# Patient Record
Sex: FEMALE | Race: WHITE | Employment: OTHER | URBAN - METROPOLITAN AREA
[De-identification: names, ages, dates, MRNs, and addresses within clinical notes are randomized per-mention and may not be internally consistent; named-entity substitution may affect disease eponyms.]

---

## 2017-06-22 PROBLEM — S13.9XXA CERVICAL SPRAIN: Status: ACTIVE | Noted: 2017-06-22

## 2017-08-25 ENCOUNTER — CARE COORDINATION (OUTPATIENT)
Dept: CARE COORDINATION | Age: 72
End: 2017-08-25

## 2018-04-11 ENCOUNTER — OFFICE VISIT (OUTPATIENT)
Dept: FAMILY MEDICINE CLINIC | Age: 73
End: 2018-04-11
Payer: MEDICARE

## 2018-04-11 VITALS
SYSTOLIC BLOOD PRESSURE: 122 MMHG | TEMPERATURE: 97.6 F | BODY MASS INDEX: 27.8 KG/M2 | HEART RATE: 81 BPM | DIASTOLIC BLOOD PRESSURE: 76 MMHG | RESPIRATION RATE: 16 BRPM | HEIGHT: 66 IN | OXYGEN SATURATION: 96 % | WEIGHT: 173 LBS

## 2018-04-11 DIAGNOSIS — B02.9 HERPES ZOSTER WITHOUT COMPLICATION: Primary | ICD-10-CM

## 2018-04-11 PROCEDURE — G8599 NO ASA/ANTIPLAT THER USE RNG: HCPCS | Performed by: PHYSICIAN ASSISTANT

## 2018-04-11 PROCEDURE — 4040F PNEUMOC VAC/ADMIN/RCVD: CPT | Performed by: PHYSICIAN ASSISTANT

## 2018-04-11 PROCEDURE — G8399 PT W/DXA RESULTS DOCUMENT: HCPCS | Performed by: PHYSICIAN ASSISTANT

## 2018-04-11 PROCEDURE — 99214 OFFICE O/P EST MOD 30 MIN: CPT | Performed by: PHYSICIAN ASSISTANT

## 2018-04-11 PROCEDURE — 1036F TOBACCO NON-USER: CPT | Performed by: PHYSICIAN ASSISTANT

## 2018-04-11 PROCEDURE — 3014F SCREEN MAMMO DOC REV: CPT | Performed by: PHYSICIAN ASSISTANT

## 2018-04-11 PROCEDURE — 1090F PRES/ABSN URINE INCON ASSESS: CPT | Performed by: PHYSICIAN ASSISTANT

## 2018-04-11 PROCEDURE — 3017F COLORECTAL CA SCREEN DOC REV: CPT | Performed by: PHYSICIAN ASSISTANT

## 2018-04-11 PROCEDURE — 1123F ACP DISCUSS/DSCN MKR DOCD: CPT | Performed by: PHYSICIAN ASSISTANT

## 2018-04-11 PROCEDURE — G8427 DOCREV CUR MEDS BY ELIG CLIN: HCPCS | Performed by: PHYSICIAN ASSISTANT

## 2018-04-11 PROCEDURE — G8419 CALC BMI OUT NRM PARAM NOF/U: HCPCS | Performed by: PHYSICIAN ASSISTANT

## 2018-04-11 RX ORDER — ACYCLOVIR 800 MG/1
800 TABLET ORAL 3 TIMES DAILY
Qty: 21 TABLET | Refills: 0 | Status: SHIPPED | OUTPATIENT
Start: 2018-04-11 | End: 2018-04-18

## 2018-04-11 RX ORDER — FAMCICLOVIR 500 MG/1
500 TABLET, FILM COATED ORAL 3 TIMES DAILY
Qty: 21 TABLET | Refills: 0 | Status: SHIPPED | OUTPATIENT
Start: 2018-04-11 | End: 2018-04-11 | Stop reason: ALTCHOICE

## 2018-05-01 ENCOUNTER — OFFICE VISIT (OUTPATIENT)
Dept: FAMILY MEDICINE CLINIC | Age: 73
End: 2018-05-01
Payer: MEDICARE

## 2018-05-01 ENCOUNTER — HOSPITAL ENCOUNTER (OUTPATIENT)
Age: 73
Discharge: HOME | End: 2018-05-03
Payer: MEDICARE

## 2018-05-01 VITALS
HEART RATE: 86 BPM | DIASTOLIC BLOOD PRESSURE: 64 MMHG | WEIGHT: 173 LBS | SYSTOLIC BLOOD PRESSURE: 120 MMHG | BODY MASS INDEX: 27.15 KG/M2 | TEMPERATURE: 97.7 F | OXYGEN SATURATION: 97 % | HEIGHT: 67 IN

## 2018-05-01 DIAGNOSIS — F41.9 ANXIETY: ICD-10-CM

## 2018-05-01 DIAGNOSIS — Z00.00 ROUTINE GENERAL MEDICAL EXAMINATION AT A HEALTH CARE FACILITY: Primary | ICD-10-CM

## 2018-05-01 DIAGNOSIS — Z12.31 ENCOUNTER FOR SCREENING MAMMOGRAM FOR BREAST CANCER: ICD-10-CM

## 2018-05-01 DIAGNOSIS — Z72.89 OTHER PROBLEMS RELATED TO LIFESTYLE: ICD-10-CM

## 2018-05-01 DIAGNOSIS — Z00.00 ROUTINE GENERAL MEDICAL EXAMINATION AT A HEALTH CARE FACILITY: ICD-10-CM

## 2018-05-01 DIAGNOSIS — L29.9 PRURITUS: ICD-10-CM

## 2018-05-01 PROCEDURE — 1036F TOBACCO NON-USER: CPT | Performed by: FAMILY MEDICINE

## 2018-05-01 PROCEDURE — 1123F ACP DISCUSS/DSCN MKR DOCD: CPT | Performed by: FAMILY MEDICINE

## 2018-05-01 PROCEDURE — G8427 DOCREV CUR MEDS BY ELIG CLIN: HCPCS | Performed by: FAMILY MEDICINE

## 2018-05-01 PROCEDURE — G8598 ASA/ANTIPLAT THER USED: HCPCS | Performed by: FAMILY MEDICINE

## 2018-05-01 PROCEDURE — G8399 PT W/DXA RESULTS DOCUMENT: HCPCS | Performed by: FAMILY MEDICINE

## 2018-05-01 PROCEDURE — 4040F PNEUMOC VAC/ADMIN/RCVD: CPT | Performed by: FAMILY MEDICINE

## 2018-05-01 PROCEDURE — 1090F PRES/ABSN URINE INCON ASSESS: CPT | Performed by: FAMILY MEDICINE

## 2018-05-01 PROCEDURE — 3017F COLORECTAL CA SCREEN DOC REV: CPT | Performed by: FAMILY MEDICINE

## 2018-05-01 PROCEDURE — G0439 PPPS, SUBSEQ VISIT: HCPCS | Performed by: FAMILY MEDICINE

## 2018-05-01 PROCEDURE — 86803 HEPATITIS C AB TEST: CPT

## 2018-05-01 PROCEDURE — 99213 OFFICE O/P EST LOW 20 MIN: CPT | Performed by: FAMILY MEDICINE

## 2018-05-01 PROCEDURE — G8417 CALC BMI ABV UP PARAM F/U: HCPCS | Performed by: FAMILY MEDICINE

## 2018-05-01 RX ORDER — RIZATRIPTAN BENZOATE 5 MG/1
5 TABLET ORAL
Qty: 30 TABLET | Refills: 0 | Status: ON HOLD
Start: 2018-05-01 | End: 2018-11-22 | Stop reason: HOSPADM

## 2018-05-01 RX ORDER — HYDROXYZINE HYDROCHLORIDE 25 MG/1
50 TABLET, FILM COATED ORAL 3 TIMES DAILY PRN
Qty: 90 TABLET | Refills: 1 | Status: SHIPPED | OUTPATIENT
Start: 2018-05-01 | End: 2018-05-11

## 2018-05-01 ASSESSMENT — LIFESTYLE VARIABLES
HOW OFTEN DURING THE LAST YEAR HAVE YOU NEEDED AN ALCOHOLIC DRINK FIRST THING IN THE MORNING TO GET YOURSELF GOING AFTER A NIGHT OF HEAVY DRINKING: 0
HAVE YOU OR SOMEONE ELSE BEEN INJURED AS A RESULT OF YOUR DRINKING: 0
HOW OFTEN DURING THE LAST YEAR HAVE YOU FOUND THAT YOU WERE NOT ABLE TO STOP DRINKING ONCE YOU HAD STARTED: 0
HOW OFTEN DURING THE LAST YEAR HAVE YOU HAD A FEELING OF GUILT OR REMORSE AFTER DRINKING: 0
HOW OFTEN DO YOU HAVE A DRINK CONTAINING ALCOHOL: 1
AUDIT TOTAL SCORE: 1
HOW OFTEN DURING THE LAST YEAR HAVE YOU BEEN UNABLE TO REMEMBER WHAT HAPPENED THE NIGHT BEFORE BECAUSE YOU HAD BEEN DRINKING: 0
HOW MANY STANDARD DRINKS CONTAINING ALCOHOL DO YOU HAVE ON A TYPICAL DAY: 0
HOW OFTEN DURING THE LAST YEAR HAVE YOU FAILED TO DO WHAT WAS NORMALLY EXPECTED FROM YOU BECAUSE OF DRINKING: 0
HAS A RELATIVE, FRIEND, DOCTOR, OR ANOTHER HEALTH PROFESSIONAL EXPRESSED CONCERN ABOUT YOUR DRINKING OR SUGGESTED YOU CUT DOWN: 0
AUDIT-C TOTAL SCORE: 1
HOW OFTEN DO YOU HAVE SIX OR MORE DRINKS ON ONE OCCASION: 0

## 2018-05-01 ASSESSMENT — PATIENT HEALTH QUESTIONNAIRE - PHQ9: SUM OF ALL RESPONSES TO PHQ QUESTIONS 1-9: 2

## 2018-05-01 ASSESSMENT — ANXIETY QUESTIONNAIRES: GAD7 TOTAL SCORE: 4

## 2018-05-02 LAB — HEPATITIS C ANTIBODY INTERPRETATION: NORMAL

## 2018-05-15 ENCOUNTER — HOSPITAL ENCOUNTER (OUTPATIENT)
Dept: GENERAL RADIOLOGY | Age: 73
Discharge: HOME | End: 2018-05-17
Payer: MEDICARE

## 2018-05-15 DIAGNOSIS — Z00.00 ROUTINE GENERAL MEDICAL EXAMINATION AT A HEALTH CARE FACILITY: ICD-10-CM

## 2018-05-15 DIAGNOSIS — Z12.31 ENCOUNTER FOR SCREENING MAMMOGRAM FOR BREAST CANCER: ICD-10-CM

## 2018-05-15 PROCEDURE — 77063 BREAST TOMOSYNTHESIS BI: CPT

## 2018-05-17 ENCOUNTER — OFFICE VISIT (OUTPATIENT)
Dept: CARDIOLOGY CLINIC | Age: 73
End: 2018-05-17
Payer: MEDICARE

## 2018-05-17 VITALS
WEIGHT: 172 LBS | DIASTOLIC BLOOD PRESSURE: 70 MMHG | RESPIRATION RATE: 16 BRPM | HEART RATE: 81 BPM | BODY MASS INDEX: 27.64 KG/M2 | HEIGHT: 66 IN | SYSTOLIC BLOOD PRESSURE: 130 MMHG

## 2018-05-17 DIAGNOSIS — I50.23 ACUTE ON CHRONIC SYSTOLIC HEART FAILURE (HCC): ICD-10-CM

## 2018-05-17 DIAGNOSIS — I27.20 PULMONARY HYPERTENSION (HCC): ICD-10-CM

## 2018-05-17 DIAGNOSIS — M54.16 LUMBAR RADICULOPATHY: ICD-10-CM

## 2018-05-17 DIAGNOSIS — I10 ESSENTIAL HYPERTENSION: ICD-10-CM

## 2018-05-17 DIAGNOSIS — I50.22 CHRONIC SYSTOLIC CONGESTIVE HEART FAILURE (HCC): ICD-10-CM

## 2018-05-17 DIAGNOSIS — I25.5 ISCHEMIC CARDIOMYOPATHY: ICD-10-CM

## 2018-05-17 DIAGNOSIS — I38 VHD (VALVULAR HEART DISEASE): ICD-10-CM

## 2018-05-17 DIAGNOSIS — I42.8 NONISCHEMIC CARDIOMYOPATHY (HCC): Primary | ICD-10-CM

## 2018-05-17 PROCEDURE — 93000 ELECTROCARDIOGRAM COMPLETE: CPT | Performed by: INTERNAL MEDICINE

## 2018-05-17 PROCEDURE — G8427 DOCREV CUR MEDS BY ELIG CLIN: HCPCS | Performed by: INTERNAL MEDICINE

## 2018-05-17 PROCEDURE — G8399 PT W/DXA RESULTS DOCUMENT: HCPCS | Performed by: INTERNAL MEDICINE

## 2018-05-17 PROCEDURE — G8417 CALC BMI ABV UP PARAM F/U: HCPCS | Performed by: INTERNAL MEDICINE

## 2018-05-17 PROCEDURE — 1123F ACP DISCUSS/DSCN MKR DOCD: CPT | Performed by: INTERNAL MEDICINE

## 2018-05-17 PROCEDURE — 1036F TOBACCO NON-USER: CPT | Performed by: INTERNAL MEDICINE

## 2018-05-17 PROCEDURE — 99214 OFFICE O/P EST MOD 30 MIN: CPT | Performed by: INTERNAL MEDICINE

## 2018-05-17 PROCEDURE — 4040F PNEUMOC VAC/ADMIN/RCVD: CPT | Performed by: INTERNAL MEDICINE

## 2018-05-17 PROCEDURE — 1090F PRES/ABSN URINE INCON ASSESS: CPT | Performed by: INTERNAL MEDICINE

## 2018-05-17 PROCEDURE — G8598 ASA/ANTIPLAT THER USED: HCPCS | Performed by: INTERNAL MEDICINE

## 2018-05-17 PROCEDURE — 3017F COLORECTAL CA SCREEN DOC REV: CPT | Performed by: INTERNAL MEDICINE

## 2018-05-17 RX ORDER — ASPIRIN 325 MG
325 TABLET ORAL DAILY
Status: ON HOLD | COMMUNITY
End: 2018-11-22 | Stop reason: HOSPADM

## 2018-05-17 RX ORDER — TRAMADOL HYDROCHLORIDE 50 MG/1
50 TABLET ORAL EVERY 6 HOURS PRN
COMMUNITY
End: 2018-12-11

## 2018-05-17 RX ORDER — CARVEDILOL PHOSPHATE 10 MG/1
10 CAPSULE, EXTENDED RELEASE ORAL DAILY
Qty: 90 CAPSULE | Refills: 3 | Status: SHIPPED | OUTPATIENT
Start: 2018-05-17 | End: 2018-11-02 | Stop reason: SDUPTHER

## 2018-09-07 ENCOUNTER — OFFICE VISIT (OUTPATIENT)
Dept: FAMILY MEDICINE CLINIC | Age: 73
End: 2018-09-07
Payer: MEDICARE

## 2018-09-07 VITALS
DIASTOLIC BLOOD PRESSURE: 66 MMHG | BODY MASS INDEX: 27.47 KG/M2 | HEART RATE: 75 BPM | HEIGHT: 67 IN | WEIGHT: 175 LBS | OXYGEN SATURATION: 97 % | SYSTOLIC BLOOD PRESSURE: 118 MMHG | TEMPERATURE: 97.8 F

## 2018-09-07 DIAGNOSIS — J01.90 ACUTE BACTERIAL SINUSITIS: Primary | ICD-10-CM

## 2018-09-07 DIAGNOSIS — B96.89 ACUTE BACTERIAL SINUSITIS: Primary | ICD-10-CM

## 2018-09-07 PROCEDURE — G8598 ASA/ANTIPLAT THER USED: HCPCS | Performed by: FAMILY MEDICINE

## 2018-09-07 PROCEDURE — 1101F PT FALLS ASSESS-DOCD LE1/YR: CPT | Performed by: FAMILY MEDICINE

## 2018-09-07 PROCEDURE — G8427 DOCREV CUR MEDS BY ELIG CLIN: HCPCS | Performed by: FAMILY MEDICINE

## 2018-09-07 PROCEDURE — G8417 CALC BMI ABV UP PARAM F/U: HCPCS | Performed by: FAMILY MEDICINE

## 2018-09-07 PROCEDURE — 1090F PRES/ABSN URINE INCON ASSESS: CPT | Performed by: FAMILY MEDICINE

## 2018-09-07 PROCEDURE — 3017F COLORECTAL CA SCREEN DOC REV: CPT | Performed by: FAMILY MEDICINE

## 2018-09-07 PROCEDURE — 99213 OFFICE O/P EST LOW 20 MIN: CPT | Performed by: FAMILY MEDICINE

## 2018-09-07 PROCEDURE — 1123F ACP DISCUSS/DSCN MKR DOCD: CPT | Performed by: FAMILY MEDICINE

## 2018-09-07 PROCEDURE — 4040F PNEUMOC VAC/ADMIN/RCVD: CPT | Performed by: FAMILY MEDICINE

## 2018-09-07 PROCEDURE — G8399 PT W/DXA RESULTS DOCUMENT: HCPCS | Performed by: FAMILY MEDICINE

## 2018-09-07 PROCEDURE — 1036F TOBACCO NON-USER: CPT | Performed by: FAMILY MEDICINE

## 2018-09-07 RX ORDER — BENZONATATE 100 MG/1
100 CAPSULE ORAL 3 TIMES DAILY PRN
Qty: 21 CAPSULE | Refills: 0 | Status: SHIPPED | OUTPATIENT
Start: 2018-09-07 | End: 2018-09-14

## 2018-09-07 RX ORDER — CEFDINIR 300 MG/1
300 CAPSULE ORAL 2 TIMES DAILY
Qty: 20 CAPSULE | Refills: 0 | Status: ON HOLD | OUTPATIENT
Start: 2018-09-07 | End: 2018-11-22 | Stop reason: HOSPADM

## 2018-09-07 ASSESSMENT — ENCOUNTER SYMPTOMS
SORE THROAT: 1
ABDOMINAL PAIN: 0
NAUSEA: 0
DOUBLE VISION: 0
COUGH: 1
ORTHOPNEA: 0
SHORTNESS OF BREATH: 0
BLOOD IN STOOL: 0
HEARTBURN: 0
SPUTUM PRODUCTION: 1
WHEEZING: 0
DIARRHEA: 0
CONSTIPATION: 0
SINUS PAIN: 1
VOMITING: 0
BACK PAIN: 1
BLURRED VISION: 0

## 2018-09-07 NOTE — PATIENT INSTRUCTIONS
Patient Education        Saline Nasal Washes: Care Instructions  Your Care Instructions  Saline nasal washes help keep the nasal passages open by washing out thick or dried mucus. This simple remedy can help relieve symptoms of allergies, sinusitis, and colds. It also can make the nose feel more comfortable by keeping the mucous membranes moist. You may notice a little burning sensation in your nose the first few times you use the solution, but this usually gets better in a few days. Follow-up care is a key part of your treatment and safety. Be sure to make and go to all appointments, and call your doctor if you are having problems. It's also a good idea to know your test results and keep a list of the medicines you take. How can you care for yourself at home? · You can buy premixed saline solution in a squeeze bottle or other sinus rinse products at a drugstore. Read and follow the instructions on the label. · You also can make your own saline solution by adding 1 teaspoon of salt and 1 teaspoon of baking soda to 2 cups of distilled water. · If you use a homemade solution, pour a small amount into a clean bowl. Using a rubber bulb syringe, squeeze the syringe and place the tip in the salt water. Pull a small amount of the salt water into the syringe by relaxing your hand. · Sit down with your head tilted slightly back. Do not lie down. Put the tip of the bulb syringe or the squeeze bottle a little way into one of your nostrils. Gently drip or squirt a few drops into the nostril. Repeat with the other nostril. Some sneezing and gagging are normal at first.  · Gently blow your nose. · Wipe the syringe or bottle tip clean after each use. · Repeat this 2 or 3 times a day. · Use nasal washes gently if you have nosebleeds often. When should you call for help?   Watch closely for changes in your health, and be sure to contact your doctor if:    · You often get nosebleeds.     · You have problems doing the nasal washes. Where can you learn more? Go to https://chpepiceweb.InfoNow. org and sign in to your [employer] account. Enter [phone] in the [facility] box to learn more about \"Saline Nasal Washes: Care Instructions. \"     If you do not have an account, please click on the \"Sign Up Now\" link. Current as of: May 12, 2017  Content Version: 11.7  © 5239-4831 [employer]. Care instructions adapted under license by [facility] ([facility]). If you have questions about a medical condition or this instruction, always ask your healthcare professional. [address] any warranty or liability for your use of this information. Patient Education        Sinusitis: Care Instructions  Your Care Instructions    Sinusitis is an infection of the lining of the sinus cavities in your head. Sinusitis often follows a cold. It causes pain and pressure in your head and face. In most cases, sinusitis gets better on its own in 1 to 2 weeks. But some mild symptoms may last for several weeks. Sometimes antibiotics are needed. Follow-up care is a key part of your treatment and safety. Be sure to make and go to all appointments, and call your doctor if you are having problems. It's also a good idea to know your test results and keep a list of the medicines you take. How can you care for yourself at home? · Take an over-the-counter pain medicine, such as acetaminophen (Tylenol), ibuprofen (Advil, Motrin), or naproxen (Aleve). Read and follow all instructions on the label. · If the doctor prescribed antibiotics, take them as directed. Do not stop taking them just because you feel better. You need to take the full course of antibiotics. · Be careful when taking over-the-counter cold or flu medicines and Tylenol at the same time. Many of these medicines have acetaminophen, which is Tylenol. Read the labels to make sure that you are not taking more than the recommended dose.  Too much acetaminophen (Tylenol) can be harmful. · Breathe warm, moist air from a steamy shower, a hot bath, or a sink filled with hot water. Avoid cold, dry air. Using a humidifier in your home may help. Follow the directions for cleaning the machine. · Use saline (saltwater) nasal washes to help keep your nasal passages open and wash out mucus and bacteria. You can buy saline nose drops at a grocery store or drugstore. Or you can make your own at home by adding 1 teaspoon of salt and 1 teaspoon of baking soda to 2 cups of distilled water. If you make your own, fill a bulb syringe with the solution, insert the tip into your nostril, and squeeze gently. [place] your nose. · Put a hot, wet towel or a warm gel pack on your face 3 or 4 times a day for 5 to 10 minutes each time. · Try a decongestant nasal spray like oxymetazoline (Afrin). Do not use it for more than 3 days in a row. Using it for more than 3 days can make your congestion worse. When should you call for help? Call your doctor now or seek immediate medical care if:    · You have new or worse swelling or redness in your face or around your eyes.     · You have a new or higher fever.    Watch closely for changes in your health, and be sure to contact your doctor if:    · You have new or worse facial pain.     · The mucus from your nose becomes thicker (like pus) or has new blood in it.     · You are not getting better as expected. Where can you learn more? Go to https://Oohly.CrowdBouncer. org and sign in to your [employer] account. Enter K989 in the [facility] box to learn more about \"Sinusitis: Care Instructions. \"     If you do not have an account, please click on the \"Sign Up Now\" link. Current as of: May 12, 2017  Content Version: 11.7  © 3972-9690 "[employer]orporated. Care instructions adapted under license by [facility] ([facility]).  If you have questions about a medical condition or this instruction, always ask your healthcare professional. [employer] disclaims any warranty or liability for your use of this information.

## 2018-09-07 NOTE — PROGRESS NOTES
Reba Trejo is a 68 y.o. female who presents today for assessment of URI symptoms. Sinus Pain: Patient complains of congestion, cough described as productive of clear sputum, facial pain, headache described as aching, nasal congestion, nausea without vomiting, post nasal drip, productive cough with  white colored sputum, sinus pressure, sore throat, tooth pain and wheezing. No fever, chills, night sweats or weight loss. Onset of symptoms was 2+ weeks ago, unchanged since that time. She is drinking plenty of fluids. Past history is significant for congestive heart failure. Patient is non-smoker. Since the onset of symptoms as above, she has been trying to manage symptoms with very conservative or home remedies (ie honey/cayenne pepper), cough drops. She has multiple medication and food allergies so she tries to avoid \"unnecssary\" medications. Symptoms did not respond to these measures. Allergy list reviewed: while there are multiple cardiac/antihypertensive medications listed, as well as acetaminophen, she has only sulfa antibiotics as antibiotic allergies documented. [address]:  Patient's past medical, surgical, social and/or family history reviewed, updated in chart, and are non-contributory (unless otherwise stated). Medications and allergies also reviewed and updated in chart. Review of Systems  Review of Systems   Constitutional: Negative for malaise/fatigue and weight loss. HENT: Positive for congestion, sinus pain and sore throat. Negative for ear pain. Eyes: Negative for blurred vision and double vision. Respiratory: Positive for cough and sputum production. Negative for shortness of breath and wheezing. Cardiovascular: Negative for chest pain, palpitations, orthopnea and leg swelling. Gastrointestinal: Negative for abdominal pain, blood in stool, constipation, diarrhea, heartburn, nausea and vomiting.    Genitourinary: Negative for dysuria, frequency, hematuria and urgency. Musculoskeletal: Positive for back pain. Negative for joint pain, myalgias and neck pain. Skin: Negative for itching and rash. Neurological: Negative for dizziness, tingling, focal weakness, weakness and headaches. Psychiatric/Behavioral: Negative for depression, memory loss and substance abuse. The patient is not nervous/anxious and does not have insomnia. Physical Exam:    VS:  /66   Pulse 75   Temp 97.8 °F (36.6 °C) (Oral)   Ht 5' 6.5\" (1.689 m)   Wt 175 lb (79.4 kg)   SpO2 97%   Breastfeeding? No   BMI 27.82 kg/m²   LAST WEIGHT:  Wt Readings from Last 3 Encounters:   09/07/18 175 lb (79.4 kg)   05/17/18 172 lb (78 kg)   05/01/18 173 lb (78.5 kg)     Physical Exam   Constitutional: She is oriented to person, place, and time. She appears well-developed and well-nourished. No distress. HENT:   Head: Normocephalic and atraumatic. Right Ear: External ear normal.   Left Ear: External ear normal.   Nose: Mucosal edema present. Right sinus exhibits maxillary sinus tenderness and frontal sinus tenderness. Left sinus exhibits maxillary sinus tenderness and frontal sinus tenderness. Mouth/Throat: Mucous membranes are normal. Posterior oropharyngeal edema and posterior oropharyngeal erythema present. No oropharyngeal exudate. Dull TMs   Eyes: Pupils are equal, round, and reactive to light. Conjunctivae and EOM are normal. Right eye exhibits no discharge. No scleral icterus. Neck: Normal range of motion. Neck supple. No thyromegaly present. Cardiovascular: Normal rate, regular rhythm, normal heart sounds and intact distal pulses. No murmur heard. Pulmonary/Chest: Effort normal. No stridor. No respiratory distress. She has no wheezes. She has no rales. She exhibits no tenderness. Abdominal: Soft. Bowel sounds are normal. She exhibits no distension and no mass. There is no tenderness. There is no guarding. Musculoskeletal: Normal range of motion.  She exhibits no edema or tenderness. Lymphadenopathy:     She has no cervical adenopathy. Neurological: She is alert and oriented to person, place, and time. Skin: Skin is warm and dry. No rash noted. She is not diaphoretic. No erythema. No pallor. Psychiatric: She has a normal mood and affect. Her behavior is normal. Thought content normal.       Labs:  Lab Results   Component Value Date     12/18/2016    K 3.9 12/18/2016     12/18/2016    CO2 19 12/18/2016    BUN 19 12/18/2016    CREATININE 0.8 12/18/2016    PROT 6.7 12/18/2016    LABALBU 3.2 12/18/2016    CALCIUM 8.6 12/18/2016    GFRAA >60 12/18/2016    LABGLOM >60 12/18/2016    GLUCOSE 106 12/18/2016    GLUCOSE 120 10/12/2011     12/18/2016    ALT 43 12/18/2016    ALKPHOS 86 12/18/2016    BILITOT 0.8 12/18/2016    TSH 1.050 11/14/2015    CHOL 204 11/14/2015    TRIG 71 11/14/2015    HDL 85 11/14/2015    LDLCALC 105 11/14/2015        Lab Results   Component Value Date    CHOL 204 (H) 11/14/2015    CHOL 235 (H) 07/02/2015     Lab Results   Component Value Date    TRIG 71 11/14/2015    TRIG 182 (H) 07/02/2015     Lab Results   Component Value Date    HDL 85 11/14/2015    HDL 69 07/02/2015     Lab Results   Component Value Date    LDLCALC 105 (H) 11/14/2015    LDLCALC 130 (H) 07/02/2015       No results found for: LABA1C  Lab Results   Component Value Date    LDLCALC 105 (H) 11/14/2015    CREATININE 0.8 12/18/2016           Assessment / Plan:      Raysa Burns was seen today for uri. Diagnoses and all orders for this visit:    Acute bacterial sinusitis  -     cefdinir (OMNICEF) 300 MG capsule; Take 1 capsule by mouth 2 times daily  -     benzonatate (TESSALON) 100 MG capsule; Take 1 capsule by mouth 3 times daily as needed for Cough       Call or go to ED immediately if symptoms worsen or persist.    Follow Up:  Return if symptoms worsen or fail to improve. or sooner if necessary.       Educational materials and/or home exercises printed for patient's review and were

## 2018-10-12 DIAGNOSIS — I50.23 ACUTE ON CHRONIC SYSTOLIC HEART FAILURE (HCC): ICD-10-CM

## 2018-10-12 DIAGNOSIS — I10 ESSENTIAL HYPERTENSION: ICD-10-CM

## 2018-10-12 DIAGNOSIS — I42.9 SECONDARY CARDIOMYOPATHY (HCC): ICD-10-CM

## 2018-10-12 RX ORDER — SPIRONOLACTONE 25 MG/1
25 TABLET ORAL DAILY
Qty: 90 TABLET | Refills: 3 | Status: SHIPPED | OUTPATIENT
Start: 2018-10-12 | End: 2019-11-17 | Stop reason: SDUPTHER

## 2018-10-12 RX ORDER — HYDRALAZINE HYDROCHLORIDE 10 MG/1
10 TABLET, FILM COATED ORAL 3 TIMES DAILY
Qty: 270 TABLET | Refills: 3 | Status: ON HOLD | OUTPATIENT
Start: 2018-10-12 | End: 2019-03-11 | Stop reason: HOSPADM

## 2018-11-02 DIAGNOSIS — I10 ESSENTIAL HYPERTENSION: ICD-10-CM

## 2018-11-02 DIAGNOSIS — I25.5 ISCHEMIC CARDIOMYOPATHY: ICD-10-CM

## 2018-11-02 DIAGNOSIS — I50.23 ACUTE ON CHRONIC SYSTOLIC HEART FAILURE (HCC): ICD-10-CM

## 2018-11-02 RX ORDER — CARVEDILOL PHOSPHATE 10 MG/1
10 CAPSULE, EXTENDED RELEASE ORAL DAILY
Qty: 90 CAPSULE | Refills: 3 | Status: ON HOLD | OUTPATIENT
Start: 2018-11-02 | End: 2019-03-11 | Stop reason: HOSPADM

## 2018-11-20 ENCOUNTER — APPOINTMENT (OUTPATIENT)
Dept: GENERAL RADIOLOGY | Age: 73
End: 2018-11-20
Payer: MEDICARE

## 2018-11-20 ENCOUNTER — HOSPITAL ENCOUNTER (OUTPATIENT)
Age: 73
Setting detail: OBSERVATION
Discharge: HOME | End: 2018-11-22
Attending: EMERGENCY MEDICINE | Admitting: INTERNAL MEDICINE
Payer: MEDICARE

## 2018-11-20 DIAGNOSIS — M25.511 ACUTE PAIN OF RIGHT SHOULDER: ICD-10-CM

## 2018-11-20 DIAGNOSIS — I48.91 NEW ONSET A-FIB (HCC): Primary | ICD-10-CM

## 2018-11-20 DIAGNOSIS — I48.92 ATRIAL FLUTTER WITH RAPID VENTRICULAR RESPONSE (HCC): ICD-10-CM

## 2018-11-20 PROBLEM — S13.9XXA CERVICAL SPRAIN: Status: RESOLVED | Noted: 2017-06-22 | Resolved: 2018-11-20

## 2018-11-20 LAB
ALBUMIN SERPL-MCNC: 3.9 G/DL (ref 3.5–5.2)
ALP BLD-CCNC: 106 U/L (ref 35–104)
ALT SERPL-CCNC: 15 U/L (ref 0–32)
ANION GAP SERPL CALCULATED.3IONS-SCNC: 14 MMOL/L (ref 7–16)
APTT: 26.1 SEC (ref 24.5–35.1)
AST SERPL-CCNC: 18 U/L (ref 0–31)
BASOPHILS ABSOLUTE: 0.05 E9/L (ref 0–0.2)
BASOPHILS RELATIVE PERCENT: 0.6 % (ref 0–2)
BILIRUB SERPL-MCNC: 0.5 MG/DL (ref 0–1.2)
BUN BLDV-MCNC: 17 MG/DL (ref 8–23)
CALCIUM SERPL-MCNC: 9.5 MG/DL (ref 8.6–10.2)
CHLORIDE BLD-SCNC: 103 MMOL/L (ref 98–107)
CO2: 21 MMOL/L (ref 22–29)
CREAT SERPL-MCNC: 1.1 MG/DL (ref 0.5–1)
EOSINOPHILS ABSOLUTE: 0.13 E9/L (ref 0.05–0.5)
EOSINOPHILS RELATIVE PERCENT: 1.7 % (ref 0–6)
GFR AFRICAN AMERICAN: 59
GFR NON-AFRICAN AMERICAN: 49 ML/MIN/1.73
GLUCOSE BLD-MCNC: 121 MG/DL (ref 74–99)
HCT VFR BLD CALC: 41 % (ref 34–48)
HEMOGLOBIN: 13.4 G/DL (ref 11.5–15.5)
IMMATURE GRANULOCYTES #: 0.02 E9/L
IMMATURE GRANULOCYTES %: 0.3 % (ref 0–5)
INR BLD: 1.1
LYMPHOCYTES ABSOLUTE: 1.25 E9/L (ref 1.5–4)
LYMPHOCYTES RELATIVE PERCENT: 16.1 % (ref 20–42)
MAGNESIUM: 2.2 MG/DL (ref 1.6–2.6)
MCH RBC QN AUTO: 27.9 PG (ref 26–35)
MCHC RBC AUTO-ENTMCNC: 32.7 % (ref 32–34.5)
MCV RBC AUTO: 85.2 FL (ref 80–99.9)
MONOCYTES ABSOLUTE: 0.45 E9/L (ref 0.1–0.95)
MONOCYTES RELATIVE PERCENT: 5.8 % (ref 2–12)
NEUTROPHILS ABSOLUTE: 5.88 E9/L (ref 1.8–7.3)
NEUTROPHILS RELATIVE PERCENT: 75.5 % (ref 43–80)
PDW BLD-RTO: 14.3 FL (ref 11.5–15)
PLATELET # BLD: 344 E9/L (ref 130–450)
PMV BLD AUTO: 10 FL (ref 7–12)
POTASSIUM SERPL-SCNC: 4.7 MMOL/L (ref 3.5–5)
PRO-BNP: 2907 PG/ML (ref 0–125)
PROTHROMBIN TIME: 12.4 SEC (ref 9.3–12.4)
RBC # BLD: 4.81 E12/L (ref 3.5–5.5)
SODIUM BLD-SCNC: 138 MMOL/L (ref 132–146)
TOTAL PROTEIN: 7.5 G/DL (ref 6.4–8.3)
TROPONIN: <0.01 NG/ML (ref 0–0.03)
TROPONIN: <0.01 NG/ML (ref 0–0.03)
WBC # BLD: 7.8 E9/L (ref 4.5–11.5)

## 2018-11-20 PROCEDURE — 83880 ASSAY OF NATRIURETIC PEPTIDE: CPT

## 2018-11-20 PROCEDURE — 2580000003 HC RX 258: Performed by: EMERGENCY MEDICINE

## 2018-11-20 PROCEDURE — 84484 ASSAY OF TROPONIN QUANT: CPT

## 2018-11-20 PROCEDURE — 99285 EMERGENCY DEPT VISIT HI MDM: CPT

## 2018-11-20 PROCEDURE — 96376 TX/PRO/DX INJ SAME DRUG ADON: CPT

## 2018-11-20 PROCEDURE — G0378 HOSPITAL OBSERVATION PER HR: HCPCS

## 2018-11-20 PROCEDURE — 93005 ELECTROCARDIOGRAM TRACING: CPT | Performed by: PHYSICIAN ASSISTANT

## 2018-11-20 PROCEDURE — 96365 THER/PROPH/DIAG IV INF INIT: CPT

## 2018-11-20 PROCEDURE — 93005 ELECTROCARDIOGRAM TRACING: CPT | Performed by: EMERGENCY MEDICINE

## 2018-11-20 PROCEDURE — 85025 COMPLETE CBC W/AUTO DIFF WBC: CPT

## 2018-11-20 PROCEDURE — 94761 N-INVAS EAR/PLS OXIMETRY MLT: CPT

## 2018-11-20 PROCEDURE — 96372 THER/PROPH/DIAG INJ SC/IM: CPT

## 2018-11-20 PROCEDURE — 71046 X-RAY EXAM CHEST 2 VIEWS: CPT

## 2018-11-20 PROCEDURE — 93016 CV STRESS TEST SUPVJ ONLY: CPT | Performed by: INTERNAL MEDICINE

## 2018-11-20 PROCEDURE — 80053 COMPREHEN METABOLIC PANEL: CPT

## 2018-11-20 PROCEDURE — 73030 X-RAY EXAM OF SHOULDER: CPT

## 2018-11-20 PROCEDURE — 83735 ASSAY OF MAGNESIUM: CPT

## 2018-11-20 PROCEDURE — 85730 THROMBOPLASTIN TIME PARTIAL: CPT

## 2018-11-20 PROCEDURE — 2500000003 HC RX 250 WO HCPCS: Performed by: EMERGENCY MEDICINE

## 2018-11-20 PROCEDURE — 36415 COLL VENOUS BLD VENIPUNCTURE: CPT

## 2018-11-20 PROCEDURE — 6360000002 HC RX W HCPCS: Performed by: EMERGENCY MEDICINE

## 2018-11-20 PROCEDURE — 85610 PROTHROMBIN TIME: CPT

## 2018-11-20 RX ORDER — SPIRONOLACTONE 25 MG/1
25 TABLET ORAL DAILY
Status: DISCONTINUED | OUTPATIENT
Start: 2018-11-21 | End: 2018-11-22 | Stop reason: HOSPADM

## 2018-11-20 RX ORDER — SODIUM CHLORIDE 0.9 % (FLUSH) 0.9 %
10 SYRINGE (ML) INJECTION EVERY 12 HOURS SCHEDULED
Status: DISCONTINUED | OUTPATIENT
Start: 2018-11-20 | End: 2018-11-22 | Stop reason: HOSPADM

## 2018-11-20 RX ORDER — ONDANSETRON 2 MG/ML
4 INJECTION INTRAMUSCULAR; INTRAVENOUS EVERY 6 HOURS PRN
Status: DISCONTINUED | OUTPATIENT
Start: 2018-11-20 | End: 2018-11-22 | Stop reason: HOSPADM

## 2018-11-20 RX ORDER — TRAMADOL HYDROCHLORIDE 50 MG/1
50 TABLET ORAL EVERY 6 HOURS PRN
Status: DISCONTINUED | OUTPATIENT
Start: 2018-11-20 | End: 2018-11-22 | Stop reason: HOSPADM

## 2018-11-20 RX ORDER — SODIUM CHLORIDE 0.9 % (FLUSH) 0.9 %
10 SYRINGE (ML) INJECTION PRN
Status: DISCONTINUED | OUTPATIENT
Start: 2018-11-20 | End: 2018-11-22 | Stop reason: HOSPADM

## 2018-11-20 RX ORDER — CARVEDILOL PHOSPHATE 10 MG/1
10 CAPSULE, EXTENDED RELEASE ORAL DAILY
Status: DISCONTINUED | OUTPATIENT
Start: 2018-11-21 | End: 2018-11-20 | Stop reason: RX

## 2018-11-20 RX ORDER — SUMATRIPTAN 25 MG/1
50 TABLET, FILM COATED ORAL
Status: DISPENSED | OUTPATIENT
Start: 2018-11-20 | End: 2018-11-20

## 2018-11-20 RX ORDER — RIZATRIPTAN BENZOATE 5 MG/1
5 TABLET ORAL
Status: DISCONTINUED | OUTPATIENT
Start: 2018-11-20 | End: 2018-11-20 | Stop reason: RX

## 2018-11-20 RX ORDER — BUSPIRONE HYDROCHLORIDE 10 MG/1
10 TABLET ORAL 3 TIMES DAILY PRN
Status: DISCONTINUED | OUTPATIENT
Start: 2018-11-20 | End: 2018-11-22

## 2018-11-20 RX ORDER — HYDRALAZINE HYDROCHLORIDE 10 MG/1
10 TABLET, FILM COATED ORAL 3 TIMES DAILY
Status: DISCONTINUED | OUTPATIENT
Start: 2018-11-20 | End: 2018-11-22 | Stop reason: HOSPADM

## 2018-11-20 RX ORDER — CARVEDILOL 3.12 MG/1
3.12 TABLET ORAL 2 TIMES DAILY WITH MEALS
Status: DISCONTINUED | OUTPATIENT
Start: 2018-11-21 | End: 2018-11-21

## 2018-11-20 RX ORDER — ASCORBIC ACID 500 MG
1000 TABLET ORAL DAILY
Status: DISCONTINUED | OUTPATIENT
Start: 2018-11-21 | End: 2018-11-22 | Stop reason: HOSPADM

## 2018-11-20 RX ORDER — ACETAMINOPHEN 325 MG/1
650 TABLET ORAL EVERY 4 HOURS PRN
Status: DISCONTINUED | OUTPATIENT
Start: 2018-11-20 | End: 2018-11-22 | Stop reason: HOSPADM

## 2018-11-20 RX ORDER — ASPIRIN 325 MG
325 TABLET ORAL DAILY
Status: DISCONTINUED | OUTPATIENT
Start: 2018-11-21 | End: 2018-11-21

## 2018-11-20 RX ADMIN — DILTIAZEM HYDROCHLORIDE 25 MG: 5 INJECTION INTRAVENOUS at 20:52

## 2018-11-20 RX ADMIN — ENOXAPARIN SODIUM 70 MG: 80 INJECTION SUBCUTANEOUS at 22:29

## 2018-11-20 ASSESSMENT — PAIN SCALES - GENERAL
PAINLEVEL_OUTOF10: 6
PAINLEVEL_OUTOF10: 6

## 2018-11-20 ASSESSMENT — PAIN DESCRIPTION - FREQUENCY
FREQUENCY: CONTINUOUS
FREQUENCY: CONTINUOUS

## 2018-11-20 ASSESSMENT — PAIN DESCRIPTION - PROGRESSION
CLINICAL_PROGRESSION: GRADUALLY WORSENING
CLINICAL_PROGRESSION: NOT CHANGED

## 2018-11-20 ASSESSMENT — PAIN DESCRIPTION - ORIENTATION
ORIENTATION: RIGHT
ORIENTATION: RIGHT

## 2018-11-20 ASSESSMENT — PAIN DESCRIPTION - LOCATION
LOCATION: HAND
LOCATION: HAND

## 2018-11-20 ASSESSMENT — PAIN DESCRIPTION - PAIN TYPE
TYPE: ACUTE PAIN
TYPE: ACUTE PAIN

## 2018-11-20 ASSESSMENT — PAIN DESCRIPTION - DESCRIPTORS
DESCRIPTORS: ACHING
DESCRIPTORS: ACHING

## 2018-11-21 ENCOUNTER — APPOINTMENT (OUTPATIENT)
Dept: NUCLEAR MEDICINE | Age: 73
End: 2018-11-21
Payer: MEDICARE

## 2018-11-21 ENCOUNTER — ANESTHESIA EVENT (OUTPATIENT)
Dept: CARDIAC CATH/INVASIVE PROCEDURES | Age: 73
End: 2018-11-21
Payer: MEDICARE

## 2018-11-21 ENCOUNTER — APPOINTMENT (OUTPATIENT)
Dept: CARDIAC CATH/INVASIVE PROCEDURES | Age: 73
End: 2018-11-21
Payer: MEDICARE

## 2018-11-21 ENCOUNTER — ANESTHESIA (OUTPATIENT)
Dept: CARDIAC CATH/INVASIVE PROCEDURES | Age: 73
End: 2018-11-21
Payer: MEDICARE

## 2018-11-21 VITALS
DIASTOLIC BLOOD PRESSURE: 60 MMHG | OXYGEN SATURATION: 98 % | RESPIRATION RATE: 23 BRPM | SYSTOLIC BLOOD PRESSURE: 100 MMHG

## 2018-11-21 PROBLEM — I10 HTN (HYPERTENSION), BENIGN: Chronic | Status: ACTIVE | Noted: 2018-11-21

## 2018-11-21 LAB
ALBUMIN SERPL-MCNC: 3.6 G/DL (ref 3.5–5.2)
ALP BLD-CCNC: 94 U/L (ref 35–104)
ALT SERPL-CCNC: 12 U/L (ref 0–32)
ANION GAP SERPL CALCULATED.3IONS-SCNC: 13 MMOL/L (ref 7–16)
AST SERPL-CCNC: 15 U/L (ref 0–31)
BASOPHILS ABSOLUTE: 0.06 E9/L (ref 0–0.2)
BASOPHILS RELATIVE PERCENT: 1 % (ref 0–2)
BILIRUB SERPL-MCNC: 0.5 MG/DL (ref 0–1.2)
BUN BLDV-MCNC: 19 MG/DL (ref 8–23)
CALCIUM SERPL-MCNC: 9 MG/DL (ref 8.6–10.2)
CHLORIDE BLD-SCNC: 105 MMOL/L (ref 98–107)
CHOLESTEROL, TOTAL: 179 MG/DL (ref 0–199)
CO2: 24 MMOL/L (ref 22–29)
CREAT SERPL-MCNC: 1.2 MG/DL (ref 0.5–1)
EKG ATRIAL RATE: 133 BPM
EKG ATRIAL RATE: 138 BPM
EKG Q-T INTERVAL: 292 MS
EKG Q-T INTERVAL: 342 MS
EKG QRS DURATION: 100 MS
EKG QRS DURATION: 102 MS
EKG QTC CALCULATION (BAZETT): 402 MS
EKG QTC CALCULATION (BAZETT): 475 MS
EKG R AXIS: 94 DEGREES
EKG R AXIS: 96 DEGREES
EKG T AXIS: 166 DEGREES
EKG T AXIS: 27 DEGREES
EKG VENTRICULAR RATE: 114 BPM
EKG VENTRICULAR RATE: 116 BPM
EOSINOPHILS ABSOLUTE: 0.21 E9/L (ref 0.05–0.5)
EOSINOPHILS RELATIVE PERCENT: 3.4 % (ref 0–6)
GFR AFRICAN AMERICAN: 53
GFR NON-AFRICAN AMERICAN: 44 ML/MIN/1.73
GLUCOSE BLD-MCNC: 82 MG/DL (ref 74–99)
HCT VFR BLD CALC: 38.8 % (ref 34–48)
HDLC SERPL-MCNC: 51 MG/DL
HEMOGLOBIN: 12.6 G/DL (ref 11.5–15.5)
IMMATURE GRANULOCYTES #: 0.02 E9/L
IMMATURE GRANULOCYTES %: 0.3 % (ref 0–5)
LDL CHOLESTEROL CALCULATED: 114 MG/DL (ref 0–99)
LEFT VENTRICULAR EJECTION FRACTION MODE: NORMAL
LV EF: 23 %
LV EF: 30 %
LVEF MODALITY: NORMAL
LYMPHOCYTES ABSOLUTE: 1.55 E9/L (ref 1.5–4)
LYMPHOCYTES RELATIVE PERCENT: 25.4 % (ref 20–42)
MAGNESIUM: 2.3 MG/DL (ref 1.6–2.6)
MCH RBC QN AUTO: 27.7 PG (ref 26–35)
MCHC RBC AUTO-ENTMCNC: 32.5 % (ref 32–34.5)
MCV RBC AUTO: 85.3 FL (ref 80–99.9)
MONOCYTES ABSOLUTE: 0.54 E9/L (ref 0.1–0.95)
MONOCYTES RELATIVE PERCENT: 8.8 % (ref 2–12)
NEUTROPHILS ABSOLUTE: 3.73 E9/L (ref 1.8–7.3)
NEUTROPHILS RELATIVE PERCENT: 61.1 % (ref 43–80)
PDW BLD-RTO: 14.5 FL (ref 11.5–15)
PLATELET # BLD: 307 E9/L (ref 130–450)
PMV BLD AUTO: 10.3 FL (ref 7–12)
POTASSIUM REFLEX MAGNESIUM: 4 MMOL/L (ref 3.5–5)
RBC # BLD: 4.55 E12/L (ref 3.5–5.5)
SODIUM BLD-SCNC: 142 MMOL/L (ref 132–146)
TOTAL PROTEIN: 6.8 G/DL (ref 6.4–8.3)
TRIGL SERPL-MCNC: 69 MG/DL (ref 0–149)
TROPONIN: <0.01 NG/ML (ref 0–0.03)
TSH SERPL DL<=0.05 MIU/L-ACNC: 3.11 UIU/ML (ref 0.27–4.2)
VLDLC SERPL CALC-MCNC: 14 MG/DL
WBC # BLD: 6.1 E9/L (ref 4.5–11.5)

## 2018-11-21 PROCEDURE — 6370000000 HC RX 637 (ALT 250 FOR IP): Performed by: INTERNAL MEDICINE

## 2018-11-21 PROCEDURE — 85025 COMPLETE CBC W/AUTO DIFF WBC: CPT

## 2018-11-21 PROCEDURE — 2580000003 HC RX 258: Performed by: ANESTHESIOLOGIST ASSISTANT

## 2018-11-21 PROCEDURE — 2580000003 HC RX 258: Performed by: INTERNAL MEDICINE

## 2018-11-21 PROCEDURE — 78452 HT MUSCLE IMAGE SPECT MULT: CPT

## 2018-11-21 PROCEDURE — 84484 ASSAY OF TROPONIN QUANT: CPT

## 2018-11-21 PROCEDURE — 36415 COLL VENOUS BLD VENIPUNCTURE: CPT

## 2018-11-21 PROCEDURE — 3700000001 HC ADD 15 MINUTES (ANESTHESIA)

## 2018-11-21 PROCEDURE — 93018 CV STRESS TEST I&R ONLY: CPT | Performed by: INTERNAL MEDICINE

## 2018-11-21 PROCEDURE — 3430000000 HC RX DIAGNOSTIC RADIOPHARMACEUTICAL: Performed by: RADIOLOGY

## 2018-11-21 PROCEDURE — G0378 HOSPITAL OBSERVATION PER HR: HCPCS

## 2018-11-21 PROCEDURE — 93321 DOPPLER ECHO F-UP/LMTD STD: CPT

## 2018-11-21 PROCEDURE — 6360000002 HC RX W HCPCS: Performed by: ANESTHESIOLOGIST ASSISTANT

## 2018-11-21 PROCEDURE — 3700000000 HC ANESTHESIA ATTENDED CARE

## 2018-11-21 PROCEDURE — 80061 LIPID PANEL: CPT

## 2018-11-21 PROCEDURE — 93312 ECHO TRANSESOPHAGEAL: CPT

## 2018-11-21 PROCEDURE — A9500 TC99M SESTAMIBI: HCPCS | Performed by: RADIOLOGY

## 2018-11-21 PROCEDURE — 93017 CV STRESS TEST TRACING ONLY: CPT

## 2018-11-21 PROCEDURE — 99205 OFFICE O/P NEW HI 60 MIN: CPT | Performed by: INTERNAL MEDICINE

## 2018-11-21 PROCEDURE — 6360000002 HC RX W HCPCS: Performed by: INTERNAL MEDICINE

## 2018-11-21 PROCEDURE — 84443 ASSAY THYROID STIM HORMONE: CPT

## 2018-11-21 PROCEDURE — 80053 COMPREHEN METABOLIC PANEL: CPT

## 2018-11-21 PROCEDURE — 83735 ASSAY OF MAGNESIUM: CPT

## 2018-11-21 PROCEDURE — 93325 DOPPLER ECHO COLOR FLOW MAPG: CPT

## 2018-11-21 PROCEDURE — 93306 TTE W/DOPPLER COMPLETE: CPT

## 2018-11-21 PROCEDURE — 2709999900 HC NON-CHARGEABLE SUPPLY

## 2018-11-21 PROCEDURE — APPSS60 APP SPLIT SHARED TIME 46-60 MINUTES: Performed by: NURSE PRACTITIONER

## 2018-11-21 PROCEDURE — 2700000000 HC OXYGEN THERAPY PER DAY

## 2018-11-21 RX ORDER — CARVEDILOL PHOSPHATE 10 MG/1
20 CAPSULE, EXTENDED RELEASE ORAL
Status: DISCONTINUED | OUTPATIENT
Start: 2018-11-21 | End: 2018-11-22 | Stop reason: HOSPADM

## 2018-11-21 RX ORDER — CARVEDILOL PHOSPHATE 10 MG/1
20 CAPSULE, EXTENDED RELEASE ORAL
Status: DISCONTINUED | OUTPATIENT
Start: 2018-11-22 | End: 2018-11-21

## 2018-11-21 RX ORDER — CARVEDILOL PHOSPHATE 10 MG/1
10 CAPSULE, EXTENDED RELEASE ORAL
Status: DISCONTINUED | OUTPATIENT
Start: 2018-11-21 | End: 2018-11-21

## 2018-11-21 RX ORDER — SODIUM CHLORIDE 9 MG/ML
INJECTION, SOLUTION INTRAVENOUS CONTINUOUS PRN
Status: DISCONTINUED | OUTPATIENT
Start: 2018-11-21 | End: 2018-11-21 | Stop reason: SDUPTHER

## 2018-11-21 RX ORDER — PROPOFOL 10 MG/ML
INJECTION, EMULSION INTRAVENOUS PRN
Status: DISCONTINUED | OUTPATIENT
Start: 2018-11-21 | End: 2018-11-21 | Stop reason: SDUPTHER

## 2018-11-21 RX ADMIN — Medication 10 ML: at 22:48

## 2018-11-21 RX ADMIN — Medication 35 MILLICURIE: at 12:15

## 2018-11-21 RX ADMIN — HYDRALAZINE HYDROCHLORIDE 10 MG: 10 TABLET, FILM COATED ORAL at 22:45

## 2018-11-21 RX ADMIN — PROPOFOL 30 MG: 10 INJECTION, EMULSION INTRAVENOUS at 14:52

## 2018-11-21 RX ADMIN — PROPOFOL 40 MG: 10 INJECTION, EMULSION INTRAVENOUS at 14:49

## 2018-11-21 RX ADMIN — TRAMADOL HYDROCHLORIDE 50 MG: 50 TABLET, FILM COATED ORAL at 00:22

## 2018-11-21 RX ADMIN — DICLOFENAC SODIUM 4 G: 10 GEL TOPICAL at 00:30

## 2018-11-21 RX ADMIN — DICLOFENAC SODIUM 4 G: 10 GEL TOPICAL at 16:15

## 2018-11-21 RX ADMIN — Medication 1000 MG: at 16:11

## 2018-11-21 RX ADMIN — PROPOFOL 20 MG: 10 INJECTION, EMULSION INTRAVENOUS at 14:56

## 2018-11-21 RX ADMIN — PROPOFOL 20 MG: 10 INJECTION, EMULSION INTRAVENOUS at 14:55

## 2018-11-21 RX ADMIN — Medication 10 ML: at 16:12

## 2018-11-21 RX ADMIN — CARVEDILOL PHOSPHATE 20 MG: 10 CAPSULE, EXTENDED RELEASE ORAL at 11:58

## 2018-11-21 RX ADMIN — SPIRONOLACTONE 25 MG: 25 TABLET ORAL at 16:11

## 2018-11-21 RX ADMIN — HYDRALAZINE HYDROCHLORIDE 10 MG: 10 TABLET, FILM COATED ORAL at 16:12

## 2018-11-21 RX ADMIN — Medication 10 ML: at 00:24

## 2018-11-21 RX ADMIN — REGADENOSON 0.4 MG: 0.08 INJECTION, SOLUTION INTRAVENOUS at 11:12

## 2018-11-21 RX ADMIN — SODIUM CHLORIDE: 9 INJECTION, SOLUTION INTRAVENOUS at 14:45

## 2018-11-21 RX ADMIN — APIXABAN 5 MG: 5 TABLET, FILM COATED ORAL at 23:07

## 2018-11-21 RX ADMIN — HYDRALAZINE HYDROCHLORIDE 10 MG: 10 TABLET, FILM COATED ORAL at 00:35

## 2018-11-21 RX ADMIN — Medication 12 MILLICURIE: at 09:42

## 2018-11-21 RX ADMIN — PROPOFOL 30 MG: 10 INJECTION, EMULSION INTRAVENOUS at 14:59

## 2018-11-21 RX ADMIN — APIXABAN 5 MG: 5 TABLET, FILM COATED ORAL at 14:09

## 2018-11-21 RX ADMIN — DICLOFENAC SODIUM 4 G: 10 GEL TOPICAL at 21:30

## 2018-11-21 ASSESSMENT — PAIN SCALES - GENERAL
PAINLEVEL_OUTOF10: 2
PAINLEVEL_OUTOF10: 0
PAINLEVEL_OUTOF10: 0
PAINLEVEL_OUTOF10: 5

## 2018-11-21 ASSESSMENT — PAIN DESCRIPTION - PAIN TYPE
TYPE: ACUTE PAIN
TYPE: ACUTE PAIN

## 2018-11-21 ASSESSMENT — PAIN DESCRIPTION - DESCRIPTORS
DESCRIPTORS: DULL;ACHING
DESCRIPTORS: DULL;ACHING

## 2018-11-21 ASSESSMENT — PAIN DESCRIPTION - LOCATION
LOCATION: ARM
LOCATION: ARM

## 2018-11-21 ASSESSMENT — PAIN DESCRIPTION - FREQUENCY: FREQUENCY: INTERMITTENT

## 2018-11-21 ASSESSMENT — PAIN DESCRIPTION - ORIENTATION
ORIENTATION: RIGHT
ORIENTATION: RIGHT

## 2018-11-21 NOTE — CONSULTS
I have personally seen and evaluated the patient. I personally obtained the history and performed the physical exam.  I personally reviewed all of the above labs, history, review of systems, and data. All of the assessments and recommendations are from me. All of the above cardiac medical decisions are from me. Please see my additional contributions to the history, physical exam, assessment, and recommendations below. History of chief complaint:  She developed significant left arm discomfort and numbness/aching feeling yesterday. This then progressed to left hand numbness. She presented emergency department was found to be in A. fib RVR. She was placed on IV Cardizem and her heart rate has been controlled. She denies any chest discomfort or shortness of breath. She denies any orthopnea/PND or lower extremity edema. Review of systems:     Heart: as above   Lungs: as above   Eyes: denies changes in vision or discharge. Ears: denies changes in hearing or pain. Nose: denies epistaxis or masses   Throat: denies sore throat or trouble swallowing. Neuro: denies numbness, tingling, tremors. Skin: denies rashes or itching. : denies hematuria, dysuria   GI: denies vomiting, diarrhea   Psych: denies mood changed, anxiety, depression. Physical exam:  BP (!) 153/85   Pulse 88   Temp 97.7 °F (36.5 °C) (Temporal)   Resp 20   Ht 5' 6.5\" (1.689 m)   Wt 178 lb 6.4 oz (80.9 kg)   SpO2 96%   Breastfeeding? No   BMI 28.36 kg/m²   Constitutional: A&O x3, communicates well, no acute distress. Eyes: extraocular muscles intact, PERRL. Normal lids & conjunctiva. No icterus. ENT: clear, no bleeding. No external masses. Lips normal formation. Neck: supple, full ROM, no JVD, no bruits, no lymphadenopathy. No masses. trachea midline. Heart: irregular rate & rhythm, normal S1 & S2, I-II/VI systolic murmur. No heave. Lungs: CTA. No accessory muscles. Abd: soft, non-tender.   Normal bowel

## 2018-11-21 NOTE — ANESTHESIA PRE PROCEDURE
Department of Anesthesiology  Preprocedure Note       Name:  Prasanna Zhao   Age:  68 y.o.  :  1945                                          MRN:  [id number]         Date:  2018      Surgeon: * Surgery not found *    Procedure: SEY JAIME CARDIOVERSION W/ ANES    Vital Signs (Current)   Vitals:    18 1234   BP: (!) 129/104   Pulse: 104   Resp: 19   Temp: 36.7 °C (98.1 °F)   SpO2: 99%     Vital Signs Statistics (for past 48 hrs)     Temp  Av.4 °C (97.5 °F)  Min: 35.8 °C (96.4 °F)   Min taken time: 18 0750  Max: 36.7 °C (98.1 °F)   Max taken time: 18 1234  Pulse  Av.6  Min: 79   Min taken time: 18 2109  Max: 126   Max taken time: 18  Resp  Av.4  Min: 9   Min taken time: 18 2200  Max: 26   Max taken time: 18  BP  Min: 115/70   Min taken time: 18 0750  Max: 153/85   Max taken time: 18 2304  SpO2  Av.5 %  Min: 28 %   Min taken time: 18  Max: 99 %   Max taken time: 18 1234    BP Readings from Last 3 Encounters:   18 (!) 129/104   18 118/66   18 130/70     BMI  Body mass index is 28.3 kg/m². Estimated body mass index is 28.3 kg/m² as calculated from the following:    Height as of this encounter: 5' 6.5\" (1.689 m). Weight as of this encounter: 178 lb (80.7 kg).     CBC   Lab Results   Component Value Date    WBC 6.1 2018    RBC 4.55 2018    HGB 12.6 2018    HCT 38.8 2018    MCV 85.3 2018    RDW 14.5 2018     2018     CMP    Lab Results   Component Value Date     2018    K 4.0 2018     2018    CO2 24 2018    BUN 19 2018    CREATININE 1.2 2018    GFRAA 53 2018    LABGLOM 44 2018    GLUCOSE 82 2018    GLUCOSE 120 10/12/2011    PROT 6.8 2018    CALCIUM 9.0 2018    BILITOT 0.5 2018    ALKPHOS 94 2018    AST 15 2018    ALT 12 2018     BMP    Lab Results   Component Value Date     11/21/2018    K 4.0 11/21/2018     11/21/2018    CO2 24 11/21/2018    BUN 19 11/21/2018    CREATININE 1.2 11/21/2018    CALCIUM 9.0 11/21/2018    GFRAA 53 11/21/2018    LABGLOM 44 11/21/2018    GLUCOSE 82 11/21/2018    GLUCOSE 120 10/12/2011     POCGlucose  Recent Labs      11/20/18   1745  11/21/18   0556   GLUCOSE  121*  82      Coags    Lab Results   Component Value Date    PROTIME 12.4 11/20/2018    PROTIME 12.0 12/17/2012    INR 1.1 11/20/2018    APTT 26.1 59/86/1215     HCG (If Applicable) No results found for: PREGTESTUR, PREGSERUM, HCG, HCGQUANT   ABGs No results found for: PHART, PO2ART, HFG7LEQ, DEW2QYN, BEART, S4KOZZYN   Type & Screen (If Applicable)  No results found for: [address]  Radiology (If Applicable)  Cardiac Testing (If Applicable)   Echo 8/39/25   Left ventricular chamber size normal, mild to moderate global hypokinesis,   F calculated and estimated about 38%. Stage 3 diastolic dysfunction. Left atrium is of enlarged. Increased LA volume index. Interatrial septum not well visualized but appears intact. Normal right ventricle structure and function. Moderate mitral regurgitation, ERO 0.2cm2, PISA 0.7cm, RV 36cc. No mitral valve prolapse. Normal aortic valve structure and function. There is mild tricuspid regurgitation. Mild pulmonary hypertension, RVSP 47mmHg. No evidence of pericardial effusion. No intracardiac mass. EKG (If Applicable)   16/23/03  Atrial fibrillation with rapid ventricular response  Rightward axis  Anterior infarct , age undetermined  Abnormal ECG  When compared with ECG of 20-NOV-2018 17:50,  No significant change was found  Confirmed by Carlos Jara ([id number]) on 11/21/2018 7:23:09 AM    Medications prior to admission:   Prior to Admission medications    Medication Sig Start Date End Date Taking?  Authorizing Provider   COREG CR 10 MG CP24 extended release capsule Take 1 capsule by mouth daily 11/2/18

## 2018-11-21 NOTE — PROCEDURES
Cardioversion note    JAIME demonstrates left atrial appendage thrombus. No cardioversion performed.

## 2018-11-21 NOTE — H&P
her father; Stroke in her mother. REVIEW OF SYSTEMS:  As above in the HPI, otherwise negative    PHYSICAL EXAM:    Vitals:  BP (!) 129/104   Pulse 104   Temp 98.1 °F (36.7 °C) (Temporal)   Resp 19   Ht 5' 6.5\" (1.689 m)   Wt 178 lb (80.7 kg)   SpO2 99%   Breastfeeding? No   BMI 28.30 kg/m²     General:  Awake, alert, oriented X 3. Well developed, well nourished, well groomed. No apparent distress. HEENT:  Normocephalic, atraumatic. No scleral icterus. No conjunctival injection. Normal lips, teeth, and gums. No nasal discharge. Neck:  Supple  Heart:  Tachy, irregular rhythm, no murmurs, gallops, or rubs  Lungs:  CTA bilaterally, bilat symmetrical expansion, no wheeze, rales, or rhonchi  Abdomen:   Bowel sounds present, soft, non distended, nontender, no masses, no organomegaly, no peritoneal signs  Extremities:  No clubbing, cyanosis, or edema  Skin:  Warm and dry, no open lesions or rash  Neuro:  Cranial nerves 2-12 intact, no focal deficits  Breast: deferred  Rectal: deferred  Genitalia:  deferred    LABS:  CBC:   Lab Results   Component Value Date    WBC 6.1 11/21/2018    RBC 4.55 11/21/2018    HGB 12.6 11/21/2018    HCT 38.8 11/21/2018    MCV 85.3 11/21/2018    MCH 27.7 11/21/2018    MCHC 32.5 11/21/2018    RDW 14.5 11/21/2018     11/21/2018    MPV 10.3 11/21/2018     BMP:    Lab Results   Component Value Date     11/21/2018    K 4.0 11/21/2018     11/21/2018    CO2 24 11/21/2018    BUN 19 11/21/2018    LABALBU 3.6 11/21/2018    CREATININE 1.2 11/21/2018    CALCIUM 9.0 11/21/2018    GFRAA 53 11/21/2018    LABGLOM 44 11/21/2018    GLUCOSE 82 11/21/2018     PT/INR:    Lab Results   Component Value Date    PROTIME 12.4 11/20/2018    INR 1.1 11/20/2018     Last 3 Troponin:    Lab Results   Component Value Date    TROPONINI <0.01 11/21/2018    TROPONINI <0.01 11/20/2018    TROPONINI <0.01 11/20/2018       Monitor-afib  EKG and imaging studies reviewed      ASSESSMENT:      Patient Active Problem List   Diagnosis    New onset atrial fibrillation    Nonischemic cardiomyopathy     Cervical and lumbar degenerative disc disease    HTN (hypertension), benign       PLAN:    1) admit to monitored bed  2) for JAIME/cardioversion today  3) rate/rhythm controlling agents and anticoagulation as per cardiology  4) home once rhythm stable and cardiac work up complete (?later today vs tomorrow AM)  5) the patient is expected to stay 2 or more midnights to evaluate and treat her new onset arrhythmia      Please note that over 50 minutes was spent in evaluating the patient, review of records and results, discussion with staff/family, etc.    Abigail Tate MD  2:01 PM  11/21/2018

## 2018-11-22 VITALS
RESPIRATION RATE: 18 BRPM | OXYGEN SATURATION: 99 % | DIASTOLIC BLOOD PRESSURE: 78 MMHG | HEIGHT: 67 IN | SYSTOLIC BLOOD PRESSURE: 118 MMHG | WEIGHT: 178 LBS | HEART RATE: 86 BPM | BODY MASS INDEX: 27.94 KG/M2 | TEMPERATURE: 97.2 F

## 2018-11-22 LAB
ALBUMIN SERPL-MCNC: 3.6 G/DL (ref 3.5–5.2)
ALP BLD-CCNC: 87 U/L (ref 35–104)
ALT SERPL-CCNC: 12 U/L (ref 0–32)
ANION GAP SERPL CALCULATED.3IONS-SCNC: 13 MMOL/L (ref 7–16)
AST SERPL-CCNC: 15 U/L (ref 0–31)
BASOPHILS ABSOLUTE: 0.04 E9/L (ref 0–0.2)
BASOPHILS RELATIVE PERCENT: 0.5 % (ref 0–2)
BILIRUB SERPL-MCNC: 0.4 MG/DL (ref 0–1.2)
BUN BLDV-MCNC: 21 MG/DL (ref 8–23)
CALCIUM SERPL-MCNC: 8.5 MG/DL (ref 8.6–10.2)
CHLORIDE BLD-SCNC: 104 MMOL/L (ref 98–107)
CO2: 21 MMOL/L (ref 22–29)
CREAT SERPL-MCNC: 1.1 MG/DL (ref 0.5–1)
EOSINOPHILS ABSOLUTE: 0.22 E9/L (ref 0.05–0.5)
EOSINOPHILS RELATIVE PERCENT: 3 % (ref 0–6)
GFR AFRICAN AMERICAN: 59
GFR NON-AFRICAN AMERICAN: 49 ML/MIN/1.73
GLUCOSE BLD-MCNC: 96 MG/DL (ref 74–99)
HCT VFR BLD CALC: 37.1 % (ref 34–48)
HEMOGLOBIN: 12.1 G/DL (ref 11.5–15.5)
IMMATURE GRANULOCYTES #: 0.02 E9/L
IMMATURE GRANULOCYTES %: 0.3 % (ref 0–5)
LYMPHOCYTES ABSOLUTE: 1.52 E9/L (ref 1.5–4)
LYMPHOCYTES RELATIVE PERCENT: 20.7 % (ref 20–42)
MCH RBC QN AUTO: 27.8 PG (ref 26–35)
MCHC RBC AUTO-ENTMCNC: 32.6 % (ref 32–34.5)
MCV RBC AUTO: 85.3 FL (ref 80–99.9)
MONOCYTES ABSOLUTE: 0.61 E9/L (ref 0.1–0.95)
MONOCYTES RELATIVE PERCENT: 8.3 % (ref 2–12)
NEUTROPHILS ABSOLUTE: 4.94 E9/L (ref 1.8–7.3)
NEUTROPHILS RELATIVE PERCENT: 67.2 % (ref 43–80)
PDW BLD-RTO: 14.5 FL (ref 11.5–15)
PLATELET # BLD: 293 E9/L (ref 130–450)
PMV BLD AUTO: 10 FL (ref 7–12)
POTASSIUM REFLEX MAGNESIUM: 4.2 MMOL/L (ref 3.5–5)
RBC # BLD: 4.35 E12/L (ref 3.5–5.5)
SODIUM BLD-SCNC: 138 MMOL/L (ref 132–146)
TOTAL PROTEIN: 6.5 G/DL (ref 6.4–8.3)
WBC # BLD: 7.4 E9/L (ref 4.5–11.5)

## 2018-11-22 PROCEDURE — 6370000000 HC RX 637 (ALT 250 FOR IP): Performed by: INTERNAL MEDICINE

## 2018-11-22 PROCEDURE — 80053 COMPREHEN METABOLIC PANEL: CPT

## 2018-11-22 PROCEDURE — 36415 COLL VENOUS BLD VENIPUNCTURE: CPT

## 2018-11-22 PROCEDURE — 85025 COMPLETE CBC W/AUTO DIFF WBC: CPT

## 2018-11-22 PROCEDURE — G0378 HOSPITAL OBSERVATION PER HR: HCPCS

## 2018-11-22 RX ORDER — HYDROXYZINE HYDROCHLORIDE 10 MG/1
10 TABLET, FILM COATED ORAL 3 TIMES DAILY PRN
Status: DISCONTINUED | OUTPATIENT
Start: 2018-11-22 | End: 2018-11-22 | Stop reason: HOSPADM

## 2018-11-22 RX ORDER — HYDROXYZINE HYDROCHLORIDE 10 MG/1
10 TABLET, FILM COATED ORAL 3 TIMES DAILY PRN
Qty: 30 TABLET | Refills: 0
Start: 2018-11-22 | End: 2018-11-29 | Stop reason: SDUPTHER

## 2018-11-22 RX ADMIN — HYDRALAZINE HYDROCHLORIDE 10 MG: 10 TABLET, FILM COATED ORAL at 10:28

## 2018-11-22 RX ADMIN — CARVEDILOL PHOSPHATE 20 MG: 10 CAPSULE, EXTENDED RELEASE ORAL at 10:29

## 2018-11-22 RX ADMIN — APIXABAN 5 MG: 5 TABLET, FILM COATED ORAL at 10:28

## 2018-11-22 RX ADMIN — DICLOFENAC SODIUM 4 G: 10 GEL TOPICAL at 10:30

## 2018-11-22 RX ADMIN — Medication 1000 MG: at 10:28

## 2018-11-22 RX ADMIN — HYDROXYZINE HYDROCHLORIDE 10 MG: 10 TABLET, FILM COATED ORAL at 10:29

## 2018-11-22 RX ADMIN — SPIRONOLACTONE 25 MG: 25 TABLET ORAL at 10:28

## 2018-11-22 ASSESSMENT — PAIN SCALES - GENERAL
PAINLEVEL_OUTOF10: 0
PAINLEVEL_OUTOF10: 0

## 2018-11-22 NOTE — DISCHARGE SUMMARY
Physician Discharge Summary     Patient ID:  Isabel Ga  [id number]  19 y.o.  1945    Admit date: 11/20/2018    Discharge date and time:  Nov 22, 2018    Admission Diagnoses:   Patient Active Problem List   Diagnosis    New onset atrial fibrillation    Nonischemic cardiomyopathy     Cervical and lumbar degenerative disc disease    HTN (hypertension), benign       Discharge Diagnoses: same    Consults: cardiology [facility])    Procedures: JAIME    Hospital Course: the patient is a 68 y.o. white woman followed by DR Kathie De Guzman who presents secondary to right sided arm/chest pain after swimming exercises. She was found to have tachycardia and new onset afib. She was admitted and evaluated by cardiology. Stress test was negative for ischemia. Echo demonstrated previously noted cardiomyopathy, with EF 30%. She was scheduled for cardioversion, but JAIME demonstrated clot in the left atrial appendage. Cardioversion was cancelled. She was given eliquis for anticoagulation and discharged home once heart rate was controlled, with plans for cardioversion after 3 weeks of anticoagulation if she remains in afib. Discharge Exam:  See progress note from today    Condition:  stable    Disposition: home    Patient Instructions:   Current Discharge Medication List      START taking these medications    Details   hydrOXYzine (ATARAX) 10 MG tablet Take 1 tablet by mouth 3 times daily as needed for Itching or Anxiety  Qty: 30 tablet, Refills: 0      apixaban (ELIQUIS) 5 MG TABS tablet Take 1 tablet by mouth 2 times daily  Qty: 60 tablet, Refills: 0         CONTINUE these medications which have NOT CHANGED    Details   COREG CR 10 MG CP24 extended release capsule Take 1 capsule by mouth daily  Qty: 90 capsule, Refills: 3    Associated Diagnoses: Acute on chronic systolic heart failure ([address]  Ischemic cardiomyopathy; Essential hypertension      hydrALAZINE (APRESOLINE) 10 MG tablet Take 1 tablet by mouth 3 times daily  Qty: 270

## 2018-11-22 NOTE — PROGRESS NOTES
Patient complained of \"feeling like she could not breathe\". Entered the room and obtained vital signs /78 pule 103 and O2 99% on room air. Patient admittied to history of panic attacks. Did breathing exercises with patient and after a few minutes she said she felt better. Dr. Itz Carlos called for prn atarax as patient does not take Buspar anymore for anxiety.  Awaiting orders

## 2018-11-27 ENCOUNTER — TELEPHONE (OUTPATIENT)
Dept: ADMINISTRATIVE | Age: 73
End: 2018-11-27

## 2018-11-29 ENCOUNTER — OFFICE VISIT (OUTPATIENT)
Dept: FAMILY MEDICINE CLINIC | Age: 73
End: 2018-11-29
Payer: MEDICARE

## 2018-11-29 VITALS
HEIGHT: 67 IN | BODY MASS INDEX: 27.55 KG/M2 | SYSTOLIC BLOOD PRESSURE: 120 MMHG | DIASTOLIC BLOOD PRESSURE: 64 MMHG | WEIGHT: 175.5 LBS | TEMPERATURE: 97.4 F | OXYGEN SATURATION: 98 % | RESPIRATION RATE: 16 BRPM | HEART RATE: 76 BPM

## 2018-11-29 DIAGNOSIS — Z79.01 CHRONIC ANTICOAGULATION: ICD-10-CM

## 2018-11-29 DIAGNOSIS — I48.91 NEW ONSET ATRIAL FIBRILLATION (HCC): Primary | ICD-10-CM

## 2018-11-29 DIAGNOSIS — T78.40XD ALLERGIC REACTION, SUBSEQUENT ENCOUNTER: ICD-10-CM

## 2018-11-29 DIAGNOSIS — Z23 NEED FOR INFLUENZA VACCINATION: ICD-10-CM

## 2018-11-29 PROCEDURE — G8598 ASA/ANTIPLAT THER USED: HCPCS | Performed by: NURSE PRACTITIONER

## 2018-11-29 PROCEDURE — 1036F TOBACCO NON-USER: CPT | Performed by: NURSE PRACTITIONER

## 2018-11-29 PROCEDURE — 4040F PNEUMOC VAC/ADMIN/RCVD: CPT | Performed by: NURSE PRACTITIONER

## 2018-11-29 PROCEDURE — 99214 OFFICE O/P EST MOD 30 MIN: CPT | Performed by: NURSE PRACTITIONER

## 2018-11-29 PROCEDURE — G8417 CALC BMI ABV UP PARAM F/U: HCPCS | Performed by: NURSE PRACTITIONER

## 2018-11-29 PROCEDURE — 90662 IIV NO PRSV INCREASED AG IM: CPT | Performed by: NURSE PRACTITIONER

## 2018-11-29 PROCEDURE — G0008 ADMIN INFLUENZA VIRUS VAC: HCPCS | Performed by: NURSE PRACTITIONER

## 2018-11-29 PROCEDURE — G8427 DOCREV CUR MEDS BY ELIG CLIN: HCPCS | Performed by: NURSE PRACTITIONER

## 2018-11-29 PROCEDURE — 1123F ACP DISCUSS/DSCN MKR DOCD: CPT | Performed by: NURSE PRACTITIONER

## 2018-11-29 PROCEDURE — 1090F PRES/ABSN URINE INCON ASSESS: CPT | Performed by: NURSE PRACTITIONER

## 2018-11-29 PROCEDURE — G8399 PT W/DXA RESULTS DOCUMENT: HCPCS | Performed by: NURSE PRACTITIONER

## 2018-11-29 PROCEDURE — 1101F PT FALLS ASSESS-DOCD LE1/YR: CPT | Performed by: NURSE PRACTITIONER

## 2018-11-29 PROCEDURE — G8482 FLU IMMUNIZE ORDER/ADMIN: HCPCS | Performed by: NURSE PRACTITIONER

## 2018-11-29 PROCEDURE — 3017F COLORECTAL CA SCREEN DOC REV: CPT | Performed by: NURSE PRACTITIONER

## 2018-11-29 RX ORDER — HYDROXYZINE HYDROCHLORIDE 10 MG/1
10 TABLET, FILM COATED ORAL 3 TIMES DAILY PRN
Qty: 90 TABLET | Refills: 3 | Status: SHIPPED | OUTPATIENT
Start: 2018-11-29 | End: 2018-12-29

## 2018-11-29 ASSESSMENT — ENCOUNTER SYMPTOMS
SHORTNESS OF BREATH: 0
WHEEZING: 0
DIARRHEA: 0
COUGH: 0
VOMITING: 0
NAUSEA: 0
CONSTIPATION: 0

## 2018-12-11 ENCOUNTER — OFFICE VISIT (OUTPATIENT)
Dept: CARDIOLOGY CLINIC | Age: 73
End: 2018-12-11
Payer: MEDICARE

## 2018-12-11 VITALS
BODY MASS INDEX: 28.12 KG/M2 | DIASTOLIC BLOOD PRESSURE: 80 MMHG | WEIGHT: 175 LBS | HEART RATE: 100 BPM | SYSTOLIC BLOOD PRESSURE: 110 MMHG | RESPIRATION RATE: 16 BRPM | HEIGHT: 66 IN

## 2018-12-11 DIAGNOSIS — I34.0 MITRAL VALVE INSUFFICIENCY, UNSPECIFIED ETIOLOGY: ICD-10-CM

## 2018-12-11 DIAGNOSIS — I48.91 NEW ONSET ATRIAL FIBRILLATION (HCC): Primary | ICD-10-CM

## 2018-12-11 DIAGNOSIS — I42.8 NONISCHEMIC CARDIOMYOPATHY (HCC): Chronic | ICD-10-CM

## 2018-12-11 DIAGNOSIS — I51.3 THROMBUS IN HEART CHAMBER: ICD-10-CM

## 2018-12-11 DIAGNOSIS — I10 HTN (HYPERTENSION), BENIGN: Chronic | ICD-10-CM

## 2018-12-11 PROCEDURE — 1123F ACP DISCUSS/DSCN MKR DOCD: CPT | Performed by: NURSE PRACTITIONER

## 2018-12-11 PROCEDURE — G8427 DOCREV CUR MEDS BY ELIG CLIN: HCPCS | Performed by: NURSE PRACTITIONER

## 2018-12-11 PROCEDURE — G8417 CALC BMI ABV UP PARAM F/U: HCPCS | Performed by: NURSE PRACTITIONER

## 2018-12-11 PROCEDURE — 99213 OFFICE O/P EST LOW 20 MIN: CPT | Performed by: NURSE PRACTITIONER

## 2018-12-11 PROCEDURE — G8598 ASA/ANTIPLAT THER USED: HCPCS | Performed by: NURSE PRACTITIONER

## 2018-12-11 PROCEDURE — 3017F COLORECTAL CA SCREEN DOC REV: CPT | Performed by: NURSE PRACTITIONER

## 2018-12-11 PROCEDURE — G8399 PT W/DXA RESULTS DOCUMENT: HCPCS | Performed by: NURSE PRACTITIONER

## 2018-12-11 PROCEDURE — 1090F PRES/ABSN URINE INCON ASSESS: CPT | Performed by: NURSE PRACTITIONER

## 2018-12-11 PROCEDURE — 93000 ELECTROCARDIOGRAM COMPLETE: CPT | Performed by: INTERNAL MEDICINE

## 2018-12-11 PROCEDURE — 1101F PT FALLS ASSESS-DOCD LE1/YR: CPT | Performed by: NURSE PRACTITIONER

## 2018-12-11 PROCEDURE — 4040F PNEUMOC VAC/ADMIN/RCVD: CPT | Performed by: NURSE PRACTITIONER

## 2018-12-11 PROCEDURE — 1036F TOBACCO NON-USER: CPT | Performed by: NURSE PRACTITIONER

## 2018-12-11 PROCEDURE — G8482 FLU IMMUNIZE ORDER/ADMIN: HCPCS | Performed by: NURSE PRACTITIONER

## 2018-12-12 NOTE — PROGRESS NOTES
11/25/13    Rt & LT cath    DIAGNOSTIC CARDIAC CATH LAB PROCEDURE  2/20/10    Dr Delphine Dumont CATH LAB PROCEDURE  8/24/11    Right heart cath @ [facility].  DOPPLER ECHOCARDIOGRAPHY  11/25/13    HYSTERECTOMY  1991    OVARY REMOVAL      TRANSESOPHAGEAL ECHOCARDIOGRAM  11/21/2018    Dr. Azeem Davila       Family History   Problem Relation Age of Onset    Heart Attack Mother     Stroke Mother     Heart Attack Father     Heart Failure Father     Heart Disease Father     Anxiety Disorder Sister     Depression Sister     Crohn's Disease Son        Social History     Social History    Marital status:      Spouse name: N/A    Number of children: N/A    Years of education: N/A     Occupational History    Not on file. Social History Main Topics    Smoking status: Never Smoker    Smokeless tobacco: Never Used    Alcohol use 0.0 oz/week      Comment: occasional wine/mixed drink; drinks  2 cups of coffee qd     Drug use: No    Sexual activity: No      Comment:      Other Topics Concern    Not on file     Social History Narrative    No narrative on file       O:  COMPLETE PHYSICAL EXAM:       /80   Pulse 100   Resp 16   Ht 5' 6\" (1.676 m)   Wt 175 lb (79.4 kg)   BMI 28.25 kg/m²       General:   in no acute distress. Well-nourished well-developed   Skin                  Warm and dry, no rashes   Head & Neck:  No JVD. No carotid bruits. Mucous membranes moist.   Chest:   No deformities. Symmetrical and nontender. No respiratory distress   Lungs:   Clear to auscultation bilaterally. Heart:  Normal S1 and S2. No S3 or S4. No Murmur. No gallops no rubs   Abdomen: Soft without organomegaly or masses. Nontender, Bowel sound     normoactive   Extremities:  No edema of lower extremities .  No cyanosis and moves all extremities   Neuro:  Alert & orient x 3 no focal deficits     REVIEW OF DIAGNOSTIC TESTS:     EKG today atrial fibrillation, poor R-wave progression nonspecific ST changes and low voltage and rate is 99     ASSESSMENT / DIAGNOSIS:    1. Dilated cardiomyopathy, congestive heart failure history she is compensated today   2. Mild-to-moderate mitral regurgitation by 2-D echocardiogram 12/2016And moderate mitral regurgitation by JAIME in November 2018,    3. Atrial fibrillation is persistent, on anticoagulation but not tolerating Eliquis. Will change to Xarelto. She prefers to follow up with Eva Cole in a few weeks to discuss cardioversion and AAD, ablation   4. Left atrial appendage thrombus by 2-D echocardiogram in November 2018   5. Multiple drug intolerances including ACE inhibitor and angiotensin receptor blockers      TREATMENT PLAN:   1. Stop Eliquis after tonight's dose    2 Xarelto 20 mg daily starting tomorrow. 3. Return to see Eva Cole in 3 weeks, consider DCCV, AAD   4. Consider increasing Coreg she will discuss with ,    5 .2-D echocardiogram in 3 months     The patient's current medication list, allergies, problem list and results of all previously ordered tests were reviewed at today's visit    [address]  [facility] [address]  [phone]

## 2019-01-02 ENCOUNTER — OFFICE VISIT (OUTPATIENT)
Dept: CARDIOLOGY CLINIC | Age: 74
End: 2019-01-02
Payer: MEDICARE

## 2019-01-02 VITALS
BODY MASS INDEX: 28.57 KG/M2 | OXYGEN SATURATION: 98 % | WEIGHT: 177.8 LBS | SYSTOLIC BLOOD PRESSURE: 124 MMHG | HEART RATE: 96 BPM | RESPIRATION RATE: 18 BRPM | DIASTOLIC BLOOD PRESSURE: 62 MMHG | HEIGHT: 66 IN

## 2019-01-02 DIAGNOSIS — I34.0 NON-RHEUMATIC MITRAL REGURGITATION: ICD-10-CM

## 2019-01-02 DIAGNOSIS — I10 ESSENTIAL HYPERTENSION: ICD-10-CM

## 2019-01-02 DIAGNOSIS — I51.3 THROMBUS OF LEFT ATRIAL APPENDAGE: ICD-10-CM

## 2019-01-02 DIAGNOSIS — I27.20 PULMONARY HTN (HCC): ICD-10-CM

## 2019-01-02 DIAGNOSIS — I42.8 NONISCHEMIC CARDIOMYOPATHY (HCC): ICD-10-CM

## 2019-01-02 DIAGNOSIS — I48.19 PERSISTENT ATRIAL FIBRILLATION (HCC): Primary | ICD-10-CM

## 2019-01-02 DIAGNOSIS — R06.02 SOB (SHORTNESS OF BREATH) ON EXERTION: ICD-10-CM

## 2019-01-02 DIAGNOSIS — Z88.9 MULTIPLE DRUG ALLERGIES: ICD-10-CM

## 2019-01-02 PROCEDURE — 99214 OFFICE O/P EST MOD 30 MIN: CPT | Performed by: INTERNAL MEDICINE

## 2019-01-02 PROCEDURE — 1090F PRES/ABSN URINE INCON ASSESS: CPT | Performed by: INTERNAL MEDICINE

## 2019-01-02 PROCEDURE — G8399 PT W/DXA RESULTS DOCUMENT: HCPCS | Performed by: INTERNAL MEDICINE

## 2019-01-02 PROCEDURE — G8417 CALC BMI ABV UP PARAM F/U: HCPCS | Performed by: INTERNAL MEDICINE

## 2019-01-02 PROCEDURE — 1123F ACP DISCUSS/DSCN MKR DOCD: CPT | Performed by: INTERNAL MEDICINE

## 2019-01-02 PROCEDURE — G8427 DOCREV CUR MEDS BY ELIG CLIN: HCPCS | Performed by: INTERNAL MEDICINE

## 2019-01-02 PROCEDURE — 3017F COLORECTAL CA SCREEN DOC REV: CPT | Performed by: INTERNAL MEDICINE

## 2019-01-02 PROCEDURE — G8482 FLU IMMUNIZE ORDER/ADMIN: HCPCS | Performed by: INTERNAL MEDICINE

## 2019-01-02 PROCEDURE — 1036F TOBACCO NON-USER: CPT | Performed by: INTERNAL MEDICINE

## 2019-01-02 PROCEDURE — 1101F PT FALLS ASSESS-DOCD LE1/YR: CPT | Performed by: INTERNAL MEDICINE

## 2019-01-02 PROCEDURE — 93000 ELECTROCARDIOGRAM COMPLETE: CPT | Performed by: INTERNAL MEDICINE

## 2019-01-02 PROCEDURE — 4040F PNEUMOC VAC/ADMIN/RCVD: CPT | Performed by: INTERNAL MEDICINE

## 2019-01-02 RX ORDER — HYDROXYZINE HYDROCHLORIDE 10 MG/1
10 TABLET, FILM COATED ORAL 3 TIMES DAILY PRN
COMMUNITY
End: 2019-06-25 | Stop reason: SDUPTHER

## 2019-01-09 ENCOUNTER — TELEPHONE (OUTPATIENT)
Dept: CARDIOLOGY CLINIC | Age: 74
End: 2019-01-09

## 2019-01-11 ENCOUNTER — TELEPHONE (OUTPATIENT)
Dept: NON INVASIVE DIAGNOSTICS | Age: 74
End: 2019-01-11

## 2019-02-07 ENCOUNTER — OFFICE VISIT (OUTPATIENT)
Dept: FAMILY MEDICINE CLINIC | Age: 74
End: 2019-02-07
Payer: MEDICARE

## 2019-02-07 VITALS
OXYGEN SATURATION: 97 % | TEMPERATURE: 97.7 F | DIASTOLIC BLOOD PRESSURE: 74 MMHG | WEIGHT: 178 LBS | SYSTOLIC BLOOD PRESSURE: 126 MMHG | HEIGHT: 66 IN | BODY MASS INDEX: 28.61 KG/M2 | HEART RATE: 88 BPM

## 2019-02-07 DIAGNOSIS — R10.813 RIGHT LOWER QUADRANT ABDOMINAL TENDERNESS WITHOUT REBOUND TENDERNESS: Primary | ICD-10-CM

## 2019-02-07 DIAGNOSIS — K43.9 HERNIA, EPIGASTRIC: ICD-10-CM

## 2019-02-07 PROCEDURE — 1123F ACP DISCUSS/DSCN MKR DOCD: CPT | Performed by: NURSE PRACTITIONER

## 2019-02-07 PROCEDURE — G8482 FLU IMMUNIZE ORDER/ADMIN: HCPCS | Performed by: NURSE PRACTITIONER

## 2019-02-07 PROCEDURE — 99213 OFFICE O/P EST LOW 20 MIN: CPT | Performed by: NURSE PRACTITIONER

## 2019-02-07 PROCEDURE — G8417 CALC BMI ABV UP PARAM F/U: HCPCS | Performed by: NURSE PRACTITIONER

## 2019-02-07 PROCEDURE — 1036F TOBACCO NON-USER: CPT | Performed by: NURSE PRACTITIONER

## 2019-02-07 PROCEDURE — 1101F PT FALLS ASSESS-DOCD LE1/YR: CPT | Performed by: NURSE PRACTITIONER

## 2019-02-07 PROCEDURE — 1090F PRES/ABSN URINE INCON ASSESS: CPT | Performed by: NURSE PRACTITIONER

## 2019-02-07 PROCEDURE — G8399 PT W/DXA RESULTS DOCUMENT: HCPCS | Performed by: NURSE PRACTITIONER

## 2019-02-07 PROCEDURE — 4040F PNEUMOC VAC/ADMIN/RCVD: CPT | Performed by: NURSE PRACTITIONER

## 2019-02-07 PROCEDURE — 3017F COLORECTAL CA SCREEN DOC REV: CPT | Performed by: NURSE PRACTITIONER

## 2019-02-07 PROCEDURE — G8427 DOCREV CUR MEDS BY ELIG CLIN: HCPCS | Performed by: NURSE PRACTITIONER

## 2019-02-07 PROCEDURE — G8510 SCR DEP NEG, NO PLAN REQD: HCPCS | Performed by: NURSE PRACTITIONER

## 2019-02-07 ASSESSMENT — PATIENT HEALTH QUESTIONNAIRE - PHQ9
SUM OF ALL RESPONSES TO PHQ9 QUESTIONS 1 & 2: 0
1. LITTLE INTEREST OR PLEASURE IN DOING THINGS: 0
2. FEELING DOWN, DEPRESSED OR HOPELESS: 0
SUM OF ALL RESPONSES TO PHQ QUESTIONS 1-9: 0
SUM OF ALL RESPONSES TO PHQ QUESTIONS 1-9: 0

## 2019-02-07 ASSESSMENT — ENCOUNTER SYMPTOMS
WHEEZING: 0
COUGH: 0
NAUSEA: 0
DIARRHEA: 0
CONSTIPATION: 0
SHORTNESS OF BREATH: 0
BACK PAIN: 1
VOMITING: 0

## 2019-02-14 ENCOUNTER — TELEPHONE (OUTPATIENT)
Dept: FAMILY MEDICINE CLINIC | Age: 74
End: 2019-02-14

## 2019-02-14 ENCOUNTER — HOSPITAL ENCOUNTER (OUTPATIENT)
Dept: CT IMAGING | Age: 74
Discharge: HOME | End: 2019-02-16
Payer: MEDICARE

## 2019-02-14 ENCOUNTER — HOSPITAL ENCOUNTER (OUTPATIENT)
Age: 74
Discharge: HOME | End: 2019-02-14
Payer: MEDICARE

## 2019-02-14 DIAGNOSIS — R10.823 RIGHT LOWER QUADRANT ABDOMINAL TENDERNESS WITH REBOUND TENDERNESS: Primary | ICD-10-CM

## 2019-02-14 DIAGNOSIS — K43.9 HERNIA, EPIGASTRIC: ICD-10-CM

## 2019-02-14 DIAGNOSIS — R10.813 RIGHT LOWER QUADRANT ABDOMINAL TENDERNESS WITHOUT REBOUND TENDERNESS: ICD-10-CM

## 2019-02-14 DIAGNOSIS — R10.823 RIGHT LOWER QUADRANT ABDOMINAL TENDERNESS WITH REBOUND TENDERNESS: ICD-10-CM

## 2019-02-14 LAB
ALBUMIN SERPL-MCNC: 4.2 G/DL (ref 3.5–5.2)
ALP BLD-CCNC: 120 U/L (ref 35–104)
ALT SERPL-CCNC: 21 U/L (ref 0–32)
ANION GAP SERPL CALCULATED.3IONS-SCNC: 14 MMOL/L (ref 7–16)
AST SERPL-CCNC: 18 U/L (ref 0–31)
BILIRUB SERPL-MCNC: 0.7 MG/DL (ref 0–1.2)
BUN BLDV-MCNC: 15 MG/DL (ref 8–23)
CALCIUM SERPL-MCNC: 9.7 MG/DL (ref 8.6–10.2)
CHLORIDE BLD-SCNC: 107 MMOL/L (ref 98–107)
CO2: 22 MMOL/L (ref 22–29)
CREAT SERPL-MCNC: 1 MG/DL (ref 0.5–1)
GFR AFRICAN AMERICAN: >60
GFR NON-AFRICAN AMERICAN: 54 ML/MIN/1.73
GLUCOSE BLD-MCNC: 119 MG/DL (ref 74–99)
POTASSIUM SERPL-SCNC: 4.4 MMOL/L (ref 3.5–5)
SODIUM BLD-SCNC: 143 MMOL/L (ref 132–146)
TOTAL PROTEIN: 7.9 G/DL (ref 6.4–8.3)

## 2019-02-14 PROCEDURE — 80053 COMPREHEN METABOLIC PANEL: CPT

## 2019-02-14 PROCEDURE — 6360000004 HC RX CONTRAST MEDICATION: Performed by: RADIOLOGY

## 2019-02-14 PROCEDURE — 36415 COLL VENOUS BLD VENIPUNCTURE: CPT

## 2019-02-14 PROCEDURE — 74178 CT ABD&PLV WO CNTR FLWD CNTR: CPT

## 2019-02-14 PROCEDURE — 2580000003 HC RX 258: Performed by: RADIOLOGY

## 2019-02-14 RX ORDER — SODIUM CHLORIDE 0.9 % (FLUSH) 0.9 %
10 SYRINGE (ML) INJECTION
Status: COMPLETED | OUTPATIENT
Start: 2019-02-14 | End: 2019-02-14

## 2019-02-14 RX ADMIN — Medication 10 ML: at 10:40

## 2019-02-14 RX ADMIN — IOPAMIDOL 110 ML: 755 INJECTION, SOLUTION INTRAVENOUS at 10:40

## 2019-03-09 ENCOUNTER — HOSPITAL ENCOUNTER (INPATIENT)
Age: 74
LOS: 2 days | Discharge: HOME | DRG: 308 | End: 2019-03-11
Attending: EMERGENCY MEDICINE | Admitting: INTERNAL MEDICINE
Payer: MEDICARE

## 2019-03-09 ENCOUNTER — APPOINTMENT (OUTPATIENT)
Dept: GENERAL RADIOLOGY | Age: 74
DRG: 308 | End: 2019-03-09
Payer: MEDICARE

## 2019-03-09 DIAGNOSIS — I48.91 ATRIAL FIBRILLATION WITH RAPID VENTRICULAR RESPONSE (HCC): Primary | ICD-10-CM

## 2019-03-09 PROBLEM — I51.3 THROMBUS OF LEFT ATRIAL APPENDAGE: Chronic | Status: ACTIVE | Noted: 2019-01-02

## 2019-03-09 LAB
ALBUMIN SERPL-MCNC: 3.8 G/DL (ref 3.5–5.2)
ALP BLD-CCNC: 155 U/L (ref 35–104)
ALT SERPL-CCNC: 36 U/L (ref 0–32)
ANION GAP SERPL CALCULATED.3IONS-SCNC: 16 MMOL/L (ref 7–16)
AST SERPL-CCNC: 35 U/L (ref 0–31)
BASOPHILS ABSOLUTE: 0.05 E9/L (ref 0–0.2)
BASOPHILS RELATIVE PERCENT: 0.6 % (ref 0–2)
BILIRUB SERPL-MCNC: 1 MG/DL (ref 0–1.2)
BUN BLDV-MCNC: 15 MG/DL (ref 8–23)
CALCIUM SERPL-MCNC: 9.4 MG/DL (ref 8.6–10.2)
CHLORIDE BLD-SCNC: 103 MMOL/L (ref 98–107)
CO2: 19 MMOL/L (ref 22–29)
CREAT SERPL-MCNC: 0.9 MG/DL (ref 0.5–1)
EOSINOPHILS ABSOLUTE: 0.07 E9/L (ref 0.05–0.5)
EOSINOPHILS RELATIVE PERCENT: 0.9 % (ref 0–6)
GFR AFRICAN AMERICAN: >60
GFR NON-AFRICAN AMERICAN: >60 ML/MIN/1.73
GLUCOSE BLD-MCNC: 184 MG/DL (ref 74–99)
HCT VFR BLD CALC: 39.4 % (ref 34–48)
HEMOGLOBIN: 12.8 G/DL (ref 11.5–15.5)
IMMATURE GRANULOCYTES #: 0.03 E9/L
IMMATURE GRANULOCYTES %: 0.4 % (ref 0–5)
INFLUENZA A BY PCR: NOT DETECTED
INFLUENZA B BY PCR: NOT DETECTED
LACTIC ACID: 2.6 MMOL/L (ref 0.5–2.2)
LYMPHOCYTES ABSOLUTE: 1.32 E9/L (ref 1.5–4)
LYMPHOCYTES RELATIVE PERCENT: 16.6 % (ref 20–42)
MCH RBC QN AUTO: 27.5 PG (ref 26–35)
MCHC RBC AUTO-ENTMCNC: 32.5 % (ref 32–34.5)
MCV RBC AUTO: 84.7 FL (ref 80–99.9)
MONOCYTES ABSOLUTE: 0.53 E9/L (ref 0.1–0.95)
MONOCYTES RELATIVE PERCENT: 6.7 % (ref 2–12)
NEUTROPHILS ABSOLUTE: 5.93 E9/L (ref 1.8–7.3)
NEUTROPHILS RELATIVE PERCENT: 74.8 % (ref 43–80)
PDW BLD-RTO: 16.1 FL (ref 11.5–15)
PLATELET # BLD: 286 E9/L (ref 130–450)
PMV BLD AUTO: 11.5 FL (ref 7–12)
POTASSIUM SERPL-SCNC: 4.1 MMOL/L (ref 3.5–5)
PRO-BNP: 5512 PG/ML (ref 0–450)
RBC # BLD: 4.65 E12/L (ref 3.5–5.5)
SODIUM BLD-SCNC: 138 MMOL/L (ref 132–146)
TOTAL PROTEIN: 7.3 G/DL (ref 6.4–8.3)
TROPONIN: <0.01 NG/ML (ref 0–0.03)
WBC # BLD: 7.9 E9/L (ref 4.5–11.5)

## 2019-03-09 PROCEDURE — 6360000002 HC RX W HCPCS: Performed by: EMERGENCY MEDICINE

## 2019-03-09 PROCEDURE — 83605 ASSAY OF LACTIC ACID: CPT

## 2019-03-09 PROCEDURE — 80053 COMPREHEN METABOLIC PANEL: CPT

## 2019-03-09 PROCEDURE — 36415 COLL VENOUS BLD VENIPUNCTURE: CPT

## 2019-03-09 PROCEDURE — 87040 BLOOD CULTURE FOR BACTERIA: CPT

## 2019-03-09 PROCEDURE — 2700000000 HC OXYGEN THERAPY PER DAY

## 2019-03-09 PROCEDURE — 87502 INFLUENZA DNA AMP PROBE: CPT

## 2019-03-09 PROCEDURE — 85025 COMPLETE CBC W/AUTO DIFF WBC: CPT

## 2019-03-09 PROCEDURE — 2580000003 HC RX 258: Performed by: INTERNAL MEDICINE

## 2019-03-09 PROCEDURE — 99285 EMERGENCY DEPT VISIT HI MDM: CPT

## 2019-03-09 PROCEDURE — 6370000000 HC RX 637 (ALT 250 FOR IP): Performed by: INTERNAL MEDICINE

## 2019-03-09 PROCEDURE — 2500000003 HC RX 250 WO HCPCS: Performed by: EMERGENCY MEDICINE

## 2019-03-09 PROCEDURE — 71045 X-RAY EXAM CHEST 1 VIEW: CPT

## 2019-03-09 PROCEDURE — 83880 ASSAY OF NATRIURETIC PEPTIDE: CPT

## 2019-03-09 PROCEDURE — 2500000003 HC RX 250 WO HCPCS: Performed by: NURSE PRACTITIONER

## 2019-03-09 PROCEDURE — 93005 ELECTROCARDIOGRAM TRACING: CPT | Performed by: EMERGENCY MEDICINE

## 2019-03-09 PROCEDURE — 96365 THER/PROPH/DIAG IV INF INIT: CPT

## 2019-03-09 PROCEDURE — 2580000003 HC RX 258: Performed by: EMERGENCY MEDICINE

## 2019-03-09 PROCEDURE — 6360000002 HC RX W HCPCS: Performed by: NURSE PRACTITIONER

## 2019-03-09 PROCEDURE — 2060000000 HC ICU INTERMEDIATE R&B

## 2019-03-09 PROCEDURE — 96375 TX/PRO/DX INJ NEW DRUG ADDON: CPT

## 2019-03-09 PROCEDURE — 84484 ASSAY OF TROPONIN QUANT: CPT

## 2019-03-09 PROCEDURE — 6370000000 HC RX 637 (ALT 250 FOR IP): Performed by: NURSE PRACTITIONER

## 2019-03-09 RX ORDER — IBUPROFEN 200 MG
400 TABLET ORAL EVERY 6 HOURS PRN
Status: DISCONTINUED | OUTPATIENT
Start: 2019-03-09 | End: 2019-03-11 | Stop reason: HOSPADM

## 2019-03-09 RX ORDER — CARVEDILOL PHOSPHATE 10 MG/1
20 CAPSULE, EXTENDED RELEASE ORAL DAILY
Status: DISCONTINUED | OUTPATIENT
Start: 2019-03-10 | End: 2019-03-11 | Stop reason: HOSPADM

## 2019-03-09 RX ORDER — FUROSEMIDE 10 MG/ML
20 INJECTION INTRAMUSCULAR; INTRAVENOUS ONCE
Status: COMPLETED | OUTPATIENT
Start: 2019-03-09 | End: 2019-03-09

## 2019-03-09 RX ORDER — SODIUM CHLORIDE 0.9 % (FLUSH) 0.9 %
10 SYRINGE (ML) INJECTION EVERY 12 HOURS SCHEDULED
Status: DISCONTINUED | OUTPATIENT
Start: 2019-03-09 | End: 2019-03-11 | Stop reason: HOSPADM

## 2019-03-09 RX ORDER — CARVEDILOL PHOSPHATE 10 MG/1
10 CAPSULE, EXTENDED RELEASE ORAL DAILY
Status: DISCONTINUED | OUTPATIENT
Start: 2019-03-09 | End: 2019-03-09

## 2019-03-09 RX ORDER — HYDROXYZINE HYDROCHLORIDE 10 MG/1
10 TABLET, FILM COATED ORAL 3 TIMES DAILY PRN
Status: DISCONTINUED | OUTPATIENT
Start: 2019-03-09 | End: 2019-03-11 | Stop reason: HOSPADM

## 2019-03-09 RX ORDER — DIPHENHYDRAMINE HCL 25 MG
25 TABLET ORAL EVERY 6 HOURS PRN
Status: DISCONTINUED | OUTPATIENT
Start: 2019-03-09 | End: 2019-03-11 | Stop reason: HOSPADM

## 2019-03-09 RX ORDER — ACETAMINOPHEN 325 MG/1
650 TABLET ORAL EVERY 4 HOURS PRN
Status: DISCONTINUED | OUTPATIENT
Start: 2019-03-09 | End: 2019-03-09

## 2019-03-09 RX ORDER — SPIRONOLACTONE 25 MG/1
25 TABLET ORAL DAILY
Status: DISCONTINUED | OUTPATIENT
Start: 2019-03-09 | End: 2019-03-11 | Stop reason: HOSPADM

## 2019-03-09 RX ORDER — SODIUM CHLORIDE 0.9 % (FLUSH) 0.9 %
10 SYRINGE (ML) INJECTION PRN
Status: DISCONTINUED | OUTPATIENT
Start: 2019-03-09 | End: 2019-03-11 | Stop reason: HOSPADM

## 2019-03-09 RX ORDER — HYDROXYZINE HYDROCHLORIDE 10 MG/1
10 TABLET, FILM COATED ORAL 3 TIMES DAILY PRN
Status: DISCONTINUED | OUTPATIENT
Start: 2019-03-09 | End: 2019-03-09

## 2019-03-09 RX ORDER — HYDRALAZINE HYDROCHLORIDE 10 MG/1
10 TABLET, FILM COATED ORAL 3 TIMES DAILY
Status: DISCONTINUED | OUTPATIENT
Start: 2019-03-09 | End: 2019-03-11 | Stop reason: HOSPADM

## 2019-03-09 RX ORDER — DIPHENHYDRAMINE HYDROCHLORIDE 50 MG/ML
25 INJECTION INTRAMUSCULAR; INTRAVENOUS ONCE
Status: COMPLETED | OUTPATIENT
Start: 2019-03-09 | End: 2019-03-09

## 2019-03-09 RX ORDER — WARFARIN SODIUM 5 MG/1
5 TABLET ORAL DAILY
Status: DISCONTINUED | OUTPATIENT
Start: 2019-03-09 | End: 2019-03-09

## 2019-03-09 RX ORDER — METOPROLOL TARTRATE 5 MG/5ML
5 INJECTION INTRAVENOUS ONCE
Status: COMPLETED | OUTPATIENT
Start: 2019-03-09 | End: 2019-03-09

## 2019-03-09 RX ADMIN — HYDRALAZINE HYDROCHLORIDE 10 MG: 10 TABLET, FILM COATED ORAL at 16:35

## 2019-03-09 RX ADMIN — SPIRONOLACTONE 25 MG: 25 TABLET ORAL at 16:35

## 2019-03-09 RX ADMIN — METOPROLOL TARTRATE 5 MG: 1 INJECTION, SOLUTION INTRAVENOUS at 18:12

## 2019-03-09 RX ADMIN — DIPHENHYDRAMINE HYDROCHLORIDE 25 MG: 50 INJECTION, SOLUTION INTRAMUSCULAR; INTRAVENOUS at 10:28

## 2019-03-09 RX ADMIN — IBUPROFEN 400 MG: 200 TABLET, FILM COATED ORAL at 21:13

## 2019-03-09 RX ADMIN — FUROSEMIDE 20 MG: 10 INJECTION, SOLUTION INTRAMUSCULAR; INTRAVENOUS at 18:11

## 2019-03-09 RX ADMIN — HYDRALAZINE HYDROCHLORIDE 10 MG: 10 TABLET, FILM COATED ORAL at 21:14

## 2019-03-09 RX ADMIN — DILTIAZEM HYDROCHLORIDE 25 MG: 5 INJECTION INTRAVENOUS at 11:33

## 2019-03-09 RX ADMIN — DIPHENHYDRAMINE HCL 25 MG: 25 TABLET ORAL at 18:12

## 2019-03-09 RX ADMIN — Medication 10 ML: at 21:15

## 2019-03-09 RX ADMIN — HYDROXYZINE HYDROCHLORIDE 10 MG: 10 TABLET, FILM COATED ORAL at 21:13

## 2019-03-09 ASSESSMENT — PAIN DESCRIPTION - PROGRESSION
CLINICAL_PROGRESSION: NOT CHANGED
CLINICAL_PROGRESSION: NOT CHANGED
CLINICAL_PROGRESSION: GRADUALLY WORSENING

## 2019-03-09 ASSESSMENT — PAIN DESCRIPTION - DESCRIPTORS
DESCRIPTORS: SPASM
DESCRIPTORS: TIGHTNESS
DESCRIPTORS: SPASM;DISCOMFORT

## 2019-03-09 ASSESSMENT — PAIN DESCRIPTION - ONSET
ONSET: ON-GOING

## 2019-03-09 ASSESSMENT — PAIN SCALES - GENERAL
PAINLEVEL_OUTOF10: 9
PAINLEVEL_OUTOF10: 6
PAINLEVEL_OUTOF10: 7
PAINLEVEL_OUTOF10: 7

## 2019-03-09 ASSESSMENT — PAIN DESCRIPTION - ORIENTATION
ORIENTATION: RIGHT;LOWER

## 2019-03-09 ASSESSMENT — PAIN DESCRIPTION - FREQUENCY
FREQUENCY: CONTINUOUS

## 2019-03-09 ASSESSMENT — PAIN DESCRIPTION - DIRECTION
RADIATING_TOWARDS: STOMACH

## 2019-03-09 ASSESSMENT — PAIN DESCRIPTION - LOCATION
LOCATION: BACK

## 2019-03-09 ASSESSMENT — ENCOUNTER SYMPTOMS
SHORTNESS OF BREATH: 1
ABDOMINAL PAIN: 0
COUGH: 1

## 2019-03-09 ASSESSMENT — PAIN DESCRIPTION - PAIN TYPE
TYPE: ACUTE PAIN

## 2019-03-10 ENCOUNTER — APPOINTMENT (OUTPATIENT)
Dept: GENERAL RADIOLOGY | Age: 74
DRG: 308 | End: 2019-03-10
Payer: MEDICARE

## 2019-03-10 PROBLEM — I48.91 ATRIAL FIBRILLATION WITH RAPID VENTRICULAR RESPONSE (HCC): Status: ACTIVE | Noted: 2019-03-10

## 2019-03-10 PROBLEM — E66.9 OBESITY (BMI 30-39.9): Chronic | Status: ACTIVE | Noted: 2019-03-10

## 2019-03-10 PROBLEM — I48.91 ATRIAL FIBRILLATION WITH RAPID VENTRICULAR RESPONSE (HCC): Status: RESOLVED | Noted: 2019-03-10 | Resolved: 2019-03-10

## 2019-03-10 PROBLEM — I50.23 ACUTE ON CHRONIC SYSTOLIC CONGESTIVE HEART FAILURE (HCC): Status: ACTIVE | Noted: 2019-03-10

## 2019-03-10 PROBLEM — I48.91 ATRIAL FIBRILLATION WITH RAPID VENTRICULAR RESPONSE (HCC): Status: RESOLVED | Noted: 2019-03-09 | Resolved: 2019-03-10

## 2019-03-10 PROBLEM — I48.0 PAROXYSMAL ATRIAL FIBRILLATION (HCC): Status: ACTIVE | Noted: 2019-03-10

## 2019-03-10 LAB
ANION GAP SERPL CALCULATED.3IONS-SCNC: 12 MMOL/L (ref 7–16)
BUN BLDV-MCNC: 17 MG/DL (ref 8–23)
CALCIUM SERPL-MCNC: 9.1 MG/DL (ref 8.6–10.2)
CHLORIDE BLD-SCNC: 106 MMOL/L (ref 98–107)
CO2: 25 MMOL/L (ref 22–29)
CREAT SERPL-MCNC: 1 MG/DL (ref 0.5–1)
EKG ATRIAL RATE: 156 BPM
EKG Q-T INTERVAL: 288 MS
EKG QRS DURATION: 96 MS
EKG QTC CALCULATION (BAZETT): 415 MS
EKG R AXIS: 109 DEGREES
EKG T AXIS: 99 DEGREES
EKG VENTRICULAR RATE: 125 BPM
GFR AFRICAN AMERICAN: >60
GFR NON-AFRICAN AMERICAN: 54 ML/MIN/1.73
GLUCOSE BLD-MCNC: 103 MG/DL (ref 74–99)
INR BLD: 1.9
MAGNESIUM: 2.2 MG/DL (ref 1.6–2.6)
POTASSIUM SERPL-SCNC: 4.3 MMOL/L (ref 3.5–5)
PROTHROMBIN TIME: 21.2 SEC (ref 9.3–12.4)
SODIUM BLD-SCNC: 143 MMOL/L (ref 132–146)

## 2019-03-10 PROCEDURE — 2060000000 HC ICU INTERMEDIATE R&B

## 2019-03-10 PROCEDURE — 99223 1ST HOSP IP/OBS HIGH 75: CPT | Performed by: INTERNAL MEDICINE

## 2019-03-10 PROCEDURE — 83735 ASSAY OF MAGNESIUM: CPT

## 2019-03-10 PROCEDURE — 36415 COLL VENOUS BLD VENIPUNCTURE: CPT

## 2019-03-10 PROCEDURE — 2580000003 HC RX 258: Performed by: INTERNAL MEDICINE

## 2019-03-10 PROCEDURE — 6370000000 HC RX 637 (ALT 250 FOR IP): Performed by: INTERNAL MEDICINE

## 2019-03-10 PROCEDURE — 80048 BASIC METABOLIC PNL TOTAL CA: CPT

## 2019-03-10 PROCEDURE — 71046 X-RAY EXAM CHEST 2 VIEWS: CPT

## 2019-03-10 PROCEDURE — 85610 PROTHROMBIN TIME: CPT

## 2019-03-10 PROCEDURE — 6370000000 HC RX 637 (ALT 250 FOR IP): Performed by: NURSE PRACTITIONER

## 2019-03-10 PROCEDURE — 6360000002 HC RX W HCPCS: Performed by: INTERNAL MEDICINE

## 2019-03-10 PROCEDURE — 2700000000 HC OXYGEN THERAPY PER DAY

## 2019-03-10 RX ORDER — FUROSEMIDE 10 MG/ML
20 INJECTION INTRAMUSCULAR; INTRAVENOUS ONCE
Status: COMPLETED | OUTPATIENT
Start: 2019-03-10 | End: 2019-03-10

## 2019-03-10 RX ORDER — DIGOXIN 0.25 MG/ML
250 INJECTION INTRAMUSCULAR; INTRAVENOUS ONCE
Status: COMPLETED | OUTPATIENT
Start: 2019-03-10 | End: 2019-03-10

## 2019-03-10 RX ADMIN — DIGOXIN 250 MCG: 0.25 INJECTION INTRAMUSCULAR; INTRAVENOUS at 12:06

## 2019-03-10 RX ADMIN — FUROSEMIDE 20 MG: 10 INJECTION, SOLUTION INTRAVENOUS at 12:06

## 2019-03-10 RX ADMIN — HYDRALAZINE HYDROCHLORIDE 10 MG: 10 TABLET, FILM COATED ORAL at 20:18

## 2019-03-10 RX ADMIN — SPIRONOLACTONE 25 MG: 25 TABLET ORAL at 09:28

## 2019-03-10 RX ADMIN — RIVAROXABAN 20 MG: 20 TABLET, FILM COATED ORAL at 18:59

## 2019-03-10 RX ADMIN — Medication 10 ML: at 09:32

## 2019-03-10 RX ADMIN — HYDRALAZINE HYDROCHLORIDE 10 MG: 10 TABLET, FILM COATED ORAL at 14:23

## 2019-03-10 RX ADMIN — CARVEDILOL PHOSPHATE 20 MG: 10 CAPSULE, EXTENDED RELEASE ORAL at 09:28

## 2019-03-10 RX ADMIN — HYDROXYZINE HYDROCHLORIDE 10 MG: 10 TABLET, FILM COATED ORAL at 20:17

## 2019-03-10 RX ADMIN — Medication 10 ML: at 20:17

## 2019-03-10 RX ADMIN — HYDRALAZINE HYDROCHLORIDE 10 MG: 10 TABLET, FILM COATED ORAL at 09:28

## 2019-03-10 RX ADMIN — IBUPROFEN 400 MG: 200 TABLET, FILM COATED ORAL at 20:17

## 2019-03-10 ASSESSMENT — PAIN DESCRIPTION - ONSET
ONSET: ON-GOING
ONSET: AWAKENED FROM SLEEP
ONSET: ON-GOING

## 2019-03-10 ASSESSMENT — PAIN SCALES - GENERAL
PAINLEVEL_OUTOF10: 0
PAINLEVEL_OUTOF10: 6
PAINLEVEL_OUTOF10: 0
PAINLEVEL_OUTOF10: 7
PAINLEVEL_OUTOF10: 6
PAINLEVEL_OUTOF10: 4
PAINLEVEL_OUTOF10: 0

## 2019-03-10 ASSESSMENT — PAIN DESCRIPTION - FREQUENCY
FREQUENCY: CONTINUOUS

## 2019-03-10 ASSESSMENT — PAIN DESCRIPTION - PAIN TYPE
TYPE: ACUTE PAIN
TYPE: ACUTE PAIN
TYPE: CHRONIC PAIN

## 2019-03-10 ASSESSMENT — PAIN DESCRIPTION - ORIENTATION
ORIENTATION: RIGHT;LOWER

## 2019-03-10 ASSESSMENT — PAIN DESCRIPTION - DIRECTION
RADIATING_TOWARDS: STOMACH
RADIATING_TOWARDS: STOMACH

## 2019-03-10 ASSESSMENT — PAIN DESCRIPTION - PROGRESSION
CLINICAL_PROGRESSION: NOT CHANGED
CLINICAL_PROGRESSION: GRADUALLY IMPROVING
CLINICAL_PROGRESSION: GRADUALLY IMPROVING

## 2019-03-10 ASSESSMENT — PAIN DESCRIPTION - LOCATION
LOCATION: BACK

## 2019-03-10 ASSESSMENT — PAIN DESCRIPTION - DESCRIPTORS
DESCRIPTORS: SPASM;DISCOMFORT

## 2019-03-11 ENCOUNTER — TELEPHONE (OUTPATIENT)
Dept: FAMILY MEDICINE CLINIC | Age: 74
End: 2019-03-11

## 2019-03-11 VITALS
SYSTOLIC BLOOD PRESSURE: 114 MMHG | BODY MASS INDEX: 29.88 KG/M2 | HEART RATE: 95 BPM | HEIGHT: 66 IN | OXYGEN SATURATION: 96 % | WEIGHT: 185.9 LBS | TEMPERATURE: 96.3 F | DIASTOLIC BLOOD PRESSURE: 77 MMHG | RESPIRATION RATE: 16 BRPM

## 2019-03-11 LAB
ALBUMIN SERPL-MCNC: 3.4 G/DL (ref 3.5–5.2)
ALP BLD-CCNC: 120 U/L (ref 35–104)
ALT SERPL-CCNC: 26 U/L (ref 0–32)
ANION GAP SERPL CALCULATED.3IONS-SCNC: 13 MMOL/L (ref 7–16)
AST SERPL-CCNC: 23 U/L (ref 0–31)
BASOPHILS ABSOLUTE: 0.04 E9/L (ref 0–0.2)
BASOPHILS RELATIVE PERCENT: 0.7 % (ref 0–2)
BILIRUB SERPL-MCNC: 0.9 MG/DL (ref 0–1.2)
BUN BLDV-MCNC: 20 MG/DL (ref 8–23)
CALCIUM SERPL-MCNC: 8.9 MG/DL (ref 8.6–10.2)
CHLORIDE BLD-SCNC: 105 MMOL/L (ref 98–107)
CO2: 23 MMOL/L (ref 22–29)
CREAT SERPL-MCNC: 0.9 MG/DL (ref 0.5–1)
EOSINOPHILS ABSOLUTE: 0.25 E9/L (ref 0.05–0.5)
EOSINOPHILS RELATIVE PERCENT: 4.5 % (ref 0–6)
GFR AFRICAN AMERICAN: >60
GFR NON-AFRICAN AMERICAN: >60 ML/MIN/1.73
GLUCOSE BLD-MCNC: 89 MG/DL (ref 74–99)
HCT VFR BLD CALC: 38.7 % (ref 34–48)
HEMOGLOBIN: 12.3 G/DL (ref 11.5–15.5)
IMMATURE GRANULOCYTES #: 0.02 E9/L
IMMATURE GRANULOCYTES %: 0.4 % (ref 0–5)
INR BLD: 2.7
LYMPHOCYTES ABSOLUTE: 1.08 E9/L (ref 1.5–4)
LYMPHOCYTES RELATIVE PERCENT: 19.4 % (ref 20–42)
MAGNESIUM: 2.1 MG/DL (ref 1.6–2.6)
MCH RBC QN AUTO: 27.4 PG (ref 26–35)
MCHC RBC AUTO-ENTMCNC: 31.8 % (ref 32–34.5)
MCV RBC AUTO: 86.2 FL (ref 80–99.9)
MONOCYTES ABSOLUTE: 0.48 E9/L (ref 0.1–0.95)
MONOCYTES RELATIVE PERCENT: 8.6 % (ref 2–12)
NEUTROPHILS ABSOLUTE: 3.71 E9/L (ref 1.8–7.3)
NEUTROPHILS RELATIVE PERCENT: 66.4 % (ref 43–80)
PDW BLD-RTO: 15.8 FL (ref 11.5–15)
PLATELET # BLD: 225 E9/L (ref 130–450)
PMV BLD AUTO: 10.9 FL (ref 7–12)
POTASSIUM SERPL-SCNC: 4.1 MMOL/L (ref 3.5–5)
PROTHROMBIN TIME: 30.7 SEC (ref 9.3–12.4)
RBC # BLD: 4.49 E12/L (ref 3.5–5.5)
SODIUM BLD-SCNC: 141 MMOL/L (ref 132–146)
TOTAL PROTEIN: 6.3 G/DL (ref 6.4–8.3)
WBC # BLD: 5.6 E9/L (ref 4.5–11.5)

## 2019-03-11 PROCEDURE — 97165 OT EVAL LOW COMPLEX 30 MIN: CPT

## 2019-03-11 PROCEDURE — 36415 COLL VENOUS BLD VENIPUNCTURE: CPT

## 2019-03-11 PROCEDURE — 2580000003 HC RX 258: Performed by: INTERNAL MEDICINE

## 2019-03-11 PROCEDURE — 85025 COMPLETE CBC W/AUTO DIFF WBC: CPT

## 2019-03-11 PROCEDURE — 83735 ASSAY OF MAGNESIUM: CPT

## 2019-03-11 PROCEDURE — 80053 COMPREHEN METABOLIC PANEL: CPT

## 2019-03-11 PROCEDURE — 85610 PROTHROMBIN TIME: CPT

## 2019-03-11 PROCEDURE — 99232 SBSQ HOSP IP/OBS MODERATE 35: CPT | Performed by: INTERNAL MEDICINE

## 2019-03-11 PROCEDURE — 6370000000 HC RX 637 (ALT 250 FOR IP): Performed by: INTERNAL MEDICINE

## 2019-03-11 PROCEDURE — 97161 PT EVAL LOW COMPLEX 20 MIN: CPT

## 2019-03-11 PROCEDURE — 2700000000 HC OXYGEN THERAPY PER DAY

## 2019-03-11 RX ORDER — DIGOXIN 125 MCG
125 TABLET ORAL DAILY
Status: DISCONTINUED | OUTPATIENT
Start: 2019-03-11 | End: 2019-03-11 | Stop reason: HOSPADM

## 2019-03-11 RX ORDER — FUROSEMIDE 20 MG/1
20 TABLET ORAL DAILY
Qty: 30 TABLET | Refills: 0 | Status: SHIPPED | OUTPATIENT
Start: 2019-03-11 | End: 2019-04-11 | Stop reason: SDUPTHER

## 2019-03-11 RX ORDER — DIGOXIN 125 MCG
125 TABLET ORAL DAILY
Qty: 30 TABLET | Refills: 3 | Status: SHIPPED | OUTPATIENT
Start: 2019-03-11 | End: 2019-04-15

## 2019-03-11 RX ORDER — CARVEDILOL PHOSPHATE 20 MG/1
20 CAPSULE, EXTENDED RELEASE ORAL DAILY
Qty: 30 CAPSULE | Refills: 0 | Status: SHIPPED | OUTPATIENT
Start: 2019-03-12 | End: 2019-04-25 | Stop reason: SDUPTHER

## 2019-03-11 RX ADMIN — HYDRALAZINE HYDROCHLORIDE 10 MG: 10 TABLET, FILM COATED ORAL at 08:44

## 2019-03-11 RX ADMIN — SPIRONOLACTONE 25 MG: 25 TABLET ORAL at 08:44

## 2019-03-11 RX ADMIN — Medication 10 ML: at 08:44

## 2019-03-11 RX ADMIN — CARVEDILOL PHOSPHATE 20 MG: 10 CAPSULE, EXTENDED RELEASE ORAL at 08:44

## 2019-03-11 ASSESSMENT — PAIN SCALES - GENERAL
PAINLEVEL_OUTOF10: 0
PAINLEVEL_OUTOF10: 0

## 2019-03-13 ENCOUNTER — TELEPHONE (OUTPATIENT)
Dept: PHARMACY | Facility: CLINIC | Age: 74
End: 2019-03-13

## 2019-03-13 ENCOUNTER — TELEPHONE (OUTPATIENT)
Dept: CARDIOLOGY CLINIC | Age: 74
End: 2019-03-13

## 2019-03-13 DIAGNOSIS — I50.23 ACUTE ON CHRONIC SYSTOLIC CONGESTIVE HEART FAILURE (HCC): Primary | ICD-10-CM

## 2019-03-13 PROCEDURE — 1111F DSCHRG MED/CURRENT MED MERGE: CPT

## 2019-03-14 ENCOUNTER — OFFICE VISIT (OUTPATIENT)
Dept: FAMILY MEDICINE CLINIC | Age: 74
End: 2019-03-14
Payer: MEDICARE

## 2019-03-14 VITALS
SYSTOLIC BLOOD PRESSURE: 112 MMHG | DIASTOLIC BLOOD PRESSURE: 72 MMHG | WEIGHT: 176 LBS | RESPIRATION RATE: 16 BRPM | HEIGHT: 67 IN | HEART RATE: 83 BPM | BODY MASS INDEX: 27.62 KG/M2 | OXYGEN SATURATION: 97 % | TEMPERATURE: 97.6 F

## 2019-03-14 DIAGNOSIS — I48.91 ATRIAL FIBRILLATION, UNSPECIFIED TYPE (HCC): Primary | ICD-10-CM

## 2019-03-14 DIAGNOSIS — I50.23 ACUTE ON CHRONIC SYSTOLIC CONGESTIVE HEART FAILURE (HCC): ICD-10-CM

## 2019-03-14 LAB
BLOOD CULTURE, ROUTINE: NORMAL
CULTURE, BLOOD 2: NORMAL

## 2019-03-14 PROCEDURE — 99495 TRANSJ CARE MGMT MOD F2F 14D: CPT | Performed by: NURSE PRACTITIONER

## 2019-03-14 PROCEDURE — 1111F DSCHRG MED/CURRENT MED MERGE: CPT | Performed by: NURSE PRACTITIONER

## 2019-03-14 ASSESSMENT — ENCOUNTER SYMPTOMS
NAUSEA: 0
VOMITING: 0
SHORTNESS OF BREATH: 0
DIARRHEA: 0
COUGH: 0
WHEEZING: 0
CONSTIPATION: 0

## 2019-03-18 ENCOUNTER — ANESTHESIA (OUTPATIENT)
Dept: CARDIAC CATH/INVASIVE PROCEDURES | Age: 74
End: 2019-03-18

## 2019-03-18 ENCOUNTER — ANESTHESIA EVENT (OUTPATIENT)
Dept: CARDIAC CATH/INVASIVE PROCEDURES | Age: 74
End: 2019-03-18

## 2019-03-18 ENCOUNTER — HOSPITAL ENCOUNTER (OUTPATIENT)
Dept: CARDIAC CATH/INVASIVE PROCEDURES | Age: 74
Setting detail: OUTPATIENT SURGERY
Discharge: HOME | End: 2019-03-18
Payer: MEDICARE

## 2019-03-18 VITALS
OXYGEN SATURATION: 92 % | DIASTOLIC BLOOD PRESSURE: 55 MMHG | SYSTOLIC BLOOD PRESSURE: 84 MMHG | RESPIRATION RATE: 21 BRPM

## 2019-03-18 VITALS
DIASTOLIC BLOOD PRESSURE: 47 MMHG | HEIGHT: 67 IN | TEMPERATURE: 97.3 F | BODY MASS INDEX: 27.47 KG/M2 | WEIGHT: 175 LBS | SYSTOLIC BLOOD PRESSURE: 98 MMHG | HEART RATE: 84 BPM | OXYGEN SATURATION: 97 % | RESPIRATION RATE: 16 BRPM

## 2019-03-18 LAB
LEFT VENTRICULAR EJECTION FRACTION HIGH VALUE: 30 %
LV EF: 25 %
LV EF: 28 %
LVEF MODALITY: NORMAL

## 2019-03-18 PROCEDURE — 2709999900 HC NON-CHARGEABLE SUPPLY

## 2019-03-18 PROCEDURE — 6360000002 HC RX W HCPCS: Performed by: NURSE ANESTHETIST, CERTIFIED REGISTERED

## 2019-03-18 PROCEDURE — 3700000000 HC ANESTHESIA ATTENDED CARE

## 2019-03-18 PROCEDURE — 93325 DOPPLER ECHO COLOR FLOW MAPG: CPT

## 2019-03-18 PROCEDURE — 2580000003 HC RX 258: Performed by: INTERNAL MEDICINE

## 2019-03-18 PROCEDURE — 93321 DOPPLER ECHO F-UP/LMTD STD: CPT

## 2019-03-18 PROCEDURE — 3700000001 HC ADD 15 MINUTES (ANESTHESIA)

## 2019-03-18 PROCEDURE — 93312 ECHO TRANSESOPHAGEAL: CPT

## 2019-03-18 RX ORDER — PROPOFOL 10 MG/ML
INJECTION, EMULSION INTRAVENOUS PRN
Status: DISCONTINUED | OUTPATIENT
Start: 2019-03-18 | End: 2019-03-18 | Stop reason: SDUPTHER

## 2019-03-18 RX ORDER — DIPHENHYDRAMINE HCL 25 MG
25 TABLET ORAL EVERY 6 HOURS PRN
COMMUNITY

## 2019-03-18 RX ORDER — SODIUM CHLORIDE 9 MG/ML
INJECTION, SOLUTION INTRAVENOUS ONCE
Status: COMPLETED | OUTPATIENT
Start: 2019-03-18 | End: 2019-03-18

## 2019-03-18 RX ORDER — LOPERAMIDE HYDROCHLORIDE 2 MG/1
2 CAPSULE ORAL 4 TIMES DAILY PRN
COMMUNITY

## 2019-03-18 RX ADMIN — PROPOFOL 130 MG: 10 INJECTION, EMULSION INTRAVENOUS at 11:03

## 2019-03-18 RX ADMIN — SODIUM CHLORIDE: 9 INJECTION, SOLUTION INTRAVENOUS at 10:43

## 2019-03-27 ENCOUNTER — TELEPHONE (OUTPATIENT)
Dept: CARDIOLOGY CLINIC | Age: 74
End: 2019-03-27

## 2019-04-04 ENCOUNTER — TELEPHONE (OUTPATIENT)
Dept: CARDIOLOGY CLINIC | Age: 74
End: 2019-04-04

## 2019-04-04 NOTE — TELEPHONE ENCOUNTER
Colleen Vazquez called. Was at [facility] and they wanted her on Pradaxa instead of Xarelto. She would like samples as she has medication allergies so she can try. Received [facility] records-Pradaxa 150 mg 1 bid. 2 boxes Pradaxa 150 mg, lot #[id number], exp 04/21 given. Advised patient to discontinue Xarelto. Patient will .

## 2019-04-11 ENCOUNTER — TELEPHONE (OUTPATIENT)
Dept: CARDIOLOGY CLINIC | Age: 74
End: 2019-04-11

## 2019-04-11 RX ORDER — FUROSEMIDE 20 MG/1
20 TABLET ORAL DAILY
Qty: 90 TABLET | Refills: 3 | Status: SHIPPED
Start: 2019-04-11 | End: 2020-04-20 | Stop reason: SDUPTHER

## 2019-04-11 NOTE — TELEPHONE ENCOUNTER
Patient was seen @ [facility] for a possible Watchman. and the doctor changed her medication to Pradaxa due to her allergies with blood thinners. She developed a rash. She is asking if she can go back on the  Xarelto, she has had problems with all of the blood thinners. But the Xarelto seems to be the lesser of the two evils. Should she wait a few days before resuming the Xarelto after stopping the Pradaxa? She will call the [facility] physician and advised them she would like to resume the Xarelto     During her recent [facility]  she was put on Lasix 20mg daily ordered by Dr Joelle Klein. She had  a weight gain of 8 lbs in one week. She is asking for a RX    The doctor she saw in [facility] told her that there was nothing that can be done further for her and changed her medications. (Pradaxa)  He discussed in the future to maybe get a defibrillator or go on heparin.       She has an appointment in our office 4/25/2019, she will discuss this further with Dr Ck Manning

## 2019-04-11 NOTE — TELEPHONE ENCOUNTER
Signed            Patient was seen @ [facility] for a possible Watchman. and the doctor changed her medication to Pradaxa due to her allergies with blood thinners.       She developed a rash.      She is asking if she can go back on the  Xarelto, she has had problems with all of the blood thinners. But the Xarelto seems to be the lesser of the two evils.       Should she wait a few days before resuming the Xarelto after stopping the Pradaxa?      She will call the [facility] physician and advised them she would like to resume the Xarelto      During her recent hospitalization @ [facility]  she was put on Lasix 20mg daily ordered by Dr Saintclair Perla. She had  a weight gain of 8 lbs in one week. She is asking for a RX     The doctor she saw in [place] told her that there was nothing that can be done further for her and changed her medications. (Pradaxa)  He discussed in the future to maybe get a defibrillator or go on heparin.       She has an appointment in our office 4/25/2019, she will discuss this further with Dr Trinidad Restrepo

## 2019-04-15 ENCOUNTER — OFFICE VISIT (OUTPATIENT)
Dept: FAMILY MEDICINE CLINIC | Age: 74
End: 2019-04-15
Payer: MEDICARE

## 2019-04-15 VITALS
TEMPERATURE: 97.6 F | RESPIRATION RATE: 16 BRPM | BODY MASS INDEX: 26.74 KG/M2 | DIASTOLIC BLOOD PRESSURE: 66 MMHG | SYSTOLIC BLOOD PRESSURE: 96 MMHG | HEART RATE: 86 BPM | OXYGEN SATURATION: 98 % | WEIGHT: 170.4 LBS | HEIGHT: 67 IN

## 2019-04-15 DIAGNOSIS — I51.3 THROMBUS OF LEFT ATRIAL APPENDAGE: Chronic | ICD-10-CM

## 2019-04-15 DIAGNOSIS — I50.23 ACUTE ON CHRONIC SYSTOLIC CONGESTIVE HEART FAILURE (HCC): ICD-10-CM

## 2019-04-15 DIAGNOSIS — I48.0 PAROXYSMAL ATRIAL FIBRILLATION (HCC): Primary | ICD-10-CM

## 2019-04-15 PROBLEM — E66.9 OBESITY (BMI 30-39.9): Chronic | Status: RESOLVED | Noted: 2019-03-10 | Resolved: 2019-04-15

## 2019-04-15 PROCEDURE — 4040F PNEUMOC VAC/ADMIN/RCVD: CPT | Performed by: FAMILY MEDICINE

## 2019-04-15 PROCEDURE — 1123F ACP DISCUSS/DSCN MKR DOCD: CPT | Performed by: FAMILY MEDICINE

## 2019-04-15 PROCEDURE — 99213 OFFICE O/P EST LOW 20 MIN: CPT | Performed by: FAMILY MEDICINE

## 2019-04-15 PROCEDURE — 1036F TOBACCO NON-USER: CPT | Performed by: FAMILY MEDICINE

## 2019-04-15 PROCEDURE — G8427 DOCREV CUR MEDS BY ELIG CLIN: HCPCS | Performed by: FAMILY MEDICINE

## 2019-04-15 PROCEDURE — 1090F PRES/ABSN URINE INCON ASSESS: CPT | Performed by: FAMILY MEDICINE

## 2019-04-15 PROCEDURE — 3017F COLORECTAL CA SCREEN DOC REV: CPT | Performed by: FAMILY MEDICINE

## 2019-04-15 PROCEDURE — G8399 PT W/DXA RESULTS DOCUMENT: HCPCS | Performed by: FAMILY MEDICINE

## 2019-04-15 PROCEDURE — G8417 CALC BMI ABV UP PARAM F/U: HCPCS | Performed by: FAMILY MEDICINE

## 2019-04-15 RX ORDER — TRIAMCINOLONE ACETONIDE 1 MG/G
CREAM TOPICAL 3 TIMES DAILY PRN
Refills: 1 | COMMUNITY
Start: 2019-04-05

## 2019-04-15 ASSESSMENT — ENCOUNTER SYMPTOMS
NAUSEA: 0
COUGH: 0
DIARRHEA: 0
VOMITING: 0
CONSTIPATION: 0
WHEEZING: 0
SHORTNESS OF BREATH: 0

## 2019-04-15 NOTE — PROGRESS NOTES
tablet  Take 1,000 mg by mouth daily             baclofen (LIORESAL) 20 MG tablet  Take 20 mg by mouth 2 times daily as needed (muscle spasms, spastic bladder) Indications: Back Spasm, Bladder Spasm              carvedilol (COREG CR) 20 MG CP24 extended release capsule  Take 1 capsule by mouth daily             digoxin (LANOXIN) 125 MCG tablet  Take 1 tablet by mouth daily             diphenhydrAMINE (BENADRYL) 25 MG tablet  Take 25 mg by mouth every 6 hours as needed for Itching             Doxylamine Succinate, Sleep, (UNISOM PO)  Take 1 tablet by mouth daily as needed (sleep)              furosemide (LASIX) 20 MG tablet  Take 1 tablet by mouth daily             hydrOXYzine (ATARAX) 10 MG tablet  Take 10 mg by mouth 3 times daily as needed for Itching             loperamide (IMODIUM) 2 MG capsule  Take 2 mg by mouth 4 times daily as needed for Diarrhea             rivaroxaban (XARELTO) 20 MG TABS tablet  Take 1 tablet by mouth daily (with breakfast)             sodium chloride (OCEAN, BABY AYR) 0.65 % nasal spray  1 spray by Nasal route as needed for Congestion             spironolactone (ALDACTONE) 25 MG tablet  Take 1 tablet by mouth daily             triamcinolone (KENALOG) 0.1 % cream  USE TID             TURMERIC PO  Take 1 capsule by mouth daily             vitamin D (CHOLECALCIFEROL) 1000 UNIT TABS tablet  Take 1,000 Units by mouth daily                     Medications patient taking as of now reconciled against medications ordered at time of hospital discharge: Yes    Chief Complaint   Patient presents with    Atrial Fibrillation     follow up. Patient here for follow up hospital admission from 3/9/19-3/11/19. She went in for shortness of breath and rapid heart rate. She was diagnosed with CHF and a-fib. She reports that she has been feeling ok - however she thinks she may be allergic to one of her new medications - she is very itchy. She is going to call cardiology when she leaves here today.   She

## 2019-04-25 ENCOUNTER — OFFICE VISIT (OUTPATIENT)
Dept: CARDIOLOGY CLINIC | Age: 74
End: 2019-04-25
Payer: MEDICARE

## 2019-04-25 VITALS
WEIGHT: 174.8 LBS | HEIGHT: 67 IN | RESPIRATION RATE: 16 BRPM | DIASTOLIC BLOOD PRESSURE: 62 MMHG | HEART RATE: 90 BPM | BODY MASS INDEX: 27.44 KG/M2 | SYSTOLIC BLOOD PRESSURE: 100 MMHG

## 2019-04-25 DIAGNOSIS — Z88.8 ALLERGY TO ACE INHIBITORS: ICD-10-CM

## 2019-04-25 DIAGNOSIS — I51.3 THROMBUS OF LEFT ATRIAL APPENDAGE: ICD-10-CM

## 2019-04-25 DIAGNOSIS — I10 HTN (HYPERTENSION), BENIGN: ICD-10-CM

## 2019-04-25 DIAGNOSIS — I50.22 CHRONIC SYSTOLIC HEART FAILURE (HCC): ICD-10-CM

## 2019-04-25 DIAGNOSIS — I48.19 PERSISTENT ATRIAL FIBRILLATION (HCC): Primary | ICD-10-CM

## 2019-04-25 DIAGNOSIS — I42.8 NONISCHEMIC CARDIOMYOPATHY (HCC): ICD-10-CM

## 2019-04-25 DIAGNOSIS — I38 VHD (VALVULAR HEART DISEASE): ICD-10-CM

## 2019-04-25 DIAGNOSIS — M54.16 LUMBAR RADICULOPATHY: ICD-10-CM

## 2019-04-25 DIAGNOSIS — I27.20 PULMONARY HTN (HCC): ICD-10-CM

## 2019-04-25 PROCEDURE — 1090F PRES/ABSN URINE INCON ASSESS: CPT | Performed by: INTERNAL MEDICINE

## 2019-04-25 PROCEDURE — 3017F COLORECTAL CA SCREEN DOC REV: CPT | Performed by: INTERNAL MEDICINE

## 2019-04-25 PROCEDURE — G8399 PT W/DXA RESULTS DOCUMENT: HCPCS | Performed by: INTERNAL MEDICINE

## 2019-04-25 PROCEDURE — 1036F TOBACCO NON-USER: CPT | Performed by: INTERNAL MEDICINE

## 2019-04-25 PROCEDURE — 99214 OFFICE O/P EST MOD 30 MIN: CPT | Performed by: INTERNAL MEDICINE

## 2019-04-25 PROCEDURE — 93000 ELECTROCARDIOGRAM COMPLETE: CPT | Performed by: INTERNAL MEDICINE

## 2019-04-25 PROCEDURE — 4040F PNEUMOC VAC/ADMIN/RCVD: CPT | Performed by: INTERNAL MEDICINE

## 2019-04-25 PROCEDURE — 1123F ACP DISCUSS/DSCN MKR DOCD: CPT | Performed by: INTERNAL MEDICINE

## 2019-04-25 PROCEDURE — G8427 DOCREV CUR MEDS BY ELIG CLIN: HCPCS | Performed by: INTERNAL MEDICINE

## 2019-04-25 PROCEDURE — G8417 CALC BMI ABV UP PARAM F/U: HCPCS | Performed by: INTERNAL MEDICINE

## 2019-04-25 RX ORDER — CARVEDILOL PHOSPHATE 20 MG/1
20 CAPSULE, EXTENDED RELEASE ORAL DAILY
Qty: 30 CAPSULE | Refills: 11 | Status: SHIPPED | OUTPATIENT
Start: 2019-04-25 | End: 2019-05-01 | Stop reason: SDUPTHER

## 2019-04-25 NOTE — PROGRESS NOTES
OFFICE VISIT     PRIMARY CARE PHYSICIAN:      Amira Trujillo MD       ALLERGIES / SENSITIVITIES:        Allergies   Allergen Reactions    Atacand [Candesartan] Hives and Itching    Adhesive Tape Itching     develops rash and itching with EKG electrodes    Digoxin Itching     developed itching and rash    Losartan Potassium Itching    Shellfish-Derived Products Itching     3/9/19 Pt states she had a lobster pizza and had difficulty breathing, started to sweat and was itchy  states she had a lobster pizza and had difficulty breathing, started to sweat and was itching    Sulfa Antibiotics Diarrhea and Other (See Comments)     Also causes migraines  caused migraines and diarrhea    Acetaminophen Hives and Itching    Apap-Caff-Dihydrocodeine Hives and Itching    Coreg [Carvedilol] Itching     Can take extended release Coreg    Cozaar [Losartan] Itching    Demerol      3/9/19 Pt states she gets a severe migraine    Diovan [Valsartan] Itching    Imdur [Isosorbide Mononitrate]      Severe migraines    Labetalol Itching    Lisinopril Itching    Ramipril Itching    Xarelto [Rivaroxaban]      3/9/19 Pt states that she had broke out with a rash, got itchy and had severe side effects (severe itch, headache)    Effexor [Venlafaxine Hydrochloride] Nausea Only    Eliquis [Apixaban] Hives, Itching and Rash     3/9/19 Pt states that she gets hives, itchy and a rash          REVIEWED MEDICATIONS:        Current Outpatient Medications:     triamcinolone (KENALOG) 0.1 % cream, USE TID, Disp: , Rfl: 1    furosemide (LASIX) 20 MG tablet, Take 1 tablet by mouth daily, Disp: 90 tablet, Rfl: 3    loperamide (IMODIUM) 2 MG capsule, Take 2 mg by mouth 4 times daily as needed for Diarrhea, Disp: , Rfl:     diphenhydrAMINE (BENADRYL) 25 MG tablet, Take 25 mg by mouth every 6 hours as needed for Itching, Disp: , Rfl:     carvedilol (COREG CR) 20 MG CP24 extended release capsule, Take 1 capsule by mouth daily, without aura, with intractable migraine, so stated, without mention of status migrainosus     RNicole Ruby    Chronic sinusitis     Disc displacement, lumbar     R. Flori    Hypertension     Left ventricular systolic dysfunction     Meige syndrome     partial/R. Brocker    Mitral regurgitation     Mild to moderate    Nonischemic cardiomyopathy (HCC)     Osteopenia     TIA (transient ischemic attack)     carotid TIA's    Vertebrobasilar artery syndrome     JOHANA Ruby            Past Surgical History:   Procedure Laterality Date    BLADDER REPAIR      BUNIONECTOMY      CARDIOVASCULAR STRESS TEST  11/25/13    Rt & LT cath    DIAGNOSTIC CARDIAC CATH LAB PROCEDURE  2/20/10    Dr Karen Jiménez CATH LAB PROCEDURE  8/24/11    Right heart cath @ [facility].  DOPPLER ECHOCARDIOGRAPHY  11/25/13    HYSTERECTOMY  1991    OVARY REMOVAL      TRANSESOPHAGEAL ECHOCARDIOGRAM  11/21/2018    Dr. Aguila Dumont TRANSESOPHAGEAL ECHOCARDIOGRAM  03/18/2019          Family History   Problem Relation Age of Onset    Heart Attack Mother     Stroke Mother     Heart Attack Father     Heart Failure Father     Heart Disease Father     Anxiety Disorder Sister     Depression Sister     Crohn's Disease Son           Social History     Tobacco Use    Smoking status: Never Smoker    Smokeless tobacco: Never Used   Substance Use Topics    Alcohol use:  Yes     Alcohol/week: 0.0 oz     Comment: occasional wine/mixed drink         Review of Systems:  HEENT: negative for acute visual symptoms or auditory problems, no dysphagia  Constitutional: negative for fever and chills, or significant weight loss  Respiratory: negative for cough, wheezing, or hemoptysis  Cardiovascular: negative for chest pain, palpitations, and dyspnea  Gastrointestinal: negative for abdominal pain, diarrhea, nausea and vomiting  Endocrine: Negative for polyuria and polydyspsia  Genitourinary:negative for dysuria and hematuria  Derm: negative for rash and skin lesion(s)  Neurological: negative for tingling, numbness, weakness, seizures and tremors  Endocrine: negative for polydipsia and polyuria  Musculoskeletal: negative for pain or tenderness  Psychiatric: negative for anxiety, depression, or suicidal ideations         O:  COMPLETE PHYSICAL EXAM:       /62   Pulse 90   Resp 16   Ht 5' 6.5\" (1.689 m)   Wt 174 lb 12.8 oz (79.3 kg)   BMI 27.79 kg/m²       General:   Patient alert, comfortable, no distress. Appears stated age. HEENT:    Pupils equal, no icterus, no nasal drainage, tongue moist.   Neck:              No masses, Thyroid not palpable. Chest:   Normal configuration, non tender. Lungs:   Clear to auscultation bilaterally, few scattered rhonchi. Cardiovascular:  Irrgular rhythm, 2/6 systolic murmur, No S3, , no palpable thrills, No elevated JVD, No carotid bruit. Abdomen:  Soft, Non tender, Bowel sounds normal, no pulsatile abdominal aorta, no palpable masses. Extremities:  + edema. Distal pulses palpable. No cyanosis, no clubbing. Skin:   Good turgor, warm and dry, no cyanosis. Musculoskeletal: No joint swelling or deformity. Neuro:   Cranial nerves grossly intact; No focal neurologic deficit. Psych:   Alert, good mood and effect. REVIEW OF DIAGNOSTIC TESTS:        Electrocardiogram: A Fib   JAIME 3/18/2019 - CHERYL thrombus, EF 25-30%. A/P:   ASSESSMENT / PLAN:    [place] Days was seen today for atrial fibrillation. Diagnoses and all orders for this visit:      Persistent atrial fibrillation ([facility] [address] - Dx 11/2018 - CHADS2 VASc score 4;  On Xarelto (did not tolerated Eliquis) Rates controlled on Coreg; add Digoxin for rate control if needed; Seen by EP at [facility]-Dr. Dana De La Garza 4/2019 for Watchman - He recommended Rhythm management; assess her EF, if <35% ICD and then Watchman in the future-Lovenox/Coumadin for 4-6 weeks then JAIME and DCCV if CHERYL clot resolves-She wanted to go for repeat JAIME to assess her CHERYL clot for now. Risk benefits discussed, agreeable for JAIME.  -     EKG 12 Lead  -     Echo transesophageal (JAIME); Future  -     Cardioversion Defibrillation; Future     Thrombus of left atrial appendage Found on JAIME 11/21/18 and 3/2019 - On Xarelto   -     Echo transesophageal (JAIME); Future    Nonischemic cardiomyopathy ([facility] [address] EF 38% by Echo 12/2016; JAIME 3/2019- EF 25-30%; On BB and Aldactone; Allergic to ACE/ARB/ Nitrates; Benefits of Life Vest discussed-She wants to think about it-She is considering out-pt CHF clinic f/u  -     EKG 12 Lead     SOBOE - Chronic, No acute CHF     Pulmonary hypertension ([facility] [address] Secondary: PA and RV 50mmHg by [facility] in 2010 at [facility] and in 2011 at [facility]; recommended Long acting Nitrates; Not on it due to Nitrate intolerance  -     EKG 12 Lead     Essential hypertension; Stable     Valvular heart disease, Stable     Migraine without aura and without status migrainosus, not intractable     Multiple drug allergies     Allergy to ACE inhibitors     Lumbar radiculopathy; chronic     Non morbid obesity;  Diet, exercise and weight loss discussed. Preventive Cardiology: Low cholesterol diet, regular exercise as tolerate, and gradual weight loss discussed. Other orders  -     EKG 12 Lead  -     Ejection Fraction Percentage       Preventive Cardiology: Low cholesterol diet, regular exercise as tolerate, and gradual weight loss discussed. Monitor BP and heart rates. All questions answered about cardiac diagnoses and cardiac medications. Continue current medications. Compliance with medications and f/u with all physicians discussed. Risk factor modification based on risk profile discussed. Call if any exertional chest pain, short of breath, dizzy or palpitations   Follow up in 3-4 weeks or earlier if needed.    Prolonged visit to discusses multiple issues      [facility]  [address] [facility], [address], [address] [facility]  [phone]

## 2019-05-01 RX ORDER — CARVEDILOL PHOSPHATE 20 MG/1
20 CAPSULE, EXTENDED RELEASE ORAL DAILY
Qty: 90 CAPSULE | Refills: 3 | Status: SHIPPED | OUTPATIENT
Start: 2019-05-01 | End: 2020-03-30 | Stop reason: SDUPTHER

## 2019-05-01 NOTE — TELEPHONE ENCOUNTER
Please needs RX for [facility] Patient Assistance Program.  Sent RX to Dr Glenn Watts to print and sign   Coreg CR 20mg #90 x 3 refills

## 2019-05-06 ENCOUNTER — HOSPITAL ENCOUNTER (OUTPATIENT)
Dept: CARDIAC CATH/INVASIVE PROCEDURES | Age: 74
Discharge: HOME | End: 2019-05-06
Payer: MEDICARE

## 2019-05-06 ENCOUNTER — ANESTHESIA EVENT (OUTPATIENT)
Dept: CARDIAC CATH/INVASIVE PROCEDURES | Age: 74
End: 2019-05-06

## 2019-05-06 ENCOUNTER — ANESTHESIA (OUTPATIENT)
Dept: CARDIAC CATH/INVASIVE PROCEDURES | Age: 74
End: 2019-05-06

## 2019-05-06 VITALS
DIASTOLIC BLOOD PRESSURE: 61 MMHG | RESPIRATION RATE: 14 BRPM | OXYGEN SATURATION: 98 % | SYSTOLIC BLOOD PRESSURE: 100 MMHG

## 2019-05-06 VITALS
OXYGEN SATURATION: 96 % | HEART RATE: 83 BPM | HEIGHT: 66 IN | WEIGHT: 170 LBS | BODY MASS INDEX: 27.32 KG/M2 | SYSTOLIC BLOOD PRESSURE: 129 MMHG | DIASTOLIC BLOOD PRESSURE: 81 MMHG | TEMPERATURE: 97.5 F | RESPIRATION RATE: 16 BRPM

## 2019-05-06 LAB
LV EF: 28 %
LVEF MODALITY: NORMAL

## 2019-05-06 PROCEDURE — 2709999900 HC NON-CHARGEABLE SUPPLY

## 2019-05-06 PROCEDURE — 92960 CARDIOVERSION ELECTRIC EXT: CPT | Performed by: INTERNAL MEDICINE

## 2019-05-06 PROCEDURE — 93321 DOPPLER ECHO F-UP/LMTD STD: CPT

## 2019-05-06 PROCEDURE — 93010 ELECTROCARDIOGRAM REPORT: CPT | Performed by: INTERNAL MEDICINE

## 2019-05-06 PROCEDURE — 93325 DOPPLER ECHO COLOR FLOW MAPG: CPT

## 2019-05-06 PROCEDURE — 3700000001 HC ADD 15 MINUTES (ANESTHESIA)

## 2019-05-06 PROCEDURE — 6370000000 HC RX 637 (ALT 250 FOR IP): Performed by: INTERNAL MEDICINE

## 2019-05-06 PROCEDURE — 3700000000 HC ANESTHESIA ATTENDED CARE

## 2019-05-06 PROCEDURE — 93312 ECHO TRANSESOPHAGEAL: CPT

## 2019-05-06 PROCEDURE — 6360000002 HC RX W HCPCS: Performed by: NURSE ANESTHETIST, CERTIFIED REGISTERED

## 2019-05-06 PROCEDURE — 2580000003 HC RX 258: Performed by: INTERNAL MEDICINE

## 2019-05-06 PROCEDURE — 93005 ELECTROCARDIOGRAM TRACING: CPT | Performed by: INTERNAL MEDICINE

## 2019-05-06 RX ORDER — SODIUM CHLORIDE 9 MG/ML
INJECTION, SOLUTION INTRAVENOUS CONTINUOUS
Status: DISCONTINUED | OUTPATIENT
Start: 2019-05-06 | End: 2019-05-07 | Stop reason: HOSPADM

## 2019-05-06 RX ORDER — SODIUM CHLORIDE 0.9 % (FLUSH) 0.9 %
10 SYRINGE (ML) INJECTION EVERY 12 HOURS SCHEDULED
Status: CANCELLED | OUTPATIENT
Start: 2019-05-06

## 2019-05-06 RX ORDER — SODIUM CHLORIDE 0.9 % (FLUSH) 0.9 %
10 SYRINGE (ML) INJECTION PRN
Status: CANCELLED | OUTPATIENT
Start: 2019-05-06

## 2019-05-06 RX ORDER — PROPOFOL 10 MG/ML
INJECTION, EMULSION INTRAVENOUS PRN
Status: DISCONTINUED | OUTPATIENT
Start: 2019-05-06 | End: 2019-05-06 | Stop reason: SDUPTHER

## 2019-05-06 RX ADMIN — PROPOFOL 50 MG: 10 INJECTION, EMULSION INTRAVENOUS at 09:04

## 2019-05-06 RX ADMIN — SODIUM CHLORIDE: 9 INJECTION, SOLUTION INTRAVENOUS at 08:26

## 2019-05-06 RX ADMIN — PROPOFOL 75 MG: 10 INJECTION, EMULSION INTRAVENOUS at 09:03

## 2019-05-06 RX ADMIN — RIVAROXABAN 20 MG: 20 TABLET, FILM COATED ORAL at 09:26

## 2019-05-06 RX ADMIN — PROPOFOL 75 MG: 10 INJECTION, EMULSION INTRAVENOUS at 08:50

## 2019-05-06 RX ADMIN — PROPOFOL 50 MG: 10 INJECTION, EMULSION INTRAVENOUS at 08:54

## 2019-05-06 NOTE — PROCEDURES
Preprocedure diagnosis:  Persistent atrial fibrillation  Procedures, cardioversion elective arrhythmia external    Preprocedure diagnosis: A. Fib/flutter    Prior tests anticoagulated    Primary procedure  Written informed consent was obtained, the JAIME was performed under stable fasting condition and intra cardaic thrombi was ruled out, self-adhesive anterior-posterior defibrillation pads were applied, the defibrillator was programmed to deliver energy in a synchronized fashion with a EKG. The patient was set up for monitoring of surface EKG and pulse oximetry continuously. Blood pressure was monitored and with automatic cuff measurements. Supplemental oxygen was administered. The procedure was performed under anesthesia by anesthesiology. After ensuring adequate anesthesia, DC CV was performed by delivering 200 J of direct current delivered in a synchronized fashion. Result: Successful cardioversion to sinus rhythm, confirmed on 12-lead EKG. Complication: None    Patient was monitored until awake and vitals remained stable. Brenda Brar M.D.   [facility] cardiology

## 2019-05-06 NOTE — ANESTHESIA PRE PROCEDURE
Department of Anesthesiology  Preprocedure Note       Name:  Prosper Denise   Age:  76 y.o.  :  1945                                          MRN:  [id number]         Date:  2019      Surgeon: * No surgeons listed *    Procedure: SEY JAIME CARDIOVERSION W/ ANES    Medications prior to admission:   Prior to Admission medications    Medication Sig Start Date End Date Taking?  Authorizing Provider   carvedilol (COREG CR) 20 MG CP24 extended release capsule Take 1 capsule by mouth daily 19   Bhumika Bradshaw MD   triamcinolone (KENALOG) 0.1 % cream USE TID 19   Historical Provider, MD   furosemide (LASIX) 20 MG tablet Take 1 tablet by mouth daily 19   Bhumika Bradshaw MD   loperamide (IMODIUM) 2 MG capsule Take 2 mg by mouth 4 times daily as needed for Diarrhea    Historical Provider, MD   diphenhydrAMINE (BENADRYL) 25 MG tablet Take 25 mg by mouth every 6 hours as needed for Itching    Historical Provider, MD   rivaroxaban (XARELTO) 20 MG TABS tablet Take 1 tablet by mouth daily (with breakfast) 19   Bhumika Bradshaw MD   hydrOXYzine (ATARAX) 10 MG tablet Take 10 mg by mouth 3 times daily as needed for Itching    Historical Provider, MD   spironolactone (ALDACTONE) 25 MG tablet Take 1 tablet by mouth daily 10/12/18   Vivienne Cortes MD   vitamin D (CHOLECALCIFEROL) 1000 UNIT TABS tablet Take 1,000 Units by mouth daily    Historical Provider, MD   TURMERIC PO Take 1 capsule by mouth daily    Historical Provider, MD   sodium chloride (OCEAN, BABY AYR) 0.65 % nasal spray 1 spray by Nasal route as needed for Congestion 16   Aaron Levo, MD   Ascorbic Acid (VITAMIN C) 250 MG tablet Take 1,000 mg by mouth daily    Historical Provider, MD   Doxylamine Succinate, Sleep, (UNISOM PO) Take 1 tablet by mouth daily as needed (sleep)     Historical Provider, MD   baclofen (LIORESAL) 20 MG tablet Take 20 mg by mouth 2 times daily as needed (muscle spasms, spastic bladder) Indications: Back difficulty breathing, started to sweat and was itching    Sulfa Antibiotics Diarrhea and Other (See Comments)     Also causes migraines  caused migraines and diarrhea    Acetaminophen Hives and Itching    Apap-Caff-Dihydrocodeine Hives and Itching    Coreg [Carvedilol] Itching     Can take extended release Coreg    Cozaar [Losartan] Itching    Demerol      3/9/19 Pt states she gets a severe migraine    Diovan [Valsartan] Itching    Imdur [Isosorbide Mononitrate]      Severe migraines    Labetalol Itching    Lisinopril Itching    Ramipril Itching    Xarelto [Rivaroxaban]      3/9/19 Pt states that she had broke out with a rash, got itchy and had severe side effects (severe itch, headache)    Effexor [Venlafaxine Hydrochloride] Nausea Only    Eliquis [Apixaban] Hives, Itching and Rash     3/9/19 Pt states that she gets hives, itchy and a rash       Problem List:    Patient Active Problem List   Diagnosis Code    Anxiety F41.9    Mitral regurgitation I34.0    Lumbar radiculopathy M54.16    Cervical radiculopathy M54.12    HTN (hypertension), benign I10    Thrombus of left atrial appendage I51.3    Paroxysmal atrial fibrillation (HCC) I48.0    Acute on chronic systolic congestive heart failure (HCC) I50.23       Past Medical History:        Diagnosis Date    Anxiety     Arthritis     Atrial fibrillation ([facility] [address]     new onset    Cataract     Cerebral artery occlusion with cerebral infarction ([facility] [address]     TIA    Cervical radiculopathy     RNicole Ruby    CHF (congestive heart failure) ([facility])     Chronic migraine without aura, with intractable migraine, so stated, without mention of status migrainosus     RNicole Sainzer    Chronic sinusitis     Disc displacement, lumbar     R. Brocker    Hypertension     Left ventricular systolic dysfunction     Meige syndrome     partial/R.  Brocker    Mitral regurgitation     Mild to moderate    Nonischemic cardiomyopathy (HCC)     Osteopenia     TIA (transient ischemic attack)     carotid TIA's    Vertebrobasilar artery syndrome     JOHANA Ruby       Past Surgical History:        Procedure Laterality Date    BLADDER REPAIR      BUNIONECTOMY      CARDIOVASCULAR STRESS TEST  11/25/13    Rt & LT cath    DIAGNOSTIC CARDIAC CATH LAB PROCEDURE  2/20/10    Dr Donivan Hashimoto CATH LAB PROCEDURE  8/24/11    Right heart cath @ [facility].  DOPPLER ECHOCARDIOGRAPHY  11/25/13    HYSTERECTOMY  1991    OVARY REMOVAL      TRANSESOPHAGEAL ECHOCARDIOGRAM  11/21/2018    Dr. Brina Dumont TRANSESOPHAGEAL ECHOCARDIOGRAM  03/18/2019       Social History:    Social History     Tobacco Use    Smoking status: Never Smoker    Smokeless tobacco: Never Used   Substance Use Topics    Alcohol use: Yes     Alcohol/week: 0.0 oz     Comment: occasional wine/mixed drink                                Counseling given: Not Answered      Vital Signs (Current): There were no vitals filed for this visit.                                            BP Readings from Last 3 Encounters:   04/25/19 100/62   04/15/19 96/66   03/18/19 (!) 98/47       NPO Status:                                                                                 BMI:   Wt Readings from Last 3 Encounters:   04/25/19 174 lb 12.8 oz (79.3 kg)   04/15/19 170 lb 6.4 oz (77.3 kg)   03/18/19 175 lb (79.4 kg)     There is no height or weight on file to calculate BMI.    CBC:   Lab Results   Component Value Date    WBC 5.6 03/11/2019    RBC 4.49 03/11/2019    HGB 12.3 03/11/2019    HCT 38.7 03/11/2019    MCV 86.2 03/11/2019    RDW 15.8 03/11/2019     03/11/2019       CMP:   Lab Results   Component Value Date     03/11/2019    K 4.1 03/11/2019    K 4.2 11/22/2018     03/11/2019    CO2 23 03/11/2019    BUN 20 03/11/2019    CREATININE 0.9 03/11/2019    GFRAA >60 03/11/2019    LABGLOM >60 03/11/2019    GLUCOSE 89 03/11/2019    GLUCOSE 120 10/12/2011    PROT 6.3 03/11/2019    CALCIUM 8.9 03/11/2019 BILITOT 0.9 03/11/2019    ALKPHOS 120 03/11/2019    AST 23 03/11/2019    ALT 26 03/11/2019       POC Tests: No results for input(s): POCGLU, POCNA, POCK, POCCL, POCBUN, POCHEMO, POCHCT in the last 72 hours. Coags:   Lab Results   Component Value Date    PROTIME 30.7 03/11/2019    PROTIME 12.0 12/17/2012    INR 2.7 03/11/2019    APTT 26.1 11/20/2018       HCG (If Applicable): No results found for: PREGTESTUR, PREGSERUM, HCG, HCGQUANT     ABGs: No results found for: PHART, PO2ART, ONV1UGS, BHP0HON, BEART, O1KFSQKJ     Type & Screen (If Applicable):  No results found for: LABABO, [address]    Anesthesia Evaluation  Patient summary reviewed and Nursing notes reviewed  Airway: Mallampati: III  TM distance: >3 FB   Neck ROM: limited  Mouth opening: > = 3 FB Dental: normal exam         Pulmonary: breath sounds clear to auscultation                             Cardiovascular:    (+) hypertension:, CHF:,       ECG reviewed  Rhythm: irregular  Rate: abnormal                    Neuro/Psych:      (-) TIA           GI/Hepatic/Renal:             Endo/Other:                     Abdominal:           Vascular:                                    Anesthesia Plan      general and MAC     ASA 3       Induction: intravenous. Anesthetic plan and risks discussed with patient. Plan discussed with CRNA and attending. [address], APRN - CRNA   5/6/2019      DOS STAFF ADDENDUM:    Patient seen and chart reviewed. Physical exam and history updated as indicated. NPO status confirmed. Anesthesia options and plan discussed including risks benefits with patient/legal guardian and family as available. Concerns and questions addressed. Consent verbalized to proceed.   Anesthesia plan, options and intraoperative/postoperative concerns discussed with care team.    Yudi Fiore MD  5/6/2019  8:25 AM

## 2019-05-06 NOTE — ANESTHESIA POSTPROCEDURE EVALUATION
Department of Anesthesiology  Postprocedure Note    Patient: Vivien Enriquez  MRN: [id number]  YOB: 1945  Date of evaluation: 5/6/2019  Time:  3:53 PM     Procedure Summary     Date:  05/06/19 [place] / [place]  [facility] [place] [facility]; [facility] [place]    Anesthesia Start:  3847 Anesthesia Stop:  0349    Procedure:  SEY JAIME CARDIOVERSION W/ ANES Diagnosis:      Scheduled Providers:  UYEN El - CRNA; Solo Parikh MD Responsible Provider:  Solo Parikh MD    Anesthesia Type:  general ASA Status:  3          Anesthesia Type: general    Joseph Phase I:      Joseph Phase II:      Last vitals: Reviewed and per EMR flowsheets.        Anesthesia Post Evaluation    Patient location during evaluation: PACU  Patient participation: complete - patient participated  Level of consciousness: awake and alert  Airway patency: patent  Nausea & Vomiting: no nausea and no vomiting  Complications: no  Cardiovascular status: hemodynamically stable and blood pressure returned to baseline  Respiratory status: acceptable  Hydration status: euvolemic

## 2019-05-07 LAB
EKG ATRIAL RATE: 64 BPM
EKG P AXIS: 58 DEGREES
EKG P-R INTERVAL: 224 MS
EKG Q-T INTERVAL: 440 MS
EKG QRS DURATION: 102 MS
EKG QTC CALCULATION (BAZETT): 453 MS
EKG R AXIS: 109 DEGREES
EKG T AXIS: 172 DEGREES
EKG VENTRICULAR RATE: 64 BPM

## 2019-06-05 ENCOUNTER — OFFICE VISIT (OUTPATIENT)
Dept: CARDIOLOGY CLINIC | Age: 74
End: 2019-06-05
Payer: MEDICARE

## 2019-06-05 VITALS
SYSTOLIC BLOOD PRESSURE: 90 MMHG | WEIGHT: 170.2 LBS | BODY MASS INDEX: 27.35 KG/M2 | HEIGHT: 66 IN | RESPIRATION RATE: 16 BRPM | HEART RATE: 65 BPM | DIASTOLIC BLOOD PRESSURE: 50 MMHG

## 2019-06-05 DIAGNOSIS — I34.0 NON-RHEUMATIC MITRAL REGURGITATION: ICD-10-CM

## 2019-06-05 DIAGNOSIS — I42.8 NONISCHEMIC CARDIOMYOPATHY (HCC): ICD-10-CM

## 2019-06-05 DIAGNOSIS — I48.19 PERSISTENT ATRIAL FIBRILLATION (HCC): Primary | ICD-10-CM

## 2019-06-05 DIAGNOSIS — I38 VHD (VALVULAR HEART DISEASE): ICD-10-CM

## 2019-06-05 DIAGNOSIS — Z88.8 ALLERGY TO ACE INHIBITORS: ICD-10-CM

## 2019-06-05 DIAGNOSIS — I50.22 CHRONIC SYSTOLIC HEART FAILURE (HCC): ICD-10-CM

## 2019-06-05 DIAGNOSIS — M54.16 LUMBAR RADICULOPATHY: ICD-10-CM

## 2019-06-05 DIAGNOSIS — I27.20 PULMONARY HTN (HCC): ICD-10-CM

## 2019-06-05 DIAGNOSIS — I10 HTN (HYPERTENSION), BENIGN: ICD-10-CM

## 2019-06-05 DIAGNOSIS — Z88.9 MULTIPLE DRUG ALLERGIES: ICD-10-CM

## 2019-06-05 PROCEDURE — 3017F COLORECTAL CA SCREEN DOC REV: CPT | Performed by: INTERNAL MEDICINE

## 2019-06-05 PROCEDURE — 4040F PNEUMOC VAC/ADMIN/RCVD: CPT | Performed by: INTERNAL MEDICINE

## 2019-06-05 PROCEDURE — G8399 PT W/DXA RESULTS DOCUMENT: HCPCS | Performed by: INTERNAL MEDICINE

## 2019-06-05 PROCEDURE — G8417 CALC BMI ABV UP PARAM F/U: HCPCS | Performed by: INTERNAL MEDICINE

## 2019-06-05 PROCEDURE — 1036F TOBACCO NON-USER: CPT | Performed by: INTERNAL MEDICINE

## 2019-06-05 PROCEDURE — 1090F PRES/ABSN URINE INCON ASSESS: CPT | Performed by: INTERNAL MEDICINE

## 2019-06-05 PROCEDURE — 93000 ELECTROCARDIOGRAM COMPLETE: CPT | Performed by: INTERNAL MEDICINE

## 2019-06-05 PROCEDURE — 99214 OFFICE O/P EST MOD 30 MIN: CPT | Performed by: INTERNAL MEDICINE

## 2019-06-05 PROCEDURE — G8427 DOCREV CUR MEDS BY ELIG CLIN: HCPCS | Performed by: INTERNAL MEDICINE

## 2019-06-05 PROCEDURE — 1123F ACP DISCUSS/DSCN MKR DOCD: CPT | Performed by: INTERNAL MEDICINE

## 2019-06-05 RX ORDER — HYDRALAZINE HYDROCHLORIDE 10 MG/1
10 TABLET, FILM COATED ORAL 3 TIMES DAILY
Refills: 3 | COMMUNITY
Start: 2019-05-24 | End: 2019-12-12 | Stop reason: SDUPTHER

## 2019-06-05 NOTE — PROGRESS NOTES
OFFICE VISIT     PRIMARY CARE PHYSICIAN:      Sly Freed MD       ALLERGIES / SENSITIVITIES:        Allergies   Allergen Reactions    Atacand [Candesartan] Hives and Itching    Adhesive Tape Itching     develops rash and itching with EKG electrodes    Digoxin Itching     developed itching and rash    Losartan Potassium Itching    Shellfish-Derived Products Itching     3/9/19 Pt states she had a lobster pizza and had difficulty breathing, started to sweat and was itchy  states she had a lobster pizza and had difficulty breathing, started to sweat and was itching    Sulfa Antibiotics Diarrhea and Other (See Comments)     Also causes migraines  caused migraines and diarrhea    Acetaminophen Hives and Itching    Apap-Caff-Dihydrocodeine Hives and Itching    Coreg [Carvedilol] Itching     Can take extended release Coreg    Cozaar [Losartan] Itching    Demerol      3/9/19 Pt states she gets a severe migraine    Diovan [Valsartan] Itching    Imdur [Isosorbide Mononitrate]      Severe migraines    Labetalol Itching    Lisinopril Itching    Ramipril Itching    Xarelto [Rivaroxaban]      3/9/19 Pt states that she had broke out with a rash, got itchy and had severe side effects (severe itch, headache)    Effexor [Venlafaxine Hydrochloride] Nausea Only    Eliquis [Apixaban] Hives, Itching and Rash     3/9/19 Pt states that she gets hives, itchy and a rash          REVIEWED MEDICATIONS:        Current Outpatient Medications:     hydrALAZINE (APRESOLINE) 10 MG tablet, 10 mg 3 times daily, Disp: , Rfl: 3    carvedilol (COREG CR) 20 MG CP24 extended release capsule, Take 1 capsule by mouth daily, Disp: 90 capsule, Rfl: 3    triamcinolone (KENALOG) 0.1 % cream, USE TID, Disp: , Rfl: 1    furosemide (LASIX) 20 MG tablet, Take 1 tablet by mouth daily, Disp: 90 tablet, Rfl: 3    loperamide (IMODIUM) 2 MG capsule, Take 2 mg by mouth 4 times daily as needed for Diarrhea, Disp: , Rfl:    diphenhydrAMINE (BENADRYL) 25 MG tablet, Take 25 mg by mouth every 6 hours as needed for Itching, Disp: , Rfl:     rivaroxaban (XARELTO) 20 MG TABS tablet, Take 1 tablet by mouth daily (with breakfast), Disp: 21 tablet, Rfl: 0    hydrOXYzine (ATARAX) 10 MG tablet, Take 10 mg by mouth 3 times daily as needed for Itching, Disp: , Rfl:     spironolactone (ALDACTONE) 25 MG tablet, Take 1 tablet by mouth daily, Disp: 90 tablet, Rfl: 3    vitamin D (CHOLECALCIFEROL) 1000 UNIT TABS tablet, Take 1,000 Units by mouth daily, Disp: , Rfl:     TURMERIC PO, Take 1 capsule by mouth daily, Disp: , Rfl:     sodium chloride (OCEAN, BABY AYR) 0.65 % nasal spray, 1 spray by Nasal route as needed for Congestion, Disp: 50 mL, Rfl: 0    Ascorbic Acid (VITAMIN C) 250 MG tablet, Take 1,000 mg by mouth daily, Disp: , Rfl:     baclofen (LIORESAL) 20 MG tablet, Take 20 mg by mouth 2 times daily as needed (muscle spasms, spastic bladder) Indications: Back Spasm, Bladder Spasm , Disp: , Rfl:       S: REASON FOR VISIT:       Chief Complaint   Patient presents with    Follow-up     Dominick /Cardioversion. Patient denies any cp or dizziness. sOB on occasions    Atrial Fibrillation          History of Present Illness:       Office Visit for follow up of A fib, Post DOMINICK/DCCV   Had DOMINICK and DCCV 5/6/19-Dr Cinda Hernandez, No CHERYL thrombus   Matthew any exertional chest pain or short of breath   No palpitations, dizzy or syncope. Active at home   No orthopnea   Try to watch diet   Compliant with all medications       Past Medical History:   Diagnosis Date    Anxiety     Arthritis     Atrial fibrillation ([address]     new onset    Cataract     Cervical radiculopathy     RNicole Ruby    CHF (congestive heart failure) (HCC)     Chronic migraine without aura, with intractable migraine, so stated, without mention of status migrainosus     JOHANA Ruby    Chronic sinusitis     Disc displacement, lumbar     R.  Alistairer    Hypertension     Left ventricular systolic dysfunction     Meige syndrome     partial/R. Flori    Mitral regurgitation     Mild to moderate    Nonischemic cardiomyopathy (HCC)     Osteopenia     TIA (transient ischemic attack)     carotid TIA's    Vertebrobasilar artery syndrome     JOHANA Ruby            Past Surgical History:   Procedure Laterality Date    BLADDER REPAIR      BUNIONECTOMY      CARDIOVASCULAR STRESS TEST  11/25/13    Rt & LT cath    DIAGNOSTIC CARDIAC CATH LAB PROCEDURE  2/20/10    Dr Nia Nath CATH LAB PROCEDURE  8/24/11    [facility] heart [facility] @ [facility].  DOPPLER ECHOCARDIOGRAPHY  11/25/13    HYSTERECTOMY  1991    OVARY REMOVAL      TRANSESOPHAGEAL ECHOCARDIOGRAM  11/21/2018    Dr. Josafat Grayson TRANSESOPHAGEAL ECHOCARDIOGRAM  03/18/2019          Family History   Problem Relation Age of Onset    Heart Attack Mother     Stroke Mother     Heart Attack Father     Heart Failure Father     Heart Disease Father     Anxiety Disorder Sister     Depression Sister     Crohn's Disease Son           Social History     Tobacco Use    Smoking status: Never Smoker    Smokeless tobacco: Never Used   Substance Use Topics    Alcohol use: Yes     Alcohol/week: 0.0 oz     Comment: occasional wine/mixed drink.   2 cups of coffee a day          Review of Systems:  HEENT: negative for acute visual symptoms or auditory problems, no dysphagia  Constitutional: negative for fever and chills, or significant weight loss  Respiratory: negative for cough, wheezing, or hemoptysis  Cardiovascular: negative for chest pain, palpitations, and dyspnea  Gastrointestinal: negative for abdominal pain, diarrhea, nausea and vomiting  Endocrine: Negative for polyuria and polydyspsia  Genitourinary:negative for dysuria and hematuria  Derm: negative for rash and skin lesion(s)  Neurological: negative for tingling, numbness, weakness, seizures and tremors  Endocrine: negative for polydipsia and polyuria  Musculoskeletal: negative for pain or tenderness  Psychiatric: negative for anxiety, depression, or suicidal ideations         O:  COMPLETE PHYSICAL EXAM:       BP (!) 90/50   Pulse 65   Resp 16   Ht 5' 6\" (1.676 m)   Wt 170 lb 3.2 oz (77.2 kg)   BMI 27.47 kg/m²       General:   Patient alert, comfortable, no distress. Appears stated age. HEENT:    Pupils equal, no icterus, no nasal drainage, tongue moist.   Neck:              No masses, Thyroid not palpable. Chest:   Normal configuration, non tender. Lungs:   Clear to auscultation bilaterally, few scattered rhonchi. Cardiovascular:  Regular rhythm, 1/6 systolic murmur, No S3,, no palpable thrills, No elevated JVD, No carotid bruit. Abdomen:  Soft, Non tender, Bowel sounds normal,    Extremities:  No edema. Distal pulses palpable. No cyanosis, no clubbing. Skin:   Good turgor, warm and dry, no cyanosis. Musculoskeletal: No joint swelling or deformity. Neuro:   Cranial nerves grossly intact; No focal neurologic deficit. Psych:   Alert, good mood and effect. REVIEW OF DIAGNOSTIC TESTS:        Electrocardiogram: NSR   JAIME 5/6/19 - Ef 25-30%    :           A/P:   ASSESSMENT / PLAN:    Launa Hashimoto was seen today for follow-up and atrial fibrillation. Diagnoses and all orders for this visit:    Persistent atrial fibrillation (HCC) - Dx 11/2018 - CHADS2 VASc score 4; On Xarelto (did not tolerated Eliquis) Rates controlled on Coreg; add Digoxin for rate control if needed; Seen by EP at [facility]-Dr. Cici David 4/2019 for Watchman - He recommended Rhythm management; assess her EF, if <35% ICD and then Watchman in the future  -     EKG 12 Lead        -      Ambulatory referral to Cardiac Electrophysiology     Thrombus of left atrial appendage Found on JAIME 11/21/18 and 3/2019 - Resolved on JAIME 5/6/2019 - On Xarelto     Nonischemic cardiomyopathy ([facility] [address] EF 38% by Echo 12/2016; JAIME 3/16/2019- EF 25-30%; JAIME 5/6/2019- EF 25-30%; On maximal tolerable BB and Aldactone;  Allergic

## 2019-06-18 NOTE — PROGRESS NOTES
Mitral regurgitation      Overview Note:     Mild to moderate      Anxiety 04/14/2011       Past Medical History:   Diagnosis Date    Anxiety     Arthritis     Atrial fibrillation ([address]     new onset    Cataract     Cervical radiculopathy     JOHANA Ruby    CHF (congestive heart failure) (HCC)     Chronic migraine without aura, with intractable migraine, so stated, without mention of status migrainosus     JOHANA Ruby    Chronic sinusitis     Disc displacement, lumbar     RNicole Ruby    Hypertension     Left ventricular systolic dysfunction     Meige syndrome     partial/R. Flori    Mitral regurgitation     Mild to moderate    Nonischemic cardiomyopathy (HCC)     Osteopenia     TIA (transient ischemic attack)     carotid TIA's    Vertebrobasilar artery syndrome     JOHANA Ruby       Family History   Problem Relation Age of Onset    Heart Attack Mother     Stroke Mother     Heart Attack Father     Heart Failure Father     Heart Disease Father     Anxiety Disorder Sister     Depression Sister     Crohn's Disease Son      There is no family history of sudden cardiac arrest    Social History     Tobacco Use    Smoking status: Never Smoker    Smokeless tobacco: Never Used   Substance Use Topics    Alcohol use: Yes     Alcohol/week: 0.0 oz     Comment: occasional wine/mixed drink.   2 cups of coffee a day        Current Outpatient Medications   Medication Sig Dispense Refill    hydrALAZINE (APRESOLINE) 10 MG tablet 10 mg 3 times daily  3    carvedilol (COREG CR) 20 MG CP24 extended release capsule Take 1 capsule by mouth daily 90 capsule 3    triamcinolone (KENALOG) 0.1 % cream USE TID  1    furosemide (LASIX) 20 MG tablet Take 1 tablet by mouth daily 90 tablet 3    loperamide (IMODIUM) 2 MG capsule Take 2 mg by mouth 4 times daily as needed for Diarrhea      diphenhydrAMINE (BENADRYL) 25 MG tablet Take 25 mg by mouth every 6 hours as needed for Itching      rivaroxaban (XARELTO) 20 MG [Venlafaxine Hydrochloride] Nausea Only    Eliquis [Apixaban] Hives, Itching and Rash     3/9/19 Pt states that she gets hives, itchy and a rash       ROS:   Constitutional: Negative for fever, activity change and appetite change. HENT: Negative for epistaxis. Eyes: Negative for diploplia, blurred vision. Respiratory: Negative for cough, chest tightness, shortness of breath and wheezing. Cardiovascular: pertinent positives in HPI  Gastrointestinal: Negative for abdominal pain and blood in stool. Genitourinary: Negative for hematuria and difficulty urinating. Musculoskeletal: Negative for myalgias and gait problem. Skin: Negative for color change and rash. Neurological: Negative for syncope and light-headedness. Psychiatric/Behavioral: Negative for confusion and agitation. The patient is not nervous/anxious. Heme: no bleeding disorders, no melena or hematochezia  All other review of systems are negative     PHYSICAL EXAM:  Vitals:    06/19/19 0651   BP: 124/78   Pulse: 61   Resp: 18   Weight: 172 lb (78 kg)   Height: 5' 6\" (1.676 m)     Constitutional: Oriented to person, place, and time. Well-developed and cooperative. Head: Normocephalic and atraumatic. Eyes: Conjunctivae are normal. Pupils are equal, round, and reactive to light. Neck: No hepatojugular reflux and no JVD present. Carotid bruit is not present. Cardiovascular: S1 normal, S2 normal and intact distal pulses. A regular rhythm present. PMI is not displaced. Pulmonary/Chest: Effort normal and breath sounds normal. No respiratory distress. Abdominal: Soft. Normal appearance and bowel sounds are normal.  No abdominal bruit and no pulsatile midline mass. There is no hepatomegaly. No tenderness. Musculoskeletal: Normal range of motion of all extremities, no muscle weakness. Neurological: Alert and oriented to person, place, and time. Gait normal.   Skin: Skin is warm and dry. No bruising, no ecchymosis and no rash noted. MD      Accession Number   [id number]     Referring Physician      Date of Birth      1945    Sonographer         Esthela Partida [facility]      Age                74 year(s)    Interpreting         Elia Mehta MD                                    Physician                                       Any Other     Procedure    Type of Study      JAIME procedure:Transesophageal Echo (JAIME), Doppler Echocardiography, Color   Flow Velocity Mapping.     Procedure Date  Date: 05/06/2019 Start: 07:24 AM    Study Location: Portable  Technical Quality: Adequate visualization    Indications:Atrial fibrillation. Patient Status: Routine    Height: 66 inches Weight: 170 pounds BSA: 1.87 m^2 BMI: 27.44 kg/m^2    Rhythm: Atrial fibrillation    JAIME Performed By: the attending and the sonographer     Type of Anesthesia: Moderate sedation     Allergies    - Other drug:(Sulfa, Effexor, Ramipril, Lisinopril, Labetalol, Imdur,      Diovan, Demerol, Coreg, Cozaar, Tylenol, Percocat, Cadisartan).     Findings      Left Ventricle   Left ventricle size is normal.   Ejection fraction is visually estimated at 25-30%.   Normal left ventricle wall thickness.   Severe global hypokinesis.      Right Ventricle   Normal right ventricular size and function.      Left Atrium   Moderately dilated left atrium.   Moderate spontaneous echo contrast was present in LA cavity.   No evidence of thrombus within left atrium or appendage.   No evidence of patent foramen ovale.    CHERYL emptying velocity 21 cm/s.      Right Atrium   Moderately enlarged right atrium size.      Mitral Valve   Normal mitral valve structure and function.   No evidence of mitral valve stenosis.   Mild mitral regurgitation is present.      Tricuspid Valve   The tricuspid valve appears structurally normal.   Mild tricuspid regurgitation.   No evidence of tricuspid stenosis.      Aortic Valve   Structurally normal aortic valve.   Individual aortic valve leaflets are not clearly visualized.   No hemodynamically significant aortic stenosis is present.   Physiologic and/or trace aortic regurgitation is noted.      Pulmonic Valve   Pulmonic valve is structurally normal.   No evidence of any pulmonic regurgitation.   No evidence of pulmonic valve stenosis.     Pericardial Effusion   No evidence for hemodynamically significant pericardial effusion.      Pleural Effusion   No evidence of pleural effusion.      Aorta   Normal aortic root and ascending aorta.   Miscellaneous   Inferior Vena Cava not well visualized.      Conclusions      Summary   Left ventricle size is normal.   Ejection fraction is visually estimated at 25-30%.   Normal left ventricle wall thickness.   Severe global hypokinesis.   Moderately dilated left atrium.   No evidence of thrombus within left atrium or appendage.   Normal right ventricular size and function.   Moderately enlarged right atrium size.   Mild mitral regurgitation is present.   No hemodynamically significant aortic stenosis is present.   Mild tricuspid regurgitation.   No evidence for hemodynamically significant pericardial effusion.      Signature      ----------------------------------------------------------------   Electronically signed by Carolyn Cardenas MD(Interpreting   physician) on 05/06/2019 09:09 PM           JAIME: March 2019:  Echo Transesophageal in Cath Lab   Order: [id number]      Андрей Baugh MD     3/18/2019 11:20 AM  PRELIMINARY TRANSESOPHAGEAL ECHOCARDIOGRAPHY REPORT    Indications for study:  Atrial fibrillation, possible   cardioversion    Study performed using (Sedation): Per anesthesia    Complications: None    Preliminary findings: Severe LV dysfunction, moderate RV   dysfunction, no hemodynamically significant valve disease, no   evidence of vegetations, spontaneous contrast and left atrial   appendage thrombus,  no intraatrial shunting on bubble study, no   significant atherosclerosis of the aorta.     For patient status during to bleeding, blood clot, infection, pneumothorax, hemothorax, cardiac perforation and tamponade, and death. The patient understands these risks and agrees to proceed with the procedure. 2.  No changes were made in medications. 3.  From an atrial fibrillation perspective, she is maintaining sinus rhythm post JAIME/CV. She also remains on Xarelto for oral anticoagulation. We did discuss that depending on recurrence rate of atrial fibrillation the possible need for anti-rhythmic medication/catheter ablation. We also discussed that if sleep continues to be intolerant to oral anticoagulation, the WATCHMAN left atrial appendage closure device may also be recommended. I have spent a total of 80 minutes with the patient  reviewing the above stated recommendations. A total of >50% of that time involved face-to-face time providing counseling and or coordination of care with the other providers. Thank you for allowing me to participate in your patient's care. Please call me if there are any questions. Sergio Eric, DO  [facility]  [address] [facility]    NOTE: This report was transcribed using voice recognition software. Every effort was made to ensure accuracy; however, inadvertent computerized transcription errors may be present.

## 2019-06-19 ENCOUNTER — OFFICE VISIT (OUTPATIENT)
Dept: NON INVASIVE DIAGNOSTICS | Age: 74
End: 2019-06-19
Payer: MEDICARE

## 2019-06-19 VITALS
DIASTOLIC BLOOD PRESSURE: 78 MMHG | RESPIRATION RATE: 18 BRPM | HEIGHT: 66 IN | HEART RATE: 61 BPM | BODY MASS INDEX: 27.64 KG/M2 | WEIGHT: 172 LBS | SYSTOLIC BLOOD PRESSURE: 124 MMHG

## 2019-06-19 DIAGNOSIS — I48.0 PAROXYSMAL ATRIAL FIBRILLATION (HCC): ICD-10-CM

## 2019-06-19 DIAGNOSIS — I50.22 CHRONIC SYSTOLIC CONGESTIVE HEART FAILURE (HCC): ICD-10-CM

## 2019-06-19 DIAGNOSIS — I50.9 CONGESTIVE HEART FAILURE, UNSPECIFIED HF CHRONICITY, UNSPECIFIED HEART FAILURE TYPE (HCC): Primary | ICD-10-CM

## 2019-06-19 PROCEDURE — G8417 CALC BMI ABV UP PARAM F/U: HCPCS | Performed by: INTERNAL MEDICINE

## 2019-06-19 PROCEDURE — 93000 ELECTROCARDIOGRAM COMPLETE: CPT | Performed by: INTERNAL MEDICINE

## 2019-06-19 PROCEDURE — 99205 OFFICE O/P NEW HI 60 MIN: CPT | Performed by: INTERNAL MEDICINE

## 2019-06-19 PROCEDURE — G8427 DOCREV CUR MEDS BY ELIG CLIN: HCPCS | Performed by: INTERNAL MEDICINE

## 2019-06-19 PROCEDURE — 1090F PRES/ABSN URINE INCON ASSESS: CPT | Performed by: INTERNAL MEDICINE

## 2019-06-25 ENCOUNTER — OFFICE VISIT (OUTPATIENT)
Dept: FAMILY MEDICINE CLINIC | Age: 74
End: 2019-06-25
Payer: MEDICARE

## 2019-06-25 VITALS
WEIGHT: 173 LBS | HEIGHT: 67 IN | SYSTOLIC BLOOD PRESSURE: 122 MMHG | BODY MASS INDEX: 27.15 KG/M2 | DIASTOLIC BLOOD PRESSURE: 70 MMHG | HEART RATE: 60 BPM | TEMPERATURE: 98.1 F | OXYGEN SATURATION: 97 %

## 2019-06-25 DIAGNOSIS — L29.9 ITCHING: ICD-10-CM

## 2019-06-25 DIAGNOSIS — Z76.0 ENCOUNTER FOR MEDICATION REFILL: ICD-10-CM

## 2019-06-25 DIAGNOSIS — Z00.00 ROUTINE GENERAL MEDICAL EXAMINATION AT A HEALTH CARE FACILITY: Primary | ICD-10-CM

## 2019-06-25 DIAGNOSIS — F41.9 ANXIETY: ICD-10-CM

## 2019-06-25 PROCEDURE — 3017F COLORECTAL CA SCREEN DOC REV: CPT | Performed by: FAMILY MEDICINE

## 2019-06-25 PROCEDURE — G0439 PPPS, SUBSEQ VISIT: HCPCS | Performed by: FAMILY MEDICINE

## 2019-06-25 PROCEDURE — 4040F PNEUMOC VAC/ADMIN/RCVD: CPT | Performed by: FAMILY MEDICINE

## 2019-06-25 PROCEDURE — 1123F ACP DISCUSS/DSCN MKR DOCD: CPT | Performed by: FAMILY MEDICINE

## 2019-06-25 RX ORDER — HYDROXYZINE HYDROCHLORIDE 10 MG/1
10 TABLET, FILM COATED ORAL 3 TIMES DAILY PRN
Qty: 270 TABLET | Refills: 3 | Status: SHIPPED
Start: 2019-06-25 | End: 2020-07-20 | Stop reason: SDUPTHER

## 2019-06-25 ASSESSMENT — LIFESTYLE VARIABLES
HOW MANY STANDARD DRINKS CONTAINING ALCOHOL DO YOU HAVE ON A TYPICAL DAY: 0
AUDIT-C TOTAL SCORE: 1
HOW OFTEN DURING THE LAST YEAR HAVE YOU NEEDED AN ALCOHOLIC DRINK FIRST THING IN THE MORNING TO GET YOURSELF GOING AFTER A NIGHT OF HEAVY DRINKING: 0
HOW OFTEN DURING THE LAST YEAR HAVE YOU BEEN UNABLE TO REMEMBER WHAT HAPPENED THE NIGHT BEFORE BECAUSE YOU HAD BEEN DRINKING: 0
HAS A RELATIVE, FRIEND, DOCTOR, OR ANOTHER HEALTH PROFESSIONAL EXPRESSED CONCERN ABOUT YOUR DRINKING OR SUGGESTED YOU CUT DOWN: 0
HAVE YOU OR SOMEONE ELSE BEEN INJURED AS A RESULT OF YOUR DRINKING: 0
HOW OFTEN DO YOU HAVE A DRINK CONTAINING ALCOHOL: 1
HOW OFTEN DURING THE LAST YEAR HAVE YOU HAD A FEELING OF GUILT OR REMORSE AFTER DRINKING: 0
HOW OFTEN DO YOU HAVE SIX OR MORE DRINKS ON ONE OCCASION: 0
HOW OFTEN DURING THE LAST YEAR HAVE YOU FAILED TO DO WHAT WAS NORMALLY EXPECTED FROM YOU BECAUSE OF DRINKING: 0

## 2019-06-25 ASSESSMENT — PATIENT HEALTH QUESTIONNAIRE - PHQ9
SUM OF ALL RESPONSES TO PHQ QUESTIONS 1-9: 0

## 2019-06-25 ASSESSMENT — ANXIETY QUESTIONNAIRES: GAD7 TOTAL SCORE: 0

## 2019-06-25 NOTE — PROGRESS NOTES
Medicare Annual Wellness Visit  Name: Nicola Kocher Date: 2019   MRN: <[id number]> Sex: Female   Age: 76 y.o. Ethnicity: Non-/Non    : 1945 Race: Kiet Mendoza is here for Medicare AWV    Medication list reviewed. Only medication prescribed by PCP is hydroxyzine 10 mg TID PRN. Screenings for behavioral, psychosocial and functional/safety risks, and cognitive dysfunction are all negative except as indicated below. These results, as well as other patient data from the [address] [facility] [address] form, are documented in Flowsheets linked to this Encounter. Allergies   Allergen Reactions    Atacand [Candesartan] Hives and Itching    Adhesive Tape Itching     develops rash and itching with EKG electrodes    Digoxin Itching     developed itching and rash    Losartan Potassium Itching    Shellfish-Derived Products Itching     3/9/19 Pt states she had a lobster pizza and had difficulty breathing, started to sweat and was itchy  states she had a lobster pizza and had difficulty breathing, started to sweat and was itching    Sulfa Antibiotics Diarrhea and Other (See Comments)     Also causes migraines  caused migraines and diarrhea    Acetaminophen Hives and Itching    Apap-Caff-Dihydrocodeine Hives and Itching    Coreg [Carvedilol] Itching     Can take extended release Coreg    Cozaar [Losartan] Itching    Demerol      3/9/19 Pt states she gets a severe migraine    Diovan [Valsartan] Itching    Imdur [Isosorbide Mononitrate]      Severe migraines    Labetalol Itching    Lisinopril Itching    Ramipril Itching    Xarelto [Rivaroxaban]      3/9/19 Pt states that she had broke out with a rash, got itchy and had severe side effects (severe itch, headache)    Effexor [Venlafaxine Hydrochloride] Nausea Only    Eliquis [Apixaban] Hives, Itching and Rash     3/9/19 Pt states that she gets hives, itchy and a rash     Prior to Visit Medications    Medication Sig Taking? lumbar     R. Brocker    Hypertension     Left ventricular systolic dysfunction     Meige syndrome     partial/R. Brocker    Mitral regurgitation     Mild to moderate    Nonischemic cardiomyopathy (HCC)     Osteopenia     TIA (transient ischemic attack)     carotid TIA's    Vertebrobasilar artery syndrome     R. Alistairer     Past Surgical History:   Procedure Laterality Date    BLADDER REPAIR      BUNIONECTOMY      CARDIOVASCULAR STRESS TEST  11/25/13    Rt & LT cath    DIAGNOSTIC CARDIAC CATH LAB PROCEDURE  2/20/10    Dr Cornelius Fairchild CATH LAB PROCEDURE  8/24/11    Right heart cath @ [facility].  DOPPLER ECHOCARDIOGRAPHY  11/25/13    HYSTERECTOMY  1991    OVARY REMOVAL      TRANSESOPHAGEAL ECHOCARDIOGRAM  11/21/2018    Dr. Franchesca Bateman TRANSESOPHAGEAL ECHOCARDIOGRAM  03/18/2019     Family History   Problem Relation Age of Onset    Heart Attack Mother     Stroke Mother     Heart Attack Father     Heart Failure Father     Heart Disease Father     Anxiety Disorder Sister     Depression Sister     Crohn's Disease Son        CareTeam (Including outside providers/suppliers regularly involved in providing care):   Patient Care Team:  Dominick Leal MD as PCP - General (Family Medicine)  Dominick Leal MD as PCP - [facility] Empaneled Provider  Dasie Dancer, MD as Consulting Physician (Dermatology)  Dasie Dancer, MD as Consulting Physician (Dermatology)  Ashok Springer MD as Consulting Physician (Cardiology)  Ashok Springer MD as Consulting Physician (Cardiology)  Norma Frederick DO as Consulting Physician (Electrophysiology)    Wt Readings from Last 3 Encounters:   06/25/19 173 lb (78.5 kg)   06/19/19 172 lb (78 kg)   06/05/19 170 lb 3.2 oz (77.2 kg)     Vitals:    06/25/19 1422   BP: 122/70   Pulse: 60   Temp: 98.1 °F (36.7 °C)   SpO2: 97%   Weight: 173 lb (78.5 kg)   Height: 5' 6.5\" (1.689 m)     Body mass index is 27.5 kg/m².     Based upon direct observation of the patient, evaluation of cognition reveals recent and remote memory intact. Physical Exam:  General Appearance: alert and oriented to person, place and time, well developed and well- nourished, in no acute distress  Skin: warm and dry, no rash or erythema  Head: normocephalic and atraumatic  Eyes: pupils equal, round, and reactive to light, extraocular eye movements intact, conjunctivae normal  ENT: tympanic membrane, external ear and ear canal normal bilaterally, nose without deformity, nasal mucosa and turbinates normal without polyps  Neck: supple and non-tender without mass, no thyromegaly or thyroid nodules, no cervical lymphadenopathy  Pulmonary/Chest: clear to auscultation bilaterally- no wheezes, rales or rhonchi, normal air movement, no respiratory distress  Cardiovascular: normal rate, regular rhythm, normal S1 and S2, no murmurs, rubs, clicks, or gallops, distal pulses intact, no carotid bruits  Abdomen: soft, non-tender, non-distended, normal bowel sounds, no masses or organomegaly  Extremities: no cyanosis, clubbing or edema  Musculoskeletal: normal range of motion, no joint swelling, deformity or tenderness  Neurologic: reflexes normal and symmetric, no cranial nerve deficit, gait, coordination and speech normal    Patient's complete Health Risk Assessment and screening values have been reviewed and are found in Flowsheets. The following problems were reviewed today and where indicated follow up appointments were made and/or referrals ordered.     Positive Risk Factor Screenings with Interventions:   Cognitive Impairment Interventions:  · Negative Cognitive Function Screening    Health Habits/Nutrition:  Health Habits/Nutrition  Do you exercise for at least 20 minutes 2-3 times per week?: Yes  Have you lost any weight without trying in the past 3 months?: No  Do you eat fewer than 2 meals per day?: No  Have you seen a dentist within the past year?: (!) No  Body mass index is 27.5 kg/m². Health Habits/Nutrition Interventions:  · Dental exam overdue:  patient declines dental evaluation    Hearing/Vision:  Hearing/Vision  Do you or your family notice any trouble with your hearing?: (!) Yes  Do you have difficulty driving, watching TV, or doing any of your daily activities because of your eyesight?: No  Have you had an eye exam within the past year?: (!) No  Hearing/Vision Interventions:  · Hearing concerns:  patient declines any further evaluation/treatment for hearing issues, citing inability to afford hearing aids  · Vision concerns:  patient encouraged to make appointment with his/her eye specialist    Personalized Preventive Plan   Current Health Maintenance Status  Immunization History   Administered Date(s) Administered    Influenza 10/18/2011, 10/17/2012    Influenza Vaccine, unspecified formulation 11/26/2014    Influenza Virus Vaccine 10/15/2013, 09/23/2015    Influenza, High Dose (Fluzone 65 yrs and older) 09/14/2016, 11/29/2018    Pneumococcal Conjugate 13-valent ([id number]) 09/23/2015    Pneumococcal Polysaccharide ([id number]) 01/01/2010, 09/14/2016    Tdap (Boostrix, Adacel) 03/18/2015        Health Maintenance   Topic Date Due    Annual Wellness Visit (AWV)  05/01/2019    Shingles Vaccine (1 of 2) 01/01/2025 (Originally 2/28/1995)    Potassium monitoring  03/11/2020    Creatinine monitoring  03/11/2020    Breast cancer screen  05/15/2020    Colon cancer screen colonoscopy  03/02/2023    Lipid screen  11/21/2023    DTaP/Tdap/Td vaccine (2 - Td) 03/18/2025    DEXA (modify frequency per FRAX score)  Completed    Flu vaccine  Completed    Pneumococcal 65+ years Vaccine  Completed    Hepatitis C screen  Completed     Recommendations for Preventive Services Due: see orders and patient instructions/AVS.  .   Recommended screening schedule for the next 5-10 years is provided to the patient in written form: see Patient Instructions/AVS.            Assessment / Plan: Colleen Vazquez was seen today for medicare awv. Diagnoses and all orders for this visit:    Medicare Annual Wellness Exam, subsequent    Encounter for medication refill  -     hydrOXYzine (ATARAX) 10 MG tablet; Take 1 tablet by mouth 3 times daily as needed for Itching    Anxiety  -     hydrOXYzine (ATARAX) 10 MG tablet; Take 1 tablet by mouth 3 times daily as needed for Itching    Itching  -     hydrOXYzine (ATARAX) 10 MG tablet; Take 1 tablet by mouth 3 times daily as needed for Itching       Follow Up:  Return for Medicare Annual Wellness Visit in 1 year (6/26/2020).

## 2019-07-08 ENCOUNTER — HOSPITAL ENCOUNTER (OUTPATIENT)
Dept: CARDIOLOGY | Age: 74
Discharge: HOME | End: 2019-07-08
Payer: MEDICARE

## 2019-07-08 DIAGNOSIS — I50.22 CHRONIC SYSTOLIC CONGESTIVE HEART FAILURE (HCC): ICD-10-CM

## 2019-07-08 PROCEDURE — 93308 TTE F-UP OR LMTD: CPT

## 2019-07-11 ENCOUNTER — TELEPHONE (OUTPATIENT)
Dept: NON INVASIVE DIAGNOSTICS | Age: 74
End: 2019-07-11

## 2019-07-11 NOTE — TELEPHONE ENCOUNTER
----- Message from Rafaela Reyes DO sent at 7/10/2019  8:29 PM EDT -----  Melinda, please let her know that I reviewed her echo. Her heart function is better at 40% and calculated another way ~ 47%. Based on this, there is no indication for ICD implantation. She should continue follow-up with Dr. Zay Yu and needs a f/u with me in 3 months.     ALK

## 2019-09-05 ENCOUNTER — OFFICE VISIT (OUTPATIENT)
Dept: CARDIOLOGY CLINIC | Age: 74
End: 2019-09-05
Payer: MEDICARE

## 2019-09-05 VITALS
DIASTOLIC BLOOD PRESSURE: 58 MMHG | WEIGHT: 176 LBS | BODY MASS INDEX: 28.28 KG/M2 | RESPIRATION RATE: 20 BRPM | SYSTOLIC BLOOD PRESSURE: 118 MMHG | HEART RATE: 65 BPM | HEIGHT: 66 IN

## 2019-09-05 DIAGNOSIS — I42.8 NON-ISCHEMIC CARDIOMYOPATHY (HCC): ICD-10-CM

## 2019-09-05 DIAGNOSIS — I48.0 PAROXYSMAL ATRIAL FIBRILLATION (HCC): Primary | ICD-10-CM

## 2019-09-05 DIAGNOSIS — I38 VHD (VALVULAR HEART DISEASE): ICD-10-CM

## 2019-09-05 DIAGNOSIS — I27.20 PULMONARY HTN (HCC): ICD-10-CM

## 2019-09-05 DIAGNOSIS — I10 ESSENTIAL HYPERTENSION: ICD-10-CM

## 2019-09-05 DIAGNOSIS — Z88.9 MULTIPLE DRUG ALLERGIES: ICD-10-CM

## 2019-09-05 DIAGNOSIS — I10 HTN (HYPERTENSION), BENIGN: ICD-10-CM

## 2019-09-05 DIAGNOSIS — I51.3 THROMBUS OF LEFT ATRIAL APPENDAGE: ICD-10-CM

## 2019-09-05 PROCEDURE — 1036F TOBACCO NON-USER: CPT | Performed by: INTERNAL MEDICINE

## 2019-09-05 PROCEDURE — 1123F ACP DISCUSS/DSCN MKR DOCD: CPT | Performed by: INTERNAL MEDICINE

## 2019-09-05 PROCEDURE — 3017F COLORECTAL CA SCREEN DOC REV: CPT | Performed by: INTERNAL MEDICINE

## 2019-09-05 PROCEDURE — 93000 ELECTROCARDIOGRAM COMPLETE: CPT | Performed by: INTERNAL MEDICINE

## 2019-09-05 PROCEDURE — 4040F PNEUMOC VAC/ADMIN/RCVD: CPT | Performed by: INTERNAL MEDICINE

## 2019-09-05 PROCEDURE — G8417 CALC BMI ABV UP PARAM F/U: HCPCS | Performed by: INTERNAL MEDICINE

## 2019-09-05 PROCEDURE — 99214 OFFICE O/P EST MOD 30 MIN: CPT | Performed by: INTERNAL MEDICINE

## 2019-09-05 PROCEDURE — G8427 DOCREV CUR MEDS BY ELIG CLIN: HCPCS | Performed by: INTERNAL MEDICINE

## 2019-09-05 PROCEDURE — 1090F PRES/ABSN URINE INCON ASSESS: CPT | Performed by: INTERNAL MEDICINE

## 2019-09-05 PROCEDURE — G8399 PT W/DXA RESULTS DOCUMENT: HCPCS | Performed by: INTERNAL MEDICINE

## 2019-09-05 RX ORDER — IBUPROFEN 200 MG
200 TABLET ORAL EVERY 6 HOURS PRN
Status: ON HOLD | COMMUNITY
End: 2020-10-16 | Stop reason: HOSPADM

## 2019-09-05 NOTE — PROGRESS NOTES
 Chronic sinusitis     Disc displacement, lumbar     RNicole Ruby    Hypertension     Left ventricular systolic dysfunction     Meige syndrome     partial/R. Brocker    Mitral regurgitation     Mild to moderate    Nonischemic cardiomyopathy (HCC)     Osteopenia     TIA (transient ischemic attack)     carotid TIA's    Vertebrobasilar artery syndrome     JOHANA Ruby            Past Surgical History:   Procedure Laterality Date    BLADDER REPAIR      BUNIONECTOMY      CARDIOVASCULAR STRESS TEST  11/25/13    Rt & LT cath    DIAGNOSTIC CARDIAC CATH LAB PROCEDURE  2/20/10    Dr Denise Santamaria CATH LAB PROCEDURE  8/24/11    Right heart cath @ [facility].  DOPPLER ECHOCARDIOGRAPHY  11/25/13    HYSTERECTOMY  1991    OVARY REMOVAL      TRANSESOPHAGEAL ECHOCARDIOGRAM  11/21/2018    Dr. Danii Royal TRANSESOPHAGEAL ECHOCARDIOGRAM  03/18/2019          Family History   Problem Relation Age of Onset    Heart Attack Mother     Stroke Mother     Heart Attack Father     Heart Failure Father     Heart Disease Father     Anxiety Disorder Sister     Depression Sister     Crohn's Disease Son           Social History     Tobacco Use    Smoking status: Never Smoker    Smokeless tobacco: Never Used   Substance Use Topics    Alcohol use: Yes     Alcohol/week: 0.0 standard drinks     Comment: occasional wine/mixed drink.   2 cups of coffee a day          Review of Systems:  HEENT: negative for acute visual symptoms or auditory problems, no dysphagia  Constitutional: negative for fever and chills, or significant weight loss  Respiratory: negative for cough, wheezing, or hemoptysis  Cardiovascular: negative for chest pain, palpitations, and dyspnea  Gastrointestinal: negative for abdominal pain, diarrhea, nausea and vomiting  Endocrine: Negative for polyuria and polydyspsia  Genitourinary:negative for dysuria and hematuria  Derm: negative for rash and skin lesion(s)  Neurological: negative for tingling, numbness, weakness, seizures and tremors  Endocrine: negative for polydipsia and polyuria  Musculoskeletal: negative for pain or tenderness  Psychiatric: negative for anxiety, depression, or suicidal ideations         O:  COMPLETE PHYSICAL EXAM:       BP (!) 118/58   Pulse 65   Resp 20   Ht 5' 6\" (1.676 m)   Wt 176 lb (79.8 kg)   BMI 28.41 kg/m²       General:   Patient alert, comfortable, no distress. Appears stated age. HEENT:    Pupils equal, no icterus, no nasal drainage, tongue moist.   Neck:              No masses, Thyroid not palpable. Chest:   Normal configuration, non tender. Lungs:   Clear to auscultation bilaterally, few scattered rhonchi. Cardiovascular:  Regular rhythm, 1/6 systolic murmur, No S3, PMI at 5th ICS, no palpable thrills, No elevated JVD, No carotid bruit. Abdomen:  Soft, Non tender, Bowel sounds normal, no pulsatile abdominal aorta, no palpable masses. Extremities:  No edema. Distal pulses palpable. No cyanosis, no clubbing. Skin:   Good turgor, warm and dry, no cyanosis. Musculoskeletal: No joint swelling or deformity. Neuro:   Cranial nerves grossly intact; No focal neurologic deficit. Psych:   Alert, good mood and effect. REVIEW OF DIAGNOSTIC TESTS:        Electrocardiogram: NSR             A/P:   ASSESSMENT / PLAN:    Manson Gitelman was seen today for atrial fibrillation.     Diagnoses and all orders for this visit:    Paroxysmal atrial fibrillation ([facility] [address]- Dx 11/2018 - CHADS2 VASc score 4; On Xarelto (did not tolerated Eliquis) Rates controlled on Coreg; add Digoxin for rate control if needed; Seen by EP at [facility]- Dr. Reed Hawk 4/2019 for Watchman - He recommended Rhythm management; assess her EF, if <35% ICD and then Watchman in the future - Seen by Dr Ayden Wellington 6/2019 - AAD if recurrent A Fib with symptoms; she is still considering Watchman     Thrombus of left atrial appendage Found on JAIME 11/21/18 and 3/2019 - Resolved on JAIME 5/6/2019 - On Xarelto

## 2019-09-18 ENCOUNTER — TELEPHONE (OUTPATIENT)
Dept: ADMINISTRATIVE | Age: 74
End: 2019-09-18

## 2019-09-20 ENCOUNTER — OFFICE VISIT (OUTPATIENT)
Dept: FAMILY MEDICINE CLINIC | Age: 74
End: 2019-09-20
Payer: MEDICARE

## 2019-09-20 VITALS
DIASTOLIC BLOOD PRESSURE: 64 MMHG | RESPIRATION RATE: 16 BRPM | BODY MASS INDEX: 28.12 KG/M2 | WEIGHT: 175 LBS | SYSTOLIC BLOOD PRESSURE: 130 MMHG | HEART RATE: 64 BPM | TEMPERATURE: 97 F | OXYGEN SATURATION: 97 % | HEIGHT: 66 IN

## 2019-09-20 DIAGNOSIS — K58.0 IRRITABLE BOWEL SYNDROME WITH DIARRHEA: Primary | ICD-10-CM

## 2019-09-20 PROCEDURE — 3017F COLORECTAL CA SCREEN DOC REV: CPT | Performed by: NURSE PRACTITIONER

## 2019-09-20 PROCEDURE — 1036F TOBACCO NON-USER: CPT | Performed by: NURSE PRACTITIONER

## 2019-09-20 PROCEDURE — G8417 CALC BMI ABV UP PARAM F/U: HCPCS | Performed by: NURSE PRACTITIONER

## 2019-09-20 PROCEDURE — G8399 PT W/DXA RESULTS DOCUMENT: HCPCS | Performed by: NURSE PRACTITIONER

## 2019-09-20 PROCEDURE — 1090F PRES/ABSN URINE INCON ASSESS: CPT | Performed by: NURSE PRACTITIONER

## 2019-09-20 PROCEDURE — G8427 DOCREV CUR MEDS BY ELIG CLIN: HCPCS | Performed by: NURSE PRACTITIONER

## 2019-09-20 PROCEDURE — 99213 OFFICE O/P EST LOW 20 MIN: CPT | Performed by: NURSE PRACTITIONER

## 2019-09-20 PROCEDURE — 1123F ACP DISCUSS/DSCN MKR DOCD: CPT | Performed by: NURSE PRACTITIONER

## 2019-09-20 PROCEDURE — 4040F PNEUMOC VAC/ADMIN/RCVD: CPT | Performed by: NURSE PRACTITIONER

## 2019-09-20 RX ORDER — DICYCLOMINE HYDROCHLORIDE 10 MG/1
CAPSULE ORAL
Qty: 90 CAPSULE | Refills: 1 | Status: ON HOLD | OUTPATIENT
Start: 2019-09-20 | End: 2019-11-04

## 2019-09-20 ASSESSMENT — ENCOUNTER SYMPTOMS
EYES NEGATIVE: 1
ALLERGIC/IMMUNOLOGIC NEGATIVE: 1
DIARRHEA: 1
COUGH: 1
BACK PAIN: 1
SHORTNESS OF BREATH: 1

## 2019-09-20 NOTE — PROGRESS NOTES
Provider, MD   carvedilol (COREG CR) 20 MG CP24 extended release capsule Take 1 capsule by mouth daily Yes Jose Maria Nevarez MD   triamcinolone (KENALOG) 0.1 % cream USE TID Yes Historical Provider, MD   furosemide (LASIX) 20 MG tablet Take 1 tablet by mouth daily Yes Jose Maria Nevarez MD   loperamide (IMODIUM) 2 MG capsule Take 2 mg by mouth 4 times daily as needed for Diarrhea Yes Historical Provider, MD   diphenhydrAMINE (BENADRYL) 25 MG tablet Take 25 mg by mouth every 6 hours as needed for Itching Yes Historical Provider, MD   rivaroxaban (XARELTO) 20 MG TABS tablet Take 1 tablet by mouth daily (with breakfast) Yes Jose Maria Nevarez MD   spironolactone (ALDACTONE) 25 MG tablet Take 1 tablet by mouth daily Yes Steffany Davis MD   vitamin D (CHOLECALCIFEROL) 1000 UNIT TABS tablet Take 1,000 Units by mouth daily Yes Historical Provider, MD   TURMERIC PO Take 1 capsule by mouth daily Yes Historical Provider, MD   sodium chloride (OCEAN, BABY AYR) 0.65 % nasal spray 1 spray by Nasal route as needed for Congestion Yes Nick Naylor MD   Ascorbic Acid (VITAMIN C) 250 MG tablet Take 1,000 mg by mouth daily Yes Historical Provider, MD   baclofen (LIORESAL) 20 MG tablet Take 20 mg by mouth 2 times daily as needed (muscle spasms, spastic bladder) Indications: Back Spasm, Bladder Spasm  Yes Historical Provider, MD        Social History     Tobacco Use    Smoking status: Never Smoker    Smokeless tobacco: Never Used   Substance Use Topics    Alcohol use: Yes     Alcohol/week: 0.0 standard drinks     Comment: occasional wine/mixed drink. 2 cups of coffee a day         Vitals:    09/20/19 0829   BP: 130/64   Pulse: 64   Resp: 16   Temp: 97 °F (36.1 °C)   TempSrc: Temporal   SpO2: 97%   Weight: 175 lb (79.4 kg)   Height: 5' 6\" (1.676 m)     Estimated body mass index is 28.25 kg/m² as calculated from the following:    Height as of this encounter: 5' 6\" (1.676 m).     Weight as of this encounter: 175 lb (79.4

## 2019-10-08 ENCOUNTER — OFFICE VISIT (OUTPATIENT)
Dept: NON INVASIVE DIAGNOSTICS | Age: 74
End: 2019-10-08
Payer: MEDICARE

## 2019-10-08 ENCOUNTER — TELEPHONE (OUTPATIENT)
Dept: NON INVASIVE DIAGNOSTICS | Age: 74
End: 2019-10-08

## 2019-10-08 VITALS
HEART RATE: 130 BPM | RESPIRATION RATE: 20 BRPM | BODY MASS INDEX: 29.09 KG/M2 | WEIGHT: 181 LBS | DIASTOLIC BLOOD PRESSURE: 58 MMHG | HEIGHT: 66 IN | SYSTOLIC BLOOD PRESSURE: 106 MMHG

## 2019-10-08 DIAGNOSIS — I48.0 PAROXYSMAL ATRIAL FIBRILLATION (HCC): Primary | ICD-10-CM

## 2019-10-08 PROCEDURE — 99214 OFFICE O/P EST MOD 30 MIN: CPT | Performed by: INTERNAL MEDICINE

## 2019-10-08 PROCEDURE — 93000 ELECTROCARDIOGRAM COMPLETE: CPT | Performed by: INTERNAL MEDICINE

## 2019-10-08 PROCEDURE — 1036F TOBACCO NON-USER: CPT | Performed by: INTERNAL MEDICINE

## 2019-10-08 PROCEDURE — 3017F COLORECTAL CA SCREEN DOC REV: CPT | Performed by: INTERNAL MEDICINE

## 2019-10-08 PROCEDURE — G8427 DOCREV CUR MEDS BY ELIG CLIN: HCPCS | Performed by: INTERNAL MEDICINE

## 2019-10-08 PROCEDURE — 1123F ACP DISCUSS/DSCN MKR DOCD: CPT | Performed by: INTERNAL MEDICINE

## 2019-10-08 PROCEDURE — 4040F PNEUMOC VAC/ADMIN/RCVD: CPT | Performed by: INTERNAL MEDICINE

## 2019-10-08 PROCEDURE — G8417 CALC BMI ABV UP PARAM F/U: HCPCS | Performed by: INTERNAL MEDICINE

## 2019-10-08 PROCEDURE — G8399 PT W/DXA RESULTS DOCUMENT: HCPCS | Performed by: INTERNAL MEDICINE

## 2019-10-08 PROCEDURE — G8484 FLU IMMUNIZE NO ADMIN: HCPCS | Performed by: INTERNAL MEDICINE

## 2019-10-08 PROCEDURE — 1090F PRES/ABSN URINE INCON ASSESS: CPT | Performed by: INTERNAL MEDICINE

## 2019-10-10 ENCOUNTER — OFFICE VISIT (OUTPATIENT)
Dept: FAMILY MEDICINE CLINIC | Age: 74
End: 2019-10-10
Payer: MEDICARE

## 2019-10-10 VITALS
DIASTOLIC BLOOD PRESSURE: 60 MMHG | HEIGHT: 67 IN | WEIGHT: 179 LBS | SYSTOLIC BLOOD PRESSURE: 102 MMHG | BODY MASS INDEX: 28.09 KG/M2 | HEART RATE: 108 BPM | OXYGEN SATURATION: 98 % | TEMPERATURE: 97.6 F

## 2019-10-10 DIAGNOSIS — J40 BRONCHITIS: ICD-10-CM

## 2019-10-10 DIAGNOSIS — R68.89 FLU-LIKE SYMPTOMS: Primary | ICD-10-CM

## 2019-10-10 DIAGNOSIS — R06.2 WHEEZING: ICD-10-CM

## 2019-10-10 LAB
INFLUENZA A ANTIBODY: NEGATIVE
INFLUENZA B ANTIBODY: NEGATIVE

## 2019-10-10 PROCEDURE — G8427 DOCREV CUR MEDS BY ELIG CLIN: HCPCS | Performed by: NURSE PRACTITIONER

## 2019-10-10 PROCEDURE — G8417 CALC BMI ABV UP PARAM F/U: HCPCS | Performed by: NURSE PRACTITIONER

## 2019-10-10 PROCEDURE — 4040F PNEUMOC VAC/ADMIN/RCVD: CPT | Performed by: NURSE PRACTITIONER

## 2019-10-10 PROCEDURE — 87804 INFLUENZA ASSAY W/OPTIC: CPT | Performed by: NURSE PRACTITIONER

## 2019-10-10 PROCEDURE — 3017F COLORECTAL CA SCREEN DOC REV: CPT | Performed by: NURSE PRACTITIONER

## 2019-10-10 PROCEDURE — G8484 FLU IMMUNIZE NO ADMIN: HCPCS | Performed by: NURSE PRACTITIONER

## 2019-10-10 PROCEDURE — 1036F TOBACCO NON-USER: CPT | Performed by: NURSE PRACTITIONER

## 2019-10-10 PROCEDURE — 1090F PRES/ABSN URINE INCON ASSESS: CPT | Performed by: NURSE PRACTITIONER

## 2019-10-10 PROCEDURE — 99213 OFFICE O/P EST LOW 20 MIN: CPT | Performed by: NURSE PRACTITIONER

## 2019-10-10 PROCEDURE — G8399 PT W/DXA RESULTS DOCUMENT: HCPCS | Performed by: NURSE PRACTITIONER

## 2019-10-10 PROCEDURE — 1123F ACP DISCUSS/DSCN MKR DOCD: CPT | Performed by: NURSE PRACTITIONER

## 2019-10-10 RX ORDER — AMOXICILLIN AND CLAVULANATE POTASSIUM 875; 125 MG/1; MG/1
1 TABLET, FILM COATED ORAL 2 TIMES DAILY
Qty: 20 TABLET | Refills: 0 | Status: SHIPPED | OUTPATIENT
Start: 2019-10-10 | End: 2019-10-20

## 2019-10-10 RX ORDER — PREDNISONE 10 MG/1
10 TABLET ORAL 2 TIMES DAILY
Qty: 10 TABLET | Refills: 0 | Status: SHIPPED | OUTPATIENT
Start: 2019-10-10 | End: 2019-10-15

## 2019-10-10 RX ORDER — IPRATROPIUM BROMIDE AND ALBUTEROL SULFATE 2.5; .5 MG/3ML; MG/3ML
1 SOLUTION RESPIRATORY (INHALATION) ONCE
Status: COMPLETED | OUTPATIENT
Start: 2019-10-10 | End: 2019-10-10

## 2019-10-10 RX ORDER — DOXYCYCLINE 100 MG/1
100 CAPSULE ORAL 2 TIMES DAILY
Qty: 20 CAPSULE | Refills: 0 | Status: CANCELLED | OUTPATIENT
Start: 2019-10-10 | End: 2019-10-20

## 2019-10-10 RX ADMIN — IPRATROPIUM BROMIDE AND ALBUTEROL SULFATE 1 AMPULE: 2.5; .5 SOLUTION RESPIRATORY (INHALATION) at 17:00

## 2019-10-10 ASSESSMENT — ENCOUNTER SYMPTOMS
VOMITING: 0
EYE REDNESS: 0
EYE ITCHING: 0
EYE PAIN: 0
ABDOMINAL PAIN: 0
STRIDOR: 0
EYE DISCHARGE: 0
COLOR CHANGE: 0
PHOTOPHOBIA: 0
SHORTNESS OF BREATH: 0
COUGH: 1
WHEEZING: 1
NAUSEA: 0
DIARRHEA: 0

## 2019-11-04 ENCOUNTER — ANESTHESIA (OUTPATIENT)
Dept: CARDIAC CATH/INVASIVE PROCEDURES | Age: 74
End: 2019-11-04

## 2019-11-04 ENCOUNTER — HOSPITAL ENCOUNTER (INPATIENT)
Dept: CARDIAC CATH/INVASIVE PROCEDURES | Age: 74
LOS: 3 days | Discharge: HOME | DRG: 309 | End: 2019-11-07
Attending: INTERNAL MEDICINE | Admitting: INTERNAL MEDICINE
Payer: MEDICARE

## 2019-11-04 ENCOUNTER — ANESTHESIA EVENT (OUTPATIENT)
Dept: CARDIAC CATH/INVASIVE PROCEDURES | Age: 74
End: 2019-11-04

## 2019-11-04 VITALS
OXYGEN SATURATION: 100 % | DIASTOLIC BLOOD PRESSURE: 68 MMHG | RESPIRATION RATE: 24 BRPM | SYSTOLIC BLOOD PRESSURE: 112 MMHG

## 2019-11-04 DIAGNOSIS — Z51.81 VISIT FOR MONITORING TIKOSYN THERAPY: ICD-10-CM

## 2019-11-04 DIAGNOSIS — Z79.899 VISIT FOR MONITORING TIKOSYN THERAPY: ICD-10-CM

## 2019-11-04 DIAGNOSIS — I48.21 PERMANENT ATRIAL FIBRILLATION (HCC): ICD-10-CM

## 2019-11-04 DIAGNOSIS — I48.19 OTHER PERSISTENT ATRIAL FIBRILLATION (HCC): ICD-10-CM

## 2019-11-04 PROBLEM — I48.91 A-FIB (HCC): Status: ACTIVE | Noted: 2019-11-04

## 2019-11-04 LAB
ANION GAP SERPL CALCULATED.3IONS-SCNC: 12 MMOL/L (ref 7–16)
BASOPHILS ABSOLUTE: 0.03 E9/L (ref 0–0.2)
BASOPHILS RELATIVE PERCENT: 0.5 % (ref 0–2)
BUN BLDV-MCNC: 22 MG/DL (ref 8–23)
CALCIUM SERPL-MCNC: 9.9 MG/DL (ref 8.6–10.2)
CHLORIDE BLD-SCNC: 105 MMOL/L (ref 98–107)
CO2: 24 MMOL/L (ref 22–29)
CREAT SERPL-MCNC: 1.1 MG/DL (ref 0.5–1)
EKG ATRIAL RATE: 375 BPM
EKG ATRIAL RATE: 62 BPM
EKG P AXIS: 59 DEGREES
EKG P-R INTERVAL: 224 MS
EKG Q-T INTERVAL: 362 MS
EKG Q-T INTERVAL: 420 MS
EKG QRS DURATION: 100 MS
EKG QRS DURATION: 98 MS
EKG QTC CALCULATION (BAZETT): 426 MS
EKG QTC CALCULATION (BAZETT): 483 MS
EKG R AXIS: 55 DEGREES
EKG R AXIS: 93 DEGREES
EKG T AXIS: -175 DEGREES
EKG T AXIS: 127 DEGREES
EKG VENTRICULAR RATE: 107 BPM
EKG VENTRICULAR RATE: 62 BPM
EOSINOPHILS ABSOLUTE: 0.11 E9/L (ref 0.05–0.5)
EOSINOPHILS RELATIVE PERCENT: 1.8 % (ref 0–6)
GFR AFRICAN AMERICAN: 59
GFR NON-AFRICAN AMERICAN: 48 ML/MIN/1.73
GLUCOSE BLD-MCNC: 133 MG/DL (ref 74–99)
HCT VFR BLD CALC: 43.7 % (ref 34–48)
HEMOGLOBIN: 13.7 G/DL (ref 11.5–15.5)
IMMATURE GRANULOCYTES #: 0.02 E9/L
IMMATURE GRANULOCYTES %: 0.3 % (ref 0–5)
LYMPHOCYTES ABSOLUTE: 1.2 E9/L (ref 1.5–4)
LYMPHOCYTES RELATIVE PERCENT: 19.5 % (ref 20–42)
MAGNESIUM: 2.3 MG/DL (ref 1.6–2.6)
MCH RBC QN AUTO: 27.6 PG (ref 26–35)
MCHC RBC AUTO-ENTMCNC: 31.4 % (ref 32–34.5)
MCV RBC AUTO: 87.9 FL (ref 80–99.9)
MONOCYTES ABSOLUTE: 0.33 E9/L (ref 0.1–0.95)
MONOCYTES RELATIVE PERCENT: 5.4 % (ref 2–12)
NEUTROPHILS ABSOLUTE: 4.47 E9/L (ref 1.8–7.3)
NEUTROPHILS RELATIVE PERCENT: 72.5 % (ref 43–80)
PDW BLD-RTO: 13.2 FL (ref 11.5–15)
PLATELET # BLD: 288 E9/L (ref 130–450)
PMV BLD AUTO: 11.2 FL (ref 7–12)
POTASSIUM SERPL-SCNC: 4.6 MMOL/L (ref 3.5–5)
RBC # BLD: 4.97 E12/L (ref 3.5–5.5)
SODIUM BLD-SCNC: 141 MMOL/L (ref 132–146)
WBC # BLD: 6.2 E9/L (ref 4.5–11.5)

## 2019-11-04 PROCEDURE — 6370000000 HC RX 637 (ALT 250 FOR IP): Performed by: NURSE PRACTITIONER

## 2019-11-04 PROCEDURE — 2709999900 HC NON-CHARGEABLE SUPPLY

## 2019-11-04 PROCEDURE — 36415 COLL VENOUS BLD VENIPUNCTURE: CPT

## 2019-11-04 PROCEDURE — 6370000000 HC RX 637 (ALT 250 FOR IP): Performed by: INTERNAL MEDICINE

## 2019-11-04 PROCEDURE — 5A2204Z RESTORATION OF CARDIAC RHYTHM, SINGLE: ICD-10-PCS | Performed by: INTERNAL MEDICINE

## 2019-11-04 PROCEDURE — 80048 BASIC METABOLIC PNL TOTAL CA: CPT

## 2019-11-04 PROCEDURE — 2060000000 HC ICU INTERMEDIATE R&B

## 2019-11-04 PROCEDURE — 83735 ASSAY OF MAGNESIUM: CPT

## 2019-11-04 PROCEDURE — 93005 ELECTROCARDIOGRAM TRACING: CPT | Performed by: NURSE PRACTITIONER

## 2019-11-04 PROCEDURE — 6360000002 HC RX W HCPCS: Performed by: NURSE ANESTHETIST, CERTIFIED REGISTERED

## 2019-11-04 PROCEDURE — 92960 CARDIOVERSION ELECTRIC EXT: CPT | Performed by: INTERNAL MEDICINE

## 2019-11-04 PROCEDURE — 93005 ELECTROCARDIOGRAM TRACING: CPT | Performed by: INTERNAL MEDICINE

## 2019-11-04 PROCEDURE — 85025 COMPLETE CBC W/AUTO DIFF WBC: CPT

## 2019-11-04 PROCEDURE — 99223 1ST HOSP IP/OBS HIGH 75: CPT | Performed by: INTERNAL MEDICINE

## 2019-11-04 PROCEDURE — 2580000003 HC RX 258: Performed by: NURSE PRACTITIONER

## 2019-11-04 PROCEDURE — 3700000000 HC ANESTHESIA ATTENDED CARE

## 2019-11-04 PROCEDURE — 2580000003 HC RX 258: Performed by: NURSE ANESTHETIST, CERTIFIED REGISTERED

## 2019-11-04 RX ORDER — HYDRALAZINE HYDROCHLORIDE 10 MG/1
10 TABLET, FILM COATED ORAL 3 TIMES DAILY
Status: DISCONTINUED | OUTPATIENT
Start: 2019-11-04 | End: 2019-11-07 | Stop reason: HOSPADM

## 2019-11-04 RX ORDER — SPIRONOLACTONE 25 MG/1
25 TABLET ORAL DAILY
Status: DISCONTINUED | OUTPATIENT
Start: 2019-11-04 | End: 2019-11-07 | Stop reason: HOSPADM

## 2019-11-04 RX ORDER — DIPHENHYDRAMINE HCL 25 MG
25 TABLET ORAL EVERY 6 HOURS PRN
Status: DISCONTINUED | OUTPATIENT
Start: 2019-11-04 | End: 2019-11-07 | Stop reason: HOSPADM

## 2019-11-04 RX ORDER — SODIUM CHLORIDE 0.9 % (FLUSH) 0.9 %
10 SYRINGE (ML) INJECTION PRN
Status: DISCONTINUED | OUTPATIENT
Start: 2019-11-04 | End: 2019-11-07 | Stop reason: HOSPADM

## 2019-11-04 RX ORDER — HYDROXYZINE HYDROCHLORIDE 10 MG/1
10 TABLET, FILM COATED ORAL 3 TIMES DAILY PRN
Status: DISCONTINUED | OUTPATIENT
Start: 2019-11-04 | End: 2019-11-07 | Stop reason: HOSPADM

## 2019-11-04 RX ORDER — DICYCLOMINE HYDROCHLORIDE 10 MG/1
10 CAPSULE ORAL 3 TIMES DAILY PRN
Status: DISCONTINUED | OUTPATIENT
Start: 2019-11-04 | End: 2019-11-07 | Stop reason: HOSPADM

## 2019-11-04 RX ORDER — FUROSEMIDE 20 MG/1
20 TABLET ORAL DAILY
Status: DISCONTINUED | OUTPATIENT
Start: 2019-11-05 | End: 2019-11-07 | Stop reason: HOSPADM

## 2019-11-04 RX ORDER — IBUPROFEN 200 MG
200 TABLET ORAL EVERY 6 HOURS PRN
Status: DISCONTINUED | OUTPATIENT
Start: 2019-11-04 | End: 2019-11-07 | Stop reason: HOSPADM

## 2019-11-04 RX ORDER — SODIUM CHLORIDE 0.9 % (FLUSH) 0.9 %
10 SYRINGE (ML) INJECTION EVERY 12 HOURS SCHEDULED
Status: DISCONTINUED | OUTPATIENT
Start: 2019-11-04 | End: 2019-11-07 | Stop reason: HOSPADM

## 2019-11-04 RX ORDER — PROPOFOL 10 MG/ML
INJECTION, EMULSION INTRAVENOUS PRN
Status: DISCONTINUED | OUTPATIENT
Start: 2019-11-04 | End: 2019-11-04 | Stop reason: SDUPTHER

## 2019-11-04 RX ORDER — CARVEDILOL 6.25 MG/1
6.25 TABLET ORAL 2 TIMES DAILY
Status: DISCONTINUED | OUTPATIENT
Start: 2019-11-04 | End: 2019-11-07 | Stop reason: HOSPADM

## 2019-11-04 RX ORDER — BACLOFEN 10 MG/1
20 TABLET ORAL 2 TIMES DAILY PRN
Status: DISCONTINUED | OUTPATIENT
Start: 2019-11-04 | End: 2019-11-07 | Stop reason: HOSPADM

## 2019-11-04 RX ORDER — VITAMIN B COMPLEX
1000 TABLET ORAL DAILY
Status: DISCONTINUED | OUTPATIENT
Start: 2019-11-04 | End: 2019-11-07 | Stop reason: HOSPADM

## 2019-11-04 RX ORDER — TRIAMCINOLONE ACETONIDE 1 MG/G
CREAM TOPICAL 3 TIMES DAILY
Status: DISCONTINUED | OUTPATIENT
Start: 2019-11-04 | End: 2019-11-07 | Stop reason: HOSPADM

## 2019-11-04 RX ORDER — SODIUM CHLORIDE 9 MG/ML
INJECTION, SOLUTION INTRAVENOUS CONTINUOUS PRN
Status: DISCONTINUED | OUTPATIENT
Start: 2019-11-04 | End: 2019-11-04 | Stop reason: SDUPTHER

## 2019-11-04 RX ORDER — LOPERAMIDE HYDROCHLORIDE 2 MG/1
2 CAPSULE ORAL 4 TIMES DAILY PRN
Status: DISCONTINUED | OUTPATIENT
Start: 2019-11-04 | End: 2019-11-07 | Stop reason: HOSPADM

## 2019-11-04 RX ORDER — SODIUM CHLORIDE 0.9 % (FLUSH) 0.9 %
10 SYRINGE (ML) INJECTION EVERY 12 HOURS SCHEDULED
Status: DISCONTINUED | OUTPATIENT
Start: 2019-11-04 | End: 2019-11-04 | Stop reason: SDUPTHER

## 2019-11-04 RX ORDER — PERPHENAZINE 8 MG
90 TABLET ORAL
COMMUNITY
End: 2020-02-18 | Stop reason: ALTCHOICE

## 2019-11-04 RX ORDER — DOFETILIDE 0.25 MG/1
250 CAPSULE ORAL EVERY 12 HOURS SCHEDULED
Status: DISCONTINUED | OUTPATIENT
Start: 2019-11-04 | End: 2019-11-07 | Stop reason: HOSPADM

## 2019-11-04 RX ADMIN — TRIAMCINOLONE ACETONIDE: 1 CREAM TOPICAL at 21:53

## 2019-11-04 RX ADMIN — RIVAROXABAN 20 MG: 20 TABLET, FILM COATED ORAL at 22:37

## 2019-11-04 RX ADMIN — HYDRALAZINE HYDROCHLORIDE 10 MG: 10 TABLET, FILM COATED ORAL at 17:03

## 2019-11-04 RX ADMIN — SODIUM CHLORIDE: 9 INJECTION, SOLUTION INTRAVENOUS at 12:28

## 2019-11-04 RX ADMIN — TRIAMCINOLONE ACETONIDE: 1 CREAM TOPICAL at 17:03

## 2019-11-04 RX ADMIN — PROPOFOL 50 MG: 10 INJECTION, EMULSION INTRAVENOUS at 12:30

## 2019-11-04 RX ADMIN — VITAMIN D, TAB 1000IU (100/BT) 1000 UNITS: 25 TAB at 17:03

## 2019-11-04 RX ADMIN — CARVEDILOL 6.25 MG: 6.25 TABLET, FILM COATED ORAL at 17:03

## 2019-11-04 RX ADMIN — HYDRALAZINE HYDROCHLORIDE 10 MG: 10 TABLET, FILM COATED ORAL at 21:53

## 2019-11-04 RX ADMIN — DOFETILIDE 250 MCG: 0.25 CAPSULE ORAL at 18:01

## 2019-11-04 RX ADMIN — Medication 10 ML: at 21:54

## 2019-11-04 RX ADMIN — SPIRONOLACTONE 25 MG: 25 TABLET ORAL at 17:14

## 2019-11-04 ASSESSMENT — PAIN SCALES - GENERAL
PAINLEVEL_OUTOF10: 0
PAINLEVEL_OUTOF10: 0

## 2019-11-05 LAB
ANION GAP SERPL CALCULATED.3IONS-SCNC: 13 MMOL/L (ref 7–16)
BUN BLDV-MCNC: 22 MG/DL (ref 8–23)
CALCIUM SERPL-MCNC: 9.5 MG/DL (ref 8.6–10.2)
CHLORIDE BLD-SCNC: 101 MMOL/L (ref 98–107)
CO2: 23 MMOL/L (ref 22–29)
CREAT SERPL-MCNC: 1.2 MG/DL (ref 0.5–1)
EKG ATRIAL RATE: 117 BPM
EKG ATRIAL RATE: 63 BPM
EKG ATRIAL RATE: 80 BPM
EKG P AXIS: 70 DEGREES
EKG P AXIS: 82 DEGREES
EKG P AXIS: 86 DEGREES
EKG P-R INTERVAL: 236 MS
EKG P-R INTERVAL: 240 MS
EKG P-R INTERVAL: 244 MS
EKG Q-T INTERVAL: 402 MS
EKG Q-T INTERVAL: 414 MS
EKG Q-T INTERVAL: 464 MS
EKG QRS DURATION: 100 MS
EKG QRS DURATION: 100 MS
EKG QRS DURATION: 96 MS
EKG QTC CALCULATION (BAZETT): 458 MS
EKG QTC CALCULATION (BAZETT): 474 MS
EKG QTC CALCULATION (BAZETT): 477 MS
EKG R AXIS: 75 DEGREES
EKG R AXIS: 95 DEGREES
EKG R AXIS: 99 DEGREES
EKG T AXIS: 123 DEGREES
EKG T AXIS: 144 DEGREES
EKG T AXIS: 145 DEGREES
EKG VENTRICULAR RATE: 63 BPM
EKG VENTRICULAR RATE: 78 BPM
EKG VENTRICULAR RATE: 80 BPM
GFR AFRICAN AMERICAN: 53
GFR NON-AFRICAN AMERICAN: 44 ML/MIN/1.73
GLUCOSE BLD-MCNC: 114 MG/DL (ref 74–99)
MAGNESIUM: 2.2 MG/DL (ref 1.6–2.6)
POTASSIUM REFLEX MAGNESIUM: 4.4 MMOL/L (ref 3.5–5)
SODIUM BLD-SCNC: 137 MMOL/L (ref 132–146)

## 2019-11-05 PROCEDURE — 80048 BASIC METABOLIC PNL TOTAL CA: CPT

## 2019-11-05 PROCEDURE — 6370000000 HC RX 637 (ALT 250 FOR IP): Performed by: INTERNAL MEDICINE

## 2019-11-05 PROCEDURE — 99233 SBSQ HOSP IP/OBS HIGH 50: CPT | Performed by: INTERNAL MEDICINE

## 2019-11-05 PROCEDURE — 2580000003 HC RX 258: Performed by: NURSE PRACTITIONER

## 2019-11-05 PROCEDURE — 36415 COLL VENOUS BLD VENIPUNCTURE: CPT

## 2019-11-05 PROCEDURE — 2060000000 HC ICU INTERMEDIATE R&B

## 2019-11-05 PROCEDURE — 93005 ELECTROCARDIOGRAM TRACING: CPT | Performed by: NURSE PRACTITIONER

## 2019-11-05 PROCEDURE — 6370000000 HC RX 637 (ALT 250 FOR IP): Performed by: NURSE PRACTITIONER

## 2019-11-05 PROCEDURE — 83735 ASSAY OF MAGNESIUM: CPT

## 2019-11-05 RX ADMIN — TRIAMCINOLONE ACETONIDE: 1 CREAM TOPICAL at 08:33

## 2019-11-05 RX ADMIN — DIPHENHYDRAMINE HCL 25 MG: 25 TABLET ORAL at 02:18

## 2019-11-05 RX ADMIN — DOFETILIDE 250 MCG: 0.25 CAPSULE ORAL at 05:57

## 2019-11-05 RX ADMIN — HYDRALAZINE HYDROCHLORIDE 10 MG: 10 TABLET, FILM COATED ORAL at 14:21

## 2019-11-05 RX ADMIN — HYDRALAZINE HYDROCHLORIDE 10 MG: 10 TABLET, FILM COATED ORAL at 20:15

## 2019-11-05 RX ADMIN — SPIRONOLACTONE 25 MG: 25 TABLET ORAL at 08:32

## 2019-11-05 RX ADMIN — TRIAMCINOLONE ACETONIDE: 1 CREAM TOPICAL at 20:16

## 2019-11-05 RX ADMIN — HYDROXYZINE HYDROCHLORIDE 10 MG: 10 TABLET, FILM COATED ORAL at 05:58

## 2019-11-05 RX ADMIN — RIVAROXABAN 20 MG: 20 TABLET, FILM COATED ORAL at 17:00

## 2019-11-05 RX ADMIN — CARVEDILOL 6.25 MG: 6.25 TABLET, FILM COATED ORAL at 20:15

## 2019-11-05 RX ADMIN — HYDROXYZINE HYDROCHLORIDE 10 MG: 10 TABLET, FILM COATED ORAL at 22:21

## 2019-11-05 RX ADMIN — Medication 10 ML: at 20:17

## 2019-11-05 RX ADMIN — TRIAMCINOLONE ACETONIDE: 1 CREAM TOPICAL at 14:22

## 2019-11-05 RX ADMIN — CARVEDILOL 6.25 MG: 6.25 TABLET, FILM COATED ORAL at 08:32

## 2019-11-05 RX ADMIN — HYDRALAZINE HYDROCHLORIDE 10 MG: 10 TABLET, FILM COATED ORAL at 08:33

## 2019-11-05 RX ADMIN — FUROSEMIDE 20 MG: 20 TABLET ORAL at 08:32

## 2019-11-05 RX ADMIN — VITAMIN D, TAB 1000IU (100/BT) 1000 UNITS: 25 TAB at 08:32

## 2019-11-05 RX ADMIN — Medication 10 ML: at 08:32

## 2019-11-05 RX ADMIN — HYDROXYZINE HYDROCHLORIDE 10 MG: 10 TABLET, FILM COATED ORAL at 14:21

## 2019-11-05 RX ADMIN — DOFETILIDE 250 MCG: 0.25 CAPSULE ORAL at 17:45

## 2019-11-05 ASSESSMENT — PAIN SCALES - GENERAL
PAINLEVEL_OUTOF10: 0

## 2019-11-06 LAB
ANION GAP SERPL CALCULATED.3IONS-SCNC: 14 MMOL/L (ref 7–16)
BACTERIA: ABNORMAL /HPF
BILIRUBIN URINE: NEGATIVE
BLOOD, URINE: NEGATIVE
BUN BLDV-MCNC: 24 MG/DL (ref 8–23)
CALCIUM SERPL-MCNC: 9.6 MG/DL (ref 8.6–10.2)
CHLORIDE BLD-SCNC: 104 MMOL/L (ref 98–107)
CLARITY: CLEAR
CO2: 22 MMOL/L (ref 22–29)
COLOR: YELLOW
CREAT SERPL-MCNC: 1.1 MG/DL (ref 0.5–1)
EPITHELIAL CELLS, UA: ABNORMAL /HPF
GFR AFRICAN AMERICAN: 59
GFR NON-AFRICAN AMERICAN: 48 ML/MIN/1.73
GLUCOSE BLD-MCNC: 116 MG/DL (ref 74–99)
GLUCOSE URINE: NEGATIVE MG/DL
KETONES, URINE: NEGATIVE MG/DL
LEUKOCYTE ESTERASE, URINE: ABNORMAL
MAGNESIUM: 2.1 MG/DL (ref 1.6–2.6)
NITRITE, URINE: POSITIVE
PH UA: 5 (ref 5–9)
POTASSIUM REFLEX MAGNESIUM: 3.9 MMOL/L (ref 3.5–5)
PROTEIN UA: NEGATIVE MG/DL
RBC UA: ABNORMAL /HPF (ref 0–2)
SODIUM BLD-SCNC: 140 MMOL/L (ref 132–146)
SPECIFIC GRAVITY UA: 1.02 (ref 1–1.03)
UROBILINOGEN, URINE: 0.2 E.U./DL
WBC UA: ABNORMAL /HPF (ref 0–5)

## 2019-11-06 PROCEDURE — 83735 ASSAY OF MAGNESIUM: CPT

## 2019-11-06 PROCEDURE — 93005 ELECTROCARDIOGRAM TRACING: CPT | Performed by: NURSE PRACTITIONER

## 2019-11-06 PROCEDURE — 2580000003 HC RX 258: Performed by: NURSE PRACTITIONER

## 2019-11-06 PROCEDURE — 36415 COLL VENOUS BLD VENIPUNCTURE: CPT

## 2019-11-06 PROCEDURE — 99233 SBSQ HOSP IP/OBS HIGH 50: CPT | Performed by: INTERNAL MEDICINE

## 2019-11-06 PROCEDURE — 6370000000 HC RX 637 (ALT 250 FOR IP): Performed by: NURSE PRACTITIONER

## 2019-11-06 PROCEDURE — 93005 ELECTROCARDIOGRAM TRACING: CPT | Performed by: INTERNAL MEDICINE

## 2019-11-06 PROCEDURE — 80048 BASIC METABOLIC PNL TOTAL CA: CPT

## 2019-11-06 PROCEDURE — 87186 SC STD MICRODIL/AGAR DIL: CPT

## 2019-11-06 PROCEDURE — 81001 URINALYSIS AUTO W/SCOPE: CPT

## 2019-11-06 PROCEDURE — 6370000000 HC RX 637 (ALT 250 FOR IP): Performed by: INTERNAL MEDICINE

## 2019-11-06 PROCEDURE — 2060000000 HC ICU INTERMEDIATE R&B

## 2019-11-06 PROCEDURE — 6360000002 HC RX W HCPCS: Performed by: INTERNAL MEDICINE

## 2019-11-06 PROCEDURE — 87088 URINE BACTERIA CULTURE: CPT

## 2019-11-06 RX ORDER — POTASSIUM CHLORIDE 7.45 MG/ML
10 INJECTION INTRAVENOUS
Status: DISCONTINUED | OUTPATIENT
Start: 2019-11-06 | End: 2019-11-06

## 2019-11-06 RX ORDER — POTASSIUM CHLORIDE 7.45 MG/ML
10 INJECTION INTRAVENOUS
Status: COMPLETED | OUTPATIENT
Start: 2019-11-06 | End: 2019-11-06

## 2019-11-06 RX ADMIN — DOFETILIDE 250 MCG: 0.25 CAPSULE ORAL at 05:57

## 2019-11-06 RX ADMIN — POTASSIUM CHLORIDE 10 MEQ: 7.46 INJECTION, SOLUTION INTRAVENOUS at 11:58

## 2019-11-06 RX ADMIN — Medication 10 ML: at 20:24

## 2019-11-06 RX ADMIN — TRIAMCINOLONE ACETONIDE: 1 CREAM TOPICAL at 09:16

## 2019-11-06 RX ADMIN — Medication 10 ML: at 09:15

## 2019-11-06 RX ADMIN — CARVEDILOL 6.25 MG: 6.25 TABLET, FILM COATED ORAL at 20:22

## 2019-11-06 RX ADMIN — Medication 10 ML: at 11:59

## 2019-11-06 RX ADMIN — VITAMIN D, TAB 1000IU (100/BT) 1000 UNITS: 25 TAB at 09:13

## 2019-11-06 RX ADMIN — HYDRALAZINE HYDROCHLORIDE 10 MG: 10 TABLET, FILM COATED ORAL at 20:22

## 2019-11-06 RX ADMIN — RIVAROXABAN 20 MG: 20 TABLET, FILM COATED ORAL at 17:05

## 2019-11-06 RX ADMIN — CARVEDILOL 6.25 MG: 6.25 TABLET, FILM COATED ORAL at 09:14

## 2019-11-06 RX ADMIN — DOFETILIDE 250 MCG: 0.25 CAPSULE ORAL at 17:55

## 2019-11-06 RX ADMIN — TRIAMCINOLONE ACETONIDE: 1 CREAM TOPICAL at 14:20

## 2019-11-06 RX ADMIN — HYDRALAZINE HYDROCHLORIDE 10 MG: 10 TABLET, FILM COATED ORAL at 14:20

## 2019-11-06 RX ADMIN — TRIAMCINOLONE ACETONIDE: 1 CREAM TOPICAL at 20:25

## 2019-11-06 RX ADMIN — SPIRONOLACTONE 25 MG: 25 TABLET ORAL at 09:13

## 2019-11-06 RX ADMIN — IBUPROFEN 200 MG: 200 TABLET, FILM COATED ORAL at 01:13

## 2019-11-06 RX ADMIN — FUROSEMIDE 20 MG: 20 TABLET ORAL at 11:59

## 2019-11-06 RX ADMIN — HYDRALAZINE HYDROCHLORIDE 10 MG: 10 TABLET, FILM COATED ORAL at 09:14

## 2019-11-06 ASSESSMENT — PAIN SCALES - GENERAL
PAINLEVEL_OUTOF10: 0
PAINLEVEL_OUTOF10: 6
PAINLEVEL_OUTOF10: 0

## 2019-11-07 VITALS
OXYGEN SATURATION: 97 % | BODY MASS INDEX: 27.87 KG/M2 | RESPIRATION RATE: 18 BRPM | WEIGHT: 173.4 LBS | TEMPERATURE: 97.7 F | HEIGHT: 66 IN | DIASTOLIC BLOOD PRESSURE: 59 MMHG | SYSTOLIC BLOOD PRESSURE: 118 MMHG | HEART RATE: 61 BPM

## 2019-11-07 LAB
ANION GAP SERPL CALCULATED.3IONS-SCNC: 16 MMOL/L (ref 7–16)
BUN BLDV-MCNC: 21 MG/DL (ref 8–23)
CALCIUM SERPL-MCNC: 9.1 MG/DL (ref 8.6–10.2)
CHLORIDE BLD-SCNC: 106 MMOL/L (ref 98–107)
CO2: 19 MMOL/L (ref 22–29)
CREAT SERPL-MCNC: 1 MG/DL (ref 0.5–1)
EKG ATRIAL RATE: 64 BPM
EKG ATRIAL RATE: 64 BPM
EKG ATRIAL RATE: 65 BPM
EKG ATRIAL RATE: 65 BPM
EKG ATRIAL RATE: 67 BPM
EKG P AXIS: 63 DEGREES
EKG P AXIS: 64 DEGREES
EKG P AXIS: 65 DEGREES
EKG P AXIS: 67 DEGREES
EKG P AXIS: 67 DEGREES
EKG P-R INTERVAL: 204 MS
EKG P-R INTERVAL: 214 MS
EKG P-R INTERVAL: 222 MS
EKG P-R INTERVAL: 224 MS
EKG P-R INTERVAL: 232 MS
EKG Q-T INTERVAL: 432 MS
EKG Q-T INTERVAL: 456 MS
EKG Q-T INTERVAL: 458 MS
EKG Q-T INTERVAL: 464 MS
EKG Q-T INTERVAL: 476 MS
EKG QRS DURATION: 100 MS
EKG QRS DURATION: 102 MS
EKG QRS DURATION: 90 MS
EKG QRS DURATION: 96 MS
EKG QRS DURATION: 98 MS
EKG QTC CALCULATION (BAZETT): 449 MS
EKG QTC CALCULATION (BAZETT): 470 MS
EKG QTC CALCULATION (BAZETT): 472 MS
EKG QTC CALCULATION (BAZETT): 482 MS
EKG QTC CALCULATION (BAZETT): 502 MS
EKG R AXIS: 67 DEGREES
EKG R AXIS: 69 DEGREES
EKG R AXIS: 74 DEGREES
EKG R AXIS: 74 DEGREES
EKG R AXIS: 80 DEGREES
EKG T AXIS: 101 DEGREES
EKG T AXIS: 105 DEGREES
EKG T AXIS: 119 DEGREES
EKG T AXIS: 126 DEGREES
EKG T AXIS: 150 DEGREES
EKG VENTRICULAR RATE: 64 BPM
EKG VENTRICULAR RATE: 64 BPM
EKG VENTRICULAR RATE: 65 BPM
EKG VENTRICULAR RATE: 65 BPM
EKG VENTRICULAR RATE: 67 BPM
GFR AFRICAN AMERICAN: >60
GFR NON-AFRICAN AMERICAN: 54 ML/MIN/1.73
GLUCOSE BLD-MCNC: 96 MG/DL (ref 74–99)
MAGNESIUM: 2.1 MG/DL (ref 1.6–2.6)
POTASSIUM REFLEX MAGNESIUM: 4.2 MMOL/L (ref 3.5–5)
SODIUM BLD-SCNC: 141 MMOL/L (ref 132–146)

## 2019-11-07 PROCEDURE — 93005 ELECTROCARDIOGRAM TRACING: CPT | Performed by: NURSE PRACTITIONER

## 2019-11-07 PROCEDURE — 2580000003 HC RX 258: Performed by: INTERNAL MEDICINE

## 2019-11-07 PROCEDURE — 36415 COLL VENOUS BLD VENIPUNCTURE: CPT

## 2019-11-07 PROCEDURE — 2580000003 HC RX 258: Performed by: NURSE PRACTITIONER

## 2019-11-07 PROCEDURE — 83735 ASSAY OF MAGNESIUM: CPT

## 2019-11-07 PROCEDURE — 80048 BASIC METABOLIC PNL TOTAL CA: CPT

## 2019-11-07 PROCEDURE — 6370000000 HC RX 637 (ALT 250 FOR IP): Performed by: NURSE PRACTITIONER

## 2019-11-07 PROCEDURE — 6360000002 HC RX W HCPCS: Performed by: INTERNAL MEDICINE

## 2019-11-07 PROCEDURE — 99239 HOSP IP/OBS DSCHRG MGMT >30: CPT | Performed by: INTERNAL MEDICINE

## 2019-11-07 RX ORDER — DOFETILIDE 0.25 MG/1
250 CAPSULE ORAL EVERY 12 HOURS SCHEDULED
Qty: 180 CAPSULE | Refills: 3 | Status: SHIPPED | OUTPATIENT
Start: 2019-11-07 | End: 2020-01-13 | Stop reason: SDUPTHER

## 2019-11-07 RX ADMIN — VITAMIN D, TAB 1000IU (100/BT) 1000 UNITS: 25 TAB at 09:10

## 2019-11-07 RX ADMIN — CARVEDILOL 6.25 MG: 6.25 TABLET, FILM COATED ORAL at 09:10

## 2019-11-07 RX ADMIN — DOFETILIDE 250 MCG: 0.25 CAPSULE ORAL at 05:51

## 2019-11-07 RX ADMIN — HYDRALAZINE HYDROCHLORIDE 10 MG: 10 TABLET, FILM COATED ORAL at 09:10

## 2019-11-07 RX ADMIN — TRIAMCINOLONE ACETONIDE: 1 CREAM TOPICAL at 09:11

## 2019-11-07 RX ADMIN — Medication 10 ML: at 09:10

## 2019-11-07 RX ADMIN — FUROSEMIDE 20 MG: 20 TABLET ORAL at 09:10

## 2019-11-07 RX ADMIN — CEFTRIAXONE SODIUM 1 G: 1 INJECTION, POWDER, FOR SOLUTION INTRAMUSCULAR; INTRAVENOUS at 09:09

## 2019-11-07 RX ADMIN — SPIRONOLACTONE 25 MG: 25 TABLET ORAL at 09:11

## 2019-11-07 ASSESSMENT — PAIN SCALES - GENERAL
PAINLEVEL_OUTOF10: 0

## 2019-11-08 ENCOUNTER — TELEPHONE (OUTPATIENT)
Dept: FAMILY MEDICINE CLINIC | Age: 74
End: 2019-11-08

## 2019-11-08 LAB
ORGANISM: ABNORMAL
URINE CULTURE, ROUTINE: ABNORMAL

## 2019-11-12 ENCOUNTER — OFFICE VISIT (OUTPATIENT)
Dept: FAMILY MEDICINE CLINIC | Age: 74
End: 2019-11-12
Payer: MEDICARE

## 2019-11-12 VITALS
HEART RATE: 65 BPM | HEIGHT: 67 IN | TEMPERATURE: 97.4 F | WEIGHT: 171 LBS | BODY MASS INDEX: 26.84 KG/M2 | DIASTOLIC BLOOD PRESSURE: 72 MMHG | SYSTOLIC BLOOD PRESSURE: 106 MMHG | OXYGEN SATURATION: 98 %

## 2019-11-12 DIAGNOSIS — E53.8 B12 NUTRITIONAL DEFICIENCY: ICD-10-CM

## 2019-11-12 DIAGNOSIS — I48.11 LONGSTANDING PERSISTENT ATRIAL FIBRILLATION (HCC): Primary | ICD-10-CM

## 2019-11-12 DIAGNOSIS — E55.9 VITAMIN D DEFICIENCY: ICD-10-CM

## 2019-11-12 DIAGNOSIS — Z78.0 POST-MENOPAUSAL: ICD-10-CM

## 2019-11-12 PROCEDURE — 99495 TRANSJ CARE MGMT MOD F2F 14D: CPT | Performed by: NURSE PRACTITIONER

## 2019-11-12 PROCEDURE — 96372 THER/PROPH/DIAG INJ SC/IM: CPT | Performed by: NURSE PRACTITIONER

## 2019-11-12 PROCEDURE — 1111F DSCHRG MED/CURRENT MED MERGE: CPT | Performed by: NURSE PRACTITIONER

## 2019-11-12 RX ORDER — CYANOCOBALAMIN 1000 UG/ML
1000 INJECTION INTRAMUSCULAR; SUBCUTANEOUS ONCE
Status: COMPLETED | OUTPATIENT
Start: 2019-11-12 | End: 2019-11-12

## 2019-11-12 RX ORDER — CYANOCOBALAMIN 1000 UG/ML
1000 INJECTION INTRAMUSCULAR; SUBCUTANEOUS ONCE
Qty: 1 ML | Refills: 0 | Status: SHIPPED | OUTPATIENT
Start: 2019-11-12 | End: 2019-11-12 | Stop reason: CLARIF

## 2019-11-12 RX ADMIN — CYANOCOBALAMIN 1000 MCG: 1000 INJECTION INTRAMUSCULAR; SUBCUTANEOUS at 15:24

## 2019-11-12 ASSESSMENT — ENCOUNTER SYMPTOMS
GASTROINTESTINAL NEGATIVE: 1
EYES NEGATIVE: 1
RESPIRATORY NEGATIVE: 1
ALLERGIC/IMMUNOLOGIC NEGATIVE: 1

## 2019-11-14 ENCOUNTER — TELEPHONE (OUTPATIENT)
Dept: NON INVASIVE DIAGNOSTICS | Age: 74
End: 2019-11-14

## 2019-11-14 ENCOUNTER — NURSE ONLY (OUTPATIENT)
Dept: NON INVASIVE DIAGNOSTICS | Age: 74
End: 2019-11-14
Payer: MEDICARE

## 2019-11-14 DIAGNOSIS — Z51.81 VISIT FOR MONITORING TIKOSYN THERAPY: Primary | ICD-10-CM

## 2019-11-14 DIAGNOSIS — Z79.899 VISIT FOR MONITORING TIKOSYN THERAPY: Primary | ICD-10-CM

## 2019-11-14 DIAGNOSIS — I48.0 PAROXYSMAL ATRIAL FIBRILLATION (HCC): ICD-10-CM

## 2019-11-14 PROCEDURE — 93000 ELECTROCARDIOGRAM COMPLETE: CPT | Performed by: INTERNAL MEDICINE

## 2019-11-17 DIAGNOSIS — I10 ESSENTIAL HYPERTENSION: ICD-10-CM

## 2019-11-17 DIAGNOSIS — I42.9 SECONDARY CARDIOMYOPATHY (HCC): ICD-10-CM

## 2019-11-17 DIAGNOSIS — I50.23 ACUTE ON CHRONIC SYSTOLIC HEART FAILURE (HCC): ICD-10-CM

## 2019-11-18 DIAGNOSIS — I42.9 SECONDARY CARDIOMYOPATHY (HCC): ICD-10-CM

## 2019-11-18 DIAGNOSIS — I10 ESSENTIAL HYPERTENSION: ICD-10-CM

## 2019-11-18 DIAGNOSIS — I50.23 ACUTE ON CHRONIC SYSTOLIC HEART FAILURE (HCC): ICD-10-CM

## 2019-11-18 RX ORDER — SPIRONOLACTONE 25 MG/1
25 TABLET ORAL DAILY
Qty: 90 TABLET | Refills: 3 | Status: SHIPPED
Start: 2019-11-18 | End: 2020-12-07 | Stop reason: SDUPTHER

## 2019-11-18 RX ORDER — SPIRONOLACTONE 25 MG/1
TABLET ORAL
Qty: 90 TABLET | Refills: 3 | Status: SHIPPED | OUTPATIENT
Start: 2019-11-18 | End: 2019-11-18 | Stop reason: SDUPTHER

## 2019-12-02 ENCOUNTER — HOSPITAL ENCOUNTER (OUTPATIENT)
Dept: MAMMOGRAPHY | Age: 74
Discharge: HOME | End: 2019-12-04
Payer: MEDICARE

## 2019-12-02 ENCOUNTER — HOSPITAL ENCOUNTER (OUTPATIENT)
Age: 74
Discharge: HOME | End: 2019-12-02
Payer: MEDICARE

## 2019-12-02 DIAGNOSIS — E55.9 VITAMIN D DEFICIENCY: ICD-10-CM

## 2019-12-02 DIAGNOSIS — Z78.0 POST-MENOPAUSAL: ICD-10-CM

## 2019-12-02 LAB
FOLATE: 14.9 NG/ML (ref 4.8–24.2)
VITAMIN B-12: 1199 PG/ML (ref 211–946)
VITAMIN D 25-HYDROXY: 23 NG/ML (ref 30–100)

## 2019-12-02 PROCEDURE — 36415 COLL VENOUS BLD VENIPUNCTURE: CPT

## 2019-12-02 PROCEDURE — 82607 VITAMIN B-12: CPT

## 2019-12-02 PROCEDURE — 82306 VITAMIN D 25 HYDROXY: CPT

## 2019-12-02 PROCEDURE — 82746 ASSAY OF FOLIC ACID SERUM: CPT

## 2019-12-02 PROCEDURE — 77080 DXA BONE DENSITY AXIAL: CPT

## 2019-12-03 DIAGNOSIS — Z78.0 OSTEOPENIA AFTER MENOPAUSE: Primary | ICD-10-CM

## 2019-12-03 DIAGNOSIS — M85.80 OSTEOPENIA AFTER MENOPAUSE: Primary | ICD-10-CM

## 2019-12-03 RX ORDER — ALENDRONATE SODIUM 35 MG/1
35 TABLET ORAL
Qty: 4 TABLET | Refills: 3 | Status: SHIPPED | OUTPATIENT
Start: 2019-12-03 | End: 2019-12-09

## 2019-12-09 ENCOUNTER — OFFICE VISIT (OUTPATIENT)
Dept: NON INVASIVE DIAGNOSTICS | Age: 74
End: 2019-12-09
Payer: MEDICARE

## 2019-12-09 VITALS
WEIGHT: 174 LBS | DIASTOLIC BLOOD PRESSURE: 70 MMHG | RESPIRATION RATE: 16 BRPM | HEIGHT: 67 IN | SYSTOLIC BLOOD PRESSURE: 130 MMHG | BODY MASS INDEX: 27.31 KG/M2 | HEART RATE: 58 BPM

## 2019-12-09 DIAGNOSIS — I48.19 PERSISTENT ATRIAL FIBRILLATION (HCC): ICD-10-CM

## 2019-12-09 DIAGNOSIS — I48.91 ATRIAL FIBRILLATION STATUS POST CARDIOVERSION (HCC): Primary | ICD-10-CM

## 2019-12-09 PROCEDURE — 3017F COLORECTAL CA SCREEN DOC REV: CPT | Performed by: NURSE PRACTITIONER

## 2019-12-09 PROCEDURE — 4040F PNEUMOC VAC/ADMIN/RCVD: CPT | Performed by: NURSE PRACTITIONER

## 2019-12-09 PROCEDURE — G8427 DOCREV CUR MEDS BY ELIG CLIN: HCPCS | Performed by: NURSE PRACTITIONER

## 2019-12-09 PROCEDURE — G8417 CALC BMI ABV UP PARAM F/U: HCPCS | Performed by: NURSE PRACTITIONER

## 2019-12-09 PROCEDURE — 1123F ACP DISCUSS/DSCN MKR DOCD: CPT | Performed by: NURSE PRACTITIONER

## 2019-12-09 PROCEDURE — 1036F TOBACCO NON-USER: CPT | Performed by: NURSE PRACTITIONER

## 2019-12-09 PROCEDURE — G8399 PT W/DXA RESULTS DOCUMENT: HCPCS | Performed by: NURSE PRACTITIONER

## 2019-12-09 PROCEDURE — G8484 FLU IMMUNIZE NO ADMIN: HCPCS | Performed by: NURSE PRACTITIONER

## 2019-12-09 PROCEDURE — 93000 ELECTROCARDIOGRAM COMPLETE: CPT | Performed by: INTERNAL MEDICINE

## 2019-12-09 PROCEDURE — 99214 OFFICE O/P EST MOD 30 MIN: CPT | Performed by: NURSE PRACTITIONER

## 2019-12-09 PROCEDURE — 1090F PRES/ABSN URINE INCON ASSESS: CPT | Performed by: NURSE PRACTITIONER

## 2019-12-09 ASSESSMENT — ENCOUNTER SYMPTOMS: RESPIRATORY NEGATIVE: 1

## 2019-12-12 RX ORDER — RIVAROXABAN 20 MG/1
TABLET, FILM COATED ORAL
Qty: 90 TABLET | Refills: 3 | Status: SHIPPED | OUTPATIENT
Start: 2019-12-12 | End: 2019-12-18 | Stop reason: SDUPTHER

## 2019-12-12 RX ORDER — HYDRALAZINE HYDROCHLORIDE 10 MG/1
10 TABLET, FILM COATED ORAL 3 TIMES DAILY
Qty: 270 TABLET | Refills: 3 | Status: SHIPPED
Start: 2019-12-12 | End: 2020-12-07 | Stop reason: SDUPTHER

## 2020-01-13 RX ORDER — DOFETILIDE 0.25 MG/1
250 CAPSULE ORAL EVERY 12 HOURS SCHEDULED
Qty: 180 CAPSULE | Refills: 3 | Status: SHIPPED | OUTPATIENT
Start: 2020-01-13 | End: 2021-02-04 | Stop reason: SDUPTHER

## 2020-02-17 PROBLEM — I48.91 A-FIB (HCC): Status: RESOLVED | Noted: 2019-11-04 | Resolved: 2020-02-17

## 2020-02-17 PROBLEM — I48.21 PERMANENT ATRIAL FIBRILLATION (HCC): Status: RESOLVED | Noted: 2019-11-04 | Resolved: 2020-02-17

## 2020-02-17 PROBLEM — I50.23 ACUTE ON CHRONIC SYSTOLIC CONGESTIVE HEART FAILURE (HCC): Status: RESOLVED | Noted: 2019-03-10 | Resolved: 2020-02-17

## 2020-02-17 NOTE — PROGRESS NOTES
for abdominal pain, blood in stool, constipation, diarrhea, nausea and vomiting. Genitourinary: Negative for dysuria, frequency, hematuria and urgency. Musculoskeletal: Negative for back pain, myalgias and neck pain. Skin: Negative for rash. Neurological: Negative for dizziness, weakness and headaches. Psychiatric/Behavioral: The patient is not nervous/anxious. Physical Exam:    VS:  /74   Pulse 58   Temp 97.8 °F (36.6 °C)   Resp 15   Ht 5' 6.5\" (1.689 m)   Wt 173 lb (78.5 kg)   SpO2 98%   BMI 27.50 kg/m²     LAST WEIGHT:  Wt Readings from Last 3 Encounters:   02/18/20 173 lb (78.5 kg)   12/09/19 174 lb (78.9 kg)   11/12/19 171 lb (77.6 kg)       BMI Readings from Last 3 Encounters:   02/18/20 27.50 kg/m²   12/09/19 27.66 kg/m²   11/12/19 27.19 kg/m²       Physical Exam  Constitutional:       General: She is not in acute distress. Appearance: She is well-developed. She is not diaphoretic. HENT:      Head: Normocephalic and atraumatic. Right Ear: External ear normal.      Left Ear: External ear normal.      Mouth/Throat:      Pharynx: No oropharyngeal exudate. Eyes:      General: No scleral icterus. Right eye: No discharge. Conjunctiva/sclera: Conjunctivae normal.      Pupils: Pupils are equal, round, and reactive to light. Neck:      Musculoskeletal: Normal range of motion and neck supple. Thyroid: No thyromegaly. Cardiovascular:      Rate and Rhythm: Normal rate and regular rhythm. Heart sounds: Normal heart sounds. No murmur. Pulmonary:      Effort: Pulmonary effort is normal. No respiratory distress. Breath sounds: No stridor. No wheezing or rales. Chest:      Chest wall: No tenderness. Abdominal:      General: Bowel sounds are normal. There is no distension. Palpations: Abdomen is soft. There is no mass. Tenderness: There is no abdominal tenderness. There is no guarding. Musculoskeletal: Normal range of motion. anxiety/stressmamangement provided to patient  -     TSH without Reflex; Future    HTN (hypertension), benign  -     TSH without Reflex; Future    Nonischemic cardiomyopathy [facility]):  Essentially stable. Followed by Dr. Johanne Garcia and Dr. Carlyn Olmedo    Paroxysmal atrial fibrillation [facility]):  Essentially stable. Followed by Dr. Johanne Garcia and Dr. Carlyn Olmedo  -     TSH without Reflex; Future    Acute on chronic systolic congestive heart failure [facility]):  Essentially stable. Followed by Dr. Johanne Garcia and Dr. Carlyn Olmedo    Functional diarrhea:  Differential Diagnosis may include thyroid dysfunction verus IBS  -     TSH without Reflex; Future  -     Written information regarding IBS and dietary modifications and stress management provided and discussed with patient. Written information regarding insomnia/sleep health, IBS and dietary modifications, stress management provided and discussed with patient. Time spent with patient, with > 50% spent counselin minutes     Call or go to ED immediately if symptoms worsen or persist.    Follow Up:  Return in about 7 weeks (around 2020) for check up of stress management measures. or sooner if necessary. Educational materials and/or home exercises printed for patient's review and were included in patient instructions on his/her AfterVisit Summary and given to patient at the end of visit. Counseled regarding above diagnosis,including possible risks and complications,  especially if left uncontrolled. Counseled regarding the possible side effects, risks, benefits and alternatives to treatment; patient and/or guardian verbalizes understanding, agrees, feels comfortable with and wishes to proceed with above treatment plan. Advised patient tocall with any new medication issues, and read all Rx info from pharmacy to assureaware of all possible risks and side effects of medication before taking. Reviewed age and gender appropriate health screening exams and vaccinations.

## 2020-02-18 ENCOUNTER — OFFICE VISIT (OUTPATIENT)
Dept: FAMILY MEDICINE CLINIC | Age: 75
End: 2020-02-18
Payer: MEDICARE

## 2020-02-18 ENCOUNTER — HOSPITAL ENCOUNTER (OUTPATIENT)
Age: 75
Discharge: HOME | End: 2020-02-20
Payer: MEDICARE

## 2020-02-18 VITALS
DIASTOLIC BLOOD PRESSURE: 74 MMHG | RESPIRATION RATE: 15 BRPM | TEMPERATURE: 97.8 F | OXYGEN SATURATION: 98 % | WEIGHT: 173 LBS | BODY MASS INDEX: 27.15 KG/M2 | SYSTOLIC BLOOD PRESSURE: 122 MMHG | HEART RATE: 58 BPM | HEIGHT: 67 IN

## 2020-02-18 LAB — TSH SERPL DL<=0.05 MIU/L-ACNC: 1.3 UIU/ML (ref 0.27–4.2)

## 2020-02-18 PROCEDURE — 1036F TOBACCO NON-USER: CPT | Performed by: FAMILY MEDICINE

## 2020-02-18 PROCEDURE — 1123F ACP DISCUSS/DSCN MKR DOCD: CPT | Performed by: FAMILY MEDICINE

## 2020-02-18 PROCEDURE — 84443 ASSAY THYROID STIM HORMONE: CPT

## 2020-02-18 PROCEDURE — 3017F COLORECTAL CA SCREEN DOC REV: CPT | Performed by: FAMILY MEDICINE

## 2020-02-18 PROCEDURE — G8427 DOCREV CUR MEDS BY ELIG CLIN: HCPCS | Performed by: FAMILY MEDICINE

## 2020-02-18 PROCEDURE — G8399 PT W/DXA RESULTS DOCUMENT: HCPCS | Performed by: FAMILY MEDICINE

## 2020-02-18 PROCEDURE — 1090F PRES/ABSN URINE INCON ASSESS: CPT | Performed by: FAMILY MEDICINE

## 2020-02-18 PROCEDURE — G8484 FLU IMMUNIZE NO ADMIN: HCPCS | Performed by: FAMILY MEDICINE

## 2020-02-18 PROCEDURE — 4040F PNEUMOC VAC/ADMIN/RCVD: CPT | Performed by: FAMILY MEDICINE

## 2020-02-18 PROCEDURE — 99214 OFFICE O/P EST MOD 30 MIN: CPT | Performed by: FAMILY MEDICINE

## 2020-02-18 PROCEDURE — G8417 CALC BMI ABV UP PARAM F/U: HCPCS | Performed by: FAMILY MEDICINE

## 2020-02-18 PROCEDURE — 36415 COLL VENOUS BLD VENIPUNCTURE: CPT | Performed by: FAMILY MEDICINE

## 2020-02-18 ASSESSMENT — ENCOUNTER SYMPTOMS
ABDOMINAL PAIN: 0
BACK PAIN: 0
SORE THROAT: 0
BLOOD IN STOOL: 0
CONSTIPATION: 0
DIARRHEA: 0
WHEEZING: 0
SHORTNESS OF BREATH: 0
NAUSEA: 0
VOMITING: 0
COUGH: 0

## 2020-02-18 ASSESSMENT — PATIENT HEALTH QUESTIONNAIRE - PHQ9
1. LITTLE INTEREST OR PLEASURE IN DOING THINGS: 0
2. FEELING DOWN, DEPRESSED OR HOPELESS: 0
SUM OF ALL RESPONSES TO PHQ QUESTIONS 1-9: 0
SUM OF ALL RESPONSES TO PHQ QUESTIONS 1-9: 0
SUM OF ALL RESPONSES TO PHQ9 QUESTIONS 1 & 2: 0

## 2020-02-18 NOTE — PATIENT INSTRUCTIONS
Patient Education        Preventing Osteoporosis: Care Instructions  Your Care Instructions    Osteoporosis means the bones are weak and thin enough that they can break easily. The older you are, the more likely you are to get osteoporosis. But with plenty of calcium, vitamin D, and exercise, you can help prevent osteoporosis. The preteen and teen years are a key time for bone building. With the help of calcium, vitamin D, and exercise in those early years and beyond, the bones reach their peak density and strength by age 27. After age 27, your bones naturally start to thin and weaken. The stronger your bones are at around age 27, the lower your risk for osteoporosis. But no matter what your age and risk are, your bones still need calcium, vitamin D, and exercise to stay strong. Also avoid smoking, and limit alcohol. Smoking and heavy alcohol use can make your bones thinner. Talk to your doctor about any special risks you might have, such as having a close relative with osteoporosis or taking a medicine that can weaken bones. Your doctor can tell you the best ways to protect your bones from thinning. Follow-up care is a key part of your treatment and safety. Be sure to make and go to all appointments, and call your doctor if you are having problems. It's also a good idea to know your test results and keep a list of the medicines you take. How can you care for yourself at home? · Get enough calcium and vitamin D. The [place] [employer] recommends adults younger than age 46 need 1,000 mg of calcium and 600 IU of vitamin D each day. Women ages 46 to 79 need 1,200 mg of calcium and 600 IU of vitamin D each day. Men ages 46 to 79 need 1,000 mg of calcium and 600 IU of vitamin D each day. Adults 71 and older need 1,200 mg of calcium and 800 IU of vitamin D each day. ? Eat foods rich in calcium, like yogurt, cheese, milk, and dark green vegetables.   ? Eat foods rich in vitamin D, like eggs, fatty fish, cereal, and fortified milk. ? Get some sunshine. Your body uses sunshine to make its own vitamin D. The safest time to be out in the sun is before 10 a.m. or after 3 p.m. Avoid getting sunburned. Sunburn can increase your risk of skin cancer. ? Talk to your doctor about taking a calcium plus vitamin D supplement. Ask about what type of calcium is right for you, and how much to take at a time. Adults ages 23 to 48 should not get more than 2,500 mg of calcium and 4,000 IU of vitamin D each day, whether it is from supplements and/or food. Adults ages 46 and older should not get more than 2,000 mg of calcium and 4,000 IU of vitamin D each day from supplements and/or food. · Get regular bone-building exercise. Weight-bearing and resistance exercises keep bones healthy by working the muscles and bones against gravity. Start out at an exercise level that feels right for you. Add a little at a time until you can do the following:  ? Do 30 minutes of weight-bearing exercise on most days of the week. Walking, jogging, stair climbing, and dancing are good choices. ? Do resistance exercises with weights or elastic bands 2 to 3 days a week. · Limit alcohol. Drink no more than 1 alcohol drink a day if you are a woman. Drink no more than 2 alcohol drinks a day if you are a man. · Do not smoke. Smoking can make bones thin faster. If you need help quitting, talk to your doctor about stop-smoking programs and medicines. These can increase your chances of quitting for good. When should you call for help? Watch closely for changes in your health, and be sure to contact your doctor if you have any problems. Where can you learn more? Go to [email or web address] and sign in to your Pur[employer] account. Enter S296 in the vufind box to learn more about \"Preventing Osteoporosis: Care Instructions. \"     If you do not have an account, please click on the \"Sign Up Now\" link.   Current as of: August 6, 2019  Content Version: 12.3  © 9402-0638 Healthwise, Incorporated. Care instructions adapted under license by [facility] ([facility]). If you have questions about a medical condition or this instruction, always ask your healthcare professional. [address] any warranty or liability for your use of this information. Patient Education        Insomnia: Care Instructions  Your Care Instructions    Insomnia is the inability to sleep well. It is a common problem for most people at some time. Insomnia may make it hard for you to get to sleep, stay asleep, or sleep as long as you need to. This can make you tired and grouchy during the day. It can also make you forgetful, less effective at work, and unhappy. Insomnia can be caused by conditions such as depression or anxiety. Pain can also affect your ability to sleep. When these problems are solved, the insomnia usually clears up. But sometimes bad sleep habits can cause insomnia. If insomnia is affecting your work or your enjoyment of life, you can take steps to improve your sleep. Follow-up care is a key part of your treatment and safety. Be sure to make and go to all appointments, and call your doctor if you are having problems. It's also a good idea to know your test results and keep a list of the medicines you take. How can you care for yourself at home? What to avoid  · Do not have drinks with caffeine, such as coffee or black tea, for 8 hours before bed. · Do not smoke or use other types of tobacco near bedtime. Nicotine is a stimulant and can keep you awake. · Avoid drinking alcohol late in the evening, because it can cause you to wake in the middle of the night. · Do not eat a big meal close to bedtime. If you are hungry, eat a light snack. · Do not drink a lot of water close to bedtime, because the need to urinate may wake you up during the night. · Do not read or watch TV in bed.  Use the bed only for sleeping and sexual activity. What to try  · Go to bed at the same time every night, and wake up at the same time every morning. Do not take naps during the day. · Keep your bedroom quiet, dark, and cool. · Sleep on a comfortable pillow and mattress. · If watching the clock makes you anxious, turn it facing away from you so you cannot see the time. · If you worry when you lie down, start a worry book. Well before bedtime, write down your worries, and then set the book and your concerns aside. · Try meditation or other relaxation techniques before you go to bed. · If you cannot fall asleep, get up and go to another room until you feel sleepy. Do something relaxing. Repeat your bedtime routine before you go to bed again. · Make your house quiet and calm about an hour before bedtime. Turn down the lights, turn off the TV, log off the computer, and turn down the volume on music. This can help you relax after a busy day. When should you call for help? Watch closely for changes in your health, and be sure to contact your doctor if:    · Your efforts to improve your sleep do not work.     · Your insomnia gets worse.     · You have been feeling down, depressed, or hopeless or have lost interest in things that you usually enjoy. Where can you learn more? Go to [email or web address]://[email or web address] org and sign in to your Hashdoc account. Enter P513 in the [facility] box to learn more about \"Insomnia: Care Instructions. \"     If you do not have an account, please click on the \"Sign Up Now\" link. Current as of: April 7, 2019  Content Version: 12.3  © 9359-5458 Healthwise, Incorporated. Care instructions adapted under license by [facility] ([facility]). If you have questions about a medical condition or this instruction, always ask your healthcare professional. [place] [address] any warranty or liability for your use of this information.          Patient Education        Learning About Sleeping Well  What does sleeping well mean? Sleeping well means getting enough sleep. How much sleep is enough varies among people. The number of hours you sleep is not as important as how you feel when you wake up. If you do not feel refreshed, you probably need more sleep. Another sign of not getting enough sleep is feeling tired during the day. The average total nightly sleep time is 7½ to 8 hours. Healthy adults may need a little more or a little less than this. Why is getting enough sleep important? Getting enough quality sleep is a basic part of good health. When your sleep suffers, your mood and your thoughts can suffer too. You may find yourself feeling more grumpy or stressed. Not getting enough sleep also can lead to serious problems, including injury, accidents, anxiety, and depression. What might cause poor sleeping? Many things can cause sleep problems, including:  · Stress. Stress can be caused by fear about a single event, such as giving a speech. Or you may have ongoing stress, such as worry about work or school. · Depression, anxiety, and other mental or emotional conditions. · Changes in your sleep habits or surroundings. This includes changes that happen where you sleep, such as noise, light, or sleeping in a different bed. It also includes changes in your sleep pattern, such as having jet lag or working a late shift. · Health problems, such as pain, breathing problems, and restless legs syndrome. · Lack of regular exercise. How can you help yourself? Here are some tips that may help you sleep more soundly and wake up feeling more refreshed. Your sleeping area  · Use your bedroom only for sleeping and sex. A bit of light reading may help you fall asleep. But if it doesn't, do your reading elsewhere in the house. Don't watch TV in bed. · Be sure your bed is big enough to stretch out comfortably, especially if you have a sleep partner. · Keep your bedroom quiet, dark, and cool.  Use curtains, blinds, or a sleep mask to block out light. To block out noise, use earplugs, soothing music, or a \"white noise\" machine. Your evening and bedtime routine  · Create a relaxing bedtime routine. You might want to take a warm shower or bath, listen to soothing music, or drink a cup of noncaffeinated tea. · Go to bed at the same time every night. And get up at the same time every morning, even if you feel tired. What to avoid  · Limit caffeine (coffee, tea, caffeinated sodas) during the day, and don't have any for at least 4 to 6 hours before bedtime. · Don't drink alcohol before bedtime. Alcohol can cause you to wake up more often during the night. · Don't smoke or use tobacco, especially in the evening. Nicotine can keep you awake. · Don't take naps during the day, especially close to bedtime. · Don't lie in bed awake for too long. If you can't fall asleep, or if you wake up in the middle of the night and can't get back to sleep within 15 minutes or so, get out of bed and go to another room until you feel sleepy. · Don't take medicine right before bed that may keep you awake or make you feel hyper or energized. Your doctor can tell you if your medicine may do this and if you can take it earlier in the day. If you can't sleep  · Imagine yourself in a peaceful, pleasant scene. Focus on the details and feelings of being in a place that is relaxing. · Get up and do a quiet or boring activity until you feel sleepy. · Don't drink any liquids after 6 p.m. if you wake up often because you have to go to the bathroom. Where can you learn more? Go to [email or web address] org and sign in to your [employer] account. Enter E202 in the Farfetch box to learn more about \"Learning About Sleeping Well. \"     If you do not have an account, please click on the \"Sign Up Now\" link. Current as of: May 28, 2019  Content Version: 12.3  © 3342-8890 Healthwise, Incorporated.  Care instructions adapted under license by [facility] ([facility]). If you have questions about a medical condition or this instruction, always ask your healthcare professional. [address] any warranty or liability for your use of this information. Patient Education        Learning About the Low FODMAP Diet for Irritable Bowel Syndrome (IBS)  What is the low-FODMAP diet? A low-FODMAP diet is a way to find out what foods give you digestion problems. You stop eating certain high-FODMAP foods for about 2 months. Then you add them back to see how your body reacts. This is called a \"challenge diet. \" A dietitian or doctor can help you follow this diet. FODMAPs are carbohydrates. They are in many types of foods. FODMAP stands for:  · F ermentable. · O ligosaccharides. · D isaccharides. · M onosaccharides. · A nd p olyols. If you have digestive problems, some of these foods can make your symptoms worse. When you are on this diet, you can still eat certain fruits and vegetables. You can also eat certain grains, meats, fish, and lactose-free milks. What is it used for? If you have irritable bowel syndrome (IBS), you can ease your symptoms by not eating some types of foods. Some people also use this diet for inflammatory bowel disease (IBD) or some food intolerances. High-FODMAP foods can be hard to digest. They pull more fluid into your intestines. They are also easily fermented. This can lead to bloating, belly pain, gas, and diarrhea. The low-FODMAP diet can help you figure out what foods to avoid. And it can help you find foods that are easier to digest.  This diet can help with symptoms of some digestive diseases. But it's not a cure. You will still need to manage your condition. How does it work? You will work with a doctor or dietitian when you start the diet. At first, you won't eat any high-FODMAP foods for a few weeks. Go to [email or web address]. [employer]van[employer] to learn more about this diet.  Moody Dickens also find links to an ba for your phone or other device. You'll find low-FODMAP cookbooks there too. After 6 to 8 weeks, you will start to try high-FODMAP foods again. You will add those foods back to your diet, one group at a time. Your doctor or dietitian will probably have you wait a few days before you add each new group of those foods. Keep a food diary. You can write down the foods you try and note how they make you feel. After a few weeks, you may have a better idea of what foods you should avoid and what foods make you feel your best.  What are the risks? There is some risk of not getting all of the vitamins and nutrients you need on the low-FODMAP diet. These include:  · Folate. · Thiamin. · Vitamin B6.  · Calcium. · Vitamin D. Your dietitian or doctor can help you find other sources of these if needed. This diet may limit your fiber intake. Try to plan your meals to include other sources of fiber. What foods are on the low-FODMAP diet? Here is a guide to foods that you can eat, plus the foods that you should avoid, when you are on the low-FODMAP diet. Grains  Okay to eat: Foods made from grains like arrowroot, buckwheat, corn, millet, and oats. You can also eat potato, quinoa, rice, sorghum, tapioca, and teff. Cereals, pasta, breads, corn tortillas and baked goods made from these grains are also okay. (These grains may be labeled \"gluten-free. \")  Avoid: Grains like wheat, barley, and rye. Avoid ingredients such as bulgur, couscous, durum, and semolina. And avoid cereals, breads, and pastas made from these grains. Avoid chickpea, lentil, and pea flour. Proteins  Okay to eat: Most meat, fish, and eggs without high-FODMAP sauces. You can have small amounts of almonds or hazelnuts (10 nuts). Macadamia nuts, peanuts, pecans, pine nuts, and walnuts are also okay. You can also eat andrew and pumpkin seeds, tofu, and tempeh. Avoid: Beans, chickpeas, lentils, and soybeans. Avoid pistachio and cashew nuts.  And some sausages may have high-FODMAP ingredients. Dairy  Okay to eat: Lactose-free dairy milks. Rice milk and almond milk are okay. So are lactose-free yogurts, kefirs, ice creams, and sorbet from low-FODMAP fruits and sweeteners. (These are often labeled \"lactose-free. \") You can have small amounts (2 Tbsp) of cottage, cream, or ricotta cheese. Hard cheeses like cheddar, [place], ROSS, and Swiss are okay. So are small amounts (1 oz) of aged or ripened cheeses like Brie, blue, and feta. Avoid: Milk, including cow, goat, and sheep. Avoid condensed or evaporated milk, buttermilk, custard, cream, sour cream, yogurt, and ice cream. Avoid soy milk. (Check sauces for dairy ingredients.)  Vegetables  Okay to eat: Bamboo shoots, bell peppers, bok jose angel, up to ½ cup of broccoli or cabbage (red or white), and cucumbers. Eggplant, green beans, lettuce, olives, parsnips, and potatoes are okay to eat. So are pumpkin, rutabaga, seaweed, sprouts, Swiss chard, and spinach. You can eat scallions (green part only) and squash (not butternut). You can eat tomatoes, turnips, watercress, yams, and zucchini. You can also have small amounts of artichoke hearts (from can, 1 oz), carrots, corn (½ cob), and sweet potato (½ cup). Avoid: Artichokes, asparagus, [place] sprouts, michelle cabbage, cauliflower, and celery. And avoid garlic, leeks, mushrooms, okra, onions, scallions (white part), shallots, and peas. Fruits  Okay to eat: Bananas, blueberries, cantaloupe, coconut, grapes, and honeydew. Kiwi, cedric, limes, oranges, passion fruit, papaya, and pineapple are also okay. You can eat plantain, raspberries, rhubarb, star fruit, strawberries, tangelo, and tangerine. You can also have small amounts of dried banana chips (up to 10 chips), dried cranberries (1 Tbsp), and shredded coconut (up to ¼ cup). Avoid: Apples, applesauce, apricots, avocados, blackberries, boysenberries, and cherries. Also avoid dates, figs, grapefruit, guava, lychee, and mangoes.  Don't eat information. Patient Education        Diet for Irritable Bowel Syndrome: Care Instructions  Your Care Instructions    Irritable bowel syndrome, or IBS, is a problem with the intestines. IBS can cause belly pain, bloating, gas, constipation, and diarrhea. Most people can control their symptoms by changing their diet and easing stress. No specific foods cause everyone with IBS to have symptoms. Many people find that they feel better by limiting or eliminating foods that may bring on symptoms. Make sure you don't stop eating all foods from any one food group without talking with a dietitian. You need to make sure you are still getting all the nutrients you need. Follow-up care is a key part of your treatment and safety. Be sure to make and go to all appointments, and call your doctor if you are having problems. It's also a good idea to know your test results and keep a list of the medicines you take. How can you care for yourself at home? To reduce constipation  · Include fruits, vegetables, beans, and whole grains in your diet each day. These foods are high in fiber. Slowly increase the amount of fiber you eat. This helps you avoid a lot of gas. · Drink plenty of fluids. If you have kidney, heart, or liver disease and have to limit fluids, talk with your doctor before you increase the amount of fluids you drink. · Get some exercise every day. Build up slowly to 30 to 60 minutes a day on 5 or more days of the week. · Take a fiber supplement, such as Citrucel or Metamucil, every day if needed. Read and follow all instructions on the label. · Schedule time each day for a bowel movement. Having a daily routine may help. Take your time and do not strain when having a bowel movement. · Check with your doctor before you increase the amount of fiber in your diet. For some people who have IBS, eating more fiber may make some symptoms worse. This includes bloating.   To reduce diarrhea  You may try giving up liability for your use of this information. Patient Education        Learning About Stress  What is stress? Stress is what you feel when you have to handle more than you are used to. Stress is a fact of life for most people, and it affects everyone differently. What causes stress for you may not be stressful for someone else. A lot of things can cause stress. You may feel stress when you go on a job interview, take a test, or run a race. This kind of short-term stress is normal and even useful. It can help you if you need to work hard or react quickly. For example, stress can help you finish an important job on time. Stress also can last a long time. Long-term stress is caused by stressful situations or events. Examples of long-term stress include long-term health problems, ongoing problems at work, or conflicts in your family. Long-term stress can harm your health. How does stress affect your health? When you are stressed, your body responds as though you are in danger. It makes hormones that speed up your heart, make you breathe faster, and give you a burst of energy. This is called the fight-or-flight stress response. If the stress is over quickly, your body goes back to normal and no harm is done. But if stress happens too often or lasts too long, it can have bad effects. Long-term stress can make you more likely to get sick, and it can make symptoms of some diseases worse. If you tense up when you are stressed, you may develop neck, shoulder, or low back pain. Stress is linked to high blood pressure and heart disease. Stress also harms your emotional health. It can make you dillard, tense, or depressed. Your relationships may suffer, and you may not do well at work or school. What can you do to manage stress? How to relax your mind  · Write. It may help to write about things that are bothering you. This helps you find out how much stress you feel and what is causing it.  When you know this, you can find better ways to cope. · Let your feelings out. Talk, laugh, cry, and express anger when you need to. Talking with friends, family, a counselor, or a member of the clergy about your feelings is a healthy way to relieve stress. · Do something you enjoy. For example, listen to music or go to a movie. Practice your hobby or do volunteer work. · Meditate. This can help you relax, because you are not worrying about what happened before or what may happen in the future. · Do guided imagery. Imagine yourself in any setting that helps you feel calm. You can use audiotapes, books, or a teacher to guide you. How to relax your body  · Do something active. Exercise or activity can help reduce stress. Walking is a great way to get started. Even everyday activities such as housecleaning or yard work can help. · Do breathing exercises. For example:  ? From a standing position, bend forward from the waist with your knees slightly bent. Let your arms dangle close to the floor. ? Breathe in slowly and deeply as you return to a standing position. Roll up slowly and lift your head last.  ? Hold your breath for just a few seconds in the standing position. ? Breathe out slowly and bend forward from the waist.  · Try yoga or yenny chi. These techniques combine exercise and meditation. You may need some training at first to learn them. What can you do to prevent stress? · Manage your time. This helps you find time to do the things you want and need to do. · Get enough sleep. Your body recovers from the stresses of the day while you are sleeping. · Get support. Your family, friends, and community can make a difference in how you experience stress. Where can you learn more? Go to https://lambert.[employer]. org and sign in to your [facility] account. Enter G276 in the My Sourcebox box to learn more about \"Learning About Stress. \"     If you do not have an account, please click on the \"Sign Up Now\" link.  Current as of: April 7, 2019  Content Version: 12.3  © 8277-3415 Healthwise, [employer]. Care instructions adapted under license by [facility] ([facility]). If you have questions about a medical condition or this instruction, always ask your healthcare professional. [address] any warranty or liability for your use of this information. Patient Education        Learning About Guided Imagery for Stress  What are guided imagery and stress? Stress is what you feel when you have to handle more than you are used to. A lot of things can cause stress. You may feel stress when you go on a job interview, take a test, or run a race. This kind of short-term stress is normal and even useful. It can help you if you need to work hard or react quickly. Stress also can last a long time. Long-term stress is caused by stressful situations or events. Examples of long-term stress include long-term health problems, ongoing problems at work, and conflicts in your family. Long-term stress can harm your health. Guided imagery is a technique of directed thoughts and suggestions that guide your mind toward a relaxed, focused state. This technique helps you use your mind to take you to a calm, peaceful place. You can use it to relax, which can lower blood pressure and reduce other problems related to stress. How does guided imagery help to relieve stress? Because of the way the mind and body are connected, guided imagery can make you feel like you are experiencing something just by imagining it. You can achieve a relaxed state when you imagine all the details of a safe, comfortable place, such as a beach or a garden. This relaxed state may help you feel more in control of your emotions and thought processes. Feeling in control may improve your attitude, health, and sense of well-being. How do you do guided imagery? You can use a smartphone ba or a video to lead you through the process.  You use all of to be present, and to be accepting. · When you're mindful, you do just one thing and you pay close attention to that one thing. For example, you may sit quietly and notice your emotions or how your food tastes and smells. · When you're present, you focus on the things that are happening right now. You let go of your thoughts about the past and the future. When you dwell on the past or the future, you miss moments that can heal and strengthen you. You may miss moments like hearing a child laugh or seeing a friendly face when you think you're all alone. · When you're accepting, you don't  the present moment. Instead you accept your thoughts and feelings as they come. You can practice anytime, anywhere, and in any way you choose. You can practice in many ways. Here are a few ideas:  · While doing your chores, like washing the dishes, let your mind focus on what's in your hand. What does the dish feel like? Is the water warm or cold? · Go outside and take a few deep breaths. What is the air like? Is it warm or cold? · When you can, take some time at the start of your day to sit alone and think. · Take a slow walk by yourself. Count your steps while you breathe in and out. · Try yoga breathing exercises, stretches, and poses to strengthen and relax your muscles. · At work, if you can, try to stop for a few moments each hour. Note how your body feels. Let yourself regroup and let your mind settle before you return to what you were doing. · If you struggle with anxiety or \"worry thoughts,\" imagine your mind as a blue marcos and your worry thoughts as clouds. Now imagine those worry thoughts floating across your mind's marcos. Just let them pass by as you watch. Follow-up care is a key part of your treatment and safety. Be sure to make and go to all appointments, and call your doctor if you are having problems. It's also a good idea to know your test results and keep a list of the medicines you take.   Where can do progressive muscle relaxation? To do progressive muscle relaxation, first you tense a group of muscles as you breathe in. Then you relax them as you breathe out. Notice how your muscles feel when you relax them. You work on your muscle groups in a certain order. Through practice, you can learn to feel the difference between a tensed muscle and a relaxed muscle. Then you can learn how to turn on this relaxed state at the first sign of a muscle tensing up due to stress. Practicing this method for a few weeks will help you get better at this skill. In time you'll be able to use this method to relieve stress. When you first start, it may help to use an audio recording until you learn all the muscle groups in order. Check online for these recordings. Choose a place where you won't be interrupted. It should have space for you to lie down on your back and stretch out comfortably, such as on a carpeted floor. Follow-up care is a key part of your treatment and safety. Be sure to make and go to all appointments, and call your doctor if you are having problems. It's also a good idea to know your test results and keep a list of the medicines you take. Where can you learn more? Go to https://KnowthenapepicewSingleHop.RecentPoker.com. org and sign in to your Cute Attack account. Enter T426 in the [facility] box to learn more about \"Learning About Progressive Muscle Relaxation for Stress. \"     If you do not have an account, please click on the \"Sign Up Now\" link. Current as of: April 7, 2019  Content Version: 12.3  © 5753-7832 Healthwise, Incorporated. Care instructions adapted under license by [facility] ([facility]). If you have questions about a medical condition or this instruction, always ask your healthcare professional. [address] any warranty or liability for your use of this information.

## 2020-03-30 RX ORDER — CARVEDILOL PHOSPHATE 20 MG/1
20 CAPSULE, EXTENDED RELEASE ORAL DAILY
Qty: 90 CAPSULE | Refills: 3 | Status: SHIPPED | OUTPATIENT
Start: 2020-03-30 | End: 2020-03-31 | Stop reason: SDUPTHER

## 2020-03-31 RX ORDER — CARVEDILOL PHOSPHATE 20 MG/1
20 CAPSULE, EXTENDED RELEASE ORAL DAILY
Qty: 90 CAPSULE | Refills: 3 | Status: SHIPPED | OUTPATIENT
Start: 2020-03-31 | End: 2020-03-31 | Stop reason: SDUPTHER

## 2020-03-31 RX ORDER — CARVEDILOL PHOSPHATE 20 MG/1
20 CAPSULE, EXTENDED RELEASE ORAL DAILY
Qty: 90 CAPSULE | Refills: 3 | Status: SHIPPED | OUTPATIENT
Start: 2020-03-31 | End: 2021-03-12 | Stop reason: SDUPTHER

## 2020-04-20 RX ORDER — FUROSEMIDE 20 MG/1
20 TABLET ORAL DAILY
Qty: 90 TABLET | Refills: 3 | Status: SHIPPED
Start: 2020-04-20 | End: 2021-04-30 | Stop reason: SDUPTHER

## 2020-06-10 ENCOUNTER — APPOINTMENT (OUTPATIENT)
Dept: GENERAL RADIOLOGY | Age: 75
End: 2020-06-10
Payer: MEDICARE

## 2020-06-10 ENCOUNTER — HOSPITAL ENCOUNTER (EMERGENCY)
Age: 75
Discharge: HOME | End: 2020-06-10
Payer: MEDICARE

## 2020-06-10 ENCOUNTER — APPOINTMENT (OUTPATIENT)
Dept: CT IMAGING | Age: 75
End: 2020-06-10
Payer: MEDICARE

## 2020-06-10 VITALS
SYSTOLIC BLOOD PRESSURE: 149 MMHG | BODY MASS INDEX: 27.03 KG/M2 | RESPIRATION RATE: 16 BRPM | OXYGEN SATURATION: 98 % | TEMPERATURE: 97 F | HEART RATE: 85 BPM | WEIGHT: 170 LBS | DIASTOLIC BLOOD PRESSURE: 65 MMHG

## 2020-06-10 PROCEDURE — 99283 EMERGENCY DEPT VISIT LOW MDM: CPT

## 2020-06-10 PROCEDURE — 70450 CT HEAD/BRAIN W/O DYE: CPT

## 2020-06-10 PROCEDURE — 73130 X-RAY EXAM OF HAND: CPT

## 2020-06-10 RX ORDER — LIDOCAINE HYDROCHLORIDE 10 MG/ML
5 INJECTION, SOLUTION INFILTRATION; PERINEURAL ONCE
Status: DISCONTINUED | OUTPATIENT
Start: 2020-06-10 | End: 2020-06-10 | Stop reason: HOSPADM

## 2020-06-10 ASSESSMENT — PAIN DESCRIPTION - ORIENTATION: ORIENTATION: LEFT

## 2020-06-10 ASSESSMENT — PAIN DESCRIPTION - PAIN TYPE: TYPE: ACUTE PAIN

## 2020-06-10 ASSESSMENT — PAIN DESCRIPTION - LOCATION: LOCATION: HAND

## 2020-06-10 ASSESSMENT — PAIN SCALES - GENERAL: PAINLEVEL_OUTOF10: 5

## 2020-06-10 ASSESSMENT — PAIN DESCRIPTION - FREQUENCY: FREQUENCY: CONTINUOUS

## 2020-06-10 ASSESSMENT — PAIN DESCRIPTION - DESCRIPTORS: DESCRIPTORS: ACHING

## 2020-06-11 ENCOUNTER — CARE COORDINATION (OUTPATIENT)
Dept: CARE COORDINATION | Age: 75
End: 2020-06-11

## 2020-06-11 NOTE — CARE COORDINATION
Patient contacted regarding recent discharge and COVID-19 risk. Discussed COVID-19 related testing which was not done at this time. Test results were not done. Patient informed of results, if available? N/A     Care Transition Nurse/ Ambulatory Care Manager contacted the patient by telephone to perform post discharge assessment. Verified name and  with patient as identifiers. Patient has following risk factors of: heart failure and CVD. CTN/ACM reviewed discharge instructions, medical action plan and red flags related to discharge diagnosis. Reviewed and educated them on any new and changed medications related to discharge diagnosis. Advised obtaining a 90-day supply of all daily and as-needed medications. Denies fever,cough, or SOB at this time. ACM offered to connect pt to pre service to schedule ER f/u appt, pt declined and states she will call on her own. Education provided regarding infection prevention, and signs and symptoms of COVID-19 and when to seek medical attention with patient who verbalized understanding. Discussed exposure protocols and quarantine from [address] you at higher risk for severe illness  and given an opportunity for questions and concerns. The patient agrees to contact the COVID-19 hotline [phone] or PCP office for questions related to their healthcare. CTN/ACM provided contact information for future reference. From CDC: Are you at higher risk for severe illness?  Wash your hands often.  Avoid close contact (6 feet, which is about two arm lengths) with people who are sick.  Put distance between yourself and other people if COVID-19 is spreading in your community.  Clean and disinfect frequently touched surfaces.  Avoid all cruise travel and non-essential air travel.  Call your healthcare professional if you have concerns about COVID-19 and your underlying condition or if you are sick.     For more information on steps you can take to

## 2020-06-23 ENCOUNTER — OFFICE VISIT (OUTPATIENT)
Dept: FAMILY MEDICINE CLINIC | Age: 75
End: 2020-06-23
Payer: MEDICARE

## 2020-06-23 VITALS
RESPIRATION RATE: 16 BRPM | BODY MASS INDEX: 27.47 KG/M2 | DIASTOLIC BLOOD PRESSURE: 70 MMHG | HEART RATE: 84 BPM | OXYGEN SATURATION: 95 % | WEIGHT: 175 LBS | TEMPERATURE: 97.6 F | HEIGHT: 67 IN | SYSTOLIC BLOOD PRESSURE: 130 MMHG

## 2020-06-23 PROCEDURE — 1123F ACP DISCUSS/DSCN MKR DOCD: CPT | Performed by: NURSE PRACTITIONER

## 2020-06-23 PROCEDURE — 1036F TOBACCO NON-USER: CPT | Performed by: NURSE PRACTITIONER

## 2020-06-23 PROCEDURE — G8417 CALC BMI ABV UP PARAM F/U: HCPCS | Performed by: NURSE PRACTITIONER

## 2020-06-23 PROCEDURE — 1090F PRES/ABSN URINE INCON ASSESS: CPT | Performed by: NURSE PRACTITIONER

## 2020-06-23 PROCEDURE — 99213 OFFICE O/P EST LOW 20 MIN: CPT | Performed by: NURSE PRACTITIONER

## 2020-06-23 PROCEDURE — G8427 DOCREV CUR MEDS BY ELIG CLIN: HCPCS | Performed by: NURSE PRACTITIONER

## 2020-06-23 PROCEDURE — 4040F PNEUMOC VAC/ADMIN/RCVD: CPT | Performed by: NURSE PRACTITIONER

## 2020-06-23 PROCEDURE — G8399 PT W/DXA RESULTS DOCUMENT: HCPCS | Performed by: NURSE PRACTITIONER

## 2020-06-23 PROCEDURE — 3017F COLORECTAL CA SCREEN DOC REV: CPT | Performed by: NURSE PRACTITIONER

## 2020-07-20 ENCOUNTER — OFFICE VISIT (OUTPATIENT)
Dept: FAMILY MEDICINE CLINIC | Age: 75
End: 2020-07-20
Payer: MEDICARE

## 2020-07-20 VITALS
BODY MASS INDEX: 27.97 KG/M2 | SYSTOLIC BLOOD PRESSURE: 110 MMHG | RESPIRATION RATE: 14 BRPM | OXYGEN SATURATION: 96 % | HEART RATE: 57 BPM | HEIGHT: 66 IN | WEIGHT: 174 LBS | DIASTOLIC BLOOD PRESSURE: 70 MMHG | TEMPERATURE: 97.7 F

## 2020-07-20 PROBLEM — Z76.0 ENCOUNTER FOR MEDICATION REFILL: Status: ACTIVE | Noted: 2020-07-20

## 2020-07-20 PROBLEM — L29.9 ITCHING: Status: ACTIVE | Noted: 2020-07-20

## 2020-07-20 PROCEDURE — 3017F COLORECTAL CA SCREEN DOC REV: CPT | Performed by: FAMILY MEDICINE

## 2020-07-20 PROCEDURE — G8417 CALC BMI ABV UP PARAM F/U: HCPCS | Performed by: FAMILY MEDICINE

## 2020-07-20 PROCEDURE — G8510 SCR DEP NEG, NO PLAN REQD: HCPCS | Performed by: FAMILY MEDICINE

## 2020-07-20 PROCEDURE — 4040F PNEUMOC VAC/ADMIN/RCVD: CPT | Performed by: FAMILY MEDICINE

## 2020-07-20 PROCEDURE — 1090F PRES/ABSN URINE INCON ASSESS: CPT | Performed by: FAMILY MEDICINE

## 2020-07-20 PROCEDURE — G8427 DOCREV CUR MEDS BY ELIG CLIN: HCPCS | Performed by: FAMILY MEDICINE

## 2020-07-20 PROCEDURE — G8399 PT W/DXA RESULTS DOCUMENT: HCPCS | Performed by: FAMILY MEDICINE

## 2020-07-20 PROCEDURE — 1036F TOBACCO NON-USER: CPT | Performed by: FAMILY MEDICINE

## 2020-07-20 PROCEDURE — 99213 OFFICE O/P EST LOW 20 MIN: CPT | Performed by: FAMILY MEDICINE

## 2020-07-20 PROCEDURE — 1123F ACP DISCUSS/DSCN MKR DOCD: CPT | Performed by: FAMILY MEDICINE

## 2020-07-20 RX ORDER — HYDROXYZINE HYDROCHLORIDE 10 MG/1
10 TABLET, FILM COATED ORAL 3 TIMES DAILY PRN
Qty: 270 TABLET | Refills: 3 | Status: SHIPPED
Start: 2020-07-20 | End: 2021-06-03

## 2020-07-20 RX ORDER — OMEPRAZOLE 40 MG/1
40 CAPSULE, DELAYED RELEASE ORAL DAILY
COMMUNITY
Start: 2020-07-02

## 2020-07-20 ASSESSMENT — PATIENT HEALTH QUESTIONNAIRE - PHQ9
1. LITTLE INTEREST OR PLEASURE IN DOING THINGS: 0
SUM OF ALL RESPONSES TO PHQ QUESTIONS 1-9: 0
2. FEELING DOWN, DEPRESSED OR HOPELESS: 0
SUM OF ALL RESPONSES TO PHQ9 QUESTIONS 1 & 2: 0
SUM OF ALL RESPONSES TO PHQ QUESTIONS 1-9: 0

## 2020-07-20 NOTE — PATIENT INSTRUCTIONS
Patient Education        Moore: Care Instructions  Your Care Instructions     Moore--even minor ones--can be very painful. A minor burn may heal within several days, while a more serious burn may take weeks or even months to heal completely. You may notice that the burned area feels tight and hard while it is healing. It is important to continue to move the area as the burn heals to prevent loss of motion or loss of function in the area. When your skin is damaged by a burn, you have a greater risk of infection. Keep the wound clean and change the bandages regularly to prevent infection and help the burn heal.  Burns can leave permanent scars. Taking good care of the burn as it heals may help prevent bad scars. The doctor has checked you carefully, but problems can develop later. If you notice any problems or new symptoms, get medical treatment right away. Follow-up care is a key part of your treatment and safety. Be sure to make and go to all appointments, and call your doctor if you are having problems. It's also a good idea to know your test results and keep a list of the medicines you take. How can you care for yourself at home? · If your doctor told you how to care for your burn, follow your doctor's instructions. If you did not get instructions, follow this general advice:  ? Wash the burn with clean water 2 times a day. Don't use hydrogen peroxide or alcohol, which can slow healing. ? Gently pat the burn dry after you wash it.  ? You may cover the burn with a thin layer of petroleum jelly, such as Vaseline, and a nonstick bandage. ? Apply more petroleum jelly and replace the bandage as needed. · Protect your burn while it is healing. Cover your burn if you are going out in the cold or the sun. ? Wear long sleeves if the burn is on your hands or arms. ? Wear a hat if the burn is on your face. ? Wear socks and shoes if the burn is on your feet. · Do not break blisters open.  This increases the chance of infection. If a blister breaks open by itself, blot up the liquid, and leave the skin that covered the blister. This helps protect the new skin. · If your doctor prescribed antibiotics, take them as directed. Do not stop taking them just because you feel better. You need to take the full course of antibiotics. For pain and itching  · Take pain medicines exactly as directed. ? If the doctor gave you a prescription medicine for pain, take it as prescribed. ? If you are not taking a prescription pain medicine, ask your doctor if you can take an over-the-counter medicine. · If the burn itches, try not to scratch it. Try an over-the-counter antihistamine such as diphenhydramine (Benadryl) or loratadine (Claritin). Read and follow all instructions on the label. When should you call for help? Call your doctor now or seek immediate medical care if:  · Your pain gets worse. · You have symptoms of infection, such as:  ? Increased pain, swelling, warmth, or redness near the burn. ? Red streaks leading from the burn. ? Pus draining from the burn. ? A fever. Watch closely for changes in your health, and be sure to contact your doctor if:  · You do not get better as expected. Where can you learn more? Go to https://REVENUE.com.[employer] Energy Resources. org and sign in to your bluebird bio account. Enter H789 in the [facility] box to learn more about \"Burns: Care Instructions. \"     If you do not have an account, please click on the \"Sign Up Now\" link. Current as of: June 26, 2019               Content Version: 12.5  © 2619-5718 Healthwise, Incorporated. Care instructions adapted under license by [facility] ([facility]). If you have questions about a medical condition or this instruction, always ask your healthcare professional. [place] [address] any warranty or liability for your use of this information.          Patient Education        Chemical Burns: Care Instructions  Your Care Instructions Burns can occur when a harmful chemical, such as a cleaning product or an acid, splashes onto the skin. The amount of damage to the skin depends on how strong the chemical was, how much of it was on the skin, and how long it was there. Chemical burns, even minor ones, can be very painful. A minor burn may heal within a few days. But a more serious burn may take weeks or even months to heal completely. When the skin is damaged by a burn, it may become infected. You can help prevent infection and help your burn heal. Keep the burn clean, and change the bandages often. Taking good care of the burn as it heals may help prevent bad scars. The treatment for most chemical burns is to remove the chemical from the skin by flushing the area with plenty of water. But some chemicals can't be removed with water. They may need to be removed from the skin in other ways by the doctor. The doctor has checked your skin carefully, but problems can develop later. If you notice any problems or new symptoms, get medical treatment right away. Follow-up care is a key part of your treatment and safety. Be sure to make and go to all appointments, and call your doctor if you are having problems. It's also a good idea to know your test results and keep a list of the medicines you take. How can you care for yourself at home? · If your doctor told you how to care for your burn, follow your doctor's instructions. If you did not get instructions, follow this general advice:  ? Wash the burn with clean water 2 times a day. Don't use hydrogen peroxide or alcohol, which can slow healing. ? Gently pat the burn dry after you wash it.  ? You may cover the burn with a thin layer of antibiotic ointment, such as bacitracin, and a nonstick bandage. ? Apply more ointment and replace the bandage as needed. · Be safe with medicines. Read and follow all instructions on the label.   ? If the doctor gave you a prescription medicine for pain, take it as prescribed. ? If you are not taking a prescription pain medicine, ask your doctor if you can take an over-the-counter medicine. · Don't break blisters open. Broken blisters could get infected. If a blister breaks open by itself, blot up the liquid, and leave the skin that covered the blister. This helps protect the new skin. · Try not to scratch the burn. Talk to your doctor about what to use on the burn for itching. When should you call for help? Call your doctor now or seek immediate medical care if:  · Your pain gets worse. · You have symptoms of infection, such as:  ? Increased pain, swelling, warmth, or redness. ? Red streaks leading from the burn. ? Pus draining from the burn. ? A fever. Watch closely for changes in your health, and be sure to contact your doctor if:  · The burn is not getting better each day. Where can you learn more? Go to https://xoompark.Loop88. org and sign in to your Auxogyn account. Enter M105 in the [facility] box to learn more about \"Chemical Burns: Care Instructions. \"     If you do not have an account, please click on the \"Sign Up Now\" link. Current as of: June 26, 2019               Content Version: 12.5  © 1978-1577 [employer]. Care instructions adapted under license by [facility] ([facility]). If you have questions about a medical condition or this instruction, always ask your healthcare professional. [place] [address] any warranty or liability for your use of this information. Patient Education         Care for Minor Moore (01:31)  Your health professional recommends that you watch this short online health video. Learn how to treat minor burns and prevent infection, and when to see a doctor. How to watch the video    Scan the QR code   OR Visit the website    https://hwi. se/[email or web address]   Current as of: June 26, 2019               Content Version: 12.5  © 9104-4711 [employer].    Care instructions adapted under license by [facility] ([facility]). If you have questions about a medical condition or this instruction, always ask your healthcare professional. [address] any warranty or liability for your use of this information.

## 2020-07-20 NOTE — PROGRESS NOTES
Latasha Farrell is a 76 y.o. female who presents today for     Chief Complaint   Patient presents with    Wound Infection     right arm wont heal pt stated she thinks it may be infected        Patient reports onset of lesion of left arm approximately one week prior to assessment. She had a procedure done where an IV had to be started in her right arm and was taped heavily. When the IV was removed ad the tape pulled off, some of her skin was pulled off as well. In addition to an area that resembles a second degree burn where the blister is unroofed, she has surrounding redness. Entire lesion measures about 6 cm, with central part surrounded by erythema. She denies swelling, warms of tenderness. She denies fever/chills/other systemic symptoms. Chart reviewed: she has multiple allergies, including to multiple topicals and antibiotics    PMFSH:  Patient's past medical, surgical, social and/or family history reviewed, updated in chart, and are non-contributory (unless otherwise stated). Medications and allergies also reviewed and updated in chart. Review of Systems  Review of Systems   All other systems reviewed and are negative. Physical Exam:    VS:  /70   Pulse 57   Temp 97.7 °F (36.5 °C) (Oral)   Resp 14   Ht 5' 6\" (1.676 m)   Wt 174 lb (78.9 kg)   SpO2 96%   BMI 28.08 kg/m²     LAST WEIGHT:  Wt Readings from Last 3 Encounters:   07/20/20 174 lb (78.9 kg)   06/23/20 175 lb (79.4 kg)   06/10/20 170 lb (77.1 kg)       BMI Readings from Last 3 Encounters:   07/20/20 28.08 kg/m²   06/23/20 27.82 kg/m²   06/10/20 27.03 kg/m²       Physical Exam  Constitutional:       General: She is not in acute distress. Appearance: Normal appearance. She is well-developed and normal weight. HENT:      Head: Normocephalic and atraumatic.       Right Ear: External ear normal.      Left Ear: External ear normal.      Nose: Nose normal.      Mouth/Throat:      Mouth: Mucous membranes are moist. Eyes:      Extraocular Movements: Extraocular movements intact. Conjunctiva/sclera: Conjunctivae normal.   Neck:      Musculoskeletal: Normal range of motion. Pulmonary:      Effort: Pulmonary effort is normal. No respiratory distress. Skin:         Neurological:      Mental Status: She is alert and oriented to person, place, and time. Psychiatric:         Attention and Perception: Attention and perception normal.         Mood and Affect: Mood and affect normal.         Speech: Speech normal.         Behavior: Behavior normal. Behavior is cooperative. Thought Content: Thought content normal.         Cognition and Memory: Cognition normal.         Labs:  Lab Results   Component Value Date     11/07/2019    K 4.2 11/07/2019     11/07/2019    CO2 19 11/07/2019    BUN 21 11/07/2019    CREATININE 1.0 11/07/2019    PROT 6.3 03/11/2019    LABALBU 3.4 03/11/2019    CALCIUM 9.1 11/07/2019    GFRAA >60 11/07/2019    LABGLOM 54 11/07/2019    GLUCOSE 96 11/07/2019    GLUCOSE 120 10/12/2011    AST 23 03/11/2019    ALT 26 03/11/2019    ALKPHOS 120 03/11/2019    BILITOT 0.9 03/11/2019    TSH 1.300 02/18/2020    CHOL 179 11/21/2018    TRIG 69 11/21/2018    HDL 51 11/21/2018    LDLCALC 114 11/21/2018        Lab Results   Component Value Date    CHOL 179 11/21/2018    CHOL 204 (H) 11/14/2015    CHOL 235 (H) 07/02/2015     Lab Results   Component Value Date    TRIG 69 11/21/2018    TRIG 71 11/14/2015    TRIG 182 (H) 07/02/2015     Lab Results   Component Value Date    HDL 51 11/21/2018    HDL 85 11/14/2015    HDL 69 07/02/2015     Lab Results   Component Value Date    LDLCALC 114 (H) 11/21/2018    LDLCALC 105 (H) 11/14/2015    LDLCALC 130 (H) 07/02/2015       No results found for: LABA1C  Lab Results   Component Value Date    LDLCALC 114 (H) 11/21/2018    CREATININE 1.0 11/07/2019           Assessment / Plan:      Nikia Sanders was seen today for wound infection.     Diagnoses and all orders for this visit:    Abrasion of right upper extremity, initial encounter: Piedra not appear infected and seems to be healing on its own        -     Conservative treatment recommendations made. No recommendations for topical treatment, as patient has multiple allergies    Encounter for medication refill  -     hydrOXYzine (ATARAX) 10 MG tablet; Take 1 tablet by mouth 3 times daily as needed for Itching    Anxiety  -     hydrOXYzine (ATARAX) 10 MG tablet; Take 1 tablet by mouth 3 times daily as needed for Itching    Itching  -     hydrOXYzine (ATARAX) 10 MG tablet; Take 1 tablet by mouth 3 times daily as needed for Itching           Follow Up:  Return if symptoms worsen or fail to improve, for Schedule Medicare [facility] at earliest convenience (OV). or sooner if necessary. Call or go to ED immediately if symptoms worsen or persist.    Educational materials and/or home exercises printed for patient's review and were included in patient instructions on his/her AfterVisit Summary and given to patient at the end of visit. Counseled regarding above diagnosis,including possible risks and complications,  especially if left uncontrolled. Counseled regarding the possible side effects, risks, benefits and alternatives to treatment; patient and/or guardian verbalizes understanding, agrees, feels comfortable with and wishes to proceed with above treatment plan. Advised patient tocall with any new medication issues, and read all Rx info from pharmacy to assureaware of all possible risks and side effects of medication before taking. Reviewed age and gender appropriate health screening exams and vaccinations. Advisedpatient regarding importance of keeping up with recommended health maintenance andto schedule as soon as possible if overdue, as this is important in assessing forundiagnosed pathology, especially cancer, as well as protecting against potentially harmful/life threatening disease.       Patient and/or guardian verbalizes understandingand agrees with above counseling, assessment and plan. All questions answered.     Sanju Solomon MD

## 2020-07-28 ENCOUNTER — OFFICE VISIT (OUTPATIENT)
Dept: CARDIOLOGY CLINIC | Age: 75
End: 2020-07-28
Payer: MEDICARE

## 2020-07-28 VITALS
BODY MASS INDEX: 28.54 KG/M2 | SYSTOLIC BLOOD PRESSURE: 126 MMHG | DIASTOLIC BLOOD PRESSURE: 62 MMHG | HEIGHT: 66 IN | OXYGEN SATURATION: 97 % | WEIGHT: 177.6 LBS | RESPIRATION RATE: 20 BRPM | HEART RATE: 59 BPM

## 2020-07-28 PROCEDURE — G8417 CALC BMI ABV UP PARAM F/U: HCPCS | Performed by: INTERNAL MEDICINE

## 2020-07-28 PROCEDURE — G8399 PT W/DXA RESULTS DOCUMENT: HCPCS | Performed by: INTERNAL MEDICINE

## 2020-07-28 PROCEDURE — 1036F TOBACCO NON-USER: CPT | Performed by: INTERNAL MEDICINE

## 2020-07-28 PROCEDURE — 4040F PNEUMOC VAC/ADMIN/RCVD: CPT | Performed by: INTERNAL MEDICINE

## 2020-07-28 PROCEDURE — 99214 OFFICE O/P EST MOD 30 MIN: CPT | Performed by: INTERNAL MEDICINE

## 2020-07-28 PROCEDURE — G8427 DOCREV CUR MEDS BY ELIG CLIN: HCPCS | Performed by: INTERNAL MEDICINE

## 2020-07-28 PROCEDURE — 93000 ELECTROCARDIOGRAM COMPLETE: CPT | Performed by: INTERNAL MEDICINE

## 2020-07-28 PROCEDURE — 3017F COLORECTAL CA SCREEN DOC REV: CPT | Performed by: INTERNAL MEDICINE

## 2020-07-28 PROCEDURE — 1090F PRES/ABSN URINE INCON ASSESS: CPT | Performed by: INTERNAL MEDICINE

## 2020-07-28 PROCEDURE — 1123F ACP DISCUSS/DSCN MKR DOCD: CPT | Performed by: INTERNAL MEDICINE

## 2020-07-28 SDOH — HEALTH STABILITY: MENTAL HEALTH: HOW OFTEN DO YOU HAVE A DRINK CONTAINING ALCOHOL?: MONTHLY OR LESS

## 2020-07-28 SDOH — HEALTH STABILITY: MENTAL HEALTH: HOW MANY STANDARD DRINKS CONTAINING ALCOHOL DO YOU HAVE ON A TYPICAL DAY?: 1 OR 2

## 2020-07-28 NOTE — PROGRESS NOTES
syncope. Active at home   No orthopnea   Try to watch diet          Past Medical History:   Diagnosis Date    Anxiety     Arthritis     Atrial fibrillation ([address]     new onset    Cataract     Cervical radiculopathy     JOHANA Ruby    CHF (congestive heart failure) (HCC)     Chronic migraine without aura, with intractable migraine, so stated, without mention of status migrainosus     JOHANA Ruby    Chronic sinusitis     Disc displacement, lumbar     RNicole Ruby    Hypertension     IBS (irritable bowel syndrome)     Left ventricular systolic dysfunction     Meige syndrome     partial/R. Flori    Mitral regurgitation     Mild to moderate    Nonischemic cardiomyopathy (HCC)     Osteopenia     TIA (transient ischemic attack)     carotid TIA's    Vertebrobasilar artery syndrome     JOHANA Ruby            Past Surgical History:   Procedure Laterality Date    BLADDER REPAIR      BUNIONECTOMY      CARDIOVASCULAR STRESS TEST  11/25/13    Rt & LT cath    CARDIOVERSION  11/04/2019    Successful CV from AF to NSR   (Dr. Willow Guevara)    COLONOSCOPY  07/2020    DIAGNOSTIC CARDIAC CATH LAB PROCEDURE  2/20/10    Dr Oliver Jonhson CATH LAB PROCEDURE  8/24/11    [facility] heart [facility] @ [facility].  DOPPLER ECHOCARDIOGRAPHY  11/25/13    HYSTERECTOMY  1991    OVARY REMOVAL      TRANSESOPHAGEAL ECHOCARDIOGRAM  11/21/2018    Dr. Germán Maldonado TRANSESOPHAGEAL ECHOCARDIOGRAM  03/18/2019          Family History   Problem Relation Age of Onset    Heart Attack Mother     Stroke Mother     Heart Attack Father     Heart Failure Father     Heart Disease Father     Anxiety Disorder Sister     Depression Sister     Crohn's Disease Son           Social History     Tobacco Use    Smoking status: Never Smoker    Smokeless tobacco: Never Used   Substance Use Topics    Alcohol use:  Yes     Alcohol/week: 0.0 standard drinks     Frequency: Monthly or less     Drinks per session: 1 or 2     Binge frequency: Never     Comment: occasional wine/mixed drink. Review of Systems:  Constitutional: negative for fever and chills, or significant weight loss  HEENT: negative for acute visual symptoms or auditory problems, no dysphagia  Respiratory: negative for cough, wheezing, or hemoptysis  Cardiovascular: negative for chest pain, palpitations, and dyspnea  Gastrointestinal: negative for abdominal pain, diarrhea, nausea and vomiting  Endocrine: Negative for polyuria and polydyspsia  Genitourinary:negative for dysuria and hematuria  Derm: negative for rash and skin lesion(s)  Neurological: negative for tingling, numbness, weakness, seizures and tremors  Endocrine: negative for polydipsia and polyuria  Musculoskeletal: negative for pain or tenderness  Psychiatric: negative for anxiety, depression, or suicidal ideations         O:  COMPLETE PHYSICAL EXAM:       /62 (Site: Left Upper Arm, Position: Sitting, Cuff Size: Medium Adult)   Pulse 59   Resp 20   Ht 5' 6\" (1.676 m)   Wt 177 lb 9.6 oz (80.6 kg)   SpO2 97%   BMI 28.67 kg/m²       General:   Patient alert, comfortable, no distress. Appears stated age. HEENT:    Pupils equal, no icterus, no nasal drainage, tongue moist.   Neck:              No masses, Thyroid not palpable. No elevated JVD, No carotid bruit. Chest:   Normal configuration, non tender. Lungs:   Clear to auscultation bilaterally, few scattered rhonchi. Cardiovascular:  Regular rhythm, 1/6 systolic murmur, No S3, PMI at 5th ICS, no palpable thrills,    Abdomen:  Soft, Non tender, Bowel sounds normal, no pulsatile abdominal aorta, no palpable masses. Extremities:  No edema. Distal pulses palpable. No cyanosis, no clubbing. Skin:   Good turgor, warm and dry, no cyanosis. Musculoskeletal: No joint swelling or deformity. Neuro:   Cranial nerves grossly intact; No focal neurologic deficit. Psych:   Alert, good mood and effect.      REVIEW OF DIAGNOSTIC TESTS: Electrocardiogram: NSR, fouzia, QTc 481ms (EKG -12/2019 -QTc 461ms)          A/P:   ASSESSMENT / PLAN:    Marshall Hinkle was seen today for atrial fibrillation, cardiomyopathy, cardiac valve problem and hypertension. Diagnoses and all orders for this visit:      Paroxysmal atrial fibrillation ([address]- Dx 11/2018 - CHADS2 VASc score 4; On Xarelto (did not tolerated Eliquis) Rates controlled on Coreg; add Digoxin for rate control if needed; Seen by EP at [facility]- Dr. Addie Chavarria in 4/2019 for Watchman - He recommended Rhythm management, reassess her EF, if <35% ICD implant and then Watchman in the future - Followed by [id number] EP - s/p DCCV 11/4/2019, On Tikosyn; She is still considering Watchman to get off her [facility] AUTHORITY. Agreeable to see Dr. Dayron Gutiérrez.    - Referral to Dr Davis Postal, [address] Structural [facility]     Thrombus of left atrial appendage Found on JAIME 11/21/18 and 3/2019 - Resolved on JAIME 5/6/2019 - On Xarelto      Nonischemic cardiomyopathy ([address] EF 38% by Echo 12/2016; JAIME 3/16/2019- EF 25-30%; JAIME 5/6/2019- EF 25-30%; Benefits of Life Vest discussed-She wants to think about it. Lexiscan stress 11/21/2018 -Fixed anterior defect EF 23%; EF 35-40% by Echo 7/8/2019 - On maximal tolerable BB and Aldactone; Allergic/Intolerance to ACE/ARB/ Nitrates. -     EKG 12 Lead     Pulmonary hypertension ([address] Secondary: PA and RV 50mmHg by RHC in 2010 at [facility] and in 2011 at [facility]; Recommended Long acting Nitrates; Not on Nitrates due to Nitrate intolerance  -     EKG 12 Lead     Essential hypertension; Stable     Valvular heart disease, Moderate Mitral regurgitation - Echo 7/2019;      Migraine without aura and without status migrainosus, not intractable     Multiple drug allergies     Allergy to ACE inhibitors     Lumbar radiculopathy; chronic     Non morbid obesity;  Diet, exercise and weight loss discussed.     Preventive Cardiology: Low cholesterol diet, regular exercise as tolerate, and gradual weight loss discussed.      Monitor BP and heart rates. Above recommendations discussed with her. All questions answered about cardiac diagnoses and cardiac medications. Continue current medications. Compliance with medications and f/u with all physicians discussed. Risk factor modification based on risk profile discussed. Call if any exertional chest pain, short of breath, dizzy or palpitations   Follow up in 6 months or earlier if needed.          [facility]  [address], [place], [address] [facility]  [phone]

## 2020-08-01 ENCOUNTER — HOSPITAL ENCOUNTER (OUTPATIENT)
Age: 75
Discharge: HOME | End: 2020-08-01
Payer: MEDICARE

## 2020-08-01 PROCEDURE — 87045 FECES CULTURE AEROBIC BACT: CPT

## 2020-08-01 PROCEDURE — 83993 ASSAY FOR CALPROTECTIN FECAL: CPT

## 2020-08-01 PROCEDURE — 87329 GIARDIA AG IA: CPT

## 2020-08-01 PROCEDURE — 87449 NOS EACH ORGANISM AG IA: CPT

## 2020-08-01 PROCEDURE — 87324 CLOSTRIDIUM AG IA: CPT

## 2020-08-01 PROCEDURE — 89055 LEUKOCYTE ASSESSMENT FECAL: CPT

## 2020-08-02 LAB
C DIFF TOXIN/ANTIGEN: NORMAL
WHITE BLOOD CELLS (WBC), STOOL: NORMAL

## 2020-08-03 LAB
CULTURE, STOOL: NORMAL
GIARDIA ANTIGEN STOOL: NORMAL

## 2020-08-26 ENCOUNTER — OFFICE VISIT (OUTPATIENT)
Dept: FAMILY MEDICINE CLINIC | Age: 75
End: 2020-08-26
Payer: MEDICARE

## 2020-08-26 VITALS
TEMPERATURE: 97.5 F | SYSTOLIC BLOOD PRESSURE: 126 MMHG | HEART RATE: 61 BPM | WEIGHT: 176 LBS | RESPIRATION RATE: 16 BRPM | HEIGHT: 67 IN | DIASTOLIC BLOOD PRESSURE: 68 MMHG | OXYGEN SATURATION: 98 % | BODY MASS INDEX: 27.62 KG/M2

## 2020-08-26 PROCEDURE — G0439 PPPS, SUBSEQ VISIT: HCPCS | Performed by: FAMILY MEDICINE

## 2020-08-26 PROCEDURE — 4040F PNEUMOC VAC/ADMIN/RCVD: CPT | Performed by: FAMILY MEDICINE

## 2020-08-26 PROCEDURE — 1123F ACP DISCUSS/DSCN MKR DOCD: CPT | Performed by: FAMILY MEDICINE

## 2020-08-26 PROCEDURE — 3017F COLORECTAL CA SCREEN DOC REV: CPT | Performed by: FAMILY MEDICINE

## 2020-08-26 RX ORDER — FAMOTIDINE 40 MG/1
40 TABLET, FILM COATED ORAL 2 TIMES DAILY PRN
COMMUNITY

## 2020-08-26 RX ORDER — MONTELUKAST SODIUM 4 MG/1
1 TABLET, CHEWABLE ORAL 4 TIMES DAILY
COMMUNITY
Start: 2020-08-12 | End: 2020-10-14

## 2020-08-26 ASSESSMENT — PATIENT HEALTH QUESTIONNAIRE - PHQ9
SUM OF ALL RESPONSES TO PHQ QUESTIONS 1-9: 2
SUM OF ALL RESPONSES TO PHQ QUESTIONS 1-9: 2

## 2020-08-26 ASSESSMENT — ANXIETY QUESTIONNAIRES
7. FEELING AFRAID AS IF SOMETHING AWFUL MIGHT HAPPEN: 0-NOT AT ALL
3. WORRYING TOO MUCH ABOUT DIFFERENT THINGS: 1-SEVERAL DAYS
GAD7 TOTAL SCORE: 3
4. TROUBLE RELAXING: 0-NOT AT ALL
6. BECOMING EASILY ANNOYED OR IRRITABLE: 0-NOT AT ALL
1. FEELING NERVOUS, ANXIOUS, OR ON EDGE: 1-SEVERAL DAYS
5. BEING SO RESTLESS THAT IT IS HARD TO SIT STILL: 0-NOT AT ALL
2. NOT BEING ABLE TO STOP OR CONTROL WORRYING: 1-SEVERAL DAYS

## 2020-08-26 NOTE — PATIENT INSTRUCTIONS
Personalized Preventive Plan for Jun Prakash - 8/26/2020  Medicare offers a range of preventive health benefits. Some of the tests and screenings are paid in full while other may be subject to a deductible, co-insurance, and/or copay. Some of these benefits include a comprehensive review of your medical history including lifestyle, illnesses that may run in your family, and various assessments and screenings as appropriate. After reviewing your medical record and screening and assessments performed today your provider may have ordered immunizations, labs, imaging, and/or referrals for you. A list of these orders (if applicable) as well as your Preventive Care list are included within your After Visit Summary for your review. Other Preventive Recommendations:    · A preventive eye exam performed by an eye specialist is recommended every 1-2 years to screen for glaucoma; cataracts, macular degeneration, and other eye disorders. · A preventive dental visit is recommended every 6 months. · Try to get at least 150 minutes of exercise per week or 10,000 steps per day on a pedometer . · Order or download the FREE \"Exercise & Physical Activity: Your Everyday Guide\" from [employer]. Call 5-982.425.2522 or search [employer] online. · You need 8715-1162 mg of calcium and 4375-6412 IU of vitamin D per day. It is possible to meet your calcium requirement with diet alone, but a vitamin D supplement is usually necessary to meet this goal.  · When exposed to the sun, use a sunscreen that protects against both UVA and UVB radiation with an SPF of 30 or greater. Reapply every 2 to 3 hours or after sweating, drying off with a towel, or swimming. · Always wear a seat belt when traveling in a car. Always wear a helmet when riding a bicycle or motorcycle.   · Make sure your home is safe!!  · Don't forget your flu shot in September!!

## 2020-08-26 NOTE — PROGRESS NOTES
Medicare Annual Wellness Visit  Name: Ariel Uriarte Date: 2020   MRN: <[id number]> Sex: Female   Age: 76 y.o. Ethnicity: Non-/Non    : 1945 Race: Nic Campos is here for Medicare AWV    Screenings for behavioral, psychosocial and functional/safety risks, and cognitive dysfunction are all negative except as indicated below. These results, as well as other patient data from the [address] form, are documented in Flowsheets linked to this Encounter. Allergies   Allergen Reactions    Atacand [Candesartan] Hives and Itching    Adhesive Tape Itching     develops rash and itching with EKG electrodes    Digoxin Itching     developed itching and rash    Losartan Potassium Itching    Shellfish-Derived Products Itching     3/9/19 Pt states she had a lobster pizza and had difficulty breathing, started to sweat and was itchy  states she had a lobster pizza and had difficulty breathing, started to sweat and was itching    Sulfa Antibiotics Diarrhea and Other (See Comments)     Also causes migraines  caused migraines and diarrhea    Acetaminophen Hives and Itching    Apap-Caff-Dihydrocodeine Hives and Itching    Coreg [Carvedilol] Itching     Can take extended release Coreg    Cozaar [Losartan] Itching    Demerol      3/9/19 Pt states she gets a severe migraine    Diovan [Valsartan] Itching    Imdur [Isosorbide Mononitrate]      Severe migraines    Labetalol Itching    Lisinopril Itching    Ramipril Itching    Xarelto [Rivaroxaban]      3/9/19 Pt states that she had broke out with a rash, got itchy and had severe side effects (severe itch, headache)    Effexor [Venlafaxine Hydrochloride] Nausea Only    Eliquis [Apixaban] Hives, Itching and Rash     3/9/19 Pt states that she gets hives, itchy and a rash       Prior to Visit Medications    Medication Sig Taking?  Authorizing Provider   colestipol (COLESTID) 1 g tablet Take 1 tablet by mouth 4 times daily Yes Historical Provider, MD   famotidine (PEPCID) 40 MG tablet Take 40 mg by mouth 2 times daily as needed Yes Historical Provider, MD   omeprazole (PRILOSEC) 40 MG delayed release capsule TAKE 1 CAPSULE BY MOUTH IN THE MORNING Yes Historical Provider, MD   hydrOXYzine (ATARAX) 10 MG tablet Take 1 tablet by mouth 3 times daily as needed for Itching Yes Jose Velázquez MD   furosemide (LASIX) 20 MG tablet Take 1 tablet by mouth daily Yes Gurvinder Kimbrough MD   carvedilol (COREG CR) 20 MG CP24 extended release capsule Take 1 capsule by mouth daily Yes Gurvinder Kimbrough MD   dofetilide (TIKOSYN) 250 MCG capsule Take 1 capsule by mouth every 12 hours Yes Santa Tapia DO   rivaroxaban (XARELTO) 20 MG TABS tablet Take 1 tablet by mouth daily (with breakfast) Yes Gurvinder Kimbrough MD   hydrALAZINE (APRESOLINE) 10 MG tablet Take 1 tablet by mouth 3 times daily Yes Gurvinder Kimbrough MD   spironolactone (ALDACTONE) 25 MG tablet Take 1 tablet by mouth daily Yes Gurvinder Kimbrough MD   ibuprofen (ADVIL;MOTRIN) 200 MG tablet Take 200 mg by mouth every 6 hours as needed for Pain Yes Historical Provider, MD   triamcinolone (KENALOG) 0.1 % cream USE TID Yes Historical Provider, MD   loperamide (IMODIUM) 2 MG capsule Take 2 mg by mouth 4 times daily as needed for Diarrhea Yes Historical Provider, MD   diphenhydrAMINE (BENADRYL) 25 MG tablet Take 25 mg by mouth every 6 hours as needed for Itching Yes Historical Provider, MD   vitamin D (CHOLECALCIFEROL) 1000 UNIT TABS tablet Take 1,000 Units by mouth daily Yes Historical Provider, MD   sodium chloride (OCEAN, BABY AYR) 0.65 % nasal spray 1 spray by Nasal route as needed for Congestion Yes Radha Velazquez MD   baclofen (LIORESAL) 20 MG tablet Take 20 mg by mouth 2 times daily as needed (muscle spasms, spastic bladder) Indications: Back Spasm, Bladder Spasm  Yes Historical Provider, MD       Past Medical History:   Diagnosis Date    Anxiety     Arthritis  Atrial [address] [address] [facility]     new onset    Cataract     Cervical radiculopathy     JOHANA Ruby    CHF (congestive heart failure) (HCC)     Chronic migraine without aura, with intractable migraine, so stated, without mention of status migrainosus     JOHANA Ruby    Chronic sinusitis     Disc displacement, lumbar     RNicole Ruby    Hypertension     IBS (irritable bowel syndrome)     Left ventricular systolic dysfunction     Meige syndrome     partial/R. Brokenyaer    Mitral regurgitation     Mild to moderate    Nonischemic cardiomyopathy (HCC)     Osteopenia     TIA (transient ischemic attack)     carotid TIA's    Vertebrobasilar artery syndrome     JOHANA Ruby       Past Surgical History:   Procedure Laterality Date    BLADDER REPAIR      BUNIONECTOMY      CARDIOVASCULAR STRESS TEST  11/25/13    Rt & LT cath    CARDIOVERSION  11/04/2019    Successful CV from AF to NSR   (Dr. Briana Sharpe)    COLONOSCOPY  07/2020    DIAGNOSTIC CARDIAC CATH LAB PROCEDURE  2/20/10    Dr Matthew Triana CATH LAB PROCEDURE  8/24/11    [facility] heart [facility] @ [facility].     DOPPLER ECHOCARDIOGRAPHY  11/25/13    HYSTERECTOMY  1991    OVARY REMOVAL      TRANSESOPHAGEAL ECHOCARDIOGRAM  11/21/2018    Dr. Stephenson Counts include [facility] beds [facility] TRANSESOPHAGEAL ECHOCARDIOGRAM  03/18/2019       Family History   Problem Relation Age of Onset    Heart Attack Mother     Stroke Mother     Heart Attack Father     Heart Failure Father     Heart Disease Father     Anxiety Disorder Sister     Depression Sister     Crohn's Disease Son        CareTeam (Including outside providers/suppliers regularly involved in providing care):   Patient Care Team:  Serge Rubio MD as PCP - General (Family Medicine)  Serge Rubio MD as PCP - [address] [place] [address] [facility]  Gregoria Harvey MD as Consulting Physician (Dermatology)  Gregoria Harvey MD as Consulting Physician (Dermatology)  Dago Camejo MD as Consulting Physician (Cardiology)  Fang Shay MD as Consulting Physician (Cardiology)  Minine Martin DO as Consulting Physician (Electrophysiology)    Wt Readings from Last 3 Encounters:   08/26/20 176 lb (79.8 kg)   07/28/20 177 lb 9.6 oz (80.6 kg)   07/20/20 174 lb (78.9 kg)     Vitals:    08/26/20 1036   BP: 126/68   Pulse: 61   Resp: 16   Temp: 97.5 °F (36.4 °C)   TempSrc: Oral   SpO2: 98%   Weight: 176 lb (79.8 kg)   Height: 5' 6.5\" (1.689 m)     Body mass index is 27.98 kg/m². Cognitive Function:  Based upon direct observation of the patient, evaluation of cognition reveals recent and remote memory intact. Physical Exam:  General Appearance: alert and oriented to person, place and time, well developed and well- nourished, in no acute distress  Skin: warm and dry, no rash or erythema  Head: normocephalic and atraumatic  Eyes: pupils equal, round, and reactive to light, extraocular eye movements intact, conjunctivae normal  ENT: tympanic membrane, external ear and ear canal normal bilaterally, nose without deformity, nasal mucosa and turbinates normal without polyps  Neck: supple and non-tender without mass, no thyromegaly or thyroid nodules, no cervical lymphadenopathy  Pulmonary/Chest: clear to auscultation bilaterally- no wheezes, rales or rhonchi, normal air movement, no respiratory distress  Cardiovascular: normal rate, regular rhythm, normal S1 and S2, no murmurs, rubs, clicks, or gallops, distal pulses intact, no carotid bruits  Abdomen: soft, non-tender, non-distended, normal bowel sounds, no masses or organomegaly  Extremities: no cyanosis, clubbing or edema  Musculoskeletal: normal range of motion, no joint swelling, deformity or tenderness  Neurologic: reflexes normal and symmetric, no cranial nerve deficit, gait, coordination and speech normal    Patient's complete Health Risk Assessment and screening values have been reviewed and are found in Flowsheets.  The following problems were reviewed today and where indicated follow up appointments were made and/or referrals ordered. Positive Risk Factor Screenings with Interventions:     Fall Risk:  2 or more falls in past year?: no  Fall with injury in past year?: (!) yes  Fall Risk Interventions:    · Home safety tips provided  · Patient declines any further evaluation/treatment for this issue    General Health:  General  In general, how would you say your health is?: Fair  In the past 7 days, have you experienced any of the following? New or Increased Pain, New or Increased Fatigue, Loneliness, Social Isolation, Stress or Anger?: (!) New or Increased Fatigue, Social Isolation  Do you get the social and emotional support that you need?: Yes  Do you have a Living Will?: Yes  General Health Risk Interventions:  · Fatigue: regular exercise recommended- 3-5 times per week, 30-45 minutes per session, patient feels that it is mostly anxiety and pandemic issues. She does follow with Cardiology on a regular basis  · Social isolation: patient's comments regarding inadequate social support: pandemic    Health Habits/Nutrition:  Health Habits/Nutrition  Do you exercise for at least 20 minutes 2-3 times per week?: Yes  Have you lost any weight without trying in the past 3 months?: No  Do you eat fewer than 2 meals per day?: No  Have you seen a dentist within the past year?: (!) No  Body mass index is 27.98 kg/m².   Health Habits/Nutrition Interventions:  · Dental exam overdue:  patient encouraged to make appointment with his/her dentist    Hearing/Vision:  No exam data present  Hearing/Vision  Do you or your family notice any trouble with your hearing?: (!) Yes  Do you have difficulty driving, watching TV, or doing any of your daily activities because of your eyesight?: (!) Yes  Have you had an eye exam within the past year?: (!) No  Hearing/Vision Interventions:  · Hearing concerns:  patient declines any further evaluation/treatment for hearing issues  · Vision concerns:  patient

## 2020-08-28 ENCOUNTER — OFFICE VISIT (OUTPATIENT)
Dept: CARDIOLOGY CLINIC | Age: 75
End: 2020-08-28
Payer: MEDICARE

## 2020-08-28 VITALS
BODY MASS INDEX: 28.12 KG/M2 | WEIGHT: 175 LBS | RESPIRATION RATE: 16 BRPM | SYSTOLIC BLOOD PRESSURE: 126 MMHG | DIASTOLIC BLOOD PRESSURE: 70 MMHG | HEIGHT: 66 IN | HEART RATE: 59 BPM | OXYGEN SATURATION: 95 %

## 2020-08-28 PROBLEM — I50.22 CHRONIC SYSTOLIC CONGESTIVE HEART FAILURE (HCC): Status: ACTIVE | Noted: 2019-03-10

## 2020-08-28 PROBLEM — Z79.01 CHRONIC ANTICOAGULATION: Status: ACTIVE | Noted: 2020-08-28

## 2020-08-28 PROCEDURE — 99215 OFFICE O/P EST HI 40 MIN: CPT | Performed by: INTERNAL MEDICINE

## 2020-08-28 PROCEDURE — 93000 ELECTROCARDIOGRAM COMPLETE: CPT | Performed by: INTERNAL MEDICINE

## 2020-08-28 NOTE — PATIENT INSTRUCTIONS
1. JAIME with Dr. Don Grimm  2. We will then schedule the Watchman in a month or so afterwards  3.  Return to see me in 3 months

## 2020-08-28 NOTE — PROGRESS NOTES
[address] [facility]   Heart and Vascular [place]   Clinic Note     Date:8/28/20   Patient Name:Aidee Charles  YOB: 1945  Age: 76 y.o. Primary Care Provider: Julisa Harrison MD    Subjective     This is a 51-year-old  female referred by Dr. Devi Bocanegra for consideration of left atrial appendage closure. She has been in her usual state of health. When she is in atrial fibrillation she can sense the palpitations and she has not felt those since November 2019 cardioversion and being started on dofetilide. She continues to have a significant skin intolerance to rivaroxaban which she manages with antihistamines. She also has an allergy to adhesive tape and will frequently bruise and blister on her arms. She has fallen maybe once or twice in the past 6 months. Most recently she fell about 1 to 2 months ago in her basement and hurt her hand. She does not note any hematuria, hematochezia, melena, hematemesis, hemoptysis. She has no angina and she has no dyspnea on exertion, lower extremity edema, orthopnea, PND, syncope or presyncope. A focused history review includes:  1. Paroxysmal atrial fibrillation status post DC cardioversion 11/2019  1. Maintained on dofetilide and rivaroxaban  2. History of left atrial appendage thrombus in November 2018 and March 2019. Resolved on rivaroxaban. 3. Allergic to rivaroxaban with significant skin reaction that she manages with hydroxyzine  1. Also allergic to dabigatran and apixaban. All the above documented additionally in [facility] clinic notes  2. Nonischemic cardiomyopathy with chronic systolic heart failure (coronary angiogram without significant CAD in 2010)  1. Most recent TTE from July 2019 with a EF 35 to 40% with moderate MR moderate PH  3. Hypertension  4. Microscopic colitis  5.  Numerous medication allergies including ACE inhibitors and ARB's  6. hypercholesterolemia      Past History    Past Medical History:         Diagnosis Date    Anxiety     Arthritis     Atrial fibrillation ([facility] [address]     new onset    Cataract     Cervical radiculopathy     JOHANA Ruby    CHF (congestive heart failure) (HCC)     Chronic migraine without aura, with intractable migraine, so stated, without mention of status migrainosus     JOHANA Ruby    Chronic sinusitis     Disc displacement, lumbar     JOHANA Ruby    Hypertension     IBS (irritable bowel syndrome)     Left ventricular systolic dysfunction     Meige syndrome     partial/RNicole Ruby    Mitral regurgitation     Mild to moderate    Nonischemic cardiomyopathy (HCC)     Osteopenia     TIA (transient ischemic attack)     carotid TIA's    Vertebrobasilar artery syndrome     JOHANA Ruby          Social History:    Social History     Tobacco History     Smoking Status  Never Smoker    Smokeless Tobacco Use  Never Used          Alcohol History     Alcohol Use Status  Yes Drinks/Week  0 Standard drinks or equivalent per week Amount  0.0 standard drinks of alcohol/wk Comment  occasional wine/mixed drink.            Drug Use     Drug Use Status  Never          Sexual Activity     Sexually Active  Not Asked Comment                      Family History:       Problem Relation Age of Onset    Heart Attack Mother     Stroke Mother     Heart Attack Father     Heart Failure Father     Heart Disease Father     Anxiety Disorder Sister     Depression Sister     Crohn's Disease Son          Review of Systems   General ROS: No weight loss fevers or chills  Psychological ROS: No new depression or anxiety or altered mood  Ophthalmic ROS: No new vision changes or diplopia  Respiratory ROS: No cough, wheezing, shortness of breath, or hemoptysis  Cardiovascular ROS: See HPI  Gastrointestinal ROS: No nausea, vomiting, constipation, diarrhea, hematemesis, melena, or hematochezia  Genito-Urinary ROS: No dysuria, hematuria, or new incontinence  Musculoskeletal ROS: No new muscle pain, joint pain, joint swelling, or back pain  Neurological ROS: No new numbness or paresthesias, no focal weakness, no altered speech, no new memory loss  Dermatological ROS: No new rashes, no pruritus, no skin masses        Medications     Current Outpatient Medications   Medication Sig Dispense Refill    colestipol (COLESTID) 1 g tablet Take 1 tablet by mouth 4 times daily      famotidine (PEPCID) 40 MG tablet Take 40 mg by mouth 2 times daily as needed      omeprazole (PRILOSEC) 40 MG delayed release capsule TAKE 1 CAPSULE BY MOUTH IN THE MORNING      hydrOXYzine (ATARAX) 10 MG tablet Take 1 tablet by mouth 3 times daily as needed for Itching 270 tablet 3    furosemide (LASIX) 20 MG tablet Take 1 tablet by mouth daily 90 tablet 3    carvedilol (COREG CR) 20 MG CP24 extended release capsule Take 1 capsule by mouth daily 90 capsule 3    dofetilide (TIKOSYN) 250 MCG capsule Take 1 capsule by mouth every 12 hours 180 capsule 3    rivaroxaban (XARELTO) 20 MG TABS tablet Take 1 tablet by mouth daily (with breakfast) 90 tablet 3    hydrALAZINE (APRESOLINE) 10 MG tablet Take 1 tablet by mouth 3 times daily 270 tablet 3    spironolactone (ALDACTONE) 25 MG tablet Take 1 tablet by mouth daily 90 tablet 3    ibuprofen (ADVIL;MOTRIN) 200 MG tablet Take 200 mg by mouth every 6 hours as needed for Pain      triamcinolone (KENALOG) 0.1 % cream Apply topically 3 times daily as needed   1    loperamide (IMODIUM) 2 MG capsule Take 2 mg by mouth 4 times daily as needed for Diarrhea      diphenhydrAMINE (BENADRYL) 25 MG tablet Take 25 mg by mouth every 6 hours as needed for Itching      vitamin D (CHOLECALCIFEROL) 1000 UNIT TABS tablet Take 1,000 Units by mouth daily      sodium chloride (OCEAN, BABY AYR) 0.65 % nasal spray 1 spray by Nasal route as needed for Congestion 50 mL 0    baclofen (LIORESAL) 20 MG tablet Take 20 mg by mouth 2 times daily as needed (muscle spasms, spastic bladder) Indications: Back Spasm, Bladder Spasm        No current facility-administered medications for this visit. Physical Examination      /70 (Site: Right Upper Arm, Position: Sitting, Cuff Size: Medium Adult)   Pulse 59   Resp 16   Ht 5' 6\" (1.676 m)   Wt 175 lb (79.4 kg)   SpO2 95%   BMI 28.25 kg/m²     General: No acute distress, appears as stated age, nonicteric  Head: Atraumatic, no gross abnormalities or bruises  Neck: Supple and nontender, no carotid bruits, no JVP  Lungs: Clear to auscultation bilaterally, no wheezes, rales, or rhonchi  Heart: Regular rate and rhythm, no murmurs, rubs, or gallops  Abdomen: Soft, nontender, nondistended, normal bowel sounds  Extremities: No obvious deformities, no cyanosis, no edema  Neurological: Alert and oriented x3, EOMI, moving all extremities x4  Psychological: Normal mood and affect, cooperative  Skin: Color, texture, and turgor normal for age          Labs/Imaging/Diagnostics     Lab Results   Component Value Date     11/07/2019    K 4.2 11/07/2019     11/07/2019    CO2 19 11/07/2019    BUN 21 11/07/2019    CREATININE 1.0 11/07/2019    GLUCOSE 96 11/07/2019    GLUCOSE 120 10/12/2011    CALCIUM 9.1 11/07/2019        CrCl cannot be calculated (Patient's most recent lab result is older than the maximum 120 days allowed. ).     Lab Results   Component Value Date    WBC 6.2 11/04/2019    HGB 13.7 11/04/2019    HCT 43.7 11/04/2019    MCV 87.9 11/04/2019     11/04/2019       Lab Results   Component Value Date    ALT 26 03/11/2019    AST 23 03/11/2019    ALKPHOS 120 (H) 03/11/2019    BILITOT 0.9 03/11/2019       Lab Results   Component Value Date    LABALBU 3.4 (L) 03/11/2019       Lab Results   Component Value Date    CHOL 179 11/21/2018    CHOL 204 (H) 11/14/2015    CHOL 235 (H) 07/02/2015     Lab Results   Component Value Date    TRIG 69 11/21/2018    TRIG 71 11/14/2015    TRIG 182 (H) 07/02/2015     Lab Results   Component Value Date    HDL 51 11/21/2018    HDL 85 11/14/2015    HDL 69 07/02/2015     Lab Results   Component Value Date    LDLCALC 114 (H) 11/21/2018    LDLCALC 105 (H) 11/14/2015    LDLCALC 130 (H) 07/02/2015     Lab Results   Component Value Date    LABVLDL 14 11/21/2018    LABVLDL 14 11/14/2015    LABVLDL 36 07/02/2015     No results found for: [facility]    Lab Results   Component Value Date    CKTOTAL 83 11/25/2013    CKMB 6.6 (H) 11/25/2013    TROPONINI <0.01 03/09/2019       Lab Results   Component Value Date    BNP 1,046 (H) 11/24/2013         No results found for: LABA1C  No results found for: EAG     Pertinent Cardiovascular Studies:  EKG: Sinus bradycardia with first-degree AV block. QTC is 489 ms. There is an incomplete left bundle branch block. TTE: I personally reviewed her TTE from July 2019 which reveals a mildly dilated LV with an EF of 40 to 45% and 3+ functional MR. There is additionally at least moderate pulmonary hypertension. Assessment and Plan: This is a pleasant 51-year-old  female with a history summarized above who has paroxysmal atrial fibrillation, currently maintaining sinus rhythm on dofetilide. She has had intolerances to rivaroxaban, apixaban, dabigatran and is not willing to try warfarin. She has not had any major bleeding although she has had some falls and her skin bruises easily due to her skin fragility and allergy to tape. Additionally she has chronic systolic heart failure with severe functional MR. She has a history of left atrial appendage clot. Her [id number] score is 5 and while she has not had any major bleeding she has had significant well-documented allergies to most oral anticoagulants. She is also at increased risk of bleeding on oral anticoagulation due to her falls and skin fragility. She is interested in pursuing left atrial appendage closure with Watchman device to reduce her risk of stroke whilst eliminating the need for oral anticoagulation.   While this is not the classical indication I do believe that if she is a good candidate anatomically that she has a reasonable indication to pursue LAAC. I discussed with her at length the indications for left atrial appendage closure using Watchman. I explained the possible risks including death, MI, stroke, device related thrombus, device embolization, cardiac tamponade. I also discussed the alternatives including continuing oral anticoagulation. I discussed the benefits which are reducing the risk of ischemic stroke essentially equally to oral anticoagulation whilst eliminating the risk of hemorrhagic stroke without the need for chronic oral anticoagulation. She agrees to proceed. Diagnosis Orders   1. PAF (paroxysmal atrial fibrillation) ([facility])  EKG 12 Lead   2. Thrombus of left atrial appendage  Echo transesophageal (JAIME)   3. Chronic systolic congestive heart failure ([address]     4. HTN (hypertension), benign     5. Severe mitral regurgitation     6. Nonischemic cardiomyopathy ([address]     7. Chronic anticoagulation        · JAIME for Watchman sizing and to rule out left atrial appendage clot. To be performed by Dr. Ernst Heaton  · If no anatomic contraindications including no left atrial appendage thrombus then we will proceed with scheduling her procedure as soon as we are able logistically. She will continue rivaroxaban in the meantime  ·   · Otherwise continue her excellent cardiac care by Dr. Wally Cristina. If she continues to have severe mitral regurgitation despite maximum tolerated medical therapy for chronic systolic heart failure then she may be considered for transcatheter [id number] mitral valve repair with MitraClip. ·      Follow up with me in 3 months    We appreciate the opportunity to participate in Her care!       Sanjana Sweeney MD  Interventional Cardiology/Structural Heart Disease  Cell: [phone]

## 2020-09-01 ENCOUNTER — TELEPHONE (OUTPATIENT)
Dept: CARDIOLOGY CLINIC | Age: 75
End: 2020-09-01

## 2020-09-01 NOTE — TELEPHONE ENCOUNTER
Called and set JAIME with Mary at [address]  9 9 12pm with DR William   (DR Reji Almanza ordered)   Faxed forms to [phone] [facility]. Medicare primary NO PA needed. Called Claudie Krabbe provided instructions verbally and also via email. No questions  Sheltering in place, Covid testing scheduled. To wear mask to hospital. Son will bring her.      Bridgett Salazar RN

## 2020-09-03 ENCOUNTER — HOSPITAL ENCOUNTER (OUTPATIENT)
Age: 75
Discharge: HOME | End: 2020-09-03
Payer: MEDICARE

## 2020-09-03 ENCOUNTER — HOSPITAL ENCOUNTER (OUTPATIENT)
Age: 75
Discharge: HOME | End: 2020-09-05
Payer: MEDICARE

## 2020-09-03 LAB
ALBUMIN SERPL-MCNC: 3.9 G/DL (ref 3.5–5.2)
ALP BLD-CCNC: 138 U/L (ref 35–104)
ALT SERPL-CCNC: 20 U/L (ref 0–32)
ANION GAP SERPL CALCULATED.3IONS-SCNC: 12 MMOL/L (ref 7–16)
APTT: 37.6 SEC (ref 24.5–35.1)
AST SERPL-CCNC: 24 U/L (ref 0–31)
BASOPHILS ABSOLUTE: 0.04 E9/L (ref 0–0.2)
BASOPHILS RELATIVE PERCENT: 0.5 % (ref 0–2)
BILIRUB SERPL-MCNC: 0.6 MG/DL (ref 0–1.2)
BUN BLDV-MCNC: 15 MG/DL (ref 8–23)
CALCIUM SERPL-MCNC: 9.5 MG/DL (ref 8.6–10.2)
CHLORIDE BLD-SCNC: 105 MMOL/L (ref 98–107)
CO2: 22 MMOL/L (ref 22–29)
CREAT SERPL-MCNC: 1 MG/DL (ref 0.5–1)
EOSINOPHILS ABSOLUTE: 0.16 E9/L (ref 0.05–0.5)
EOSINOPHILS RELATIVE PERCENT: 2.1 % (ref 0–6)
GFR AFRICAN AMERICAN: >60
GFR NON-AFRICAN AMERICAN: 54 ML/MIN/1.73
GLUCOSE BLD-MCNC: 112 MG/DL (ref 74–99)
HCT VFR BLD CALC: 42.3 % (ref 34–48)
HEMOGLOBIN: 13.6 G/DL (ref 11.5–15.5)
IMMATURE GRANULOCYTES #: 0.02 E9/L
IMMATURE GRANULOCYTES %: 0.3 % (ref 0–5)
INR BLD: 1.4
LYMPHOCYTES ABSOLUTE: 1.05 E9/L (ref 1.5–4)
LYMPHOCYTES RELATIVE PERCENT: 13.8 % (ref 20–42)
MCH RBC QN AUTO: 28.1 PG (ref 26–35)
MCHC RBC AUTO-ENTMCNC: 32.2 % (ref 32–34.5)
MCV RBC AUTO: 87.4 FL (ref 80–99.9)
MONOCYTES ABSOLUTE: 0.47 E9/L (ref 0.1–0.95)
MONOCYTES RELATIVE PERCENT: 6.2 % (ref 2–12)
NEUTROPHILS ABSOLUTE: 5.86 E9/L (ref 1.8–7.3)
NEUTROPHILS RELATIVE PERCENT: 77.1 % (ref 43–80)
PDW BLD-RTO: 12.9 FL (ref 11.5–15)
PLATELET # BLD: 264 E9/L (ref 130–450)
PMV BLD AUTO: 10.7 FL (ref 7–12)
POTASSIUM SERPL-SCNC: 4.3 MMOL/L (ref 3.5–5)
PROTHROMBIN TIME: 16.3 SEC (ref 9.3–12.4)
RBC # BLD: 4.84 E12/L (ref 3.5–5.5)
SODIUM BLD-SCNC: 139 MMOL/L (ref 132–146)
TOTAL PROTEIN: 7.6 G/DL (ref 6.4–8.3)
WBC # BLD: 7.6 E9/L (ref 4.5–11.5)

## 2020-09-03 PROCEDURE — 85610 PROTHROMBIN TIME: CPT

## 2020-09-03 PROCEDURE — 80053 COMPREHEN METABOLIC PANEL: CPT

## 2020-09-03 PROCEDURE — 85730 THROMBOPLASTIN TIME PARTIAL: CPT

## 2020-09-03 PROCEDURE — 85025 COMPLETE CBC W/AUTO DIFF WBC: CPT

## 2020-09-03 PROCEDURE — U0003 INFECTIOUS AGENT DETECTION BY NUCLEIC ACID (DNA OR RNA); SEVERE ACUTE RESPIRATORY SYNDROME CORONAVIRUS 2 (SARS-COV-2) (CORONAVIRUS DISEASE [COVID-19]), AMPLIFIED PROBE TECHNIQUE, MAKING USE OF HIGH THROUGHPUT TECHNOLOGIES AS DESCRIBED BY CMS-2020-01-R: HCPCS

## 2020-09-03 PROCEDURE — 36415 COLL VENOUS BLD VENIPUNCTURE: CPT

## 2020-09-08 ENCOUNTER — TELEPHONE (OUTPATIENT)
Dept: CARDIOLOGY CLINIC | Age: 75
End: 2020-09-08

## 2020-09-08 ENCOUNTER — ANESTHESIA EVENT (OUTPATIENT)
Dept: NON INVASIVE DIAGNOSTICS | Age: 75
End: 2020-09-08

## 2020-09-09 ENCOUNTER — ANESTHESIA (OUTPATIENT)
Dept: NON INVASIVE DIAGNOSTICS | Age: 75
End: 2020-09-09

## 2020-09-09 ENCOUNTER — HOSPITAL ENCOUNTER (OUTPATIENT)
Dept: NON INVASIVE DIAGNOSTICS | Age: 75
Discharge: HOME | End: 2020-09-09
Payer: MEDICARE

## 2020-09-09 VITALS
TEMPERATURE: 96.8 F | SYSTOLIC BLOOD PRESSURE: 132 MMHG | DIASTOLIC BLOOD PRESSURE: 62 MMHG | WEIGHT: 170 LBS | HEART RATE: 67 BPM | OXYGEN SATURATION: 98 % | RESPIRATION RATE: 16 BRPM | HEIGHT: 67 IN | BODY MASS INDEX: 26.68 KG/M2

## 2020-09-09 VITALS
DIASTOLIC BLOOD PRESSURE: 39 MMHG | OXYGEN SATURATION: 99 % | SYSTOLIC BLOOD PRESSURE: 82 MMHG | RESPIRATION RATE: 26 BRPM

## 2020-09-09 LAB
LV EF: 45 %
LVEF MODALITY: NORMAL
SARS-COV-2, NAAT: NOT DETECTED
SARS-COV-2: NOT DETECTED
SOURCE: NORMAL

## 2020-09-09 PROCEDURE — 93320 DOPPLER ECHO COMPLETE: CPT

## 2020-09-09 PROCEDURE — 7100000010 HC PHASE II RECOVERY - FIRST 15 MIN

## 2020-09-09 PROCEDURE — 7100000011 HC PHASE II RECOVERY - ADDTL 15 MIN

## 2020-09-09 PROCEDURE — 6360000002 HC RX W HCPCS: Performed by: NURSE ANESTHETIST, CERTIFIED REGISTERED

## 2020-09-09 PROCEDURE — 2580000003 HC RX 258: Performed by: NURSE ANESTHETIST, CERTIFIED REGISTERED

## 2020-09-09 PROCEDURE — 3700000001 HC ADD 15 MINUTES (ANESTHESIA)

## 2020-09-09 PROCEDURE — 93325 DOPPLER ECHO COLOR FLOW MAPG: CPT

## 2020-09-09 PROCEDURE — U0002 COVID-19 LAB TEST NON-CDC: HCPCS

## 2020-09-09 PROCEDURE — 93312 ECHO TRANSESOPHAGEAL: CPT

## 2020-09-09 PROCEDURE — 93312 ECHO TRANSESOPHAGEAL: CPT | Performed by: INTERNAL MEDICINE

## 2020-09-09 PROCEDURE — 3700000000 HC ANESTHESIA ATTENDED CARE

## 2020-09-09 PROCEDURE — 93325 DOPPLER ECHO COLOR FLOW MAPG: CPT | Performed by: INTERNAL MEDICINE

## 2020-09-09 RX ORDER — PROPOFOL 10 MG/ML
INJECTION, EMULSION INTRAVENOUS PRN
Status: DISCONTINUED | OUTPATIENT
Start: 2020-09-09 | End: 2020-09-09 | Stop reason: SDUPTHER

## 2020-09-09 RX ORDER — SODIUM CHLORIDE 0.9 % (FLUSH) 0.9 %
10 SYRINGE (ML) INJECTION PRN
Status: DISCONTINUED | OUTPATIENT
Start: 2020-09-09 | End: 2020-09-10 | Stop reason: HOSPADM

## 2020-09-09 RX ORDER — SODIUM CHLORIDE 9 MG/ML
INJECTION, SOLUTION INTRAVENOUS CONTINUOUS PRN
Status: DISCONTINUED | OUTPATIENT
Start: 2020-09-09 | End: 2020-09-09 | Stop reason: SDUPTHER

## 2020-09-09 RX ADMIN — SODIUM CHLORIDE: 9 INJECTION, SOLUTION INTRAVENOUS at 12:11

## 2020-09-09 RX ADMIN — PROPOFOL 200 MG: 10 INJECTION, EMULSION INTRAVENOUS at 12:32

## 2020-09-09 NOTE — ANESTHESIA PRE PROCEDURE
Department of Anesthesiology  Preprocedure Note       Name:  John Hess   Age:  76 y.o.  :  1945                                          MRN:  [id number]         Date:  2020      Surgeon: Dr. Zoey Evangelista    Procedure: TRANSESOPHAGEAL ECHO    Vital Signs (Current)   Vitals:    20 1030   BP: (!) 152/69   Pulse: 59   Resp: 18   Temp: 97.8 °F (36.6 °C)   SpO2: 96%     Vital Signs Statistics (for past 48 hrs)     Temp  Av.8 °F (36.6 °C)  Min: 97.8 °F (36.6 °C)   Min taken time: 20 1030  Max: 97.8 °F (36.6 °C)   Max taken time: 20 1030  Pulse  Av  Min: 61   Min taken time: 20 1030  Max: 61   Max taken time: 20 1030  Resp  Av  Min: 18   Min taken time: 20 1030  Max: 18   Max taken time: 20 1030  BP  Min: 152/69   Min taken time: 20 1030  Max: 152/69   Max taken time: 20 1030  SpO2  Av %  Min: 96 %   Min taken time: 20 1030  Max: 96 %   Max taken time: 20 1030    BP Readings from Last 3 Encounters:   20 (!) 152/69   20 126/70   20 126/68     BMI  Body mass index is 27.03 kg/m². Estimated body mass index is 27.03 kg/m² as calculated from the following:    Height as of this encounter: 5' 6.5\" (1.689 m). Weight as of this encounter: 170 lb (77.1 kg).     CBC   Lab Results   Component Value Date    WBC 7.6 2020    RBC 4.84 2020    HGB 13.6 2020    HCT 42.3 2020    MCV 87.4 2020    RDW 12.9 2020     2020     CMP    Lab Results   Component Value Date     2020    K 4.3 2020    K 4.2 2019     2020    CO2 22 2020    BUN 15 2020    CREATININE 1.0 2020    GFRAA >60 2020    LABGLOM 54 2020    GLUCOSE 112 2020    GLUCOSE 120 10/12/2011    PROT 7.6 2020    CALCIUM 9.5 2020    BILITOT 0.6 2020    ALKPHOS 138 2020    AST 24 2020    ALT 20 2020     BMP    Lab Results Component Value Date     09/03/2020    K 4.3 09/03/2020    K 4.2 11/07/2019     09/03/2020    CO2 22 09/03/2020    BUN 15 09/03/2020    CREATININE 1.0 09/03/2020    CALCIUM 9.5 09/03/2020    GFRAA >60 09/03/2020    LABGLOM 54 09/03/2020    GLUCOSE 112 09/03/2020    GLUCOSE 120 10/12/2011     POCGlucose  No results for input(s): GLUCOSE in the last 72 hours. Coags    Lab Results   Component Value Date    PROTIME 16.3 09/03/2020    PROTIME 12.0 12/17/2012    INR 1.4 09/03/2020    APTT 37.6 09/21/7614     HCG (If Applicable) No results found for: PREGTESTUR, PREGSERUM, HCG, HCGQUANT   ABGs No results found for: PHART, PO2ART, JOR8ZIH, MGR6QJZ, BEART, M3QMUKDQ   Type & Screen (If Applicable)  No results found for: [address]  Radiology (If Applicable)  Cardiac Testing (If Applicable)   Summary   Limited Echo for LV function.   Left ventricular chamber size is normal.   Mild to moderate global hypokinesis, LV EF estimated about 35-40%.   Moderate mitral regurgitation is present.   Moderate Pulmonary HTN.   Compared to prior studies LV EF is improved. (EF 25-30% by JAIME on 5/6/2019   and 25-30% by Echo on 3/18/19)      Signature      ----------------------------------------------------------------   Electronically signed by Manan Kamara MD(Interpreting   physician) on 07/10/2019 08:01 AM    EKG (If Applicable)   Atrial fibrillation with rapid ventricular response  Rightward axis  Low voltage QRS  Cannot rule out Anterior infarct , age undetermined  ST & T wave abnormality, consider inferior ischemia  Abnormal ECG  When compared with ECG of 06-MAY-2019 09:09,  Significant changes have occurred    Medications prior to admission:   Prior to Admission medications    Medication Sig Start Date End Date Taking?  Authorizing Provider   colestipol (COLESTID) 1 g tablet Take 1 tablet by mouth 4 times daily 8/12/20  Yes Historical Provider, MD   famotidine (PEPCID) 40 MG tablet Take 40 mg by mouth 2 times daily    Yes Historical Provider, MD   omeprazole (PRILOSEC) 40 MG delayed release capsule TAKE 1 CAPSULE BY MOUTH IN THE MORNING 7/2/20  Yes Historical Provider, MD   hydrOXYzine (ATARAX) 10 MG tablet Take 1 tablet by mouth 3 times daily as needed for Itching 7/20/20  Yes Robyn Hernandez MD   furosemide (LASIX) 20 MG tablet Take 1 tablet by mouth daily 4/20/20  Yes Manan Kamara MD   carvedilol (COREG CR) 20 MG CP24 extended release capsule Take 1 capsule by mouth daily 3/31/20  Yes Manan Kamara MD   dofetilide [facility]) 250 MCG capsule Take 1 capsule by mouth every 12 hours 1/13/20  Yes Donya Bruce DO   rivaroxaban (XARELTO) 20 MG TABS tablet Take 1 tablet by mouth daily (with breakfast) 12/18/19  Yes Manan Kamara MD   hydrALAZINE (APRESOLINE) 10 MG tablet Take 1 tablet by mouth 3 times daily 12/12/19  Yes Manan Kamara MD   spironolactone (ALDACTONE) 25 MG tablet Take 1 tablet by mouth daily 11/18/19  Yes Manan Kamara MD   ibuprofen (ADVIL;MOTRIN) 200 MG tablet Take 200 mg by mouth every 6 hours as needed for Pain   Yes Historical Provider, MD   triamcinolone (KENALOG) 0.1 % cream Apply topically 3 times daily as needed  4/5/19  Yes Historical Provider, MD   loperamide (IMODIUM) 2 MG capsule Take 2 mg by mouth 4 times daily as needed for Diarrhea   Yes Historical Provider, MD   diphenhydrAMINE (BENADRYL) 25 MG tablet Take 25 mg by mouth every 6 hours as needed for Itching   Yes Historical Provider, MD   sodium chloride (OCEAN, BABY AYR) 0.65 % nasal spray 1 spray by Nasal route as needed for Congestion 12/18/16  Yes Kyle Soria MD   baclofen (LIORESAL) 20 MG tablet Take 20 mg by mouth 2 times daily as needed (muscle spasms, spastic bladder) Indications: Back Spasm, Bladder Spasm    Yes Historical Provider, MD   vitamin D (CHOLECALCIFEROL) 1000 UNIT TABS tablet Take 1,000 Units by mouth daily    Historical Provider, MD       Current medications:    Current Outpatient Medications Medication Sig Dispense Refill    colestipol (COLESTID) 1 g tablet Take 1 tablet by mouth 4 times daily      famotidine (PEPCID) 40 MG tablet Take 40 mg by mouth 2 times daily       omeprazole (PRILOSEC) 40 MG delayed release capsule TAKE 1 CAPSULE BY MOUTH IN THE MORNING      hydrOXYzine (ATARAX) 10 MG tablet Take 1 tablet by mouth 3 times daily as needed for Itching 270 tablet 3    furosemide (LASIX) 20 MG tablet Take 1 tablet by mouth daily 90 tablet 3    carvedilol (COREG CR) 20 MG CP24 extended release capsule Take 1 capsule by mouth daily 90 capsule 3    dofetilide (TIKOSYN) 250 MCG capsule Take 1 capsule by mouth every 12 hours 180 capsule 3    rivaroxaban (XARELTO) 20 MG TABS tablet Take 1 tablet by mouth daily (with breakfast) 90 tablet 3    hydrALAZINE (APRESOLINE) 10 MG tablet Take 1 tablet by mouth 3 times daily 270 tablet 3    spironolactone (ALDACTONE) 25 MG tablet Take 1 tablet by mouth daily 90 tablet 3    ibuprofen (ADVIL;MOTRIN) 200 MG tablet Take 200 mg by mouth every 6 hours as needed for Pain      triamcinolone (KENALOG) 0.1 % cream Apply topically 3 times daily as needed   1    loperamide (IMODIUM) 2 MG capsule Take 2 mg by mouth 4 times daily as needed for Diarrhea      diphenhydrAMINE (BENADRYL) 25 MG tablet Take 25 mg by mouth every 6 hours as needed for Itching      sodium chloride (OCEAN, BABY AYR) 0.65 % nasal spray 1 spray by Nasal route as needed for Congestion 50 mL 0    baclofen (LIORESAL) 20 MG tablet Take 20 mg by mouth 2 times daily as needed (muscle spasms, spastic bladder) Indications: Back Spasm, Bladder Spasm       vitamin D (CHOLECALCIFEROL) 1000 UNIT TABS tablet Take 1,000 Units by mouth daily       Current Facility-Administered Medications   Medication Dose Route Frequency Provider Last Rate Last Dose    sodium chloride flush 0.9 % injection 10 mL  10 mL Intravenous PRN Gavino Bowling MD           Allergies:     Allergies   Allergen Reactions    Atacand [Candesartan] Hives and Itching    Adhesive Tape Itching     develops rash and itching with EKG electrodes    Digoxin Itching     developed itching and rash    Losartan Potassium Itching    Shellfish-Derived Products Itching     3/9/19 Pt states she had a lobster pizza and had difficulty breathing, started to sweat and was itchy    Sulfa Antibiotics Diarrhea and Other (See Comments)     Also causes migraines  caused migraines and diarrhea    Acetaminophen Hives and Itching    Apap-Caff-Dihydrocodeine Hives and Itching    Coreg [Carvedilol] Itching     Can take extended release Coreg    Cozaar [Losartan] Itching    Demerol      3/9/19 Pt states she gets a severe migraine    Diovan [Valsartan] Itching    Imdur [Isosorbide Mononitrate]      Severe migraines    Labetalol Itching    Lisinopril Itching    Ramipril Itching    Xarelto [Rivaroxaban]      3/9/19 Pt states that she had broke out with a rash, got itchy and had severe side effects (severe itch, headache)    Effexor [Venlafaxine Hydrochloride] Nausea Only    Eliquis [Apixaban] Hives, Itching and Rash     3/9/19 Pt states that she gets hives, itchy and a rash       Problem List:    Patient Active Problem List   Diagnosis Code    Anxiety F41.9    Nonischemic cardiomyopathy ([facility] [address] I42.8    Severe mitral regurgitation I34.0    Lumbar radiculopathy M54.16    Cervical radiculopathy M54.12    HTN (hypertension), benign I10    Thrombus of left atrial appendage I51.3    PAF (paroxysmal atrial fibrillation) ([facility]) I48.0    Chronic systolic congestive heart failure ([facility] [address] I50.22    Visit for monitoring Tikosyn therapy Z51.81, Z79.899    Encounter for medication refill Z76.0    Itching L29.9    Chronic anticoagulation Z79.01       Past Medical History:        Diagnosis Date    Anxiety     Arthritis     Atrial fibrillation ([facility] [address]     new onset    Cervical radiculopathy     JOHANA Ruby    CHF (congestive heart failure) ([facility])     Chronic migraine without aura, with intractable migraine, so stated, without mention of status migrainosus     RNicole Ruby    Chronic sinusitis     Disc displacement, lumbar     R. Flori    Hypertension     IBS (irritable bowel syndrome)     Left ventricular systolic dysfunction     Meige syndrome     partial/R. Brocker    Mitral regurgitation     Mild to moderate    Nonischemic cardiomyopathy (HCC)     Osteopenia     TIA (transient ischemic attack)     carotid TIA's    Vertebrobasilar artery syndrome     RNicole Ruby       Past Surgical History:        Procedure Laterality Date    BLADDER REPAIR      BUNIONECTOMY      CARDIOVASCULAR STRESS TEST  11/25/13    Rt & LT cath    CARDIOVERSION  11/04/2019    Successful CV from AF to NSR   (Dr. Griselda Spivey)    COLONOSCOPY  07/2020    DIAGNOSTIC CARDIAC CATH LAB PROCEDURE  2/20/10    Dr Loree Richardson CATH LAB PROCEDURE  8/24/11    [facility].  DOPPLER ECHOCARDIOGRAPHY  11/25/13    HYSTERECTOMY  1991    OVARY REMOVAL      TRANSESOPHAGEAL ECHOCARDIOGRAM  11/21/2018    Dr. Nathan Delgado    TRANSESOPHAGEAL ECHOCARDIOGRAM  03/18/2019    WISDOM TOOTH EXTRACTION         Social History:    Social History     Tobacco Use    Smoking status: Never Smoker    Smokeless tobacco: Never Used   Substance Use Topics    Alcohol use: Yes     Alcohol/week: 0.0 standard drinks     Frequency: Monthly or less     Drinks per session: 1 or 2     Binge frequency: Never     Comment: occasional wine/mixed drink.                                  Counseling given: Not Answered      Vital Signs (Current):   Vitals:    09/09/20 1030   BP: (!) 152/69   Pulse: 59   Resp: 18   Temp: 97.8 °F (36.6 °C)   TempSrc: Temporal   SpO2: 96%   Weight: 170 lb (77.1 kg)   Height: 5' 6.5\" (1.689 m)                                              BP Readings from Last 3 Encounters:   09/09/20 (!) 152/69   08/28/20 126/70   08/26/20 126/68       NPO Status: Time of last liquid consumption: 1900                        Time of last solid consumption: 1900                        Date of last liquid consumption: 09/08/20                        Date of last solid food consumption: 09/08/20    BMI:   Wt Readings from Last 3 Encounters:   09/09/20 170 lb (77.1 kg)   09/03/20 174 lb (78.9 kg)   08/28/20 175 lb (79.4 kg)     Body mass index is 27.03 kg/m². CBC:   Lab Results   Component Value Date    WBC 7.6 09/03/2020    RBC 4.84 09/03/2020    HGB 13.6 09/03/2020    HCT 42.3 09/03/2020    MCV 87.4 09/03/2020    RDW 12.9 09/03/2020     09/03/2020       CMP:   Lab Results   Component Value Date     09/03/2020    K 4.3 09/03/2020    K 4.2 11/07/2019     09/03/2020    CO2 22 09/03/2020    BUN 15 09/03/2020    CREATININE 1.0 09/03/2020    GFRAA >60 09/03/2020    LABGLOM 54 09/03/2020    GLUCOSE 112 09/03/2020    GLUCOSE 120 10/12/2011    PROT 7.6 09/03/2020    CALCIUM 9.5 09/03/2020    BILITOT 0.6 09/03/2020    ALKPHOS 138 09/03/2020    AST 24 09/03/2020    ALT 20 09/03/2020       POC Tests: No results for input(s): POCGLU, POCNA, POCK, POCCL, POCBUN, POCHEMO, POCHCT in the last 72 hours. Coags:   Lab Results   Component Value Date    PROTIME 16.3 09/03/2020    PROTIME 12.0 12/17/2012    INR 1.4 09/03/2020    APTT 37.6 09/03/2020       HCG (If Applicable): No results found for: PREGTESTUR, PREGSERUM, HCG, HCGQUANT     ABGs: No results found for: PHART, PO2ART, BXC4WTZ, IGM1KZX, BEART, I0BCZRNB     Type & Screen (If Applicable):  No results found for: LABABO, [address]    Anesthesia Evaluation  Patient summary reviewed and Nursing notes reviewed no history of anesthetic complications:   Airway: Mallampati: III  TM distance: <3 FB   Neck ROM: limited  Mouth opening: < 3 FB Dental:    (+) caps  Comment: Pt confirms 2 upper R chipped teeth and no loose teeth, no dentures.     Pulmonary:   (+) decreased breath sounds,                             Cardiovascular:    (+) hypertension:, valvular problems/murmurs: MR, dysrhythmias: atrial fibrillation, CHF:,       ECG reviewed  Rhythm: irregular  Rate: normal  Echocardiogram reviewed  Stress test reviewed       Beta Blocker:  Not on Beta Blocker      ROS comment: Nonischemic cardiomyopathy     Neuro/Psych:   (+) neuromuscular disease:, TIA, headaches: migraine headaches, depression/anxiety              ROS comment: Lumbar disc displacement  Cervical radiculopathy  Meige syndrome  Vertebrobasilar artery syndrome GI/Hepatic/Renal:   (+) GERD:,           Endo/Other:    (+) blood dyscrasia: anticoagulation therapy:., .                  ROS comment: osteopenia Abdominal:           Vascular:           ROS comment: JAIME to R/O blood clot. .                               Anesthesia Plan      MAC     ASA 4     (Pt agrees to [facility] ATHENS and IV sedation.)  Induction: intravenous. Anesthetic plan and risks discussed with patient. Plan discussed with CRNA.     Attending anesthesiologist reviewed and agrees with Pre Eval content            rBigitte Nunn MD   9/9/2020

## 2020-09-09 NOTE — PROCEDURES
PRELIMINARY TRANSESOPHAGEAL ECHOCARDIOGRAPHY REPORT    Cardiologist: Dr. Shola Wesley    Date of Procedure: 9/9/2020    Indications for study:  Atrial fibrillation, Pre-WATCHMAN    Study performed using (Sedation): Per anesthesia    Complications: None    Preliminary findings:  +SEC / no CHERYL thrombus  CHERYL ostium measurements / CHERYL ostium to CHERYL apex measurements will be in final report    Bouchra Cantu MD  [facility]) Cardiology

## 2020-09-10 NOTE — ANESTHESIA POSTPROCEDURE EVALUATION
Department of Anesthesiology  Postprocedure Note    Patient: Aspen Hickman  MRN: [id number]  YOB: 1945  Date of evaluation: 9/10/2020  Time:  6:58 AM     Procedure Summary     Date:  09/09/20 Room / Location:  [facility] Echocardiography    Anesthesia Start:  1211 Anesthesia Stop:  1244    Procedure:  ECHOCARDIOGRAM TRANSESOPHAGEAL Diagnosis: Thrombus of left atrial appendage      Intracardiac thrombosis, not elsewhere classified      Nonrheumatic mitral (valve) insufficiency      Paroxysmal atrial fibrillation      (h/o CHERYL thrombus; work-up for Watchman)    Scheduled Providers:   Responsible Provider:  Yeison Medina MD    Anesthesia Type:  MAC ASA Status:  4          Anesthesia Type: MAC    Joseph Phase I: Joseph Score: 10    Joseph Phase II: Joseph Score: 10    Last vitals: Reviewed and per EMR flowsheets.        Anesthesia Post Evaluation    Patient location during evaluation: PACU  Level of consciousness: awake  Airway patency: patent  Nausea & Vomiting: no nausea and no vomiting  Complications: no  Cardiovascular status: hemodynamically stable  Respiratory status: acceptable

## 2020-10-02 ENCOUNTER — OFFICE VISIT (OUTPATIENT)
Dept: CARDIOLOGY CLINIC | Age: 75
End: 2020-10-02
Payer: MEDICARE

## 2020-10-02 VITALS
DIASTOLIC BLOOD PRESSURE: 70 MMHG | WEIGHT: 176.4 LBS | RESPIRATION RATE: 16 BRPM | HEART RATE: 61 BPM | BODY MASS INDEX: 28.35 KG/M2 | HEIGHT: 66 IN | OXYGEN SATURATION: 98 % | SYSTOLIC BLOOD PRESSURE: 118 MMHG

## 2020-10-02 PROCEDURE — 93000 ELECTROCARDIOGRAM COMPLETE: CPT | Performed by: INTERNAL MEDICINE

## 2020-10-02 PROCEDURE — 99214 OFFICE O/P EST MOD 30 MIN: CPT | Performed by: INTERNAL MEDICINE

## 2020-10-02 NOTE — PROGRESS NOTES
[address] [facility]   Heart and V[facility] [place]   Clinic Note     Date:10/2/20   Patient Name:Aidee Charles  YOB: 1945  Age: 76 y.o. Primary Care Provider: Dorisann Hodgkin, MD    Subjective     This is a 51-year-old  female referred by Dr. Maverick Pantoja for consideration of left atrial appendage closure. She has been in her usual state of health. When she is in atrial fibrillation she can sense the palpitations and she has not felt those since November 2019 cardioversion and being started on dofetilide. She continues to have a significant skin intolerance to rivaroxaban which she manages with antihistamines. She also has an allergy to adhesive tape and will frequently bruise and blister on her arms. She has fallen maybe once or twice in the past 6 months. Most recently she fell about  2 months ago in her basement and hurt her hand. She does not note any hematuria, hematochezia, melena, hematemesis, hemoptysis. She has no angina and she has no dyspnea on exertion, lower extremity edema, orthopnea, PND, syncope or presyncope. A focused history review includes:  1. Paroxysmal atrial fibrillation status post DC cardioversion 11/2019  1. Maintained on dofetilide and rivaroxaban  2. History of left atrial appendage thrombus in November 2018 and March 2019. Resolved on rivaroxaban. 3. Allergic to rivaroxaban with significant skin reaction that she manages with hydroxyzine  1. Also allergic to dabigatran and apixaban. All the above documented additionally in [place] clinic notes  2. Nonischemic cardiomyopathy with chronic systolic heart failure (coronary angiogram without significant CAD in 2010)  1. Most recent TTE from July 2019 with a EF 35 to 40% with moderate MR moderate PH  2. JAIME dated 9/9/2020 personally reviewed: Mildly reduced LV systolic function with an EF 45%. Normal RV size and systolic function. Moderate MR. Mild AR. Mild TR. Moderate pulmonary hypertension. No CHERYL thrombus. CHERYL anatomy amenable to watchman. 3. Hypertension  4. Microscopic colitis  5. Numerous medication allergies including ACE inhibitors and ARB's  6. hypercholesterolemia      Past History    Past Medical History:         Diagnosis Date    Anxiety     Arthritis     Atrial fibrillation ([facility] [address]     new onset    Cervical radiculopathy     JOHANA Ruby    CHF (congestive heart failure) (HCC)     Chronic migraine without aura, with intractable migraine, so stated, without mention of status migrainosus     JOHANA Ruby    Chronic sinusitis     Disc displacement, lumbar     JOHANA Ruby    Hypertension     IBS (irritable bowel syndrome)     Left ventricular systolic dysfunction     Meige syndrome     partial/JOHANA Ruby    Microscopic colitis     Mitral regurgitation     Mild to moderate    Nonischemic cardiomyopathy (HCC)     Osteopenia     TIA (transient ischemic attack)     carotid TIA's    Vertebrobasilar artery syndrome     JOHANA Ruby          Social History:    Social History     Tobacco History     Smoking Status  Never Smoker    Smokeless Tobacco Use  Never Used          Alcohol History     Alcohol Use Status  Yes Drinks/Week  0 Standard drinks or equivalent per week Amount  0.0 standard drinks of alcohol/wk Comment  occasional wine/mixed drink.            Drug Use     Drug Use Status  Never          Sexual Activity     Sexually Active  Not Asked Comment                      Family History:       Problem Relation Age of Onset    Heart Attack Mother     Stroke Mother     Heart Attack Father     Heart Failure Father     Heart Disease Father     Anxiety Disorder Sister     Depression Sister     Crohn's Disease Son          Review of Systems   General ROS: No weight loss fevers or chills  Psychological ROS: No new depression or anxiety or altered mood  Ophthalmic ROS: No new vision changes or diplopia  Respiratory ROS: No cough, wheezing, shortness of breath, or hemoptysis  Cardiovascular ROS: See HPI  Gastrointestinal ROS: No nausea, vomiting, constipation, diarrhea, hematemesis, melena, or hematochezia  Genito-Urinary ROS: No dysuria, hematuria, or new incontinence  Musculoskeletal ROS: No new muscle pain, joint pain, joint swelling, or back pain  Neurological ROS: No new numbness or paresthesias, no focal weakness, no altered speech, no new memory loss  Dermatological ROS: No new rashes, no pruritus, no skin masses        Medications     Current Outpatient Medications   Medication Sig Dispense Refill    colestipol (COLESTID) 1 g tablet Take 1 tablet by mouth 4 times daily      famotidine (PEPCID) 40 MG tablet Take 40 mg by mouth 2 times daily       omeprazole (PRILOSEC) 40 MG delayed release capsule TAKE 1 CAPSULE BY MOUTH IN THE MORNING      hydrOXYzine (ATARAX) 10 MG tablet Take 1 tablet by mouth 3 times daily as needed for Itching 270 tablet 3    furosemide (LASIX) 20 MG tablet Take 1 tablet by mouth daily 90 tablet 3    carvedilol (COREG CR) 20 MG CP24 extended release capsule Take 1 capsule by mouth daily 90 capsule 3    dofetilide (TIKOSYN) 250 MCG capsule Take 1 capsule by mouth every 12 hours 180 capsule 3    rivaroxaban (XARELTO) 20 MG TABS tablet Take 1 tablet by mouth daily (with breakfast) 90 tablet 3    hydrALAZINE (APRESOLINE) 10 MG tablet Take 1 tablet by mouth 3 times daily 270 tablet 3    spironolactone (ALDACTONE) 25 MG tablet Take 1 tablet by mouth daily 90 tablet 3    ibuprofen (ADVIL;MOTRIN) 200 MG tablet Take 200 mg by mouth every 6 hours as needed for Pain      triamcinolone (KENALOG) 0.1 % cream Apply topically 3 times daily as needed   1    loperamide (IMODIUM) 2 MG capsule Take 2 mg by mouth 4 times daily as needed for Diarrhea      diphenhydrAMINE (BENADRYL) 25 MG tablet Take 25 mg by mouth every 6 hours as needed for Itching      vitamin D (CHOLECALCIFEROL) 1000 UNIT TABS tablet Take 1,000 Units by mouth daily      sodium chloride (OCEAN, BABY AYR) 0.65 % nasal spray 1 spray by Nasal route as needed for Congestion 50 mL 0    baclofen (LIORESAL) 20 MG tablet Take 20 mg by mouth 2 times daily as needed (muscle spasms, spastic bladder) Indications: Back Spasm, Bladder Spasm        No current facility-administered medications for this visit. Physical Examination      /70   Pulse 61   Resp 16   Ht 5' 6\" (1.676 m)   Wt 176 lb 6.4 oz (80 kg)   SpO2 98%   BMI 28.47 kg/m²     General: No acute distress, appears as stated age, nonicteric  Head: Atraumatic, no gross abnormalities or bruises  Neck: Supple and nontender, no carotid bruits, no JVP  Lungs: Clear to auscultation bilaterally, no wheezes, rales, or rhonchi  Heart: Regular rate and rhythm, no murmurs, rubs, or gallops  Abdomen: Soft, nontender, nondistended, normal bowel sounds  Extremities: No obvious deformities, no cyanosis, no edema  Neurological: Alert and oriented x3, EOMI, moving all extremities x4  Psychological: Normal mood and affect, cooperative  Skin: Color, texture, and turgor normal for age          Labs/Imaging/Diagnostics     Lab Results   Component Value Date     09/03/2020    K 4.3 09/03/2020    K 4.2 11/07/2019     09/03/2020    CO2 22 09/03/2020    BUN 15 09/03/2020    CREATININE 1.0 09/03/2020    GLUCOSE 112 09/03/2020    GLUCOSE 120 10/12/2011    CALCIUM 9.5 09/03/2020        Estimated Creatinine Clearance: 52 mL/min (based on SCr of 1 mg/dL).     Lab Results   Component Value Date    WBC 7.6 09/03/2020    HGB 13.6 09/03/2020    HCT 42.3 09/03/2020    MCV 87.4 09/03/2020     09/03/2020       Lab Results   Component Value Date    ALT 20 09/03/2020    AST 24 09/03/2020    ALKPHOS 138 (H) 09/03/2020    BILITOT 0.6 09/03/2020       Lab Results   Component Value Date    LABALBU 3.9 09/03/2020       Lab Results   Component Value Date    CHOL 179 11/21/2018    CHOL 204 (H) 11/14/2015    CHOL 235 (H) 07/02/2015 Lab Results   Component Value Date    TRIG 69 11/21/2018    TRIG 71 11/14/2015    TRIG 182 (H) 07/02/2015     Lab Results   Component Value Date    HDL 51 11/21/2018    HDL 85 11/14/2015    HDL 69 07/02/2015     Lab Results   Component Value Date    LDLCALC 114 (H) 11/21/2018    LDLCALC 105 (H) 11/14/2015    LDLCALC 130 (H) 07/02/2015     Lab Results   Component Value Date    LABVLDL 14 11/21/2018    LABVLDL 14 11/14/2015    LABVLDL 36 07/02/2015     No results found for: [facility]    Lab Results   Component Value Date    CKTOTAL 83 11/25/2013    CKMB 6.6 (H) 11/25/2013    TROPONINI <0.01 03/09/2019       Lab Results   Component Value Date    BNP 1,046 (H) 11/24/2013         No results found for: LABA1C  No results found for: EAG     Pertinent Cardiovascular Studies:  EKG: Normal sinus rhythm.     JAIME personally reviewed as above     Assessment and Plan: This is a pleasant 42-year-old  female with a history summarized above who has paroxysmal atrial fibrillation, currently maintaining sinus rhythm on dofetilide. She has had intolerances to rivaroxaban, apixaban, dabigatran and is not willing to try warfarin. She has not had any major bleeding although she has had some falls and her skin bruises easily due to her skin fragility and allergy to tape. Additionally she has chronic systolic heart failure with moderate functional MR. She has a history of left atrial appendage clot which was not visualized on her JAIME last month. Her [id number] score is 5 and while she has not had any major bleeding she has had significant well-documented allergies to most oral anticoagulants. She is also at increased risk of bleeding on oral anticoagulation due to her falls and skin fragility. She is interested in pursuing left atrial appendage closure with Watchman device to reduce her risk of stroke whilst eliminating the need for chronic oral anticoagulation.   While this is not the classical

## 2020-10-05 NOTE — PROGRESS NOTES
Watchman  Education handouts provided to patient and [employer]  [id number]  2020   Watchman left atrial appendage closure implant  and [id number] 2019 Think outside the pillbox   Along with review and handout of cardiosmart :a decision aid for  AFIB Stroke prevention for patients with atrial fibrillation very high risk  [email or web address]           After reviewing education and speaking with Dr Chucho Reyes. Logan Sanders  wants to procedue with Watchman. I have answered all questions to his satisfaction.  10 2 2020

## 2020-10-08 RX ORDER — AMOXICILLIN 500 MG/1
2000 CAPSULE ORAL PRN
Qty: 60 CAPSULE | Refills: 0 | Status: SHIPPED
Start: 2020-10-08 | End: 2021-06-03

## 2020-10-08 NOTE — PROGRESS NOTES
Antibiotic prophylactic written letter and escribed for post LAAC. Needed for 6 months 10 14 2020 to 4 14 2021.      Soraya Vazquez RN

## 2020-10-08 NOTE — PROGRESS NOTES
LAAD / Watchman set 10 14 2020    post JAIME for Watchman set at [address] Nov 30,2020  1230pm with DR William performing.

## 2020-10-09 ENCOUNTER — HOSPITAL ENCOUNTER (OUTPATIENT)
Age: 75
Discharge: HOME | End: 2020-10-11
Payer: MEDICARE

## 2020-10-09 PROCEDURE — U0003 INFECTIOUS AGENT DETECTION BY NUCLEIC ACID (DNA OR RNA); SEVERE ACUTE RESPIRATORY SYNDROME CORONAVIRUS 2 (SARS-COV-2) (CORONAVIRUS DISEASE [COVID-19]), AMPLIFIED PROBE TECHNIQUE, MAKING USE OF HIGH THROUGHPUT TECHNOLOGIES AS DESCRIBED BY CMS-2020-01-R: HCPCS

## 2020-10-10 ENCOUNTER — HOSPITAL ENCOUNTER (OUTPATIENT)
Age: 75
Discharge: HOME | End: 2020-10-10
Payer: MEDICARE

## 2020-10-10 LAB
ANION GAP SERPL CALCULATED.3IONS-SCNC: 11 MMOL/L (ref 7–16)
BACTERIA: ABNORMAL /HPF
BASOPHILS ABSOLUTE: 0.04 E9/L (ref 0–0.2)
BASOPHILS RELATIVE PERCENT: 0.7 % (ref 0–2)
BILIRUBIN URINE: NEGATIVE
BLOOD, URINE: NEGATIVE
BUN BLDV-MCNC: 20 MG/DL (ref 8–23)
CALCIUM SERPL-MCNC: 9.7 MG/DL (ref 8.6–10.2)
CHLORIDE BLD-SCNC: 104 MMOL/L (ref 98–107)
CLARITY: CLEAR
CO2: 25 MMOL/L (ref 22–29)
COLOR: YELLOW
CREAT SERPL-MCNC: 1.2 MG/DL (ref 0.5–1)
EOSINOPHILS ABSOLUTE: 0.15 E9/L (ref 0.05–0.5)
EOSINOPHILS RELATIVE PERCENT: 2.7 % (ref 0–6)
GFR AFRICAN AMERICAN: 53
GFR NON-AFRICAN AMERICAN: 44 ML/MIN/1.73
GLUCOSE BLD-MCNC: 111 MG/DL (ref 74–99)
GLUCOSE URINE: NEGATIVE MG/DL
HCT VFR BLD CALC: 40.8 % (ref 34–48)
HEMOGLOBIN: 13.6 G/DL (ref 11.5–15.5)
IMMATURE GRANULOCYTES #: 0.03 E9/L
IMMATURE GRANULOCYTES %: 0.5 % (ref 0–5)
INR BLD: 2.3
KETONES, URINE: NEGATIVE MG/DL
LEUKOCYTE ESTERASE, URINE: ABNORMAL
LYMPHOCYTES ABSOLUTE: 1.4 E9/L (ref 1.5–4)
LYMPHOCYTES RELATIVE PERCENT: 25.4 % (ref 20–42)
MAGNESIUM: 2.1 MG/DL (ref 1.6–2.6)
MCH RBC QN AUTO: 29.2 PG (ref 26–35)
MCHC RBC AUTO-ENTMCNC: 33.3 % (ref 32–34.5)
MCV RBC AUTO: 87.6 FL (ref 80–99.9)
MONOCYTES ABSOLUTE: 0.49 E9/L (ref 0.1–0.95)
MONOCYTES RELATIVE PERCENT: 8.9 % (ref 2–12)
NEUTROPHILS ABSOLUTE: 3.41 E9/L (ref 1.8–7.3)
NEUTROPHILS RELATIVE PERCENT: 61.8 % (ref 43–80)
NITRITE, URINE: NEGATIVE
PDW BLD-RTO: 13.7 FL (ref 11.5–15)
PH UA: 5.5 (ref 5–9)
PLATELET # BLD: 278 E9/L (ref 130–450)
PMV BLD AUTO: 10.3 FL (ref 7–12)
POTASSIUM SERPL-SCNC: 4.3 MMOL/L (ref 3.5–5)
PROTEIN UA: NEGATIVE MG/DL
PROTHROMBIN TIME: 26.4 SEC (ref 9.3–12.4)
RBC # BLD: 4.66 E12/L (ref 3.5–5.5)
RBC UA: ABNORMAL /HPF (ref 0–2)
SARS-COV-2: NOT DETECTED
SODIUM BLD-SCNC: 140 MMOL/L (ref 132–146)
SOURCE: NORMAL
SPECIFIC GRAVITY UA: 1.01 (ref 1–1.03)
UROBILINOGEN, URINE: 0.2 E.U./DL
WBC # BLD: 5.5 E9/L (ref 4.5–11.5)
WBC UA: ABNORMAL /HPF (ref 0–5)

## 2020-10-10 PROCEDURE — 81001 URINALYSIS AUTO W/SCOPE: CPT

## 2020-10-10 PROCEDURE — 85025 COMPLETE CBC W/AUTO DIFF WBC: CPT

## 2020-10-10 PROCEDURE — 83735 ASSAY OF MAGNESIUM: CPT

## 2020-10-10 PROCEDURE — 80048 BASIC METABOLIC PNL TOTAL CA: CPT

## 2020-10-10 PROCEDURE — 85610 PROTHROMBIN TIME: CPT

## 2020-10-10 PROCEDURE — 36415 COLL VENOUS BLD VENIPUNCTURE: CPT

## 2020-10-13 ENCOUNTER — TELEPHONE (OUTPATIENT)
Dept: CARDIOLOGY | Age: 75
End: 2020-10-13

## 2020-10-13 NOTE — TELEPHONE ENCOUNTER
For purpose of the [address] [place] [address] Registry, The Modified [place] Scale was performed over the phone. The results are below.     MODIFIED DAVID SCALE  No symptoms at all

## 2020-10-14 ENCOUNTER — ANESTHESIA EVENT (OUTPATIENT)
Dept: CARDIAC CATH/INVASIVE PROCEDURES | Age: 75
End: 2020-10-14

## 2020-10-14 ENCOUNTER — HOSPITAL ENCOUNTER (INPATIENT)
Dept: CARDIAC CATH/INVASIVE PROCEDURES | Age: 75
LOS: 1 days | Discharge: HOME | DRG: 274 | End: 2020-10-16
Attending: INTERNAL MEDICINE | Admitting: INTERNAL MEDICINE
Payer: MEDICARE

## 2020-10-14 ENCOUNTER — HOSPITAL ENCOUNTER (OUTPATIENT)
Dept: CARDIAC CATH/INVASIVE PROCEDURES | Age: 75
Discharge: HOME | DRG: 274 | End: 2020-10-14
Payer: MEDICARE

## 2020-10-14 ENCOUNTER — ANESTHESIA (OUTPATIENT)
Dept: CARDIAC CATH/INVASIVE PROCEDURES | Age: 75
End: 2020-10-14

## 2020-10-14 PROBLEM — Z95.818 PRESENCE OF WATCHMAN LEFT ATRIAL APPENDAGE CLOSURE DEVICE: Status: ACTIVE | Noted: 2020-10-14

## 2020-10-14 LAB
ABO/RH: NORMAL
ANTIBODY SCREEN: NORMAL
APTT: 31.1 SEC (ref 24.5–35.1)
INR BLD: 1
POC ACT LR: 142 SECONDS
POC ACT LR: 300 SECONDS
POC ACT LR: >400 SECONDS
PROTHROMBIN TIME: 11.3 SEC (ref 9.3–12.4)

## 2020-10-14 PROCEDURE — 36415 COLL VENOUS BLD VENIPUNCTURE: CPT

## 2020-10-14 PROCEDURE — 93308 TTE F-UP OR LMTD: CPT

## 2020-10-14 PROCEDURE — 33340 PERQ CLSR TCAT L ATR APNDGE: CPT

## 2020-10-14 PROCEDURE — C1729 CATH, DRAINAGE: HCPCS

## 2020-10-14 PROCEDURE — 33340 PERQ CLSR TCAT L ATR APNDGE: CPT | Performed by: INTERNAL MEDICINE

## 2020-10-14 PROCEDURE — B246ZZ4 ULTRASONOGRAPHY OF RIGHT AND LEFT HEART, TRANSESOPHAGEAL: ICD-10-PCS | Performed by: INTERNAL MEDICINE

## 2020-10-14 PROCEDURE — C1725 CATH, TRANSLUMIN NON-LASER: HCPCS

## 2020-10-14 PROCEDURE — 2580000003 HC RX 258: Performed by: INTERNAL MEDICINE

## 2020-10-14 PROCEDURE — 99221 1ST HOSP IP/OBS SF/LOW 40: CPT | Performed by: SURGERY

## 2020-10-14 PROCEDURE — C1769 GUIDE WIRE: HCPCS

## 2020-10-14 PROCEDURE — G0378 HOSPITAL OBSERVATION PER HR: HCPCS

## 2020-10-14 PROCEDURE — C1760 CLOSURE DEV, VASC: HCPCS

## 2020-10-14 PROCEDURE — 6360000002 HC RX W HCPCS

## 2020-10-14 PROCEDURE — 2720000010 HC SURG SUPPLY STERILE

## 2020-10-14 PROCEDURE — 93321 DOPPLER ECHO F-UP/LMTD STD: CPT

## 2020-10-14 PROCEDURE — 6370000000 HC RX 637 (ALT 250 FOR IP): Performed by: INTERNAL MEDICINE

## 2020-10-14 PROCEDURE — 85610 PROTHROMBIN TIME: CPT

## 2020-10-14 PROCEDURE — 93355 ECHO TRANSESOPHAGEAL (TEE): CPT | Performed by: INTERNAL MEDICINE

## 2020-10-14 PROCEDURE — 85730 THROMBOPLASTIN TIME PARTIAL: CPT

## 2020-10-14 PROCEDURE — 6360000002 HC RX W HCPCS: Performed by: INTERNAL MEDICINE

## 2020-10-14 PROCEDURE — 2709999900 HC NON-CHARGEABLE SUPPLY

## 2020-10-14 PROCEDURE — 86900 BLOOD TYPING SEROLOGIC ABO: CPT

## 2020-10-14 PROCEDURE — 2780000010 HC IMPLANT OTHER

## 2020-10-14 PROCEDURE — 85347 COAGULATION TIME ACTIVATED: CPT

## 2020-10-14 PROCEDURE — G0379 DIRECT REFER HOSPITAL OBSERV: HCPCS

## 2020-10-14 PROCEDURE — C1894 INTRO/SHEATH, NON-LASER: HCPCS

## 2020-10-14 PROCEDURE — 86850 RBC ANTIBODY SCREEN: CPT

## 2020-10-14 PROCEDURE — 86901 BLOOD TYPING SEROLOGIC RH(D): CPT

## 2020-10-14 PROCEDURE — 93325 DOPPLER ECHO COLOR FLOW MAPG: CPT

## 2020-10-14 PROCEDURE — 94002 VENT MGMT INPAT INIT DAY: CPT

## 2020-10-14 PROCEDURE — 93312 ECHO TRANSESOPHAGEAL: CPT

## 2020-10-14 PROCEDURE — 5A2204Z RESTORATION OF CARDIAC RHYTHM, SINGLE: ICD-10-PCS | Performed by: INTERNAL MEDICINE

## 2020-10-14 PROCEDURE — 02L73DK OCCLUSION OF LEFT ATRIAL APPENDAGE WITH INTRALUMINAL DEVICE, PERCUTANEOUS APPROACH: ICD-10-PCS | Performed by: INTERNAL MEDICINE

## 2020-10-14 RX ORDER — HYDROXYZINE HYDROCHLORIDE 10 MG/1
10 TABLET, FILM COATED ORAL 3 TIMES DAILY PRN
Status: DISCONTINUED | OUTPATIENT
Start: 2020-10-14 | End: 2020-10-16 | Stop reason: HOSPADM

## 2020-10-14 RX ORDER — HYDRALAZINE HYDROCHLORIDE 10 MG/1
10 TABLET, FILM COATED ORAL EVERY 8 HOURS SCHEDULED
Status: DISCONTINUED | OUTPATIENT
Start: 2020-10-15 | End: 2020-10-16 | Stop reason: HOSPADM

## 2020-10-14 RX ORDER — BACLOFEN 10 MG/1
20 TABLET ORAL 2 TIMES DAILY PRN
Status: DISCONTINUED | OUTPATIENT
Start: 2020-10-15 | End: 2020-10-16 | Stop reason: HOSPADM

## 2020-10-14 RX ORDER — SODIUM CHLORIDE 0.9 % (FLUSH) 0.9 %
10 SYRINGE (ML) INJECTION PRN
Status: DISCONTINUED | OUTPATIENT
Start: 2020-10-14 | End: 2020-10-16 | Stop reason: HOSPADM

## 2020-10-14 RX ORDER — SODIUM CHLORIDE 9 MG/ML
INJECTION, SOLUTION INTRAVENOUS CONTINUOUS
Status: DISCONTINUED | OUTPATIENT
Start: 2020-10-14 | End: 2020-10-14

## 2020-10-14 RX ORDER — SODIUM CHLORIDE 0.9 % (FLUSH) 0.9 %
10 SYRINGE (ML) INJECTION EVERY 12 HOURS SCHEDULED
Status: DISCONTINUED | OUTPATIENT
Start: 2020-10-14 | End: 2020-10-16 | Stop reason: HOSPADM

## 2020-10-14 RX ORDER — PROPOFOL 10 MG/ML
10 INJECTION, EMULSION INTRAVENOUS
Status: DISCONTINUED | OUTPATIENT
Start: 2020-10-14 | End: 2020-10-14

## 2020-10-14 RX ORDER — FUROSEMIDE 40 MG/1
40 TABLET ORAL DAILY
Status: DISCONTINUED | OUTPATIENT
Start: 2020-10-15 | End: 2020-10-16 | Stop reason: HOSPADM

## 2020-10-14 RX ORDER — PROPOFOL 10 MG/ML
INJECTION, EMULSION INTRAVENOUS
Status: COMPLETED
Start: 2020-10-14 | End: 2020-10-14

## 2020-10-14 RX ORDER — CARVEDILOL 6.25 MG/1
6.25 TABLET ORAL 2 TIMES DAILY
Status: DISCONTINUED | OUTPATIENT
Start: 2020-10-15 | End: 2020-10-15

## 2020-10-14 RX ORDER — DOFETILIDE 0.25 MG/1
250 CAPSULE ORAL EVERY 12 HOURS SCHEDULED
Status: DISCONTINUED | OUTPATIENT
Start: 2020-10-14 | End: 2020-10-16 | Stop reason: HOSPADM

## 2020-10-14 RX ORDER — SPIRONOLACTONE 25 MG/1
25 TABLET ORAL DAILY
Status: DISCONTINUED | OUTPATIENT
Start: 2020-10-14 | End: 2020-10-16 | Stop reason: HOSPADM

## 2020-10-14 RX ORDER — SODIUM CHLORIDE 9 MG/ML
INJECTION, SOLUTION INTRAVENOUS ONCE
Status: COMPLETED | OUTPATIENT
Start: 2020-10-14 | End: 2020-10-14

## 2020-10-14 RX ORDER — PANTOPRAZOLE SODIUM 40 MG/1
40 TABLET, DELAYED RELEASE ORAL
Status: DISCONTINUED | OUTPATIENT
Start: 2020-10-15 | End: 2020-10-16 | Stop reason: HOSPADM

## 2020-10-14 RX ORDER — DIPHENHYDRAMINE HCL 25 MG
25 TABLET ORAL EVERY 6 HOURS PRN
Status: DISCONTINUED | OUTPATIENT
Start: 2020-10-14 | End: 2020-10-16 | Stop reason: HOSPADM

## 2020-10-14 RX ORDER — FAMOTIDINE 20 MG/1
40 TABLET, FILM COATED ORAL 2 TIMES DAILY
Status: DISCONTINUED | OUTPATIENT
Start: 2020-10-14 | End: 2020-10-15

## 2020-10-14 RX ADMIN — SODIUM CHLORIDE: 9 INJECTION, SOLUTION INTRAVENOUS at 09:30

## 2020-10-14 RX ADMIN — DOFETILIDE 250 MCG: 0.25 CAPSULE ORAL at 22:43

## 2020-10-14 RX ADMIN — CEFAZOLIN 1 G: 1 INJECTION, POWDER, FOR SOLUTION INTRAMUSCULAR; INTRAVENOUS at 22:42

## 2020-10-14 RX ADMIN — SODIUM CHLORIDE: 9 INJECTION, SOLUTION INTRAVENOUS at 09:32

## 2020-10-14 RX ADMIN — PROPOFOL 10 MCG/KG/MIN: 10 INJECTION, EMULSION INTRAVENOUS at 13:51

## 2020-10-14 RX ADMIN — Medication 10 ML: at 22:43

## 2020-10-14 RX ADMIN — PROPOFOL INJECTABLE EMULSION 10 MCG/KG/MIN: 10 INJECTION, EMULSION INTRAVENOUS at 13:51

## 2020-10-14 RX ADMIN — FAMOTIDINE 40 MG: 20 TABLET, FILM COATED ORAL at 22:42

## 2020-10-14 RX ADMIN — SODIUM CHLORIDE: 9 INJECTION, SOLUTION INTRAVENOUS at 09:28

## 2020-10-14 ASSESSMENT — PAIN SCALES - GENERAL
PAINLEVEL_OUTOF10: 0
PAINLEVEL_OUTOF10: 3
PAINLEVEL_OUTOF10: 0
PAINLEVEL_OUTOF10: 8
PAINLEVEL_OUTOF10: 0
PAINLEVEL_OUTOF10: 3

## 2020-10-14 ASSESSMENT — PAIN DESCRIPTION - PAIN TYPE
TYPE: CHRONIC PAIN

## 2020-10-14 ASSESSMENT — PULMONARY FUNCTION TESTS
PIF_VALUE: 13
PIF_VALUE: 23
PIF_VALUE: 22
PIF_VALUE: 25
PIF_VALUE: 26
PIF_VALUE: 25
PIF_VALUE: 21
PIF_VALUE: 24
PIF_VALUE: 14

## 2020-10-14 ASSESSMENT — ENCOUNTER SYMPTOMS: SHORTNESS OF BREATH: 1

## 2020-10-14 NOTE — PROCEDURES
LEFT ATRIAL APPENDAGE CLOSURE cardioversion  Procedure Note      Date of procedure:  10/14/20      : Azucena Cabrera MD  Imaging cardiologist: Esme Montano MD    Procedure:   Left atrial appendage closure using Watchman  Right femoral arterial line using 4 French sheath    Anesthesia: General anesthesia with endotracheal intubation  Appropriate venous lines were placed by anesthesia as needed. Indication:   Atrial fibrillation with [id number] score 5 and HAS-BLED score 2; NYHA functional class 1. Reason LAAC deemed appropriate: Multiple documented allergies to oral anticoagulants -deemed appropriate by her primary cardiologist Dr. Asiya Clay as well as Manda Acosta at [facility] [place] [facility]. Description of the Procedure: Following informed consent, the patient was bought to the Cath Lab and placed under anesthesia as above. JAIME was used to aid in guidance of the procedure; this demonstrated no thrombus in the left atrial appendage and the following baseline ostial measurements: At 0 degrees: Width 18; depth 28  At 45 degrees: Width 18; depth 25  At 90 degrees: Width 18; depth 20  At 135 degrees: Width 18; depth 25  CHERYL morphology: broccoli    Right femoral vein access was obtained with a Cook needle and ultrasound guidance and a short 8 French sheath was placed. 50 units/kg IV heparin was administered. A single Proglide Perclose was used for preclosure. After appropriate serial dilation, a 16 French Cook sheath was placed. A 0.032\" was placed in the superior vena cava and a SL1 transseptal sheath was advanced into the SVC; the 0.032\" wire was then removed. A Euclid NRG needle was advanced inside the transseptal sheath/dilator assembly. Transseptal puncture was performed under JAIME and fluoroscopy guidance as per protocol. The transseptal sheath was advanced to the mid left atrium and the transseptal needle and dilator were removed.  50 units/kg IV heparin was additionally administered with additional heparin as needed to achieve ACT greater than 300 seconds. Left atrial pressure was transduced and showed a mean pressure of 20. Next, an exchange length 0.035\" J-tip Amplatz Super Stiff wire was placed in the left upper pulmonary vein and the transseptal sheath removed over the wire. The Watchman Access Sheath (WAS) was advanced into the left atrium over the Amplatz wire. The Amplatz wire and the WAS dilator were then removed. A 5 French Pigtail catheter was advanced into the left atrial appendage under fluoroscopy and JAIME guidance and placed in the anterior lobe of the CHERYL. Angiography through the pigtail catheter was used to confirm position and measurements obtained on JAIME. A 24 mm Watchman 2.5 device was chosen. The WAS was advanced over the pigtail catheter into deployment position. The Watchman Delivery System [facility]) was prepped in the usual fashion. The pigtail catheter was removed from the WAS and the [address] was advanced under fluoroscopy and JAIME guidance. The device was deployed under fluoroscopy and JAIME in the usual manner. A full recapture was performed due to significant proximal placement and the WDS was removed; the device was examined for structural integrity and was appropriate for reuse. The WAS was repositioned into the posterior lobe over the pigtail catheter. The WDS was re-prepped and the device deployed this time into the posterior lobe of the CHERYL. Device release criteria were verified as follows:  Position was assessed on JAIME and using angiography and deemed to be satisfactory  [place] was assessed on JAIME and fluoroscopy and deemed to be satisfactory  Size was assessed on JAIME and a compression ratio of 12.5 to 25% was achieved  Seal was assessed on JAIME and there was no inge-device leak. The device was released in a standard fashion and reevaluation with JAIME and fluoroscopy ruled out embolization/peridevice leak.     Shortly after the recapture a new, small pericardial effusion was noted.  The procedure was completed as noted above. The patient remained hemodynamically stable with IV hydration and the effusion remained stable and small for about half an hour after protamine was given. Limited TTE was done on the table and revealed only trivial posterior effusion with no RA or RV collapse. We decided to keep the patient intubated with the 4 Cymraes sheath in the right femoral artery for invasive blood pressure monitoring and the 16 Cymraes venous sheath for volume supplementation if needed. She was transferred to CVICU on no pressors and in stable condition. Complications: small pericardial effusion without hemodynamic instability  Blood loss: 10 mL  Contrast: 35 mL  Air Kerma: 223 mGy    Conclusions:  Successful percutaneous left atrial appendage closure using 24 mm Watchman 2.5. Plan:   1. Observe in CVICU  2. Limited TTE in 3 hours. If effusion is stable then we will de-line and extubate  3. Aspirin 81 mg p.o. daily starting tomorrow  4. Repeat limited TTE before discharge tomorrow. Start rivaroxaban in 2 days if effusion is stable. a. Pre-implant regimen  5.  JAIME and office visit in 6 weeks per protocol      Haroon Mir MD  Interventional Cardiology/Structural Heart Disease  Cell: [phone]

## 2020-10-14 NOTE — PLAN OF CARE
Problem: Skin Integrity:  Goal: Will show no infection signs and symptoms  Description: Will show no infection signs and symptoms  Outcome: Met This Shift  Goal: Absence of new skin breakdown  Description: Absence of new skin breakdown  Outcome: Met This Shift     Problem: Restraint Use - Nonviolent/Non-Self-Destructive Behavior:  Goal: Absence of restraint indications  Description: Absence of restraint indications  Outcome: Not Met This Shift  Goal: Absence of restraint-related injury  Description: Absence of restraint-related injury  Outcome: Met This Shift     Problem: Falls - Risk of:  Goal: Will remain free from falls  Description: Will remain free from falls  Outcome: Met This Shift  Goal: Absence of physical injury  Description: Absence of physical injury  Outcome: Met This Shift

## 2020-10-14 NOTE — H&P
Update History & Physical    The patient's History and Physical of October 2, 2020 was reviewed with the patient and I examined the patient. There was no change. The surgical site was confirmed by the patient and me. Plan: The risks, benefits, expected outcome, and alternative to the recommended procedure have been discussed with the patient. Patient understands and wants to proceed with the procedure.      Electronically signed by Jaye Chau MD on 10/14/2020 at 1:37 PM

## 2020-10-14 NOTE — PROGRESS NOTES
Dr. David Soria perfect serve'd, \"Patient following all commands, with spontaneous eye opening, and Propofol turned off completely approx. 20 minutes ago. Respiratory put patient on tidal volume of 650 and performed leak test, no audible leak heard. Patient is still receiving 600-700 tidal volumes. Pt currently on PS of 8 and peep of 5 and is getting good volumes. \"

## 2020-10-14 NOTE — PROGRESS NOTES
Spontaneous Parameters performed on PS mode 0 / 40% / +5    VT = 513 ml  f = 24  B/M  Ve = 13.0 L/M  NIF =  -50  cmH2O  VC = 1328 L  RSBI = 46    Put on ACVC tidal volume of 650, and NO audible leak heard, volumes still received of 600-700.     Performed by Esperanza Rajput RRT RCP

## 2020-10-14 NOTE — PROCEDURES
WATCHMAN TRANSESOPHAGEAL ECHOCARDIOGRAPHY REPORT    Cardiologist: Dr. Leroy Lopez    Date of Procedure: 10/14/2020    Indications for study:  WATCHMAN JAIME, Atrial Fibrillation    Study performed using (Sedation): Per anesthesia    No evidence of a left atrial appendage thrombus / +SEC    Small pericardial effusion post-procedure    Device size implanted: 24 mm  No significant color flow jet around the WATCHMAN device. Deployed diameter 1.8-2.1 cm (12.5%-25% compression).     Yareli Tejada MD  [facility]) Cardiology

## 2020-10-14 NOTE — CONSULTS
144/64   Pulse 58   Temp 93.9 °F (34.4 °C) (Temporal)   Resp 15   Ht 5' 6.5\" (1.689 m)   Wt 175 lb (79.4 kg)   SpO2 100%   BMI 27.82 kg/m²     Average, Min, and Max for last 24 hours Vitals:  Temp:  Temp  Av.2 °F (35.7 °C)  Min: 93.9 °F (34.4 °C)  Max: 97.5 °F (36.4 °C)  RR: Resp  Av.5  Min: 10  Max: 25  HR: Pulse  Av.9  Min: 54  Max: 59  BP:  Systolic (27POF), WMF:455 , Min:144 , WBL:377   ; Diastolic (17TNP), XEO:70, Min:64, Max:78    SpO2: SpO2  Av %  Min: 100 %  Max: 100 %        GCS:    4 - Opens eyes on own   6 - Follows simple motor commands  1 - Makes no noise    Pupil size:  Left 2 mm    Right 2 mm    Pupil reaction: Yes    Wiggles fingers: Left Yes Right Yes    Hand grasp:   Left normal       Right normal    Wiggles toes: Left Yes    Right Yes    Plantar flexion: Left normal     Right normal      CONSTITUTIONAL: no acute distress, lying in hospital bed, intubated, sedated  NEUROLOGIC: PERRL  CARDIOVASCULAR: S1 S2, regular rate  PULMONARY: intubated on mechanical ventilation. Lungs fairly clear  RENAL: lantigua to gravity, clear yellow urine  ABDOMEN: soft, nontender, nondistended  MUSCULOSKELETAL: moves all extremities purposefully   SKIN/EXTREMITIES: no rashes/ecchymosis, no edema, no groin site hematoma, good capillary refill       ASSESSMENT / PLAN:   · Neuro:  GCS 11T   ·   · CV: POD0 from watchman procedure  · Post operative paricardial effusion   · Repeat echo    · Pulm:  Intubated and sedated  · Attempt pressor support  · Can possibly extubate if repeat echo shows stable or resolving pericardial effusion     · GI: No acute issues  · On GI prophylaxis    · Renal: No acute issues  · Making adequate urine    · ID: No acute issues    · Endocrine: No acute issues  · Maintain blood glucose <180  ·   · MSK: moves all extremities      · Heme: No acute issues   · Monitor CBC         Pain/Analgesia: none  Bowel regimen: none  Diet: Diet NPO Effective Now  DVT proph: none  GI proph: pepcid  Seizure proph: none  Glucose protocol: none  Mouth/eye care: none  Still: continue  CVC sites: arterial sheath - right femoral  Ancillary consults: none  Family Update: as able  CODE Status: Full Code    Dispo: Continue SICU care      Electronically signed by Preeti Escudero MD on 10/14/20 at 4:14 PM EDT

## 2020-10-14 NOTE — PROGRESS NOTES
Called by nursing to place patient on pressure support. Patient leak test performed on tidal volume of 650, NO audible leak heard, and still receiving 600-700 tidal volumes back. Patient following all commands, placed on a pressure support mode of 8, +5, receiving good tidal volumes at this time. Bedside nurse notified.

## 2020-10-14 NOTE — PROGRESS NOTES
Dr. Mauricio Tejada at bedside for echo, okay to extubate patient per Dr. Mauricio Tejada. Dr. Farida fernandez serve'd and asked if it was okay to stop sedation and start weaning protocol, Dr. Ghazal Brandt.

## 2020-10-14 NOTE — ANESTHESIA PRE PROCEDURE
Department of Anesthesiology  Preprocedure Note       Name:  Pascale Melendez   Age:  76 y.o.  :  1945                                          MRN:  [id number]         Date:  10/14/2020      Surgeon: Robyn Zazueta  Procedure:  Watchman procedure    Medications prior to admission:   Prior to Admission medications    Medication Sig Start Date End Date Taking?  Authorizing Provider   hydrOXYzine (ATARAX) 10 MG tablet Take 1 tablet by mouth 3 times daily as needed for Itching 20  Yes Lucas Nair MD   furosemide (LASIX) 20 MG tablet Take 1 tablet by mouth daily 20  Yes Michelle Palma MD   carvedilol (COREG CR) 20 MG CP24 extended release capsule Take 1 capsule by mouth daily 3/31/20  Yes Michelle Palma MD   dofetilide [facility]) 250 MCG capsule Take 1 capsule by mouth every 12 hours 20  Yes Marcia Abarca DO   hydrALAZINE (APRESOLINE) 10 MG tablet Take 1 tablet by mouth 3 times daily 19  Yes Michelle Palma MD   spironolactone (ALDACTONE) 25 MG tablet Take 1 tablet by mouth daily 19  Yes Michelle Palma MD   triamcinolone (KENALOG) 0.1 % cream Apply topically 3 times daily as needed  19  Yes Historical Provider, MD   loperamide (IMODIUM) 2 MG capsule Take 2 mg by mouth 4 times daily as needed for Diarrhea   Yes Historical Provider, MD   diphenhydrAMINE (BENADRYL) 25 MG tablet Take 25 mg by mouth every 6 hours as needed for Itching   Yes Historical Provider, MD   vitamin D (CHOLECALCIFEROL) 1000 UNIT TABS tablet Take 1,000 Units by mouth daily   Yes Historical Provider, MD   baclofen (LIORESAL) 20 MG tablet Take 20 mg by mouth 2 times daily as needed (muscle spasms, spastic bladder) Indications: Back Spasm, Bladder Spasm    Yes Historical Provider, MD   amoxicillin (AMOXIL) 500 MG capsule Take 4 capsules by mouth as needed (2000mg once prior to dental, GI/, skin  or cardiac invasive procedures) 10/8/20   Alicja Jones MD   famotidine (PEPCID) 40 MG tablet Take 40 mg by mouth 2 times daily       omeprazole (PRILOSEC) 40 MG delayed release capsule TAKE 1 CAPSULE BY MOUTH IN THE MORNING      rivaroxaban (XARELTO) 20 MG TABS tablet Take 1 tablet by mouth daily (with breakfast) 90 tablet 3    ibuprofen (ADVIL;MOTRIN) 200 MG tablet Take 200 mg by mouth every 6 hours as needed for Pain      sodium chloride (OCEAN, BABY AYR) 0.65 % nasal spray 1 spray by Nasal route as needed for Congestion 50 mL 0     Current Facility-Administered Medications   Medication Dose Route Frequency Provider Last Rate Last Dose    0.9 % sodium chloride infusion   Intravenous Continuous Henry Gautam  mL/hr at 10/14/20 0930         Allergies:     Allergies   Allergen Reactions    Atacand [Candesartan] Hives and Itching    Adhesive Tape Itching     develops rash and itching with EKG electrodes    Digoxin Itching     developed itching and rash    Losartan Potassium Itching    Shellfish-Derived Products Itching     3/9/19 Pt states she had a lobster pizza and had difficulty breathing, started to sweat and was itchy    Sulfa Antibiotics Diarrhea and Other (See Comments)     Also causes migraines  caused migraines and diarrhea    Acetaminophen Hives and Itching    Apap-Caff-Dihydrocodeine Hives and Itching    Coreg [Carvedilol] Itching     Can take extended release Coreg    Cozaar [Losartan] Itching    Demerol      3/9/19 Pt states she gets a severe migraine    Diovan [Valsartan] Itching    Imdur [Isosorbide Mononitrate]      Severe migraines    Labetalol Itching    Lisinopril Itching    Ramipril Itching    Xarelto [Rivaroxaban]      3/9/19 Pt states that she had broke out with a rash, got itchy and had severe side effects (severe itch, headache)    Effexor [Venlafaxine Hydrochloride] Nausea Only    Eliquis [Apixaban] Hives, Itching and Rash     3/9/19 Pt states that she gets hives, itchy and a rash       Problem List:    Patient Active Problem List   Diagnosis Code    Anxiety F41.9    status: Never Smoker    Smokeless tobacco: Never Used   Substance Use Topics    Alcohol use: Yes     Alcohol/week: 0.0 standard drinks     Frequency: Monthly or less     Drinks per session: 1 or 2     Binge frequency: Never     Comment: occasional wine/mixed drink. Counseling given: Not Answered      Vital Signs (Current):   Vitals:    10/14/20 0831   BP: (!) 147/78   Pulse: 57   Resp: 18   Temp: 97.1 °F (36.2 °C)   TempSrc: Temporal   Weight: 175 lb (79.4 kg)   Height: 5' 6.5\" (1.689 m)                                              BP Readings from Last 3 Encounters:   10/14/20 (!) 147/78   10/02/20 118/70   09/09/20 (!) 82/39       NPO Status:  before midnight                                                                               BMI:   Wt Readings from Last 3 Encounters:   10/14/20 175 lb (79.4 kg)   10/02/20 176 lb 6.4 oz (80 kg)   09/09/20 170 lb (77.1 kg)     Body mass index is 27.82 kg/m². CBC:   Lab Results   Component Value Date    WBC 5.5 10/10/2020    RBC 4.66 10/10/2020    HGB 13.6 10/10/2020    HCT 40.8 10/10/2020    MCV 87.6 10/10/2020    RDW 13.7 10/10/2020     10/10/2020       CMP:   Lab Results   Component Value Date     10/10/2020    K 4.3 10/10/2020    K 4.2 11/07/2019     10/10/2020    CO2 25 10/10/2020    BUN 20 10/10/2020    CREATININE 1.2 10/10/2020    GFRAA 53 10/10/2020    LABGLOM 44 10/10/2020    GLUCOSE 111 10/10/2020    GLUCOSE 120 10/12/2011    PROT 7.6 09/03/2020    CALCIUM 9.7 10/10/2020    BILITOT 0.6 09/03/2020    ALKPHOS 138 09/03/2020    AST 24 09/03/2020    ALT 20 09/03/2020       POC Tests: No results for input(s): POCGLU, POCNA, POCK, POCCL, POCBUN, POCHEMO, POCHCT in the last 72 hours.     Coags:   Lab Results   Component Value Date    PROTIME 11.3 10/14/2020    PROTIME 12.0 12/17/2012    INR 1.0 10/14/2020    APTT 31.1 10/14/2020       HCG (If Applicable): No results found for: PREGTESTUR, PREGSERUM, HCG, HCGQUANT ABGs: No results found for: PHART, PO2ART, PNP3ZVF, YFZ7VDH, BEART, L8QFMWQH     Type & Screen (If Applicable):  No results found for: LABABO, LABRH    Drug/Infectious Status (If Applicable):  No results found for: HIV, HEPCAB    COVID-19 Screening (If Applicable):   Lab Results   Component Value Date    COVID19 Not Detected 10/09/2020         Anesthesia Evaluation  Patient summary reviewed and Nursing notes reviewed no history of anesthetic complications:   Airway: Mallampati: II  TM distance: >3 FB   Neck ROM: full  Mouth opening: > = 3 FB Dental:    (+) caps      Pulmonary: breath sounds clear to auscultation  (+) shortness of breath:                             Cardiovascular:  Exercise tolerance: poor (<4 METS),   (+) hypertension:, valvular problems/murmurs: MR, dysrhythmias: atrial fibrillation, CHF:,         Rhythm: irregular  Rate: normal                    Neuro/Psych:   (+) neuromuscular disease:, headaches:,             GI/Hepatic/Renal:   (+) hiatal hernia,           Endo/Other:    (+) blood dyscrasia: anticoagulation therapy:., .                 Abdominal:           Vascular:                                   Left Ventricle   Left ventricle size is normal.   Mildly reduced left ventricular systolic function. Ejection fraction is visually estimated at 45%. Right Ventricle   Normal right ventricular size and function. Left Atrium   Dilated left atrium. Spontaneous echo contrast present. No evidence of a left atrial appendage thrombus. No evidence of interatrial shunting on bubble study. CHERYL ostium measurements:   0 degrees - 1.8 cm (image 40)   45 degrees - 1.8 cm (image 33)   90 degrees - 1.8 cm (image 35)   135 degrees - 1.8 cm (image 37)      CHERYL ostium to CHERYL apex measurements:   0 degrees - 2.3 cm   45 degrees - 2.2 cm   90 degrees - 2.0 cm   135 degrees - 2.0 cm      Right Atrium   Dilated right atrium. Mitral Valve   Moderate mitral regurgitation.    No evidence of hemodynamically significant mitral stenosis. Systolic blunting on PV doppler. Tricuspid Valve   Mild tricuspid regurgitation. RV-RA gradient is estimated at > 55 mmHg. Aortic Valve   Aortic valve opens well. The aortic valve is trileaflet. No evidence of hemodynamically significant aortic stenosis. Mild aortic regurgitation. Pulmonic Valve   No evidence of hemodynamically significant pulmonic regurgitation. Pericardial Effusion   No evidence of a hemodynamically significant pericardial effusion. Aorta   Aortic root dimension within normal limits. Conclusions      Summary   Mildly reduced left ventricular systolic function. Ejection fraction is visually estimated at 45%. Normal right ventricular size and function. Moderate mitral regurgitation. Systolic blunting on PV doppler. Mild aortic regurgitation. Mild tricuspid regurgitation. RV-RA gradient is estimated at > 55 mmHg. Spontaneous echo contrast present in the LA and CHERYL. No evidence of a left atrial appendage thrombus. No evidence of interatrial shunting on bubble study. Anesthesia Plan      general     ASA 3       Induction: intravenous. arterial line  MIPS: Postoperative opioids intended, Prophylactic antiemetics administered and Postoperative trial extubation. Anesthetic plan and risks discussed with patient and child/children. Plan discussed with CRNA. Josephine Parnell MD   10/14/2020      DOS STAFF ADDENDUM:    Patient seen and chart reviewed. Physical exam and history updated as indicated. NPO status confirmed. Anesthesia options and plan discussed including risks benefits with patient/legal guardian and family as available. Concerns and questions addressed. Consent verbalized to proceed.   Anesthesia plan, options and intraoperative/postoperative concerns discussed with care team.    Josephine Parnell MD  10/14/2020  10:13 AM

## 2020-10-15 LAB
ANION GAP SERPL CALCULATED.3IONS-SCNC: 10 MMOL/L (ref 7–16)
BASOPHILS ABSOLUTE: 0.02 E9/L (ref 0–0.2)
BASOPHILS RELATIVE PERCENT: 0.2 % (ref 0–2)
BUN BLDV-MCNC: 22 MG/DL (ref 8–23)
CALCIUM SERPL-MCNC: 9.2 MG/DL (ref 8.6–10.2)
CHLORIDE BLD-SCNC: 105 MMOL/L (ref 98–107)
CO2: 24 MMOL/L (ref 22–29)
CREAT SERPL-MCNC: 1.2 MG/DL (ref 0.5–1)
EOSINOPHILS ABSOLUTE: 0.02 E9/L (ref 0.05–0.5)
EOSINOPHILS RELATIVE PERCENT: 0.2 % (ref 0–6)
GFR AFRICAN AMERICAN: 53
GFR NON-AFRICAN AMERICAN: 44 ML/MIN/1.73
GLUCOSE BLD-MCNC: 103 MG/DL (ref 74–99)
HCT VFR BLD CALC: 37.9 % (ref 34–48)
HEMOGLOBIN: 12.2 G/DL (ref 11.5–15.5)
IMMATURE GRANULOCYTES #: 0.05 E9/L
IMMATURE GRANULOCYTES %: 0.5 % (ref 0–5)
LYMPHOCYTES ABSOLUTE: 1.14 E9/L (ref 1.5–4)
LYMPHOCYTES RELATIVE PERCENT: 10.3 % (ref 20–42)
MCH RBC QN AUTO: 28.8 PG (ref 26–35)
MCHC RBC AUTO-ENTMCNC: 32.2 % (ref 32–34.5)
MCV RBC AUTO: 89.6 FL (ref 80–99.9)
MONOCYTES ABSOLUTE: 0.72 E9/L (ref 0.1–0.95)
MONOCYTES RELATIVE PERCENT: 6.5 % (ref 2–12)
NEUTROPHILS ABSOLUTE: 9.08 E9/L (ref 1.8–7.3)
NEUTROPHILS RELATIVE PERCENT: 82.3 % (ref 43–80)
PDW BLD-RTO: 14 FL (ref 11.5–15)
PLATELET # BLD: 221 E9/L (ref 130–450)
PMV BLD AUTO: 10.8 FL (ref 7–12)
POTASSIUM REFLEX MAGNESIUM: 4.1 MMOL/L (ref 3.5–5)
RBC # BLD: 4.23 E12/L (ref 3.5–5.5)
REASON FOR REJECTION: NORMAL
REJECTED TEST: NORMAL
SODIUM BLD-SCNC: 139 MMOL/L (ref 132–146)
WBC # BLD: 11 E9/L (ref 4.5–11.5)

## 2020-10-15 PROCEDURE — 2580000003 HC RX 258: Performed by: INTERNAL MEDICINE

## 2020-10-15 PROCEDURE — 85025 COMPLETE CBC W/AUTO DIFF WBC: CPT

## 2020-10-15 PROCEDURE — 6370000000 HC RX 637 (ALT 250 FOR IP): Performed by: INTERNAL MEDICINE

## 2020-10-15 PROCEDURE — 80048 BASIC METABOLIC PNL TOTAL CA: CPT

## 2020-10-15 PROCEDURE — 99233 SBSQ HOSP IP/OBS HIGH 50: CPT | Performed by: INTERNAL MEDICINE

## 2020-10-15 PROCEDURE — 6360000002 HC RX W HCPCS: Performed by: INTERNAL MEDICINE

## 2020-10-15 PROCEDURE — 36415 COLL VENOUS BLD VENIPUNCTURE: CPT

## 2020-10-15 PROCEDURE — 96374 THER/PROPH/DIAG INJ IV PUSH: CPT

## 2020-10-15 PROCEDURE — 2060000000 HC ICU INTERMEDIATE R&B

## 2020-10-15 RX ORDER — FAMOTIDINE 20 MG/1
20 TABLET, FILM COATED ORAL DAILY
Status: DISCONTINUED | OUTPATIENT
Start: 2020-10-16 | End: 2020-10-16 | Stop reason: HOSPADM

## 2020-10-15 RX ORDER — CARVEDILOL PHOSPHATE 20 MG/1
20 CAPSULE, EXTENDED RELEASE ORAL
Status: DISCONTINUED | OUTPATIENT
Start: 2020-10-15 | End: 2020-10-16 | Stop reason: HOSPADM

## 2020-10-15 RX ORDER — ASPIRIN 81 MG/1
81 TABLET ORAL DAILY
Status: DISCONTINUED | OUTPATIENT
Start: 2020-10-15 | End: 2020-10-16 | Stop reason: HOSPADM

## 2020-10-15 RX ADMIN — FUROSEMIDE 40 MG: 40 TABLET ORAL at 09:35

## 2020-10-15 RX ADMIN — FAMOTIDINE 40 MG: 20 TABLET, FILM COATED ORAL at 09:34

## 2020-10-15 RX ADMIN — SPIRONOLACTONE 25 MG: 25 TABLET ORAL at 09:34

## 2020-10-15 RX ADMIN — DOFETILIDE 250 MCG: 0.25 CAPSULE ORAL at 20:57

## 2020-10-15 RX ADMIN — ASPIRIN 81 MG: 81 TABLET, COATED ORAL at 13:03

## 2020-10-15 RX ADMIN — RIVAROXABAN 15 MG: 15 TABLET, FILM COATED ORAL at 17:40

## 2020-10-15 RX ADMIN — CEFAZOLIN 1 G: 1 INJECTION, POWDER, FOR SOLUTION INTRAMUSCULAR; INTRAVENOUS at 16:00

## 2020-10-15 RX ADMIN — CARVEDILOL PHOSPHATE 20 MG: 20 CAPSULE, EXTENDED RELEASE ORAL at 17:08

## 2020-10-15 RX ADMIN — HYDRALAZINE HYDROCHLORIDE 10 MG: 10 TABLET, FILM COATED ORAL at 14:29

## 2020-10-15 RX ADMIN — DOFETILIDE 250 MCG: 0.25 CAPSULE ORAL at 09:35

## 2020-10-15 RX ADMIN — Medication 10 ML: at 20:57

## 2020-10-15 RX ADMIN — CEFAZOLIN 1 G: 1 INJECTION, POWDER, FOR SOLUTION INTRAMUSCULAR; INTRAVENOUS at 05:51

## 2020-10-15 RX ADMIN — HYDRALAZINE HYDROCHLORIDE 10 MG: 10 TABLET, FILM COATED ORAL at 20:57

## 2020-10-15 RX ADMIN — SODIUM CHLORIDE, PRESERVATIVE FREE 10 ML: 5 INJECTION INTRAVENOUS at 16:00

## 2020-10-15 RX ADMIN — Medication 10 ML: at 09:08

## 2020-10-15 RX ADMIN — PANTOPRAZOLE SODIUM 40 MG: 40 TABLET, DELAYED RELEASE ORAL at 05:51

## 2020-10-15 RX ADMIN — CEFAZOLIN 1 G: 1 INJECTION, POWDER, FOR SOLUTION INTRAMUSCULAR; INTRAVENOUS at 23:47

## 2020-10-15 RX ADMIN — HYDRALAZINE HYDROCHLORIDE 10 MG: 10 TABLET, FILM COATED ORAL at 05:51

## 2020-10-15 ASSESSMENT — PAIN SCALES - GENERAL
PAINLEVEL_OUTOF10: 3
PAINLEVEL_OUTOF10: 0
PAINLEVEL_OUTOF10: 3
PAINLEVEL_OUTOF10: 3
PAINLEVEL_OUTOF10: 0
PAINLEVEL_OUTOF10: 3

## 2020-10-15 ASSESSMENT — PAIN DESCRIPTION - PAIN TYPE
TYPE: CHRONIC PAIN

## 2020-10-15 NOTE — PROGRESS NOTES
Nurse to nurse report given to [facility] on [place]. Patient stated she would message her son via cell phone of her new room #.

## 2020-10-15 NOTE — PLAN OF CARE
Problem: Skin Integrity:  Goal: Will show no infection signs and symptoms  Description: Will show no infection signs and symptoms  10/15/2020 1858 by Vazquez Aleman RN  Outcome: Met This Shift  10/15/2020 0904 by Ronnie Donis RN  Outcome: Met This Shift  Goal: Absence of new skin breakdown  Description: Absence of new skin breakdown  10/15/2020 1858 by Vazquez Aleman RN  Outcome: Met This Shift  10/15/2020 0904 by Ronnie Donis RN  Outcome: Met This Shift     Problem: Falls - Risk of:  Goal: Will remain free from falls  Description: Will remain free from falls  10/15/2020 1858 by Vazquez Aleman RN  Outcome: Met This Shift  10/15/2020 0904 by Ronnie Donis RN  Outcome: Met This Shift  Goal: Absence of physical injury  Description: Absence of physical injury  10/15/2020 0904 by Ronnie Donis RN  Outcome: Met This Shift

## 2020-10-15 NOTE — PROGRESS NOTES
Your patient is on a medication that requires a renal dose adjustment. Renal Function Assessment:    Date Body Weight SCr CrCl   10/15/2020 79.4 kg 1.2 44 ml/min       Pharmacy has renally dose-adjusted the following medication(s):    Date Medication Original Dosing Regimen New Dosing Regimen   10/15/2020 famotidine 40 mg twice daily 20 mg once daily           These changes were made per protocol according to the [facility] Renal Function-Based Dose Adjustments Policy    *Please note this dose may need readjusted if your patient's renal function significantly improves. Please contact pharmacy with any questions regarding these changes. Alverto Astorga.  Kasey Bradford, Rolf 10/15/2020 12:24 PM

## 2020-10-15 NOTE — PLAN OF CARE
Problem: Skin Integrity:  Goal: Will show no infection signs and symptoms  Description: Will show no infection signs and symptoms  10/15/2020 0904 by Charles Abbott RN  Outcome: Met This Shift  10/15/2020 0034 by Yared Elmore RN  Outcome: Met This Shift  Goal: Absence of new skin breakdown  Description: Absence of new skin breakdown  Outcome: Met This Shift     Problem: Falls - Risk of:  Goal: Will remain free from falls  Description: Will remain free from falls  10/15/2020 0904 by Charles Abbott RN  Outcome: Met This Shift  10/15/2020 0034 by Yared Elmore RN  Outcome: Met This Shift  Goal: Absence of physical injury  Description: Absence of physical injury  Outcome: Met This Shift     Problem: Pain:  Goal: Pain level will decrease  Description: Pain level will decrease  10/15/2020 0904 by Charles Abbott RN  Outcome: Met This Shift  10/15/2020 0034 by Yared Elmore RN  Outcome: Met This Shift  Goal: Control of acute pain  Description: Control of acute pain  Outcome: Met This Shift

## 2020-10-15 NOTE — PROGRESS NOTES
Inpatient Cardiology progress note      Interval HPI:    Seen this morning in CVICU where she spent the night   Normotensive to hypertensive overnight. Normal heart rate   Limited TTE done this morning but not recorded. Effusion nearly resolved   16 Syriac venous sheath and 4 Syriac arterial sheath were removed from the right femoral site last afternoon. There was a small hematoma that resolved with manual pressure.  Labs this morning are stable   She has no complaints. She feels great      PHYSICAL EXAM:  BP (!) 110/55   Pulse 68   Temp 98.4 °F (36.9 °C) (Bladder)   Resp 25   Ht 5' 6.5\" (1.689 m)   Wt 175 lb (79.4 kg)   SpO2 99%   BMI 27.82 kg/m²     General Appearance:    Alert, cooperative, no distress, appears stated age    Head:    Normocephalic, without obvious abnormality, atraumatic    Eyes:    PERRL, conjunctiva/corneas clear, EOM's intact    Neck:   Supple, symmetrical, trachea midline; no carotid    bruit or JVP    Lungs:     Clear to auscultation bilaterally, respirations unlabored, no wheezing, rales or rhonchi    Heart:    Regular rate and rhythm, no murmur, rub   or gallop    Abdomen:     Soft, non-tender, non-distended    Extremities:   Extremities normal, atraumatic, no cyanosis or edema    Skin:   Skin color, texture, turgor normal for age    Neurologic:   Oriented x3, CNII-XII intact. Normal gross strength and sensation       DATA:      Intake/Output Summary (Last 24 hours) at 10/15/2020 1016  Last data filed at 10/15/2020 0858  Gross per 24 hour   Intake 220 ml   Output 1960 ml   Net -1740 ml       Labs:   CBC: No results for input(s): WBC, HGB, HCT, PLT in the last 72 hours. BMP: No results for input(s): NA, K, CO2, BUN, CREATININE, LABGLOM, CALCIUM in the last 72 hours. Invalid input(s): GLU  Mag: No results for input(s): MG in the last 72 hours. Phos: No results for input(s): PHOS in the last 72 hours.   TSH: No results for input(s): TSH in the last 72 hours. HgA1c: No results found for: LABA1C  No results found for: EAG  proBNP: No results for input(s): PROBNP in the last 72 hours. PT/INR:   Recent Labs     10/14/20  0910   PROTIME 11.3   INR 1.0     APTT:  Recent Labs     10/14/20  0910   APTT 31.1     CARDIAC ENZYMES:No results for input(s): CKTOTAL, CKMB, CKMBINDEX, TROPONINI in the last 72 hours. FASTING LIPID PANEL:  Lab Results   Component Value Date    CHOL 179 11/21/2018    HDL 51 11/21/2018    LDLCALC 114 11/21/2018    TRIG 69 11/21/2018     LIVER PROFILE:No results for input(s): AST, ALT, LABALBU in the last 72 hours. ASSESSMENT:  77-year-old  female with paroxysmal atrial fibrillation status post DC cardioversion in November 2019 (maintained on dofetilide), history of left atrial appendage thrombus, history of multiple allergies to oral anticoagulants including rivaroxaban which is managed with antihistamines, nonischemic cardiomyopathy with chronic systolic heart failure, hypertension, hypercholesterolemia. She is postop day 1 status post successful left atrial appendage closure using Watchman with 24 mm device. The procedure was complicated by a small pericardial effusion, not hemodynamically significant and did not require intervention. 1. Status post left atrial appendage closure using Watchman 10/14/2020 with 24 mm device -successful  2. Trivial to small pericardial effusion due to #1. Stable and not hemodynamically significant  3. Paroxysmal atrial fibrillation-in sinus rhythm on dofetilide  4. Nonischemic cardiomyopathy with chronic systolic heart failure-well compensated    PLAN:   Start aspirin 81 mg p.o. daily today   Start rivaroxaban 15 mg p.o. daily today-adjusted for renal creatinine clearance of 44   Continue dofetilide   Continue hydralazine and spironolactone   Continue carvedilol CR   Limited TTE in the morning  a.  If TTE without increase in pericardial effusion then will be okay for discharge    Follow-up plan:  · Aspirin 81 mg daily and rivaroxaban 50 mg p.o. daily until JAIME then will adjust  · JAIME in 6 weeks with  then office visit with me.   · JAIME in 6 months with  then office visit with me  · Antibiotic prophylaxis for endocarditis for the next 6 months before high-risk procedures  · Follow-up with Dr. Yary Low in 3 months      Gunnar Perkins MD  Interventional Cardiology/Structural Heart Disease  Cell: [phone]       Electronically signed by Gunnar Perkins MD on 10/15/2020 at 10:16 AM

## 2020-10-15 NOTE — CARE COORDINATION
SW spoke with Pt and Son - Jeremy Garcia about Transition plan of Care. Pt lives alone and was independent prior to admission. Pt lives in single story ranch with 13 steps to basement where washer ansd dryer are. Pt uses no DME. Pt still driving. PCP: Dr. Hugo Jovel. [facility] [address] Pt declined [facility]. Discharge Plan is to return home with no needs. PATRICK/BECKY to follow for discharge needs.    Javier Gaitan, L.S.W.  [phone]

## 2020-10-15 NOTE — FLOWSHEET NOTE
Joanna Ross notified about hematoma development at puncture site. Pressure applied and vital remained stable.

## 2020-10-15 NOTE — PLAN OF CARE
Problem: Skin Integrity:  Goal: Will show no infection signs and symptoms  Description: Will show no infection signs and symptoms  10/15/2020 0034 by Delilah Lynn RN  Outcome: Met This Shift     Problem: Falls - Risk of:  Goal: Will remain free from falls  Description: Will remain free from falls  10/15/2020 0034 by Delilah Lynn RN  Outcome: Met This Shift     Problem: Pain:  Goal: Pain level will decrease  Description: Pain level will decrease  Outcome: Met This Shift     Problem: Restraint Use - Nonviolent/Non-Self-Destructive Behavior:  Goal: Absence of restraint indications  Description: Absence of restraint indications  10/15/2020 0034 by Delilah Lynn RN  Outcome: Completed  10/14/2020 1459 by Leon Booth RN  Outcome: Not Met This Shift     Problem: Restraint Use - Nonviolent/Non-Self-Destructive Behavior:  Goal: Absence of restraint-related injury  Description: Absence of restraint-related injury  10/15/2020 0034 by Delilah Lynn RN  Outcome: Completed

## 2020-10-15 NOTE — PROGRESS NOTES
Post [facility]   Mailed antibiotic prophylactic letter:  [phone] to 4 14 2020 (PRN amoxicillin 2mg once 30 to 60 min  PRN.      Order has already been placed for Amoxicillin ( 10-8-2020)

## 2020-10-16 ENCOUNTER — TELEPHONE (OUTPATIENT)
Dept: ADMINISTRATIVE | Age: 75
End: 2020-10-16

## 2020-10-16 VITALS
DIASTOLIC BLOOD PRESSURE: 50 MMHG | HEIGHT: 67 IN | HEART RATE: 69 BPM | TEMPERATURE: 97.9 F | OXYGEN SATURATION: 96 % | BODY MASS INDEX: 27.47 KG/M2 | WEIGHT: 175 LBS | SYSTOLIC BLOOD PRESSURE: 103 MMHG | RESPIRATION RATE: 18 BRPM

## 2020-10-16 LAB
ANION GAP SERPL CALCULATED.3IONS-SCNC: 11 MMOL/L (ref 7–16)
BASOPHILS ABSOLUTE: 0.01 E9/L (ref 0–0.2)
BASOPHILS RELATIVE PERCENT: 0.1 % (ref 0–2)
BUN BLDV-MCNC: 23 MG/DL (ref 8–23)
CALCIUM SERPL-MCNC: 8.5 MG/DL (ref 8.6–10.2)
CHLORIDE BLD-SCNC: 105 MMOL/L (ref 98–107)
CO2: 20 MMOL/L (ref 22–29)
CREAT SERPL-MCNC: 1.2 MG/DL (ref 0.5–1)
EOSINOPHILS ABSOLUTE: 0.03 E9/L (ref 0.05–0.5)
EOSINOPHILS RELATIVE PERCENT: 0.3 % (ref 0–6)
GFR AFRICAN AMERICAN: 53
GFR NON-AFRICAN AMERICAN: 44 ML/MIN/1.73
GLUCOSE BLD-MCNC: 128 MG/DL (ref 74–99)
HCT VFR BLD CALC: 31.6 % (ref 34–48)
HEMOGLOBIN: 10.2 G/DL (ref 11.5–15.5)
IMMATURE GRANULOCYTES #: 0.06 E9/L
IMMATURE GRANULOCYTES %: 0.6 % (ref 0–5)
LYMPHOCYTES ABSOLUTE: 1.46 E9/L (ref 1.5–4)
LYMPHOCYTES RELATIVE PERCENT: 14.3 % (ref 20–42)
MCH RBC QN AUTO: 27.9 PG (ref 26–35)
MCHC RBC AUTO-ENTMCNC: 32.3 % (ref 32–34.5)
MCV RBC AUTO: 86.6 FL (ref 80–99.9)
MONOCYTES ABSOLUTE: 0.89 E9/L (ref 0.1–0.95)
MONOCYTES RELATIVE PERCENT: 8.7 % (ref 2–12)
NEUTROPHILS ABSOLUTE: 7.73 E9/L (ref 1.8–7.3)
NEUTROPHILS RELATIVE PERCENT: 76 % (ref 43–80)
PDW BLD-RTO: 13.8 FL (ref 11.5–15)
PLATELET # BLD: 201 E9/L (ref 130–450)
PMV BLD AUTO: 10.6 FL (ref 7–12)
POTASSIUM REFLEX MAGNESIUM: 4.2 MMOL/L (ref 3.5–5)
RBC # BLD: 3.65 E12/L (ref 3.5–5.5)
SODIUM BLD-SCNC: 136 MMOL/L (ref 132–146)
WBC # BLD: 10.2 E9/L (ref 4.5–11.5)

## 2020-10-16 PROCEDURE — 93308 TTE F-UP OR LMTD: CPT

## 2020-10-16 PROCEDURE — 99232 SBSQ HOSP IP/OBS MODERATE 35: CPT | Performed by: INTERNAL MEDICINE

## 2020-10-16 PROCEDURE — 85025 COMPLETE CBC W/AUTO DIFF WBC: CPT

## 2020-10-16 PROCEDURE — 80048 BASIC METABOLIC PNL TOTAL CA: CPT

## 2020-10-16 PROCEDURE — 6370000000 HC RX 637 (ALT 250 FOR IP): Performed by: INTERNAL MEDICINE

## 2020-10-16 PROCEDURE — 36415 COLL VENOUS BLD VENIPUNCTURE: CPT

## 2020-10-16 PROCEDURE — 6360000002 HC RX W HCPCS: Performed by: INTERNAL MEDICINE

## 2020-10-16 PROCEDURE — 2580000003 HC RX 258: Performed by: INTERNAL MEDICINE

## 2020-10-16 RX ORDER — ASPIRIN 81 MG/1
81 TABLET ORAL DAILY
Qty: 90 TABLET | Refills: 3 | Status: SHIPPED
Start: 2020-10-16 | End: 2020-11-30 | Stop reason: HOSPADM

## 2020-10-16 RX ADMIN — PANTOPRAZOLE SODIUM 40 MG: 40 TABLET, DELAYED RELEASE ORAL at 06:20

## 2020-10-16 RX ADMIN — SPIRONOLACTONE 25 MG: 25 TABLET ORAL at 09:12

## 2020-10-16 RX ADMIN — HYDRALAZINE HYDROCHLORIDE 10 MG: 10 TABLET, FILM COATED ORAL at 06:20

## 2020-10-16 RX ADMIN — DOFETILIDE 250 MCG: 0.25 CAPSULE ORAL at 09:12

## 2020-10-16 RX ADMIN — FAMOTIDINE 20 MG: 20 TABLET, FILM COATED ORAL at 09:12

## 2020-10-16 RX ADMIN — Medication 10 ML: at 09:13

## 2020-10-16 RX ADMIN — CEFAZOLIN 1 G: 1 INJECTION, POWDER, FOR SOLUTION INTRAMUSCULAR; INTRAVENOUS at 06:21

## 2020-10-16 RX ADMIN — ASPIRIN 81 MG: 81 TABLET, COATED ORAL at 09:12

## 2020-10-16 RX ADMIN — FUROSEMIDE 40 MG: 40 TABLET ORAL at 09:12

## 2020-10-16 ASSESSMENT — PAIN SCALES - GENERAL
PAINLEVEL_OUTOF10: 0
PAINLEVEL_OUTOF10: 0

## 2020-10-16 NOTE — CARE COORDINATION
Discharge order is in. Pt awaiting ride and ready to leave. Discharge Plan is home with no needs.    Shamar Hunt, L.S.W.  [phone]

## 2020-10-16 NOTE — PROGRESS NOTES
Inpatient Cardiology progress note      Interval HPI:    Doing well this morning. She ambulated without symptoms  Adonica Paget she received aspirin 81 mg and rivaroxaban 15 mg   TTE this morning reviewed as below   No complaints related to the access site      PHYSICAL EXAM:  BP (!) 103/50   Pulse 69   Temp 97.9 °F (36.6 °C) (Oral)   Resp 18   Ht 5' 6.5\" (1.689 m)   Wt 175 lb (79.4 kg)   SpO2 96%   BMI 27.82 kg/m²     General Appearance:    Alert, cooperative, no distress, appears stated age    Head:    Normocephalic, without obvious abnormality, atraumatic    Eyes:    PERRL, conjunctiva/corneas clear, EOM's intact    Neck:   Supple, symmetrical, trachea midline; no carotid    bruit or JVP    Lungs:     Clear to auscultation bilaterally, respirations unlabored, no wheezing, rales or rhonchi    Heart:    Regular rate and rhythm, no murmur, rub   or gallop    Abdomen:     Soft, non-tender, non-distended    Extremities:   Extremities normal, atraumatic, no cyanosis or edema    Skin:   Skin color, texture, turgor normal for age    Neurologic:   Oriented x3, CNII-XII intact. Normal gross strength and sensation       DATA:      Intake/Output Summary (Last 24 hours) at 10/16/2020 1750  Last data filed at 10/16/2020 1327  Gross per 24 hour   Intake 360 ml   Output 800 ml   Net -440 ml       Labs:   CBC:   Recent Labs     10/15/20  1447 10/16/20  0500   WBC 11.0 10.2   HGB 12.2 10.2*   HCT 37.9 31.6*    201     BMP:   Recent Labs     10/15/20  1642 10/16/20  0500    136   K 4.1 4.2   CO2 24 20*   BUN 22 23   CREATININE 1.2* 1.2*   LABGLOM 44 44   CALCIUM 9.2 8.5*     Mag: No results for input(s): MG in the last 72 hours. Phos: No results for input(s): PHOS in the last 72 hours. TSH: No results for input(s): TSH in the last 72 hours. HgA1c: No results found for: LABA1C  No results found for: EAG  proBNP: No results for input(s): PROBNP in the last 72 hours.   PT/INR:   Recent Labs 10/14/20  0910   PROTIME 11.3   INR 1.0     APTT:  Recent Labs     10/14/20  0910   APTT 31.1     CARDIAC ENZYMES:No results for input(s): CKTOTAL, CKMB, CKMBINDEX, TROPONINI in the last 72 hours. FASTING LIPID PANEL:  Lab Results   Component Value Date    CHOL 179 11/21/2018    HDL 51 11/21/2018    LDLCALC 114 11/21/2018    TRIG 69 11/21/2018     LIVER PROFILE:No results for input(s): AST, ALT, LABALBU in the last 72 hours. Personally reviewed her limited TTE this morning:  The pericardial effusion noted previously is essentially resolved. ASSESSMENT:  24-year-old  female with paroxysmal atrial fibrillation status post DC cardioversion in November 2019 (maintained on dofetilide), history of left atrial appendage thrombus, history of multiple allergies to oral anticoagulants including rivaroxaban which is managed with antihistamines, nonischemic cardiomyopathy with chronic systolic heart failure, hypertension, hypercholesterolemia. She is postop day 2 status post successful left atrial appendage closure using Watchman with 24 mm device. The procedure was complicated by a small pericardial effusion, not hemodynamically significant and did not require intervention. 1. Status post left atrial appendage closure using Watchman 10/14/2020 with 24 mm device -successful  2. Trivial to small pericardial effusion due to #1. Resolving  3. Paroxysmal atrial fibrillation-in sinus rhythm on dofetilide  4. Nonischemic cardiomyopathy with chronic systolic heart failure-well compensated    PLAN:   Continue aspirin 81 mg p.o. daily today   Continue rivaroxaban 15 mg p.o. daily -adjusted for renal creatinine clearance of 44   Continue dofetilide   Continue hydralazine and spironolactone   Continue carvedilol CR    Follow-up plan:  · Aspirin 81 mg daily and rivaroxaban 15 mg p.o. daily until JAIME then will adjust  · JAIME in 6 weeks with  then office visit with me.   · JAIME in 6 months with  then office visit with me  · Antibiotic prophylaxis for endocarditis for the next 6 months before high-risk procedures  · Follow-up with Dr. Abel Armendariz in 3 months      Sarah Nugent MD  Interventional Cardiology/Structural Heart Disease  Cell: [phone]       Electronically signed by Sarah Nugent MD on 10/16/2020 at 5:50 PM

## 2020-10-16 NOTE — PROGRESS NOTES
from [facility] site. Follow Up Appointments:   You have a follow up appointment with Dr. Meagan Ricks on 11/16/20 at 1:00 pm        39 Day Follow Up JAIME:   Your follow up JAIME schedule on 11/23/20 at 12:30 pm at [address] will be given detailed instructions closer to the above date.  You will also be notified at that time on any medications changes based on the JAIME findings.

## 2020-10-16 NOTE — PROGRESS NOTES
CLINICAL PHARMACY NOTE: MEDS TO [address] [facility] Patient?: No  Total # of Prescriptions Filled: 1   The following medications were delivered to the patient:  · xarelto 15 mg  Total # of Interventions Completed: 3  Time Spent (min): 15    Additional Documentation:

## 2020-10-16 NOTE — TELEPHONE ENCOUNTER
Jeannie Gaxiola from [facility] SPECIAL[facility] E's called. , Pt needs a hospital f/u since she is leaving today. dx prescence of watchman device,   needs appt within one week. Please call pt back.

## 2020-10-16 NOTE — DISCHARGE SUMMARY
Physician Discharge Summary     Patient ID:  Diane Dooley  [id number]  85 y.o.  1945    Admit date: 10/14/2020    Discharge date and time: 10/16/2020  2:40 PM     Admitting Physician: King Ignacio MD     Discharge Physician: King Ignacio MD     Admission Diagnoses: Paroxysmal atrial fibrillation [I48.0]    Discharge Diagnoses: Presence of Watchman left atrial appendage closure device [Z95.818]    Admission Condition: good    Discharged Condition: good    Indication for Admission: Observation post left atrial appendage closure using [facility] Course: The patient presented electively to the cardiac Cath Lab on October 14. She underwent successful percutaneous left atrial appendage closure with a 24 mm Watchman device under JAIME and general anesthesia. There was 1 full recapture. There was a small pericardial effusion that developed after the recapture. This was monitored and was resolving by the time of discharge and did not require any intervention. Upon discharge she had received her aspirin and a dose of rivaroxaban and underwent a follow-up TTE to ensure no recurrence/worsening of the effusion. Consults: none    Significant Diagnostic Studies: TTE    Discharge Exam:  General Appearance:    Alert, cooperative, no distress, appears stated age    Head:    Normocephalic, without obvious abnormality, atraumatic    Eyes:    PERRL, conjunctiva/corneas clear, EOM's intact    Neck:   Supple, symmetrical, trachea midline; no carotid    bruit or JVP    Lungs:     Clear to auscultation bilaterally, respirations unlabored, no wheezing, rales or rhonchi    Heart:    Regular rate and rhythm, no murmur, rub   or gallop    Abdomen:     Soft, non-tender, non-distended    Extremities:   Extremities normal, atraumatic, no cyanosis or edema    Skin:   Skin color, texture, turgor normal for age    Neurologic:   Oriented x3, CNII-XII intact.  Normal gross strength and sensation     Disposition: home    In process/preliminary results:  Outstanding Order Results     No orders found from 9/15/2020 to 10/15/2020. Patient Instructions:   Current Discharge Medication List      START taking these medications    Details   aspirin 81 MG EC tablet Take 1 tablet by mouth daily  Qty: 90 tablet, Refills: 3         CONTINUE these medications which have CHANGED    Details   rivaroxaban (XARELTO) 15 MG TABS tablet Take 1 tablet by mouth daily (with breakfast)  Qty: 45 tablet, Refills: 1    Comments: Please consider 90 day supplies to promote better adherence         CONTINUE these medications which have NOT CHANGED    Details   hydrOXYzine (ATARAX) 10 MG tablet Take 1 tablet by mouth 3 times daily as needed for Itching  Qty: 270 tablet, Refills: 3    Comments: 7/20/2020: Disregard previous prescriptions and refills; honor this prescription and refills  Associated Diagnoses: Anxiety; Itching; Encounter for medication refill      furosemide (LASIX) 20 MG tablet Take 1 tablet by mouth daily  Qty: 90 tablet, Refills: 3      carvedilol (COREG CR) 20 MG CP24 extended release capsule Take 1 capsule by mouth daily  Qty: 90 capsule, Refills: 3      dofetilide (TIKOSYN) 250 MCG capsule Take 1 capsule by mouth every 12 hours  Qty: 180 capsule, Refills: 3      hydrALAZINE (APRESOLINE) 10 MG tablet Take 1 tablet by mouth 3 times daily  Qty: 270 tablet, Refills: 3      spironolactone (ALDACTONE) 25 MG tablet Take 1 tablet by mouth daily  Qty: 90 tablet, Refills: 3    Associated Diagnoses: Acute on chronic systolic heart failure ([address] Secondary cardiomyopathy ([address]  Essential hypertension      triamcinolone (KENALOG) 0.1 % cream Apply topically 3 times daily as needed   Refills: 1      loperamide (IMODIUM) 2 MG capsule Take 2 mg by mouth 4 times daily as needed for Diarrhea      diphenhydrAMINE (BENADRYL) 25 MG tablet Take 25 mg by mouth every 6 hours as needed for Itching      vitamin D (CHOLECALCIFEROL) 1000 UNIT TABS tablet Take 1,000 Units by mouth daily      baclofen (LIORESAL) 20 MG tablet Take 20 mg by mouth 2 times daily as needed (muscle spasms, spastic bladder) Indications: Back Spasm, Bladder Spasm       amoxicillin (AMOXIL) 500 MG capsule Take 4 capsules by mouth as needed (2000mg once prior to dental, GI/, skin  or cardiac invasive procedures)  Qty: 60 capsule, Refills: 0    Comments: Post LAAC/ Watchman 6 month prophylactics 10 14 2020 to 4 14 2021  Associated Diagnoses: Thrombus of left atrial appendage      famotidine (PEPCID) 40 MG tablet Take 40 mg by mouth 2 times daily       omeprazole (PRILOSEC) 40 MG delayed release capsule TAKE 1 CAPSULE BY MOUTH IN THE MORNING      sodium chloride (OCEAN, BABY AYR) 0.65 % nasal spray 1 spray by Nasal route as needed for Congestion  Qty: 50 mL, Refills: 0         STOP taking these medications       colestipol (COLESTID) 1 g tablet Comments:   Reason for Stopping:         ibuprofen (ADVIL;MOTRIN) 200 MG tablet Comments:   Reason for Stopping:             Activity: activity as tolerated  Diet: regular diet  Wound Care: as directed    Follow-up plan:  · Aspirin 81 mg daily and rivaroxaban 15 mg p.o. daily until JAIME then will adjust  · JAIME in 6 weeks with  then office visit with me.   · JAIME in 6 months with  then office visit with me  · Antibiotic prophylaxis for endocarditis for the next 6 months before high-risk procedures  · Follow-up with Dr. Sera Bertrand in 3 months    Signed:  Danie Irwin MD  Interventional Cardiology/Structural Heart Disease  Cell: [phone]   10/16/2020  6:00 PM

## 2020-10-17 ENCOUNTER — CARE COORDINATION (OUTPATIENT)
Dept: CASE MANAGEMENT | Age: 75
End: 2020-10-17

## 2020-10-17 NOTE — CARE COORDINATION
[facility] Transitions Initial Follow Up Call    Call within 2 business days of discharge: Yes    Patient: Anitha Smith Patient : 1945   MRN: [id number]  Reason for Admission: Ethelda Ransom left atrial appendage closure device  Discharge Date: 10/16/20 RARS: Readmission Risk Score: 18      Last Discharge [facility]       Complaint Diagnosis Description Type Department Provider    10/14/20  Presence of Watchman left atrial appendage closure device Admission (Discharged) from [address] [place] [address] [address], MD            -First attempt to reach the patient for initial Care Transition call post hospital discharge. Message left with CTN's contact information requesting return phone call.     Follow Up  Future Appointments   Date Time Provider Duke Santana   2020  1:00 PM Karis Holm MD [facility]   2020 12:30 PM [facility] RM-[facility] Ivonne Gandhi RN

## 2020-10-19 ENCOUNTER — CARE COORDINATION (OUTPATIENT)
Dept: CASE MANAGEMENT | Age: 75
End: 2020-10-19

## 2020-10-19 ENCOUNTER — TELEPHONE (OUTPATIENT)
Dept: ADMINISTRATIVE | Age: 75
End: 2020-10-19

## 2020-10-19 NOTE — CARE COORDINATION
[facility] Transitions Initial Follow Up Call    Call within 2 business days of discharge: Yes    Patient: Lizzy Street Patient : 1945   MRN: [id number]  Reason for Admission: Laverne Frizzle left atrial appendage closure device  Discharge Date: 10/16/20 RARS: Readmission Risk Score: 18      Last Discharge [facility]       Complaint Diagnosis Description Type Department Provider    10/14/20  Presence of Watchman left atrial appendage closure device Admission (Discharged) from [address] [place] [address] [address] [place] MD          -Second attempt to reach the patient for initial Care Transition call post hospital discharge. Message left with CTN's contact information requesting return phone call.       Follow Up  Future Appointments   Date Time Provider Duke Santana   2020  1:00 PM Azucena Cabrera MD [facility]   2020 12:30 PM [facility] RM-[facility] Ivonne Juares RN

## 2020-10-20 ENCOUNTER — CARE COORDINATION (OUTPATIENT)
Dept: CASE MANAGEMENT | Age: 75
End: 2020-10-20

## 2020-10-29 ENCOUNTER — TELEPHONE (OUTPATIENT)
Dept: CARDIOLOGY CLINIC | Age: 75
End: 2020-10-29

## 2020-11-09 ENCOUNTER — TELEPHONE (OUTPATIENT)
Dept: CARDIOLOGY CLINIC | Age: 75
End: 2020-11-09

## 2020-11-09 NOTE — TELEPHONE ENCOUNTER
King Ignacio MD  You 2 hours ago (1:58 PM)       I prefer that her Xarelto is not stopped    Message text          I called and left voice message per DR Ortiz Erm advice / instruction. Call back # left if Diane Ferozsahara has questions.    Marilee Bean RN

## 2020-11-16 ENCOUNTER — OFFICE VISIT (OUTPATIENT)
Dept: CARDIOLOGY CLINIC | Age: 75
End: 2020-11-16
Payer: MEDICARE

## 2020-11-16 VITALS
HEART RATE: 67 BPM | DIASTOLIC BLOOD PRESSURE: 70 MMHG | HEIGHT: 67 IN | BODY MASS INDEX: 27.62 KG/M2 | WEIGHT: 176 LBS | SYSTOLIC BLOOD PRESSURE: 138 MMHG | RESPIRATION RATE: 16 BRPM

## 2020-11-16 PROCEDURE — 93000 ELECTROCARDIOGRAM COMPLETE: CPT | Performed by: INTERNAL MEDICINE

## 2020-11-16 PROCEDURE — 99214 OFFICE O/P EST MOD 30 MIN: CPT | Performed by: INTERNAL MEDICINE

## 2020-11-16 RX ORDER — CHOLESTYRAMINE 4 G/9G
POWDER, FOR SUSPENSION ORAL DAILY
COMMUNITY
End: 2021-06-03

## 2020-11-16 NOTE — PROGRESS NOTES
[facility]   Heart and V[facility] [place]   Clinic Note     Date:11/16/20   Patient Name:Aidee Charles  YOB: 1945  Age: 76 y.o. Primary Care Provider: Rachna Lima MD    Subjective     This is a 42-year-old  female, patient of Dr. Eddi Sepulveda, who is referred to us originally for left atrial appendage closure using watchman which she underwent successfully on 10/14/2020. This is her 6-week follow-up. Since her discharge she has done very well. She has had no pain or swelling or drainage from her access site. She has had no chest pain or palpitations or syncope or presyncope. She has not had any lower extremity edema orthopnea or PND. She continues to have dyspnea on exertion which is unchanged compared to preprocedure. She is still taking aspirin and Xarelto. She still has her endocarditis prophylaxis antibiotic prescription. A focused history review includes:  1. Paroxysmal atrial fibrillation status post DC cardioversion 11/2019  1. Maintained on dofetilide and rivaroxaban  2. History of left atrial appendage thrombus in November 2018 and March 2019. Resolved on rivaroxaban. 3. Allergic to rivaroxaban with significant skin reaction that she manages with hydroxyzine  1. Also allergic to dabigatran and apixaban. All the above documented additionally in [facility] clinic notes  2. S/p left atrial appendage closure using 24 mm watchman 2.5 (10/14/20 - (Dr. Meagan Ricks)) complicated by small pericardial effusion that did not require intervention and resolved before discharge  3. Nonischemic cardiomyopathy with chronic systolic heart failure (coronary angiogram without significant CAD in 2010)  1. Most recent TTE from July 2019 with a EF 35 to 40% with moderate MR moderate PH  2. JAIME dated 9/9/2020 personally reviewed: Mildly reduced LV systolic function with an EF 45%. Normal RV size and systolic function. Moderate MR. Mild AR. Mild TR. Moderate pulmonary hypertension. No CHERYL thrombus. CHERYL anatomy amenable to watchman. 4. Hypertension  5. Microscopic colitis  6. Numerous medication allergies including ACE inhibitors and ARB's  7. hypercholesterolemia      Past History    Past Medical History:         Diagnosis Date    Anxiety     Arthritis     Atrial fibrillation [address] [address]     new onset    Cervical radiculopathy     JOHANA Ruby    CHF (congestive heart failure) (HCC)     Chronic migraine without aura, with intractable migraine, so stated, without mention of status migrainosus     JOHANA Ruby    Chronic sinusitis     Disc displacement, lumbar     JOHANA Ruby    Hx of blood clots     in heart    Hypertension     IBS (irritable bowel syndrome)     Left ventricular systolic dysfunction     Meige syndrome     partial/JOHANA Ruby    Microscopic colitis     Mitral regurgitation     Mild to moderate    Nonischemic cardiomyopathy (HCC)     Osteopenia     Vertebrobasilar artery syndrome     JOHANA Ruby          Social History:    Social History     Tobacco History     Smoking Status  Never Smoker    Smokeless Tobacco Use  Never Used          Alcohol History     Alcohol Use Status  Yes Drinks/Week  0 Standard drinks or equivalent per week Amount  0.0 standard drinks of alcohol/wk Comment  occasional wine/mixed drink.            Drug Use     Drug Use Status  Never          Sexual Activity     Sexually Active  Not Asked Comment                      Family History:       Problem Relation Age of Onset    Heart Attack Mother     Stroke Mother     Heart Attack Father     Heart Failure Father     Heart Disease Father     Anxiety Disorder Sister     Depression Sister     Crohn's Disease Son          Review of Systems   General ROS: No weight loss fevers or chills  Psychological ROS: No new depression or anxiety or altered mood  Ophthalmic ROS: No new vision changes or diplopia  Respiratory ROS: No cough, wheezing, shortness of breath, or hemoptysis  Cardiovascular ROS: See HPI  Gastrointestinal ROS: No nausea, vomiting, constipation, diarrhea, hematemesis, melena, or hematochezia  Genito-Urinary ROS: No dysuria, hematuria, or new incontinence  Musculoskeletal ROS: No new muscle pain, joint pain, joint swelling, or back pain  Neurological ROS: No new numbness or paresthesias, no focal weakness, no altered speech, no new memory loss  Dermatological ROS: No new rashes, no pruritus, no skin masses        Medications     Current Outpatient Medications   Medication Sig Dispense Refill    cholestyramine (QUESTRAN) 4 GM/DOSE powder Take by mouth daily      aspirin 81 MG EC tablet Take 1 tablet by mouth daily 90 tablet 3    rivaroxaban (XARELTO) 15 MG TABS tablet Take 1 tablet by mouth daily (with breakfast) 45 tablet 1    amoxicillin (AMOXIL) 500 MG capsule Take 4 capsules by mouth as needed (2000mg once prior to dental, GI/, skin  or cardiac invasive procedures) 60 capsule 0    famotidine (PEPCID) 40 MG tablet Take 40 mg by mouth 2 times daily       omeprazole (PRILOSEC) 40 MG delayed release capsule TAKE 1 CAPSULE BY MOUTH IN THE MORNING      hydrOXYzine (ATARAX) 10 MG tablet Take 1 tablet by mouth 3 times daily as needed for Itching 270 tablet 3    furosemide (LASIX) 20 MG tablet Take 1 tablet by mouth daily 90 tablet 3    carvedilol (COREG CR) 20 MG CP24 extended release capsule Take 1 capsule by mouth daily 90 capsule 3    dofetilide (TIKOSYN) 250 MCG capsule Take 1 capsule by mouth every 12 hours 180 capsule 3    hydrALAZINE (APRESOLINE) 10 MG tablet Take 1 tablet by mouth 3 times daily 270 tablet 3    spironolactone (ALDACTONE) 25 MG tablet Take 1 tablet by mouth daily 90 tablet 3    triamcinolone (KENALOG) 0.1 % cream Apply topically 3 times daily as needed   1    loperamide (IMODIUM) 2 MG capsule Take 2 mg by mouth 4 times daily as needed for Diarrhea      diphenhydrAMINE (BENADRYL) 25 MG tablet Take 25 mg by mouth every 6 hours as Value Date    LABALBU 3.9 09/03/2020       Lab Results   Component Value Date    CHOL 179 11/21/2018    CHOL 204 (H) 11/14/2015    CHOL 235 (H) 07/02/2015     Lab Results   Component Value Date    TRIG 69 11/21/2018    TRIG 71 11/14/2015    TRIG 182 (H) 07/02/2015     Lab Results   Component Value Date    HDL 51 11/21/2018    HDL 85 11/14/2015    HDL 69 07/02/2015     Lab Results   Component Value Date    LDLCALC 114 (H) 11/21/2018    LDLCALC 105 (H) 11/14/2015    LDLCALC 130 (H) 07/02/2015     Lab Results   Component Value Date    LABVLDL 14 11/21/2018    LABVLDL 14 11/14/2015    LABVLDL 36 07/02/2015     No results found for: [facility]    Lab Results   Component Value Date    CKTOTAL 83 11/25/2013    CKMB 6.6 (H) 11/25/2013    TROPONINI <0.01 03/09/2019       Lab Results   Component Value Date    BNP 1,046 (H) 11/24/2013         EKG today: Normal sinus rhythm with first-degree AV block and left bundle branch block. Assessment and Plan:      80-year-old  female who is 4 weeks post left atrial appendage closure using Watchman with 24 mm device. She is recovering wonderfully. Diagnosis Orders   1. Presence of Watchman left atrial appendage closure device     2. PAF (paroxysmal atrial fibrillation) ([facility])  EKG 12 Lead      · Continue aspirin and Xarelto pending the 6-week JAIME scheduled for November 30  · If adequate Watchman seal then will discontinue Xarelto and replace it with Plavix 75 mg daily in addition to aspirin 325 mg daily  · Continue endocarditis prophylaxis at least through 6 months post implant  · Return in April for the 6-month visit. JAIME will be scheduled at that time as well.  Follow up with me in April 2021    We appreciate the opportunity to participate in Her care!       Ebony Sheets MD  Interventional Cardiology/Structural Heart Disease  Office: [phone]  Fax: [phone]  CHRISTOPHER Coordinator: Nicko Cordova

## 2020-11-16 NOTE — PATIENT INSTRUCTIONS
1. Xarelto samples for 1 month  2. Follow up in 2 months with Dr. Rui Kay   3. We will call you after the JAIME on November 30 regarding continuing the Xarelto or not  4. Continue the aspirin  5.  Return in April for your 6 month appointment - there will be a JAIME around that time as well

## 2020-11-24 ENCOUNTER — HOSPITAL ENCOUNTER (OUTPATIENT)
Age: 75
Discharge: HOME | End: 2020-11-26
Payer: MEDICARE

## 2020-11-24 PROCEDURE — U0003 INFECTIOUS AGENT DETECTION BY NUCLEIC ACID (DNA OR RNA); SEVERE ACUTE RESPIRATORY SYNDROME CORONAVIRUS 2 (SARS-COV-2) (CORONAVIRUS DISEASE [COVID-19]), AMPLIFIED PROBE TECHNIQUE, MAKING USE OF HIGH THROUGHPUT TECHNOLOGIES AS DESCRIBED BY CMS-2020-01-R: HCPCS

## 2020-11-26 LAB
SARS-COV-2: NOT DETECTED
SOURCE: NORMAL

## 2020-11-27 ENCOUNTER — TELEPHONE (OUTPATIENT)
Dept: NON INVASIVE DIAGNOSTICS | Age: 75
End: 2020-11-27

## 2020-11-30 ENCOUNTER — ANESTHESIA (OUTPATIENT)
Dept: CARDIAC CATH/INVASIVE PROCEDURES | Age: 75
End: 2020-11-30

## 2020-11-30 ENCOUNTER — ANESTHESIA EVENT (OUTPATIENT)
Dept: CARDIAC CATH/INVASIVE PROCEDURES | Age: 75
End: 2020-11-30

## 2020-11-30 ENCOUNTER — HOSPITAL ENCOUNTER (OUTPATIENT)
Dept: CARDIAC CATH/INVASIVE PROCEDURES | Age: 75
Discharge: HOME | End: 2020-11-30
Payer: MEDICARE

## 2020-11-30 VITALS
HEIGHT: 67 IN | HEART RATE: 68 BPM | OXYGEN SATURATION: 97 % | BODY MASS INDEX: 27.15 KG/M2 | RESPIRATION RATE: 16 BRPM | SYSTOLIC BLOOD PRESSURE: 126 MMHG | WEIGHT: 173 LBS | DIASTOLIC BLOOD PRESSURE: 58 MMHG

## 2020-11-30 VITALS
DIASTOLIC BLOOD PRESSURE: 77 MMHG | SYSTOLIC BLOOD PRESSURE: 138 MMHG | OXYGEN SATURATION: 95 % | RESPIRATION RATE: 20 BRPM

## 2020-11-30 LAB
LV EF: 48 %
LVEF MODALITY: NORMAL

## 2020-11-30 PROCEDURE — 3700000000 HC ANESTHESIA ATTENDED CARE

## 2020-11-30 PROCEDURE — 2580000003 HC RX 258: Performed by: INTERNAL MEDICINE

## 2020-11-30 PROCEDURE — 2709999900 HC NON-CHARGEABLE SUPPLY

## 2020-11-30 PROCEDURE — 6360000002 HC RX W HCPCS

## 2020-11-30 PROCEDURE — 93320 DOPPLER ECHO COMPLETE: CPT | Performed by: INTERNAL MEDICINE

## 2020-11-30 PROCEDURE — 3700000001 HC ADD 15 MINUTES (ANESTHESIA)

## 2020-11-30 PROCEDURE — 93325 DOPPLER ECHO COLOR FLOW MAPG: CPT

## 2020-11-30 PROCEDURE — 93312 ECHO TRANSESOPHAGEAL: CPT

## 2020-11-30 PROCEDURE — 93325 DOPPLER ECHO COLOR FLOW MAPG: CPT | Performed by: INTERNAL MEDICINE

## 2020-11-30 PROCEDURE — 93312 ECHO TRANSESOPHAGEAL: CPT | Performed by: INTERNAL MEDICINE

## 2020-11-30 PROCEDURE — 93321 DOPPLER ECHO F-UP/LMTD STD: CPT

## 2020-11-30 RX ORDER — CLOPIDOGREL BISULFATE 75 MG/1
75 TABLET ORAL DAILY
Qty: 30 TABLET | Refills: 5 | Status: SHIPPED | OUTPATIENT
Start: 2020-11-30 | End: 2021-04-15 | Stop reason: HOSPADM

## 2020-11-30 RX ORDER — SODIUM CHLORIDE 9 MG/ML
INJECTION, SOLUTION INTRAVENOUS CONTINUOUS
Status: DISCONTINUED | OUTPATIENT
Start: 2020-11-30 | End: 2020-12-01 | Stop reason: HOSPADM

## 2020-11-30 RX ORDER — PROPOFOL 10 MG/ML
INJECTION, EMULSION INTRAVENOUS PRN
Status: DISCONTINUED | OUTPATIENT
Start: 2020-11-30 | End: 2020-11-30 | Stop reason: SDUPTHER

## 2020-11-30 RX ORDER — ASPIRIN 325 MG
325 TABLET, DELAYED RELEASE (ENTERIC COATED) ORAL DAILY
Qty: 30 TABLET | Refills: 11 | Status: SHIPPED | OUTPATIENT
Start: 2020-11-30 | End: 2021-03-17

## 2020-11-30 RX ADMIN — SODIUM CHLORIDE: 9 INJECTION, SOLUTION INTRAVENOUS at 11:47

## 2020-11-30 RX ADMIN — PROPOFOL 50 MG: 10 INJECTION, EMULSION INTRAVENOUS at 12:02

## 2020-11-30 RX ADMIN — PROPOFOL 10 MG: 10 INJECTION, EMULSION INTRAVENOUS at 12:06

## 2020-11-30 RX ADMIN — PROPOFOL 10 MG: 10 INJECTION, EMULSION INTRAVENOUS at 12:12

## 2020-11-30 RX ADMIN — PROPOFOL 20 MG: 10 INJECTION, EMULSION INTRAVENOUS at 12:10

## 2020-11-30 RX ADMIN — PROPOFOL 10 MG: 10 INJECTION, EMULSION INTRAVENOUS at 12:04

## 2020-11-30 RX ADMIN — PROPOFOL 10 MG: 10 INJECTION, EMULSION INTRAVENOUS at 12:08

## 2020-11-30 NOTE — ANESTHESIA PRE PROCEDURE
Department of Anesthesiology  Preprocedure Note       Name:  Gavin Zheng   Age:  76 y.o.  :  1945                                          MRN:  [id number]         Date:  2020      Surgeon: * Surgery not found *    Procedure:     Medications prior to admission:   Prior to Admission medications    Medication Sig Start Date End Date Taking?  Authorizing Provider   aspirin 81 MG EC tablet Take 1 tablet by mouth daily 10/16/20  Yes Maverick Navarrete MD   rivaroxaban (XARELTO) 15 MG TABS tablet Take 1 tablet by mouth daily (with breakfast) 10/16/20  Yes Maverick Navarrete MD   amoxicillin (AMOXIL) 500 MG capsule Take 4 capsules by mouth as needed (2000mg once prior to dental, GI/, skin  or cardiac invasive procedures) 10/8/20  Yes Maverick Navarrete MD   famotidine (PEPCID) 40 MG tablet Take 40 mg by mouth 2 times daily    Yes Historical Provider, MD   omeprazole (PRILOSEC) 40 MG delayed release capsule TAKE 1 CAPSULE BY MOUTH IN THE MORNING 20  Yes Historical Provider, MD   hydrOXYzine (ATARAX) 10 MG tablet Take 1 tablet by mouth 3 times daily as needed for Itching 20  Yes Bonita Yoon MD   furosemide (LASIX) 20 MG tablet Take 1 tablet by mouth daily 20  Yes Grabiel Marquez MD   carvedilol (COREG CR) 20 MG CP24 extended release capsule Take 1 capsule by mouth daily 3/31/20  Yes Grabiel Marquez MD   dofetilide [facility]) 250 MCG capsule Take 1 capsule by mouth every 12 hours 20  Yes Nanci Godoy DO   hydrALAZINE (APRESOLINE) 10 MG tablet Take 1 tablet by mouth 3 times daily 19  Yes Grabiel Marquez MD   spironolactone (ALDACTONE) 25 MG tablet Take 1 tablet by mouth daily 19  Yes Grabiel Marquez MD   triamcinolone (KENALOG) 0.1 % cream Apply topically 3 times daily as needed  19  Yes Historical Provider, MD   loperamide (IMODIUM) 2 MG capsule Take 2 mg by mouth 4 times daily as needed for Diarrhea   Yes Historical Provider, MD   diphenhydrAMINE (BENADRYL) 25 MG tablet Take 25 mg by mouth every 6 hours as needed for Itching   Yes Historical Provider, MD   vitamin D (CHOLECALCIFEROL) 1000 UNIT TABS tablet Take 1,000 Units by mouth daily   Yes Historical Provider, MD   sodium chloride (OCEAN, BABY AYR) 0.65 % nasal spray 1 spray by Nasal route as needed for Congestion 12/18/16  Yes Cornelius Mccartney MD   baclofen (LIORESAL) 20 MG tablet Take 20 mg by mouth 2 times daily as needed (muscle spasms, spastic bladder) Indications: Back Spasm, Bladder Spasm    Yes Historical Provider, MD   cholestyramine Brown Flatter) 4 GM/DOSE powder Take by mouth daily    Historical Provider, MD       Current medications:    Current Outpatient Medications   Medication Sig Dispense Refill    aspirin 81 MG EC tablet Take 1 tablet by mouth daily 90 tablet 3    rivaroxaban (XARELTO) 15 MG TABS tablet Take 1 tablet by mouth daily (with breakfast) 45 tablet 1    amoxicillin (AMOXIL) 500 MG capsule Take 4 capsules by mouth as needed (2000mg once prior to dental, GI/, skin  or cardiac invasive procedures) 60 capsule 0    famotidine (PEPCID) 40 MG tablet Take 40 mg by mouth 2 times daily       omeprazole (PRILOSEC) 40 MG delayed release capsule TAKE 1 CAPSULE BY MOUTH IN THE MORNING      hydrOXYzine (ATARAX) 10 MG tablet Take 1 tablet by mouth 3 times daily as needed for Itching 270 tablet 3    furosemide (LASIX) 20 MG tablet Take 1 tablet by mouth daily 90 tablet 3    carvedilol (COREG CR) 20 MG CP24 extended release capsule Take 1 capsule by mouth daily 90 capsule 3    dofetilide (TIKOSYN) 250 MCG capsule Take 1 capsule by mouth every 12 hours 180 capsule 3    hydrALAZINE (APRESOLINE) 10 MG tablet Take 1 tablet by mouth 3 times daily 270 tablet 3    spironolactone (ALDACTONE) 25 MG tablet Take 1 tablet by mouth daily 90 tablet 3    triamcinolone (KENALOG) 0.1 % cream Apply topically 3 times daily as needed   1    loperamide (IMODIUM) 2 MG capsule Take 2 mg by mouth 4 times daily as needed for Diarrhea      diphenhydrAMINE (BENADRYL) 25 MG tablet Take 25 mg by mouth every 6 hours as needed for Itching      vitamin D (CHOLECALCIFEROL) 1000 UNIT TABS tablet Take 1,000 Units by mouth daily      sodium chloride (OCEAN, BABY AYR) 0.65 % nasal spray 1 spray by Nasal route as needed for Congestion 50 mL 0    baclofen (LIORESAL) 20 MG tablet Take 20 mg by mouth 2 times daily as needed (muscle spasms, spastic bladder) Indications: Back Spasm, Bladder Spasm       cholestyramine (QUESTRAN) 4 GM/DOSE powder Take by mouth daily       Current Facility-Administered Medications   Medication Dose Route Frequency Provider Last Rate Last Dose    0.9 % sodium chloride infusion   Intravenous Continuous Janey Mejía MD           Allergies:     Allergies   Allergen Reactions    Atacand [Candesartan] Hives and Itching    Adhesive Tape Itching     develops rash and itching with EKG electrodes    Digoxin Itching     developed itching and rash    Losartan Potassium Itching    Shellfish-Derived Products Itching     3/9/19 Pt states she had a lobster pizza and had difficulty breathing, started to sweat and was itchy    Sulfa Antibiotics Diarrhea and Other (See Comments)     Also causes migraines  caused migraines and diarrhea    Acetaminophen Hives and Itching    Apap-Caff-Dihydrocodeine Hives and Itching    Coreg [Carvedilol] Itching     Can take extended release Coreg    Cozaar [Losartan] Itching    Demerol      3/9/19 Pt states she gets a severe migraine    Diovan [Valsartan] Itching    Imdur [Isosorbide Mononitrate]      Severe migraines    Labetalol Itching    Lisinopril Itching    Ramipril Itching    Xarelto [Rivaroxaban]      3/9/19 Pt states that she had broke out with a rash, got itchy and had severe side effects (severe itch, headache)    Effexor [Venlafaxine Hydrochloride] Nausea Only    Eliquis [Apixaban] Hives, Itching and Rash     3/9/19 Pt states that she gets hives, itchy and a rash Problem List:    Patient Active Problem List   Diagnosis Code    Anxiety F41.9    Nonischemic cardiomyopathy ([facility] [address] [address] I42.8    Severe mitral regurgitation I34.0    Lumbar radiculopathy M54.16    Cervical radiculopathy M54.12    HTN (hypertension), benign I10    Thrombus of left atrial appendage I51.3    PAF (paroxysmal atrial fibrillation) ([facility] I48.0    Chronic systolic congestive heart failure ([facility] [address] [address] I50.22    Visit for monitoring Tikosyn therapy Z51.81, Z79.899    Encounter for medication refill Z76.0    Itching L29.9    Chronic anticoagulation Z79.01    Presence of Watchman left atrial appendage closure device Z95.818       Past Medical History:        Diagnosis Date    Anxiety     Arthritis     Atrial fibrillation ([facility] [address] [address]     new onset    Cervical radiculopathy     JOHANA Ruby    CHF (congestive heart failure) ([facility])     Chronic migraine without aura, with intractable migraine, so stated, without mention of status migrainosus     JOHANA Ruby    Chronic sinusitis     Disc displacement, lumbar     JOHANA Ruby    Hx of blood clots     in heart    Hypertension     IBS (irritable bowel syndrome)     Left ventricular systolic dysfunction     Meige syndrome     partial/RNicole Ruby    Microscopic colitis     Mitral regurgitation     Mild to moderate    Nonischemic cardiomyopathy ([facility])     Osteopenia     Vertebrobasilar artery syndrome     JOHANA Ruby       Past Surgical History:        Procedure Laterality Date    BLADDER REPAIR      BUNIONECTOMY      CARDIOVASCULAR STRESS TEST  11/25/13    Rt & LT cath    CARDIOVERSION  11/04/2019    Successful CV from AF to NSR   (Dr. Des Holland)    COLONOSCOPY  07/2020    DIAGNOSTIC CARDIAC CATH LAB PROCEDURE  2/20/10    Dr Asaf Schmid CATH LAB PROCEDURE  8/24/11    [facility].     DOPPLER ECHOCARDIOGRAPHY  11/25/13    HYSTERECTOMY  1991    OTHER SURGICAL HISTORY  10/14/2020    Dr. Terry Robertson- 24mm Watchman device  OVARY REMOVAL      TRANSESOPHAGEAL ECHOCARDIOGRAM  11/21/2018    Dr. Evon Avendaño TRANSESOPHAGEAL ECHOCARDIOGRAM  03/18/2019    WISDOM TOOTH EXTRACTION         Social History:    Social History     Tobacco Use    Smoking status: Never Smoker    Smokeless tobacco: Never Used   Substance Use Topics    Alcohol use: Yes     Alcohol/week: 0.0 standard drinks     Frequency: Monthly or less     Drinks per session: 1 or 2     Binge frequency: Never     Comment: occasional wine/mixed drink. Counseling given: Not Answered      Vital Signs (Current):   Vitals:    11/30/20 0958   Weight: 173 lb (78.5 kg)   Height: 5' 6.5\" (1.689 m)                                              BP Readings from Last 3 Encounters:   11/16/20 138/70   10/16/20 (!) 103/50   10/02/20 118/70       NPO Status:                                                                                 BMI:   Wt Readings from Last 3 Encounters:   11/30/20 173 lb (78.5 kg)   11/16/20 176 lb (79.8 kg)   10/14/20 175 lb (79.4 kg)     Body mass index is 27.5 kg/m². CBC:   Lab Results   Component Value Date    WBC 10.2 10/16/2020    RBC 3.65 10/16/2020    HGB 10.2 10/16/2020    HCT 31.6 10/16/2020    MCV 86.6 10/16/2020    RDW 13.8 10/16/2020     10/16/2020       CMP:   Lab Results   Component Value Date     10/16/2020    K 4.2 10/16/2020     10/16/2020    CO2 20 10/16/2020    BUN 23 10/16/2020    CREATININE 1.2 10/16/2020    GFRAA 53 10/16/2020    LABGLOM 44 10/16/2020    GLUCOSE 128 10/16/2020    GLUCOSE 120 10/12/2011    PROT 7.6 09/03/2020    CALCIUM 8.5 10/16/2020    BILITOT 0.6 09/03/2020    ALKPHOS 138 09/03/2020    AST 24 09/03/2020    ALT 20 09/03/2020       POC Tests: No results for input(s): POCGLU, POCNA, POCK, POCCL, POCBUN, POCHEMO, POCHCT in the last 72 hours.     Coags:   Lab Results   Component Value Date    PROTIME 11.3 10/14/2020    PROTIME 12.0 12/17/2012    INR 1.0 10/14/2020    APTT 31.1

## 2020-11-30 NOTE — ANESTHESIA POSTPROCEDURE EVALUATION
Department of Anesthesiology  Postprocedure Note    Patient: Rpuesh Aguilar  MRN: [id number]  YOB: 1945  Date of evaluation: 11/30/2020  Time:  12:33 PM     Procedure Summary     Date:  11/30/20 Room / Location:  [facility] CATH LAB; YZ ECHO    Anesthesia Start:  5868 Anesthesia Stop:  1218    Procedure:  ECHOCARDIOGRAM TRANSESOPHAGEAL Diagnosis:       Presence of Watchman left atrial appendage closure device      PAF (paroxysmal atrial fibrillation) ([facility])      (6 week post LAAC/ Watchman  DR William to do JAIME, Dr Trey Vigil ordering)      (ok for [facility] [address] [facility] 11 30 2020.)    Scheduled Providers:   Responsible Provider:  Liz Majano MD    Anesthesia Type:  MAC ASA Status:  3          Anesthesia Type: MAC    Joseph Phase I:      Joseph Phase II:      Last vitals: Reviewed and per EMR flowsheets.        Anesthesia Post Evaluation    Patient location during evaluation: PACU  Patient participation: complete - patient participated  Level of consciousness: awake  Pain score: 0  Airway patency: patent  Nausea & Vomiting: no nausea  Complications: no  Cardiovascular status: hemodynamically stable  Respiratory status: acceptable  Hydration status: stable

## 2020-11-30 NOTE — PROCEDURES
TRANSESOPHAGEAL ECHOCARDIOGRAPHY REPORT    Cardiologist: Dr. Manda Rodrigez    Date of Procedure: 11/30/2020    Indications for study:  Follow-up JAIME for Watchman device, Atrial Fibrillation    Study performed using (Sedation): Per anesthesia    Complications: None  Spontaneous echo contrast present in the LA.  WATCHMAN device in place (24 mm) --> no significant color flow jet around the device   Negative bubble study.      -Will discontinue ASA 81 mg daily and xarelto 15 mg daily    - Will start  mg daily and plavix 75 mg daily      Madina Alfaro MD  [facility]) Cardiology

## 2020-12-07 RX ORDER — HYDRALAZINE HYDROCHLORIDE 10 MG/1
10 TABLET, FILM COATED ORAL 3 TIMES DAILY
Qty: 270 TABLET | Refills: 3 | Status: SHIPPED
Start: 2020-12-07 | End: 2021-12-28 | Stop reason: SDUPTHER

## 2020-12-07 RX ORDER — SPIRONOLACTONE 25 MG/1
25 TABLET ORAL DAILY
Qty: 90 TABLET | Refills: 3 | Status: SHIPPED
Start: 2020-12-07 | End: 2021-12-28 | Stop reason: SDUPTHER

## 2020-12-09 ENCOUNTER — TELEPHONE (OUTPATIENT)
Dept: CARDIOLOGY | Age: 75
End: 2020-12-09

## 2020-12-09 NOTE — TELEPHONE ENCOUNTER
12[address] [facility] EVALUATION     For purpose of the [address], The Modified Callaway Scale & the Barthol Index Evaluation was performed over the phone. The results are below.     Modified Clemencia Scale:  No symptoms at all    Barthol Index Evaluation:   Feeding: independant  Bathing: independent  Grooming: independent  Dressing: independent  Bowels: continent   Bladder: continent  Toilet Use: independent  Transfers: independent  Mobility: independent  Stairs: independent

## 2021-01-22 ENCOUNTER — OFFICE VISIT (OUTPATIENT)
Dept: FAMILY MEDICINE CLINIC | Age: 76
End: 2021-01-22
Payer: MEDICARE

## 2021-01-22 VITALS
TEMPERATURE: 97.5 F | SYSTOLIC BLOOD PRESSURE: 130 MMHG | HEART RATE: 60 BPM | OXYGEN SATURATION: 97 % | BODY MASS INDEX: 28.03 KG/M2 | RESPIRATION RATE: 16 BRPM | WEIGHT: 178.6 LBS | DIASTOLIC BLOOD PRESSURE: 66 MMHG | HEIGHT: 67 IN

## 2021-01-22 DIAGNOSIS — H11.31 SUBCONJUNCTIVAL HEMATOMA, RIGHT: Primary | ICD-10-CM

## 2021-01-22 DIAGNOSIS — H11.31 SUBCONJUNCTIVAL HEMATOMA, RIGHT: ICD-10-CM

## 2021-01-22 LAB
APTT: 28.5 SEC (ref 24.5–35.1)
BASOPHILS ABSOLUTE: 0.04 E9/L (ref 0–0.2)
BASOPHILS RELATIVE PERCENT: 0.6 % (ref 0–2)
EOSINOPHILS ABSOLUTE: 0.15 E9/L (ref 0.05–0.5)
EOSINOPHILS RELATIVE PERCENT: 2.3 % (ref 0–6)
HCT VFR BLD CALC: 41.5 % (ref 34–48)
HEMOGLOBIN: 13.5 G/DL (ref 11.5–15.5)
IMMATURE GRANULOCYTES #: 0.02 E9/L
IMMATURE GRANULOCYTES %: 0.3 % (ref 0–5)
INR BLD: 1
LYMPHOCYTES ABSOLUTE: 1.3 E9/L (ref 1.5–4)
LYMPHOCYTES RELATIVE PERCENT: 19.9 % (ref 20–42)
MCH RBC QN AUTO: 28 PG (ref 26–35)
MCHC RBC AUTO-ENTMCNC: 32.5 % (ref 32–34.5)
MCV RBC AUTO: 86.1 FL (ref 80–99.9)
MONOCYTES ABSOLUTE: 0.44 E9/L (ref 0.1–0.95)
MONOCYTES RELATIVE PERCENT: 6.7 % (ref 2–12)
NEUTROPHILS ABSOLUTE: 4.58 E9/L (ref 1.8–7.3)
NEUTROPHILS RELATIVE PERCENT: 70.2 % (ref 43–80)
PDW BLD-RTO: 13.7 FL (ref 11.5–15)
PLATELET # BLD: 301 E9/L (ref 130–450)
PMV BLD AUTO: 11.2 FL (ref 7–12)
PROTHROMBIN TIME: 11.5 SEC (ref 9.3–12.4)
RBC # BLD: 4.82 E12/L (ref 3.5–5.5)
WBC # BLD: 6.5 E9/L (ref 4.5–11.5)

## 2021-01-22 PROCEDURE — 3017F COLORECTAL CA SCREEN DOC REV: CPT | Performed by: FAMILY MEDICINE

## 2021-01-22 PROCEDURE — 1123F ACP DISCUSS/DSCN MKR DOCD: CPT | Performed by: FAMILY MEDICINE

## 2021-01-22 PROCEDURE — 99213 OFFICE O/P EST LOW 20 MIN: CPT | Performed by: FAMILY MEDICINE

## 2021-01-22 PROCEDURE — 4040F PNEUMOC VAC/ADMIN/RCVD: CPT | Performed by: FAMILY MEDICINE

## 2021-01-22 PROCEDURE — G8417 CALC BMI ABV UP PARAM F/U: HCPCS | Performed by: FAMILY MEDICINE

## 2021-01-22 PROCEDURE — 1036F TOBACCO NON-USER: CPT | Performed by: FAMILY MEDICINE

## 2021-01-22 PROCEDURE — G8427 DOCREV CUR MEDS BY ELIG CLIN: HCPCS | Performed by: FAMILY MEDICINE

## 2021-01-22 PROCEDURE — G8399 PT W/DXA RESULTS DOCUMENT: HCPCS | Performed by: FAMILY MEDICINE

## 2021-01-22 PROCEDURE — 1090F PRES/ABSN URINE INCON ASSESS: CPT | Performed by: FAMILY MEDICINE

## 2021-01-22 PROCEDURE — G8484 FLU IMMUNIZE NO ADMIN: HCPCS | Performed by: FAMILY MEDICINE

## 2021-01-22 RX ORDER — DABIGATRAN ETEXILATE 75 MG/1
75 CAPSULE, COATED PELLETS ORAL DAILY
COMMUNITY
End: 2021-03-17

## 2021-01-22 ASSESSMENT — VISUAL ACUITY: OU: 1

## 2021-01-22 NOTE — PROGRESS NOTES
2021    Jasson Chavarria (:  1945) is a 76 y.o. female, here for evaluation of the following medical concerns:  Chief Complaint   Patient presents with    Eye Problem     red, no drainage, no itching, not swollen, right eye       Medical history was reviewed. HPI     Right eye: red; no pain, no itching, no swollen, no discharge, no photophobia. PreVA: normal on right. Fluorescein and wood's lamp. Hair removed. Lab: cbc, appt, pt/inr. Call cardiologist, today. Allergies   Allergen Reactions    Atacand [Candesartan] Hives and Itching    Adhesive Tape Itching     develops rash and itching with EKG electrodes    Digoxin Itching     developed itching and rash    Losartan Potassium Itching    Shellfish-Derived Products Itching     3/9/19 Pt states she had a lobster pizza and had difficulty breathing, started to sweat and was itchy    Sulfa Antibiotics Diarrhea and Other (See Comments)     Also causes migraines  caused migraines and diarrhea    Acetaminophen Hives and Itching    Apap-Caff-Dihydrocodeine Hives and Itching    Coreg [Carvedilol] Itching     Can take extended release Coreg    Cozaar [Losartan] Itching    Demerol      3/9/19 Pt states she gets a severe migraine    Diovan [Valsartan] Itching    Imdur [Isosorbide Mononitrate]      Severe migraines    Labetalol Itching    Lisinopril Itching    Ramipril Itching    Xarelto [Rivaroxaban]      3/9/19 Pt states that she had broke out with a rash, got itchy and had severe side effects (severe itch, headache)    Effexor [Venlafaxine Hydrochloride] Nausea Only    Eliquis [Apixaban] Hives, Itching and Rash     3/9/19 Pt states that she gets hives, itchy and a rash       Prior to Visit Medications    Medication Sig Taking?  Authorizing Provider   dabigatran (PRADAXA) 75 MG capsule Take 75 mg by mouth daily Yes Historical Provider, MD hydrALAZINE (APRESOLINE) 10 MG tablet Take 1 tablet by mouth 3 times daily Yes Regina Duncan MD   spironolactone (ALDACTONE) 25 MG tablet Take 1 tablet by mouth daily Yes Regina Duncan MD   clopidogrel (PLAVIX) 75 MG tablet Take 1 tablet by mouth daily Yes Naeem Rucker MD   aspirin 325 MG EC tablet Take 1 tablet by mouth daily Yes Naeem Rucker MD   cholestyramine Johnson Demetris) 4 GM/DOSE powder Take by mouth daily Yes Historical Provider, MD   amoxicillin (AMOXIL) 500 MG capsule Take 4 capsules by mouth as needed (2000mg once prior to dental, GI/, skin  or cardiac invasive procedures) Yes Angeli Nelson MD   famotidine (PEPCID) 40 MG tablet Take 40 mg by mouth 2 times daily  Yes Historical Provider, MD   omeprazole (PRILOSEC) 40 MG delayed release capsule TAKE 1 CAPSULE BY MOUTH IN THE MORNING Yes Historical Provider, MD   hydrOXYzine (ATARAX) 10 MG tablet Take 1 tablet by mouth 3 times daily as needed for Itching Yes Adi Pretty MD   furosemide (LASIX) 20 MG tablet Take 1 tablet by mouth daily Yes Regina Duncan MD   carvedilol (COREG CR) 20 MG CP24 extended release capsule Take 1 capsule by mouth daily Yes Regina Duncan MD   dofetilide (TIKOSYN) 250 MCG capsule Take 1 capsule by mouth every 12 hours Yes Nichelle Galarza DO   triamcinolone (KENALOG) 0.1 % cream Apply topically 3 times daily as needed  Yes Historical Provider, MD   loperamide (IMODIUM) 2 MG capsule Take 2 mg by mouth 4 times daily as needed for Diarrhea Yes Historical Provider, MD   diphenhydrAMINE (BENADRYL) 25 MG tablet Take 25 mg by mouth every 6 hours as needed for Itching Yes Historical Provider, MD   vitamin D (CHOLECALCIFEROL) 1000 UNIT TABS tablet Take 1,000 Units by mouth daily Yes Historical Provider, MD   sodium chloride (OCEAN, BABY AYR) 0.65 % nasal spray 1 spray by Nasal route as needed for Congestion Yes Geovany Torres MD baclofen (LIORESAL) 20 MG tablet Take 20 mg by mouth 2 times daily as needed (muscle spasms, spastic bladder) Indications: Back Spasm, Bladder Spasm  Yes Historical Provider, MD        Review of Systems    Vitals:    01/22/21 1142   BP: 130/66   Pulse: 60   Resp: 16   Temp: 97.5 °F (36.4 °C)   TempSrc: Oral   SpO2: 97%   Weight: 178 lb 9.6 oz (81 kg)   Height: 5' 6.5\" (1.689 m)       Estimated body mass index is 28.39 kg/m² as calculated from the following:    Height as of this encounter: 5' 6.5\" (1.689 m). Weight as of this encounter: 178 lb 9.6 oz (81 kg). Physical Exam  Eyes:      General: Lids are normal. Vision grossly intact. Gaze aligned appropriately. No allergic shiner, visual field deficit or scleral icterus. Right eye: Foreign body present. Extraocular Movements: Extraocular movements intact. Right eye: Normal extraocular motion and no nystagmus. Left eye: Normal extraocular motion and no nystagmus. Conjunctiva/sclera:      Right eye: Hemorrhage present. Left eye: No hemorrhage. Pupils: Pupils are equal, round, and reactive to light. Right eye: No corneal abrasion or fluorescein uptake. ASSESSMENT/PLAN:  Ashley Robles was seen today for eye problem. Diagnoses and all orders for this visit:    Subconjunctival hematoma, right  -     CBC WITH AUTO DIFFERENTIAL; Future  -     PROTIME-INR; Future  -     APTT; Future         Return in about 5 days (around 1/27/2021). An  electronic signature was used to authenticate this note.     --Marisol Nunez DO on 1/22/2021 at 12:32 PM

## 2021-01-29 DIAGNOSIS — Z79.899 VISIT FOR MONITORING TIKOSYN THERAPY: Primary | ICD-10-CM

## 2021-01-29 DIAGNOSIS — I48.0 PAF (PAROXYSMAL ATRIAL FIBRILLATION) (HCC): ICD-10-CM

## 2021-01-29 DIAGNOSIS — Z51.81 VISIT FOR MONITORING TIKOSYN THERAPY: Primary | ICD-10-CM

## 2021-01-29 NOTE — PROGRESS NOTES
Patient needs to go for lab draw (bmp and mag) before we can process a refill on Tikosyn. Patient wants medication to go through [facility]. Patient is aware that we cannot process a refill until we have labs to assure we do not need to adjust medication and patient verbalized understanding. Patient has about 2 weeks left of medication and will go tomorrow or Monday for labs.

## 2021-01-30 ENCOUNTER — HOSPITAL ENCOUNTER (OUTPATIENT)
Age: 76
Discharge: HOME | End: 2021-01-30
Payer: MEDICARE

## 2021-01-30 DIAGNOSIS — Z51.81 VISIT FOR MONITORING TIKOSYN THERAPY: ICD-10-CM

## 2021-01-30 DIAGNOSIS — Z79.899 VISIT FOR MONITORING TIKOSYN THERAPY: ICD-10-CM

## 2021-01-30 DIAGNOSIS — I48.0 PAF (PAROXYSMAL ATRIAL FIBRILLATION) (HCC): ICD-10-CM

## 2021-01-30 LAB
ANION GAP SERPL CALCULATED.3IONS-SCNC: 12 MMOL/L (ref 7–16)
BUN BLDV-MCNC: 17 MG/DL (ref 8–23)
CALCIUM SERPL-MCNC: 9.2 MG/DL (ref 8.6–10.2)
CHLORIDE BLD-SCNC: 102 MMOL/L (ref 98–107)
CO2: 24 MMOL/L (ref 22–29)
CREAT SERPL-MCNC: 1.2 MG/DL (ref 0.5–1)
GFR AFRICAN AMERICAN: 53
GFR NON-AFRICAN AMERICAN: 44 ML/MIN/1.73
GLUCOSE BLD-MCNC: 107 MG/DL (ref 74–99)
MAGNESIUM: 2.1 MG/DL (ref 1.6–2.6)
POTASSIUM SERPL-SCNC: 4.1 MMOL/L (ref 3.5–5)
SODIUM BLD-SCNC: 138 MMOL/L (ref 132–146)

## 2021-01-30 PROCEDURE — 80048 BASIC METABOLIC PNL TOTAL CA: CPT

## 2021-01-30 PROCEDURE — 36415 COLL VENOUS BLD VENIPUNCTURE: CPT

## 2021-01-30 PROCEDURE — 83735 ASSAY OF MAGNESIUM: CPT

## 2021-02-04 ENCOUNTER — TELEPHONE (OUTPATIENT)
Dept: CARDIOLOGY CLINIC | Age: 76
End: 2021-02-04

## 2021-02-04 ENCOUNTER — NURSE ONLY (OUTPATIENT)
Dept: NON INVASIVE DIAGNOSTICS | Age: 76
End: 2021-02-04
Payer: MEDICARE

## 2021-02-04 DIAGNOSIS — I48.0 PAF (PAROXYSMAL ATRIAL FIBRILLATION) (HCC): ICD-10-CM

## 2021-02-04 DIAGNOSIS — Z51.81 VISIT FOR MONITORING TIKOSYN THERAPY: Primary | ICD-10-CM

## 2021-02-04 DIAGNOSIS — Z79.899 VISIT FOR MONITORING TIKOSYN THERAPY: Primary | ICD-10-CM

## 2021-02-04 PROCEDURE — 93000 ELECTROCARDIOGRAM COMPLETE: CPT | Performed by: INTERNAL MEDICINE

## 2021-02-04 RX ORDER — DOFETILIDE 0.25 MG/1
250 CAPSULE ORAL EVERY 12 HOURS SCHEDULED
Qty: 180 CAPSULE | Refills: 1 | Status: SHIPPED
Start: 2021-02-04 | End: 2022-03-10 | Stop reason: DRUGHIGH

## 2021-02-04 RX ORDER — DOFETILIDE 0.25 MG/1
250 CAPSULE ORAL EVERY 12 HOURS SCHEDULED
Qty: 180 CAPSULE | Refills: 3 | Status: SHIPPED
Start: 2021-02-04 | End: 2021-02-04 | Stop reason: SDUPTHER

## 2021-02-04 NOTE — TELEPHONE ENCOUNTER
She keeps getting bleeding in R eye   was told to f/u with cardiology regarding her ASA and Plavix  Current use    S/p LAAC - watchman 10-. Will have f/u JAIME in April 2021 (to be set when schedule opens)  6 month f/u . She Requesting f/u visit be set with DR Carcamo Backers     Will send message to DR first to review the above.

## 2021-02-04 NOTE — TELEPHONE ENCOUNTER
Requesting Tikosyn refill - CrCl calculated off the following information:    Tikosyn dosage: 250 mcg     Age: 75   Ht: 1.689 m  Wt: 81 kg  Cr: 1.2 mg/dl (based off labs on )  CrCl: 38.65 mL/min    Last QTc: 450 msec (based on last EKG on 11/2020)

## 2021-02-05 NOTE — TELEPHONE ENCOUNTER
Remi Morales MD  You 3 hours ago (7:32 AM)     I would ask for her to be seen by an ophthalmologist or her PCP. Alison Vivas the meantime stop the aspirin and continue the Plavix until such evaluation. Message text        Provided DR Mayank Ambriz instructions.  She will set ophthalmologist apt (this is 2nd time eye bled) and will stop ASA till evaluated by

## 2021-02-08 ENCOUNTER — IMMUNIZATION (OUTPATIENT)
Dept: PRIMARY CARE CLINIC | Age: 76
End: 2021-02-08
Payer: MEDICARE

## 2021-02-08 DIAGNOSIS — Z23 NEED FOR VACCINATION: Primary | ICD-10-CM

## 2021-02-08 PROCEDURE — 91300 COVID-19, PFIZER VACCINE 30MCG/0.3ML DOSE: CPT | Performed by: NURSE PRACTITIONER

## 2021-02-08 PROCEDURE — 0001A COVID-19, PFIZER VACCINE 30MCG/0.3ML DOSE: CPT | Performed by: NURSE PRACTITIONER

## 2021-03-02 ENCOUNTER — IMMUNIZATION (OUTPATIENT)
Dept: PRIMARY CARE CLINIC | Age: 76
End: 2021-03-02
Payer: MEDICARE

## 2021-03-02 PROCEDURE — 0002A COVID-19, PFIZER VACCINE 30MCG/0.3ML DOSE: CPT | Performed by: NURSE PRACTITIONER

## 2021-03-02 PROCEDURE — 91300 COVID-19, PFIZER VACCINE 30MCG/0.3ML DOSE: CPT | Performed by: NURSE PRACTITIONER

## 2021-03-12 RX ORDER — CARVEDILOL PHOSPHATE 20 MG/1
20 CAPSULE, EXTENDED RELEASE ORAL DAILY
Qty: 90 CAPSULE | Refills: 3 | OUTPATIENT
Start: 2021-03-12 | End: 2021-03-24 | Stop reason: SDUPTHER

## 2021-03-15 NOTE — PROGRESS NOTES
extremities   Neuro:  Alert & orient x 3 no focal deficits     REVIEW OF DIAGNOSTIC TESTS:     EKG today sinus rhythm with QTC of 426  Lab Results   Component Value Date     01/30/2021     10/16/2020     10/15/2020    K 4.1 01/30/2021    K 4.2 10/16/2020    K 4.1 10/15/2020    K 4.3 10/10/2020    K 4.3 09/03/2020    K 4.2 11/07/2019     01/30/2021     10/16/2020     10/15/2020    CO2 24 01/30/2021    CO2 20 10/16/2020    CO2 24 10/15/2020    BUN 17 01/30/2021    BUN 23 10/16/2020    BUN 22 10/15/2020    CREATININE 1.2 01/30/2021    CREATININE 1.2 10/16/2020    CREATININE 1.2 10/15/2020         Lab Results   Component Value Date    PROBNP 5,512 (H) 03/09/2019    PROBNP 2,907 (H) 11/20/2018    PROBNP 5,797 (H) 12/16/2016    Labs were reviewed in epic from January 30 and she has labs every 3 months while on Tikosyn     ASSESSMENT / DIAGNOSIS:        Diagnosis Orders   1. Presence of Watchman left atrial appendage closure device     2. PAF (paroxysmal atrial fibrillation) (HCC) on Tikosyn EKG 12 Lead     She is maintaining sinus rhythm and is on Tikosyn     3. nonischemic cardiomyopathy. She is compensated today  4. Hypertension, well controlled  5. Obesity history   6. Multiple allergies and reason for no ACE inhibitor  7. Pulmonary hypertension, she denies after an evaluation in [facility] [employer]  8. History of left atrial appendage thrombus, resolved on last JAIME  9. history of pericardial effusion. Which was decreased by echocardiogram in October 2020, resolved on JAIME  10. Anxiety  11. chronic kidney disease. Creatinine clearance is 51 mL/min  12. Endocarditis prophylactic 6 months post implant of watchman     TREATMENT PLAN;  · Continue Plavix  · Continue endocarditis prophylaxis at least through 6 months post implant  · Return in April for the 6-month visit with Sushant Ambriz,  JAIME will be scheduled at that time as well.   · Follow-up in 7 months with         The patient's current medication list, allergies, problem list and results of all previously ordered tests were reviewed at today's visit      UYEN Baez - CNP        [facility] CARDIOLOGY  [address]  [facility] [address] [phone]

## 2021-03-17 ENCOUNTER — OFFICE VISIT (OUTPATIENT)
Dept: CARDIOLOGY CLINIC | Age: 76
End: 2021-03-17
Payer: MEDICARE

## 2021-03-17 VITALS
RESPIRATION RATE: 12 BRPM | WEIGHT: 177.6 LBS | BODY MASS INDEX: 27.88 KG/M2 | HEART RATE: 66 BPM | DIASTOLIC BLOOD PRESSURE: 64 MMHG | HEIGHT: 67 IN | SYSTOLIC BLOOD PRESSURE: 110 MMHG

## 2021-03-17 DIAGNOSIS — I42.8 NONISCHEMIC CARDIOMYOPATHY (HCC): Primary | Chronic | ICD-10-CM

## 2021-03-17 PROCEDURE — G8417 CALC BMI ABV UP PARAM F/U: HCPCS | Performed by: NURSE PRACTITIONER

## 2021-03-17 PROCEDURE — 99213 OFFICE O/P EST LOW 20 MIN: CPT | Performed by: NURSE PRACTITIONER

## 2021-03-17 PROCEDURE — G8427 DOCREV CUR MEDS BY ELIG CLIN: HCPCS | Performed by: NURSE PRACTITIONER

## 2021-03-17 PROCEDURE — 4040F PNEUMOC VAC/ADMIN/RCVD: CPT | Performed by: NURSE PRACTITIONER

## 2021-03-17 PROCEDURE — 1036F TOBACCO NON-USER: CPT | Performed by: NURSE PRACTITIONER

## 2021-03-17 PROCEDURE — G8484 FLU IMMUNIZE NO ADMIN: HCPCS | Performed by: NURSE PRACTITIONER

## 2021-03-17 PROCEDURE — 1090F PRES/ABSN URINE INCON ASSESS: CPT | Performed by: NURSE PRACTITIONER

## 2021-03-17 PROCEDURE — G8399 PT W/DXA RESULTS DOCUMENT: HCPCS | Performed by: NURSE PRACTITIONER

## 2021-03-17 PROCEDURE — 93000 ELECTROCARDIOGRAM COMPLETE: CPT | Performed by: INTERNAL MEDICINE

## 2021-03-17 PROCEDURE — 1123F ACP DISCUSS/DSCN MKR DOCD: CPT | Performed by: NURSE PRACTITIONER

## 2021-03-24 ENCOUNTER — CARE COORDINATION (OUTPATIENT)
Dept: CARE COORDINATION | Age: 76
End: 2021-03-24

## 2021-03-24 RX ORDER — CARVEDILOL PHOSPHATE 20 MG/1
20 CAPSULE, EXTENDED RELEASE ORAL DAILY
Qty: 90 CAPSULE | Refills: 3 | Status: SHIPPED | OUTPATIENT
Start: 2021-03-24 | End: 2022-05-06 | Stop reason: ALTCHOICE

## 2021-03-24 NOTE — LETTER
3/24/2021    [address]      Dear Ede Malone,    My name is Patricia Medina and I am a registered nurse who partners with Eliana Young MD to improve patients' health. Eliana Young MD believes you would benefit from working with me. As a member of your health care team, I would work with other providers involved in your care, offer education for your specific health conditions, and connect you with additional resources as needed. I will collaborate with Eliana Young MD to support you in following your treatment plan. The additional support I provide is no additional cost to you. My primary focus is to help you achieve specific goals and improve your health. We are committed to walk with you on this journey and look forward to working with you. Please call me to further discuss your healthcare needs.   I am available by phone    In good health,     Patricia [place], [place], [address] [address]  Cell: [phone]

## 2021-03-24 NOTE — CARE COORDINATION
-Attempted to reach pt to offer enrollment into care coordination program, however no answer.  -Detailed VM left introducing self, reason for call, and brief explanation of program.  -Left ACM's contact information, requesting call back.   -Mailed Intro Letter via [employer]

## 2021-03-26 DIAGNOSIS — I48.0 PAF (PAROXYSMAL ATRIAL FIBRILLATION) (HCC): ICD-10-CM

## 2021-03-26 DIAGNOSIS — Z01.818 PRE-OP EXAM: Primary | ICD-10-CM

## 2021-03-26 DIAGNOSIS — Z95.818 PRESENCE OF WATCHMAN LEFT ATRIAL APPENDAGE CLOSURE DEVICE: ICD-10-CM

## 2021-03-26 NOTE — PROGRESS NOTES
Set JAIME with Mary at [facility] unable to get ride for next Friday. Reset JAIME 4/15/2021 [address], 1945, [phone] (home)    · DR Luciana Ferreira ordering/ Dr Linden Raphael performing 6mo post LAAC/  JAIME    Patient scheduled on  4/15/21 at  1am with arrival 615am  at [facility]   for  JAIME  ,  and was given procedure instructions, mask requirement and temperature check  instructions. Patient answered all checklist questions and appointment confirmed. ·  covid test due  4/9     · Faxed clinicals and orders PAT HECTOR  · No auth required for JAIME    · Marvie Leyden will  written instructions for testing at [facility] upcoming visit.  (see scanned media tab 3/26/2021 for JAIME instructions)

## 2021-03-29 NOTE — CARE COORDINATION
LVM #2  Noted Intro letter in [address] read 3/25/21 nonsmoker  no memory loss as per niece  she lives with her niece  no ETOH  never smoker

## 2021-04-06 ENCOUNTER — OFFICE VISIT (OUTPATIENT)
Dept: CARDIOLOGY CLINIC | Age: 76
End: 2021-04-06
Payer: MEDICARE

## 2021-04-06 VITALS
OXYGEN SATURATION: 98 % | HEIGHT: 66 IN | SYSTOLIC BLOOD PRESSURE: 122 MMHG | DIASTOLIC BLOOD PRESSURE: 62 MMHG | WEIGHT: 179.2 LBS | RESPIRATION RATE: 19 BRPM | BODY MASS INDEX: 28.8 KG/M2 | HEART RATE: 64 BPM

## 2021-04-06 DIAGNOSIS — I48.0 PAF (PAROXYSMAL ATRIAL FIBRILLATION) (HCC): ICD-10-CM

## 2021-04-06 DIAGNOSIS — Z95.818 PRESENCE OF WATCHMAN LEFT ATRIAL APPENDAGE CLOSURE DEVICE: Primary | ICD-10-CM

## 2021-04-06 PROCEDURE — 1090F PRES/ABSN URINE INCON ASSESS: CPT | Performed by: INTERNAL MEDICINE

## 2021-04-06 PROCEDURE — 93000 ELECTROCARDIOGRAM COMPLETE: CPT | Performed by: INTERNAL MEDICINE

## 2021-04-06 PROCEDURE — G8399 PT W/DXA RESULTS DOCUMENT: HCPCS | Performed by: INTERNAL MEDICINE

## 2021-04-06 PROCEDURE — G8417 CALC BMI ABV UP PARAM F/U: HCPCS | Performed by: INTERNAL MEDICINE

## 2021-04-06 PROCEDURE — 1123F ACP DISCUSS/DSCN MKR DOCD: CPT | Performed by: INTERNAL MEDICINE

## 2021-04-06 PROCEDURE — 99213 OFFICE O/P EST LOW 20 MIN: CPT | Performed by: INTERNAL MEDICINE

## 2021-04-06 PROCEDURE — G8427 DOCREV CUR MEDS BY ELIG CLIN: HCPCS | Performed by: INTERNAL MEDICINE

## 2021-04-06 PROCEDURE — 1036F TOBACCO NON-USER: CPT | Performed by: INTERNAL MEDICINE

## 2021-04-06 PROCEDURE — 4040F PNEUMOC VAC/ADMIN/RCVD: CPT | Performed by: INTERNAL MEDICINE

## 2021-04-06 NOTE — PROGRESS NOTES
[facility]   [facility] [place]   Clinic Note     Date:4/6/21   Patient Name:Aidee Charles  YOB: 1945  Age: 68 y.o. Primary Care Provider: Kaya Ritter MD    Subjective     This is a 28-year-old  female, patient of Dr. Mely Brand, who is referred to us originally for left atrial appendage closure using watchman which she underwent successfully on 10/14/2020. This is her 6-month follow-up. She continues to do very well. She had conjunctival hemorrhage when she was on full dose aspirin in addition to Plavix. Since then her aspirin was discontinued and she has been maintained on Plavix monotherapy which she has tolerated without adverse bleeding. She has mild stable dyspnea on exertion which has not changed. She denies lower extremity edema, orthopnea, PND, palpitations, syncope, presyncope, chest pain. A focused history review includes:  1. Paroxysmal atrial fibrillation status post DC cardioversion 11/2019  1. Maintained on dofetilide   2. History of left atrial appendage thrombus in November 2018 and March 2019. Resolved on rivaroxaban. 3. Allergic to rivaroxaban with significant skin reaction that she manages with hydroxyzine  1. Also allergic to dabigatran and apixaban. All the above documented additionally in [facility] clinic notes  2. S/p left atrial appendage closure using 24 mm watchman 2.5 (10/14/20 - (Dr. Leonidas Brooke)) complicated by small pericardial effusion that did not require intervention and resolved before discharge  1. 6-week JAIME without issues-switched to aspirin plus Plavix then Plavix alone due to conjunctival hemorrhage  3. Nonischemic cardiomyopathy with chronic systolic heart failure (coronary angiogram without significant CAD in 2010)  1. Most recent TTE from July 2019 with a EF 35 to 40% with moderate MR moderate PH  2. JAIME dated 9/9/2020 personally reviewed: Mildly reduced LV systolic function with an EF 45%. Normal RV size and systolic function. Moderate MR. Mild AR. Mild TR. Moderate pulmonary hypertension. No CHERYL thrombus. CHERYL anatomy amenable to watchman. 4. Hypertension  5. Microscopic colitis  6. Numerous medication allergies including ACE inhibitors and ARB's  7. hypercholesterolemia      Past History    Past Medical History:         Diagnosis Date    Anxiety     Arthritis     Atrial fibrillation ([facility] [address]     new onset    Cervical radiculopathy     JOHANA Ruby    CHF (congestive heart failure) (HCC)     Chronic migraine without aura, with intractable migraine, so stated, without mention of status migrainosus     JOHANA Ruby    Chronic sinusitis     Disc displacement, lumbar     JOHANA Ruby    Hx of blood clots     in heart    Hypertension     IBS (irritable bowel syndrome)     Left ventricular systolic dysfunction     Meige syndrome     partial/JOHANA Ruby    Microscopic colitis     Mitral regurgitation     Mild to moderate    Nonischemic cardiomyopathy (HCC)     Osteopenia     Vertebrobasilar artery syndrome     JOHANA Ruby          Social History:    Social History     Tobacco History     Smoking Status  Never Smoker    Smokeless Tobacco Use  Never Used          Alcohol History     Alcohol Use Status  Yes Drinks/Week  0 Standard drinks or equivalent per week Amount  0.0 standard drinks of alcohol/wk Comment  occasional wine/mixed drink.            Drug Use     Drug Use Status  Never          Sexual Activity     Sexually Active  Not Asked Comment                      Family History:       Problem Relation Age of Onset    Heart Attack Mother     Stroke Mother     Heart Attack Father     Heart Failure Father     Heart Disease Father     Anxiety Disorder Sister     Depression Sister     Crohn's Disease Son          Review of Systems   See above        Medications     Current Outpatient Medications   Medication Sig Dispense Refill    carvedilol (COREG CR) 20 MG CP24 extended release capsule Take 1 capsule by mouth daily 90 capsule 3    dofetilide (TIKOSYN) 250 MCG capsule Take 1 capsule by mouth every 12 hours 180 capsule 1    hydrALAZINE (APRESOLINE) 10 MG tablet Take 1 tablet by mouth 3 times daily 270 tablet 3    spironolactone (ALDACTONE) 25 MG tablet Take 1 tablet by mouth daily 90 tablet 3    clopidogrel (PLAVIX) 75 MG tablet Take 1 tablet by mouth daily 30 tablet 5    cholestyramine (QUESTRAN) 4 GM/DOSE powder Take by mouth daily      amoxicillin (AMOXIL) 500 MG capsule Take 4 capsules by mouth as needed (2000mg once prior to dental, GI/, skin  or cardiac invasive procedures) 60 capsule 0    famotidine (PEPCID) 40 MG tablet Take 40 mg by mouth 2 times daily       omeprazole (PRILOSEC) 40 MG delayed release capsule TAKE 1 CAPSULE BY MOUTH IN THE MORNING      hydrOXYzine (ATARAX) 10 MG tablet Take 1 tablet by mouth 3 times daily as needed for Itching 270 tablet 3    furosemide (LASIX) 20 MG tablet Take 1 tablet by mouth daily 90 tablet 3    triamcinolone (KENALOG) 0.1 % cream Apply topically 3 times daily as needed   1    loperamide (IMODIUM) 2 MG capsule Take 2 mg by mouth 4 times daily as needed for Diarrhea      diphenhydrAMINE (BENADRYL) 25 MG tablet Take 25 mg by mouth every 6 hours as needed for Itching      vitamin D (CHOLECALCIFEROL) 1000 UNIT TABS tablet Take 1,000 Units by mouth daily      sodium chloride (OCEAN, BABY AYR) 0.65 % nasal spray 1 spray by Nasal route as needed for Congestion 50 mL 0    baclofen (LIORESAL) 20 MG tablet Take 20 mg by mouth 2 times daily as needed (muscle spasms, spastic bladder) Indications: Back Spasm, Bladder Spasm        No current facility-administered medications for this visit.             Physical Examination      /62   Pulse 64   Resp 19   Ht 5' 6\" (1.676 m)   Wt 179 lb 3.2 oz (81.3 kg)   SpO2 98%   BMI 28.92 kg/m²     General: No acute distress, appears as stated age, nonicteric  Head: Atraumatic, no gross abnormalities or bruises  Neck: Supple and nontender, no carotid bruits, normal JVP  Lungs: Clear to auscultation bilaterally, no wheezes, rales, or rhonchi  Heart: Regular rate and rhythm, no murmurs, rubs, or gallops  Abdomen: Soft, nontender, nondistended, normal bowel sounds  Extremities: No obvious deformities, no cyanosis, no edema  Neurological: Alert and oriented x3, EOMI, moving all extremities x4  Psychological: Normal mood and affect, cooperative  Skin: Color, texture, and turgor normal for age          Labs/Imaging/Diagnostics     Lab Results   Component Value Date     01/30/2021    K 4.1 01/30/2021    K 4.2 10/16/2020     01/30/2021    CO2 24 01/30/2021    BUN 17 01/30/2021    CREATININE 1.2 01/30/2021    GLUCOSE 107 01/30/2021    GLUCOSE 120 10/12/2011    CALCIUM 9.2 01/30/2021        Estimated Creatinine Clearance: 43 mL/min (A) (based on SCr of 1.2 mg/dL (H)).     Lab Results   Component Value Date    WBC 6.5 01/22/2021    HGB 13.5 01/22/2021    HCT 41.5 01/22/2021    MCV 86.1 01/22/2021     01/22/2021       Lab Results   Component Value Date    ALT 20 09/03/2020    AST 24 09/03/2020    ALKPHOS 138 (H) 09/03/2020    BILITOT 0.6 09/03/2020       Lab Results   Component Value Date    LABALBU 3.9 09/03/2020       Lab Results   Component Value Date    CHOL 179 11/21/2018    CHOL 204 (H) 11/14/2015    CHOL 235 (H) 07/02/2015     Lab Results   Component Value Date    TRIG 69 11/21/2018    TRIG 71 11/14/2015    TRIG 182 (H) 07/02/2015     Lab Results   Component Value Date    HDL 51 11/21/2018    HDL 85 11/14/2015    HDL 69 07/02/2015     Lab Results   Component Value Date    LDLCALC 114 (H) 11/21/2018    LDLCALC 105 (H) 11/14/2015    LDLCALC 130 (H) 07/02/2015     Lab Results   Component Value Date    LABVLDL 14 11/21/2018    LABVLDL 14 11/14/2015    LABVLDL 36 07/02/2015     No results found for: [facility]    Lab Results   Component Value Date    CKTOTAL 83 11/25/2013    CKMB 6.6 (H) 11/25/2013    TROPONINI <0.01 03/09/2019       Lab Results   Component Value Date    BNP 1,046 (H) 11/24/2013         EKG today: Normal sinus rhythm with incomplete left bundle branch block        Assessment and Plan:      20-year-old  female who is ~6 months post left atrial appendage closure using Watchman with 24 mm device. She is doing wonderfully and has no angina, heart failure, arrhythmia symptoms or adverse effects related to antiplatelet therapy or watchman. .       Diagnosis Orders   1. Presence of Watchman left atrial appendage closure device     2. PAF (paroxysmal atrial fibrillation) ([facility])  EKG 12 Lead      · 6 months JAIME scheduled. If no concerns will be switched to aspirin 81 mg daily and stop Plavix. · Continue endocarditis prophylaxis through 12 months post implant  · Return in October for the 12-month visit. · Rest of cardiac care per Dr. Pee Espinoza Follow up with me in October 2021    We appreciate the opportunity to participate in Her care!       Lio Mayer MD  Interventional Cardiology/Structural Heart Disease  Office: [phone]  Fax: [phone]  RN Coordinator: Sohail Zavala

## 2021-04-09 ENCOUNTER — HOSPITAL ENCOUNTER (OUTPATIENT)
Age: 76
Discharge: HOME | End: 2021-04-11
Payer: MEDICARE

## 2021-04-09 DIAGNOSIS — Z01.818 PREOP TESTING: ICD-10-CM

## 2021-04-09 PROCEDURE — U0005 INFEC AGEN DETEC AMPLI PROBE: HCPCS

## 2021-04-09 PROCEDURE — U0003 INFECTIOUS AGENT DETECTION BY NUCLEIC ACID (DNA OR RNA); SEVERE ACUTE RESPIRATORY SYNDROME CORONAVIRUS 2 (SARS-COV-2) (CORONAVIRUS DISEASE [COVID-19]), AMPLIFIED PROBE TECHNIQUE, MAKING USE OF HIGH THROUGHPUT TECHNOLOGIES AS DESCRIBED BY CMS-2020-01-R: HCPCS

## 2021-04-11 LAB — SARS-COV-2, PCR: NOT DETECTED

## 2021-04-13 ENCOUNTER — TELEPHONE (OUTPATIENT)
Dept: CARDIOLOGY CLINIC | Age: 76
End: 2021-04-13

## 2021-04-13 NOTE — TELEPHONE ENCOUNTER
Guilherme Frye called and said orders are needed in [facility] for pt's JAIME w anesth . It can't be written.

## 2021-04-13 NOTE — PROGRESS NOTES
[facility] PRE-ADMISSION TESTING INSTRUCTIONS    The Preadmission Testing patient is instructed accordingly using the following criteria (check applicable):    ARRIVAL INSTRUCTIONS:  [x] Parking the day of Surgery is located in the Main Entrance lot. Upon entering the door, make an immediate right to the surgery reception desk    [x] Bring photo ID and insurance card    [] Bring in a copy of Living will or Durable Power of  papers. [x] Please be sure to arrange transportation to and from the hospital    [x] Please arrange for someone to be with you the remainder of the day due to having anesthesia      GENERAL INSTRUCTIONS:    [x] Nothing by mouth after midnight, including gum, candy, mints or water    [x] You may brush your teeth, but do not swallow any water    [x] Take medications as instructed with 1-2 oz of water    [x] Stop herbal supplements and vitamins 5 days prior to procedure    [] Follow preop dosing of blood thinners per physician instructions    [] Do not take insulin or oral diabetic medications    [] If diabetic and have low blood sugar or feel symptomatic, take 1-2oz apple juice or glucose tablets    [] Bring inhalers day of surgery    [] Bring C-PAP/ Bi-Pap day of surgery    [] Bring urine specimen day of surgery    [x]  Soap shower or bath AM of Surgery, no lotion, powders or creams to surgical site    [] Follow bowel prep as instructed per surgeon    [] No tobacco products within 24 hours of surgery     [] No alcohol or illegal drug use within 24 hours of surgery.     [x] Jewelry, body piercing's, eyeglasses, contact lenses and dentures are not permitted into surgery (bring cases)      [x] Please do not wear any nail polish or make up on the day of surgery    [x] If not already done, you can expect a call from registration    [x] If surgeon requests a time change you will be notified the day prior to surgery    [x] If you receive a survey after surgery we would greatly appreciate your comments    [] Parent/guardian of a minor must accompany their child and remain on the premises  the entire time they are under our care     [] Pediatric patients may bring favorite toy, blanket or comfort item with them    [] A caregiver or family member must remain with the patient during their stay if they are mentally handicapped, have dementia, disoriented or unable to use a call light or would be a safety concern if left unattended    [x] Please notify surgeon if you develop any illness between now and time of surgery (cold, cough, sore throat, fever, nausea, vomiting) or any signs of infections  including skin, wounds, and dental.    [] Other instructions    EDUCATIONAL MATERIALS PROVIDED:    [] PAT Preoperative Education Packet/Booklet     [] Medication List    [] Fluoroscopy Information Pamphlet    [] Transfusion bracelet applied with instructions    [] Joint replacement video reviewed    [] Shower with antibacterial soap and use CHG wipes provided the evening before surgery as instructed        Have you been tested for COVID  Yes           Have you been told you were positive for COVID No  Have you had any known exposure to someone that is positive for COVID No  Do you have a cough                   No              Do you have shortness of breath No                 Do you have a sore throat            No                Are you having chills                    No                Are you having muscle aches. No                    Please come to the hospital wearing a mask and have your significant other wear a mask as well. Both of you should check your temperature before leaving to come here,  if it is 100 or higher please call [phone] for instruction.

## 2021-04-15 ENCOUNTER — ANESTHESIA EVENT (OUTPATIENT)
Dept: NON INVASIVE DIAGNOSTICS | Age: 76
End: 2021-04-15

## 2021-04-15 ENCOUNTER — TELEPHONE (OUTPATIENT)
Dept: CARDIOLOGY CLINIC | Age: 76
End: 2021-04-15

## 2021-04-15 ENCOUNTER — ANESTHESIA (OUTPATIENT)
Dept: NON INVASIVE DIAGNOSTICS | Age: 76
End: 2021-04-15

## 2021-04-15 ENCOUNTER — HOSPITAL ENCOUNTER (OUTPATIENT)
Dept: NON INVASIVE DIAGNOSTICS | Age: 76
Discharge: HOME | End: 2021-04-15
Payer: MEDICARE

## 2021-04-15 VITALS
OXYGEN SATURATION: 98 % | RESPIRATION RATE: 25 BRPM | SYSTOLIC BLOOD PRESSURE: 96 MMHG | DIASTOLIC BLOOD PRESSURE: 55 MMHG

## 2021-04-15 VITALS
TEMPERATURE: 97.4 F | HEIGHT: 66 IN | BODY MASS INDEX: 28.77 KG/M2 | SYSTOLIC BLOOD PRESSURE: 122 MMHG | DIASTOLIC BLOOD PRESSURE: 75 MMHG | OXYGEN SATURATION: 96 % | HEART RATE: 63 BPM | RESPIRATION RATE: 24 BRPM | WEIGHT: 179 LBS

## 2021-04-15 DIAGNOSIS — Z95.818 PRESENCE OF WATCHMAN LEFT ATRIAL APPENDAGE CLOSURE DEVICE: ICD-10-CM

## 2021-04-15 DIAGNOSIS — Z01.818 PREOP TESTING: Primary | ICD-10-CM

## 2021-04-15 DIAGNOSIS — Z01.818 PRE-OP EXAM: ICD-10-CM

## 2021-04-15 DIAGNOSIS — I48.0 PAF (PAROXYSMAL ATRIAL FIBRILLATION) (HCC): ICD-10-CM

## 2021-04-15 LAB
LV EF: 48 %
LVEF MODALITY: NORMAL

## 2021-04-15 PROCEDURE — 2500000003 HC RX 250 WO HCPCS: Performed by: ANESTHESIOLOGY

## 2021-04-15 PROCEDURE — 3700000000 HC ANESTHESIA ATTENDED CARE

## 2021-04-15 PROCEDURE — 6360000002 HC RX W HCPCS

## 2021-04-15 PROCEDURE — 7100000010 HC PHASE II RECOVERY - FIRST 15 MIN

## 2021-04-15 PROCEDURE — 6360000002 HC RX W HCPCS: Performed by: NURSE ANESTHETIST, CERTIFIED REGISTERED

## 2021-04-15 PROCEDURE — 93325 DOPPLER ECHO COLOR FLOW MAPG: CPT | Performed by: INTERNAL MEDICINE

## 2021-04-15 PROCEDURE — 7100000011 HC PHASE II RECOVERY - ADDTL 15 MIN

## 2021-04-15 PROCEDURE — 2580000003 HC RX 258: Performed by: NURSE ANESTHETIST, CERTIFIED REGISTERED

## 2021-04-15 PROCEDURE — 93320 DOPPLER ECHO COMPLETE: CPT | Performed by: INTERNAL MEDICINE

## 2021-04-15 PROCEDURE — 93325 DOPPLER ECHO COLOR FLOW MAPG: CPT

## 2021-04-15 PROCEDURE — 93312 ECHO TRANSESOPHAGEAL: CPT

## 2021-04-15 PROCEDURE — 3700000001 HC ADD 15 MINUTES (ANESTHESIA)

## 2021-04-15 PROCEDURE — 93312 ECHO TRANSESOPHAGEAL: CPT | Performed by: INTERNAL MEDICINE

## 2021-04-15 PROCEDURE — 93320 DOPPLER ECHO COMPLETE: CPT

## 2021-04-15 RX ORDER — FENTANYL CITRATE 50 UG/ML
25 INJECTION, SOLUTION INTRAMUSCULAR; INTRAVENOUS EVERY 5 MIN PRN
Status: DISCONTINUED | OUTPATIENT
Start: 2021-04-15 | End: 2021-04-16 | Stop reason: HOSPADM

## 2021-04-15 RX ORDER — PROPOFOL 10 MG/ML
INJECTION, EMULSION INTRAVENOUS PRN
Status: DISCONTINUED | OUTPATIENT
Start: 2021-04-15 | End: 2021-04-15 | Stop reason: SDUPTHER

## 2021-04-15 RX ORDER — SODIUM CHLORIDE 0.9 % (FLUSH) 0.9 %
5-40 SYRINGE (ML) INJECTION PRN
Status: DISCONTINUED | OUTPATIENT
Start: 2021-04-15 | End: 2021-04-16 | Stop reason: HOSPADM

## 2021-04-15 RX ORDER — SODIUM CHLORIDE 9 MG/ML
INJECTION, SOLUTION INTRAVENOUS CONTINUOUS PRN
Status: DISCONTINUED | OUTPATIENT
Start: 2021-04-15 | End: 2021-04-15 | Stop reason: SDUPTHER

## 2021-04-15 RX ADMIN — SODIUM CHLORIDE: 9 INJECTION, SOLUTION INTRAVENOUS at 07:46

## 2021-04-15 RX ADMIN — PROPOFOL 50 MG: 10 INJECTION, EMULSION INTRAVENOUS at 07:57

## 2021-04-15 RX ADMIN — PROPOFOL 50 MG: 10 INJECTION, EMULSION INTRAVENOUS at 08:03

## 2021-04-15 RX ADMIN — PROPOFOL 80 MG: 10 INJECTION, EMULSION INTRAVENOUS at 07:48

## 2021-04-15 RX ADMIN — FAMOTIDINE 20 MG: 10 INJECTION, SOLUTION INTRAVENOUS at 06:59

## 2021-04-15 RX ADMIN — PROPOFOL 50 MG: 10 INJECTION, EMULSION INTRAVENOUS at 07:53

## 2021-04-15 ASSESSMENT — LIFESTYLE VARIABLES: SMOKING_STATUS: 0

## 2021-04-15 ASSESSMENT — PAIN - FUNCTIONAL ASSESSMENT: PAIN_FUNCTIONAL_ASSESSMENT: 0-10

## 2021-04-15 ASSESSMENT — ENCOUNTER SYMPTOMS: DYSPNEA ACTIVITY LEVEL: AFTER AMBULATING 2 FLIGHTS OF STAIRS

## 2021-04-15 ASSESSMENT — PAIN SCALES - GENERAL: PAINLEVEL_OUTOF10: 0

## 2021-04-15 NOTE — TELEPHONE ENCOUNTER
MD Adolph Woodward Torres   Cc: Regina Hernández, RN             Device-related thrombus noted on the atrial surface of the Watchman CHERYL occluder 6-month JAIME. There is no inge-device leak; the device is well-seated. Stop antiplatelets and start Xarelto 20 mg daily. I called her and went over the findings and plan. Repeat JAIME in 6-8 weeks. Follow up with me in 4 weeks. · Set 1mo f/u with DR Meenakshi Luu. Mailed apt and called and left on VM apt     · She called me back. Confirmed upcoming apt with DR Meenakshi Luu. · She will use Rx assistance for xalrelto transferred to [facility] rx assistance to start paperwork. · Will need JAIME set in 6-8 weeks. LM for [facility] at [facility] for week of May 31 with DR William. Pending call back to set. · 1:34 PM no am openings the week of May 31st for JAIME at [facility]. Will send ntoe to Wm Reynoso to set JAIME once June provider schedules open. Set with DR William. 1st or 2nd week of June please.

## 2021-04-15 NOTE — ANESTHESIA PRE PROCEDURE
Department of Anesthesiology  Preprocedure Note       Name:  Michelle Mckeon   Age:  68 y.o.  :  1945                                          MRN:  [id number]         Date:  4/15/2021      Surgeon: * No surgeons listed *    Procedure: * No procedures listed *    Medications prior to admission:   Prior to Admission medications    Medication Sig Start Date End Date Taking?  Authorizing Provider   carvedilol (COREG CR) 20 MG CP24 extended release capsule Take 1 capsule by mouth daily 3/24/21  Yes Patricia Vera MD   dofetilide (TIKOSYN) 250 MCG capsule Take 1 capsule by mouth every 12 hours 21  Yes Amarilis Jackson MD   hydrALAZINE (APRESOLINE) 10 MG tablet Take 1 tablet by mouth 3 times daily 20  Yes Patricia Vera MD   spironolactone (ALDACTONE) 25 MG tablet Take 1 tablet by mouth daily 20  Yes Patricia Vera MD   clopidogrel (PLAVIX) 75 MG tablet Take 1 tablet by mouth daily 20  Yes Winifred Cisneros MD   cholestyramine Nelli Doyle) 4 GM/DOSE powder Take by mouth daily   Yes Historical Provider, MD   amoxicillin (AMOXIL) 500 MG capsule Take 4 capsules by mouth as needed (2000mg once prior to dental, GI/, skin  or cardiac invasive procedures) 10/8/20  Yes Rosie Mays MD   famotidine (PEPCID) 40 MG tablet Take 40 mg by mouth 2 times daily    Yes Historical Provider, MD   omeprazole (PRILOSEC) 40 MG delayed release capsule TAKE 1 CAPSULE BY MOUTH IN THE MORNING 20  Yes Historical Provider, MD   hydrOXYzine (ATARAX) 10 MG tablet Take 1 tablet by mouth 3 times daily as needed for Itching 20  Yes Chelsea Walker MD   furosemide (LASIX) 20 MG tablet Take 1 tablet by mouth daily 20  Yes Patricia Vera MD   triamcinolone (KENALOG) 0.1 % cream Apply topically 3 times daily as needed  19  Yes Historical Provider, MD   loperamide (IMODIUM) 2 MG capsule Take 2 mg by mouth 4 times daily as needed for Diarrhea   Yes Historical Provider, MD   diphenhydrAMINE (BENADRYL) 25 MG tablet Take 25 mg by mouth every 6 hours as needed for Itching   Yes Historical Provider, MD   vitamin D (CHOLECALCIFEROL) 1000 UNIT TABS tablet Take 1,000 Units by mouth daily   Yes Historical Provider, MD   sodium chloride (OCEAN, BABY AYR) 0.65 % nasal spray 1 spray by Nasal route as needed for Congestion 12/18/16  Yes Leroy Zepeda MD   baclofen (LIORESAL) 20 MG tablet Take 20 mg by mouth 2 times daily as needed (muscle spasms, spastic bladder) Indications: Back Spasm, Bladder Spasm    Yes Historical Provider, MD       Current medications:    Current Outpatient Medications   Medication Sig Dispense Refill    carvedilol (COREG CR) 20 MG CP24 extended release capsule Take 1 capsule by mouth daily 90 capsule 3    dofetilide (TIKOSYN) 250 MCG capsule Take 1 capsule by mouth every 12 hours 180 capsule 1    hydrALAZINE (APRESOLINE) 10 MG tablet Take 1 tablet by mouth 3 times daily 270 tablet 3    spironolactone (ALDACTONE) 25 MG tablet Take 1 tablet by mouth daily 90 tablet 3    clopidogrel (PLAVIX) 75 MG tablet Take 1 tablet by mouth daily 30 tablet 5    cholestyramine (QUESTRAN) 4 GM/DOSE powder Take by mouth daily      amoxicillin (AMOXIL) 500 MG capsule Take 4 capsules by mouth as needed (2000mg once prior to dental, GI/, skin  or cardiac invasive procedures) 60 capsule 0    famotidine (PEPCID) 40 MG tablet Take 40 mg by mouth 2 times daily       omeprazole (PRILOSEC) 40 MG delayed release capsule TAKE 1 CAPSULE BY MOUTH IN THE MORNING      hydrOXYzine (ATARAX) 10 MG tablet Take 1 tablet by mouth 3 times daily as needed for Itching 270 tablet 3    furosemide (LASIX) 20 MG tablet Take 1 tablet by mouth daily 90 tablet 3    triamcinolone (KENALOG) 0.1 % cream Apply topically 3 times daily as needed   1    loperamide (IMODIUM) 2 MG capsule Take 2 mg by mouth 4 times daily as needed for Diarrhea      diphenhydrAMINE (BENADRYL) 25 MG tablet Take 25 mg by mouth every 6 hours as  Chronic systolic congestive heart failure (HCC) I50.22    Visit for monitoring Tikosyn therapy Z51.81, Z79.899    Encounter for medication refill Z76.0    Itching L29.9    Chronic anticoagulation Z79.01    Presence of Watchman left atrial appendage closure device Z95.818       Past Medical History:        Diagnosis Date    Afib ([facility] [address]     history    Anxiety     Arthritis     CHF (congestive heart failure) (HCC)     Chronic sinusitis     Hypertension     Left ventricular systolic dysfunction     Meige syndrome     partial/ PCP    Microscopic colitis     Mitral regurgitation     Mild to moderate    Nonischemic cardiomyopathy (HCC)     f/u with Dr. Ifeoma Ji Osteopenia     Vertebrobasilar artery syndrome     f/u PCP       Past Surgical History:        Procedure Laterality Date    BLADDER REPAIR      BUNIONECTOMY      CARDIOVASCULAR STRESS TEST  11/25/13    Rt & LT cath    CARDIOVERSION  11/04/2019    Successful CV from AF to NSR   (Dr. Jennifer River)    COLONOSCOPY  07/2020    DIAGNOSTIC CARDIAC CATH LAB PROCEDURE  2/20/10    Dr Moreira [place] CATH LAB PROCEDURE  8/24/11    Right heart cath @ [facility].  DOPPLER ECHOCARDIOGRAPHY  11/25/13    HYSTERECTOMY  1991    total    OTHER SURGICAL HISTORY  10/14/2020    Dr. Néstor Ruiz- 24mm Watchman device    TRANSESOPHAGEAL ECHOCARDIOGRAM  11/21/2018    Dr. Rubi Dumont TRANSESOPHAGEAL ECHOCARDIOGRAM  03/18/2019    WISDOM TOOTH EXTRACTION         Social History:    Social History     Tobacco Use    Smoking status: Never Smoker    Smokeless tobacco: Never Used   Substance Use Topics    Alcohol use: Yes     Alcohol/week: 0.0 standard drinks     Frequency: Monthly or less     Drinks per session: 1 or 2     Binge frequency: Never     Comment: occasional wine/mixed drink.                                  Counseling given: Not Answered      Vital Signs (Current):   Vitals:    04/13/21 1015   Weight: 179 lb (81.2 kg)   Height: 5' 6\" (1.676 m) BP Readings from Last 3 Encounters:   04/06/21 122/62   03/17/21 110/64   01/22/21 130/66       NPO Status:                                                                                 BMI:   Wt Readings from Last 3 Encounters:   04/13/21 179 lb (81.2 kg)   04/06/21 179 lb 3.2 oz (81.3 kg)   03/17/21 177 lb 9.6 oz (80.6 kg)     Body mass index is 28.89 kg/m². CBC:   Lab Results   Component Value Date    WBC 6.5 01/22/2021    RBC 4.82 01/22/2021    HGB 13.5 01/22/2021    HCT 41.5 01/22/2021    MCV 86.1 01/22/2021    RDW 13.7 01/22/2021     01/22/2021       CMP:   Lab Results   Component Value Date     01/30/2021    K 4.1 01/30/2021    K 4.2 10/16/2020     01/30/2021    CO2 24 01/30/2021    BUN 17 01/30/2021    CREATININE 1.2 01/30/2021    GFRAA 53 01/30/2021    LABGLOM 44 01/30/2021    GLUCOSE 107 01/30/2021    GLUCOSE 120 10/12/2011    PROT 7.6 09/03/2020    CALCIUM 9.2 01/30/2021    BILITOT 0.6 09/03/2020    ALKPHOS 138 09/03/2020    AST 24 09/03/2020    ALT 20 09/03/2020       POC Tests: No results for input(s): POCGLU, POCNA, POCK, POCCL, POCBUN, POCHEMO, POCHCT in the last 72 hours.     Coags:   Lab Results   Component Value Date    PROTIME 11.5 01/22/2021    PROTIME 12.0 12/17/2012    INR 1.0 01/22/2021    APTT 28.5 01/22/2021       HCG (If Applicable): No results found for: PREGTESTUR, PREGSERUM, HCG, HCGQUANT     ABGs: No results found for: PHART, PO2ART, ENJ4CFK, RRJ8XUU, BEART, A8EVPNHK     Type & Screen (If Applicable):  No results found for: LABABO, LABRH    Drug/Infectious Status (If Applicable):  No results found for: HIV, HEPCAB    COVID-19 Screening (If Applicable):   Lab Results   Component Value Date    COVID19 Not Detected 04/09/2021           Anesthesia Evaluation  Patient summary reviewed and Nursing notes reviewed no history of anesthetic complications:   Airway: Mallampati: III  TM distance: <3 FB   Neck ROM: limited  Mouth opening: > = 3 FB Dental:          Pulmonary:   (+) decreased breath sounds,      (-) not a current smoker                           Cardiovascular:  Exercise tolerance: good (>4 METS),   (+) hypertension:, valvular problems/murmurs: MR, CAD:, dysrhythmias: atrial fibrillation, CHF: systolic, CAIN: after ambulating 2 flights of stairs and no interval change, murmur (Grade II murmur),       ECG reviewed  Rhythm: regular  Rate: normal  Echocardiogram reviewed         Beta Blocker:  Dose within 24 Hrs      ROS comment: Severe MR    Presence of Watchman left atrial appendage closure device    Summary  Ejection fraction is visually estimated at 45-50%. Normal right ventricular size and function. Moderate mitral regurgitation. Mild aortic regurgitation. Mild tricuspid regurgitation. RV-RA gradient is estimated at 49 mmHg. No evidence of interatrial shunting on bubble study. History of WATCHMAN device (24 mm). No significant color flow jet around     Neuro/Psych:   (+) neuromuscular disease:, psychiatric history: stable with treatmentdepression/anxiety             GI/Hepatic/Renal:   (+) hiatal hernia, GERD: poorly controlled,           Endo/Other:    (+) blood dyscrasia (Plavix LD yesterday): anticoagulation therapy, arthritis: OA., . Pt had no PAT visit        ROS comment: Meige syndrome Abdominal:           Vascular:           ROS comment: Vertebrobasilar artery syndrome. Anesthesia Plan      MAC     ASA 3       Induction: intravenous. MIPS: Prophylactic antiemetics administered. Anesthetic plan and risks discussed with patient. Plan discussed with CRNA.                   Mikki Collado,    4/15/2021

## 2021-04-15 NOTE — PROCEDURES
TRANSESOPHAGEAL ECHOCARDIOGRAPHY REPORT    Cardiologist: Dr. Gunnar Bee    Date of Procedure: 4/15/2021    Indications for study:  Follow-up JAIME for Watchman device, Atrial Fibrillation    Study performed using (Sedation): Per anesthesia    Complications: None    * WATCHMAN device in place (24 mm) --> no significant color flow jet around the device  * Thrombus noted on the WATCHMAN device      - Will discontinue plavix  - Will restart xarelto  - Case discussed with Dr. Che Buckley MD  [facility]) Cardiology

## 2021-04-16 ENCOUNTER — TELEPHONE (OUTPATIENT)
Dept: CARDIOLOGY | Age: 76
End: 2021-04-16

## 2021-04-16 NOTE — TELEPHONE ENCOUNTER
For purpose of the [address], The Modified [place] Scale & the Barthol Index Evaluation was administered  over the phone. The results are below.     MODIFIED DAVID SCALE  No symptoms    Barthol Index Evaluation   Feeding: independent  Bathing: independent  Grooming: independent  Dressing: independent  Bowels: inconsistent  Bladder: inconstistent  Toilet Use: independent  Transfers: independent  Mobility: independent  Stairs:independent      Anticoagulant Medication:  Xarelto 20 mg po daily - started yesterday d/t a clot on the Watchman device  Plavix discontinued

## 2021-04-21 ENCOUNTER — TELEPHONE (OUTPATIENT)
Dept: CARDIOLOGY CLINIC | Age: 76
End: 2021-04-21

## 2021-04-21 NOTE — TELEPHONE ENCOUNTER
----- Message from Hortensia Thorne MD sent at 4/15/2021 12:02 PM EDT -----  Device-related thrombus noted on the atrial surface of the Watchman CHERYL occluder 6-month JAIME. There is no inge-device leak; the device is well-seated. Stop antiplatelets and start Xarelto 20 mg daily. I called her and went over the findings and plan. Repeat JAIME in 6-8 weeks. Follow up with me in 4 weeks.

## 2021-04-30 RX ORDER — FUROSEMIDE 20 MG/1
20 TABLET ORAL DAILY
Qty: 90 TABLET | Refills: 3 | Status: SHIPPED
Start: 2021-04-30 | End: 2022-05-06 | Stop reason: SDUPTHER

## 2021-05-10 ENCOUNTER — OFFICE VISIT (OUTPATIENT)
Dept: CARDIOLOGY CLINIC | Age: 76
End: 2021-05-10
Payer: MEDICARE

## 2021-05-10 VITALS
SYSTOLIC BLOOD PRESSURE: 112 MMHG | HEIGHT: 66 IN | RESPIRATION RATE: 16 BRPM | DIASTOLIC BLOOD PRESSURE: 62 MMHG | HEART RATE: 57 BPM | WEIGHT: 178.6 LBS | BODY MASS INDEX: 28.7 KG/M2 | OXYGEN SATURATION: 96 %

## 2021-05-10 DIAGNOSIS — I48.0 PAF (PAROXYSMAL ATRIAL FIBRILLATION) (HCC): ICD-10-CM

## 2021-05-10 DIAGNOSIS — Z95.818 PRESENCE OF WATCHMAN LEFT ATRIAL APPENDAGE CLOSURE DEVICE: Primary | ICD-10-CM

## 2021-05-10 DIAGNOSIS — I51.3 LEFT ATRIAL THROMBUS: ICD-10-CM

## 2021-05-10 PROCEDURE — 99213 OFFICE O/P EST LOW 20 MIN: CPT | Performed by: INTERNAL MEDICINE

## 2021-05-10 PROCEDURE — G8427 DOCREV CUR MEDS BY ELIG CLIN: HCPCS | Performed by: INTERNAL MEDICINE

## 2021-05-10 PROCEDURE — G8417 CALC BMI ABV UP PARAM F/U: HCPCS | Performed by: INTERNAL MEDICINE

## 2021-05-10 PROCEDURE — 4040F PNEUMOC VAC/ADMIN/RCVD: CPT | Performed by: INTERNAL MEDICINE

## 2021-05-10 PROCEDURE — 1090F PRES/ABSN URINE INCON ASSESS: CPT | Performed by: INTERNAL MEDICINE

## 2021-05-10 PROCEDURE — 93000 ELECTROCARDIOGRAM COMPLETE: CPT | Performed by: INTERNAL MEDICINE

## 2021-05-10 PROCEDURE — 1036F TOBACCO NON-USER: CPT | Performed by: INTERNAL MEDICINE

## 2021-05-10 PROCEDURE — G8399 PT W/DXA RESULTS DOCUMENT: HCPCS | Performed by: INTERNAL MEDICINE

## 2021-05-10 PROCEDURE — 1123F ACP DISCUSS/DSCN MKR DOCD: CPT | Performed by: INTERNAL MEDICINE

## 2021-05-10 NOTE — PROGRESS NOTES
[address] [facility]   Heart and Vascular [place]   Clinic Note     Date:5/10/21   Patient Name:Aidee Charles  YOB: 1945  Age: 68 y.o. Primary Care Provider: Kailey Hoover MD    Subjective     This is a 54-year-old  female, patient of Dr. Phillip Mesa, who is referred to us originally for left atrial appendage closure using watchman which she underwent successfully on 10/14/2020. She was restarted on rivaroxaban due to detection of device related thrombus on her 6-month JAIME. Fortunately she is tolerating this well. She reports no other cardiac symptoms such as chest pain, dyspnea, lower extremity edema, orthopnea, PND. A focused history review includes:  1. Paroxysmal atrial fibrillation status post DC cardioversion 11/2019  1. Maintained on dofetilide   2. History of left atrial appendage thrombus in November 2018 and March 2019. Resolved on rivaroxaban. 3. Allergic to rivaroxaban with significant skin reaction that she manages with hydroxyzine  1. Also allergic to dabigatran and apixaban. All the above documented additionally in [facility] clinic notes  2. S/p left atrial appendage closure using 24 mm watchman 2.5 (10/14/20 - (Dr. Jasson Sood)) complicated by small pericardial effusion that did not require intervention and resolved before discharge  1. 6-week JAIME without issues-switched to aspirin 325 mg plus Plavix then Plavix alone due to conjunctival hemorrhage  2. 6-month JAIEM with DRT; no inge-device leak. Stopped APT and restarted Xarelto  3. Nonischemic cardiomyopathy with chronic systolic heart failure (coronary angiogram without significant CAD in 2010)  1. Most recent TTE from July 2019 with a EF 35 to 40% with moderate MR moderate PH  2. JAIME dated 9/9/2020 personally reviewed: Mildly reduced LV systolic function with an EF 45%. Normal RV size and systolic function. Moderate MR. Mild AR. Mild TR. Moderate pulmonary hypertension.   No CHERYL tablet 3    spironolactone (ALDACTONE) 25 MG tablet Take 1 tablet by mouth daily 90 tablet 3    cholestyramine (QUESTRAN) 4 GM/DOSE powder Take by mouth daily      amoxicillin (AMOXIL) 500 MG capsule Take 4 capsules by mouth as needed (2000mg once prior to dental, GI/, skin  or cardiac invasive procedures) 60 capsule 0    famotidine (PEPCID) 40 MG tablet Take 40 mg by mouth 2 times daily       omeprazole (PRILOSEC) 40 MG delayed release capsule TAKE 1 CAPSULE BY MOUTH IN THE MORNING      hydrOXYzine (ATARAX) 10 MG tablet Take 1 tablet by mouth 3 times daily as needed for Itching 270 tablet 3    triamcinolone (KENALOG) 0.1 % cream Apply topically 3 times daily as needed   1    loperamide (IMODIUM) 2 MG capsule Take 2 mg by mouth 4 times daily as needed for Diarrhea      diphenhydrAMINE (BENADRYL) 25 MG tablet Take 25 mg by mouth every 6 hours as needed for Itching      vitamin D (CHOLECALCIFEROL) 1000 UNIT TABS tablet Take 1,000 Units by mouth daily      sodium chloride (OCEAN, BABY AYR) 0.65 % nasal spray 1 spray by Nasal route as needed for Congestion (Patient taking differently: 445 sprays by Nasal route as needed for Congestion ) 50 mL 0    baclofen (LIORESAL) 20 MG tablet Take 20 mg by mouth 2 times daily as needed (muscle spasms, spastic bladder) Indications: Back Spasm, Bladder Spasm        No current facility-administered medications for this visit.             Physical Examination      /62 (Site: Right Upper Arm, Position: Sitting, Cuff Size: Medium Adult)   Pulse 57   Resp 16   Ht 5' 6\" (1.676 m)   Wt 178 lb 9.6 oz (81 kg)   SpO2 96%   BMI 28.83 kg/m²     General: No acute distress, appears as stated age, nonicteric  Head: Atraumatic, no gross abnormalities or bruises  Neck: Supple and nontender, no carotid bruits, normal JVP  Lungs: Clear to auscultation bilaterally, no wheezes, rales, or rhonchi  Heart: Regular rate and rhythm, no murmurs, rubs, or gallops  Abdomen: Soft, nontender, nondistended, normal bowel sounds  Extremities: No obvious deformities, no cyanosis, no edema  Neurological: Alert and oriented x3, EOMI, moving all extremities x4  Psychological: Normal mood and affect, cooperative  Skin: Color, texture, and turgor normal for age          Labs/Imaging/Diagnostics     Lab Results   Component Value Date     01/30/2021    K 4.1 01/30/2021    K 4.2 10/16/2020     01/30/2021    CO2 24 01/30/2021    BUN 17 01/30/2021    CREATININE 1.2 01/30/2021    GLUCOSE 107 01/30/2021    GLUCOSE 120 10/12/2011    CALCIUM 9.2 01/30/2021        Estimated Creatinine Clearance: 43 mL/min (A) (based on SCr of 1.2 mg/dL (H)).     Lab Results   Component Value Date    WBC 6.5 01/22/2021    HGB 13.5 01/22/2021    HCT 41.5 01/22/2021    MCV 86.1 01/22/2021     01/22/2021       Lab Results   Component Value Date    ALT 20 09/03/2020    AST 24 09/03/2020    ALKPHOS 138 (H) 09/03/2020    BILITOT 0.6 09/03/2020       Lab Results   Component Value Date    LABALBU 3.9 09/03/2020       Lab Results   Component Value Date    CHOL 179 11/21/2018    CHOL 204 (H) 11/14/2015    CHOL 235 (H) 07/02/2015     Lab Results   Component Value Date    TRIG 69 11/21/2018    TRIG 71 11/14/2015    TRIG 182 (H) 07/02/2015     Lab Results   Component Value Date    HDL 51 11/21/2018    HDL 85 11/14/2015    HDL 69 07/02/2015     Lab Results   Component Value Date    LDLCALC 114 (H) 11/21/2018    LDLCALC 105 (H) 11/14/2015    LDLCALC 130 (H) 07/02/2015     Lab Results   Component Value Date    LABVLDL 14 11/21/2018    LABVLDL 14 11/14/2015    LABVLDL 36 07/02/2015     No results found for: [facility]    Lab Results   Component Value Date    CKTOTAL 83 11/25/2013    CKMB 6.6 (H) 11/25/2013    TROPONINI <0.01 03/09/2019       Lab Results   Component Value Date    BNP 1,046 (H) 11/24/2013         EKG today: Normal sinus rhythm with incomplete left bundle branch block        Assessment and Plan:      Very pleasant 60-year-old  female with a history noted above. Although her Watchman is well-seated without any peridevice leak, unfortunately there was development of device related thrombus (DRT) for which she had to be restarted on rivaroxaban. Of note she has a history of left atrial appendage thrombus prior to [facility]. Diagnosis Orders   1. Presence of Watchman left atrial appendage closure device     2. PAF (paroxysmal atrial fibrillation) ([facility])  EKG 12 Lead   3. Left atrial thrombus        · Recheck JAIME for DRT in late June. If thrombus persists then will switch to warfarin plus low-dose aspirin. If thrombus resolves then will switch to aspirin 81 mg daily plus ticagrelor 90 mg p.o. twice daily for 6 months; we will recheck a JAIME 3 months after stopping rivaroxaban. · Otherwise continue medications as is including prophylactic antibiotics as needed.  Follow up with me in October 2021    We appreciate the opportunity to participate in Her care!       Jaison Robledo MD  Interventional Cardiology/Structural Heart Disease  Office: [phone]  Fax: [phone]  RN Coordinator: Jeremy Beckwith

## 2021-05-10 NOTE — PATIENT INSTRUCTIONS
1. JAIME in mid to late June - we will call you  2. Keep taking Xarelto until you hear otherwise from us  3.  Keep return appointment as is in October for now

## 2021-06-03 ENCOUNTER — APPOINTMENT (OUTPATIENT)
Dept: CT IMAGING | Age: 76
DRG: 690 | End: 2021-06-03
Payer: MEDICARE

## 2021-06-03 ENCOUNTER — HOSPITAL ENCOUNTER (INPATIENT)
Age: 76
LOS: 1 days | Discharge: HOME | DRG: 690 | End: 2021-06-04
Attending: EMERGENCY MEDICINE | Admitting: INTERNAL MEDICINE
Payer: MEDICARE

## 2021-06-03 ENCOUNTER — APPOINTMENT (OUTPATIENT)
Dept: GENERAL RADIOLOGY | Age: 76
DRG: 690 | End: 2021-06-03
Payer: MEDICARE

## 2021-06-03 DIAGNOSIS — N39.0 URINARY TRACT INFECTION WITHOUT HEMATURIA, SITE UNSPECIFIED: ICD-10-CM
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
## 2021-06-03 DIAGNOSIS — R53.83 OTHER FATIGUE: ICD-10-CM

## 2021-06-03 LAB
ALBUMIN SERPL-MCNC: 4.2 G/DL (ref 3.5–5.2)
ALP BLD-CCNC: 126 U/L (ref 35–104)
ALT SERPL-CCNC: 17 U/L (ref 0–32)
ANION GAP SERPL CALCULATED.3IONS-SCNC: 9 MMOL/L (ref 7–16)
AST SERPL-CCNC: 22 U/L (ref 0–31)
BACTERIA: ABNORMAL /HPF
BASOPHILS ABSOLUTE: 0.03 E9/L (ref 0–0.2)
BASOPHILS RELATIVE PERCENT: 0.2 % (ref 0–2)
BILIRUB SERPL-MCNC: 0.5 MG/DL (ref 0–1.2)
BILIRUBIN URINE: NEGATIVE
BLOOD, URINE: ABNORMAL
BUN BLDV-MCNC: 18 MG/DL (ref 6–23)
CALCIUM SERPL-MCNC: 9.7 MG/DL (ref 8.6–10.2)
CHLORIDE BLD-SCNC: 107 MMOL/L (ref 98–107)
CHP ED QC CHECK: NORMAL
CLARITY: ABNORMAL
CO2: 25 MMOL/L (ref 22–29)
COLOR: YELLOW
CREAT SERPL-MCNC: 1 MG/DL (ref 0.5–1)
EKG ATRIAL RATE: 78 BPM
EKG P AXIS: 80 DEGREES
EKG P-R INTERVAL: 232 MS
EKG Q-T INTERVAL: 418 MS
EKG QRS DURATION: 104 MS
EKG QTC CALCULATION (BAZETT): 476 MS
EKG R AXIS: 100 DEGREES
EKG T AXIS: 88 DEGREES
EKG VENTRICULAR RATE: 78 BPM
EOSINOPHILS ABSOLUTE: 0 E9/L (ref 0.05–0.5)
EOSINOPHILS RELATIVE PERCENT: 0 % (ref 0–6)
EPITHELIAL CELLS, UA: ABNORMAL /HPF
GFR AFRICAN AMERICAN: >60
GFR NON-AFRICAN AMERICAN: 54 ML/MIN/1.73
GLUCOSE BLD-MCNC: 123 MG/DL
GLUCOSE BLD-MCNC: 138 MG/DL (ref 74–99)
GLUCOSE URINE: NEGATIVE MG/DL
HCT VFR BLD CALC: 43.9 % (ref 34–48)
HEMOGLOBIN: 14.3 G/DL (ref 11.5–15.5)
IMMATURE GRANULOCYTES #: 0.06 E9/L
IMMATURE GRANULOCYTES %: 0.5 % (ref 0–5)
KETONES, URINE: 15 MG/DL
LACTIC ACID: 1.5 MMOL/L (ref 0.5–2.2)
LEUKOCYTE ESTERASE, URINE: ABNORMAL
LYMPHOCYTES ABSOLUTE: 0.93 E9/L (ref 1.5–4)
LYMPHOCYTES RELATIVE PERCENT: 7.5 % (ref 20–42)
MCH RBC QN AUTO: 28 PG (ref 26–35)
MCHC RBC AUTO-ENTMCNC: 32.6 % (ref 32–34.5)
MCV RBC AUTO: 85.9 FL (ref 80–99.9)
METER GLUCOSE: 123 MG/DL (ref 74–99)
MONOCYTES ABSOLUTE: 0.5 E9/L (ref 0.1–0.95)
MONOCYTES RELATIVE PERCENT: 4 % (ref 2–12)
NEUTROPHILS ABSOLUTE: 10.92 E9/L (ref 1.8–7.3)
NEUTROPHILS RELATIVE PERCENT: 87.8 % (ref 43–80)
NITRITE, URINE: POSITIVE
PDW BLD-RTO: 13.8 FL (ref 11.5–15)
PH UA: 6 (ref 5–9)
PLATELET # BLD: 279 E9/L (ref 130–450)
PMV BLD AUTO: 10.5 FL (ref 7–12)
POTASSIUM SERPL-SCNC: 4.4 MMOL/L (ref 3.5–5)
PROTEIN UA: NEGATIVE MG/DL
RBC # BLD: 5.11 E12/L (ref 3.5–5.5)
RBC UA: >20 /HPF (ref 0–2)
SODIUM BLD-SCNC: 141 MMOL/L (ref 132–146)
SPECIFIC GRAVITY UA: >=1.03 (ref 1–1.03)
TOTAL CK: 109 U/L (ref 20–180)
TOTAL PROTEIN: 8 G/DL (ref 6.4–8.3)
TROPONIN, HIGH SENSITIVITY: 14 NG/L (ref 0–9)
TROPONIN, HIGH SENSITIVITY: 15 NG/L (ref 0–9)
UROBILINOGEN, URINE: 0.2 E.U./DL
WBC # BLD: 12.4 E9/L (ref 4.5–11.5)
WBC UA: >20 /HPF (ref 0–5)

## 2021-06-03 PROCEDURE — 2580000003 HC RX 258: Performed by: FAMILY MEDICINE

## 2021-06-03 PROCEDURE — 84484 ASSAY OF TROPONIN QUANT: CPT

## 2021-06-03 PROCEDURE — 6370000000 HC RX 637 (ALT 250 FOR IP): Performed by: FAMILY MEDICINE

## 2021-06-03 PROCEDURE — 72125 CT NECK SPINE W/O DYE: CPT

## 2021-06-03 PROCEDURE — 85025 COMPLETE CBC W/AUTO DIFF WBC: CPT

## 2021-06-03 PROCEDURE — 82550 ASSAY OF CK (CPK): CPT

## 2021-06-03 PROCEDURE — 83605 ASSAY OF LACTIC ACID: CPT

## 2021-06-03 PROCEDURE — 87186 SC STD MICRODIL/AGAR DIL: CPT

## 2021-06-03 PROCEDURE — G0378 HOSPITAL OBSERVATION PER HR: HCPCS

## 2021-06-03 PROCEDURE — 93005 ELECTROCARDIOGRAM TRACING: CPT | Performed by: EMERGENCY MEDICINE

## 2021-06-03 PROCEDURE — 81001 URINALYSIS AUTO W/SCOPE: CPT

## 2021-06-03 PROCEDURE — 2580000003 HC RX 258: Performed by: EMERGENCY MEDICINE

## 2021-06-03 PROCEDURE — 96374 THER/PROPH/DIAG INJ IV PUSH: CPT

## 2021-06-03 PROCEDURE — 99283 EMERGENCY DEPT VISIT LOW MDM: CPT

## 2021-06-03 PROCEDURE — 72131 CT LUMBAR SPINE W/O DYE: CPT

## 2021-06-03 PROCEDURE — 6360000002 HC RX W HCPCS: Performed by: EMERGENCY MEDICINE

## 2021-06-03 PROCEDURE — 87088 URINE BACTERIA CULTURE: CPT

## 2021-06-03 PROCEDURE — 36415 COLL VENOUS BLD VENIPUNCTURE: CPT

## 2021-06-03 PROCEDURE — 80053 COMPREHEN METABOLIC PANEL: CPT

## 2021-06-03 PROCEDURE — 82962 GLUCOSE BLOOD TEST: CPT

## 2021-06-03 PROCEDURE — 71045 X-RAY EXAM CHEST 1 VIEW: CPT

## 2021-06-03 PROCEDURE — 70450 CT HEAD/BRAIN W/O DYE: CPT

## 2021-06-03 PROCEDURE — 6370000000 HC RX 637 (ALT 250 FOR IP): Performed by: NURSE PRACTITIONER

## 2021-06-03 RX ORDER — TRAMADOL HYDROCHLORIDE 50 MG/1
25 TABLET ORAL ONCE
Status: COMPLETED | OUTPATIENT
Start: 2021-06-03 | End: 2021-06-03

## 2021-06-03 RX ORDER — FUROSEMIDE 20 MG/1
20 TABLET ORAL DAILY
Status: DISCONTINUED | OUTPATIENT
Start: 2021-06-03 | End: 2021-06-04 | Stop reason: HOSPADM

## 2021-06-03 RX ORDER — HYDROXYZINE HYDROCHLORIDE 10 MG/1
10 TABLET, FILM COATED ORAL EVERY 8 HOURS PRN
COMMUNITY
End: 2021-07-12 | Stop reason: SDUPTHER

## 2021-06-03 RX ORDER — DIPHENHYDRAMINE HCL 25 MG
25 TABLET ORAL EVERY 6 HOURS PRN
Status: DISCONTINUED | OUTPATIENT
Start: 2021-06-03 | End: 2021-06-04 | Stop reason: HOSPADM

## 2021-06-03 RX ORDER — DOFETILIDE 0.25 MG/1
250 CAPSULE ORAL EVERY 12 HOURS SCHEDULED
Status: DISCONTINUED | OUTPATIENT
Start: 2021-06-03 | End: 2021-06-04 | Stop reason: HOSPADM

## 2021-06-03 RX ORDER — HYDROXYZINE HYDROCHLORIDE 10 MG/1
10 TABLET, FILM COATED ORAL 3 TIMES DAILY PRN
Status: DISCONTINUED | OUTPATIENT
Start: 2021-06-03 | End: 2021-06-04 | Stop reason: HOSPADM

## 2021-06-03 RX ORDER — SODIUM CHLORIDE 0.9 % (FLUSH) 0.9 %
5-40 SYRINGE (ML) INJECTION PRN
Status: DISCONTINUED | OUTPATIENT
Start: 2021-06-03 | End: 2021-06-04 | Stop reason: HOSPADM

## 2021-06-03 RX ORDER — CHOLESTYRAMINE 4 G/9G
4 POWDER, FOR SUSPENSION ORAL DAILY
Status: DISCONTINUED | OUTPATIENT
Start: 2021-06-03 | End: 2021-06-03

## 2021-06-03 RX ORDER — BACLOFEN 10 MG/1
20 TABLET ORAL 2 TIMES DAILY PRN
Status: DISCONTINUED | OUTPATIENT
Start: 2021-06-03 | End: 2021-06-04 | Stop reason: HOSPADM

## 2021-06-03 RX ORDER — VITAMIN B COMPLEX
1000 TABLET ORAL DAILY
Status: DISCONTINUED | OUTPATIENT
Start: 2021-06-03 | End: 2021-06-04 | Stop reason: HOSPADM

## 2021-06-03 RX ORDER — PANTOPRAZOLE SODIUM 40 MG/1
40 TABLET, DELAYED RELEASE ORAL
Status: DISCONTINUED | OUTPATIENT
Start: 2021-06-04 | End: 2021-06-04 | Stop reason: HOSPADM

## 2021-06-03 RX ORDER — SODIUM CHLORIDE 0.9 % (FLUSH) 0.9 %
5-40 SYRINGE (ML) INJECTION EVERY 12 HOURS SCHEDULED
Status: DISCONTINUED | OUTPATIENT
Start: 2021-06-03 | End: 2021-06-04 | Stop reason: HOSPADM

## 2021-06-03 RX ORDER — CARVEDILOL 6.25 MG/1
6.25 TABLET ORAL 2 TIMES DAILY
Status: DISCONTINUED | OUTPATIENT
Start: 2021-06-03 | End: 2021-06-04 | Stop reason: HOSPADM

## 2021-06-03 RX ORDER — ACETAMINOPHEN 650 MG/1
650 SUPPOSITORY RECTAL EVERY 6 HOURS PRN
Status: DISCONTINUED | OUTPATIENT
Start: 2021-06-03 | End: 2021-06-04 | Stop reason: HOSPADM

## 2021-06-03 RX ORDER — HYDRALAZINE HYDROCHLORIDE 10 MG/1
10 TABLET, FILM COATED ORAL 3 TIMES DAILY
Status: DISCONTINUED | OUTPATIENT
Start: 2021-06-03 | End: 2021-06-04 | Stop reason: HOSPADM

## 2021-06-03 RX ORDER — ONDANSETRON 2 MG/ML
4 INJECTION INTRAMUSCULAR; INTRAVENOUS EVERY 6 HOURS PRN
Status: DISCONTINUED | OUTPATIENT
Start: 2021-06-03 | End: 2021-06-04 | Stop reason: HOSPADM

## 2021-06-03 RX ORDER — FAMOTIDINE 20 MG/1
40 TABLET, FILM COATED ORAL 2 TIMES DAILY
Status: DISCONTINUED | OUTPATIENT
Start: 2021-06-03 | End: 2021-06-04 | Stop reason: HOSPADM

## 2021-06-03 RX ORDER — ACETAMINOPHEN 325 MG/1
650 TABLET ORAL EVERY 6 HOURS PRN
Status: DISCONTINUED | OUTPATIENT
Start: 2021-06-03 | End: 2021-06-04 | Stop reason: HOSPADM

## 2021-06-03 RX ORDER — SODIUM CHLORIDE 9 MG/ML
25 INJECTION, SOLUTION INTRAVENOUS PRN
Status: DISCONTINUED | OUTPATIENT
Start: 2021-06-03 | End: 2021-06-04 | Stop reason: HOSPADM

## 2021-06-03 RX ORDER — SPIRONOLACTONE 25 MG/1
25 TABLET ORAL DAILY
Status: DISCONTINUED | OUTPATIENT
Start: 2021-06-03 | End: 2021-06-04 | Stop reason: HOSPADM

## 2021-06-03 RX ORDER — TRIAMCINOLONE ACETONIDE 1 MG/G
CREAM TOPICAL 3 TIMES DAILY PRN
Status: DISCONTINUED | OUTPATIENT
Start: 2021-06-03 | End: 2021-06-04 | Stop reason: HOSPADM

## 2021-06-03 RX ORDER — ONDANSETRON 4 MG/1
4 TABLET, ORALLY DISINTEGRATING ORAL EVERY 8 HOURS PRN
Status: DISCONTINUED | OUTPATIENT
Start: 2021-06-03 | End: 2021-06-04 | Stop reason: HOSPADM

## 2021-06-03 RX ADMIN — CEFTRIAXONE 1000 MG: 1 INJECTION, POWDER, FOR SOLUTION INTRAMUSCULAR; INTRAVENOUS at 15:23

## 2021-06-03 RX ADMIN — DOFETILIDE 250 MCG: 0.25 CAPSULE ORAL at 21:25

## 2021-06-03 RX ADMIN — Medication 1000 UNITS: at 21:25

## 2021-06-03 RX ADMIN — HYDRALAZINE HYDROCHLORIDE 10 MG: 10 TABLET, FILM COATED ORAL at 21:26

## 2021-06-03 RX ADMIN — FAMOTIDINE 40 MG: 20 TABLET ORAL at 21:25

## 2021-06-03 RX ADMIN — SPIRONOLACTONE 25 MG: 25 TABLET ORAL at 21:25

## 2021-06-03 RX ADMIN — CARVEDILOL 6.25 MG: 6.25 TABLET, FILM COATED ORAL at 21:25

## 2021-06-03 RX ADMIN — SODIUM CHLORIDE, PRESERVATIVE FREE 10 ML: 5 INJECTION INTRAVENOUS at 21:26

## 2021-06-03 RX ADMIN — RIVAROXABAN 20 MG: 20 TABLET, FILM COATED ORAL at 21:25

## 2021-06-03 RX ADMIN — FUROSEMIDE 20 MG: 20 TABLET ORAL at 21:25

## 2021-06-03 RX ADMIN — TRAMADOL HYDROCHLORIDE 25 MG: 50 TABLET, FILM COATED ORAL at 22:10

## 2021-06-03 ASSESSMENT — PAIN DESCRIPTION - LOCATION: LOCATION: BACK

## 2021-06-03 ASSESSMENT — PAIN DESCRIPTION - ORIENTATION: ORIENTATION: LOWER

## 2021-06-03 ASSESSMENT — PAIN SCALES - GENERAL
PAINLEVEL_OUTOF10: 6
PAINLEVEL_OUTOF10: 6
PAINLEVEL_OUTOF10: 10
PAINLEVEL_OUTOF10: 0
PAINLEVEL_OUTOF10: 0

## 2021-06-03 ASSESSMENT — PAIN DESCRIPTION - PAIN TYPE: TYPE: ACUTE PAIN

## 2021-06-03 NOTE — ED PROVIDER NOTES
Department of Emergency Medicine   ED  Provider Note  Admit Date/RoomTime: 6/3/2021 10:18 AM  ED Room: [facility]          History of Present Illness:  6/3/21, Time: 2:12 PM EDT  Chief Complaint   Patient presents with    Fall     frequent falls, reported that she was laying on ground for 1 hour                Gini Harvey is a 68 y.o. female presenting to the ED for falls. Patient been having frequent falls over the past couple of days. Came on suddenly, nothing makes it better or worse, no associated pain. She does complain of generalized weakness. She has not had this in the past.  She said she was on the ground for about an hour today, she is not able to get up. Her son came and helped her. She does live alone. She does ambulate without a cane or walker. She denies any fever, chills, nausea, vomiting, change in bowel or bladder, neck pain or stiffness, lethargy, cough, sputum, paresthesias, or any other symptoms or complaints. Review of Systems:   Pertinent positives and negatives are stated within HPI, all other systems reviewed and are negative.        --------------------------------------------- PAST HISTORY ---------------------------------------------  Past Medical History:  has a past medical history of Afib ([address] Anxiety, Arthritis, CHF (congestive heart failure) ([address] Chronic sinusitis, Hypertension, Left ventricular systolic dysfunction, Meige syndrome, Microscopic colitis, Mitral regurgitation, Nonischemic cardiomyopathy ([address] Osteopenia, and Vertebrobasilar artery syndrome. Past Surgical History:  has a past surgical history that includes bladder repair; Bunionectomy; Diagnostic Cardiac Cath Lab Procedure (2/20/10); Diagnostic Cardiac Cath Lab Procedure (8/24/11); cardiovascular stress test (11/25/13); doppler echocardiography (11/25/13); transesophageal echocardiogram (11/21/2018); transesophageal echocardiogram (03/18/2019); Cardioversion (11/04/2019);  Colonoscopy (07/2020); [place] tooth extraction; other surgical history (10/14/2020); and Hysterectomy (1991). Social History:  reports that she has never smoked. She has never used smokeless tobacco. She reports current alcohol use. She reports that she does not use drugs. Family History: family history includes Anxiety Disorder in her sister; Crohn's Disease in her son; Depression in her sister; Heart Attack in her father and mother; Heart Disease in her father; Heart Failure in her father; Stroke in her mother. . Unless otherwise noted, family history is non contributory    The patients home medications have been reviewed. Allergies: Atacand [candesartan], Xarelto [rivaroxaban], Adhesive tape, Demerol, Digoxin, Imdur [isosorbide mononitrate], Losartan potassium, Shellfish-derived products, Sulfa antibiotics, Acetaminophen, Apap-caff-dihydrocodeine, Coreg [carvedilol], Cozaar [losartan], Diovan [valsartan], Effexor [venlafaxine hydrochloride], Eliquis [apixaban], Labetalol, Lisinopril, and Ramipril        ---------------------------------------------------PHYSICAL EXAM--------------------------------------    Constitutional/General: Alert and oriented x3  Head: Normocephalic and atraumatic  Eyes: PERRL, EOMI, sclera non icteric  Mouth: Oropharynx clear, handling secretions, no trismus, no asymmetry of the posterior oropharynx or uvular edema  Neck: Supple, full ROM, no stridor, no meningeal signs  Respiratory: Lungs clear to auscultation bilaterally, no wheezes, rales, or rhonchi. Not in respiratory distress  Cardiovascular:  Regular rate. Regular rhythm. 2+ distal pulses. Equal extremity pulses. Chest: No chest wall tenderness  GI:  Abdomen Soft, Non tender, Non distended. No rebound, guarding, or rigidity. No pulsatile masses. Musculoskeletal: Moves all extremities x 4. Warm and well perfused, no clubbing, cyanosis, or edema. Capillary refill <3 seconds  Integument: skin warm and dry. No rashes.    Neurologic: GCS 15, no focal deficits, symmetric strength 5/5 in the upper and lower extremities bilaterally. NIH is 0  Psychiatric: Normal Affect          -------------------------------------------------- RESULTS -------------------------------------------------  I have personally reviewed all laboratory and imaging results for this patient. Results are listed below.      LABS: (Lab results interpreted by me)  Results for orders placed or performed during the hospital encounter of 06/03/21   CBC Auto Differential   Result Value Ref Range    WBC 12.4 (H) 4.5 - 11.5 E9/L    RBC 5.11 3.50 - 5.50 E12/L    Hemoglobin 14.3 11.5 - 15.5 g/dL    Hematocrit 43.9 34.0 - 48.0 %    MCV 85.9 80.0 - 99.9 fL    MCH 28.0 26.0 - 35.0 pg    MCHC 32.6 32.0 - 34.5 %    RDW 13.8 11.5 - 15.0 fL    Platelets 797 454 - 747 E9/L    MPV 10.5 7.0 - 12.0 fL    Neutrophils % 87.8 (H) 43.0 - 80.0 %    Immature Granulocytes % 0.5 0.0 - 5.0 %    Lymphocytes % 7.5 (L) 20.0 - 42.0 %    Monocytes % 4.0 2.0 - 12.0 %    Eosinophils % 0.0 0.0 - 6.0 %    Basophils % 0.2 0.0 - 2.0 %    Neutrophils Absolute 10.92 (H) 1.80 - 7.30 E9/L    Immature Granulocytes # 0.06 E9/L    Lymphocytes Absolute 0.93 (L) 1.50 - 4.00 E9/L    Monocytes Absolute 0.50 0.10 - 0.95 E9/L    Eosinophils Absolute 0.00 (L) 0.05 - 0.50 E9/L    Basophils Absolute 0.03 0.00 - 0.20 E9/L   Comprehensive Metabolic Panel   Result Value Ref Range    Sodium 141 132 - 146 mmol/L    Potassium 4.4 3.5 - 5.0 mmol/L    Chloride 107 98 - 107 mmol/L    CO2 25 22 - 29 mmol/L    Anion Gap 9 7 - 16 mmol/L    Glucose 138 (H) 74 - 99 mg/dL    BUN 18 6 - 23 mg/dL    CREATININE 1.0 0.5 - 1.0 mg/dL    GFR Non-African American 54 >=60 mL/min/1.73    GFR African American >60     Calcium 9.7 8.6 - 10.2 mg/dL    Total Protein 8.0 6.4 - 8.3 g/dL    Albumin 4.2 3.5 - 5.2 g/dL    Total Bilirubin 0.5 0.0 - 1.2 mg/dL    Alkaline Phosphatase 126 (H) 35 - 104 U/L    ALT 17 0 - 32 U/L    AST 22 0 - 31 U/L   Troponin   Result Value Ref Range compression fracture or malalignment of the lumbar spine   2. Multilevel facet arthropathy. 3. Degenerative disc disease at the L4-5 and L5-S1 levels most prominent at   the L5-S1 level. XR CHEST PORTABLE   Final Result   1. 2.1 cm round focus seen within the right hilum likely representing   superimposed vascular structures. Follow-up nonemergent CT scan of the   thorax is recommended when the patient is clinically able. 2. Minimal discoid atelectasis within the left lung base. EKG Interpretation  Interpreted by emergency department physician, Dr. Mellissa William, rate 74, No STEMI        ------------------------- NURSING NOTES AND VITALS REVIEWED ---------------------------   The nursing notes within the ED encounter and vital signs as below have been reviewed by myself  BP (!) 142/61   Pulse 71   Temp 97.4 °F (36.3 °C)   Resp 16   SpO2 97%     Oxygen Saturation Interpretation: Normal    The patients available past medical records and past encounters were reviewed. ------------------------------ ED COURSE/MEDICAL DECISION MAKING----------------------  Medications   cefTRIAXone (ROCEPHIN) 1,000 mg in sterile water 10 mL IV syringe (has no administration in time range)           The cardiac monitor revealed sinus with a heart rate in the 80s as interpreted by me. The cardiac monitor was ordered secondary to the patient's fatigue and to monitor the patient for dysrhythmia. CPT [id number]         Medical Decision Making:    Patient had no focal deficits on arrival.  Labs and imaging reviewed. Patient did receive Rocephin. On reevaluation, patient's resting comfortably, attempted to ambulate, she was not able to stand on her own power. Given this, along with a UTI, patient will be admitted. Counseling:    The emergency provider has spoken with the patient and discussed todays results, in addition to providing specific details for the plan of care and counseling regarding the

## 2021-06-04 VITALS
SYSTOLIC BLOOD PRESSURE: 132 MMHG | OXYGEN SATURATION: 100 % | DIASTOLIC BLOOD PRESSURE: 94 MMHG | HEART RATE: 69 BPM | BODY MASS INDEX: 29.41 KG/M2 | HEIGHT: 66 IN | RESPIRATION RATE: 16 BRPM | TEMPERATURE: 96.5 F | WEIGHT: 182.98 LBS

## 2021-06-04 LAB
HCT VFR BLD CALC: 39.4 % (ref 34–48)
HEMOGLOBIN: 12.7 G/DL (ref 11.5–15.5)
MCH RBC QN AUTO: 28 PG (ref 26–35)
MCHC RBC AUTO-ENTMCNC: 32.2 % (ref 32–34.5)
MCV RBC AUTO: 86.8 FL (ref 80–99.9)
PDW BLD-RTO: 14.1 FL (ref 11.5–15)
PLATELET # BLD: 270 E9/L (ref 130–450)
PMV BLD AUTO: 10.7 FL (ref 7–12)
RBC # BLD: 4.54 E12/L (ref 3.5–5.5)
WBC # BLD: 7.4 E9/L (ref 4.5–11.5)

## 2021-06-04 PROCEDURE — 6370000000 HC RX 637 (ALT 250 FOR IP): Performed by: FAMILY MEDICINE

## 2021-06-04 PROCEDURE — 1200000000 HC SEMI PRIVATE

## 2021-06-04 PROCEDURE — 97535 SELF CARE MNGMENT TRAINING: CPT

## 2021-06-04 PROCEDURE — 96376 TX/PRO/DX INJ SAME DRUG ADON: CPT

## 2021-06-04 PROCEDURE — 2580000003 HC RX 258: Performed by: FAMILY MEDICINE

## 2021-06-04 PROCEDURE — 97165 OT EVAL LOW COMPLEX 30 MIN: CPT

## 2021-06-04 PROCEDURE — 85027 COMPLETE CBC AUTOMATED: CPT

## 2021-06-04 PROCEDURE — 6360000002 HC RX W HCPCS: Performed by: FAMILY MEDICINE

## 2021-06-04 PROCEDURE — 97161 PT EVAL LOW COMPLEX 20 MIN: CPT

## 2021-06-04 PROCEDURE — 36415 COLL VENOUS BLD VENIPUNCTURE: CPT

## 2021-06-04 RX ORDER — CEFDINIR 300 MG/1
300 CAPSULE ORAL 2 TIMES DAILY
Qty: 14 CAPSULE | Refills: 0 | Status: SHIPPED | OUTPATIENT
Start: 2021-06-04 | End: 2021-06-11

## 2021-06-04 RX ADMIN — SPIRONOLACTONE 25 MG: 25 TABLET ORAL at 08:42

## 2021-06-04 RX ADMIN — SODIUM CHLORIDE, PRESERVATIVE FREE 10 ML: 5 INJECTION INTRAVENOUS at 08:45

## 2021-06-04 RX ADMIN — FAMOTIDINE 40 MG: 20 TABLET ORAL at 08:43

## 2021-06-04 RX ADMIN — DOFETILIDE 250 MCG: 0.25 CAPSULE ORAL at 08:43

## 2021-06-04 RX ADMIN — Medication 1000 UNITS: at 08:43

## 2021-06-04 RX ADMIN — PANTOPRAZOLE SODIUM 40 MG: 40 TABLET, DELAYED RELEASE ORAL at 06:26

## 2021-06-04 RX ADMIN — HYDRALAZINE HYDROCHLORIDE 10 MG: 10 TABLET, FILM COATED ORAL at 14:57

## 2021-06-04 RX ADMIN — CEFTRIAXONE 1000 MG: 1 INJECTION, POWDER, FOR SOLUTION INTRAMUSCULAR; INTRAVENOUS at 14:39

## 2021-06-04 ASSESSMENT — PAIN SCALES - GENERAL: PAINLEVEL_OUTOF10: 7

## 2021-06-04 NOTE — CARE COORDINATION
Care Coordination-Observation  The patient was brought in after being found down on the floor due to having a uti and she is now on iv rocephin 1 gm iv qd and she is on Xarelto as well. The patient  lives alone in a 2 story home with 6 steps to get into the home and her bed and bath is on the second floor. She has a cane at home and her [facility] is [facility] and her pcp is Dr Jadyn Jensen. Pt [facility] 18/24 and she walked 210 feet with a wheeled walker sba. I asked the patient if she would like a wheeled walkwer for home and offered choices and she chose [facility] dme ref made to Palak Galarza and orders are in. Heena Martin will bring the wheeled walker to her room.  I will follow
abdominal pain

## 2021-06-04 NOTE — H&P
[address] [facility]  History and Physical      CHIEF COMPLAINT: Frequent falls       Patient of Luis Angel Malone MD presents with:  UTI (urinary tract infection)    History of Present Illness:   Patient is a 59-year-old female with a past medical history of CHF, A. fib, and HTN. Patient presented to the ED for frequent falls at home over the last 2 days. Patient denies any injuries and hitting her head. Patient is a Xarelto patient. Patient states there are no aggravating factors there are no relieving factors. Patient did complain frequent urination. Patient admits to living alone, and uses a cane for ambulation, due to previous back injuries. Patient denies any chest pain, shortness of breath, fever, chills, nausea, diarrhea, abdominal pain, and headache. ER work-up consisted of UTI patient started on Rocephin. Patient admitted observation for further testing and treatment. REVIEW OF SYSTEMS:  Pertinent negatives are above in HPI. ROS otherwise negative. Past Medical History:   Diagnosis Date    Afib [facility])     history    Anxiety     Arthritis     CHF (congestive heart failure) (HCC)     Chronic sinusitis     Hypertension     Left ventricular systolic dysfunction     Meige syndrome     partial/ PCP    Microscopic colitis     Mitral regurgitation     Mild to moderate    Nonischemic cardiomyopathy (HCC)     f/u with Dr. Franco Escudero Osteopenia     Vertebrobasilar artery syndrome     f/u PCP         Past Surgical History:   Procedure Laterality Date    BLADDER REPAIR      BUNIONECTOMY      CARDIOVASCULAR STRESS TEST  11/25/13    Rt & LT cath    CARDIOVERSION  11/04/2019    Successful CV from AF to NSR   (Dr. Hebert Shock)    COLONOSCOPY  07/2020    DIAGNOSTIC CARDIAC CATH LAB PROCEDURE  2/20/10    Dr Katie Cordova CATH LAB PROCEDURE  8/24/11    Right heart cath @ [facility].     DOPPLER ECHOCARDIOGRAPHY  11/25/13    HYSTERECTOMY  1991    total    OTHER SURGICAL HISTORY  10/14/2020    Dr. Perales Ast- 24mm Watchman device    TRANSESOPHAGEAL ECHOCARDIOGRAM  11/21/2018    Dr. Les Ha TRANSESOPHAGEAL ECHOCARDIOGRAM  03/18/2019    WISDOM TOOTH EXTRACTION         Medications Prior to Admission:    Medications Prior to Admission: hydrOXYzine (ATARAX) 10 MG tablet, Take 10 mg by mouth every 8 hours as needed for Itching  furosemide (LASIX) 20 MG tablet, Take 1 tablet by mouth daily  carvedilol (COREG CR) 20 MG CP24 extended release capsule, Take 1 capsule by mouth daily  dofetilide (TIKOSYN) 250 MCG capsule, Take 1 capsule by mouth every 12 hours  hydrALAZINE (APRESOLINE) 10 MG tablet, Take 1 tablet by mouth 3 times daily  spironolactone (ALDACTONE) 25 MG tablet, Take 1 tablet by mouth daily  famotidine (PEPCID) 40 MG tablet, Take 40 mg by mouth 2 times daily as needed   omeprazole (PRILOSEC) 40 MG delayed release capsule, Take 40 mg by mouth daily   triamcinolone (KENALOG) 0.1 % cream, Apply topically 3 times daily as needed (rash)   loperamide (IMODIUM) 2 MG capsule, Take 2 mg by mouth 4 times daily as needed for Diarrhea  diphenhydrAMINE (BENADRYL) 25 MG tablet, Take 25 mg by mouth every 6 hours as needed for Itching  vitamin D (CHOLECALCIFEROL) 1000 UNIT TABS tablet, Take 1,000 Units by mouth daily  baclofen (LIORESAL) 20 MG tablet, Take 20 mg by mouth 2 times daily as needed (spasms) Indications: Back Spasm, Bladder Spasm   rivaroxaban (XARELTO) 20 MG TABS tablet, Take 1 tablet by mouth Daily with supper    Note that the patient's home medications were reviewed and the above list is accurate to the best of my knowledge at the time of the exam.    Allergies:    Atacand [candesartan], Xarelto [rivaroxaban], Adhesive tape, Demerol, Digoxin, Imdur [isosorbide mononitrate], Losartan potassium, Shellfish-derived products, Sulfa antibiotics, Acetaminophen, Apap-caff-dihydrocodeine, Coreg [carvedilol], Cozaar [losartan], Diovan [valsartan], Effexor [venlafaxine 107 06/03/2021    CO2 25 06/03/2021    BUN 18 06/03/2021    CREATININE 1.0 06/03/2021    GFRAA >60 06/03/2021    LABGLOM 54 06/03/2021    GLUCOSE 123 06/03/2021    GLUCOSE 120 10/12/2011    PROT 8.0 06/03/2021    LABALBU 4.2 06/03/2021    CALCIUM 9.7 06/03/2021    BILITOT 0.5 06/03/2021    ALKPHOS 126 06/03/2021    AST 22 06/03/2021    ALT 17 06/03/2021       Imaging:  CXR: 2.1 cm round focus seen within the right hilum likely representing superimposed vascular structures. Follow-up nonemergent CT scan of the thorax is recommended when the patient is clinically able. Minimal discoid atelectasis within the left lung base. CT head: No acute intracranial abnormality    CT cervical spine: There is no acute fracture or subluxation of the cervical spine. Mild multilevel degenerative disc disease and degenerative joint disease most prominent at the C5-6 level. Heterogenous appearance of the thyroid gland. Dedicated nonemergent ultrasound of the thyroid gland is recommended. CT lumbar spine: There is no acute compression fracture or malalignment of the lumbar spine. Multilevel facet arthropathy. Degenerative disc disease at the L4-5 and L5-S1 levels most prominent at the L5-S1 level    EKG:  Sinus rhythm first-degree AV block with PVCs      ASSESSMENT/PLAN:  Principal Problem:    UTI (urinary tract infection)  Active Problems:    HTN (hypertension), benign  Resolved Problems:    * No resolved hospital problems. *    59-year-old female with past medical history of HTN CHF A. fib admitted with    UTI   - Rocephin IV 1 gram every 24 hours  - Strict I & O's   - Urine culture pending    HTN  -Monitor Bps and supplement as needed's if necessary.  -Resume home antihypertensives. Medication for other comorbidities continue as appropriate dose adjustment as necessary. DVT prophylaxis  PT OT  Discharge planning  Case discussed with attending and agreed upon plan of care.        Code status: Full  Requires inpatient level of care  Asad Randall, APRN - CNP    7:05 AM  6/4/2021     admitted for uti   transition to po abx   Advised to keep log of bp's at home for further adjustment of bp's as needed     I personally saw, examined and provided care for the patient. Radiographs, labs and medication list were reviewed by me independently. The case was discussed in detail and plans for care were established. Review of [address] [place] [address], documentation was conducted and revisions were made as appropriate directly by me. I agree with the above documented exam, problem list, and plan of care.      Jodi Alonso MD  5:36 PM  6/6/2021

## 2021-06-04 NOTE — PROGRESS NOTES
CLINICAL PHARMACY NOTE: MEDS TO BEDS    Total # of Prescriptions Filled: 1   The following medications were delivered to the patient:  · Cefdinir 300mg caps       Additional Documentation:  Delivered to patient 6/4 @2:35pm

## 2021-06-04 NOTE — PROGRESS NOTES
Physical Therapy Initial Assessment     Name: Claudeen Quails  : 1945  MRN: [id number]      Date of Service: 2021    Evaluating PT:  Khushboo Durham, PT, DPT [id number]      Room #:  5808/4256-L  Diagnosis:  UTI (urinary tract infection) [N39.0]  PMHx/PSHx:  Nonischemic cardiomyopathy, Mitral regurgitation, Osteopenia, Anxiety, Meige syndrom, Vertebrobasilar artery syndrome, HTN, CHF, Arthritis, A-fib, Cardioversion 2019  Procedure/Surgery:  NA  Precautions:  Falls  Equipment Needs:  Wheeled walker    SUBJECTIVE:    Pt lives alone in a 1 story home with 1 stairs to enter and no rail. Basement laundry with flight and 1 rail. Pt ambulated with no AD PTA. Pt actively driving. Pt reported multiple falls. OBJECTIVE:   Initial Evaluation  Date: 2021 Treatment Date:  NA Short Term/ Long Term   Goals   AM-PAC 6 Clicks 84/73     Was pt agreeable to Eval/treatment? Yes      Does pt have pain? Reported LBP due to herniated discs as 6/10. RN in room and aware. Bed Mobility  Rolling: Supervision  Supine to sit: Supervision  Sit to supine: Supervision  Scooting: Supervision  Rolling: Independent  Supine to sit: Independent  Sit to supine: Independent  Scooting: Independent   Transfers Sit to stand: CGA  Stand to sit: SBA  Stand pivot: SBA  Sit to stand: Mod I  Stand to sit: Mod I  Stand pivot: Mod I   Ambulation    10 feet with no AD with CGA, 200 feet with WW with SBA  >200 feet with [address] Mod I   Stair negotiation: ascended and descended  NT  10 steps with 1 rail with Mod I   ROM BUE:  WFL  BLE:  WFL     Strength BUE:  4/5  BLE:  4/5  Increase strength 1/3 grade   Balance Sitting EOB:  Supervision  Dynamic Standing:  SBA with [address]  Sitting EOB:  Independent  Dynamic Standing:   Mod I with [address]     Pt is A & O x 3  Sensation:  Pt denies numbness and tingling to extremities  Edema:  None noted    Vitals:  Blood Pressure at rest - Blood Pressure post session -   Heart Rate at rest - Heart Rate post session -   SPO2 at rest - SPO2 post session -     Therapeutic Exercises:  STS x 4    Patient education  Pt educated on purpose of PT assessment, importance of mobility, safety with mobility, transfers, gait, use of AD for safety    Patient response to education:   Pt verbalized understanding Pt demonstrated skill Pt requires further education in this area   Yes  Yes with verbal cues and assist Reinforcement as needed     ASSESSMENT:    Conditions Requiring Skilled Therapeutic Intervention:     [x]Decreased strength     []Decreased ROM  [x]Decreased functional mobility  [x]Decreased balance   [x]Decreased endurance   []Decreased posture  []Decreased sensation  []Decreased coordination   []Decreased vision  []Decreased safety awareness   [x]Increased pain     Comments:  Patient cleared by RN and agreeable to treatment. Patient found in semi Lepe's with RN in room administering medication. Patient reported multiple falls at home, but not able to state cause of the falls. Patient assisted to seated EOB and denied dizziness with positional change. Patient assisted to standing and ambulated without AD and mildly unsteady on her feet and reached for the environment to steady self. Hands on maintained for safety. Introduced [employer] and patient demonstrated improved balance and gait quality. Patient with slow gait speed, short stride length but steady with AD. Patient assisted back to semi Lepe's as found with call light and tray table in reach. Patient would benefit from use of [employer] and further PT services to improve safety with functional mobility/gait. Treatment:  Patient practiced and was instructed in the following treatment:    · Bed mobility: Verbal/tactile cues for sequencing BUEs/BLEs for safe technique with rolling/supine<>sit task. · Transfer training: Verbal/tactile cues to facilitate proper hand placement, technique and safety during sit to stand task.    · Gait training: Verbal and tactile cues to facilitate upright posture and standardized testing/informal observation of tasks, assessment of data and education on plan of care and goals.     CPT codes:  [x] Low Complexity PT evaluation 92514  [] Moderate Complexity PT evaluation 41252  [] High Complexity PT evaluation 15172  [] PT Re-evaluation 16711  [] Gait training 52969 - minutes  [] Manual therapy 12038 - minutes  [] Therapeutic activities 48388 - minutes  [] Therapeutic exercises 84783 - minutes  [] Neuromuscular reeducation 24562 - minutes     Ghassane Check, PT, DPT  License [id number]

## 2021-06-04 NOTE — PROGRESS NOTES
PT is alert and oriented, S1S2 normal, lungs clear, no edema, /50, held morning BP meds per MAR, no dizziness at this time, pt in pain, but states she is allergic to Tylenol, refusing Baclofen. She feels her fall is from taking Baclofen too often.  Call bell within reach, will continue to monitor

## 2021-06-05 LAB
ORGANISM: ABNORMAL
URINE CULTURE, ROUTINE: ABNORMAL

## 2021-06-07 ENCOUNTER — CARE COORDINATION (OUTPATIENT)
Dept: CASE MANAGEMENT | Age: 76
End: 2021-06-07

## 2021-06-07 NOTE — CARE COORDINATION
[facility] Transitions Initial Follow Up Call    Call within 2 business days of discharge: Yes    Patient: Zafar Coronel Patient : 1945   MRN: [id number]  Reason for Admission: UTI  Discharge Date: 21 RARS: Readmission Risk Score: 13      Last Discharge [facility]       Complaint Diagnosis Description Type Department Provider    6/3/21 Fall Fall, initial encounter . .. ED to Hosp-Admission (Discharged) (ADMITTED) Maame Yousif MD; NAVNEET Langley. Spoke with: No one    [place] [facility]    First attempt to reach the patient for initial Care Transition call post hospital discharge. Message left with CTN's contact information requesting return phone call.       Follow Up  Future Appointments   Date Time Provider Duke Santana   2021  9:30 AM [facility] CVL SPECIAL PROCEDURES LAB SE[facility] CATH [facility]   10/11/2021 10:30 AM Iggy Aburto MD [address] [place] [address] CHRISTOPHER Maguire

## 2021-06-07 NOTE — DISCHARGE SUMMARY
Physician Discharge Summary     Patient ID:  Zafar Coronel  [id number]  67 y.o.  1945    Admit date: 6/3/2021    Discharge date and time: 6/7/2021    Admission Diagnoses:   Patient Active Problem List   Diagnosis    Anxiety    [facility]ischemic [facility] [address]    Severe mitral regurgitation    Lumbar radiculopathy    Cervical radiculopathy    HTN (hypertension), benign    Left atrial thrombus    PAF (paroxysmal atrial fibrillation) (HCC)    Chronic systolic congestive heart failure ([facility]    Visit for monitoring Tikosyn therapy    Encounter for medication refill    Itching    Chronic anticoagulation    Presence of Watchman left atrial appendage closure device    UTI (urinary tract infection)       Discharge Diagnoses: UTI, hypertension    Consults: none    Procedures: None    Hospital Course: The patient is a 68 y.o. female of Kailey Hoover MD with significant past medical history of CHF, A. fib, and hypertension who presents with frequent falls at home over the past 2 days. Patient tested positive for UTI and was started on IV Rocephin. Patient was given IV fluids, vital signs were monitored with close attention to patient's blood pressures. Patient was discharged on Omnicef x7 days, and patient was advised to keep logs of BPs at home for further adjustments of medications, BP logs to be given to PCP. No results for input(s): WBC, HGB, HCT, PLT in the last 72 hours. No results for input(s): NA, K, CL, CO2, BUN, CREATININE, CALCIUM in the last 72 hours. Invalid input(s): GLU    CT HEAD WO CONTRAST    Result Date: 6/3/2021  EXAMINATION: CT OF THE HEAD WITHOUT CONTRAST  6/3/2021 11:24 am TECHNIQUE: CT of the head was performed without the administration of intravenous contrast. Dose modulation, iterative reconstruction, and/or weight based adjustment of the mA/kV was utilized to reduce the radiation dose to as low as reasonably achievable.  COMPARISON: 06/10/2020 HISTORY: widely patent. Images through the lung apices are negative for a pneumothorax. The thyroid gland is heterogeneous and there are 1 cm cystic nodule within the right and left thyroid lobes and possible 2 mildly calcified dense nodules within the right thyroid lobe measuring less than 6 mm. 1. There is no acute fracture or subluxation of the cervical spine 2. Mild multilevel degenerative disc disease and degenerative joint disease most prominent at the C5-6 level. 3. Heterogeneous appearance of the thyroid gland. Dedicated nonemergent ultrasound of the thyroid gland is recommended. CT LUMBAR SPINE WO CONTRAST    Result Date: 6/3/2021  EXAMINATION: CT OF THE LUMBAR SPINE WITHOUT CONTRAST  6/3/2021 TECHNIQUE: CT of the lumbar spine was performed without the administration of intravenous contrast. Multiplanar reformatted images are provided for review. Dose modulation, iterative reconstruction, and/or weight based adjustment of the mA/kV was utilized to reduce the radiation dose to as low as reasonably achievable. COMPARISON: None HISTORY: ORDERING SYSTEM PROVIDED HISTORY: falls TECHNOLOGIST PROVIDED HISTORY: Reason for exam:->falls Decision Support Exception - unselect if not a suspected or confirmed emergency medical condition->Emergency Medical Condition (MA) What reading provider will be dictating this exam?->CRC FINDINGS: BONES/ALIGNMENT: There is normal alignment of the spine. The vertebral body heights are maintained. No osseous destructive lesion is seen. There is no compression fracture of the lumbar spine. DEGENERATIVE CHANGES: Multilevel facet arthropathy is noted. Mild degenerative disc disease noted at the L4-5 level. There is moderate degenerative disc disease is noted at the L5-S1 level. Abner Michael SOFT TISSUES/RETROPERITONEUM: No paraspinal mass is seen. The sacroiliac joints are patent and symmetrical bilaterally. 1. There is no acute compression fracture or malalignment of the lumbar spine 2. Multilevel facet arthropathy. 3. Degenerative disc disease at the L4-5 and L5-S1 levels most prominent at the L5-S1 level. XR CHEST PORTABLE    Result Date: 6/3/2021  EXAMINATION: ONE XRAY VIEW OF THE CHEST 6/3/2021 11:20 am COMPARISON: 03/10/2019 HISTORY: ORDERING SYSTEM PROVIDED HISTORY: weakness, Possible Stroke TECHNOLOGIST PROVIDED HISTORY: Reason for exam:->weakness, Possible Stroke What reading provider will be dictating this exam?->CRC FINDINGS: Suboptimal inspiration was obtained for the chest x-ray. There is a rounded opacity seen within the right hilum measuring approximately 2.1 cm. This finding can represent a vascular structure. The left hilum is unremarkable. The heart is not enlarged. There is no gross right or left lung infiltrate. There is minimal discoid atelectasis seen within the left lung base. 1. 2.1 cm round focus seen within the right hilum likely representing superimposed vascular structures. Follow-up nonemergent CT scan of the thorax is recommended when the patient is clinically able. 2. Minimal discoid atelectasis within the left lung base. Discharge Exam:    HEENT: NCAT,  PERRLA, No JVD  Heart:  RRR, no murmurs, gallops, or rubs.   Lungs:  CTA bilaterally, no wheeze, rales or rhonchi  Abd: bowel sounds present, nontender, nondistended, no masses  Extrem:  No clubbing, cyanosis, or edema    Disposition: home     Patient Condition at Discharge: Stable    Patient Instructions:      Medication List      START taking these medications    cefdinir 300 MG capsule  Commonly known as: OMNICEF  Take 1 capsule by mouth 2 times daily for 7 days        CONTINUE taking these medications    baclofen 20 MG tablet  Commonly known as: LIORESAL     carvedilol 20 MG Cp24 extended release capsule  Commonly known as: COREG CR  Take 1 capsule by mouth daily     diphenhydrAMINE 25 MG tablet  Commonly known as: BENADRYL     dofetilide 250 MCG capsule  Commonly known as: TIKOSYN  Take 1 capsule by mouth every 12 hours     famotidine 40 MG tablet  Commonly known as: PEPCID     furosemide 20 MG tablet  Commonly known as: Lasix  Take 1 tablet by mouth daily     hydrALAZINE 10 MG tablet  Commonly known as: APRESOLINE  Take 1 tablet by mouth 3 times daily     hydrOXYzine 10 MG tablet  Commonly known as: ATARAX     loperamide 2 MG capsule  Commonly known as: IMODIUM     omeprazole 40 MG delayed release capsule  Commonly known as: PRILOSEC     rivaroxaban 20 MG Tabs tablet  Commonly known as: Xarelto  Take 1 tablet by mouth Daily with supper     spironolactone 25 MG tablet  Commonly known as: ALDACTONE  Take 1 tablet by mouth daily     triamcinolone 0.1 % cream  Commonly known as: KENALOG     vitamin D 1000 UNIT Tabs tablet  Commonly known as: CHOLECALCIFEROL           Where to Get Your Medications      These medications were sent to [facility] [address] [address]    Phone: [phone]   · cefdinir 300 MG capsule       Activity: activity as tolerated  Diet: cardiac diet    Pt has been advised to: Follow-up with Elia Pretty MD in 1 week.   Follow-up with consultants as recommended by them    Note that over 30 minutes was spent in preparing discharge papers, discussing discharge with patient, medication review, etc.    Signed:  Stephanie Yuen MD  6/7/2021  6:38 AM

## 2021-06-08 ENCOUNTER — CARE COORDINATION (OUTPATIENT)
Dept: CASE MANAGEMENT | Age: 76
End: 2021-06-08

## 2021-06-08 DIAGNOSIS — N39.0 URINARY TRACT INFECTION WITHOUT HEMATURIA, SITE UNSPECIFIED: Primary | ICD-10-CM

## 2021-06-08 PROCEDURE — 1111F DSCHRG MED/CURRENT MED MERGE: CPT | Performed by: FAMILY MEDICINE

## 2021-06-08 NOTE — CARE COORDINATION
[facility] Transitions Initial Follow Up Call    Call within 2 business days of discharge: Yes    Patient: Mehdi Golden Patient : 1945   MRN: [id number]  Reason for Admission: UTI  Discharge Date: 21 RARS: Readmission Risk Score: 13      Last Discharge [facility]       Complaint Diagnosis Description Type Department Provider    6/3/21 Fall Fall, initial encounter . .. ED to Hosp-Admission (Discharged) (ADMITTED) Barb Osgood, MD; NAVNEET Jesus. Spoke with: Mehdi Golden, patient    Facility[place] [facility] [place]    Transitions of Care Initial Call      Challenges to be reviewed by the provider   Additional needs identified to be addressed with provider: Yes  Patient reports itching due to CONNOR RUPERTO. Denies sob. Patient states she is taking Benadryl. Patient advised to avoid taking Atarax and Benadryl together. Patient verbalized understanding. Patient states itching is common effect from meds for her. Method of communication with provider : chart routing    Was this a readmission? No  Patient stated reason for admission: frequent falls  Patients top risk factors for readmission: functional physical ability and medical condition-cardiomyopathy, hf, afib    Care Transition Nurse (CTN) contacted the patient by telephone to perform post hospital discharge assessment. Verified name and  with patient as identifiers. Provided introduction to self, and explanation of the CTN role. CTN reviewed discharge instructions, medical action plan and red flags with patient who verbalized understanding. Patient given an opportunity to ask questions and does not have any further questions or concerns at this time. Were discharge instructions available to patient? Yes. Reviewed appropriate site of care based on symptoms and resources available to patient including: PCP and When to call 911. The patient agrees to contact the PCP office for questions related to their healthcare.

## 2021-06-09 ENCOUNTER — CARE COORDINATION (OUTPATIENT)
Dept: CASE MANAGEMENT | Age: 76
End: 2021-06-09

## 2021-06-15 ENCOUNTER — CARE COORDINATION (OUTPATIENT)
Dept: CASE MANAGEMENT | Age: 76
End: 2021-06-15

## 2021-06-15 NOTE — CARE COORDINATION
[facility] Transitions Follow Up Call    6/15/2021    Patient: Trae Ring  Patient : 1945   MRN: [id number]  Reason for Admission: UTI  Discharge Date: 21 RARS: Readmission Risk Score: 13         Spoke with: No one    First attempt to reach the patient for sub Care Transition call post hospital discharge. Message left with CTN's contact information requesting return phone call.       Follow Up  Future Appointments   Date Time Provider Duke Santana   2021  9:30 AM [facility] CVL SPECIAL PROCEDURES LAB [facility] CATH [facility]   10/11/2021 10:30 AM Judy Barragan MD [address] [place] [address] CHRISTOPHER Maguire

## 2021-06-16 ENCOUNTER — CARE COORDINATION (OUTPATIENT)
Dept: CASE MANAGEMENT | Age: 76
End: 2021-06-16

## 2021-06-16 NOTE — CARE COORDINATION
assist you with?: No  Identified Barriers: Other  Care Transitions Interventions  No Identified Needs  Other Interventions:         Spoke with patient for follow up care transition call. Patient states back pain continues from herniated discs and DDD. Patient states each day is better. Patient denies falls, using ww prn. Patient denies urinary burning; reports urinary issues at baseline. Patient reports her scale is under her bed and she will ask her son to get it when he comes to the home on Saturday. Patient states eating is improved and she is hydrating. Patient denies any further needs, questions, or concerns at this time. Patient is agreeable to future follow up calls.          Follow Up  Future Appointments   Date Time Provider Duke Santana   6/23/2021  9:30 AM [facility] SPECIAL PROCEDURES LAB [facility] [place]  Salvadorcarolyn Muñoz   10/11/2021 10:30 AM Earline Sifuentes MD [address] [place] [address] CHRISTOPHER Maguire

## 2021-06-22 ENCOUNTER — TELEPHONE (OUTPATIENT)
Dept: CARDIOLOGY CLINIC | Age: 76
End: 2021-06-22

## 2021-06-22 ENCOUNTER — OFFICE VISIT (OUTPATIENT)
Dept: FAMILY MEDICINE CLINIC | Age: 76
End: 2021-06-22
Payer: MEDICARE

## 2021-06-22 VITALS
DIASTOLIC BLOOD PRESSURE: 72 MMHG | OXYGEN SATURATION: 98 % | RESPIRATION RATE: 16 BRPM | TEMPERATURE: 97.3 F | WEIGHT: 174 LBS | BODY MASS INDEX: 27.97 KG/M2 | SYSTOLIC BLOOD PRESSURE: 128 MMHG | HEART RATE: 65 BPM | HEIGHT: 66 IN

## 2021-06-22 DIAGNOSIS — Z87.440 RECENT URINARY TRACT INFECTION: ICD-10-CM

## 2021-06-22 DIAGNOSIS — R30.0 DYSURIA: Primary | ICD-10-CM

## 2021-06-22 LAB
BILIRUBIN, POC: NORMAL
BLOOD URINE, POC: NORMAL
CLARITY, POC: NORMAL
COLOR, POC: YELLOW
GLUCOSE URINE, POC: NORMAL
KETONES, POC: NORMAL
LEUKOCYTE EST, POC: NORMAL
NITRITE, POC: NORMAL
PH, POC: 5.5
PROTEIN, POC: NORMAL
SPECIFIC GRAVITY, POC: 1.02
UROBILINOGEN, POC: 0.2

## 2021-06-22 PROCEDURE — 4040F PNEUMOC VAC/ADMIN/RCVD: CPT | Performed by: PHYSICIAN ASSISTANT

## 2021-06-22 PROCEDURE — G8399 PT W/DXA RESULTS DOCUMENT: HCPCS | Performed by: PHYSICIAN ASSISTANT

## 2021-06-22 PROCEDURE — G8427 DOCREV CUR MEDS BY ELIG CLIN: HCPCS | Performed by: PHYSICIAN ASSISTANT

## 2021-06-22 PROCEDURE — G8417 CALC BMI ABV UP PARAM F/U: HCPCS | Performed by: PHYSICIAN ASSISTANT

## 2021-06-22 PROCEDURE — 1111F DSCHRG MED/CURRENT MED MERGE: CPT | Performed by: PHYSICIAN ASSISTANT

## 2021-06-22 PROCEDURE — 81002 URINALYSIS NONAUTO W/O SCOPE: CPT | Performed by: PHYSICIAN ASSISTANT

## 2021-06-22 PROCEDURE — 1090F PRES/ABSN URINE INCON ASSESS: CPT | Performed by: PHYSICIAN ASSISTANT

## 2021-06-22 PROCEDURE — 99213 OFFICE O/P EST LOW 20 MIN: CPT | Performed by: PHYSICIAN ASSISTANT

## 2021-06-22 PROCEDURE — 1123F ACP DISCUSS/DSCN MKR DOCD: CPT | Performed by: PHYSICIAN ASSISTANT

## 2021-06-22 PROCEDURE — 1036F TOBACCO NON-USER: CPT | Performed by: PHYSICIAN ASSISTANT

## 2021-06-22 RX ORDER — PHENAZOPYRIDINE HYDROCHLORIDE 200 MG/1
200 TABLET, FILM COATED ORAL 3 TIMES DAILY PRN
Qty: 6 TABLET | Refills: 0 | Status: SHIPPED | OUTPATIENT
Start: 2021-06-22 | End: 2021-06-25

## 2021-06-22 NOTE — TELEPHONE ENCOUNTER
PT is scheduled for 6w  post watchman JAIME 6-3-21. She is having pain and pressure in the lower abdomen    Home urine test stated she has a UTI. Plans on  going to urgent care for follow up.      Wants to know if she should  proceed with JAIME

## 2021-06-22 NOTE — PROGRESS NOTES
constitutional, psych, eyes, ENT, cardiovascular, respiratory, gastrointestinal, neurological, genitourinary, musculoskeletal, integument systems and systems related to the presenting problem are either stated in the preceding or were not pertinent or were negative for the symptoms and/or complaints related to the medical problem. Past Surgical history:   Past Surgical History:   Procedure Laterality Date    BLADDER REPAIR      BUNIONECTOMY      CARDIOVASCULAR STRESS TEST  11/25/13    Rt & LT cath    CARDIOVERSION  11/04/2019    Successful CV from AF to NSR   (Dr. Deb Harmon)    COLONOSCOPY  07/2020    DIAGNOSTIC CARDIAC CATH LAB PROCEDURE  2/20/10    Dr Shaheen Ibarra CATH LAB PROCEDURE  8/24/11    Right heart cath @ [facility].  DOPPLER ECHOCARDIOGRAPHY  11/25/13    HYSTERECTOMY  1991    total    OTHER SURGICAL HISTORY  10/14/2020    Dr. Marcelina Davidson- 24mm Watchman device    TRANSESOPHAGEAL ECHOCARDIOGRAM  11/21/2018    Dr. Brenda Wilcox TRANSESOPHAGEAL ECHOCARDIOGRAM  03/18/2019    WISDOM TOOTH EXTRACTION       Social History:  reports that she has never smoked. She has never used smokeless tobacco. She reports current alcohol use. She reports that she does not use drugs. Family History: family history includes Anxiety Disorder in her sister; Crohn's Disease in her son; Depression in her sister; Heart Attack in her father and mother; Heart Disease in her father; Heart Failure in her father; Stroke in her mother.    Allergies: Atacand [candesartan], Xarelto [rivaroxaban], Adhesive tape, Demerol, Digoxin, Imdur [isosorbide mononitrate], Losartan potassium, Shellfish-derived products, Sulfa antibiotics, Cefdinir, Acetaminophen, Apap-caff-dihydrocodeine, Coreg [carvedilol], Cozaar [losartan], Diovan [valsartan], Effexor [venlafaxine hydrochloride], Eliquis [apixaban], Labetalol, Lisinopril, and Ramipril    Physical Exam         VS:  /72   Pulse 65   Temp 97.3 °F (36.3 °C) (Oral) from recent infection. Script written for Pyridium for symptomatic relief, side effects discussed. Increase fluids and rest.  Also advised to avoid bladder irritants like caffeine to prevent exacerbation. Patient advised to contact urologist to schedule annual exam and symptom recheck. ED sooner if symptoms worsen or change. ED immediately with the development of fever, shaking chills, body aches, flank pain, vomiting, CP, or SOB. Pt is in agreement with this care plan. All questions answered. Jair Fish PA-C

## 2021-06-23 ENCOUNTER — CARE COORDINATION (OUTPATIENT)
Dept: CASE MANAGEMENT | Age: 76
End: 2021-06-23

## 2021-06-25 ENCOUNTER — CARE COORDINATION (OUTPATIENT)
Dept: CASE MANAGEMENT | Age: 76
End: 2021-06-25

## 2021-06-25 ENCOUNTER — TELEPHONE (OUTPATIENT)
Dept: PRIMARY CARE CLINIC | Age: 76
End: 2021-06-25

## 2021-06-25 LAB
ORGANISM: ABNORMAL
URINE CULTURE, ROUTINE: ABNORMAL

## 2021-06-25 RX ORDER — NITROFURANTOIN 25; 75 MG/1; MG/1
100 CAPSULE ORAL 2 TIMES DAILY
Qty: 10 CAPSULE | Refills: 0 | Status: SHIPPED | OUTPATIENT
Start: 2021-06-25 | End: 2021-06-30

## 2021-07-02 ENCOUNTER — CARE COORDINATION (OUTPATIENT)
Dept: CARE COORDINATION | Age: 76
End: 2021-07-02

## 2021-07-02 DIAGNOSIS — Z01.818 PRE-OP EXAM: ICD-10-CM

## 2021-07-02 DIAGNOSIS — I51.3 THROMBUS OF LEFT ATRIAL APPENDAGE: ICD-10-CM

## 2021-07-02 DIAGNOSIS — I48.0 PAF (PAROXYSMAL ATRIAL FIBRILLATION) (HCC): Primary | ICD-10-CM

## 2021-07-02 DIAGNOSIS — Z95.818 PRESENCE OF WATCHMAN LEFT ATRIAL APPENDAGE CLOSURE DEVICE: ICD-10-CM

## 2021-07-02 DIAGNOSIS — I34.0 SEVERE MITRAL REGURGITATION: ICD-10-CM

## 2021-07-02 NOTE — CARE COORDINATION
Second and final attempt made to outreach patient for care transition follow up call.  Left HIPAA compliant message and provided call back number

## 2021-07-04 PROBLEM — N39.0 UTI (URINARY TRACT INFECTION): Status: RESOLVED | Noted: 2021-06-03 | Resolved: 2021-07-04

## 2021-07-12 ENCOUNTER — OFFICE VISIT (OUTPATIENT)
Dept: FAMILY MEDICINE CLINIC | Age: 76
End: 2021-07-12
Payer: MEDICARE

## 2021-07-12 VITALS
SYSTOLIC BLOOD PRESSURE: 136 MMHG | OXYGEN SATURATION: 97 % | DIASTOLIC BLOOD PRESSURE: 74 MMHG | RESPIRATION RATE: 18 BRPM | HEART RATE: 71 BPM | BODY MASS INDEX: 28.45 KG/M2 | HEIGHT: 66 IN | WEIGHT: 177 LBS | TEMPERATURE: 97 F

## 2021-07-12 DIAGNOSIS — M79.642 LEFT HAND PAIN: ICD-10-CM

## 2021-07-12 DIAGNOSIS — L29.9 ITCHING: ICD-10-CM

## 2021-07-12 DIAGNOSIS — Z91.81 AT HIGH RISK FOR FALLS: Primary | ICD-10-CM

## 2021-07-12 DIAGNOSIS — Z01.818 PREOP EXAMINATION: ICD-10-CM

## 2021-07-12 DIAGNOSIS — Z00.00 ROUTINE HEALTH MAINTENANCE: ICD-10-CM

## 2021-07-12 DIAGNOSIS — R93.89 THYROID WITH HETEROGENEOUS ECHOTEXTURE DETERMINED BY ULTRASOUND: ICD-10-CM

## 2021-07-12 DIAGNOSIS — I48.0 PAROXYSMAL ATRIAL FIBRILLATION (HCC): ICD-10-CM

## 2021-07-12 DIAGNOSIS — R91.8 HILAR MASS: ICD-10-CM

## 2021-07-12 LAB
BILIRUBIN, POC: NEGATIVE
BLOOD URINE, POC: NEGATIVE
CLARITY, POC: CLEAR
COLOR, POC: YELLOW
GLUCOSE URINE, POC: NEGATIVE
KETONES, POC: NEGATIVE
LEUKOCYTE EST, POC: NEGATIVE
NITRITE, POC: NEGATIVE
PH, POC: 5
PROTEIN, POC: NEGATIVE
SPECIFIC GRAVITY, POC: 1.02
UROBILINOGEN, POC: 0.2

## 2021-07-12 PROCEDURE — 4040F PNEUMOC VAC/ADMIN/RCVD: CPT | Performed by: STUDENT IN AN ORGANIZED HEALTH CARE EDUCATION/TRAINING PROGRAM

## 2021-07-12 PROCEDURE — 1123F ACP DISCUSS/DSCN MKR DOCD: CPT | Performed by: STUDENT IN AN ORGANIZED HEALTH CARE EDUCATION/TRAINING PROGRAM

## 2021-07-12 PROCEDURE — 99213 OFFICE O/P EST LOW 20 MIN: CPT | Performed by: STUDENT IN AN ORGANIZED HEALTH CARE EDUCATION/TRAINING PROGRAM

## 2021-07-12 PROCEDURE — G8427 DOCREV CUR MEDS BY ELIG CLIN: HCPCS | Performed by: STUDENT IN AN ORGANIZED HEALTH CARE EDUCATION/TRAINING PROGRAM

## 2021-07-12 PROCEDURE — 1090F PRES/ABSN URINE INCON ASSESS: CPT | Performed by: STUDENT IN AN ORGANIZED HEALTH CARE EDUCATION/TRAINING PROGRAM

## 2021-07-12 PROCEDURE — 81002 URINALYSIS NONAUTO W/O SCOPE: CPT | Performed by: STUDENT IN AN ORGANIZED HEALTH CARE EDUCATION/TRAINING PROGRAM

## 2021-07-12 PROCEDURE — 1036F TOBACCO NON-USER: CPT | Performed by: STUDENT IN AN ORGANIZED HEALTH CARE EDUCATION/TRAINING PROGRAM

## 2021-07-12 PROCEDURE — G8417 CALC BMI ABV UP PARAM F/U: HCPCS | Performed by: STUDENT IN AN ORGANIZED HEALTH CARE EDUCATION/TRAINING PROGRAM

## 2021-07-12 PROCEDURE — G8399 PT W/DXA RESULTS DOCUMENT: HCPCS | Performed by: STUDENT IN AN ORGANIZED HEALTH CARE EDUCATION/TRAINING PROGRAM

## 2021-07-12 RX ORDER — HYDROXYZINE HYDROCHLORIDE 10 MG/1
10 TABLET, FILM COATED ORAL EVERY 8 HOURS PRN
Qty: 30 TABLET | Refills: 3 | Status: SHIPPED
Start: 2021-07-12 | End: 2022-02-01 | Stop reason: SDUPTHER

## 2021-07-12 RX ORDER — DICYCLOMINE HYDROCHLORIDE 10 MG/1
10 CAPSULE ORAL 3 TIMES DAILY
COMMUNITY
Start: 2021-07-07 | End: 2022-05-06 | Stop reason: ALTCHOICE

## 2021-07-12 SDOH — ECONOMIC STABILITY: FOOD INSECURITY: WITHIN THE PAST 12 MONTHS, YOU WORRIED THAT YOUR FOOD WOULD RUN OUT BEFORE YOU GOT MONEY TO BUY MORE.: NEVER TRUE

## 2021-07-12 SDOH — ECONOMIC STABILITY: FOOD INSECURITY: WITHIN THE PAST 12 MONTHS, THE FOOD YOU BOUGHT JUST DIDN'T LAST AND YOU DIDN'T HAVE MONEY TO GET MORE.: NEVER TRUE

## 2021-07-12 ASSESSMENT — ENCOUNTER SYMPTOMS
DIARRHEA: 1
SHORTNESS OF BREATH: 0
NAUSEA: 0
VOMITING: 0
SINUS PAIN: 0
CONSTIPATION: 0
BACK PAIN: 0
COUGH: 0
EYE REDNESS: 0
RHINORRHEA: 0
EYE PAIN: 0
SORE THROAT: 0
SINUS PRESSURE: 0
ABDOMINAL PAIN: 0

## 2021-07-12 ASSESSMENT — SOCIAL DETERMINANTS OF HEALTH (SDOH): HOW HARD IS IT FOR YOU TO PAY FOR THE VERY BASICS LIKE FOOD, HOUSING, MEDICAL CARE, AND HEATING?: NOT HARD AT ALL

## 2021-07-12 ASSESSMENT — PATIENT HEALTH QUESTIONNAIRE - PHQ9
1. LITTLE INTEREST OR PLEASURE IN DOING THINGS: 0
SUM OF ALL RESPONSES TO PHQ9 QUESTIONS 1 & 2: 0
SUM OF ALL RESPONSES TO PHQ QUESTIONS 1-9: 0
2. FEELING DOWN, DEPRESSED OR HOPELESS: 0

## 2021-07-12 NOTE — PATIENT INSTRUCTIONS
Patient Education        Preventing Falls: Care Instructions  Your Care Instructions     Getting around your home safely can be a challenge if you have injuries or health problems that make it easy for you to fall. Loose rugs and furniture in walkways are among the dangers for many older people who have problems walking or who have poor eyesight. People who have conditions such as arthritis, osteoporosis, or dementia also have to be careful not to fall. You can make your home safer with a few simple measures. Follow-up care is a key part of your treatment and safety. Be sure to make and go to all appointments, and call your doctor if you are having problems. It's also a good idea to know your test results and keep a list of the medicines you take. How can you care for yourself at home? Taking care of yourself  · You may get dizzy if you do not drink enough water. To prevent dehydration, drink plenty of fluids. Choose water and other caffeine-free clear liquids. If you have kidney, heart, or liver disease and have to limit fluids, talk with your doctor before you increase the amount of fluids you drink. · Exercise regularly to improve your strength, muscle tone, and balance. Walk if you can. Swimming may be a good choice if you cannot walk easily. · Have your vision and hearing checked each year or any time you notice a change. If you have trouble seeing and hearing, you might not be able to avoid objects and could lose your balance. · Know the side effects of the medicines you take. Ask your doctor or pharmacist whether the medicines you take can affect your balance. Sleeping pills or sedatives can affect your balance. · Limit the amount of alcohol you drink. Alcohol can impair your balance and other senses. · Ask your doctor whether calluses or corns on your feet need to be removed. If you wear loose-fitting shoes because of calluses or corns, you can lose your balance and fall.   · Talk to your doctor if you have numbness in your feet. Preventing falls at home  · Remove raised doorway thresholds, throw rugs, and clutter. Repair loose carpet or raised areas in the floor. · Move furniture and electrical cords to keep them out of walking paths. · Use nonskid floor wax, and wipe up spills right away, especially on ceramic tile floors. · If you use a walker or cane, put rubber tips on it. If you use crutches, clean the bottoms of them regularly with an abrasive pad, such as steel wool. · Keep your house well lit, especially [place], and outside walkways. Use night-lights in areas such as hallways and bathrooms. Add extra light switches or use remote switches (such as switches that go on or off when you clap your hands) to make it easier to turn lights on if you have to get up during the night. · Install sturdy handrails on stairways. · Move items in your cabinets so that the things you use a lot are on the lower shelves (about waist level). · Keep a cordless phone and a flashlight with new batteries by your bed. If possible, put a phone in each of the main rooms of your house, or carry a cell phone in case you fall and cannot reach a phone. Or, you can wear a device around your neck or wrist. You push a button that sends a signal for help. · Wear low-heeled shoes that fit well and give your feet good support. Use footwear with nonskid soles. Check the heels and soles of your shoes for wear. Repair or replace worn heels or soles. · Do not wear socks without shoes on wood floors. · Walk on the grass when the sidewalks are slippery. If you live in an area that gets snow and ice in the winter, sprinkle salt on slippery steps and sidewalks. Preventing falls in the bath  · Install grab bars and nonskid mats inside and outside your shower or tub and near the toilet and sinks. · Use shower chairs and bath benches. · Use a hand-held shower head that will allow you to sit while showering.   · Get into a tub or shower by putting the weaker leg in first. Get out of a tub or shower with your strong side first.  · Repair loose toilet seats and consider installing a raised toilet seat to make getting on and off the toilet easier. · Keep your bathroom door unlocked while you are in the shower. Where can you learn more? Go to https://chpepiceweb.Innobits. org and sign in to your [employer] account. Enter [id number] in the [facility] box to learn more about \"Preventing Falls: Care Instructions. \"     If you do not have an account, please click on the \"Sign Up Now\" link. Current as of: December 7, 2020               Content Version: 12.9  © 2006-2021 Healthwise[employer]. Care instructions adapted under license by [facility] ([facility]). If you have questions about a medical condition or this instruction, always ask your healthcare professional. [place] [address] any warranty or liability for your use of this information. Patient Education        How to Get Up Safely After a Fall: Care Instructions  Your Care Instructions     If you have injuries, health problems, or other reasons that may make it easy for you to fall at home, it is a good idea to learn how to get up safely after a fall. Learning how to get up correctly can help you avoid making an injury worse. Also, knowing what to do if you cannot get up can help you stay safe until help arrives. Follow-up care is a key part of your treatment and safety. Be sure to make and go to all appointments, and call your doctor if you are having problems. It's also a good idea to know your test results and keep a list of the medicines you take. How can you care for yourself after a fall? If you think you can get up  First lie still for a few minutes and think about how you feel. If your body feels okay and you think you can get up safely, follow the rest of the steps below:  1.  Look for a chair or other piece of furniture that is close to you.  2. Roll onto your side and rest. Roll by turning your head in the direction you want to roll, move your shoulder and arm, then hip and leg in the same direction. 3. Lie still for a moment to let your blood pressure adjust.  4. Slowly push your upper body up, lift your head, and take a moment to rest.  5. Slowly get up on your hands and knees, and crawl to the chair or other stable piece of furniture. 6. Put your hands on the chair. 7. Move one foot forward, and place it flat on the floor. Your other leg should be bent with the knee on the floor. 8. Rise slowly, turn your body, and sit in the chair. Stay seated for a bit and think about how you feel. Call for help. Even if you feel okay, let someone know what happened to you. You might not know that you have a serious injury. If you cannot get up  1. If you think you are injured after a fall or you cannot get up, try not to panic. 2. Call out for help. 3. If you have a phone within reach or you have an emergency call device, use it to call for help. 4. If you do not have a phone within reach, try to slide yourself toward it. If you cannot get to the phone, try to slide toward a door or window or a place where you think you can be heard. 5. [place] or use an object to make noise so someone might hear you. 6. If you can reach something that you can use for a pillow, place it under your head. Try to stay warm by covering yourself with a blanket or clothing while you wait for help. When should you call for help? Call 911 anytime you think you may need emergency care. For example, call if:    · You passed out (lost consciousness).     · You cannot get up after a fall.     · You have severe pain. Call your doctor now or seek immediate medical care if:    · You have new or worse pain.     · You are dizzy or lightheaded.     · You hit your head.    Watch closely for changes in your health, and be sure to contact your doctor if:    · You do not get better as expected. Where can you learn more? Go to https://chpepiceweb.Mi-Pay. org and sign in to your [employer] account. Enter L132 in the [place] box to learn more about \"How to Get Up Safely After a Fall: Care Instructions. \"     If you do not have an account, please click on the \"Sign Up Now\" link. Current as of: December 7, 2020               Content Version: 12.9  © 2006-2021 [employer]. Care instructions adapted under license by [facility] ([facility]). If you have questions about a medical condition or this instruction, always ask your healthcare professional. [address] any warranty or liability for your use of this information. Patient Education        Hand Pain: Care Instructions  Your Care Instructions     Common causes of hand pain are overuse and injuries, such as might happen during sports or home repair projects. Everyday wear and tear, especially as you get older, also can cause hand pain. Most minor hand injuries will heal on their own, and home treatment is usually all you need to do. If you have sudden and severe pain, you may need tests and treatment. Follow-up care is a key part of your treatment and safety. Be sure to make and go to all appointments, and call your doctor if you are having problems. It's also a good idea to know your test results and keep a list of the medicines you take. How can you care for yourself at home? · Take pain medicines exactly as directed. ? If the doctor gave you a prescription medicine for pain, take it as prescribed. ? If you are not taking a prescription pain medicine, ask your doctor if you can take an over-the-counter medicine. · Rest and protect your hand. Take a break from any activity that may cause pain. · Put ice or a cold pack on your hand for 10 to 20 minutes at a time. Put a thin cloth between the ice and your skin.   · Prop up the sore hand on a pillow when you ice it or anytime you sit or lie down during the next 3 days. Try to keep it above the level of your heart. This will help reduce swelling. · If your doctor recommends a sling, splint, or elastic bandage to support your hand, wear it as directed. When should you call for help? Call 911 anytime you think you may need emergency care. For example, call if:    · Your hand turns cool or pale or changes color. Call your doctor now or seek immediate medical care if:    · You cannot move your hand.     · Your hand pops, moves out of its normal position, and then returns to its normal position.     · You have signs of infection, such as:  ? Increased pain, swelling, warmth, or redness. ? Red streaks leading from the sore area. ? Pus draining from a place on your hand. ? A fever.     · Your hand feels numb or tingly. Watch closely for changes in your health, and be sure to contact your doctor if:    · Your hand feels unstable when you try to use it.     · You do not get better as expected.     · You have any new symptoms, such as swelling.     · Bruises from an injury to your hand last longer than 2 weeks. Where can you learn more? Go to https://The Runthrough.CollabRx. org and sign in to your [employer]P account. Enter R273 in the [facility] box to learn more about \"Hand Pain: Care Instructions. \"     If you do not have an account, please click on the \"Sign Up Now\" link. Current as of: October 19, 2020               Content Version: 12.9  © 2006-2021 Healthwise, Incorporated. Care instructions adapted under license by [facility] ([facility]). If you have questions about a medical condition or this instruction, always ask your healthcare professional. [place] [address] any warranty or liability for your use of this information.

## 2021-07-12 NOTE — PROGRESS NOTES
2021    Mali Ballesteros (:  1945) is a 68 y.o. female, here for evaluation of the following medical concerns:    HPI  Chief Complaint   Patient presents with    New Patient     est pcp    Hand Problem     LT hand pain off and on x 8 mos    Leg Pain     b/l leg pain lt > Rt off and since  admission for back pain      Patient is a 51-year-old female here today to establish care with myself. She used to be a patient of Dr. Marika Vargas. She has past medical history of atrial fibrillation anxiety arthritis congestive heart failure hypertension left ventricular diastolic dysfunction colitis mitral regurg nonischemic cardiomyopathy osteopenia. She is allergic to multiple things including candesartan and Xarelto adhesive tape Demerol digoxin Imdur all the ARBs shellfish sulfa cefdinir Tylenol Coreg Effexor Eliquis and the ACE inhibitor's as well. She has had multiple surgeries including a bunionectomy bladder repair cardioversion hysterectomy watchman device and wisdom tooth extraction. Her mother has a history of heart attack and a stroke her father had a heart attack and heart failure and heart disease her sister has anxiety and depression her son has Crohn's disease. Today she has complaints of left hand pain that has been off and on for about 8 months. She also has leg pain which is worse than the left but it is bilateral and has been present since . Sometimes her hand will lock up and she does not usually drop things. She is still able to  with the hand. It is every once in a while it gets numb and that she has to rub the hand out. She was in the hospital for multiple falls at home and was found to have a UTI and was treated with IV Rocephin and given IV fluids. She is discharged home on Omnicef for 7 days.   Her CT head was normal CT neck showed cervical cervical degenerative disc disease and heterogenous thyroid gland and they recommended a ultrasound of the thyroid in the future. CT lumbar spine showed degenerative disc disease at L4-L5 and L5-S1 chest x-ray showed a 2.1 cm rounded focus in the right hilum and they also recommended a nonemergent CT scan of the thorax. Patient had watchman that developed a blood clot and she is getting a JAIME on Friday. She needs a UA as per cardiology so she can get it done. If she is clear for her blood clots then she can stop her xarelto. Patient states that she always has bladder pressure and decreased urine. This causes her to not really know if she has a UTI or not. She sees her urologist tomorrow. Patient denies CP, SOB, nausea, vomiting, fevers chills sweats, abdominal pain, ear pain, eye pain, nasal congestion, headaches, blurry vision. Review of Systems   Constitutional: Negative for chills, fatigue and fever. HENT: Negative for congestion, ear pain, postnasal drip, rhinorrhea, sinus pressure, sinus pain and sore throat. Eyes: Negative for pain and redness. Respiratory: Negative for cough and shortness of breath. Cardiovascular: Negative for chest pain. Gastrointestinal: Positive for diarrhea. Negative for abdominal pain, constipation, nausea and vomiting. Genitourinary: Positive for decreased urine volume. Negative for difficulty urinating and dysuria. Pressure   Musculoskeletal: Positive for arthralgias (bilateral leg pain and left hand pain ), joint swelling and neck pain. Negative for back pain and myalgias. Skin: Negative for rash. Neurological: Positive for weakness, light-headedness and numbness. Negative for dizziness. Psychiatric/Behavioral: The patient is not nervous/anxious. Prior to Visit Medications    Medication Sig Taking?  Authorizing Provider   dicyclomine (BENTYL) 10 MG capsule  Yes Historical Provider, MD   hydrOXYzine (ATARAX) 10 MG tablet Take 1 tablet by mouth every 8 hours as needed for Itching Yes Trisha Cardona, DO   [place]ic[place] [facility] [place] more than 200 [place]  Expiration: 7/12/2026 Yes Trisha Cardona DO   furosemide (LASIX) 20 MG tablet Take 1 tablet by mouth daily Yes Jose Maria Block MD   rivaroxaban (XARELTO) 20 MG TABS tablet Take 1 tablet by mouth Daily with supper Yes Mona Loredo MD   carvedilol (COREG CR) 20 MG CP24 extended release capsule Take 1 capsule by mouth daily Yes Jose Maria Block MD   dofetilide (TIKOSYN) 250 MCG capsule Take 1 capsule by mouth every 12 hours Yes David Kohli MD   hydrALAZINE (APRESOLINE) 10 MG tablet Take 1 tablet by mouth 3 times daily Yes Jose Maria Block MD   spironolactone (ALDACTONE) 25 MG tablet Take 1 tablet by mouth daily Yes Jose Maria Block MD   famotidine (PEPCID) 40 MG tablet Take 40 mg by mouth 2 times daily as needed  Yes Historical Provider, MD   omeprazole (PRILOSEC) 40 MG delayed release capsule Take 40 mg by mouth daily  Yes Historical Provider, MD   triamcinolone (KENALOG) 0.1 % cream Apply topically 3 times daily as needed (rash)  Yes Historical Provider, MD   loperamide (IMODIUM) 2 MG capsule Take 2 mg by mouth 4 times daily as needed for Diarrhea Yes Historical Provider, MD   diphenhydrAMINE (BENADRYL) 25 MG tablet Take 25 mg by mouth every 6 hours as needed for Itching Yes Historical Provider, MD   vitamin D (CHOLECALCIFEROL) 1000 UNIT TABS tablet Take 1,000 Units by mouth daily Yes Historical Provider, MD   baclofen (LIORESAL) 20 MG tablet Take 20 mg by mouth 2 times daily as needed (spasms) Indications: Back Spasm, Bladder Spasm  Yes Historical Provider, MD        Allergies   Allergen Reactions    Atacand [Candesartan] Hives and Itching    Xarelto [Rivaroxaban] Itching and Rash      severe itch, headache, rash    Adhesive Tape Itching     develops rash and itching with EKG electrodes    Demerol      3/9/19 Pt states she gets a severe migraine    Digoxin Itching     developed itching and rash    Imdur [Isosorbide Mononitrate]       migraines    Losartan Potassium Itching    Shellfish-Derived Products Itching     3/9/19 Pt states she had a lobster pizza and had difficulty breathing, started to sweat and was itchy    Sulfa Antibiotics Diarrhea and Other (See Comments)     Also causes migraines  caused migraines and diarrhea    Cefdinir Hives, Itching and Nausea Only    Acetaminophen Hives and Itching    Apap-Caff-Dihydrocodeine Hives and Itching    Coreg [Carvedilol] Itching     Can take extended release Coreg    Cozaar [Losartan] Itching    Diovan [Valsartan] Itching    Effexor [Venlafaxine Hydrochloride] Nausea Only    Eliquis [Apixaban] Hives, Itching and Rash     3/9/19 Pt states that she gets hives, itchy and a rash    Labetalol Itching    Lisinopril Itching    Ramipril Itching       Past Medical History:   Diagnosis Date    Afib ([facility] [address]     history    Anxiety     Arthritis     CHF (congestive heart failure) (HCC)     Chronic sinusitis     Hypertension     Left ventricular systolic dysfunction     Meige syndrome     partial/ PCP    Microscopic colitis     Mitral regurgitation     Mild to moderate    Nonischemic cardiomyopathy ([facility] [address]     f/u with Dr. Reuben Chau Osteopenia     Vertebrobasilar artery syndrome     f/u PCP       Past Surgical History:   Procedure Laterality Date    BLADDER REPAIR      BUNIONECTOMY      CARDIOVASCULAR STRESS TEST  11/25/13    Rt & LT cath    CARDIOVERSION  11/04/2019    Successful CV from AF to NSR   (Dr. Samuel Busch)    COLONOSCOPY  07/2020    DIAGNOSTIC CARDIAC CATH LAB PROCEDURE  2/20/10    Dr Denny Rivas CATH LAB PROCEDURE  8/24/11    Right heart cath @ [facility].     DOPPLER ECHOCARDIOGRAPHY  11/25/13    HYSTERECTOMY  1991    total    OTHER SURGICAL HISTORY  10/14/2020    Dr. Angela Dahl- 24mm Watchman device    TRANSESOPHAGEAL ECHOCARDIOGRAM  11/21/2018    Dr. Alma Campos    TRANSESOPHAGEAL ECHOCARDIOGRAM  03/18/2019    WISDOM TOOTH EXTRACTION         Social History     Socioeconomic History    Marital status:      Spouse name: Not on file    Number of children: Not on file    Years of education: Not on file    Highest education level: Not on file   Occupational History    Not on file   Tobacco Use    Smoking status: Never Smoker    Smokeless tobacco: Never Used   Vaping Use    Vaping Use: Never used   Substance and Sexual Activity    Alcohol use: Yes     Alcohol/week: 0.0 standard drinks     Comment: occasional wine/mixed drink.  Drug use: Never    Sexual activity: Not on file     Comment:    Other Topics Concern    Not on file   Social History Narrative    Drinks 1-2 cups of coffee daily. Social Determinants of Health     Financial Resource Strain: Low Risk     Difficulty of Paying Living Expenses: Not hard at all   Food Insecurity: No Food Insecurity    Worried About Running Out of Food in the Last Year: Never true    Makayla of Food in the Last Year: Never true   Transportation Needs:     Lack of Transportation (Medical):      Lack of Transportation (Non-Medical):    Physical Activity:     Days of Exercise per Week:     Minutes of Exercise per Session:    Stress:     Feeling of Stress :    Social Connections:     Frequency of Communication with Friends and Family:     Frequency of Social Gatherings with Friends and Family:     Attends Holiness Services:     Active Member of Clubs or Organizations:     Attends Club or Organization Meetings:     Marital Status:    Intimate Partner Violence:     Fear of Current or Ex-Partner:     Emotionally Abused:     Physically Abused:     Sexually Abused:         Family History   Problem Relation Age of Onset    Heart Attack Mother     Stroke Mother     Heart Attack Father     Heart Failure Father     Heart Disease Father     Anxiety Disorder Sister     Depression Sister     Crohn's Disease Son            Vitals:    07/12/21 1305   BP: 136/74   Pulse: 71   Resp: 18   Temp: 97 °F (36.1 °C)   SpO2: 97%   Weight: 177 lb 24 09/03/2020    AST 23 03/11/2019    ALT 17 06/03/2021    ALT 20 09/03/2020    ALT 26 03/11/2019     TSH  Lab Results   Component Value Date    TSH 1.300 02/18/2020    TSH 3.110 11/21/2018    TSH 1.050 11/14/2015     LIPID  Lab Results   Component Value Date    CHOL 179 11/21/2018    CHOL 204 11/14/2015    CHOL 235 07/02/2015    HDL 51 11/21/2018    HDL 85 11/14/2015    HDL 69 07/02/2015    LDLCALC 114 11/21/2018    LDLCALC 105 11/14/2015    LDLCALC 130 07/02/2015    TRIG 69 11/21/2018    TRIG 71 11/14/2015    TRIG 182 07/02/2015     VITAMIN D  Lab Results   Component Value Date    VITD25 23 12/02/2019    VITD25 22 11/14/2015     MAGNESIUM  Lab Results   Component Value Date    MG 2.1 01/30/2021    MG 2.1 10/10/2020    MG 2.1 11/07/2019       HEPATITIS C  Lab Results   Component Value Date    HCVABI Non-Reactive 05/01/2018     UA  Lab Results   Component Value Date    COLORU yellow 06/22/2021    COLORU Yellow 06/03/2021    COLORU Yellow 10/10/2020    COLORU Yellow 11/06/2019    CLARITYU cloudy 06/22/2021    CLARITYU SL CLOUDY 06/03/2021    CLARITYU Clear 10/10/2020    CLARITYU Clear 11/06/2019    GLUCOSEU neg 06/22/2021    GLUCOSEU Negative 06/03/2021    GLUCOSEU Negative 10/10/2020    GLUCOSEU Negative 11/06/2019    BILIRUBINUR neg 06/22/2021    BILIRUBINUR Negative 06/03/2021    BILIRUBINUR Negative 10/10/2020    BILIRUBINUR Negative 11/06/2019    BILIRUBINUR negative 09/30/2014    BILIRUBINUR neg 08/15/2012    KETUA neg 06/22/2021    KETUA 15 06/03/2021    KETUA Negative 10/10/2020    KETUA Negative 11/06/2019    SPECGRAV 1.020 06/22/2021    SPECGRAV >=1.030 06/03/2021    SPECGRAV 1.015 10/10/2020    SPECGRAV 1.025 11/06/2019    BLOODU neg 06/22/2021    BLOODU SMALL 06/03/2021    BLOODU Negative 10/10/2020    BLOODU Negative 11/06/2019    PHUR 5.5 06/22/2021    PHUR 6.0 06/03/2021    PHUR 5.5 10/10/2020    PHUR 5.0 11/06/2019    PROTEINU neg 06/22/2021    PROTEINU Negative 06/03/2021    PROTEINU Negative 10/10/2020    PROTEINU Negative 11/06/2019    UROBILINOGEN 0.2 06/03/2021    UROBILINOGEN 0.2 10/10/2020    UROBILINOGEN 0.2 11/06/2019    NITRU POSITIVE 06/03/2021    NITRU Negative 10/10/2020    NITRU POSITIVE 11/06/2019    LEUKOCYTESUR trace 06/22/2021    LEUKOCYTESUR MODERATE 06/03/2021    LEUKOCYTESUR SMALL 10/10/2020    LEUKOCYTESUR TRACE 11/06/2019     Physical Exam  Vitals and nursing note reviewed. Constitutional:       Appearance: Normal appearance. HENT:      Head: Normocephalic and atraumatic. Right Ear: Tympanic membrane, ear canal and external ear normal.      Left Ear: Tympanic membrane, ear canal and external ear normal.      Nose: Nose normal.      Mouth/Throat:      Mouth: Mucous membranes are moist.      Pharynx: Oropharynx is clear. Eyes:      Extraocular Movements: Extraocular movements intact. Conjunctiva/sclera: Conjunctivae normal.      Pupils: Pupils are equal, round, and reactive to light. Cardiovascular:      Rate and Rhythm: Normal rate and regular rhythm. Pulses: Normal pulses. Heart sounds: Normal heart sounds. Pulmonary:      Effort: Pulmonary effort is normal.      Breath sounds: Normal breath sounds. Abdominal:      General: Bowel sounds are normal.      Palpations: Abdomen is soft. Musculoskeletal:         General: Normal range of motion. Cervical back: Normal range of motion and neck supple. Skin:     General: Skin is warm and dry. Capillary Refill: Capillary refill takes less than 2 seconds. Neurological:      General: No focal deficit present. Mental Status: She is alert and oriented to person, place, and time. Psychiatric:         Mood and Affect: Mood normal.         Behavior: Behavior normal.         Thought Content: Thought content normal.         ASSESSMENT/PLAN:  Ruma Amor was seen today for new patient, hand problem and leg pain.     Diagnoses and all orders for this visit:    At high risk for falls  -     Handicap Placard MISC; Cannot walk more than 200 feet  Expiration: 7/12/2026    Thyroid with heterogeneous echotexture determined by ultrasound  -     US THYROID; Future  -     TSH WITH REFLEX TO FT4; Future    Hilar mass  -     CT CHEST W CONTRAST; Future    Itching  -     hydrOXYzine (ATARAX) 10 MG tablet; Take 1 tablet by mouth every 8 hours as needed for Itching    Left hand pain  -     XR HAND LEFT (MIN 3 VIEWS); Future    Routine health maintenance  -     Hemoglobin A1C; Future  -     Lipid Panel; Future  -     TSH WITH REFLEX TO FT4; Future       UA was negative for any infection. Educational materials and/or home exercises printed for patient's review and were included in patient instructions on his/her After Visit Summary and given to patient at the end of visit. Counseled regarding above diagnosis, including possible risks and complications,  especially if left uncontrolled. Counseled regarding the possible side effects, risks, benefits and alternatives to treatment; patient and/or guardian verbalizes understanding, agrees, feels comfortable with and wishes to proceed with above treatment plan. Advised patient to call with any new medication issues, and read all Rx info from pharmacy to assure aware of all possible risks and side effects of medication before taking. Reviewed age and gender appropriate health screening exams and vaccinations. Advised patient regarding importance of keeping up with recommended health maintenance and to schedule as soon as possible if overdue, as this is important in assessing for undiagnosed pathology, especially cancer, as well as protecting against potentially harmful/life threatening disease. Patient and/or guardian verbalizes understanding and agrees with above counseling, assessment and plan. All questions answered. Return in about 6 months (around 1/12/2022). An  electronic signature was used to authenticate this note.     --Emily Gilmore, DO on 7/12/2021 at 1:42 PM    On the basis of positive falls risk screening, assessment and plan is as follows: home safety tips provided.

## 2021-07-15 ENCOUNTER — TELEPHONE (OUTPATIENT)
Dept: CARDIAC CATH/INVASIVE PROCEDURES | Age: 76
End: 2021-07-15

## 2021-07-15 NOTE — TELEPHONE ENCOUNTER
Reminded patient of scheduled procedure on 7/16/21  Instructions given and COVID questionnaire completed.

## 2021-07-16 ENCOUNTER — HOSPITAL ENCOUNTER (OUTPATIENT)
Dept: CARDIAC CATH/INVASIVE PROCEDURES | Age: 76
Discharge: HOME | End: 2021-07-16
Payer: MEDICARE

## 2021-07-16 ENCOUNTER — TELEPHONE (OUTPATIENT)
Dept: CARDIOLOGY CLINIC | Age: 76
End: 2021-07-16

## 2021-07-16 ENCOUNTER — ANESTHESIA (OUTPATIENT)
Dept: CARDIAC CATH/INVASIVE PROCEDURES | Age: 76
End: 2021-07-16

## 2021-07-16 ENCOUNTER — ANESTHESIA EVENT (OUTPATIENT)
Dept: CARDIAC CATH/INVASIVE PROCEDURES | Age: 76
End: 2021-07-16

## 2021-07-16 VITALS
OXYGEN SATURATION: 93 % | HEART RATE: 58 BPM | RESPIRATION RATE: 16 BRPM | SYSTOLIC BLOOD PRESSURE: 123 MMHG | DIASTOLIC BLOOD PRESSURE: 66 MMHG

## 2021-07-16 VITALS
SYSTOLIC BLOOD PRESSURE: 94 MMHG | OXYGEN SATURATION: 96 % | DIASTOLIC BLOOD PRESSURE: 45 MMHG | RESPIRATION RATE: 18 BRPM

## 2021-07-16 DIAGNOSIS — I48.0 PAF (PAROXYSMAL ATRIAL FIBRILLATION) (HCC): ICD-10-CM

## 2021-07-16 DIAGNOSIS — I51.3 THROMBUS OF LEFT ATRIAL APPENDAGE: ICD-10-CM

## 2021-07-16 DIAGNOSIS — Z95.818 PRESENCE OF WATCHMAN LEFT ATRIAL APPENDAGE CLOSURE DEVICE: ICD-10-CM

## 2021-07-16 DIAGNOSIS — I34.0 SEVERE MITRAL REGURGITATION: ICD-10-CM

## 2021-07-16 DIAGNOSIS — I51.3 LEFT ATRIAL THROMBUS: Primary | ICD-10-CM

## 2021-07-16 LAB
LV EF: 48 %
LVEF MODALITY: NORMAL

## 2021-07-16 PROCEDURE — 93320 DOPPLER ECHO COMPLETE: CPT | Performed by: INTERNAL MEDICINE

## 2021-07-16 PROCEDURE — 3700000000 HC ANESTHESIA ATTENDED CARE

## 2021-07-16 PROCEDURE — 93321 DOPPLER ECHO F-UP/LMTD STD: CPT

## 2021-07-16 PROCEDURE — 93325 DOPPLER ECHO COLOR FLOW MAPG: CPT

## 2021-07-16 PROCEDURE — 93312 ECHO TRANSESOPHAGEAL: CPT

## 2021-07-16 PROCEDURE — 93325 DOPPLER ECHO COLOR FLOW MAPG: CPT | Performed by: INTERNAL MEDICINE

## 2021-07-16 PROCEDURE — 2709999900 HC NON-CHARGEABLE SUPPLY

## 2021-07-16 PROCEDURE — 2580000003 HC RX 258: Performed by: NURSE ANESTHETIST, CERTIFIED REGISTERED

## 2021-07-16 PROCEDURE — 3700000001 HC ADD 15 MINUTES (ANESTHESIA)

## 2021-07-16 PROCEDURE — 6360000002 HC RX W HCPCS: Performed by: NURSE ANESTHETIST, CERTIFIED REGISTERED

## 2021-07-16 PROCEDURE — 93312 ECHO TRANSESOPHAGEAL: CPT | Performed by: INTERNAL MEDICINE

## 2021-07-16 RX ORDER — ASPIRIN 81 MG/1
81 TABLET ORAL DAILY
Qty: 90 TABLET | Refills: 3 | Status: ON HOLD
Start: 2021-07-16 | End: 2021-11-17 | Stop reason: HOSPADM

## 2021-07-16 RX ORDER — SODIUM CHLORIDE 9 MG/ML
INJECTION, SOLUTION INTRAVENOUS CONTINUOUS PRN
Status: DISCONTINUED | OUTPATIENT
Start: 2021-07-16 | End: 2021-07-16 | Stop reason: SDUPTHER

## 2021-07-16 RX ORDER — CLOPIDOGREL BISULFATE 75 MG/1
75 TABLET ORAL DAILY
Qty: 90 TABLET | Refills: 1 | Status: SHIPPED
Start: 2021-07-16 | End: 2021-10-29 | Stop reason: ALTCHOICE

## 2021-07-16 RX ORDER — PROPOFOL 10 MG/ML
INJECTION, EMULSION INTRAVENOUS PRN
Status: DISCONTINUED | OUTPATIENT
Start: 2021-07-16 | End: 2021-07-16 | Stop reason: SDUPTHER

## 2021-07-16 RX ADMIN — PROPOFOL 30 MG: 10 INJECTION, EMULSION INTRAVENOUS at 09:47

## 2021-07-16 RX ADMIN — PROPOFOL 70 MG: 10 INJECTION, EMULSION INTRAVENOUS at 09:44

## 2021-07-16 RX ADMIN — SODIUM CHLORIDE: 9 INJECTION, SOLUTION INTRAVENOUS at 09:40

## 2021-07-16 NOTE — ANESTHESIA PRE PROCEDURE
Department of Anesthesiology  Preprocedure Note       Name:  Poppy Torres   Age:  68 y.o.  :  1945                                          MRN:  [id number]         Date:  2021      Surgeon: Branden Gutierrez    Procedure:   JAIME  Medications prior to admission:   Prior to Admission medications    Medication Sig Start Date End Date Taking?  Authorizing Provider   dicyclomine (BENTYL) 10 MG capsule  21  Yes Historical Provider, MD   hydrOXYzine (ATARAX) 10 MG tablet Take 1 tablet by mouth every 8 hours as needed for Itching 21  Yes Trisha Cardona DO   furosemide (LASIX) 20 MG tablet Take 1 tablet by mouth daily 21  Yes Maddy Snow MD   rivaroxaban (XARELTO) 20 MG TABS tablet Take 1 tablet by mouth Daily with supper 4/15/21  Yes Kimberlyn Cristina MD   carvedilol (COREG CR) 20 MG CP24 extended release capsule Take 1 capsule by mouth daily 3/24/21  Yes Maddy Snow MD   dofetilide (TIKOSYN) 250 MCG capsule Take 1 capsule by mouth every 12 hours 21  Yes Lucretia Andino MD   hydrALAZINE (APRESOLINE) 10 MG tablet Take 1 tablet by mouth 3 times daily 20  Yes Maddy Snow MD   spironolactone (ALDACTONE) 25 MG tablet Take 1 tablet by mouth daily 20  Yes Maddy Snow MD   famotidine (PEPCID) 40 MG tablet Take 40 mg by mouth 2 times daily as needed    Yes Historical Provider, MD   omeprazole (PRILOSEC) 40 MG delayed release capsule Take 40 mg by mouth daily  20  Yes Historical Provider, MD   triamcinolone (KENALOG) 0.1 % cream Apply topically 3 times daily as needed (rash)  19  Yes Historical Provider, MD   loperamide (IMODIUM) 2 MG capsule Take 2 mg by mouth 4 times daily as needed for Diarrhea   Yes Historical Provider, MD   diphenhydrAMINE (BENADRYL) 25 MG tablet Take 25 mg by mouth every 6 hours as needed for Itching   Yes Historical Provider, MD   vitamin D (CHOLECALCIFEROL) 1000 UNIT TABS tablet Take 1,000 Units by mouth daily   Yes Historical Provider, MD baclofen (LIORESAL) 20 MG tablet Take 20 mg by mouth 2 times daily as needed (spasms) Indications: Back Spasm, Bladder Spasm    Yes Historical Provider, MD   Handicap Placard MISC Cannot walk more than 200 feet  Expiration: 7/12/2026 7/12/21   Trisha Cardona DO       Current medications:    Current Outpatient Medications   Medication Sig Dispense Refill    dicyclomine (BENTYL) 10 MG capsule       hydrOXYzine (ATARAX) 10 MG tablet Take 1 tablet by mouth every 8 hours as needed for Itching 30 tablet 3    furosemide (LASIX) 20 MG tablet Take 1 tablet by mouth daily 90 tablet 3    rivaroxaban (XARELTO) 20 MG TABS tablet Take 1 tablet by mouth Daily with supper 30 tablet 11    carvedilol (COREG CR) 20 MG CP24 extended release capsule Take 1 capsule by mouth daily 90 capsule 3    dofetilide (TIKOSYN) 250 MCG capsule Take 1 capsule by mouth every 12 hours 180 capsule 1    hydrALAZINE (APRESOLINE) 10 MG tablet Take 1 tablet by mouth 3 times daily 270 tablet 3    spironolactone (ALDACTONE) 25 MG tablet Take 1 tablet by mouth daily 90 tablet 3    famotidine (PEPCID) 40 MG tablet Take 40 mg by mouth 2 times daily as needed       omeprazole (PRILOSEC) 40 MG delayed release capsule Take 40 mg by mouth daily       triamcinolone (KENALOG) 0.1 % cream Apply topically 3 times daily as needed (rash)   1    loperamide (IMODIUM) 2 MG capsule Take 2 mg by mouth 4 times daily as needed for Diarrhea      diphenhydrAMINE (BENADRYL) 25 MG tablet Take 25 mg by mouth every 6 hours as needed for Itching      vitamin D (CHOLECALCIFEROL) 1000 UNIT TABS tablet Take 1,000 Units by mouth daily      baclofen (LIORESAL) 20 MG tablet Take 20 mg by mouth 2 times daily as needed (spasms) Indications: Back Spasm, Bladder Spasm       Handicap Placard MISC Cannot walk more than 200 feet  Expiration: 7/12/2026 1 each 0     No current facility-administered medications for this encounter. Allergies:     Allergies   Allergen Reactions  Atacand [Candesartan] Hives and Itching    Xarelto [Rivaroxaban] Itching and Rash      severe itch, headache, rash    Adhesive Tape Itching     develops rash and itching with EKG electrodes    Demerol      3/9/19 Pt states she gets a severe migraine    Digoxin Itching     developed itching and rash    Imdur [Isosorbide Mononitrate]       migraines    Losartan Potassium Itching    Shellfish-Derived Products Itching     3/9/19 Pt states she had a lobster pizza and had difficulty breathing, started to sweat and was itchy    Sulfa Antibiotics Diarrhea and Other (See Comments)     Also causes migraines  caused migraines and diarrhea    Cefdinir Hives, Itching and Nausea Only    Acetaminophen Hives and Itching    Apap-Caff-Dihydrocodeine Hives and Itching    Coreg [Carvedilol] Itching     Can take extended release Coreg    Cozaar [Losartan] Itching    Diovan [Valsartan] Itching    Effexor [Venlafaxine Hydrochloride] Nausea Only    Eliquis [Apixaban] Hives, Itching and Rash     3/9/19 Pt states that she gets hives, itchy and a rash    Labetalol Itching    Lisinopril Itching    Ramipril Itching       Problem List:    Patient Active Problem List   Diagnosis Code    Anxiety F41.9    Nonischemic cardiomyopathy ([facility] [address] I42.8    Severe mitral regurgitation I34.0    Lumbar radiculopathy M54.16    Cervical radiculopathy M54.12    HTN (hypertension), benign I10    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    PAF (paroxysmal atrial fibrillation) (formerly [facility]) I48.0    Chronic systolic congestive heart failure (HCC) I50.22    Visit for monitoring Tikosyn therapy Z51.81, Z79.899    Encounter for medication refill Z76.0    Itching L29.9    Chronic anticoagulation Z79.01    Presence of Watchman left atrial appendage closure device Z95.818       Past Medical History:        Diagnosis Date    Afib ([facility] [address]     history    Anxiety     Arthritis     CHF (congestive heart failure) (formerly [facility])     Chronic sinusitis     Hypertension  Left ventricular systolic dysfunction     Meige syndrome     partial/ PCP    Microscopic colitis     Mitral regurgitation     Mild to moderate    Nonischemic cardiomyopathy (HCC)     f/u with Dr. Moser Rater Osteopenia     Vertebrobasilar artery syndrome     f/u PCP       Past Surgical History:        Procedure Laterality Date    BLADDER REPAIR      BUNIONECTOMY      CARDIOVASCULAR STRESS TEST  11/25/13    Rt & LT cath    CARDIOVERSION  11/04/2019    Successful CV from AF to NSR   (Dr. Melquiades Cardona)    COLONOSCOPY  07/2020    DIAGNOSTIC CARDIAC CATH LAB PROCEDURE  2/20/10    Dr Sena Anthony CATH LAB PROCEDURE  8/24/11    Right heart cath @ [facility].  DOPPLER ECHOCARDIOGRAPHY  11/25/13    HYSTERECTOMY  1991    total    OTHER SURGICAL HISTORY  10/14/2020    Dr. Lino Tello- 24mm Watchman device    TRANSESOPHAGEAL ECHOCARDIOGRAM  11/21/2018    Dr. Angel Singh TRANSESOPHAGEAL ECHOCARDIOGRAM  03/18/2019    WISDOM TOOTH EXTRACTION         Social History:    Social History     Tobacco Use    Smoking status: Never Smoker    Smokeless tobacco: Never Used   Substance Use Topics    Alcohol use: Yes     Alcohol/week: 0.0 standard drinks     Comment: occasional wine/mixed drink. Counseling given: Not Answered      Vital Signs (Current): There were no vitals filed for this visit.                                            BP Readings from Last 3 Encounters:   07/12/21 136/74   06/22/21 128/72   06/04/21 (!) 132/94       NPO Status:  >8 hrs                                                                               BMI:   Wt Readings from Last 3 Encounters:   07/12/21 177 lb (80.3 kg)   06/22/21 174 lb (78.9 kg)   06/04/21 182 lb 15.7 oz (83 kg)     There is no height or weight on file to calculate BMI.    CBC:   Lab Results   Component Value Date    WBC 7.4 06/04/2021    RBC 4.54 06/04/2021    HGB 12.7 06/04/2021    HCT 39.4 06/04/2021    MCV 86.8 06/04/2021 RDW 14.1 06/04/2021     06/04/2021       CMP:   Lab Results   Component Value Date     06/03/2021    K 4.4 06/03/2021    K 4.2 10/16/2020     06/03/2021    CO2 25 06/03/2021    BUN 18 06/03/2021    CREATININE 1.0 06/03/2021    GFRAA >60 06/03/2021    LABGLOM 54 06/03/2021    GLUCOSE 123 06/03/2021    GLUCOSE 120 10/12/2011    PROT 8.0 06/03/2021    CALCIUM 9.7 06/03/2021    BILITOT 0.5 06/03/2021    ALKPHOS 126 06/03/2021    AST 22 06/03/2021    ALT 17 06/03/2021       POC Tests: No results for input(s): POCGLU, POCNA, POCK, POCCL, POCBUN, POCHEMO, POCHCT in the last 72 hours.     Coags:   Lab Results   Component Value Date    PROTIME 11.5 01/22/2021    PROTIME 12.0 12/17/2012    INR 1.0 01/22/2021    APTT 28.5 01/22/2021       HCG (If Applicable): No results found for: PREGTESTUR, PREGSERUM, HCG, HCGQUANT     ABGs: No results found for: PHART, PO2ART, MVX1YJX, ALU0GYW, BEART, B9GDOCVB     Type & Screen (If Applicable):  No results found for: LABABO, LABRH    Drug/Infectious Status (If Applicable):  No results found for: HIV, HEPCAB    COVID-19 Screening (If Applicable):   Lab Results   Component Value Date    COVID19 Not Detected 04/09/2021           Anesthesia Evaluation  Patient summary reviewed and Nursing notes reviewed no history of anesthetic complications:   Airway: Mallampati: II  TM distance: >3 FB   Neck ROM: limited  Mouth opening: > = 3 FB Dental:          Pulmonary:Negative Pulmonary ROS and normal exam  breath sounds clear to auscultation                             Cardiovascular:  Exercise tolerance: good (>4 METS),   (+) hypertension:, valvular problems/murmurs: MR, dysrhythmias: atrial fibrillation, CHF (NICM):,         Rhythm: regular  Rate: normal      Cleared by cardiology           ROS comment: Watchman 10/20-thrombus on watchman    PE comment: sr   Neuro/Psych:   (+) neuromuscular disease:, depression/anxiety              ROS comment: Cervical pain w LUE numbness at times, not now  Lumbar radiculopathy, cervical radiculopathy GI/Hepatic/Renal: Neg GI/Hepatic/Renal ROS            Endo/Other:    (+) blood dyscrasia: anticoagulation therapy, arthritis:., .          Pt had no PAT visit       Abdominal:       Abdomen: soft. Vascular: Other Findings:             Anesthesia Plan      MAC     ASA 4       Induction: intravenous. Anesthetic plan and risks discussed with patient. Use of blood products discussed with patient whom. Plan discussed with attending.                   UYNE Mullins - CRNA   7/16/2021

## 2021-07-16 NOTE — TELEPHONE ENCOUNTER
No DRT on f/u JAIME. D/c rivaroxaban and start ASA EC 81 mg QD and clopidogrel 75 mg QD. F/u as scheduled on 10/11/21.

## 2021-07-16 NOTE — PROCEDURES
PRELIMINARY TRANSESOPHAGEAL ECHOCARDIOGRAPHY REPORT    Date of Procedure: 7/16/2021    Indication:  Watchman thrombus    Sedation: Propofol    Complications: None    Preliminary findings:  24 mm Watchman device present in the CHERYL  No device leaks  Previously visualized device associated thrombus is no longer present    Full report to follow    Beverley Castorena MD, [address] [facility]

## 2021-07-26 NOTE — ANESTHESIA POSTPROCEDURE EVALUATION
Department of Anesthesiology  Postprocedure Note    Patient: Scott Garcia  MRN: [id number]  YOB: 1945  Date of evaluation: 7/26/2021  Time:  5:05 PM     Procedure Summary     Date: 07/16/21 Room / Location: [facility] [place] CATH LAB; [facility] ECHO    Anesthesia Start: 0940 Anesthesia Stop: 9553    Procedure: ECHOCARDIOGRAM TRANSESOPHAGEAL Diagnosis:       PAF (paroxysmal atrial fibrillation) (HCC)      Thrombus of left atrial appendage      Severe mitral regurgitation      Presence of Watchman left atrial appendage closure device      (apical clot)      (6 mo post LAAC watchman)    Scheduled Providers: Tracey Hyman MD Responsible Provider: Tracey Hyman MD    Anesthesia Type: MAC ASA Status: 4          Anesthesia Type: MAC    Joseph Phase I:      Joseph Phase II:      Last vitals: Reviewed and per EMR flowsheets.        Anesthesia Post Evaluation    Patient location during evaluation: PACU  Patient participation: complete - patient participated  Level of consciousness: awake and alert  Airway patency: patent  Nausea & Vomiting: no nausea and no vomiting  Complications: no  Cardiovascular status: blood pressure returned to baseline and hemodynamically stable  Respiratory status: acceptable  Hydration status: euvolemic

## 2021-08-17 ENCOUNTER — HOSPITAL ENCOUNTER (OUTPATIENT)
Dept: CT IMAGING | Age: 76
Discharge: HOME | End: 2021-08-19
Payer: MEDICARE

## 2021-08-17 ENCOUNTER — HOSPITAL ENCOUNTER (OUTPATIENT)
Age: 76
Discharge: HOME | End: 2021-08-19
Payer: MEDICARE

## 2021-08-17 ENCOUNTER — HOSPITAL ENCOUNTER (OUTPATIENT)
Dept: ULTRASOUND IMAGING | Age: 76
Discharge: HOME | End: 2021-08-19
Payer: MEDICARE

## 2021-08-17 ENCOUNTER — HOSPITAL ENCOUNTER (OUTPATIENT)
Age: 76
Discharge: HOME | End: 2021-08-17
Payer: MEDICARE

## 2021-08-17 DIAGNOSIS — R91.8 HILAR MASS: ICD-10-CM

## 2021-08-17 DIAGNOSIS — R93.89 THYROID WITH HETEROGENEOUS ECHOTEXTURE DETERMINED BY ULTRASOUND: ICD-10-CM

## 2021-08-17 DIAGNOSIS — Z00.00 ROUTINE HEALTH MAINTENANCE: Primary | ICD-10-CM

## 2021-08-17 DIAGNOSIS — Z00.00 ROUTINE HEALTH MAINTENANCE: ICD-10-CM

## 2021-08-17 DIAGNOSIS — M79.642 LEFT HAND PAIN: ICD-10-CM

## 2021-08-17 LAB
BUN BLDV-MCNC: 13 MG/DL (ref 6–23)
CREAT SERPL-MCNC: 1.1 MG/DL (ref 0.5–1)
GFR AFRICAN AMERICAN: 58
GFR NON-AFRICAN AMERICAN: 48 ML/MIN/1.73

## 2021-08-17 PROCEDURE — 76536 US EXAM OF HEAD AND NECK: CPT

## 2021-08-17 PROCEDURE — 36415 COLL VENOUS BLD VENIPUNCTURE: CPT

## 2021-08-17 PROCEDURE — 84520 ASSAY OF UREA NITROGEN: CPT

## 2021-08-17 PROCEDURE — 73130 X-RAY EXAM OF HAND: CPT

## 2021-08-17 PROCEDURE — 82565 ASSAY OF CREATININE: CPT

## 2021-08-17 PROCEDURE — 6360000004 HC RX CONTRAST MEDICATION: Performed by: RADIOLOGY

## 2021-08-17 PROCEDURE — 2580000003 HC RX 258: Performed by: RADIOLOGY

## 2021-08-17 PROCEDURE — 71260 CT THORAX DX C+: CPT

## 2021-08-17 RX ORDER — SODIUM CHLORIDE 0.9 % (FLUSH) 0.9 %
10 SYRINGE (ML) INJECTION PRN
Status: DISCONTINUED | OUTPATIENT
Start: 2021-08-17 | End: 2021-08-20 | Stop reason: HOSPADM

## 2021-08-17 RX ADMIN — SODIUM CHLORIDE, PRESERVATIVE FREE 10 ML: 5 INJECTION INTRAVENOUS at 13:46

## 2021-08-17 RX ADMIN — IOPAMIDOL 75 ML: 755 INJECTION, SOLUTION INTRAVENOUS at 13:46

## 2021-08-18 DIAGNOSIS — N28.89 LEFT KIDNEY MASS: ICD-10-CM

## 2021-08-18 DIAGNOSIS — R91.1 PULMONARY NODULE SEEN ON IMAGING STUDY: ICD-10-CM

## 2021-08-18 DIAGNOSIS — N28.89 KIDNEY MASS: Primary | ICD-10-CM

## 2021-08-18 DIAGNOSIS — R59.0 HILAR ADENOPATHY: ICD-10-CM

## 2021-08-24 ENCOUNTER — HOSPITAL ENCOUNTER (OUTPATIENT)
Dept: NUCLEAR MEDICINE | Age: 76
Discharge: HOME | End: 2021-08-26
Payer: MEDICARE

## 2021-08-24 DIAGNOSIS — N28.89 LEFT KIDNEY MASS: ICD-10-CM

## 2021-08-24 DIAGNOSIS — R91.1 PULMONARY NODULE SEEN ON IMAGING STUDY: ICD-10-CM

## 2021-08-24 DIAGNOSIS — R59.0 HILAR ADENOPATHY: ICD-10-CM

## 2021-08-24 PROCEDURE — 78815 PET IMAGE W/CT SKULL-THIGH: CPT

## 2021-08-24 PROCEDURE — A9552 F18 FDG: HCPCS | Performed by: RADIOLOGY

## 2021-08-24 PROCEDURE — G1010 CDSM STANSON: HCPCS | Performed by: RADIOLOGY

## 2021-08-24 PROCEDURE — 3430000000 HC RX DIAGNOSTIC RADIOPHARMACEUTICAL: Performed by: RADIOLOGY

## 2021-08-24 PROCEDURE — 78815 PET IMAGE W/CT SKULL-THIGH: CPT | Performed by: RADIOLOGY

## 2021-08-24 RX ORDER — FLUDEOXYGLUCOSE F 18 200 MCI/ML
15 INJECTION, SOLUTION INTRAVENOUS
Status: COMPLETED | OUTPATIENT
Start: 2021-08-24 | End: 2021-08-24

## 2021-08-24 RX ADMIN — FLUDEOXYGLUCOSE F 18 15 MILLICURIE: 200 INJECTION, SOLUTION INTRAVENOUS at 12:17

## 2021-08-26 ENCOUNTER — TELEPHONE (OUTPATIENT)
Dept: CARDIOLOGY CLINIC | Age: 76
End: 2021-08-26

## 2021-08-26 NOTE — TELEPHONE ENCOUNTER
Chikis Hernandez  Per Dr. Roby Cavanaugh note I don't think this is needed. Please review again and let me know              Note     No DRT on f/u JAIME. D/c rivaroxaban and start ASA EC 81 mg QD and clopidogrel 75 mg QD.  F/u as scheduled on 10/11/21.

## 2021-08-26 NOTE — TELEPHONE ENCOUNTER
----- Message from Miguelito Mckeon RN sent at 8/26/2021 10:39 AM EDT -----  [place] Sylvia,     This patient is due for 1 yr. JAIME for Watchman protocol, please. She has a 1 yr appt scheduled for 10/11/2021 with Dr. Angela Dahl already.      Thanks,   Harris Saldana

## 2021-08-30 ENCOUNTER — OFFICE VISIT (OUTPATIENT)
Dept: FAMILY MEDICINE CLINIC | Age: 76
End: 2021-08-30
Payer: MEDICARE

## 2021-08-30 VITALS
TEMPERATURE: 98.4 F | HEART RATE: 79 BPM | HEIGHT: 67 IN | SYSTOLIC BLOOD PRESSURE: 134 MMHG | RESPIRATION RATE: 16 BRPM | DIASTOLIC BLOOD PRESSURE: 72 MMHG | WEIGHT: 176.4 LBS | OXYGEN SATURATION: 97 % | BODY MASS INDEX: 27.69 KG/M2

## 2021-08-30 DIAGNOSIS — R91.1 PULMONARY NODULE: ICD-10-CM

## 2021-08-30 DIAGNOSIS — E04.1 THYROID NODULE: Primary | ICD-10-CM

## 2021-08-30 PROCEDURE — 4040F PNEUMOC VAC/ADMIN/RCVD: CPT | Performed by: STUDENT IN AN ORGANIZED HEALTH CARE EDUCATION/TRAINING PROGRAM

## 2021-08-30 PROCEDURE — 99213 OFFICE O/P EST LOW 20 MIN: CPT | Performed by: STUDENT IN AN ORGANIZED HEALTH CARE EDUCATION/TRAINING PROGRAM

## 2021-08-30 PROCEDURE — G8399 PT W/DXA RESULTS DOCUMENT: HCPCS | Performed by: STUDENT IN AN ORGANIZED HEALTH CARE EDUCATION/TRAINING PROGRAM

## 2021-08-30 PROCEDURE — 1090F PRES/ABSN URINE INCON ASSESS: CPT | Performed by: STUDENT IN AN ORGANIZED HEALTH CARE EDUCATION/TRAINING PROGRAM

## 2021-08-30 PROCEDURE — 1123F ACP DISCUSS/DSCN MKR DOCD: CPT | Performed by: STUDENT IN AN ORGANIZED HEALTH CARE EDUCATION/TRAINING PROGRAM

## 2021-08-30 PROCEDURE — G8417 CALC BMI ABV UP PARAM F/U: HCPCS | Performed by: STUDENT IN AN ORGANIZED HEALTH CARE EDUCATION/TRAINING PROGRAM

## 2021-08-30 PROCEDURE — 1036F TOBACCO NON-USER: CPT | Performed by: STUDENT IN AN ORGANIZED HEALTH CARE EDUCATION/TRAINING PROGRAM

## 2021-08-30 PROCEDURE — G8427 DOCREV CUR MEDS BY ELIG CLIN: HCPCS | Performed by: STUDENT IN AN ORGANIZED HEALTH CARE EDUCATION/TRAINING PROGRAM

## 2021-08-30 ASSESSMENT — ENCOUNTER SYMPTOMS
DIARRHEA: 0
BACK PAIN: 0
SINUS PRESSURE: 0
ABDOMINAL PAIN: 0
NAUSEA: 0
SHORTNESS OF BREATH: 0
SINUS PAIN: 0
COUGH: 0
EYE REDNESS: 0
RHINORRHEA: 0
SORE THROAT: 0
CONSTIPATION: 0
EYE PAIN: 0
VOMITING: 0

## 2021-08-30 NOTE — PROGRESS NOTES
2021    Vikas Redman (:  1945) is a 68 y.o. female, here for evaluation of the following medical concerns:    HPI  Chief Complaint   Patient presents with    Results     Review pet scan     Patient is a 68year old female here today with her son to go over her PET scan results. She originally had a CT scan done that I had ordered due to something seen on a CXR back in . 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 The PET scan stated that it was likely not renal cell carcinoma but that there was metastasis to the lungs. There was also uptake in the thyroid and a biopsy was recommended. She had one of these 7-8 years ago which was normal. She has not been having any symptoms- no fevers chills sweats nausea vomiting diarrhea abdominal pain weight loss blood in urine or stool and no vaginal bleeding. We will refer patient to oncology and patient already has an appointment soon with Dr. Gabriel Marie. Recommend patient to keep scheduled appointment for her CT abdomen that Dr. Veronica Maynard had ordered for the patient. All questions answered. Review of Systems   Constitutional: Negative for chills, fatigue and fever. HENT: Negative for congestion, ear pain, postnasal drip, rhinorrhea, sinus pressure, sinus pain and sore throat. Eyes: Negative for pain and redness. Respiratory: Negative for cough and shortness of breath. Cardiovascular: Negative for chest pain. Gastrointestinal: Negative for abdominal pain, constipation, diarrhea, nausea and vomiting. Genitourinary: Negative for difficulty urinating and dysuria. Musculoskeletal: Negative for back pain, myalgias and neck pain. Skin: Negative for rash. Neurological: Negative for dizziness, light-headedness and numbness. Psychiatric/Behavioral: The patient is not nervous/anxious. Prior to Visit Medications    Medication Sig Taking?  Authorizing Provider   mirabegron (MYRBETRIQ) 25 MG Xarelto [Rivaroxaban] Itching and Rash      severe itch, headache, rash    Adhesive Tape Itching     develops rash and itching with EKG electrodes    Demerol      3/9/19 Pt states she gets a severe migraine    Digoxin Itching     developed itching and rash    Imdur [Isosorbide Mononitrate]       migraines    Losartan Potassium Itching    Shellfish-Derived Products Itching     3/9/19 Pt states she had a lobster pizza and had difficulty breathing, started to sweat and was itchy    Sulfa Antibiotics Diarrhea and Other (See Comments)     Also causes migraines  caused migraines and diarrhea    Cefdinir Hives, Itching and Nausea Only    Acetaminophen Hives and Itching    Apap-Caff-Dihydrocodeine Hives and Itching    Coreg [Carvedilol] Itching     Can take extended release Coreg    Cozaar [Losartan] Itching    Diovan [Valsartan] Itching    Effexor [Venlafaxine Hydrochloride] Nausea Only    Eliquis [Apixaban] Hives, Itching and Rash     3/9/19 Pt states that she gets hives, itchy and a rash    Labetalol Itching    Lisinopril Itching    Ramipril Itching       Past Medical History:   Diagnosis Date    Afib ([facility] [address]     history    Anxiety     Arthritis     CHF (congestive heart failure) (HCC)     Chronic sinusitis     Hypertension     Left ventricular systolic dysfunction     Meige syndrome     partial/ PCP    Microscopic colitis     Mitral regurgitation     Mild to moderate    Nonischemic cardiomyopathy ([address]     f/u with Dr. Laine Mcdaniel Osteopenia     Vertebrobasilar artery syndrome     f/u PCP       Past Surgical History:   Procedure Laterality Date    BLADDER REPAIR      BUNIONECTOMY      CARDIOVASCULAR STRESS TEST  11/25/13    Rt & LT cath    CARDIOVERSION  11/04/2019    Successful CV from AF to NSR   (Dr. Shannon Santos)    COLONOSCOPY  07/2020    DIAGNOSTIC CARDIAC CATH LAB PROCEDURE  2/20/10    Dr Mona Davidson CATH LAB PROCEDURE  8/24/11    Right heart cath @ [facility]. Relation Age of Onset    Heart Attack Mother     Stroke Mother     Heart Attack Father     Heart Failure Father     Heart Disease Father     Anxiety Disorder Sister     Depression Sister     Crohn's Disease Son            Vitals:    08/30/21 0925   BP: 134/72   Site: Right Upper Arm   Pulse: 79   Resp: 16   Temp: 98.4 °F (36.9 °C)   TempSrc: Temporal   SpO2: 97%   Weight: 176 lb 6.4 oz (80 kg)   Height: 5' 7\" (1.702 m)     Estimated body mass index is 27.63 kg/m² as calculated from the following:    Height as of this encounter: 5' 7\" (1.702 m). Weight as of this encounter: 176 lb 6.4 oz (80 kg).     Most Recent Labs  CBC  Lab Results   Component Value Date    WBC 7.4 06/04/2021    WBC 12.4 06/03/2021    WBC 6.5 01/22/2021    RBC 4.54 06/04/2021    RBC 5.11 06/03/2021    RBC 4.82 01/22/2021    HGB 12.7 06/04/2021    HGB 14.3 06/03/2021    HGB 13.5 01/22/2021    HCT 39.4 06/04/2021    HCT 43.9 06/03/2021    HCT 41.5 01/22/2021    MCV 86.8 06/04/2021    MCV 85.9 06/03/2021    MCV 86.1 01/22/2021     06/04/2021     06/03/2021     01/22/2021      CMP  Lab Results   Component Value Date     06/03/2021     01/30/2021     10/16/2020    K 4.4 06/03/2021    K 4.1 01/30/2021    K 4.2 10/16/2020    K 4.1 10/15/2020    K 4.3 10/10/2020    K 4.2 11/07/2019     06/03/2021     01/30/2021     10/16/2020    CO2 25 06/03/2021    CO2 24 01/30/2021    CO2 20 10/16/2020    ANIONGAP 9 06/03/2021    ANIONGAP 12 01/30/2021    ANIONGAP 11 10/16/2020    GLUCOSE 123 06/03/2021    GLUCOSE 138 06/03/2021    GLUCOSE 107 01/30/2021    GLUCOSE 120 10/12/2011    GLUCOSE 132 08/16/2011    BUN 13 08/17/2021    BUN 18 06/03/2021    BUN 17 01/30/2021    CREATININE 1.1 08/17/2021    CREATININE 1.0 06/03/2021    CREATININE 1.2 01/30/2021    LABGLOM 48 08/17/2021    LABGLOM 54 06/03/2021    LABGLOM 44 01/30/2021    GFRAA 58 08/17/2021    GFRAA >60 06/03/2021    GFRAA 53 01/30/2021 CALCIUM 9.7 06/03/2021    CALCIUM 9.2 01/30/2021    CALCIUM 8.5 10/16/2020    PROT 8.0 06/03/2021    PROT 7.6 09/03/2020    PROT 6.3 03/11/2019    LABALBU 4.2 06/03/2021    LABALBU 3.9 09/03/2020    LABALBU 3.4 03/11/2019    BILITOT 0.5 06/03/2021    BILITOT 0.6 09/03/2020    BILITOT 0.9 03/11/2019    ALKPHOS 126 06/03/2021    ALKPHOS 138 09/03/2020    ALKPHOS 120 03/11/2019    AST 22 06/03/2021    AST 24 09/03/2020    AST 23 03/11/2019    ALT 17 06/03/2021    ALT 20 09/03/2020    ALT 26 03/11/2019     TSH  Lab Results   Component Value Date    TSH 1.300 02/18/2020    TSH 3.110 11/21/2018    TSH 1.050 11/14/2015     LIPID  Lab Results   Component Value Date    CHOL 179 11/21/2018    CHOL 204 11/14/2015    CHOL 235 07/02/2015    HDL 51 11/21/2018    HDL 85 11/14/2015    HDL 69 07/02/2015    LDLCALC 114 11/21/2018    LDLCALC 105 11/14/2015    LDLCALC 130 07/02/2015    TRIG 69 11/21/2018    TRIG 71 11/14/2015    TRIG 182 07/02/2015     VITAMIN D  Lab Results   Component Value Date    VITD25 23 12/02/2019    VITD25 22 11/14/2015     MAGNESIUM  Lab Results   Component Value Date    MG 2.1 01/30/2021    MG 2.1 10/10/2020    MG 2.1 11/07/2019       HEPATITIS C  Lab Results   Component Value Date    HCVABI Non-Reactive 05/01/2018     UA  Lab Results   Component Value Date    COLORU yellow 07/12/2021    COLORU yellow 06/22/2021    COLORU Yellow 06/03/2021    COLORU Yellow 10/10/2020    COLORU Yellow 11/06/2019    CLARITYU clear 07/12/2021    CLARITYU cloudy 06/22/2021    CLARITYU SL CLOUDY 06/03/2021    CLARITYU Clear 10/10/2020    CLARITYU Clear 11/06/2019    GLUCOSEU negative 07/12/2021    GLUCOSEU neg 06/22/2021    GLUCOSEU Negative 06/03/2021    GLUCOSEU Negative 10/10/2020    GLUCOSEU Negative 11/06/2019    BILIRUBINUR negative 07/12/2021    BILIRUBINUR neg 06/22/2021    BILIRUBINUR Negative 06/03/2021    BILIRUBINUR Negative 10/10/2020    BILIRUBINUR Negative 11/06/2019    BILIRUBINUR negative 09/30/2014    Danie Sterling negative 07/12/2021    KETUA neg 06/22/2021    KETUA 15 06/03/2021    KETUA Negative 10/10/2020    KETUA Negative 11/06/2019    SPECGRAV 1.020 07/12/2021    SPECGRAV 1.020 06/22/2021    SPECGRAV >=1.030 06/03/2021    SPECGRAV 1.015 10/10/2020    SPECGRAV 1.025 11/06/2019    BLOODU negative 07/12/2021    BLOODU neg 06/22/2021    BLOODU SMALL 06/03/2021    BLOODU Negative 10/10/2020    BLOODU Negative 11/06/2019    PHUR 5.0 07/12/2021    PHUR 5.5 06/22/2021    PHUR 6.0 06/03/2021    PHUR 5.5 10/10/2020    PHUR 5.0 11/06/2019    PROTEINU negative 07/12/2021    PROTEINU neg 06/22/2021    PROTEINU Negative 06/03/2021    PROTEINU Negative 10/10/2020    PROTEINU Negative 11/06/2019    UROBILINOGEN 0.2 06/03/2021    UROBILINOGEN 0.2 10/10/2020    UROBILINOGEN 0.2 11/06/2019    NITRU POSITIVE 06/03/2021    NITRU Negative 10/10/2020    NITRU POSITIVE 11/06/2019    LEUKOCYTESUR negative 07/12/2021    LEUKOCYTESUR trace 06/22/2021    LEUKOCYTESUR MODERATE 06/03/2021    LEUKOCYTESUR SMALL 10/10/2020    LEUKOCYTESUR TRACE 11/06/2019     Physical Exam  Vitals and nursing note reviewed. Constitutional:       Appearance: Normal appearance. HENT:      Head: Normocephalic and atraumatic. Right Ear: External ear normal.      Left Ear: External ear normal.      Nose: Nose normal.      Mouth/Throat:      Mouth: Mucous membranes are moist.      Pharynx: Oropharynx is clear. Eyes:      Extraocular Movements: Extraocular movements intact. Conjunctiva/sclera: Conjunctivae normal.      Pupils: Pupils are equal, round, and reactive to light. Cardiovascular:      Rate and Rhythm: Normal rate and regular rhythm. Pulses: Normal pulses. Heart sounds: Normal heart sounds. Pulmonary:      Effort: Pulmonary effort is normal.      Breath sounds: Normal breath sounds. Abdominal:      General: Bowel sounds are normal.      Palpations: Abdomen is soft. Musculoskeletal:         General: Normal range of motion. Cervical back: Normal range of motion and neck supple. Skin:     General: Skin is warm and dry. Capillary Refill: Capillary refill takes less than 2 seconds. Neurological:      General: No focal deficit present. Mental Status: She is alert and oriented to person, place, and time. Psychiatric:         Mood and Affect: Mood normal.         Behavior: Behavior normal.         Thought Content: Thought content normal.         ASSESSMENT/PLAN:  Heike Harper was seen today for results. Diagnoses and all orders for this visit:    Thyroid nodule  -     IR US THYROID BIOPSY; Future    Pulmonary nodule  -     Reyes Pale, MD, Medical Oncology, [facility] materials and/or home exercises printed for patient's review and were included in patient instructions on his/her After Visit Summary and given to patient at the end of visit. Counseled regarding above diagnosis, including possible risks and complications,  especially if left uncontrolled. Counseled regarding the possible side effects, risks, benefits and alternatives to treatment; patient and/or guardian verbalizes understanding, agrees, feels comfortable with and wishes to proceed with above treatment plan. Advised patient to call with any new medication issues, and read all Rx info from pharmacy to assure aware of all possible risks and side effects of medication before taking. Reviewed age and gender appropriate health screening exams and vaccinations. Advised patient regarding importance of keeping up with recommended health maintenance and to schedule as soon as possible if overdue, as this is important in assessing for undiagnosed pathology, especially cancer, as well as protecting against potentially harmful/life threatening disease. Patient and/or guardian verbalizes understanding and agrees with above counseling, assessment and plan. All questions answered.      Return in about 4 weeks (around 9/27/2021). An  electronic signature was used to authenticate this note.     --Vi Santamaria, DO on 8/30/2021 at 2:17 PM

## 2021-08-31 ENCOUNTER — TELEPHONE (OUTPATIENT)
Dept: FAMILY MEDICINE CLINIC | Age: 76
End: 2021-08-31

## 2021-08-31 DIAGNOSIS — R91.1 PULMONARY NODULE: Primary | ICD-10-CM

## 2021-08-31 NOTE — TELEPHONE ENCOUNTER
[facility] to possibly have a biopsy of the nodule. Before she sees oncology.

## 2021-09-10 ENCOUNTER — HOSPITAL ENCOUNTER (OUTPATIENT)
Dept: ULTRASOUND IMAGING | Age: 76
Discharge: HOME | End: 2021-09-12
Payer: MEDICARE

## 2021-09-10 DIAGNOSIS — E04.1 THYROID NODULE: ICD-10-CM

## 2021-09-10 PROCEDURE — 88305 TISSUE EXAM BY PATHOLOGIST: CPT

## 2021-09-10 PROCEDURE — 10005 FNA BX W/US GDN 1ST LES: CPT

## 2021-09-10 PROCEDURE — 88173 CYTOPATH EVAL FNA REPORT: CPT

## 2021-09-10 PROCEDURE — 10005 FNA BX W/US GDN 1ST LES: CPT | Performed by: RADIOLOGY

## 2021-09-10 NOTE — H&P
Interventional Radiology  Attending Pre-operative History and Physical    DIAGNOSIS:    Patient Active Problem List   Diagnosis    Anxiety    [facility]ischemic cardiomyopathy ([facility] [address]    Severe mitral regurgitation    Lumbar radiculopathy    Cervical radiculopathy    HTN (hypertension), benign    Left atrial thrombus    PAF (paroxysmal atrial fibrillation) (HCC)    [facility]    Visit for monitoring Tikosyn therapy    Encounter for medication refill    Itching    Chronic anticoagulation    Presence of Watchman left atrial appendage closure device       CHIEF COMPLAINT: <principal problem not specified>          Current Outpatient Medications:     mirabegron (MYRBETRIQ) 25 MG TB24, daily , Disp: , Rfl:     clopidogrel (PLAVIX) 75 MG tablet, Take 1 tablet by mouth daily, Disp: 90 tablet, Rfl: 1    aspirin EC 81 MG EC tablet, Take 1 tablet by mouth daily, Disp: 90 tablet, Rfl: 3    dicyclomine (BENTYL) 10 MG capsule, three times daily , Disp: , Rfl:     hydrOXYzine (ATARAX) 10 MG tablet, Take 1 tablet by mouth every 8 hours as needed for Itching, Disp: 30 tablet, Rfl: 3    Handicap Placard MISC, Cannot walk more than 200 feet Expiration: 7/12/2026, Disp: 1 each, Rfl: 0    furosemide (LASIX) 20 MG tablet, Take 1 tablet by mouth daily, Disp: 90 tablet, Rfl: 3    carvedilol (COREG CR) 20 MG CP24 extended release capsule, Take 1 capsule by mouth daily, Disp: 90 capsule, Rfl: 3    dofetilide (TIKOSYN) 250 MCG capsule, Take 1 capsule by mouth every 12 hours, Disp: 180 capsule, Rfl: 1    hydrALAZINE (APRESOLINE) 10 MG tablet, Take 1 tablet by mouth 3 times daily, Disp: 270 tablet, Rfl: 3    spironolactone (ALDACTONE) 25 MG tablet, Take 1 tablet by mouth daily, Disp: 90 tablet, Rfl: 3    famotidine (PEPCID) 40 MG tablet, Take 40 mg by mouth 2 times daily as needed , Disp: , Rfl:     omeprazole (PRILOSEC) 40 MG delayed release capsule, Take 40 mg by mouth daily , Disp: , Rfl:     triamcinolone (KENALOG) 0.1 % cream, Apply topically 3 times daily as needed (rash) , Disp: , Rfl: 1    loperamide (IMODIUM) 2 MG capsule, Take 2 mg by mouth 4 times daily as needed for Diarrhea, Disp: , Rfl:     diphenhydrAMINE (BENADRYL) 25 MG tablet, Take 25 mg by mouth every 6 hours as needed for Itching, Disp: , Rfl:     vitamin D (CHOLECALCIFEROL) 1000 UNIT TABS tablet, Take 1,000 Units by mouth daily, Disp: , Rfl:     baclofen (LIORESAL) 20 MG tablet, Take 20 mg by mouth 2 times daily as needed (spasms) Indications: Back Spasm, Bladder Spasm , Disp: , Rfl:     Allergies   Allergen Reactions    Atacand [Candesartan] Hives and Itching    Xarelto [Rivaroxaban] Itching and Rash      severe itch, headache, rash    Adhesive Tape Itching     develops rash and itching with EKG electrodes    Demerol      3/9/19 Pt states she gets a severe migraine    Digoxin Itching     developed itching and rash    Imdur [Isosorbide Mononitrate]       migraines    Losartan Potassium Itching    Shellfish-Derived Products Itching     3/9/19 Pt states she had a lobster pizza and had difficulty breathing, started to sweat and was itchy    Sulfa Antibiotics Diarrhea and Other (See Comments)     Also causes migraines  caused migraines and diarrhea    Cefdinir Hives, Itching and Nausea Only    Acetaminophen Hives and Itching    Apap-Caff-Dihydrocodeine Hives and Itching    Coreg [Carvedilol] Itching     Can take extended release Coreg    Cozaar [Losartan] Itching    Diovan [Valsartan] Itching    Effexor [Venlafaxine Hydrochloride] Nausea Only    Eliquis [Apixaban] Hives, Itching and Rash     3/9/19 Pt states that she gets hives, itchy and a rash    Labetalol Itching    Lisinopril Itching    Ramipril Itching       Past Medical History:   Diagnosis Date    [facility]ib ([facility] [address]     history    Anxiety     Arthritis     CHF (congestive heart failure) (HCC)     Chronic sinusitis     Hypertension     Left ventricular systolic dysfunction     Meige syndrome     partial/ PCP    Microscopic colitis     Mitral regurgitation     Mild to moderate    Nonischemic cardiomyopathy (HCC)     f/u with Dr. Joanne Cardenas Osteopenia     Vertebrobasilar artery syndrome     f/u PCP       Past Surgical History:   Procedure Laterality Date    BLADDER REPAIR      BUNIONECTOMY      CARDIOVASCULAR STRESS TEST  11/25/13    Rt & LT cath    CARDIOVERSION  11/04/2019    Successful CV from AF to NSR   (Dr. Hector Harkins)    COLONOSCOPY  07/2020    DIAGNOSTIC CARDIAC CATH LAB PROCEDURE  2/20/10    Dr Devi Bocanegra CATH LAB PROCEDURE  8/24/11    [facility] @ [facility].  DOPPLER ECHOCARDIOGRAPHY  11/25/13    HYSTERECTOMY  1991    total    OTHER SURGICAL HISTORY  10/14/2020    Dr. Michelle Chiang- 24mm Watchman device    TRANSESOPHAGEAL ECHOCARDIOGRAM  11/21/2018    Dr. Reta Fish TRANSESOPHAGEAL ECHOCARDIOGRAM  03/18/2019    WISDOM TOOTH EXTRACTION         Family History   Problem Relation Age of Onset    Heart Attack Mother     Stroke Mother     Heart Attack Father     Heart Failure Father     Heart Disease Father     Anxiety Disorder Sister     Depression Sister     Crohn's Disease Son        Social History     Socioeconomic History    Marital status:      Spouse name: Not on file    Number of children: Not on file    Years of education: Not on file    Highest education level: Not on file   Occupational History    Not on file   Tobacco Use    Smoking status: Never Smoker    Smokeless tobacco: Never Used   Vaping Use    Vaping Use: Never used   Substance and Sexual Activity    Alcohol use: Yes     Alcohol/week: 0.0 standard drinks     Comment: occasional wine/mixed drink.  Drug use: Never    Sexual activity: Not on file     Comment:    Other Topics Concern    Not on file   Social History Narrative    Drinks 1-2 cups of coffee daily.       Social Determinants of Health     Financial Resource Strain: Low Risk     Difficulty of Paying Living Expenses: Not hard at all   Food Insecurity: No Food Insecurity    Worried About Running Out of Food in the Last Year: Never true    Makayla of Food in the Last Year: Never true   Transportation Needs:     Lack of Transportation (Medical):      Lack of Transportation (Non-Medical):    Physical Activity:     Days of Exercise per Week:     Minutes of Exercise per Session:    Stress:     Feeling of Stress :    Social Connections:     Frequency of Communication with Friends and Family:     Frequency of Social Gatherings with Friends and Family:     Attends [employer] Services:     Active Member of Clubs or Organizations:     Attends Club or Organization Meetings:     Marital Status:    Intimate Partner Violence:     Fear of Current or Ex-Partner:     Emotionally Abused:     Physically Abused:     Sexually Abused:        ROS: Non-contributory other than as noted above    PHYSICAL EXAM:      Heart and Lungs:  demonstrate no contraindications to proceed    DATA:  CBC:   Lab Results   Component Value Date    WBC 7.4 06/04/2021    RBC 4.54 06/04/2021    HGB 12.7 06/04/2021    HCT 39.4 06/04/2021    MCV 86.8 06/04/2021    MCH 28.0 06/04/2021    MCHC 32.2 06/04/2021    RDW 14.1 06/04/2021     06/04/2021    MPV 10.7 06/04/2021     CBC with Differential:    Lab Results   Component Value Date    WBC 7.4 06/04/2021    RBC 4.54 06/04/2021    HGB 12.7 06/04/2021    HCT 39.4 06/04/2021     06/04/2021    MCV 86.8 06/04/2021    MCH 28.0 06/04/2021    MCHC 32.2 06/04/2021    RDW 14.1 06/04/2021    SEGSPCT 85 12/13/2013    LYMPHOPCT 7.5 06/03/2021    MONOPCT 4.0 06/03/2021    BASOPCT 0.2 06/03/2021    MONOSABS 0.50 06/03/2021    LYMPHSABS 0.93 06/03/2021    EOSABS 0.00 06/03/2021    BASOSABS 0.03 06/03/2021     Platelets:    Lab Results   Component Value Date     06/04/2021     BUN/Creatinine:    Lab Results   Component Value Date    BUN 13 08/17/2021 CREATININE 1.1 08/17/2021       ASSESSMENT AND PLAN:  1. Left thyroid mass plan biopsy  2. Procedure options, risks and benefits reviewed with patient. Patient expresses understanding.     Electronically signed by Jules Steiner II, MD on 9/10/2021 at 9:27 AM

## 2021-09-10 NOTE — BRIEF OP NOTE
Brief Postoperative Note    Chanda Power  YOB: 1945  [id number]    Pre-operative Diagnosis and Procedure: Left thyroid mass plan biopsy    Post-operative Diagnosis: Same    Anesthesia: Local    Estimated Blood Loss: < 10 cc    Surgeon: Bety ALONSO     Complications: none    Specimen obtained: yes    Findings: none     Hernandez Cabello II, MD   9/10/2021 9:29 AM

## 2021-09-13 ENCOUNTER — HOSPITAL ENCOUNTER (OUTPATIENT)
Dept: CT IMAGING | Age: 76
Discharge: HOME | End: 2021-09-15
Payer: MEDICARE

## 2021-09-13 DIAGNOSIS — N28.89 LEFT KIDNEY MASS: ICD-10-CM

## 2021-09-13 PROCEDURE — 74160 CT ABDOMEN W/CONTRAST: CPT

## 2021-09-13 PROCEDURE — 2580000003 HC RX 258: Performed by: RADIOLOGY

## 2021-09-13 PROCEDURE — 6360000004 HC RX CONTRAST MEDICATION: Performed by: RADIOLOGY

## 2021-09-13 RX ORDER — SODIUM CHLORIDE 0.9 % (FLUSH) 0.9 %
10 SYRINGE (ML) INJECTION ONCE
Status: COMPLETED | OUTPATIENT
Start: 2021-09-13 | End: 2021-09-13

## 2021-09-13 RX ADMIN — IOPAMIDOL 90 ML: 755 INJECTION, SOLUTION INTRAVENOUS at 13:55

## 2021-09-13 RX ADMIN — Medication 10 ML: at 13:55

## 2021-09-16 ENCOUNTER — TELEPHONE (OUTPATIENT)
Dept: CARDIOLOGY CLINIC | Age: 76
End: 2021-09-16

## 2021-09-16 NOTE — TELEPHONE ENCOUNTER
PT scheduled for JAIME watchman protocol  10-18-21 7:30am  Dr. Florian Arguello to do.   PT instructed and confirmed

## 2021-09-16 NOTE — TELEPHONE ENCOUNTER
----- Message from Rafal Lewis RN sent at 8/26/2021  1:57 PM EDT -----  Dr. Juno Myers is due for a 1 yr JAIME Watchman protocol this October. She had a CT chest with contrast 8/17/21 for another reason. Do we still need to do a 1 yr. JAIME or is this CT adequate for the Watchman Protocol?      Rafal Lewis RN

## 2021-09-19 NOTE — PROGRESS NOTES
Department of [facility]  Attending Consult Note    Reason for Visit: Consultation on a patient with left kidney mass     Referring Physician: Pam Acosta DO    PCP:  Pam Acosta DO    History of Present Illness:  68year old female who presented with frequent falls. CXR 06/03/2021:  2.1 cm round focus seen within the right hilum. CT head 6/3/2021 no acute intracranial abnormality    CT cervical spine 6/3/2021 no acute fracture or subluxation of the cervical spine  Mild multilevel DJD and DJD most prominent at the C5-C6 level  Heterogeneous appearance of the thyroid gland    CT lumbar spine 6/3/2021 no acute compression fracture or malalignment of the lumbar spine  Multilevel facet arthropathy  Degenerative joint disease at the L4-5 and L5-S1 levels most prominent at the L5-S1 level    Thyroid ultrasound 8/17/2021 multiple thyroid nodules bilaterally. CT chest on 08/17/2021  Heterogenous thyroid with multiple areas of nodularity, largest on the right measuring 1.4 cm and left 1.6 cm. Right hilar adenopathy measuring 1.9 x 1.6 cm. There are a few other borderline prominent mediastinal LN.  Noncalcified 1 x 0.8 cm nodule in the posterior right upper lobe has a somewhat necrotic appearance. Luna Cheeks is a very small faint area of ground-glass opacification in the posterior left upper lobe measuring approximately 1 x 1 cm. Noncalcified nodular density in the lateral left costophrenic angle measures 1 cm. Solid renal mass in medial left kidney measures 3.3 x 3 cm. PET/CT 08/24/2021 with increased tracer uptake to single left thyroid mass in the inferior pole with max SUV of 9.6. Increased tracer uptake to right hilum associated with small lymph node with SUV of 4.4   Increased tracer uptake left pericardial region inferior to aortopulmonary window, peak SUV 5.    Pulmonary nodules do not demonstrate increased tracer uptake, this may be related to size and may be below the resolution of PET/CT  Low level uptake at the left renal masses    FNA Left Thyroid biopsy 09/10/2021  Scant cellularity  Negative for malignant cells. Benign-appearing follicular cells, few foam cells, colloid, and blood present. [place] System Category 2. Cellblock is similar. 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 This abuts the left psoas muscle without definite invasion of left psoas muscle. Review of Systems;  CONSTITUTIONAL: No fever, chills. Fair appetite and energy level. ENMT: Eyes: No diplopia; Nose: No epistaxis. Mouth: No sore throat. RESPIRATORY: No hemoptysis, shortness of breath, cough. CARDIOVASCULAR: No chest pain, palpitations. GASTROINTESTINAL: No nausea/vomiting, abdominal pain. GENITOURINARY: No dysuria, urinary frequency, hematuria. NEURO: No syncope, presyncope, headache. Remainder:  ROS NEGATIVE    Past Medical History:      Diagnosis Date    Afib ([address]     history    Anxiety     Arthritis     CHF (congestive heart failure) (HCC)     Chronic sinusitis     Hypertension     Left ventricular systolic dysfunction     Meige syndrome     partial/ PCP    Microscopic colitis     Mitral regurgitation     Mild to moderate    Nonischemic cardiomyopathy (HCC)     f/u with Dr. Latasha Elise Osteopenia     Vertebrobasilar artery syndrome     f/u PCP     Past Surgical History:      Procedure Laterality Date    BLADDER REPAIR      BUNIONECTOMY      CARDIOVASCULAR STRESS TEST  11/25/13    Rt & LT cath    CARDIOVERSION  11/04/2019    Successful CV from AF to NSR   (Dr. Marylee Lob)    COLONOSCOPY  07/2020    DIAGNOSTIC CARDIAC CATH LAB PROCEDURE  2/20/10    Dr Michael Tolentino CATH LAB PROCEDURE  8/24/11    Right heart cath @ [facility].     DOPPLER ECHOCARDIOGRAPHY  11/25/13    HYSTERECTOMY  1991    total    OTHER Organizations:     Attends Club or Organization Meetings:     Marital Status:    Intimate Partner Violence:     Fear of Current or Ex-Partner:     Emotionally Abused:     Physically Abused:     Sexually Abused: Allergies: Allergies   Allergen Reactions    Atacand [Candesartan] Hives and Itching    Xarelto [Rivaroxaban] Itching and Rash      severe itch, headache, rash    Adhesive Tape Itching     develops rash and itching with EKG electrodes    Demerol      3/9/19 Pt states she gets a severe migraine    Digoxin Itching     developed itching and rash    Imdur [Isosorbide Mononitrate]       migraines    Losartan Potassium Itching    Shellfish-Derived Products Itching     3/9/19 Pt states she had a lobster pizza and had difficulty breathing, started to sweat and was itchy    Sulfa Antibiotics Diarrhea and Other (See Comments)     Also causes migraines  caused migraines and diarrhea    Cefdinir Hives, Itching and Nausea Only    Acetaminophen Hives and Itching    Apap-Caff-Dihydrocodeine Hives and Itching    Coreg [Carvedilol] Itching     Can take extended release Coreg    Cozaar [Losartan] Itching    Diovan [Valsartan] Itching    Effexor [Venlafaxine Hydrochloride] Nausea Only    Eliquis [Apixaban] Hives, Itching and Rash     3/9/19 Pt states that she gets hives, itchy and a rash    Labetalol Itching    Lisinopril Itching    Ramipril Itching     Physical Exam:  /62   Pulse 63   Resp 14   Ht 5' 7\" (1.702 m)   Wt 178 lb (80.7 kg)   SpO2 97%   BMI 27.88 kg/m²    GENERAL: Alert, oriented x 3, not in acute distress. HEENT: PERRLA; EOMI. Oropharynx clear. NECK: Supple. Without lymphadenopathy. LUNGS: Good air entry bilaterally. No wheezing, crackles or rhonchi. CARDIOVASCULAR: Regular rate. No murmurs, rubs or gallops. ABDOMEN: Soft. Non-tender, non-distended. Positive bowel sounds. EXTREMITIES: Without clubbing or cyanosis or edema. NEUROLOGIC: No focal deficits.    ECOG PS 1    Impression/Plan:  69 y/o female with Left renal mass    CT chest on 08/17/2021  Heterogenous thyroid with multiple areas of nodularity, largest on the right measuring 1.4 cm and left 1.6 cm. Right hilar adenopathy measuring 1.9 x 1.6 cm. There are a few other borderline prominent mediastinal LN.  Noncalcified 1 x 0.8 cm nodule in the posterior right upper lobe has a somewhat necrotic appearance. Karla Cheers is a very small faint area of ground-glass opacification in the posterior left upper lobe measuring approximately 1 x 1 cm. Noncalcified nodular density in the lateral left costophrenic angle measures 1 cm. Solid renal mass in medial left kidney measures 3.3 x 3 cm. PET/CT 08/24/2021 with increased tracer uptake to single left thyroid mass in the inferior pole with max SUV of 9.6. Increased tracer uptake to right hilum associated with small lymph node with SUV of 4.4   Increased tracer uptake left pericardial region inferior to aortopulmonary window, peak SUV 5. Pulmonary nodules do not demonstrate increased tracer uptake, this may be related to size and may be below the resolution of PET/CT  Low level uptake at the left renal masses    FNA Left Thyroid biopsy 09/10/2021  Scant cellularity  Negative for malignant cells. Benign-appearing follicular cells, few foam cells, colloid, and blood present. [place] System Category 2. Cellblock is similar. 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 This abuts the left psoas muscle without definite invasion of left psoas muscle. Urology team on board. Awaiting metastatic work-up  Pulmonary team consulted for bronchoscopy/EBUS guided mediastinal Hilar LN biopsies. RTC one week post biopsies to review pathology and treatment plan accordingly.      Thank you for allowing us to participate in the care of Mrs. Bigg Abarca, APRN - CNP      The patient was seen and examined, I agree with Hellen Tatum CNP H&P, assessment and plan as outlined.   09/20/2021  Terrance Koch MD

## 2021-09-20 ENCOUNTER — HOSPITAL ENCOUNTER (OUTPATIENT)
Dept: INFUSION THERAPY | Age: 76
Discharge: HOME | End: 2021-09-20
Payer: MEDICARE

## 2021-09-20 ENCOUNTER — OFFICE VISIT (OUTPATIENT)
Dept: ONCOLOGY | Age: 76
End: 2021-09-20
Payer: MEDICARE

## 2021-09-20 ENCOUNTER — TELEPHONE (OUTPATIENT)
Dept: CASE MANAGEMENT | Age: 76
End: 2021-09-20

## 2021-09-20 VITALS
RESPIRATION RATE: 14 BRPM | WEIGHT: 178 LBS | OXYGEN SATURATION: 97 % | HEART RATE: 63 BPM | SYSTOLIC BLOOD PRESSURE: 135 MMHG | DIASTOLIC BLOOD PRESSURE: 62 MMHG | BODY MASS INDEX: 27.94 KG/M2 | HEIGHT: 67 IN

## 2021-09-20 DIAGNOSIS — N28.89 LEFT RENAL MASS: Primary | ICD-10-CM

## 2021-09-20 DIAGNOSIS — N28.89 RENAL MASS: ICD-10-CM

## 2021-09-20 DIAGNOSIS — R59.0 HILAR LYMPHADENOPATHY: Primary | ICD-10-CM

## 2021-09-20 PROCEDURE — 99214 OFFICE O/P EST MOD 30 MIN: CPT

## 2021-09-20 PROCEDURE — 99205 OFFICE O/P NEW HI 60 MIN: CPT | Performed by: INTERNAL MEDICINE

## 2021-09-20 PROCEDURE — 1090F PRES/ABSN URINE INCON ASSESS: CPT | Performed by: INTERNAL MEDICINE

## 2021-09-20 PROCEDURE — G8417 CALC BMI ABV UP PARAM F/U: HCPCS | Performed by: INTERNAL MEDICINE

## 2021-09-20 PROCEDURE — G8428 CUR MEDS NOT DOCUMENT: HCPCS | Performed by: INTERNAL MEDICINE

## 2021-09-20 NOTE — PROGRESS NOTES
Dr. Figueroa [facility] practice could not see patient until mid October for EBUS d/t scheduling.  Called and got patient in at Dr. Benoit Russo for 9/28 at 36 AM-records faxed over to office

## 2021-09-20 NOTE — TELEPHONE ENCOUNTER
Met with patient during her initial consultation with Dr. Gavino Buck  for her recent lung nodule diagnosis. Introduced myself and explained my role with patients at our center. Patient was friendly and receptive. Instructed on next steps including follow up with Rupesh Cortez to scheduled EBUS for biopsy then follow up per Dr. Author Olivera recommendations and follow up care. Reviewed resources available including Social Work, Dietician, and Financial Navigator. Provided with my contact information and instructed patient to call me with questions or concerns. Verbalizes understanding. Patient appreciative of visit. Will continue to follow.

## 2021-09-20 NOTE — PROGRESS NOTES
Diane Soumya  1945 68 y.o. Referring Physician: Dr Wilfredo Murphy    PCP: Xiomara Chu, DO    Vitals:    21 1432   BP: 135/62   Pulse: 63   Resp: 14   SpO2: 97%        Wt Readings from Last 3 Encounters:   21 178 lb (80.7 kg)   21 176 lb 6.4 oz (80 kg)   21 177 lb (80.3 kg)        Body mass index is 27.88 kg/m². Chief Complaint:   Chief Complaint   Patient presents with    New Patient         Cancer Staging  No matching staging information was found for the patient. Prior Radiation Therapy? NO    Concurrent Chemo/radiation? NO    Prior Chemotherapy? NO    Prior Hormonal Therapy? YES: Site Treated: Estrogen          Facility: GYN          Date: 30 years ago-age of 55    Head and Neck Cancer? No, patient does NOT have HN cancer.       LMP: Hysterectomy age 55    Age at first Menses: 15    : 3    Para: 3          Current Outpatient Medications:     mirabegron (MYRBETRIQ) 25 MG TB24, daily , Disp: , Rfl:     clopidogrel (PLAVIX) 75 MG tablet, Take 1 tablet by mouth daily, Disp: 90 tablet, Rfl: 1    aspirin EC 81 MG EC tablet, Take 1 tablet by mouth daily, Disp: 90 tablet, Rfl: 3    dicyclomine (BENTYL) 10 MG capsule, three times daily , Disp: , Rfl:     hydrOXYzine (ATARAX) 10 MG tablet, Take 1 tablet by mouth every 8 hours as needed for Itching, Disp: 30 tablet, Rfl: 3    Handicap Placard MISC, Cannot walk more than 200 feet Expiration: 2026, Disp: 1 each, Rfl: 0    furosemide (LASIX) 20 MG tablet, Take 1 tablet by mouth daily, Disp: 90 tablet, Rfl: 3    carvedilol (COREG CR) 20 MG CP24 extended release capsule, Take 1 capsule by mouth daily, Disp: 90 capsule, Rfl: 3    dofetilide (TIKOSYN) 250 MCG capsule, Take 1 capsule by mouth every 12 hours, Disp: 180 capsule, Rfl: 1    hydrALAZINE (APRESOLINE) 10 MG tablet, Take 1 tablet by mouth 3 times daily, Disp: 270 tablet, Rfl: 3    spironolactone (ALDACTONE) 25 MG tablet, Take 1 tablet by mouth daily, Disp: 90 tablet, Rfl: 3    famotidine (PEPCID) 40 MG tablet, Take 40 mg by mouth 2 times daily as needed , Disp: , Rfl:     omeprazole (PRILOSEC) 40 MG delayed release capsule, Take 40 mg by mouth daily , Disp: , Rfl:     triamcinolone (KENALOG) 0.1 % cream, Apply topically 3 times daily as needed (rash) , Disp: , Rfl: 1    loperamide (IMODIUM) 2 MG capsule, Take 2 mg by mouth 4 times daily as needed for Diarrhea, Disp: , Rfl:     diphenhydrAMINE (BENADRYL) 25 MG tablet, Take 25 mg by mouth every 6 hours as needed for Itching, Disp: , Rfl:     vitamin D (CHOLECALCIFEROL) 1000 UNIT TABS tablet, Take 1,000 Units by mouth daily, Disp: , Rfl:     baclofen (LIORESAL) 20 MG tablet, Take 20 mg by mouth 2 times daily as needed (spasms) Indications: Back Spasm, Bladder Spasm , Disp: , Rfl:        Past Medical History:   Diagnosis Date    [address] [address]     history    Anxiety     Arthritis     CHF (congestive heart failure) (HCC)     Chronic sinusitis     Hypertension     Left ventricular systolic dysfunction     Meige syndrome     partial/ PCP    Microscopic colitis     Mitral regurgitation     Mild to moderate    Nonischemic cardiomyopathy (HCC)     f/u with Dr. Marci Lombard Osteopenia     Vertebrobasilar artery syndrome     f/u PCP       Past Surgical History:   Procedure Laterality Date    BLADDER REPAIR      BUNIONECTOMY      CARDIOVASCULAR STRESS TEST  11/25/13    Rt & LT cath    CARDIOVERSION  11/04/2019    Successful CV from AF to NSR   (Dr. Adelaide Becerril)    COLONOSCOPY  07/2020    DIAGNOSTIC CARDIAC CATH LAB PROCEDURE  2/20/10    Dr Marva Keita CATH LAB PROCEDURE  8/24/11    Right heart cath @ [facility].     DOPPLER ECHOCARDIOGRAPHY  11/25/13    HYSTERECTOMY  1991    total    OTHER SURGICAL HISTORY  10/14/2020    Dr. Ria Severin- 24mm Watchman device    TRANSESOPHAGEAL ECHOCARDIOGRAM  11/21/2018    Dr. Brianna De Dios    TRANSESOPHAGEAL ECHOCARDIOGRAM  03/18/2019    WISDOM TOOTH EXTRACTION Family History   Problem Relation Age of Onset    Heart Attack Mother     Stroke Mother     Heart Attack Father     Heart Failure Father     Heart Disease Father     Anxiety Disorder Sister     Depression Sister     Crohn's Disease Son        Social History     Socioeconomic History    Marital status:      Spouse name: Not on file    Number of children: Not on file    Years of education: Not on file    Highest education level: Not on file   Occupational History    Not on file   Tobacco Use    Smoking status: Never Smoker    Smokeless tobacco: Never Used   Vaping Use    Vaping Use: Never used   Substance and Sexual Activity    Alcohol use: Yes     Alcohol/week: 0.0 standard drinks     Comment: occasional wine/mixed drink.  Drug use: Never    Sexual activity: Not on file     Comment:    Other Topics Concern    Not on file   Social History Narrative    Drinks 1-2 cups of coffee daily. Social Determinants of Health     Financial Resource Strain: Low Risk     Difficulty of Paying Living Expenses: Not hard at all   Food Insecurity: No Food Insecurity    Worried About Running Out of Food in the Last Year: Never true    Makayla of Food in the Last Year: Never true   Transportation Needs:     Lack of Transportation (Medical):      Lack of Transportation (Non-Medical):    Physical Activity:     Days of Exercise per Week:     Minutes of Exercise per Session:    Stress:     Feeling of Stress :    Social Connections:     Frequency of Communication with Friends and Family:     Frequency of Social Gatherings with Friends and Family:     Attends Hoahaoism Services:     Active Member of Clubs or Organizations:     Attends Club or Organization Meetings:     Marital Status:    Intimate Partner Violence:     Fear of Current or Ex-Partner:     Emotionally Abused:     Physically Abused:     Sexually Abused:            Occupation: [employer] [address]  Retired:  YES: Patient is retired from [employer]. REVIEW OF SYSTEMS: <<For Level 5, 10 or more systems>>     Pacemaker/Defibulator/ICD:  No    Mediport: No           FALLS RISK SCREENING ASSESSMENT    Instructions:  Assess the patient and Kake the appropriate indicators that are present for fall risk identification. Total the numbers circled and assign a fall risk score from Table 2.  Reassess patient at a minimum every 12 weeks or with status change. Assessment   Date  9/20/2021     1. Mental Ability: confusion/cognitively impaired No - 0       2. Elimination Issues: incontinence, frequency Yes - 3       3. Ambulatory: use of assistive devices (walker, cane, off-loading devices), attached to equipment (IV pole, oxygen) No - 0     4. Sensory Limitations: dizziness, vertigo, impaired vision Yes - 3       5. Age 72 years or greater - 1       10. Medication: diuretics, strong analgesics, hypnotics, sedatives, antihypertensive agents   No - 0   7. Falls:  recent history of falls within the last 3 months (not to include slipping or tripping)   No - 0   TOTAL 7    If score of 4 or greater was education given? Yes       TABLE 2   Risk Score Risk Level Plan of Care   0-3 Little or  No Risk 1. Provide assistance as indicated for ambulation activities  2. Reorient confused/cognitively impaired patient  3. Call-light/bell within patient's reach  4. Chair/bed in low position, stretcher/bed with siderails up except when performing patient care activities  5. Educate patient/family/caregiver on falls prevention  6.  Reassess in 12 weeks or with any noted change in patient condition which places them at a risk for a fall   4-6 Moderate Risk 1. Provide assistance as indicated for ambulation activities  2. Reorient confused/cognitively impaired patient  3. Call-light/bell within patient's reach  4. Chair/bed in low position, stretcher/bed with siderails up except when performing patient care activities  5.   Educate memory, attention/focus that impact daily activities: No     · Do you avoid participation in leisure/social activity due weakness, fatigue or pain: No     ARE ANY OF THE ABOVE ARE ANSWERED YES: No          PT ASSESSMENT FOR REFERRAL    · Have you had any recent falls in past 2 months: No     · Do you have difficulty  going up/down stairs: Yes     · Are you having difficulty walking: No     · Do you often hold onto furniture/environmental supports or feel off balance when you are walking: No     · Do you need to take rest breaks when you are walking: Yes     · Any pain on scale of 1-10 that limits your mobility: No 0/10    ARE ANY OF THE ABOVE ARE ANSWERED YES: Yes - but NO PT referral request sent due to will wait for now. PREHAB AUDIOLOGY REFERRAL    - Is patient planned to receive Cisplatin? No. This patient is not planned to start Cisplatin. - Is patient complaining of new onset hearing loss? Yes, but NO audiology referral request sent due to not a new issue.         Willow Quigley RN

## 2021-09-30 NOTE — PROGRESS NOTES
[address] PRE-ADMISSION TESTING GENERAL INSTRUCTIONS- [facility]-phone number:[phone]    GENERAL INSTRUCTIONS  [x] Antibacterial Soap shower Night before and/or AM of Surgery    [x] Nothing by mouth after midnight, including gum, candy, mints, or water. [x] You may brush your teeth, gargle, but do NOT swallow water. [x]No smoking, chewing tobacco, illegal drugs, or alcohol within 24 hours of your surgery. [x] Jewelry, valuables or body piercing's should not be brought to the hospital. All body and/or tongue piercing's must be removed prior to arriving to hospital.  ALL hair pins must be removed. [x] Do not wear makeup, lotions, powders, deodorant. Nail polish as directed by the nurse. [x] Arrange transportation with a responsible adult  to and from the hospital. If you do not have a responsible adult  to transport you, you will need to make arrangements with a medical transportation company (i.e. [employer]. A Uber/taxi/bus is not appropriate unless you are accompanied by a responsible adult ). Arrange for someone to be with you for the remainder of the day and for 24 hours after your procedure due to having had anesthesia. Who will be your  for transportation? Wendy Sevilla, son  Who will be staying with you for 24 hrs after your procedure? Wendy Sevilla, son  [x] Bring insurance card and photo ID. [x] Bring copy of living will or healthcare power of  papers to be placed in your electronic record. PARKING INSTRUCTIONS:   [x] Arrival Time: 11:30, you and your  will need to wear a mask. · [x] Parking lot '\"I\"  is located on [facility] (the corner of [facility] and [facility]). To enter, press the button and the gate will lift. A free token will be provided to exit the lot. One car per patient is allowed to park in this lot. All other cars are to park on [address] [place] [address] either in the parking garage or the handicap lot.       Walk up the front walk to the [employer], the door will be locked an employee will greet you and let you in. EDUCATION INSTRUCTIONS:          [x]Pain: Post-op pain is normal and to be expected. You will be asked to rate your pain from 0-10 (a zero is not acceptable-education is needed). Your post-op pain goal is  [x] Ask your nurse for your pain medication. MEDICATION INSTRUCTIONS:  [x]Bring a complete list of your medications, please write the last time you took the medicine, give this list to the nurse. [x] Take the following medications the morning of surgery with 1-2 ounces of water: dofetilide, hydralazine, carvedilol, omeprazole      May take morning of surgery if needed:  hydroxyzine        [x] Stop herbal supplements, ibuprofen (Nsaids)  and vitamins 5 days before your surgery. [x] Follow physician instructions regarding any blood thinners you may be taking. WHAT TO EXPECT:  [x] The day of surgery you will be greeted and checked in by the [employer]. Please bring your photo ID and insurance card. A nurse will greet you in accordance to the time you are needed in the pre-op area to prepare you for surgery. Please do not be discouraged if you are not greeted in the order you arrive as there are many variables that are involved in patient preparation. Your patience is greatly appreciated as you wait for your nurse. Please bring in items such as: books, magazines, newspapers, electronics, or any other items  to occupy your time in the waiting area. [x]  Delays may occur with surgery and staff will make a sincere effort to keep you informed of delays. If any delays occur with your procedure, we apologize ahead of time for your inconvenience as we recognize the value of your time.

## 2021-10-05 ENCOUNTER — HOSPITAL ENCOUNTER (OUTPATIENT)
Age: 76
Setting detail: OUTPATIENT SURGERY
Discharge: HOME | End: 2021-10-05
Attending: INTERNAL MEDICINE | Admitting: INTERNAL MEDICINE
Payer: MEDICARE

## 2021-10-05 ENCOUNTER — ANESTHESIA (OUTPATIENT)
Dept: ENDOSCOPY | Age: 76
End: 2021-10-05
Payer: MEDICARE

## 2021-10-05 ENCOUNTER — ANESTHESIA EVENT (OUTPATIENT)
Dept: ENDOSCOPY | Age: 76
End: 2021-10-05
Payer: MEDICARE

## 2021-10-05 ENCOUNTER — APPOINTMENT (OUTPATIENT)
Dept: GENERAL RADIOLOGY | Age: 76
End: 2021-10-05
Attending: INTERNAL MEDICINE
Payer: MEDICARE

## 2021-10-05 VITALS
HEART RATE: 62 BPM | WEIGHT: 179 LBS | TEMPERATURE: 97.4 F | SYSTOLIC BLOOD PRESSURE: 143 MMHG | OXYGEN SATURATION: 99 % | RESPIRATION RATE: 18 BRPM | DIASTOLIC BLOOD PRESSURE: 62 MMHG | HEIGHT: 67 IN | BODY MASS INDEX: 28.09 KG/M2

## 2021-10-05 VITALS — TEMPERATURE: 95.5 F | DIASTOLIC BLOOD PRESSURE: 66 MMHG | OXYGEN SATURATION: 100 % | SYSTOLIC BLOOD PRESSURE: 128 MMHG

## 2021-10-05 DIAGNOSIS — Z01.818 PRE-OP TESTING: Primary | ICD-10-CM

## 2021-10-05 LAB
ANION GAP SERPL CALCULATED.3IONS-SCNC: 12 MMOL/L (ref 7–16)
BUN BLDV-MCNC: 18 MG/DL (ref 6–23)
CALCIUM SERPL-MCNC: 9.9 MG/DL (ref 8.6–10.2)
CHLORIDE BLD-SCNC: 106 MMOL/L (ref 98–107)
CO2: 23 MMOL/L (ref 22–29)
CREAT SERPL-MCNC: 1.1 MG/DL (ref 0.5–1)
GFR AFRICAN AMERICAN: 58
GFR NON-AFRICAN AMERICAN: 48 ML/MIN/1.73
GLUCOSE BLD-MCNC: 123 MG/DL (ref 74–99)
HCT VFR BLD CALC: 37.7 % (ref 34–48)
HEMOGLOBIN: 12.5 G/DL (ref 11.5–15.5)
INR BLD: 1
MCH RBC QN AUTO: 28.5 PG (ref 26–35)
MCHC RBC AUTO-ENTMCNC: 33.2 % (ref 32–34.5)
MCV RBC AUTO: 86.1 FL (ref 80–99.9)
PDW BLD-RTO: 13.5 FL (ref 11.5–15)
PLATELET # BLD: 258 E9/L (ref 130–450)
PMV BLD AUTO: 10.5 FL (ref 7–12)
POTASSIUM SERPL-SCNC: 3.8 MMOL/L (ref 3.5–5)
PROTHROMBIN TIME: 11.2 SEC (ref 9.3–12.4)
RBC # BLD: 4.38 E12/L (ref 3.5–5.5)
SODIUM BLD-SCNC: 141 MMOL/L (ref 132–146)
WBC # BLD: 6.1 E9/L (ref 4.5–11.5)

## 2021-10-05 PROCEDURE — 7100000001 HC PACU RECOVERY - ADDTL 15 MIN: Performed by: INTERNAL MEDICINE

## 2021-10-05 PROCEDURE — 7100000011 HC PHASE II RECOVERY - ADDTL 15 MIN: Performed by: INTERNAL MEDICINE

## 2021-10-05 PROCEDURE — 80048 BASIC METABOLIC PNL TOTAL CA: CPT

## 2021-10-05 PROCEDURE — 2580000003 HC RX 258

## 2021-10-05 PROCEDURE — 88305 TISSUE EXAM BY PATHOLOGIST: CPT

## 2021-10-05 PROCEDURE — 85027 COMPLETE CBC AUTOMATED: CPT

## 2021-10-05 PROCEDURE — 2500000003 HC RX 250 WO HCPCS

## 2021-10-05 PROCEDURE — 3609027000 HC BRONCHOSCOPY: Performed by: INTERNAL MEDICINE

## 2021-10-05 PROCEDURE — 2720000010 HC SURG SUPPLY STERILE: Performed by: INTERNAL MEDICINE

## 2021-10-05 PROCEDURE — 3700000001 HC ADD 15 MINUTES (ANESTHESIA): Performed by: INTERNAL MEDICINE

## 2021-10-05 PROCEDURE — 36415 COLL VENOUS BLD VENIPUNCTURE: CPT

## 2021-10-05 PROCEDURE — 7100000010 HC PHASE II RECOVERY - FIRST 15 MIN: Performed by: INTERNAL MEDICINE

## 2021-10-05 PROCEDURE — 85610 PROTHROMBIN TIME: CPT

## 2021-10-05 PROCEDURE — 71045 X-RAY EXAM CHEST 1 VIEW: CPT

## 2021-10-05 PROCEDURE — 3609020000 HC BRONCHOSCOPY W/EBUS FNA: Performed by: INTERNAL MEDICINE

## 2021-10-05 PROCEDURE — 3700000000 HC ANESTHESIA ATTENDED CARE: Performed by: INTERNAL MEDICINE

## 2021-10-05 PROCEDURE — 2709999900 HC NON-CHARGEABLE SUPPLY: Performed by: INTERNAL MEDICINE

## 2021-10-05 PROCEDURE — 88173 CYTOPATH EVAL FNA REPORT: CPT

## 2021-10-05 PROCEDURE — 6360000002 HC RX W HCPCS

## 2021-10-05 PROCEDURE — 7100000000 HC PACU RECOVERY - FIRST 15 MIN: Performed by: INTERNAL MEDICINE

## 2021-10-05 RX ORDER — FENTANYL CITRATE 50 UG/ML
INJECTION, SOLUTION INTRAMUSCULAR; INTRAVENOUS PRN
Status: DISCONTINUED | OUTPATIENT
Start: 2021-10-05 | End: 2021-10-05 | Stop reason: SDUPTHER

## 2021-10-05 RX ORDER — GLYCOPYRROLATE 1 MG/5 ML
SYRINGE (ML) INTRAVENOUS PRN
Status: DISCONTINUED | OUTPATIENT
Start: 2021-10-05 | End: 2021-10-05 | Stop reason: SDUPTHER

## 2021-10-05 RX ORDER — DEXAMETHASONE SODIUM PHOSPHATE 10 MG/ML
INJECTION, SOLUTION INTRAMUSCULAR; INTRAVENOUS PRN
Status: DISCONTINUED | OUTPATIENT
Start: 2021-10-05 | End: 2021-10-05 | Stop reason: SDUPTHER

## 2021-10-05 RX ORDER — EPHEDRINE SULFATE/0.9% NACL/PF 50 MG/5 ML
SYRINGE (ML) INTRAVENOUS PRN
Status: DISCONTINUED | OUTPATIENT
Start: 2021-10-05 | End: 2021-10-05 | Stop reason: SDUPTHER

## 2021-10-05 RX ORDER — LIDOCAINE HYDROCHLORIDE 20 MG/ML
INJECTION, SOLUTION INTRAVENOUS PRN
Status: DISCONTINUED | OUTPATIENT
Start: 2021-10-05 | End: 2021-10-05 | Stop reason: SDUPTHER

## 2021-10-05 RX ORDER — MIDAZOLAM HYDROCHLORIDE 1 MG/ML
INJECTION INTRAMUSCULAR; INTRAVENOUS PRN
Status: DISCONTINUED | OUTPATIENT
Start: 2021-10-05 | End: 2021-10-05 | Stop reason: SDUPTHER

## 2021-10-05 RX ORDER — PROPOFOL 10 MG/ML
INJECTION, EMULSION INTRAVENOUS PRN
Status: DISCONTINUED | OUTPATIENT
Start: 2021-10-05 | End: 2021-10-05 | Stop reason: SDUPTHER

## 2021-10-05 RX ORDER — NEOSTIGMINE METHYLSULFATE 1 MG/ML
INJECTION, SOLUTION INTRAVENOUS PRN
Status: DISCONTINUED | OUTPATIENT
Start: 2021-10-05 | End: 2021-10-05 | Stop reason: SDUPTHER

## 2021-10-05 RX ORDER — SODIUM CHLORIDE 9 MG/ML
INJECTION, SOLUTION INTRAVENOUS CONTINUOUS PRN
Status: DISCONTINUED | OUTPATIENT
Start: 2021-10-05 | End: 2021-10-05 | Stop reason: SDUPTHER

## 2021-10-05 RX ORDER — ROCURONIUM BROMIDE 10 MG/ML
INJECTION, SOLUTION INTRAVENOUS PRN
Status: DISCONTINUED | OUTPATIENT
Start: 2021-10-05 | End: 2021-10-05 | Stop reason: SDUPTHER

## 2021-10-05 RX ORDER — ONDANSETRON 2 MG/ML
INJECTION INTRAMUSCULAR; INTRAVENOUS PRN
Status: DISCONTINUED | OUTPATIENT
Start: 2021-10-05 | End: 2021-10-05 | Stop reason: SDUPTHER

## 2021-10-05 RX ADMIN — PROPOFOL 50 MCG/KG/MIN: 10 INJECTION, EMULSION INTRAVENOUS at 13:27

## 2021-10-05 RX ADMIN — DEXAMETHASONE SODIUM PHOSPHATE 10 MG: 10 INJECTION INTRAMUSCULAR; INTRAVENOUS at 13:21

## 2021-10-05 RX ADMIN — MIDAZOLAM 1 MG: 1 INJECTION INTRAMUSCULAR; INTRAVENOUS at 13:13

## 2021-10-05 RX ADMIN — SODIUM CHLORIDE: 9 INJECTION, SOLUTION INTRAVENOUS at 13:13

## 2021-10-05 RX ADMIN — Medication 10 MG: at 13:32

## 2021-10-05 RX ADMIN — Medication 0.6 MG: at 14:16

## 2021-10-05 RX ADMIN — ROCURONIUM BROMIDE 30 MG: 10 INJECTION, SOLUTION INTRAVENOUS at 13:21

## 2021-10-05 RX ADMIN — FENTANYL CITRATE 50 MCG: 50 INJECTION, SOLUTION INTRAMUSCULAR; INTRAVENOUS at 13:21

## 2021-10-05 RX ADMIN — Medication 10 MG: at 14:04

## 2021-10-05 RX ADMIN — LIDOCAINE HYDROCHLORIDE 60 MG: 20 INJECTION, SOLUTION INTRAVENOUS at 13:21

## 2021-10-05 RX ADMIN — PROPOFOL 150 MG: 10 INJECTION, EMULSION INTRAVENOUS at 13:21

## 2021-10-05 RX ADMIN — ONDANSETRON HYDROCHLORIDE 4 MG: 2 INJECTION, SOLUTION INTRAMUSCULAR; INTRAVENOUS at 14:13

## 2021-10-05 RX ADMIN — Medication 3 MG: at 14:16

## 2021-10-05 RX ADMIN — MIDAZOLAM 1 MG: 1 INJECTION INTRAMUSCULAR; INTRAVENOUS at 13:21

## 2021-10-05 RX ADMIN — Medication 10 MG: at 13:46

## 2021-10-05 ASSESSMENT — PULMONARY FUNCTION TESTS
PIF_VALUE: 18
PIF_VALUE: 17
PIF_VALUE: 26
PIF_VALUE: 17
PIF_VALUE: 0
PIF_VALUE: 25
PIF_VALUE: 2
PIF_VALUE: 22
PIF_VALUE: 19
PIF_VALUE: 23
PIF_VALUE: 19
PIF_VALUE: 8
PIF_VALUE: 25
PIF_VALUE: 0
PIF_VALUE: 1
PIF_VALUE: 24
PIF_VALUE: 23
PIF_VALUE: 22
PIF_VALUE: 2
PIF_VALUE: 1
PIF_VALUE: 21
PIF_VALUE: 21
PIF_VALUE: 22
PIF_VALUE: 23
PIF_VALUE: 17
PIF_VALUE: 21
PIF_VALUE: 14
PIF_VALUE: 0
PIF_VALUE: 17
PIF_VALUE: 23
PIF_VALUE: 14
PIF_VALUE: 23
PIF_VALUE: 22
PIF_VALUE: 18
PIF_VALUE: 4
PIF_VALUE: 3
PIF_VALUE: 18
PIF_VALUE: 22
PIF_VALUE: 14
PIF_VALUE: 23
PIF_VALUE: 23
PIF_VALUE: 0
PIF_VALUE: 2
PIF_VALUE: 22
PIF_VALUE: 13
PIF_VALUE: 8
PIF_VALUE: 16
PIF_VALUE: 22
PIF_VALUE: 2
PIF_VALUE: 24
PIF_VALUE: 2
PIF_VALUE: 0
PIF_VALUE: 22
PIF_VALUE: 22
PIF_VALUE: 15
PIF_VALUE: 23
PIF_VALUE: 16
PIF_VALUE: 18
PIF_VALUE: 28
PIF_VALUE: 18
PIF_VALUE: 21
PIF_VALUE: 17
PIF_VALUE: 22
PIF_VALUE: 17
PIF_VALUE: 0
PIF_VALUE: 19
PIF_VALUE: 0
PIF_VALUE: 19
PIF_VALUE: 0
PIF_VALUE: 22
PIF_VALUE: 17
PIF_VALUE: 19
PIF_VALUE: 14

## 2021-10-05 ASSESSMENT — PAIN SCALES - GENERAL
PAINLEVEL_OUTOF10: 0

## 2021-10-05 ASSESSMENT — PAIN - FUNCTIONAL ASSESSMENT: PAIN_FUNCTIONAL_ASSESSMENT: 0-10

## 2021-10-05 NOTE — PROGRESS NOTES
Discharge instructions reviewed with pt and her son, questions answered, pt acknowledges her understanding. Dr. Garcia Erica bedside to speak with pt. [id number] Desiree Acosta for pt to resume plavix and aspirin tmro.

## 2021-10-05 NOTE — ANESTHESIA PRE PROCEDURE
Department of Anesthesiology  Preprocedure Note       Name:  Olman Yang   Age:  68 y.o.  :  1945                                          MRN:  [id number]         Date:  10/5/2021      Surgeon: Sissy Bishop    Procedure: Bronch, EBUS ( Endo Bronchial Ultra Sound  Bronchoscopy, diagnostic, or cell wash only. Medications prior to admission:   Prior to Admission medications    Medication Sig Start Date End Date Taking?  Authorizing Provider   mirabegron (MYRBETRIQ) 25 MG TB24 25 mg every evening     Historical Provider, MD   clopidogrel (PLAVIX) 75 MG tablet Take 1 tablet by mouth daily 21   Heidi Cain MD   aspirin EC 81 MG EC tablet Take 1 tablet by mouth daily 21   Heidi Cain MD   dicyclomine (BENTYL) 10 MG capsule three times daily  21   Historical Provider, MD   hydrOXYzine (ATARAX) 10 MG tablet Take 1 tablet by mouth every 8 hours as needed for Itching  Patient taking differently: Take 10 mg by mouth every 8 hours as needed for Itching or Anxiety  21   Trisha Cardona DO   Handicap Placard MISC Cannot walk more than 200 feet  Expiration: 2026   Trisha Cardona DO   furosemide (LASIX) 20 MG tablet Take 1 tablet by mouth daily 21   Moe Nelson MD   carvedilol (COREG CR) 20 MG CP24 extended release capsule Take 1 capsule by mouth daily 3/24/21   Moe Nelson MD   dofetilide (TIKOSYN) 250 MCG capsule Take 1 capsule by mouth every 12 hours 21   Erin Pastrana MD   hydrALAZINE (APRESOLINE) 10 MG tablet Take 1 tablet by mouth 3 times daily 20   Moe Nelson MD   spironolactone (ALDACTONE) 25 MG tablet Take 1 tablet by mouth daily 20   Moe Nelson MD   famotidine (PEPCID) 40 MG tablet Take 40 mg by mouth 2 times daily as needed     Historical Provider, MD   omeprazole (PRILOSEC) 40 MG delayed release capsule Take 40 mg by mouth daily  20   Historical Provider, MD   triamcinolone (KENALOG) 0.1 % cream Apply topically 3 times daily as needed (rash)  4/5/19   Historical Provider, MD   loperamide (IMODIUM) 2 MG capsule Take 2 mg by mouth 4 times daily as needed for Diarrhea    Historical Provider, MD   diphenhydrAMINE (BENADRYL) 25 MG tablet Take 25 mg by mouth every 6 hours as needed for Itching    Historical Provider, MD   vitamin D (CHOLECALCIFEROL) 1000 UNIT TABS tablet Take 1,000 Units by mouth daily    Historical Provider, MD   baclofen (LIORESAL) 20 MG tablet Take 20 mg by mouth 2 times daily as needed (spasms) Indications: Back Spasm, Bladder Spasm     Historical Provider, MD       Current medications:    Current Outpatient Medications   Medication Sig Dispense Refill    mirabegron (MYRBETRIQ) 25 MG TB24 25 mg every evening       clopidogrel (PLAVIX) 75 MG tablet Take 1 tablet by mouth daily 90 tablet 1    aspirin EC 81 MG EC tablet Take 1 tablet by mouth daily 90 tablet 3    dicyclomine (BENTYL) 10 MG capsule three times daily       hydrOXYzine (ATARAX) 10 MG tablet Take 1 tablet by mouth every 8 hours as needed for Itching (Patient taking differently: Take 10 mg by mouth every 8 hours as needed for Itching or Anxiety ) 30 tablet 3    Handicap Placard MISC Cannot walk more than 200 feet  Expiration: 7/12/2026 1 each 0    furosemide (LASIX) 20 MG tablet Take 1 tablet by mouth daily 90 tablet 3    carvedilol (COREG CR) 20 MG CP24 extended release capsule Take 1 capsule by mouth daily 90 capsule 3    dofetilide (TIKOSYN) 250 MCG capsule Take 1 capsule by mouth every 12 hours 180 capsule 1    hydrALAZINE (APRESOLINE) 10 MG tablet Take 1 tablet by mouth 3 times daily 270 tablet 3    spironolactone (ALDACTONE) 25 MG tablet Take 1 tablet by mouth daily 90 tablet 3    famotidine (PEPCID) 40 MG tablet Take 40 mg by mouth 2 times daily as needed       omeprazole (PRILOSEC) 40 MG delayed release capsule Take 40 mg by mouth daily       triamcinolone (KENALOG) 0.1 % cream Apply topically 3 times daily as needed (rash)   1    loperamide (IMODIUM) 2 MG capsule Take 2 mg by mouth 4 times daily as needed for Diarrhea      diphenhydrAMINE (BENADRYL) 25 MG tablet Take 25 mg by mouth every 6 hours as needed for Itching      vitamin D (CHOLECALCIFEROL) 1000 UNIT TABS tablet Take 1,000 Units by mouth daily      baclofen (LIORESAL) 20 MG tablet Take 20 mg by mouth 2 times daily as needed (spasms) Indications: Back Spasm, Bladder Spasm        No current facility-administered medications for this visit. Allergies:     Allergies   Allergen Reactions    Atacand [Candesartan] Hives and Itching    Xarelto [Rivaroxaban] Itching and Rash      severe itch, headache, rash    Adhesive Tape Itching     develops rash and itching with EKG electrodes    Demerol      3/9/19 Pt states she gets a severe migraine    Digoxin Itching     developed itching and rash    Imdur [Isosorbide Mononitrate]       migraines    Losartan Potassium Itching    Shellfish-Derived Products Itching     3/9/19 Pt states she had a lobster pizza and had difficulty breathing, started to sweat and was itchy    Sulfa Antibiotics Diarrhea and Other (See Comments)     Also causes migraines  caused migraines and diarrhea    Cefdinir Hives, Itching and Nausea Only    Acetaminophen Hives and Itching    Apap-Caff-Dihydrocodeine Hives and Itching    Coreg [Carvedilol] Itching     Can take extended release Coreg    Cozaar [Losartan] Itching    Diovan [Valsartan] Itching    Effexor [Venlafaxine Hydrochloride] Nausea Only    Eliquis [Apixaban] Hives, Itching and Rash     3/9/19 Pt states that she gets hives, itchy and a rash    Labetalol Itching    Lisinopril Itching    Ramipril Itching       Problem List:    Patient Active Problem List   Diagnosis Code    Anxiety F41.9    Nonischemic cardiomyopathy ([facility] [address] I42.8    Severe mitral regurgitation I34.0    Lumbar radiculopathy M54.16    Cervical radiculopathy M54.12    HTN (hypertension), benign I10    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    PAF (paroxysmal atrial fibrillation) ([facility]) I48.0    Chronic systolic congestive heart failure (HCC) I50.22    Visit for monitoring Tikosyn therapy Z51.81, Z79.899    Encounter for medication refill Z76.0    Itching L29.9    Chronic anticoagulation Z79.01    Presence of Watchman left atrial appendage closure device Z95.818       Past Medical History:        Diagnosis Date    Afib ([address]     WATCHMAN    Anxiety     Arthritis     Cervical radiculopathy     CHF (congestive heart failure) ([facility])     Chronic sinusitis     Hilar adenopathy     Hypertension     Left ventricular systolic dysfunction     Lesion of left native kidney     Lumbar radiculopathy     Lung nodules     Meige syndrome     partial/ PCP    Microscopic colitis     Mitral regurgitation     Mild to moderate    Nonischemic cardiomyopathy ([facility])     f/u with Dr. Earnestine Melgar Osteopenia     Vertebrobasilar artery syndrome     f/u PCP       Past Surgical History:        Procedure Laterality Date    BLADDER REPAIR      BUNIONECTOMY      CARDIOVASCULAR STRESS TEST  11/25/13    Rt & LT cath    CARDIOVERSION  11/04/2019    Successful CV from AF to NSR   (Dr. Que Castillo)    COLONOSCOPY  07/2020    DIAGNOSTIC CARDIAC CATH LAB PROCEDURE  2/20/10    Dr Lizzy Ness CATH LAB PROCEDURE  8/24/11    Right heart cath @ [facility].  DOPPLER ECHOCARDIOGRAPHY  11/25/13    HYSTERECTOMY  1991    total    OTHER SURGICAL HISTORY  10/14/2020    Dr. Srinivasan Arias- 24mm Watchman device    TRANSESOPHAGEAL ECHOCARDIOGRAM  11/21/2018    Dr. Rachael Maguire TRANSESOPHAGEAL ECHOCARDIOGRAM  03/18/2019    WISDOM TOOTH EXTRACTION         Social History:    Social History     Tobacco Use    Smoking status: Never Smoker    Smokeless tobacco: Never Used   Substance Use Topics    Alcohol use:  Yes     Alcohol/week: 0.0 standard drinks     Comment: occasional wine/mixed drink every few months                                Counseling given: Not Answered      Vital Signs (Current): There were no vitals filed for this visit. BP Readings from Last 3 Encounters:   09/20/21 135/62   08/30/21 134/72   07/16/21 123/66       NPO Status:  >8 hrs                                                                               BMI:   Wt Readings from Last 3 Encounters:   09/20/21 178 lb (80.7 kg)   08/30/21 176 lb 6.4 oz (80 kg)   07/12/21 177 lb (80.3 kg)     There is no height or weight on file to calculate BMI.    CBC:   Lab Results   Component Value Date    WBC 7.4 06/04/2021    RBC 4.54 06/04/2021    HGB 12.7 06/04/2021    HCT 39.4 06/04/2021    MCV 86.8 06/04/2021    RDW 14.1 06/04/2021     06/04/2021       CMP:   Lab Results   Component Value Date     06/03/2021    K 4.4 06/03/2021    K 4.2 10/16/2020     06/03/2021    CO2 25 06/03/2021    BUN 13 08/17/2021    CREATININE 1.1 08/17/2021    GFRAA 58 08/17/2021    LABGLOM 48 08/17/2021    GLUCOSE 123 06/03/2021    GLUCOSE 120 10/12/2011    PROT 8.0 06/03/2021    CALCIUM 9.7 06/03/2021    BILITOT 0.5 06/03/2021    ALKPHOS 126 06/03/2021    AST 22 06/03/2021    ALT 17 06/03/2021       POC Tests: No results for input(s): POCGLU, POCNA, POCK, POCCL, POCBUN, POCHEMO, POCHCT in the last 72 hours.     Coags:   Lab Results   Component Value Date    PROTIME 11.5 01/22/2021    PROTIME 12.0 12/17/2012    INR 1.0 01/22/2021    APTT 28.5 01/22/2021       HCG (If Applicable): No results found for: PREGTESTUR, PREGSERUM, HCG, HCGQUANT     ABGs: No results found for: PHART, PO2ART, JCF7RAI, YEU2MRI, BEART, Y1VKUQFL     Type & Screen (If Applicable):  No results found for: LABABO, LABRH    Drug/Infectious Status (If Applicable):  No results found for: HIV, HEPCAB    COVID-19 Screening (If Applicable):   Lab Results   Component Value Date    COVID19 Not Detected 04/09/2021           Anesthesia Evaluation  Patient summary reviewed and Nursing notes reviewed no history of anesthetic complications:   Airway: Mallampati: II  TM distance: >3 FB   Neck ROM: limited  Mouth opening: > = 3 FB Dental:          Pulmonary:Negative Pulmonary ROS and normal exam  breath sounds clear to auscultation                             Cardiovascular:  Exercise tolerance: good (>4 METS),   (+) hypertension:, valvular problems/murmurs: MR, dysrhythmias: atrial fibrillation, CHF (NICM):,       ECG reviewed  Rhythm: regular  Rate: normal  Echocardiogram reviewed  Stress test reviewed  Cleared by cardiology           ROS comment: EKG:  Sinus rhythm with 1st degree AV block with occasional premature ventricular complexes  Lateral infarct , age undetermined  Abnormal ECG  When compared with ECG of 07-NOV-2019 08:06,  Significant changes have occurred  Confirmed by Jose R Newman ([id number]) on 6/3/2021 2:24:53 PM.    Stress Test:  1. Nondiagnostic stress ECG for ischemia. 2.  Perfusion imaging is pending. ECHO:    24 mm WATCHMAN device present in the LA appendage. Previously visualized device associated thrombus is no longer present. PE comment: sr   Neuro/Psych:   (+) neuromuscular disease:, depression/anxiety              ROS comment: Cervical pain w LUE numbness at times, not now  Lumbar radiculopathy, cervical radiculopathy GI/Hepatic/Renal: Neg GI/Hepatic/Renal ROS            Endo/Other:    (+) blood dyscrasia: anticoagulation therapy, arthritis:., .          Pt had no PAT visit       Abdominal:       Abdomen: soft. Vascular: Other Findings:             Anesthesia Plan      MAC     ASA 4       Induction: intravenous. Anesthetic plan and risks discussed with patient. Use of blood products discussed with patient whom. Plan discussed with attending.     Attending anesthesiologist reviewed and agrees with Arabella Mazariegos MD   10/5/2021

## 2021-10-05 NOTE — H&P
[address] NOTE    Patient: Radha Ornelas  MRN: [id number]  : 1945    Encounter Date: 10/5/2021  Encounter Time: 1:02 PM     Date of Admission: .10/5/2021 11:21 AM    Consulting Physician:  Primary Care Physician:      Rome Akers DO     [phone]    PROBLEM LIST:  Patient Active Problem List   Diagnosis    Anxiety    Nonischemic cardiomyopathy ([facility]    Severe mitral regurgitation    Lumbar radiculopathy    Cervical radiculopathy    HTN (hypertension), benign    Left atrial thrombus    PAF (paroxysmal atrial fibrillation) (HCC)    Chronic systolic congestive heart failure ([address]    Visit for monitoring Tikosyn therapy    Encounter for medication refill    Itching    Chronic anticoagulation    Presence of Watchman left atrial appendage closure device     Reason for Consultation: Right Hilar Lymphadenopathy     HPI:   Ms. Rahat Dee is a 67 y/o female with past medical history noted for left renal mass that presented to [facility] for outpatient evaluation of EPT/CT positive right hilar lymph node delineated on 2021 PET/CT imaging with SUV 4.4. Most recent thyroid nodule FNA 9/10/2021 negative for malignancy.      PAST MEDICAL HISTORY:   Past Medical History:   Diagnosis Date    Afib ([address]     WATCHMAN    Anxiety     Arthritis     Cervical radiculopathy     CHF (congestive heart failure) (HCC)     Chronic sinusitis     Hilar adenopathy     Hypertension     Left ventricular systolic dysfunction     Lesion of left native kidney     Lumbar radiculopathy     Lung nodules     Meige syndrome     partial/ PCP    Microscopic colitis     Mitral regurgitation     Mild to moderate    Nonischemic cardiomyopathy ([address]     f/u with Dr. Yvette Benitez Osteopenia     Vertebrobasilar artery syndrome     f/u PCP     PAST SURGICAL HISTORY:   Past Surgical History:   Procedure Laterality Date    BLADDER REPAIR      BUNIONECTOMY of Transportation (Non-Medical):    Physical Activity:     Days of Exercise per Week:     Minutes of Exercise per Session:    Stress:     Feeling of Stress :    Social Connections:     Frequency of Communication with Friends and Family:     Frequency of Social Gatherings with Friends and Family:     Attends Christian Services:     Active Member of Clubs or Organizations:     Attends Club or Organization Meetings:     Marital Status:    Intimate Partner Violence:     Fear of Current or Ex-Partner:     Emotionally Abused:     Physically Abused:     Sexually Abused:      Social History     Tobacco Use   Smoking Status Never Smoker   Smokeless Tobacco Never Used     Social History     Substance and Sexual Activity   Alcohol Use Yes    Alcohol/week: 0.0 standard drinks    Comment: occasional wine/mixed drink every few months     Social History     Substance and Sexual Activity   Drug Use Never     HOME MEDICATIONS:  Prior to Admission medications    Medication Sig Start Date End Date Taking?  Authorizing Provider   mirabegron (MYRBETRIQ) 25 MG TB24 25 mg every evening    Yes Historical Provider, MD   aspirin EC 81 MG EC tablet Take 1 tablet by mouth daily 7/16/21  Yes Naun Quinn MD   dicyclomine (BENTYL) 10 MG capsule three times daily  7/7/21  Yes Historical Provider, MD   hydrOXYzine (ATARAX) 10 MG tablet Take 1 tablet by mouth every 8 hours as needed for Itching  Patient taking differently: Take 10 mg by mouth every 8 hours as needed for Itching or Anxiety  7/12/21  Yes Trisha Cardona DO   furosemide (LASIX) 20 MG tablet Take 1 tablet by mouth daily 4/30/21  Yes Sherwin Cartagena MD   carvedilol (COREG CR) 20 MG CP24 extended release capsule Take 1 capsule by mouth daily 3/24/21  Yes Sherwin Cartagena MD   dofetilide (TIKOSYN) 250 MCG capsule Take 1 capsule by mouth every 12 hours 2/4/21  Yes Jimmy Baldwin MD   hydrALAZINE (APRESOLINE) 10 MG tablet Take 1 tablet by mouth 3 times daily 12/7/20 Yes Zacarias Rice MD   spironolactone (ALDACTONE) 25 MG tablet Take 1 tablet by mouth daily 12/7/20  Yes Zacarias Rice MD   famotidine (PEPCID) 40 MG tablet Take 40 mg by mouth 2 times daily as needed    Yes Historical Provider, MD   omeprazole (PRILOSEC) 40 MG delayed release capsule Take 40 mg by mouth daily  7/2/20  Yes Historical Provider, MD   triamcinolone (KENALOG) 0.1 % cream Apply topically 3 times daily as needed (rash)  4/5/19  Yes Historical Provider, MD   loperamide (IMODIUM) 2 MG capsule Take 2 mg by mouth 4 times daily as needed for Diarrhea   Yes Historical Provider, MD   diphenhydrAMINE (BENADRYL) 25 MG tablet Take 25 mg by mouth every 6 hours as needed for Itching   Yes Historical Provider, MD   vitamin D (CHOLECALCIFEROL) 1000 UNIT TABS tablet Take 1,000 Units by mouth daily   Yes Historical Provider, MD   baclofen (LIORESAL) 20 MG tablet Take 20 mg by mouth 2 times daily as needed (spasms) Indications: Back Spasm, Bladder Spasm    Yes Historical Provider, MD   clopidogrel (PLAVIX) 75 MG tablet Take 1 tablet by mouth daily 7/16/21   Radames York MD   Handicap Placard [facility] Cannot walk more than 200 feet  Expiration: 7/12/2026 7/12/21   Trisha Cardona DO       CURRENT MEDICATIONS:  No current facility-administered medications for this encounter.     IV MEDICATIONS:      ALLERGIES:  Allergies   Allergen Reactions    Atacand [Candesartan] Hives and Itching    Xarelto [Rivaroxaban] Itching and Rash      severe itch, headache, rash    Adhesive Tape Itching     develops rash and itching with EKG electrodes    Demerol      3/9/19 Pt states she gets a severe migraine    Digoxin Itching     developed itching and rash    Imdur [Isosorbide Mononitrate]       migraines    Losartan Potassium Itching    Shellfish-Derived Products Itching     3/9/19 Pt states she had a lobster pizza and had difficulty breathing, started to sweat and was itchy    Sulfa Antibiotics Diarrhea and Other (See Comments) Also causes migraines  caused migraines and diarrhea    Cefdinir Hives, Itching and Nausea Only    Acetaminophen Hives and Itching    Apap-Caff-Dihydrocodeine Hives and Itching    Coreg [Carvedilol] Itching     Can take extended release Coreg    Cozaar [Losartan] Itching    Diovan [Valsartan] Itching    Effexor [Venlafaxine Hydrochloride] Nausea Only    Eliquis [Apixaban] Hives, Itching and Rash     3/9/19 Pt states that she gets hives, itchy and a rash    Labetalol Itching    Lisinopril Itching    Ramipril Itching       REVIEW OF SYSTEMS:  General ROS:     - Positive For:     - Negative For: chills, fatigue, fever, malaise, night sweats or sleep disturbance   ENT ROS:      - Positive For:     - Negative For: epistaxis, headaches, sinus pain, sneezing or sore throat   Allergy and Immunology ROS:     - Negative For: nasal congestion, postnasal drip or seasonal allergies   Hematological and Lymphatic ROS:      - Negative For: bleeding problems, bruising, fatigue, night sweats or pallor   Respiratory ROS:      - Positive For:       - Negative For: hemoptysis, pleuritic type chest pains  Cardiovascular ROS:      - Positive For:      - Negative For: chest pain, dyspnea on exertion, irregular heartbeat, loss of consciousness, murmur, orthopnea or palpitations   Gastrointestinal ROS:      - Positive For:     - Negative For: abdominal pain, appetite loss, blood in stools, change in bowel habits, change in stools, constipation, diarrhea, hematemesis, melena, nausea/vomiting or stool incontinence   Genito-Urinary ROS:      - Negative For: change in urinary stream, dysuria, hematuria or incontinence   Musculoskeletal ROS:      - Negative For: joint pain, joint stiffness, joint swelling or muscle pain   Neurological ROS:     - Negative For: behavioral changes, confusion, dizziness, headaches, impaired coordination/balance or memory loss   Dermatological ROS:      - Negative For: hair changes, lumps, mole changes, nail changes or pruritus    PHYSICAL EXAMINATION:     VITAL SIGNS:  BP (!) 142/67   Pulse 70   Temp 97.1 °F (36.2 °C) (Temporal)   Resp 18   Ht 5' 7\" (1.702 m)   Wt 179 lb (81.2 kg)   SpO2 97%   BMI 28.04 kg/m²   Wt Readings from Last 3 Encounters:   10/05/21 179 lb (81.2 kg)   21 178 lb (80.7 kg)   21 176 lb 6.4 oz (80 kg)     Temp Readings from Last 3 Encounters:   10/05/21 97.1 °F (36.2 °C) (Temporal)   21 98.4 °F (36.9 °C) (Temporal)   21 97 °F (36.1 °C)     TMAX:  BP Readings from Last 3 Encounters:   10/05/21 (!) 142/67   21 135/62   21 134/72     Pulse Readings from Last 3 Encounters:   10/05/21 70   21 63   21 79       CURRENT PULSE OXIMETRY: SpO2: 97 %  24HR PULSE OXIMETRY RANGE: SpO2  Av %  Min: 97 %  Max: 97 %  CVP:      ________________________________________________________________________    VENTILATOR SETTINGS (if applicable):         Vent Information  SpO2: 97 %  Additional Respiratory  Assessments  Pulse: 70  Resp: 18  SpO2: 97 %  ETCO2:  Peak Inspiratory Pressure:  End-Inspiratory Plateau Pressure:    ABG:  No results for input(s): PH, PO2, PCO2, HCO3, BE, O2SAT, METHB, O2HB, COHB, O2CON, HHB, THB in the last 72 hours. ________________________________________________________________________    IV ACCESS:    NUTRITION: No diet orders on file    INTAKE/OUTPUTS:  No intake/output data recorded.   No intake or output data in the 24 hours ending 10/05/21 1302    General Appearance: alert and oriented to person, place and time, well-developed and   well-nourished, in no acute distress   Eyes: pupils equal, round, and reactive to light, extraocular eye movements intact, conjunctivae normal and sclera anicteric   Neck: neck supple and non tender without mass, no thyromegaly, no thyroid nodules and no cervical adenopathy   Pulmonary/Chest: decreased breath sounds, no accessory muscles of inspiration, no focal wheezes  Cardiovascular: normal rate, regular rhythm, normal S1 and S2, no murmurs, rubs, clicks or gallops, distal pulses intact, no carotid bruits, no murmurs, no gallops, no carotid bruits and no JVD   Abdomen: soft, non-tender, non-distended, normal bowel sounds, no masses or organomegaly   Extremities: no cyanosis, no clubbing, no edema  Musculoskeletal: normal range of motion, no joint swelling, deformity or tenderness   Neurologic: reflexes normal and symmetric, no cranial nerve deficit noted    LABS/IMAGING:    CBC:  Lab Results   Component Value Date    WBC 6.1 10/05/2021    HGB 12.5 10/05/2021    HCT 37.7 10/05/2021    MCV 86.1 10/05/2021     10/05/2021    LYMPHOPCT 7.5 (L) 06/03/2021    RBC 4.38 10/05/2021    MCH 28.5 10/05/2021    MCHC 33.2 10/05/2021    RDW 13.5 10/05/2021    NEUTOPHILPCT 87.8 (H) 06/03/2021    MONOPCT 4.0 06/03/2021    BASOPCT 0.2 06/03/2021    NEUTROABS 10.92 (H) 06/03/2021    LYMPHSABS 0.93 (L) 06/03/2021    MONOSABS 0.50 06/03/2021    EOSABS 0.00 (L) 06/03/2021    BASOSABS 0.03 06/03/2021       Recent Labs     10/05/21  1220   WBC 6.1   HGB 12.5   HCT 37.7   MCV 86.1          BMP:   No results for input(s): NA, K, CL, CO2, PHOS, BUN, CREATININE in the last 72 hours. Invalid input(s): CA    MG:   Lab Results   Component Value Date    MG 2.1 01/30/2021     Ca/Phos:   Lab Results   Component Value Date    CALCIUM 9.7 06/03/2021     Amylase:   Lab Results   Component Value Date    AMYLASE 66 11/24/2013     Lipase:   Lab Results   Component Value Date    LIPASE 29 11/24/2013     LIVER PROFILE: No results for input(s): AST, ALT, LIPASE, BILIDIR, BILITOT, ALKPHOS in the last 72 hours. Invalid input(s): AMYLASE,  ALB    PT/INR: No results for input(s): PROTIME, INR in the last 72 hours. APTT: No results for input(s): APTT in the last 72 hours.     Cardiac Enzymes:  Lab Results   Component Value Date    CKTOTAL 109 06/03/2021    CKMB 6.6 (H) 11/25/2013    TROPONINI <0.01 03/09/2019       Hgb A1C: No results found for: LABA1C  No results found for: EAG  RITU: No results found for: RITU  ESR:   Lab Results   Component Value Date    SEDRATE 25 (H) 01/22/2016     CRP: No results found for: CRP  D Dimer:   Lab Results   Component Value Date    DDIMER 248 11/24/2013     Folate and B12:   Lab Results   Component Value Date    DAPWTDLS78 8693 (H) 12/02/2019   ,   Lab Results   Component Value Date    FOLATE 14.9 12/02/2019       Lactic Acid:   Lab Results   Component Value Date    LACTA 1.5 06/03/2021     Ammonia:   Cortisol:  Thyroid Studies:  Lab Results   Component Value Date    TSH 1.300 02/18/2020     PET/CT 8/24/2021:  1.  FDG avid tracer uptake within an inferior pole left thyroid mass,   with a corresponding CT abnormality. Consider biopsy. 2.  Increased tracer uptake in both the right and the left vick   concerning for metastatic disease. 3.  Low level tracer uptake at the level the pulmonary nodules as well   as the left renal masses. Uptake does not exceed the threshold SUV         ASSESSMENT:  1.) Right Hilar Lymphadenopathy   2.) Thyroid Mass  3.) Left Renal Mass    PLAN:  1.) EBUS Bronchoscopy with lymph node sampling at right hilum     Thank you Pearl Alfaro MD very much for allowing me to see this patient in consultation and follow up. Care reviewed with nursing staff, medical and surgical specialty care, primary care and the patient's family as available. Restraints are ordered when the patient can do harm to him/herself by pulling out devices.     Liliana Perkins MD, M.D.

## 2021-10-05 NOTE — OP NOTE
[facility]    Pulmonary/critical care procedure note    Flexible Fiberoptic Bronchoscopy NOTE    Patient: Alanna Panda    MRN: [id number]  : 1945    Date: 10/5/2021  Time: 2:29 PM     Primary Care Physician:      Billie Bales [place]     [phone]     [phone]    Laboratory Work:  Lab Results   Component Value Date    INR 1.0 10/05/2021    INR 1.0 2021    INR 1.0 10/14/2020    PROTIME 11.2 10/05/2021    PROTIME 11.5 2021    PROTIME 11.3 10/14/2020     Lab Results   Component Value Date    WBC 6.1 10/05/2021    HGB 12.5 10/05/2021    HCT 37.7 10/05/2021    MCV 86.1 10/05/2021     10/05/2021    LYMPHOPCT 7.5 (L) 2021    RBC 4.38 10/05/2021    MCH 28.5 10/05/2021    MCHC 33.2 10/05/2021    RDW 13.5 10/05/2021    NEUTOPHILPCT 87.8 (H) 2021    MONOPCT 4.0 2021    BASOPCT 0.2 2021    NEUTROABS 10.92 (H) 2021    LYMPHSABS 0.93 (L) 2021    MONOSABS 0.50 2021    EOSABS 0.00 (L) 2021    BASOSABS 0.03 2021     Lab Results   Component Value Date     10/05/2021    K 3.8 10/05/2021    K 4.2 10/16/2020     10/05/2021    CO2 23 10/05/2021    BUN 18 10/05/2021    CREATININE 1.1 10/05/2021    GLUCOSE 123 10/05/2021    GLUCOSE 120 10/12/2011    CALCIUM 9.9 10/05/2021      Attending Physician: Dr. Gary Payne    Assistant(s): Anesthesia Dept, Cytology, Pathology, Endoscopy     Pre-Operative Medications: Per Anesthesia Dept     Intra-Operative Medications: Per Anesthesia Dept     Anesthesia: General Anesthesia    Pre-Procedure Diagnosis: Right Hilar Lymphadenopathy     Operation/Procedure:   1.) Flexible Fiberoptic Bronchoscopy  2.) EBUS Bronchoscopy with 1 Lymph Node Sample - Station 11R x 6 Passes    Post-Procedure Diagnosis: Right Hilar Lymphadenopathy, Diffusely Vascular Endobronchial Mucosa      Consent: Consent was obtained prior to procedure. Indications, risks, benefits were explained at length.     Indications: Right Hilar Lymphadenopathy     Purpose: Diagnostic, Therapeutic    Procedure Summary:   A time out was performed to confirm both patient and procedure. I had worn a gown with hat, mask, gloves prior to initiation of procedure. The patient was pre-medicated with medications per anesthesia dept. Intra-operatively, any additional medications were given under instruction of anesthesia dept. Bronchoscope was introduced via adapter at end of Et tube. The trachea was inspected and found to be normal.  The main gianna was sharp.     - The right bronchial tree was inspected and the following were noted:   [a] Right Upper Lobe contained three segments [apical segmental bronchus, posterior segmental bronchus, and anterior segmental bronchus] and was found to be normal.   [b] Bronchus Intermedius was examined and found to be normal.   [c] Right Middle Lobe had 2 segments [lateral segmental bronchus and medial segmental bronchus] and was found to be normal.   [d] Right Lower Lobe including apical segment and basal segmental branches [anterior basal branch, lateral basal branch, and posterior basal branch] was also inspected to the sub-segmental levels and was found to be normal.   [e] The right bronchial tree was found to be without stenosis, circumferential narrowing, intrinsic compressing mass, extrinsic compressing mass, obstruction in the area of the RUL, RML, RLL. [f] Mucosa in general appeared normal without edema but very vascular as mentioned above.      - The left bronchial tree was inspected and the following were noted:   [a] Left Upper Lobe including the apical posterior segmental bronchus and the anterior segmental bronchus were inspected and found to be normal.  [b] Lingula including the superior, inferior bronchopulmonary segments were inspected and found to be normal.   [c] Left Lower Lobe with superior segment was inspected to sub-segmental level [anterior basal segment, lateral basal segment, and posterior basal segment] and found to be normal.   [d] The left bronchial tree was found to be without stenosis, circumferential narrowing, intrinsic compressing mass, extrinsic compressing mass, obstruction in the area of the YELITZA, LINGULA, LLL. [e] Mucosa in general appeared normal without edema but very vascular as mentioned above. Procedures Completed:  1.) Flexible Fiberoptic Bronchoscopy  2.) EBUS Bronchoscopy with 1 Lymph Node Sample - Station 11R x 6 Passes    Patient tolerated the post-procedure recovery. During the endoscopic procedure there were no/some tendencies towards: desaturations, hypotension, hypertension, bradycardia, tachycardia, dysrhythmia.     Subsequent chest x-ray was ordered post endoscopic procedure     Estimated Blood Loss: NONE    Complications: NONE    Tolerance: Excellent     Patient extubated and send to PACU in stable condition hemodynamically    Giselle Napier MD on 10/5/2021 at 2:29 PM

## 2021-10-06 NOTE — ANESTHESIA POSTPROCEDURE EVALUATION
Department of Anesthesiology  Postprocedure Note    Patient: Mario Urias  MRN: [id number]  YOB: 1945  Date of evaluation: 10/6/2021  Time:  12:29 PM     Procedure Summary     Date: 10/05/21 Room / Location: [facility] ENDO 03 / [facility]    Anesthesia Start: 0900 Anesthesia Stop: 3446    Procedures:       BRONCHOSCOPY W/EBUS FNA (N/A )      BRONCHOSCOPY DIAGNOSTIC OR CELL [address] ONLY (N/A ) Diagnosis: (HILAR ADENOPATHY)    Surgeons: Josh Camacho MD Responsible Provider: Leticia Gorman MD    Anesthesia Type: MAC ASA Status: 4          Anesthesia Type: MAC    Joseph Phase I: Joseph Score: 10    Joseph Phase II: Joseph Score: 10    Last vitals: Reviewed and per EMR flowsheets.        Anesthesia Post Evaluation    Patient location during evaluation: PACU  Patient participation: complete - patient participated  Level of consciousness: awake  Pain score: 0  Airway patency: patent  Nausea & Vomiting: no nausea  Complications: no  Cardiovascular status: blood pressure returned to baseline  Respiratory status: acceptable  Hydration status: euvolemic

## 2021-10-11 ENCOUNTER — OFFICE VISIT (OUTPATIENT)
Dept: CARDIOLOGY CLINIC | Age: 76
End: 2021-10-11
Payer: MEDICARE

## 2021-10-11 VITALS
HEART RATE: 68 BPM | HEIGHT: 67 IN | DIASTOLIC BLOOD PRESSURE: 60 MMHG | RESPIRATION RATE: 16 BRPM | BODY MASS INDEX: 27.81 KG/M2 | SYSTOLIC BLOOD PRESSURE: 118 MMHG | WEIGHT: 177.2 LBS

## 2021-10-11 DIAGNOSIS — Z95.818 PRESENCE OF WATCHMAN LEFT ATRIAL APPENDAGE CLOSURE DEVICE: Primary | ICD-10-CM

## 2021-10-11 DIAGNOSIS — I48.0 PAF (PAROXYSMAL ATRIAL FIBRILLATION) (HCC): ICD-10-CM

## 2021-10-11 DIAGNOSIS — I50.22 CHRONIC HFREF (HEART FAILURE WITH REDUCED EJECTION FRACTION) (HCC): ICD-10-CM

## 2021-10-11 DIAGNOSIS — I42.8 NONISCHEMIC CARDIOMYOPATHY (HCC): ICD-10-CM

## 2021-10-11 PROCEDURE — 99213 OFFICE O/P EST LOW 20 MIN: CPT | Performed by: INTERNAL MEDICINE

## 2021-10-11 PROCEDURE — G8484 FLU IMMUNIZE NO ADMIN: HCPCS | Performed by: INTERNAL MEDICINE

## 2021-10-11 PROCEDURE — 93000 ELECTROCARDIOGRAM COMPLETE: CPT | Performed by: INTERNAL MEDICINE

## 2021-10-11 PROCEDURE — 1123F ACP DISCUSS/DSCN MKR DOCD: CPT | Performed by: INTERNAL MEDICINE

## 2021-10-11 PROCEDURE — 1036F TOBACCO NON-USER: CPT | Performed by: INTERNAL MEDICINE

## 2021-10-11 PROCEDURE — G8399 PT W/DXA RESULTS DOCUMENT: HCPCS | Performed by: INTERNAL MEDICINE

## 2021-10-11 PROCEDURE — G8427 DOCREV CUR MEDS BY ELIG CLIN: HCPCS | Performed by: INTERNAL MEDICINE

## 2021-10-11 PROCEDURE — 1090F PRES/ABSN URINE INCON ASSESS: CPT | Performed by: INTERNAL MEDICINE

## 2021-10-11 PROCEDURE — G8417 CALC BMI ABV UP PARAM F/U: HCPCS | Performed by: INTERNAL MEDICINE

## 2021-10-11 PROCEDURE — 4040F PNEUMOC VAC/ADMIN/RCVD: CPT | Performed by: INTERNAL MEDICINE

## 2021-10-11 NOTE — PROGRESS NOTES
[facility]   Heart and Vascular [place]   Clinic Note     Date:10/11/21   Patient Name:Aidee Charles  YOB: 1945  Age: 68 y.o. Primary Care Provider: DO Saurav Clarke     This is a 55-year-old  female, patient of Dr. Nikita Wright, who is referred to us originally for left atrial appendage closure using watchman which she underwent successfully on 10/14/2020. She is stressed out about being worked up for multiple nodules and masses in her thyroid, lungs, kidneys at this time. From a cardiac standpoint she denies chest discomfort, lower extremity edema, orthopnea, PND, palpitations, presyncope. She had an episode of syncope due to dehydration in June. She has stable dyspnea with 1 flight of stairs which is unchanged. She is compliant with and tolerating her medications including dual antiplatelet therapy. A focused history review includes:  1. Paroxysmal atrial fibrillation status post DC cardioversion 11/2019  1. Maintained on dofetilide   2. History of left atrial appendage thrombus in November 2018 and March 2019. Resolved on rivaroxaban. 3. Allergic to rivaroxaban with significant skin reaction that she manages with hydroxyzine  1. Also allergic to dabigatran and apixaban. All the above documented additionally in [place] clinic notes  2. S/p left atrial appendage closure using 24 mm Watchman 2.5 (10/14/20 - (Dr. Abdiel Watkins)) complicated by small pericardial effusion that did not require intervention and resolved before discharge  1. 6-week JAIME without issues-switched to aspirin 325 mg plus Plavix then Plavix alone due to conjunctival hemorrhage  2. 6-month JAIME with DRT; no inge-device leak. Stopped APT and restarted Xarelto  3. After 2 months on rivaroxaban resumed aspirin and clopidogrel July 2021 after DRT resolved on JAIME  3.  Nonischemic cardiomyopathy with chronic systolic heart failure (coronary angiogram without significant CAD in 2010)  1. Most recent TTE from July 2019 with a EF 35 to 40% with moderate MR moderate PH  2. JAIME dated 9/9/2020 personally reviewed: Mildly reduced LV systolic function with an EF 45%. Normal RV size and systolic function. Moderate MR. Mild AR. Mild TR. Moderate pulmonary hypertension. No CHERYL thrombus. CHERYL anatomy amenable to watchman. 4. Hypertension  5. Microscopic colitis  6. Numerous medication allergies including ACE inhibitors and ARB's  7. Hypercholesterolemia  8. Being worked up for renal cell carcinoma, thyroid mass      Past History    Past Medical History:         Diagnosis Date    Afib ([facility] [address]     WATCHMAN    Anxiety     Arthritis     Cervical radiculopathy     CHF (congestive heart failure) (HCC)     Chronic sinusitis     Hilar adenopathy     Hypertension     Left ventricular systolic dysfunction     Lesion of left native kidney     Lumbar radiculopathy     Lung nodules     Meige syndrome     partial/ PCP    Microscopic colitis     Mitral regurgitation     Mild to moderate    Nonischemic cardiomyopathy ([address]     f/u with Dr. Nighat Jarvis Osteopenia     Vertebrobasilar artery syndrome     f/u PCP          Social History:    Social History     Tobacco History     Smoking Status  Never Smoker    Smokeless Tobacco Use  Never Used          Alcohol History     Alcohol Use Status  Yes Drinks/Week  0 Standard drinks or equivalent per week Amount  0.0 standard drinks of alcohol/wk Comment  occasional wine/mixed drink.            Drug Use     Drug Use Status  Never          Sexual Activity     Sexually Active  Not Asked Comment                      Family History:       Problem Relation Age of Onset    Heart Attack Mother     Stroke Mother     Heart Attack Father     Heart Failure Father     Heart Disease Father     Anxiety Disorder Sister     Depression Sister     Crohn's Disease Son          Review of Systems   See above        Medications     Current Outpatient Medications Medication Sig Dispense Refill    mirabegron (MYRBETRIQ) 25 MG TB24 25 mg every evening       clopidogrel (PLAVIX) 75 MG tablet Take 1 tablet by mouth daily 90 tablet 1    aspirin EC 81 MG EC tablet Take 1 tablet by mouth daily 90 tablet 3    dicyclomine (BENTYL) 10 MG capsule three times daily       hydrOXYzine (ATARAX) 10 MG tablet Take 1 tablet by mouth every 8 hours as needed for Itching (Patient taking differently: Take 10 mg by mouth every 8 hours as needed for Itching or Anxiety ) 30 tablet 3    furosemide (LASIX) 20 MG tablet Take 1 tablet by mouth daily 90 tablet 3    carvedilol (COREG CR) 20 MG CP24 extended release capsule Take 1 capsule by mouth daily 90 capsule 3    dofetilide (TIKOSYN) 250 MCG capsule Take 1 capsule by mouth every 12 hours 180 capsule 1    hydrALAZINE (APRESOLINE) 10 MG tablet Take 1 tablet by mouth 3 times daily 270 tablet 3    spironolactone (ALDACTONE) 25 MG tablet Take 1 tablet by mouth daily 90 tablet 3    famotidine (PEPCID) 40 MG tablet Take 40 mg by mouth 2 times daily as needed       omeprazole (PRILOSEC) 40 MG delayed release capsule Take 40 mg by mouth daily       triamcinolone (KENALOG) 0.1 % cream Apply topically 3 times daily as needed (rash)   1    loperamide (IMODIUM) 2 MG capsule Take 2 mg by mouth 4 times daily as needed for Diarrhea      diphenhydrAMINE (BENADRYL) 25 MG tablet Take 25 mg by mouth every 6 hours as needed for Itching      vitamin D (CHOLECALCIFEROL) 1000 UNIT TABS tablet Take 1,000 Units by mouth daily      baclofen (LIORESAL) 20 MG tablet Take 20 mg by mouth 2 times daily as needed (spasms) Indications: Back Spasm, Bladder Spasm       Handicap [facility] Cannot walk more than 200 feet  Expiration: 7/12/2026 1 each 0     No current facility-administered medications for this visit.            Physical Examination      /60   Pulse 68   Resp 16   Ht 5' 7\" (1.702 m)   Wt 177 lb 3.2 oz (80.4 kg)   BMI 27.75 kg/m²     General: No acute distress, appears as stated age, nonicteric  Head: Atraumatic, no gross abnormalities or bruises  Neck: Supple and nontender, no carotid bruits, normal JVP  Lungs: Clear to auscultation bilaterally, no wheezes, rales, or rhonchi  Heart: Regular rate and rhythm, no murmurs, rubs, or gallops  Abdomen: Soft, nontender, nondistended, normal bowel sounds  Extremities: No obvious deformities, no cyanosis, no edema  Neurological: Alert and oriented x3, EOMI, moving all extremities x4  Psychological: Normal mood and affect, cooperative  Skin: Color, texture, and turgor normal for age          Labs/Imaging/Diagnostics     Lab Results   Component Value Date     10/05/2021    K 3.8 10/05/2021    K 4.2 10/16/2020     10/05/2021    CO2 23 10/05/2021    BUN 18 10/05/2021    CREATININE 1.1 10/05/2021    GLUCOSE 123 10/05/2021    GLUCOSE 120 10/12/2011    CALCIUM 9.9 10/05/2021        Estimated Creatinine Clearance: 47 mL/min (A) (based on SCr of 1.1 mg/dL (H)).     Lab Results   Component Value Date    WBC 6.1 10/05/2021    HGB 12.5 10/05/2021    HCT 37.7 10/05/2021    MCV 86.1 10/05/2021     10/05/2021       Lab Results   Component Value Date    ALT 17 06/03/2021    AST 22 06/03/2021    ALKPHOS 126 (H) 06/03/2021    BILITOT 0.5 06/03/2021       Lab Results   Component Value Date    LABALBU 4.2 06/03/2021       Lab Results   Component Value Date    CHOL 179 11/21/2018    CHOL 204 (H) 11/14/2015    CHOL 235 (H) 07/02/2015     Lab Results   Component Value Date    TRIG 69 11/21/2018    TRIG 71 11/14/2015    TRIG 182 (H) 07/02/2015     Lab Results   Component Value Date    HDL 51 11/21/2018    HDL 85 11/14/2015    HDL 69 07/02/2015     Lab Results   Component Value Date    LDLCALC 114 (H) 11/21/2018    LDLCALC 105 (H) 11/14/2015    LDLCALC 130 (H) 07/02/2015     Lab Results   Component Value Date    LABVLDL 14 11/21/2018    LABVLDL 14 11/14/2015    LABVLDL 36 07/02/2015     No results found for: [facility]    Lab Results   Component Value Date    CKTOTAL 109 06/03/2021    CKMB 6.6 (H) 11/25/2013    TROPONINI <0.01 03/09/2019       Lab Results   Component Value Date    BNP 1,046 (H) 11/24/2013         EKG today: Normal sinus rhythm with incomplete left bundle branch block/IVCD        Assessment and Plan:      Very pleasant 27-year-old  female with a history noted above. After her left atrial appendage occlusion procedure she developed a device related thrombus which resolved with 2 months of oral anticoagulation. She is currently on 2 antiplatelet therapy without issue. We are awaiting her 12-month JAIME in a few days. She is euvolemic on exam and has no angina but her functional capacity is modest       Diagnosis Orders   1. Presence of Watchman left atrial appendage closure device     2. Nonischemic cardiomyopathy (HCC)  EKG 12 Lead   3. PAF (paroxysmal atrial fibrillation) ([facility] [address]     4. Chronic HFrEF (heart failure with reduced ejection fraction) ([facility])        · Check 12-month JAIME. If no issues then discontinue clopidogrel and maintain aspirin 81 mg daily indefinitely  · We will continue with annual JAIME afterwards  · She will follow up with Dr. Shelley Mabry regarding her other cardiac issues including chronic dofetilide therapy, heart failure     Follow up with me in October 2022    We appreciate the opportunity to participate in Her care!       Caron Sofia MD  Interventional Cardiology/Structural Heart Disease  Office: [phone]  Fax: [phone]  Coordinator: Darby Jaramillo

## 2021-10-11 NOTE — PATIENT INSTRUCTIONS
1. JAIME later this month as currently scheduled - if looks okay then we will stop the Plavix and continue just with the aspirin  2. Talk to Dr. Rio Alexis about the Tikosyn  3.  Return in 12 months if JAIME okay

## 2021-10-13 ENCOUNTER — TELEPHONE (OUTPATIENT)
Dept: CARDIOLOGY CLINIC | Age: 76
End: 2021-10-13

## 2021-10-13 NOTE — TELEPHONE ENCOUNTER
Pt was contacted to give JAIME instructions. She stated she is scheduled for kidney surgery (mass removal) with Dr. Terence Sy on 11/16/21 at [facility]. She states he is asking for cardiac clearance for the procedure. Please advise.

## 2021-10-15 ENCOUNTER — TELEPHONE (OUTPATIENT)
Dept: NON INVASIVE DIAGNOSTICS | Age: 76
End: 2021-10-15

## 2021-10-15 NOTE — TELEPHONE ENCOUNTER
Reminded patient of scheduled procedure on 10/18/21. Instructions given and COVID questionnaire completed.

## 2021-10-18 ENCOUNTER — ANESTHESIA (OUTPATIENT)
Dept: CARDIAC CATH/INVASIVE PROCEDURES | Age: 76
End: 2021-10-18

## 2021-10-18 ENCOUNTER — ANESTHESIA EVENT (OUTPATIENT)
Dept: CARDIAC CATH/INVASIVE PROCEDURES | Age: 76
End: 2021-10-18

## 2021-10-18 ENCOUNTER — HOSPITAL ENCOUNTER (OUTPATIENT)
Dept: CARDIAC CATH/INVASIVE PROCEDURES | Age: 76
Discharge: HOME | End: 2021-10-18
Payer: MEDICARE

## 2021-10-18 VITALS
BODY MASS INDEX: 27.47 KG/M2 | SYSTOLIC BLOOD PRESSURE: 100 MMHG | TEMPERATURE: 97.6 F | WEIGHT: 175 LBS | RESPIRATION RATE: 17 BRPM | DIASTOLIC BLOOD PRESSURE: 43 MMHG | OXYGEN SATURATION: 95 % | HEART RATE: 53 BPM | HEIGHT: 67 IN

## 2021-10-18 VITALS
RESPIRATION RATE: 17 BRPM | OXYGEN SATURATION: 96 % | SYSTOLIC BLOOD PRESSURE: 85 MMHG | DIASTOLIC BLOOD PRESSURE: 38 MMHG

## 2021-10-18 LAB
LV EF: 48 %
LVEF MODALITY: NORMAL

## 2021-10-18 PROCEDURE — 93320 DOPPLER ECHO COMPLETE: CPT | Performed by: INTERNAL MEDICINE

## 2021-10-18 PROCEDURE — 6360000002 HC RX W HCPCS: Performed by: NURSE ANESTHETIST, CERTIFIED REGISTERED

## 2021-10-18 PROCEDURE — 93321 DOPPLER ECHO F-UP/LMTD STD: CPT

## 2021-10-18 PROCEDURE — 2580000003 HC RX 258: Performed by: NURSE ANESTHETIST, CERTIFIED REGISTERED

## 2021-10-18 PROCEDURE — 93312 ECHO TRANSESOPHAGEAL: CPT

## 2021-10-18 PROCEDURE — 2709999900 HC NON-CHARGEABLE SUPPLY

## 2021-10-18 PROCEDURE — 93325 DOPPLER ECHO COLOR FLOW MAPG: CPT

## 2021-10-18 PROCEDURE — 93325 DOPPLER ECHO COLOR FLOW MAPG: CPT | Performed by: INTERNAL MEDICINE

## 2021-10-18 PROCEDURE — 93312 ECHO TRANSESOPHAGEAL: CPT | Performed by: INTERNAL MEDICINE

## 2021-10-18 PROCEDURE — 3700000000 HC ANESTHESIA ATTENDED CARE

## 2021-10-18 PROCEDURE — 3700000001 HC ADD 15 MINUTES (ANESTHESIA)

## 2021-10-18 RX ORDER — PROPOFOL 10 MG/ML
INJECTION, EMULSION INTRAVENOUS PRN
Status: DISCONTINUED | OUTPATIENT
Start: 2021-10-18 | End: 2021-10-18 | Stop reason: SDUPTHER

## 2021-10-18 RX ORDER — SODIUM CHLORIDE 9 MG/ML
INJECTION, SOLUTION INTRAVENOUS CONTINUOUS PRN
Status: DISCONTINUED | OUTPATIENT
Start: 2021-10-18 | End: 2021-10-18 | Stop reason: SDUPTHER

## 2021-10-18 RX ADMIN — SODIUM CHLORIDE: 9 INJECTION, SOLUTION INTRAVENOUS at 07:16

## 2021-10-18 RX ADMIN — PROPOFOL 120 MG: 10 INJECTION, EMULSION INTRAVENOUS at 08:26

## 2021-10-18 NOTE — ANESTHESIA POSTPROCEDURE EVALUATION
Department of Anesthesiology  Postprocedure Note    Patient: Deyvi Garcia  MRN: [id number]  YOB: 1945  Date of evaluation: 10/18/2021  Time:  8:44 AM     Procedure Summary     Date: 10/18/21 Room / Location: [facility] CATH LAB; [facility] ECHO    Anesthesia Start: 2709 Anesthesia Stop: 1533    Procedure: SEY JAIME Diagnosis:       Unspecified atrial fibrillation      Nonrheumatic mitral (valve) insufficiency    Scheduled Providers: UYEN York CRNA; Bharti Reynoso MD Responsible Provider: Bharti Reynoso MD    Anesthesia Type: MAC ASA Status: 3          Anesthesia Type: MAC    Joseph Phase I:      Joseph Phase II:      Last vitals: Reviewed and per EMR flowsheets.        Anesthesia Post Evaluation    Patient location during evaluation: PACU  Patient participation: complete - patient participated  Level of consciousness: awake  Pain score: 0  Airway patency: patent  Nausea & Vomiting: no nausea  Complications: no  Cardiovascular status: hemodynamically stable  Respiratory status: acceptable  Hydration status: stable

## 2021-10-18 NOTE — ANESTHESIA PRE PROCEDURE
needed for Itching   Yes Historical Provider, MD   vitamin D (CHOLECALCIFEROL) 1000 UNIT TABS tablet Take 1,000 Units by mouth daily   Yes Historical Provider, MD   baclofen (LIORESAL) 20 MG tablet Take 20 mg by mouth 2 times daily as needed (spasms) Indications: Back Spasm, Bladder Spasm    Yes Historical Provider, MD   Handicap Placard [facility] Cannot walk more than 200 feet  Expiration: 7/12/2026 7/12/21   Trisha Cardona DO   triamcinolone (KENALOG) 0.1 % cream Apply topically 3 times daily as needed (rash)  4/5/19   Historical Provider, MD       Current medications:    Current Outpatient Medications   Medication Sig Dispense Refill    mirabegron (MYRBETRIQ) 25 MG TB24 25 mg every evening       clopidogrel (PLAVIX) 75 MG tablet Take 1 tablet by mouth daily 90 tablet 1    aspirin EC 81 MG EC tablet Take 1 tablet by mouth daily 90 tablet 3    dicyclomine (BENTYL) 10 MG capsule three times daily       hydrOXYzine (ATARAX) 10 MG tablet Take 1 tablet by mouth every 8 hours as needed for Itching (Patient taking differently: Take 10 mg by mouth every 8 hours as needed for Itching or Anxiety ) 30 tablet 3    furosemide (LASIX) 20 MG tablet Take 1 tablet by mouth daily 90 tablet 3    carvedilol (COREG CR) 20 MG CP24 extended release capsule Take 1 capsule by mouth daily 90 capsule 3    dofetilide (TIKOSYN) 250 MCG capsule Take 1 capsule by mouth every 12 hours 180 capsule 1    hydrALAZINE (APRESOLINE) 10 MG tablet Take 1 tablet by mouth 3 times daily 270 tablet 3    spironolactone (ALDACTONE) 25 MG tablet Take 1 tablet by mouth daily 90 tablet 3    famotidine (PEPCID) 40 MG tablet Take 40 mg by mouth 2 times daily as needed       omeprazole (PRILOSEC) 40 MG delayed release capsule Take 40 mg by mouth daily       loperamide (IMODIUM) 2 MG capsule Take 2 mg by mouth 4 times daily as needed for Diarrhea      diphenhydrAMINE (BENADRYL) 25 MG tablet Take 25 mg by mouth every 6 hours as needed for Itching      vitamin D (CHOLECALCIFEROL) 1000 UNIT TABS tablet Take 1,000 Units by mouth daily      baclofen (LIORESAL) 20 MG tablet Take 20 mg by mouth 2 times daily as needed (spasms) Indications: Back Spasm, Bladder Spasm       Handicap Placard [facility] Cannot walk more than 200 feet  Expiration: 7/12/2026 1 each 0    triamcinolone (KENALOG) 0.1 % cream Apply topically 3 times daily as needed (rash)   1     No current facility-administered medications for this encounter. Allergies:     Allergies   Allergen Reactions    Atacand [Candesartan] Hives and Itching    Xarelto [Rivaroxaban] Itching and Rash      severe itch, headache, rash    Adhesive Tape Itching     develops rash and itching with EKG electrodes    Demerol      3/9/19 Pt states she gets a severe migraine    Digoxin Itching     developed itching and rash    Imdur [Isosorbide Mononitrate]       migraines    Losartan Potassium Itching    Shellfish-Derived Products Itching     3/9/19 Pt states she had a lobster pizza and had difficulty breathing, started to sweat and was itchy    Sulfa Antibiotics Diarrhea and Other (See Comments)     Also causes migraines  caused migraines and diarrhea    Cefdinir Hives, Itching and Nausea Only    Acetaminophen Hives and Itching    Apap-Caff-Dihydrocodeine Hives and Itching    Coreg [Carvedilol] Itching     Can take extended release Coreg    Cozaar [Losartan] Itching    Diovan [Valsartan] Itching    Effexor [Venlafaxine Hydrochloride] Nausea Only    Eliquis [Apixaban] Hives, Itching and Rash     3/9/19 Pt states that she gets hives, itchy and a rash    Labetalol Itching    Lisinopril Itching    Ramipril Itching       Problem List:    Patient Active Problem List   Diagnosis Code    Anxiety F41.9    Nonischemic cardiomyopathy ([facility] [address] I42.8    Severe mitral regurgitation I34.0    Lumbar radiculopathy M54.16    Cervical radiculopathy M54.12    HTN (hypertension), benign I10    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    PAF ([facility]oxysmal [facility] fibrillation) ([facility] I48.0    Chronic HFrEF (heart failure with reduced ejection fraction) ([facility]) I50.22    Visit for monitoring Tikosyn therapy Z51.81, Z79.899    Encounter for medication refill Z76.0    Itching L29.9    Chronic anticoagulation Z79.01    Presence of Watchman left atrial appendage closure device Z95.818       Past Medical History:        Diagnosis Date    Afib ([address]     WATCHMAN    Anxiety     Arthritis     Cervical radiculopathy     CHF (congestive heart failure) ([facility])     Chronic sinusitis     Hilar adenopathy     Hx of blood clots     Hypertension     Left ventricular systolic dysfunction     Lesion of left native kidney     Lumbar radiculopathy     Lung nodules     Meige syndrome     partial/ PCP    Microscopic colitis     Mitral regurgitation     Mild to moderate    Nonischemic cardiomyopathy ([facility])     f/u with Dr. Mamta Yoo Osteopenia     Vertebrobasilar artery syndrome     f/u PCP       Past Surgical History:        Procedure Laterality Date    BLADDER REPAIR      BRONCHOSCOPY N/A 10/5/2021    BRONCHOSCOPY W/EBUS FNA performed by Osmel Cornejo MD at [address] [place] [address] N/A 10/5/2021    BRONCHOSCOPY DIAGNOSTIC OR CELL [address] ONLY performed by Osmel Cornejo MD at [address] TEST  11/25/13    Rt & LT cath    CARDIOVERSION  11/04/2019    Successful CV from AF to NSR   (Dr. Kaylee Marvin)    COLONOSCOPY  07/2020    DIAGNOSTIC CARDIAC CATH LAB PROCEDURE  2/20/10    Dr Jamie Marques CATH LAB PROCEDURE  8/24/11    [facility] heart [facility] @ [facility].     DOPPLER ECHOCARDIOGRAPHY  11/25/13    HYSTERECTOMY  1991    total    OTHER SURGICAL HISTORY  10/14/2020    Dr. Noemi Baldwin- 24mm Watchman device    TRANSESOPHAGEAL ECHOCARDIOGRAM  11/21/2018    Dr. Ana M Weiss TRANSESOPHAGEAL ECHOCARDIOGRAM  03/18/2019    WISDOM TOOTH EXTRACTION         Social History:    Social History     Tobacco Use    Smoking status: Never Smoker    Smokeless tobacco: Never Used   Substance Use Topics    Alcohol use: Yes     Alcohol/week: 0.0 standard drinks     Comment: occasional wine/mixed drink every few months                                Counseling given: Not Answered      Vital Signs (Current):   Vitals:    10/18/21 0638   BP: 134/70   Pulse: 57   Resp: 16   Temp: 36.4 °C (97.6 °F)   TempSrc: Temporal   SpO2: 94%   Weight: 175 lb (79.4 kg)   Height: 5' 7\" (1.702 m)                                              BP Readings from Last 3 Encounters:   10/18/21 134/70   10/11/21 118/60   10/05/21 (!) 143/62       NPO Status:  2000                                                                               BMI:   Wt Readings from Last 3 Encounters:   10/18/21 175 lb (79.4 kg)   10/11/21 177 lb 3.2 oz (80.4 kg)   10/05/21 179 lb (81.2 kg)     Body mass index is 27.41 kg/m². CBC:   Lab Results   Component Value Date    WBC 6.1 10/05/2021    RBC 4.38 10/05/2021    HGB 12.5 10/05/2021    HCT 37.7 10/05/2021    MCV 86.1 10/05/2021    RDW 13.5 10/05/2021     10/05/2021       CMP:   Lab Results   Component Value Date     10/05/2021    K 3.8 10/05/2021    K 4.2 10/16/2020     10/05/2021    CO2 23 10/05/2021    BUN 18 10/05/2021    CREATININE 1.1 10/05/2021    GFRAA 58 10/05/2021    LABGLOM 48 10/05/2021    GLUCOSE 123 10/05/2021    GLUCOSE 120 10/12/2011    PROT 8.0 06/03/2021    CALCIUM 9.9 10/05/2021    BILITOT 0.5 06/03/2021    ALKPHOS 126 06/03/2021    AST 22 06/03/2021    ALT 17 06/03/2021       POC Tests: No results for input(s): POCGLU, POCNA, POCK, POCCL, POCBUN, POCHEMO, POCHCT in the last 72 hours.     Coags:   Lab Results   Component Value Date    PROTIME 11.2 10/05/2021    PROTIME 12.0 12/17/2012    INR 1.0 10/05/2021    APTT 28.5 01/22/2021       HCG (If Applicable): No results found for: PREGTESTUR, PREGSERUM, HCG, HCGQUANT     ABGs: No results found for: PHART, PO2ART, BWQ8GAV, GAL6CDH, BEART, F2TRBSTA     Type & Screen (If Applicable):  No results found for: LABABO, LABRH    Drug/Infectious Status (If Applicable):  No results found for: HIV, HEPCAB    COVID-19 Screening (If Applicable):   Lab Results   Component Value Date    COVID19 Not Detected 04/09/2021           Anesthesia Evaluation  Patient summary reviewed no history of anesthetic complications:   Airway: Mallampati: III  TM distance: >3 FB   Neck ROM: full  Mouth opening: > = 3 FB Dental:          Pulmonary:Negative Pulmonary ROS                              Cardiovascular:    (+) hypertension:, valvular problems/murmurs: MR, dysrhythmias: atrial fibrillation, CHF:,             Echocardiogram reviewed         Beta Blocker:  Dose within 24 Hrs      ROS comment: S/p Watchman 10/20    EF 45-50%, mild TR, AR     Neuro/Psych:   (+) neuromuscular disease (lumbar radiculopathy):, depression/anxiety             GI/Hepatic/Renal:   (+) renal disease:,          ROS comment: Mass on left kidney. Endo/Other:    (+) blood dyscrasia: anticoagulation therapy:., .                  ROS comment: Plavix    Thyroid mass negative Abdominal:             Vascular: negative vascular ROS. Other Findings:             Anesthesia Plan      MAC     ASA 3       Induction: intravenous. Anesthetic plan and risks discussed with patient.                       UYEN Gordon - CRNA   10/18/2021

## 2021-10-19 ENCOUNTER — TELEPHONE (OUTPATIENT)
Dept: CARDIOLOGY CLINIC | Age: 76
End: 2021-10-19

## 2021-10-20 ENCOUNTER — TELEPHONE (OUTPATIENT)
Dept: CARDIOLOGY CLINIC | Age: 76
End: 2021-10-20

## 2021-10-20 NOTE — TELEPHONE ENCOUNTER
Spoke to Friday amy per Dr. Dillon Morning stop Plavix and switch to aspirin 81 mg daily indefinitely. Ocie Severe looks great       All questions answered to patients satisfaction

## 2021-10-21 ENCOUNTER — HOSPITAL ENCOUNTER (OUTPATIENT)
Dept: INFUSION THERAPY | Age: 76
Discharge: HOME | End: 2021-10-21
Payer: MEDICARE

## 2021-10-21 ENCOUNTER — OFFICE VISIT (OUTPATIENT)
Dept: ONCOLOGY | Age: 76
End: 2021-10-21
Payer: MEDICARE

## 2021-10-21 VITALS
HEART RATE: 61 BPM | WEIGHT: 169.1 LBS | RESPIRATION RATE: 16 BRPM | BODY MASS INDEX: 26.48 KG/M2 | OXYGEN SATURATION: 96 % | DIASTOLIC BLOOD PRESSURE: 63 MMHG | SYSTOLIC BLOOD PRESSURE: 132 MMHG | TEMPERATURE: 97 F

## 2021-10-21 DIAGNOSIS — N28.89 LEFT RENAL MASS: Primary | ICD-10-CM

## 2021-10-21 DIAGNOSIS — N28.89 LEFT RENAL MASS: ICD-10-CM

## 2021-10-21 PROCEDURE — 1036F TOBACCO NON-USER: CPT | Performed by: INTERNAL MEDICINE

## 2021-10-21 PROCEDURE — 99212 OFFICE O/P EST SF 10 MIN: CPT

## 2021-10-21 PROCEDURE — 4040F PNEUMOC VAC/ADMIN/RCVD: CPT | Performed by: INTERNAL MEDICINE

## 2021-10-21 PROCEDURE — 99214 OFFICE O/P EST MOD 30 MIN: CPT | Performed by: INTERNAL MEDICINE

## 2021-10-21 PROCEDURE — G8399 PT W/DXA RESULTS DOCUMENT: HCPCS | Performed by: INTERNAL MEDICINE

## 2021-10-21 PROCEDURE — G8417 CALC BMI ABV UP PARAM F/U: HCPCS | Performed by: INTERNAL MEDICINE

## 2021-10-21 PROCEDURE — 1123F ACP DISCUSS/DSCN MKR DOCD: CPT | Performed by: INTERNAL MEDICINE

## 2021-10-21 PROCEDURE — G8427 DOCREV CUR MEDS BY ELIG CLIN: HCPCS | Performed by: INTERNAL MEDICINE

## 2021-10-21 PROCEDURE — 1090F PRES/ABSN URINE INCON ASSESS: CPT | Performed by: INTERNAL MEDICINE

## 2021-10-21 PROCEDURE — G8484 FLU IMMUNIZE NO ADMIN: HCPCS | Performed by: INTERNAL MEDICINE

## 2021-10-21 NOTE — PROGRESS NOTES
Department of [facility] Oncology  Attending Clinic Note    Reason for Visit: Follow-up on a patient with left kidney mass     PCP:  Billie Bales DO    History of Present Illness:  68year old female who presented with frequent falls. CXR 06/03/2021:  2.1 cm round focus seen within the right hilum. CT head 6/3/2021 no acute intracranial abnormality    CT cervical spine 6/3/2021 no acute fracture or subluxation of the cervical spine  Mild multilevel DJD and DJD most prominent at the C5-C6 level  Heterogeneous appearance of the thyroid gland    CT lumbar spine 6/3/2021 no acute compression fracture or malalignment of the lumbar spine  Multilevel facet arthropathy  Degenerative joint disease at the L4-5 and L5-S1 levels most prominent at the L5-S1 level    Thyroid ultrasound 8/17/2021 multiple thyroid nodules bilaterally. CT chest on 08/17/2021  Heterogenous thyroid with multiple areas of nodularity, largest on the right measuring 1.4 cm and left 1.6 cm. Right hilar adenopathy measuring 1.9 x 1.6 cm. There are a few other borderline prominent mediastinal LN.  Noncalcified 1 x 0.8 cm nodule in the posterior right upper lobe has a somewhat necrotic appearance. Cb Thorofare is a very small faint area of ground-glass opacification in the posterior left upper lobe measuring approximately 1 x 1 cm. Noncalcified nodular density in the lateral left costophrenic angle measures 1 cm. Solid renal mass in medial left kidney measures 3.3 x 3 cm. PET/CT 08/24/2021 with increased tracer uptake to single left thyroid mass in the inferior pole with max SUV of 9.6. Increased tracer uptake to right hilum associated with small lymph node with SUV of 4.4   Increased tracer uptake left pericardial region inferior to aortopulmonary window, peak SUV 5.    Pulmonary nodules do not demonstrate increased tracer uptake, this may be related to size and may be below the resolution of PET/CT  Low level uptake at the left renal masses    FNA Left Thyroid biopsy 09/10/2021  Scant cellularity  Negative for malignant cells. Benign-appearing follicular cells, few foam cells, colloid, and blood present. [place] System Category 2. Cellblock is similar. 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 This abuts the left psoas muscle without definite invasion of left psoas muscle. Review of Systems;  CONSTITUTIONAL: No fever, chills. Fair appetite and energy level. ENMT: Eyes: No diplopia; Nose: No epistaxis. Mouth: No sore throat. RESPIRATORY: No hemoptysis, shortness of breath, cough. CARDIOVASCULAR: No chest pain, palpitations. GASTROINTESTINAL: No nausea/vomiting, abdominal pain. GENITOURINARY: No dysuria, urinary frequency, hematuria. NEURO: No syncope, presyncope, headache. Remainder:  ROS NEGATIVE    Past Medical History:      Diagnosis Date    Afib ([facility] [address]     WATCHMAN    Anxiety     Arthritis     Cervical radiculopathy     CHF (congestive heart failure) (HCC)     Chronic sinusitis     Hilar adenopathy     Hx of blood clots     Hypertension     Left ventricular systolic dysfunction     Lesion of left native kidney     Lumbar radiculopathy     Lung nodules     Meige syndrome     partial/ PCP    Microscopic colitis     Mitral regurgitation     Mild to moderate    Nonischemic cardiomyopathy (HCC)     f/u with Dr. Jones Phi Osteopenia     Vertebrobasilar artery syndrome     f/u PCP     Medications:  Reviewed and reconciled. Allergies:   Allergies   Allergen Reactions    Atacand [Candesartan] Hives and Itching    Xarelto [Rivaroxaban] Itching and Rash      severe itch, headache, rash    Adhesive Tape Itching     develops rash and itching with EKG electrodes    Demerol      3/9/19 Pt states she gets a severe migraine    Digoxin Itching     developed itching and rash    Imdur [Isosorbide Mononitrate]       migraines    Losartan Potassium Itching    Shellfish-Derived Products Itching     3/9/19 Pt states she had a lobster pizza and had difficulty breathing, started to sweat and was itchy    Sulfa Antibiotics Diarrhea and Other (See Comments)     Also causes migraines  caused migraines and diarrhea    Cefdinir Hives, Itching and Nausea Only    Acetaminophen Hives and Itching    Apap-Caff-Dihydrocodeine Hives and Itching    Coreg [Carvedilol] Itching     Can take extended release Coreg    Cozaar [Losartan] Itching    Diovan [Valsartan] Itching    Effexor [Venlafaxine Hydrochloride] Nausea Only    Eliquis [Apixaban] Hives, Itching and Rash     3/9/19 Pt states that she gets hives, itchy and a rash    Labetalol Itching    Lisinopril Itching    Ramipril Itching     Physical Exam:  /63   Pulse 61   Temp 97 °F (36.1 °C)   Resp 16   Wt 169 lb 1.6 oz (76.7 kg)   SpO2 96%   BMI 26.48 kg/m²    GENERAL: Alert, oriented x 3, not in acute distress. HEENT: PERRLA; EOMI. Oropharynx clear. NECK: Supple. Without lymphadenopathy. LUNGS: Good air entry bilaterally. No wheezing, crackles or rhonchi. CARDIOVASCULAR: Regular rate. No murmurs, rubs or gallops. ABDOMEN: Soft. Non-tender, non-distended. Positive bowel sounds. EXTREMITIES: Without clubbing or cyanosis or edema. NEUROLOGIC: No focal deficits. ECOG PS 1    Impression/Plan:  67 y/o female with Left renal mass    CT chest on 08/17/2021  Heterogenous thyroid with multiple areas of nodularity, largest on the right measuring 1.4 cm and left 1.6 cm. Right hilar adenopathy measuring 1.9 x 1.6 cm. There are a few other borderline prominent mediastinal LN.  Noncalcified 1 x 0.8 cm nodule in the posterior right upper lobe has a somewhat necrotic appearance. Barbie Section is a very small faint area of ground-glass opacification in the posterior left upper lobe measuring approximately 1 x 1 cm.  Noncalcified nodular density in the lateral left costophrenic angle measures 1 cm. Solid renal mass in medial left kidney measures 3.3 x 3 cm. PET/CT 08/24/2021 with increased tracer uptake to single left thyroid mass in the inferior pole with max SUV of 9.6. Increased tracer uptake to right hilum associated with small lymph node with SUV of 4.4   Increased tracer uptake left pericardial region inferior to aortopulmonary window, peak SUV 5. Pulmonary nodules do not demonstrate increased tracer uptake, this may be related to size and may be below the resolution of PET/CT  Low level uptake at the left renal masses    FNA Left Thyroid biopsy 09/10/2021  Scant cellularity  Negative for malignant cells. Benign-appearing follicular cells, few foam cells, colloid, and blood present. [place] System Category 2. Cellblock is similar. 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 This abuts the left psoas muscle without definite invasion of left psoas muscle. Bronchoscopy/EBUS guided mediastinal Hilar LN biopsies 10/05/2021:  EBUS FNA 11R (adequacy statement satisfactory for interpretation) Negative for malignant cells.  Benign and degenerated bronchial cells, scant lymphocytes and blood present  Seen by Dr. Baljinder Crook 10/15/2021; repeat CT chest in 6 months  Left partial nephrectomy scheduled on 11/16/2021    RTC 2 weeks post-op to review surgical pathology     10/21/2021  Jackson Hirsch MD

## 2021-10-29 ENCOUNTER — OFFICE VISIT (OUTPATIENT)
Dept: CARDIOLOGY CLINIC | Age: 76
End: 2021-10-29
Payer: MEDICARE

## 2021-10-29 VITALS
RESPIRATION RATE: 16 BRPM | DIASTOLIC BLOOD PRESSURE: 60 MMHG | OXYGEN SATURATION: 96 % | HEART RATE: 60 BPM | SYSTOLIC BLOOD PRESSURE: 124 MMHG | WEIGHT: 180 LBS | BODY MASS INDEX: 28.25 KG/M2 | HEIGHT: 67 IN

## 2021-10-29 DIAGNOSIS — Z01.810 PREOP CARDIOVASCULAR EXAM: ICD-10-CM

## 2021-10-29 DIAGNOSIS — I48.0 PAF (PAROXYSMAL ATRIAL FIBRILLATION) (HCC): Primary | ICD-10-CM

## 2021-10-29 DIAGNOSIS — I10 ESSENTIAL HYPERTENSION: ICD-10-CM

## 2021-10-29 DIAGNOSIS — I42.8 NICM (NONISCHEMIC CARDIOMYOPATHY) (HCC): ICD-10-CM

## 2021-10-29 DIAGNOSIS — I38 VHD (VALVULAR HEART DISEASE): ICD-10-CM

## 2021-10-29 PROCEDURE — G8484 FLU IMMUNIZE NO ADMIN: HCPCS | Performed by: INTERNAL MEDICINE

## 2021-10-29 PROCEDURE — G8417 CALC BMI ABV UP PARAM F/U: HCPCS | Performed by: INTERNAL MEDICINE

## 2021-10-29 PROCEDURE — 93000 ELECTROCARDIOGRAM COMPLETE: CPT | Performed by: INTERNAL MEDICINE

## 2021-10-29 PROCEDURE — G8427 DOCREV CUR MEDS BY ELIG CLIN: HCPCS | Performed by: INTERNAL MEDICINE

## 2021-10-29 PROCEDURE — 1090F PRES/ABSN URINE INCON ASSESS: CPT | Performed by: INTERNAL MEDICINE

## 2021-10-29 PROCEDURE — 99214 OFFICE O/P EST MOD 30 MIN: CPT | Performed by: INTERNAL MEDICINE

## 2021-10-29 PROCEDURE — 1123F ACP DISCUSS/DSCN MKR DOCD: CPT | Performed by: INTERNAL MEDICINE

## 2021-10-29 PROCEDURE — 1036F TOBACCO NON-USER: CPT | Performed by: INTERNAL MEDICINE

## 2021-10-29 PROCEDURE — G8399 PT W/DXA RESULTS DOCUMENT: HCPCS | Performed by: INTERNAL MEDICINE

## 2021-10-29 PROCEDURE — 4040F PNEUMOC VAC/ADMIN/RCVD: CPT | Performed by: INTERNAL MEDICINE

## 2021-10-29 NOTE — PROGRESS NOTES
OFFICE VISIT     PRIMARY CARE PHYSICIAN:      Trisha Cardona DO       ALLERGIES / SENSITIVITIES:        Allergies   Allergen Reactions    Atacand [Candesartan] Hives and Itching    Xarelto [Rivaroxaban] Itching and Rash      severe itch, headache, rash    Adhesive Tape Itching     develops rash and itching with EKG electrodes    Demerol      3/9/19 Pt states she gets a severe migraine    Digoxin Itching     developed itching and rash    Imdur [Isosorbide Mononitrate]       migraines    Losartan Potassium Itching    Shellfish-Derived Products Itching     3/9/19 Pt states she had a lobster pizza and had difficulty breathing, started to sweat and was itchy    Sulfa Antibiotics Diarrhea and Other (See Comments)     Also causes migraines  caused migraines and diarrhea    Cefdinir Hives, Itching and Nausea Only    Acetaminophen Hives and Itching    Apap-Caff-Dihydrocodeine Hives and Itching    Coreg [Carvedilol] Itching     Can take extended release Coreg    Cozaar [Losartan] Itching    Diovan [Valsartan] Itching    Effexor [Venlafaxine Hydrochloride] Nausea Only    Eliquis [Apixaban] Hives, Itching and Rash     3/9/19 Pt states that she gets hives, itchy and a rash    Labetalol Itching    Lisinopril Itching    Ramipril Itching          REVIEWED MEDICATIONS:        Current Outpatient Medications:     mirabegron (MYRBETRIQ) 25 MG TB24, 25 mg every evening , Disp: , Rfl:     aspirin EC 81 MG EC tablet, Take 1 tablet by mouth daily, Disp: 90 tablet, Rfl: 3    dicyclomine (BENTYL) 10 MG capsule, Take 10 mg by mouth three times daily , Disp: , Rfl:     hydrOXYzine (ATARAX) 10 MG tablet, Take 1 tablet by mouth every 8 hours as needed for Itching, Disp: 30 tablet, Rfl: 3    furosemide (LASIX) 20 MG tablet, Take 1 tablet by mouth daily, Disp: 90 tablet, Rfl: 3    carvedilol (COREG CR) 20 MG CP24 extended release capsule, Take 1 capsule by mouth daily, Disp: 90 capsule, Rfl: 3    dofetilide (TIKOSYN) 250 MCG capsule, Take 1 capsule by mouth every 12 hours, Disp: 180 capsule, Rfl: 1    hydrALAZINE (APRESOLINE) 10 MG tablet, Take 1 tablet by mouth 3 times daily, Disp: 270 tablet, Rfl: 3    spironolactone (ALDACTONE) 25 MG tablet, Take 1 tablet by mouth daily, Disp: 90 tablet, Rfl: 3    famotidine (PEPCID) 40 MG tablet, Take 40 mg by mouth 2 times daily as needed , Disp: , Rfl:     omeprazole (PRILOSEC) 40 MG delayed release capsule, Take 40 mg by mouth daily , Disp: , Rfl:     triamcinolone (KENALOG) 0.1 % cream, Apply topically 3 times daily as needed (rash) , Disp: , Rfl: 1    loperamide (IMODIUM) 2 MG capsule, Take 2 mg by mouth 4 times daily as needed for Diarrhea, Disp: , Rfl:     diphenhydrAMINE (BENADRYL) 25 MG tablet, Take 25 mg by mouth every 6 hours as needed for Itching, Disp: , Rfl:     vitamin D (CHOLECALCIFEROL) 1000 UNIT TABS tablet, Take 1,000 Units by mouth daily, Disp: , Rfl:     baclofen (LIORESAL) 20 MG tablet, Take 20 mg by mouth 2 times daily as needed (spasms) Indications: Back Spasm, Bladder Spasm , Disp: , Rfl:     Handicap Placard MISC, Cannot walk more than 200 feet Expiration: 7/12/2026, Disp: 1 each, Rfl: 0      S: REASON FOR VISIT:       Chief Complaint   Patient presents with    Cardiac Clearance     2 week ov. Needs cardiac clearance for left partial nephrectomy 11/16/21 W/Dr Joesph Gonzáles. S/P JAIME 10/18/21. No cardiac complaints.      Cardiomyopathy    Atrial Fibrillation          History of Present Illness:       Office Visit for follow up of VHD, PAF, CMP, Preop clearacne   68 yr old with Hx of PAF, VHD, HTN came for Preop clearacne and f/u visit   Needs Left renal surgery for 'mass', on 11/16/21 Dr Joesph Gonzáles   Seen by Dr Belle Cotto 10/2021, Had JAIME 10/18/21 for f/u of LA thrombus-Off Plavix   No hospitalizations or surgeries since last visit   Patient is compliant with all medications   Matthew any exertional chest pain or short of breath   No palpitations, dizzy or syncope. Active at home   No orthopnea   Try to watch diet          Past Medical History:   Diagnosis Date    Afib ([address]     WATCHMAN    Anxiety     Arthritis     Cervical radiculopathy     CHF (congestive heart failure) (HCC)     Chronic sinusitis     Hilar adenopathy     Hx of blood clots     Hypertension     Left ventricular systolic dysfunction     Lesion of left native kidney     Lumbar radiculopathy     Lung nodules     Meige syndrome     partial/ PCP    Microscopic colitis     Mitral regurgitation     Mild to moderate    Nonischemic cardiomyopathy ([address]     f/u with Dr. Art Hdz Osteopenia     Vertebrobasilar artery syndrome     f/u PCP            Past Surgical History:   Procedure Laterality Date    BLADDER REPAIR      BRONCHOSCOPY N/A 10/5/2021    BRONCHOSCOPY W/EBUS FNA performed by Le[facility] 10/5/2021    BRONCHOSCOPY DIAGNOSTIC OR CELL [address] ONLY performed by Leandro Viera MD at [address] TEST  11/25/13    Rt & LT cath    CARDIOVERSION  11/04/2019    Successful CV from AF to NSR   (Dr. Juanpablo Liu)    COLONOSCOPY  07/2020    DIAGNOSTIC CARDIAC CATH LAB PROCEDURE  2/20/10    Dr Buck Broure CATH LAB PROCEDURE  8/24/11    Right heart [facility] @ [facility].     DOPPLER ECHOCARDIOGRAPHY  11/25/13    HYSTERECTOMY  1991    total    OTHER SURGICAL HISTORY  10/14/2020    Dr. Sarai Sanders- 24mm Watchman device    TRANSESOPHAGEAL ECHOCARDIOGRAM  11/21/2018    Dr. Rob Bryan TRANSESOPHAGEAL ECHOCARDIOGRAM  03/18/2019    WISDOM TOOTH EXTRACTION            Family History   Problem Relation Age of Onset    Heart Attack Mother     Stroke Mother     Heart Attack Father     Heart Failure Father     Heart Disease Father     Anxiety Disorder Sister     Depression Sister     Crohn's Disease Son           Social History     Tobacco Use    Smoking status: Never Smoker    Smokeless tobacco: Never Used   Substance Use Topics    Alcohol use: Yes     Alcohol/week: 0.0 standard drinks     Comment: occasional wine/mixed drink every few months         Review of Systems:  Constitutional: negative for fever and chills, or significant weight loss  HEENT: negative for acute visual symptoms or auditory problems, no dysphagia  Respiratory: negative for cough, wheezing, or hemoptysis  Cardiovascular: negative for chest pain, palpitations, and dyspnea  Gastrointestinal: negative for abdominal pain, diarrhea, nausea and vomiting  Endocrine: Negative for polyuria and polydyspsia  Genitourinary:negative for dysuria and hematuria  Derm: negative for rash and skin lesion(s)  Neurological: negative for tingling, numbness, weakness, seizures and tremors  Endocrine: negative for polydipsia and polyuria  Musculoskeletal: negative for pain or tenderness  Psychiatric: negative for anxiety, depression, or suicidal ideations         O:  COMPLETE PHYSICAL EXAM:       /60 (Site: Right Upper Arm, Position: Sitting, Cuff Size: Medium Adult)   Pulse 60   Resp 16   Ht 5' 7\" (1.702 m)   Wt 180 lb (81.6 kg)   SpO2 96%   BMI 28.19 kg/m²       General:   Patient alert, comfortable, no distress. Appears stated age. HEENT:    Pupils equal, no icterus, no nasal drainage, tongue moist.   Neck:              No masses, Thyroid not palpable. No elevated JVD, No carotid bruit. Chest:   Normal configuration, non tender. Lungs:   Clear to auscultation bilaterally, few scattered rhonchi. Cardiovascular:  Irregularly irregular rhythm, 2/6 systolic murmur, No S3, no palpable thrills,    Abdomen:  Soft, Non tender, Bowel sounds normal.   Extremities:  No edema. Distal pulses palpable. No cyanosis, no clubbing. Skin:   Good turgor, warm and dry, no cyanosis. Musculoskeletal: No joint swelling or deformity. Neuro:   Cranial nerves grossly intact; No focal neurologic deficit.    Psych:   Alert, good mood and effect. REVIEW OF DIAGNOSTIC TESTS:        Electrocardiogram: Reviewed      JAIME 10/18/21   Summary   Ejection fraction is visually estimated at 45-50%. Normal right ventricular size and function. No evidence of interatrial shunting on bubble study. History of WATCHMAN device (24 mm). No significant color flow jet around   the device. No device related thrombus visualized. Moderate mitral regurgitation. Mild aortic regurgitation. Moderate tricuspid regurgitation. RV-RA gradient is estimated at 82 mmHg.    ----------------------------------------------------------------   Electronically signed by Paul Mcmahan MD         JAIME 7/16/21   Summary   24 mm WATCHMAN device present in the LA appendage. Previously visualized device associated thrombus is no longer present.     ----------------------------------------------------------------   Electronically signed by Jessica Corona MD  A/P:   ASSESSMENT / PLAN:    Rasheed Browning was seen today for cardiac clearance, cardiomyopathy and atrial fibrillation. Diagnoses and all orders for this visit:      Preop cardiovascular exam  - Cleared for her surgical procedure, Send letter of clearacne to Dr Jacinto Beat  -     EKG 12 Lead     Hx Thrombus of left atrial appendage Found on JAIME 11/21/18 and 3/2019, On Xarelto for 2 months, then DAPT,  Resolved on JAIME 5/6/2019 and 7/16/21 and 10/18/21 JAIME       Nonischemic cardiomyopathy ([address] EF 38% by Echo 12/2016; JAIME 3/16/2019- EF 25-30%; JAIME 5/6/2019- EF 25-30%; Benefits of Life Vest discussed-She wants to think about it. Lexiscan stress 11/21/2018 -Fixed anterior defect EF 23%; EF 35-40% by Echo 7/8/2019 - On maximal tolerable BB and Aldactone; Allergic/Intolerance to ACE/ARB/ Nitrates.    -     EKG 12 Lead     Paroxysmal atrial fibrillation ([address]- Dx 11/2018 - CHADS2 VASc score 4; On Xarelto (did not tolerated Eliquis) Rates controlled on Coreg; add Digoxin for rate control if needed; Seen by EP at [facility]- Dr. Haven Armas 4/2019 - He recommended Rhythm management; Seen by Dr Hortensia Cotton, s/p Watchman #[id number] on 10/14/2020,  On ASA+Plavix, Now on ASA and [address] [place] EP for opinion on continuation of Tikosyn. Pulmonary hypertension ([address] Secondary: PA and RV 50mmHg by [address] in 2010 at [facility] and in 2011 at [facility]; Recommended Long acting Nitrates; Not on Nitrates due to Nitrate intolerance - JAIME 10//21    -     EKG 12 Lead     Essential hypertension - Controlled     Valvular heart disease, Moderate Mitral, TR, mild AR, JAIME 10/2021      Migraine without aura and without status migrainosus, not intractable     Multiple drug allergies     Allergy to ACE inhibitors     Lumbar radiculopathy; chronic     Non morbid obesity;  Diet, exercise and weight loss discussed. Preventive Cardiology: Low cholesterol diet, regular exercise as tolerate, and gradual weight loss discussed. Monitor BP and heart rates. Above recommendations discussed with her. All questions answered about cardiac diagnoses and cardiac medications. Continue current medications. Compliance with medications and f/u with all physicians discussed. Risk factor modification based on risk profile discussed. Call if any exertional chest pain, short of breath, dizzy or palpitations   Follow up in 6 months or earlier if needed.          [facility]  [address], [place], [address] [facility]  [phone]

## 2021-11-02 ENCOUNTER — OFFICE VISIT (OUTPATIENT)
Dept: FAMILY MEDICINE CLINIC | Age: 76
End: 2021-11-02
Payer: MEDICARE

## 2021-11-02 VITALS
HEIGHT: 67 IN | SYSTOLIC BLOOD PRESSURE: 116 MMHG | WEIGHT: 178 LBS | BODY MASS INDEX: 27.94 KG/M2 | DIASTOLIC BLOOD PRESSURE: 64 MMHG | HEART RATE: 79 BPM | TEMPERATURE: 97.8 F | OXYGEN SATURATION: 98 % | RESPIRATION RATE: 16 BRPM

## 2021-11-02 DIAGNOSIS — Z01.818 PREOP EXAMINATION: Primary | ICD-10-CM

## 2021-11-02 PROCEDURE — G8427 DOCREV CUR MEDS BY ELIG CLIN: HCPCS | Performed by: STUDENT IN AN ORGANIZED HEALTH CARE EDUCATION/TRAINING PROGRAM

## 2021-11-02 PROCEDURE — G8399 PT W/DXA RESULTS DOCUMENT: HCPCS | Performed by: STUDENT IN AN ORGANIZED HEALTH CARE EDUCATION/TRAINING PROGRAM

## 2021-11-02 PROCEDURE — G8417 CALC BMI ABV UP PARAM F/U: HCPCS | Performed by: STUDENT IN AN ORGANIZED HEALTH CARE EDUCATION/TRAINING PROGRAM

## 2021-11-02 PROCEDURE — 99213 OFFICE O/P EST LOW 20 MIN: CPT | Performed by: STUDENT IN AN ORGANIZED HEALTH CARE EDUCATION/TRAINING PROGRAM

## 2021-11-02 PROCEDURE — 4040F PNEUMOC VAC/ADMIN/RCVD: CPT | Performed by: STUDENT IN AN ORGANIZED HEALTH CARE EDUCATION/TRAINING PROGRAM

## 2021-11-02 PROCEDURE — 1123F ACP DISCUSS/DSCN MKR DOCD: CPT | Performed by: STUDENT IN AN ORGANIZED HEALTH CARE EDUCATION/TRAINING PROGRAM

## 2021-11-02 PROCEDURE — G8484 FLU IMMUNIZE NO ADMIN: HCPCS | Performed by: STUDENT IN AN ORGANIZED HEALTH CARE EDUCATION/TRAINING PROGRAM

## 2021-11-02 PROCEDURE — 1036F TOBACCO NON-USER: CPT | Performed by: STUDENT IN AN ORGANIZED HEALTH CARE EDUCATION/TRAINING PROGRAM

## 2021-11-02 PROCEDURE — 1090F PRES/ABSN URINE INCON ASSESS: CPT | Performed by: STUDENT IN AN ORGANIZED HEALTH CARE EDUCATION/TRAINING PROGRAM

## 2021-11-02 NOTE — PROGRESS NOTES
Preoperative Consultation      Joel Elias  YOB: 1945    Date of Service:  11/2/2021    Vitals:    11/02/21 1250   BP: 116/64   Site: Right Upper Arm   Pulse: 79   Resp: 16   Temp: 97.8 °F (36.6 °C)   TempSrc: Temporal   SpO2: 98%   Weight: 178 lb (80.7 kg)   Height: 5' 7\" (1.702 m)      Wt Readings from Last 2 Encounters:   11/02/21 178 lb (80.7 kg)   10/29/21 180 lb (81.6 kg)     BP Readings from Last 3 Encounters:   11/02/21 116/64   10/29/21 124/60   10/21/21 132/63        Chief Complaint   Patient presents with   Sylvia Bran Pre-op Exam     Left partial nephrectomy on 11/16 with Dr. Nereyda Figueroa and Itching    Xarelto [Rivaroxaban] Itching and Rash      severe itch, headache, rash    Adhesive Tape Itching     develops rash and itching with EKG electrodes    Demerol      3/9/19 Pt states she gets a severe migraine    Digoxin Itching     developed itching and rash    Imdur [Isosorbide Mononitrate]       migraines    Losartan Potassium Itching    Shellfish-Derived Products Itching     3/9/19 Pt states she had a lobster pizza and had difficulty breathing, started to sweat and was itchy    Sulfa Antibiotics Diarrhea and Other (See Comments)     Also causes migraines  caused migraines and diarrhea    Cefdinir Hives, Itching and Nausea Only    Acetaminophen Hives and Itching    Apap-Caff-Dihydrocodeine Hives and Itching    Coreg [Carvedilol] Itching     Can take extended release Coreg    Cozaar [Losartan] Itching    Diovan [Valsartan] Itching    Effexor [Venlafaxine Hydrochloride] Nausea Only    Eliquis [Apixaban] Hives, Itching and Rash     3/9/19 Pt states that she gets hives, itchy and a rash    Labetalol Itching    Lisinopril Itching    Ramipril Itching     Outpatient Medications Marked as Taking for the 11/2/21 encounter (Office Visit) with Abraham Codi, DO   Medication Sig Dispense Refill    mirabegron (MYRBETRIQ) 25 MG TB24 25 mg every evening       aspirin EC 81 MG EC tablet Take 1 tablet by mouth daily 90 tablet 3    dicyclomine (BENTYL) 10 MG capsule Take 10 mg by mouth three times daily       hydrOXYzine (ATARAX) 10 MG tablet Take 1 tablet by mouth every 8 hours as needed for Itching 30 tablet 3    Handicap Placard MISC Cannot walk more than 200 feet  Expiration: 7/12/2026 1 each 0    furosemide (LASIX) 20 MG tablet Take 1 tablet by mouth daily 90 tablet 3    carvedilol (COREG CR) 20 MG CP24 extended release capsule Take 1 capsule by mouth daily 90 capsule 3    dofetilide (TIKOSYN) 250 MCG capsule Take 1 capsule by mouth every 12 hours 180 capsule 1    hydrALAZINE (APRESOLINE) 10 MG tablet Take 1 tablet by mouth 3 times daily 270 tablet 3    spironolactone (ALDACTONE) 25 MG tablet Take 1 tablet by mouth daily 90 tablet 3    famotidine (PEPCID) 40 MG tablet Take 40 mg by mouth 2 times daily as needed       omeprazole (PRILOSEC) 40 MG delayed release capsule Take 40 mg by mouth daily       triamcinolone (KENALOG) 0.1 % cream Apply topically 3 times daily as needed (rash)   1    loperamide (IMODIUM) 2 MG capsule Take 2 mg by mouth 4 times daily as needed for Diarrhea      diphenhydrAMINE (BENADRYL) 25 MG tablet Take 25 mg by mouth every 6 hours as needed for Itching      vitamin D (CHOLECALCIFEROL) 1000 UNIT TABS tablet Take 1,000 Units by mouth daily      baclofen (LIORESAL) 20 MG tablet Take 20 mg by mouth 2 times daily as needed (spasms) Indications: Back Spasm, Bladder Spasm          This patient presents to the office today for a preoperative consultation at the request of surgeon, Dr. Shamika Carrillo, who plans on performing left partial nephrectomy on November 16 at [facility]. E's main. The current problem began a few months ago, and symptoms have been stable with time.   Conservative therapy: No.    Planned anesthesia: General   Known anesthesia problems: None   Bleeding risk: No recent or remote history of abnormal bleeding  Personal or FH of DVT/PE: Yes - she had the blood clots in the heart with her watchman device- are gone at this time.  Cardiology has cleared patient     Patient objection to receiving blood products: No    Patient Active Problem List   Diagnosis    Anxiety    [facility]ischemic cardiomyopathy [facility]    Severe mitral regurgitation    Lumbar radiculopathy    Cervical radiculopathy    HTN (hypertension), benign    Left atrial thrombus    PAF (paroxysmal atrial fibrillation) (HCC)    Chronic HFrEF (heart failure with reduced ejection fraction) ([facility]    Visit for monitoring Tikosyn therapy    Encounter for medication refill    Itching    Chronic anticoagulation    Presence of Watchman left atrial appendage closure device       Past Medical History:   Diagnosis Date    Afib ([facility]     WATCHMAN    Anxiety     Arthritis     Cervical radiculopathy     CHF (congestive heart failure) (HCC)     Chronic sinusitis     Hilar adenopathy     Hx of blood clots     Hypertension     Left ventricular systolic dysfunction     Lesion of left native kidney     Lumbar radiculopathy     Lung nodules     Meige syndrome     partial/ PCP    Microscopic colitis     Mitral regurgitation     Mild to moderate    Nonischemic cardiomyopathy ([facility]     f/u with Dr. Mamta Yoo Osteopenia     Vertebrobasilar artery syndrome     f/u PCP     Past Surgical History:   Procedure Laterality Date    BLADDER REPAIR      BRONCHOSCOPY N/A 10/5/2021    BRONCHOSCOPY W/EBUS [facility]NA [facility]    BRONCHOSCOPY DIAGNOSTIC OR CELL [facility] ONLY performed by Osmel Cornejo MD at [address] TEST  11/25/13    Rt & LT cath    CARDIOVERSION  11/04/2019    Successful CV from AF to NSR   (Dr. Kaylee Marvin)    COLONOSCOPY  07/2020    DIAGNOSTIC CARDIAC CATH LAB PROCEDURE  2/20/10    Dr Jamie Marques CATH LAB PROCEDURE  8/24/11    [facility] heart [facility].  DOPPLER ECHOCARDIOGRAPHY  11/25/13    HYSTERECTOMY  1991    total    OTHER SURGICAL HISTORY  10/14/2020    Dr. Akosua Nolan- 24mm Watchman device    TRANSESOPHAGEAL ECHOCARDIOGRAM  11/21/2018    Dr. Michelle Public TRANSESOPHAGEAL ECHOCARDIOGRAM  03/18/2019    WISDOM TOOTH EXTRACTION       Family History   Problem Relation Age of Onset    Heart Attack Mother     Stroke Mother     Heart Attack Father     Heart Failure Father     Heart Disease Father     Anxiety Disorder Sister     Depression Sister     Crohn's Disease Son      Social History     Socioeconomic History    Marital status:      Spouse name: Not on file    Number of children: Not on file    Years of education: Not on file    Highest education level: Not on file   Occupational History    Not on file   Tobacco Use    Smoking status: Never Smoker    Smokeless tobacco: Never Used   Vaping Use    Vaping Use: Never used   Substance and Sexual Activity    Alcohol use: Yes     Alcohol/week: 0.0 standard drinks     Comment: occasional wine/mixed drink every few months    Drug use: Never    Sexual activity: Not on file     Comment:    Other Topics Concern    Not on file   Social History Narrative    Drinks 1-2 cups of coffee daily. Social Determinants of Health     Financial Resource Strain: Low Risk     Difficulty of Paying Living Expenses: Not hard at all   Food Insecurity: No Food Insecurity    Worried About Running Out of Food in the Last Year: Never true    Makayla of Food in the Last Year: Never true   Transportation Needs:     Lack of Transportation (Medical):      Lack of Transportation (Non-Medical):    Physical Activity:     Days of Exercise per Week:     Minutes of Exercise per Session:    Stress:     Feeling of Stress :    Social Connections:     Frequency of Communication with Friends and Family:     Frequency of Social Gatherings with Friends and Family:     Attends Oriental orthodox Services:     Active Member of Clubs or Organizations:     Attends Club or Organization Meetings:     Marital Status:    Intimate Partner Violence:     Fear of Current or Ex-Partner:     Emotionally Abused:     Physically Abused:     Sexually Abused:        Review of Systems  A comprehensive review of systems was negative except for what was noted in the HPI. Physical Exam   Constitutional: She is oriented to person, place, and time. She appears well-developed and well-nourished. No distress. HENT:   Head: Normocephalic and atraumatic. Mouth/Throat: Uvula is midline, oropharynx is clear and moist and mucous membranes are normal.   Eyes: Conjunctivae and EOM are normal. Pupils are equal, round, and reactive to light. Neck: Trachea normal and normal range of motion. Neck supple. No JVD present. Carotid bruit is not present. No mass and no thyromegaly present. Cardiovascular: Normal rate, regular rhythm, normal heart sounds and intact distal pulses. Exam reveals no gallop and no friction rub. No murmur heard. Pulmonary/Chest: Effort normal and breath sounds normal. No respiratory distress. She has no wheezes. She has no rales. Abdominal: Soft. Normal aorta and bowel sounds are normal. She exhibits no distension and no mass. There is no hepatosplenomegaly. No tenderness. Musculoskeletal: She exhibits no edema and no tenderness. Neurological: She is alert and oriented to person, place, and time. She has normal strength. No cranial nerve deficit or sensory deficit. Coordination and gait normal.   Skin: Skin is warm and dry. No rash noted. No erythema. Psychiatric: She has a normal mood and affect. Her behavior is normal.     EKG Interpretation:  normal EKG, normal sinus rhythm, unchanged from previous tracings. Lab Review she is getting blood work on the 11th. She does not think she needs a CXR.  She just had one in October which was normal        Assessment: 68 y.o. patient with planned surgery as above. Known risk factors for perioperative complications: Hypertension and hx of blood clots  Current medications which may produce withdrawal symptoms if withheld perioperatively: none      Plan:     1. Preoperative workup as follows: chest x-ray, ECG, blood work on the 11th  2. Change in medication regimen before surgery: hold medications as per surgery team  3. Prophylaxis for cardiac events with perioperative beta-blockers: Not indicated  ACC/AHA indications for pre-operative beta-blocker use:    · Vascular surgery with history of postitive stress test  · Intermediate or high risk surgery with history of CAD   · Intermediate or high risk surgery with multiple clinical predictors of CAD- 2 of the following: history of compensated or prior heart failure, history of cerebrovascular disease, DM, or renal insufficiency    Routine administration of higher-dose, long-acting metoprolol in beta-blockernaïve patients on the day of surgery, and in the absence of dose titration is associated with an overall increase in mortality. Beta-blockers should be started days to weeks prior to surgery and titrated to pulse < 70.  4. Deep vein thrombosis prophylaxis: regimen to be chosen by surgical team but needs to be watched carefully due to her history of clots  5.  No contraindications to planned surgery and cardiology cleared patient as well     Trisha Cardona DO

## 2021-11-05 ENCOUNTER — PREP FOR PROCEDURE (OUTPATIENT)
Dept: UROLOGY | Age: 76
End: 2021-11-05

## 2021-11-05 DIAGNOSIS — N28.89 RENAL MASS: Primary | ICD-10-CM

## 2021-11-05 RX ORDER — SODIUM CHLORIDE 9 MG/ML
INJECTION, SOLUTION INTRAVENOUS CONTINUOUS
Status: CANCELLED | OUTPATIENT
Start: 2021-11-05

## 2021-11-05 RX ORDER — SODIUM CHLORIDE 0.9 % (FLUSH) 0.9 %
10 SYRINGE (ML) INJECTION PRN
Status: CANCELLED | OUTPATIENT
Start: 2021-11-05

## 2021-11-05 RX ORDER — CIPROFLOXACIN 2 MG/ML
400 INJECTION, SOLUTION INTRAVENOUS
Status: CANCELLED | OUTPATIENT
Start: 2021-11-05 | End: 2021-11-05

## 2021-11-05 RX ORDER — SODIUM CHLORIDE 9 MG/ML
25 INJECTION, SOLUTION INTRAVENOUS PRN
Status: CANCELLED | OUTPATIENT
Start: 2021-11-05

## 2021-11-05 RX ORDER — SODIUM CHLORIDE 0.9 % (FLUSH) 0.9 %
10 SYRINGE (ML) INJECTION EVERY 12 HOURS SCHEDULED
Status: CANCELLED | OUTPATIENT
Start: 2021-11-05

## 2021-11-05 NOTE — PROGRESS NOTES
[facility] [facility] PRE-ADMISSION TESTING GENERAL INSTRUCTIONS- [facility]-phone number:[phone]    GENERAL INSTRUCTIONS  [x] Antibacterial Soap shower Night before and/or AM of Surgery. [x] Nothing by mouth after midnight [x] You may brush your teeth, gargle, but do NOT swallow water. [x]No smoking, chewing tobacco, illegal drugs, or alcohol within 24 hours of your surgery. [x] Jewelry, valuables or body piercing's should not be brought to the hospital. All body and/or tongue piercing's must be removed prior to arriving to hospital.  ALL hair pins must be removed. [x] Do not wear makeup, lotions, powders, deodorant. Nail polish as directed by the nurse. [x] Arrange transportation with a responsible adult  to and from the hospital. .  [x] Transfusion Bracelet: Please bring with you to hospital, day of surgery  [x] Bring copy of living will or healthcare power of  papers to be placed in your electronic record. PARKING INSTRUCTIONS:   [x] Arrival Time:0900 for surgery 11-16 tuesday with dr Toma Bermudez_____________  · [x] Parking lot '\"I\"  is located on [facility] (the corner of [facility]). To enter, press the button and the gate will lift. A free token will be provided to exit the lot. EDUCATION INSTRUCTIONS:       [x] Pre-admission Testing educational folder given  [x] Incentive Spirometry,coughing & deep breathing exercises reviewed. .    [x]Pain: Post-op pain is normal and to be expected. You will be asked to rate your pain from 0-10(a zero is not acceptable-education is needed). Your post-op pain goal is:  [x] Ask your nurse for your pain medication. [] Other:___________________________    MEDICATION INSTRUCTIONS:   [x]Bring a complete list of your medications, please write the last time you took the medicine, give this list to the nurse. SEE MED SHEET   [x] Take the following medications the morning of surgery with 1-2 ounces of water: [facility] [x] Stop herbal supplements and vitamins 5 days before your surgerY   [x] Follow physician instructions regarding any blood thinners you may be taking. ASA    WHAT TO EXPECT:  [x] The day of surgery you will be greeted and checked in by the [facility] [employer] [facility].  In addition, you will be registered in the [facility] by a Patient Access Representative. Please bring your photo ID and insurance card. A nurse will greet you in accordance to the time you are needed in the pre-op area to prepare you for surgery. Please do not be discouraged if you are not greeted in the order you arrive as there are many variables that are involved in patient preparation. Your patience is greatly appreciated as you wait for your nurse. Please bring in items such as: books, magazines, newspapers, electronics, or any other items  to occupy your time in the waiting area. [x]  Delays may occur with surgery and staff will make a sincere effort to keep you informed of delays. If any delays occur with your procedure, we apologize ahead of time for your inconvenience as we recognize the value of your time.

## 2021-11-08 ENCOUNTER — OFFICE VISIT (OUTPATIENT)
Dept: FAMILY MEDICINE CLINIC | Age: 76
End: 2021-11-08
Payer: MEDICARE

## 2021-11-08 VITALS
RESPIRATION RATE: 18 BRPM | HEIGHT: 67 IN | BODY MASS INDEX: 28.09 KG/M2 | OXYGEN SATURATION: 97 % | WEIGHT: 179 LBS | HEART RATE: 65 BPM | TEMPERATURE: 98 F

## 2021-11-08 DIAGNOSIS — R39.9 UTI SYMPTOMS: ICD-10-CM

## 2021-11-08 DIAGNOSIS — N30.00 ACUTE CYSTITIS WITHOUT HEMATURIA: ICD-10-CM

## 2021-11-08 DIAGNOSIS — R39.9 UTI SYMPTOMS: Primary | ICD-10-CM

## 2021-11-08 LAB
BILIRUBIN, POC: ABNORMAL
BLOOD URINE, POC: ABNORMAL
CLARITY, POC: ABNORMAL
COLOR, POC: YELLOW
GLUCOSE URINE, POC: ABNORMAL
KETONES, POC: ABNORMAL
LEUKOCYTE EST, POC: ABNORMAL
NITRITE, POC: POSITIVE
PH, POC: 5.5
PROTEIN, POC: ABNORMAL
SPECIFIC GRAVITY, POC: 1.01
UROBILINOGEN, POC: 0.2

## 2021-11-08 PROCEDURE — 1090F PRES/ABSN URINE INCON ASSESS: CPT | Performed by: STUDENT IN AN ORGANIZED HEALTH CARE EDUCATION/TRAINING PROGRAM

## 2021-11-08 PROCEDURE — G8427 DOCREV CUR MEDS BY ELIG CLIN: HCPCS | Performed by: STUDENT IN AN ORGANIZED HEALTH CARE EDUCATION/TRAINING PROGRAM

## 2021-11-08 PROCEDURE — 1036F TOBACCO NON-USER: CPT | Performed by: STUDENT IN AN ORGANIZED HEALTH CARE EDUCATION/TRAINING PROGRAM

## 2021-11-08 PROCEDURE — 4040F PNEUMOC VAC/ADMIN/RCVD: CPT | Performed by: STUDENT IN AN ORGANIZED HEALTH CARE EDUCATION/TRAINING PROGRAM

## 2021-11-08 PROCEDURE — G8484 FLU IMMUNIZE NO ADMIN: HCPCS | Performed by: STUDENT IN AN ORGANIZED HEALTH CARE EDUCATION/TRAINING PROGRAM

## 2021-11-08 PROCEDURE — 1123F ACP DISCUSS/DSCN MKR DOCD: CPT | Performed by: STUDENT IN AN ORGANIZED HEALTH CARE EDUCATION/TRAINING PROGRAM

## 2021-11-08 PROCEDURE — 99213 OFFICE O/P EST LOW 20 MIN: CPT | Performed by: STUDENT IN AN ORGANIZED HEALTH CARE EDUCATION/TRAINING PROGRAM

## 2021-11-08 PROCEDURE — G8417 CALC BMI ABV UP PARAM F/U: HCPCS | Performed by: STUDENT IN AN ORGANIZED HEALTH CARE EDUCATION/TRAINING PROGRAM

## 2021-11-08 PROCEDURE — G8399 PT W/DXA RESULTS DOCUMENT: HCPCS | Performed by: STUDENT IN AN ORGANIZED HEALTH CARE EDUCATION/TRAINING PROGRAM

## 2021-11-08 PROCEDURE — 81002 URINALYSIS NONAUTO W/O SCOPE: CPT | Performed by: STUDENT IN AN ORGANIZED HEALTH CARE EDUCATION/TRAINING PROGRAM

## 2021-11-08 RX ORDER — NITROFURANTOIN 25; 75 MG/1; MG/1
100 CAPSULE ORAL 2 TIMES DAILY
Qty: 10 CAPSULE | Refills: 0 | Status: SHIPPED | OUTPATIENT
Start: 2021-11-08 | End: 2021-11-13

## 2021-11-08 NOTE — PROGRESS NOTES
Chief Complaint:   Urinary Tract Infection (Possible UTI / Burning and pressure x few days / concerned suhaile she is scheduled for LT neprectomy on 11/16/21 )      History of Present Illness   Source of history provided by:  patient. Naeem Daniels is a 68 y.o. old female who has a past medical history of:   Past Medical History:   Diagnosis Date    Afib ([facility]     WATCHMAN    Anxiety     Arthritis     Cervical radiculopathy     CHF (congestive heart failure) (HCC)     Chronic sinusitis     Hilar adenopathy     Hx of blood clots     Hypertension     Left ventricular systolic dysfunction     Lesion of left native kidney     Lumbar radiculopathy     Lung nodules     Meige syndrome     partial/ PCP    Microscopic colitis     Mitral regurgitation     Mild to moderate    Nonischemic cardiomyopathy ([facility]     f/u with Dr. Gayle Quiroz Osteopenia     Vertebrobasilar artery syndrome     f/u PCP    Presents to the walk in clinic with complaints of dysuria and suprapubic pressure and low urine volume x several days. Reports associated frequency, urgency, and suprapubic pressure. Denies gross hematuria.  some associated flank pain. Denies any fever, chills, vaginal discharge, vaginal bleeding, possibility of pregnancy, vomiting, diarrhea, or lethargy. No LMP recorded. Patient has had a hysterectomy. discussed with patient to call Dr. Rm Jacobs and let him know but that she should be okay to have the surgery.        ROS    Unless otherwise stated in this report or unable to obtain because of the patient's clinical or mental status as evidenced by the medical record, this patients's positive and negative responses for Review of Systems, constitutional, psych, eyes, ENT, cardiovascular, respiratory, gastrointestinal, neurological, genitourinary, musculoskeletal, integument systems and systems related to the presenting problem are either stated in the preceding or were not pertinent or were negative for the symptoms and/or complaints related to the medical problem. Past Surgical history:   Past Surgical History:   Procedure Laterality Date    BLADDER REPAIR      BRONCHOSCOPY N/A 10/5/2021    BRONCHOSCOPY W/EBUS FNA [facility]os [facility]2021    BRONCHOSCOPY DIAGNOSTIC OR CELL [address] ONLY performed by Uche Matamoros MD at [address] TEST  11/25/13    Rt & LT cath    CARDIOVERSION  11/04/2019    Successful CV from AF to NSR   (Dr. Mauricio Panchal)    COLONOSCOPY  07/2020    DIAGNOSTIC CARDIAC CATH LAB PROCEDURE  2/20/10    Dr Hayley Lux CATH LAB PROCEDURE  8/24/11    Right heart cath @ [facility].  DOPPLER ECHOCARDIOGRAPHY  11/25/13    HYSTERECTOMY  1991    total    OTHER SURGICAL HISTORY  10/14/2020    Dr. Rodriguez Learn- 24mm Watchman device    TRANSESOPHAGEAL ECHOCARDIOGRAM  11/21/2018    Dr. Neena Gosselin TRANSESOPHAGEAL ECHOCARDIOGRAM  03/18/2019    WISDOM TOOTH EXTRACTION       Social History:  reports that she has never smoked. She has never used smokeless tobacco. She reports current alcohol use. She reports that she does not use drugs. Family History: family history includes Anxiety Disorder in her sister; Crohn's Disease in her son; Depression in her sister; Heart Attack in her father and mother; Heart Disease in her father; Heart Failure in her father; Stroke in her mother.    Allergies: Adhesive tape, Atacand [candesartan], Cefdinir, Demerol, Digoxin, Imdur [isosorbide mononitrate], Losartan potassium, Shellfish-derived products, Sulfa antibiotics, Xarelto [rivaroxaban], Acetaminophen, Apap-caff-dihydrocodeine, Coreg [carvedilol], Cozaar [losartan], Diovan [valsartan], Effexor [venlafaxine hydrochloride], Eliquis [apixaban], Labetalol, Lisinopril, and Ramipril    Physical Exam         VS:  Pulse 65   Temp 98 °F (36.7 °C) (Temporal)   Resp 18   Ht 5' 7\" (1.702 m)   Wt 179 lb (81.2 kg)   SpO2 97%   BMI 28.04 kg/m²    Oxygen Saturation Interpretation: Normal.    Constitutional:  A&Ox3, development consistent with age, NAD. Lungs:  CTAB without wheezing, rales, or rhonchi. Heart:  RRR without pathologic murmurs, rubs, or gallops. Abdomen: Soft, nondistended, with mild suprapubic tenderness. No rebound, rigidity, or guarding. BS+ X4. No organomegaly. Back: no CVA tenderness. Skin:  Normal turgor. Warm, dry, without visible rash, unless noted elsewhere. Neurological:  Alert and oriented. Motor functions intact. Responds to verbal commands. Lab / Imaging Results   (All laboratory and radiology results have been personally reviewed by myself)  Labs:  Results for orders placed or performed in visit on 11/08/21   POCT Urinalysis no Micro   Result Value Ref Range    Color, UA yellow     Clarity, UA cloudy     Glucose, UA POC neg     Bilirubin, UA neg     Ketones, UA neg     Spec Grav, UA 1.015     Blood, UA POC trace     pH, UA 5.5     Protein, UA POC neg     Urobilinogen, UA 0.2     Leukocytes, UA moderate     Nitrite, UA Positive        Imaging: All Radiology results interpreted by Radiologist unless otherwise noted. No orders to display     Assessment / Plan     Impression(s):  Maron Klinefelter was seen today for urinary tract infection. Diagnoses and all orders for this visit:    UTI symptoms  -     POCT Urinalysis no Micro  -     Culture, Urine; Future    Acute cystitis without hematuria  -     nitrofurantoin, macrocrystal-monohydrate, (MACROBID) 100 MG capsule; Take 1 capsule by mouth 2 times daily for 5 days  -     Culture, Urine; Future      Disposition:  Disposition: Discharge to home. UA appears positive for a UTI. Urine C&S pending, will call with results once available. Script written for macrobid, side effects discussed. Increase fluids and rest. F/u PCP in 3-5 days if symptoms persist. ED sooner if symptoms worsen or change.  ED immediately with the development of fever, shaking chills, body aches, flank pain, vomiting, CP, or SOB. Pt is in agreement with this care plan. All questions answered.      Trisha Cardona DO

## 2021-11-11 ENCOUNTER — HOSPITAL ENCOUNTER (OUTPATIENT)
Dept: PREADMISSION TESTING | Age: 76
Discharge: HOME | End: 2021-11-11
Payer: MEDICARE

## 2021-11-11 VITALS
DIASTOLIC BLOOD PRESSURE: 66 MMHG | WEIGHT: 177 LBS | HEART RATE: 64 BPM | TEMPERATURE: 97 F | SYSTOLIC BLOOD PRESSURE: 129 MMHG | OXYGEN SATURATION: 95 % | HEIGHT: 67 IN | BODY MASS INDEX: 27.78 KG/M2 | RESPIRATION RATE: 12 BRPM

## 2021-11-11 DIAGNOSIS — R39.9 UTI SYMPTOMS: Primary | ICD-10-CM

## 2021-11-11 DIAGNOSIS — N28.89 RENAL MASS: ICD-10-CM

## 2021-11-11 LAB
ABO/RH: NORMAL
ANION GAP SERPL CALCULATED.3IONS-SCNC: 11 MMOL/L (ref 7–16)
ANTIBODY SCREEN: NORMAL
BASOPHILS ABSOLUTE: 0.04 E9/L (ref 0–0.2)
BASOPHILS RELATIVE PERCENT: 0.7 % (ref 0–2)
BUN BLDV-MCNC: 17 MG/DL (ref 6–23)
CALCIUM SERPL-MCNC: 9.4 MG/DL (ref 8.6–10.2)
CHLORIDE BLD-SCNC: 102 MMOL/L (ref 98–107)
CO2: 22 MMOL/L (ref 22–29)
CREAT SERPL-MCNC: 1.2 MG/DL (ref 0.5–1)
EOSINOPHILS ABSOLUTE: 0.13 E9/L (ref 0.05–0.5)
EOSINOPHILS RELATIVE PERCENT: 2.4 % (ref 0–6)
GFR AFRICAN AMERICAN: 53
GFR NON-AFRICAN AMERICAN: 44 ML/MIN/1.73
GLUCOSE BLD-MCNC: 100 MG/DL (ref 74–99)
HCT VFR BLD CALC: 40.9 % (ref 34–48)
HEMOGLOBIN: 13.6 G/DL (ref 11.5–15.5)
LYMPHOCYTES ABSOLUTE: 0.95 E9/L (ref 1.5–4)
LYMPHOCYTES RELATIVE PERCENT: 17.7 % (ref 20–42)
MCH RBC QN AUTO: 28.4 PG (ref 26–35)
MCHC RBC AUTO-ENTMCNC: 33.3 % (ref 32–34.5)
MCV RBC AUTO: 85.4 FL (ref 80–99.9)
MONOCYTES ABSOLUTE: 0.43 E9/L (ref 0.1–0.95)
MONOCYTES RELATIVE PERCENT: 8 % (ref 2–12)
NEUTROPHILS ABSOLUTE: 3.8 E9/L (ref 1.8–7.3)
NEUTROPHILS RELATIVE PERCENT: 70.6 % (ref 43–80)
ORGANISM: ABNORMAL
ORGANISM: ABNORMAL
PDW BLD-RTO: 13.1 FL (ref 11.5–15)
PLATELET # BLD: 272 E9/L (ref 130–450)
PMV BLD AUTO: 10.2 FL (ref 7–12)
POTASSIUM REFLEX MAGNESIUM: 4.7 MMOL/L (ref 3.5–5)
RBC # BLD: 4.79 E12/L (ref 3.5–5.5)
SODIUM BLD-SCNC: 135 MMOL/L (ref 132–146)
URINE CULTURE, ROUTINE: ABNORMAL
URINE CULTURE, ROUTINE: ABNORMAL
WBC # BLD: 5.4 E9/L (ref 4.5–11.5)

## 2021-11-11 PROCEDURE — 86900 BLOOD TYPING SEROLOGIC ABO: CPT

## 2021-11-11 PROCEDURE — 86923 COMPATIBILITY TEST ELECTRIC: CPT

## 2021-11-11 PROCEDURE — 86901 BLOOD TYPING SEROLOGIC RH(D): CPT

## 2021-11-11 PROCEDURE — 36415 COLL VENOUS BLD VENIPUNCTURE: CPT

## 2021-11-11 PROCEDURE — 86850 RBC ANTIBODY SCREEN: CPT

## 2021-11-11 PROCEDURE — 85025 COMPLETE CBC W/AUTO DIFF WBC: CPT

## 2021-11-11 PROCEDURE — 80048 BASIC METABOLIC PNL TOTAL CA: CPT

## 2021-11-11 RX ORDER — LEVOFLOXACIN 750 MG/1
750 TABLET ORAL DAILY
Qty: 5 TABLET | Refills: 0 | Status: ON HOLD
Start: 2021-11-11 | End: 2021-11-17 | Stop reason: HOSPADM

## 2021-11-14 LAB — URINE CULTURE, ROUTINE: NORMAL

## 2021-11-15 ENCOUNTER — ANESTHESIA EVENT (OUTPATIENT)
Dept: OPERATING ROOM | Age: 76
DRG: 658 | End: 2021-11-15
Payer: MEDICARE

## 2021-11-15 LAB
BLOOD BANK DISPENSE STATUS: NORMAL
BLOOD BANK DISPENSE STATUS: NORMAL
BLOOD BANK PRODUCT CODE: NORMAL
BLOOD BANK PRODUCT CODE: NORMAL
BPU ID: NORMAL
BPU ID: NORMAL
DESCRIPTION BLOOD BANK: NORMAL
DESCRIPTION BLOOD BANK: NORMAL

## 2021-11-16 ENCOUNTER — ANESTHESIA (OUTPATIENT)
Dept: OPERATING ROOM | Age: 76
DRG: 658 | End: 2021-11-16
Payer: MEDICARE

## 2021-11-16 ENCOUNTER — HOSPITAL ENCOUNTER (INPATIENT)
Age: 76
LOS: 1 days | Discharge: HOME | DRG: 658 | End: 2021-11-17
Attending: STUDENT IN AN ORGANIZED HEALTH CARE EDUCATION/TRAINING PROGRAM | Admitting: STUDENT IN AN ORGANIZED HEALTH CARE EDUCATION/TRAINING PROGRAM
Payer: MEDICARE

## 2021-11-16 VITALS — OXYGEN SATURATION: 100 %

## 2021-11-16 DIAGNOSIS — G89.18 POST-OP PAIN: Primary | ICD-10-CM

## 2021-11-16 PROBLEM — N28.89 RENAL MASS: Status: ACTIVE | Noted: 2021-11-16

## 2021-11-16 LAB
BLOOD BANK DISPENSE STATUS: NORMAL
BLOOD BANK PRODUCT CODE: NORMAL
BPU ID: NORMAL
DESCRIPTION BLOOD BANK: NORMAL

## 2021-11-16 PROCEDURE — 88307 TISSUE EXAM BY PATHOLOGIST: CPT

## 2021-11-16 PROCEDURE — 2500000003 HC RX 250 WO HCPCS: Performed by: STUDENT IN AN ORGANIZED HEALTH CARE EDUCATION/TRAINING PROGRAM

## 2021-11-16 PROCEDURE — 6360000002 HC RX W HCPCS: Performed by: NURSE ANESTHETIST, CERTIFIED REGISTERED

## 2021-11-16 PROCEDURE — 3600000009 HC SURGERY ROBOT BASE: Performed by: STUDENT IN AN ORGANIZED HEALTH CARE EDUCATION/TRAINING PROGRAM

## 2021-11-16 PROCEDURE — 3700000001 HC ADD 15 MINUTES (ANESTHESIA): Performed by: STUDENT IN AN ORGANIZED HEALTH CARE EDUCATION/TRAINING PROGRAM

## 2021-11-16 PROCEDURE — 2580000003 HC RX 258: Performed by: NURSE PRACTITIONER

## 2021-11-16 PROCEDURE — 3700000000 HC ANESTHESIA ATTENDED CARE: Performed by: STUDENT IN AN ORGANIZED HEALTH CARE EDUCATION/TRAINING PROGRAM

## 2021-11-16 PROCEDURE — 3600000019 HC SURGERY ROBOT ADDTL 15MIN: Performed by: STUDENT IN AN ORGANIZED HEALTH CARE EDUCATION/TRAINING PROGRAM

## 2021-11-16 PROCEDURE — 2709999900 HC NON-CHARGEABLE SUPPLY: Performed by: STUDENT IN AN ORGANIZED HEALTH CARE EDUCATION/TRAINING PROGRAM

## 2021-11-16 PROCEDURE — 7100000000 HC PACU RECOVERY - FIRST 15 MIN: Performed by: STUDENT IN AN ORGANIZED HEALTH CARE EDUCATION/TRAINING PROGRAM

## 2021-11-16 PROCEDURE — 2500000003 HC RX 250 WO HCPCS

## 2021-11-16 PROCEDURE — 6360000002 HC RX W HCPCS

## 2021-11-16 PROCEDURE — 6360000002 HC RX W HCPCS: Performed by: NURSE PRACTITIONER

## 2021-11-16 PROCEDURE — 2580000003 HC RX 258: Performed by: NURSE ANESTHETIST, CERTIFIED REGISTERED

## 2021-11-16 PROCEDURE — 6370000000 HC RX 637 (ALT 250 FOR IP): Performed by: STUDENT IN AN ORGANIZED HEALTH CARE EDUCATION/TRAINING PROGRAM

## 2021-11-16 PROCEDURE — 7100000001 HC PACU RECOVERY - ADDTL 15 MIN: Performed by: STUDENT IN AN ORGANIZED HEALTH CARE EDUCATION/TRAINING PROGRAM

## 2021-11-16 PROCEDURE — S2900 ROBOTIC SURGICAL SYSTEM: HCPCS | Performed by: STUDENT IN AN ORGANIZED HEALTH CARE EDUCATION/TRAINING PROGRAM

## 2021-11-16 PROCEDURE — 0TB10ZZ EXCISION OF LEFT KIDNEY, OPEN APPROACH: ICD-10-PCS | Performed by: UROLOGY

## 2021-11-16 PROCEDURE — 2580000003 HC RX 258: Performed by: STUDENT IN AN ORGANIZED HEALTH CARE EDUCATION/TRAINING PROGRAM

## 2021-11-16 PROCEDURE — 1200000000 HC SEMI PRIVATE

## 2021-11-16 PROCEDURE — 2500000003 HC RX 250 WO HCPCS: Performed by: NURSE ANESTHETIST, CERTIFIED REGISTERED

## 2021-11-16 PROCEDURE — 8E0W4CZ ROBOTIC ASSISTED PROCEDURE OF TRUNK REGION, PERCUTANEOUS ENDOSCOPIC APPROACH: ICD-10-PCS | Performed by: UROLOGY

## 2021-11-16 RX ORDER — PHENYLEPHRINE HCL IN 0.9% NACL 1 MG/10 ML
SYRINGE (ML) INTRAVENOUS PRN
Status: DISCONTINUED | OUTPATIENT
Start: 2021-11-16 | End: 2021-11-16 | Stop reason: SDUPTHER

## 2021-11-16 RX ORDER — SODIUM CHLORIDE, SODIUM LACTATE, POTASSIUM CHLORIDE, CALCIUM CHLORIDE 600; 310; 30; 20 MG/100ML; MG/100ML; MG/100ML; MG/100ML
INJECTION, SOLUTION INTRAVENOUS CONTINUOUS PRN
Status: DISCONTINUED | OUTPATIENT
Start: 2021-11-16 | End: 2021-11-16 | Stop reason: SDUPTHER

## 2021-11-16 RX ORDER — SPIRONOLACTONE 25 MG/1
25 TABLET ORAL
Status: DISCONTINUED | OUTPATIENT
Start: 2021-11-16 | End: 2021-11-17 | Stop reason: HOSPADM

## 2021-11-16 RX ORDER — SODIUM CHLORIDE 9 MG/ML
INJECTION, SOLUTION INTRAVENOUS CONTINUOUS PRN
Status: DISCONTINUED | OUTPATIENT
Start: 2021-11-16 | End: 2021-11-16 | Stop reason: SDUPTHER

## 2021-11-16 RX ORDER — HYDROXYZINE HYDROCHLORIDE 10 MG/1
10 TABLET, FILM COATED ORAL EVERY 8 HOURS PRN
Status: DISCONTINUED | OUTPATIENT
Start: 2021-11-16 | End: 2021-11-17 | Stop reason: HOSPADM

## 2021-11-16 RX ORDER — FUROSEMIDE 20 MG/1
20 TABLET ORAL DAILY
Status: DISCONTINUED | OUTPATIENT
Start: 2021-11-16 | End: 2021-11-17 | Stop reason: HOSPADM

## 2021-11-16 RX ORDER — EPHEDRINE SULFATE/0.9% NACL/PF 50 MG/5 ML
SYRINGE (ML) INTRAVENOUS PRN
Status: DISCONTINUED | OUTPATIENT
Start: 2021-11-16 | End: 2021-11-16 | Stop reason: SDUPTHER

## 2021-11-16 RX ORDER — BACLOFEN 10 MG/1
20 TABLET ORAL 2 TIMES DAILY PRN
Status: DISCONTINUED | OUTPATIENT
Start: 2021-11-16 | End: 2021-11-17 | Stop reason: HOSPADM

## 2021-11-16 RX ORDER — GLYCOPYRROLATE 1 MG/5 ML
SYRINGE (ML) INTRAVENOUS PRN
Status: DISCONTINUED | OUTPATIENT
Start: 2021-11-16 | End: 2021-11-16 | Stop reason: SDUPTHER

## 2021-11-16 RX ORDER — DEXAMETHASONE SODIUM PHOSPHATE 10 MG/ML
INJECTION INTRAMUSCULAR; INTRAVENOUS PRN
Status: DISCONTINUED | OUTPATIENT
Start: 2021-11-16 | End: 2021-11-16 | Stop reason: SDUPTHER

## 2021-11-16 RX ORDER — SODIUM CHLORIDE 9 MG/ML
INJECTION, SOLUTION INTRAVENOUS CONTINUOUS
Status: DISCONTINUED | OUTPATIENT
Start: 2021-11-16 | End: 2021-11-17 | Stop reason: HOSPADM

## 2021-11-16 RX ORDER — SODIUM CHLORIDE 0.9 % (FLUSH) 0.9 %
10 SYRINGE (ML) INJECTION EVERY 12 HOURS SCHEDULED
Status: DISCONTINUED | OUTPATIENT
Start: 2021-11-16 | End: 2021-11-16

## 2021-11-16 RX ORDER — CIPROFLOXACIN 2 MG/ML
INJECTION, SOLUTION INTRAVENOUS PRN
Status: DISCONTINUED | OUTPATIENT
Start: 2021-11-16 | End: 2021-11-16 | Stop reason: SDUPTHER

## 2021-11-16 RX ORDER — PANTOPRAZOLE SODIUM 40 MG/1
40 TABLET, DELAYED RELEASE ORAL
Status: DISCONTINUED | OUTPATIENT
Start: 2021-11-17 | End: 2021-11-17 | Stop reason: HOSPADM

## 2021-11-16 RX ORDER — SODIUM CHLORIDE 9 MG/ML
25 INJECTION, SOLUTION INTRAVENOUS PRN
Status: DISCONTINUED | OUTPATIENT
Start: 2021-11-16 | End: 2021-11-16

## 2021-11-16 RX ORDER — HYDRALAZINE HYDROCHLORIDE 10 MG/1
10 TABLET, FILM COATED ORAL 3 TIMES DAILY
Status: DISCONTINUED | OUTPATIENT
Start: 2021-11-16 | End: 2021-11-17 | Stop reason: HOSPADM

## 2021-11-16 RX ORDER — SODIUM CHLORIDE 0.9 % (FLUSH) 0.9 %
5-40 SYRINGE (ML) INJECTION EVERY 12 HOURS SCHEDULED
Status: DISCONTINUED | OUTPATIENT
Start: 2021-11-16 | End: 2021-11-17 | Stop reason: HOSPADM

## 2021-11-16 RX ORDER — SODIUM CHLORIDE 0.9 % (FLUSH) 0.9 %
5-40 SYRINGE (ML) INJECTION PRN
Status: DISCONTINUED | OUTPATIENT
Start: 2021-11-16 | End: 2021-11-17 | Stop reason: HOSPADM

## 2021-11-16 RX ORDER — DOFETILIDE 0.25 MG/1
250 CAPSULE ORAL EVERY 12 HOURS SCHEDULED
Status: DISCONTINUED | OUTPATIENT
Start: 2021-11-16 | End: 2021-11-17 | Stop reason: HOSPADM

## 2021-11-16 RX ORDER — ATROPA BELLADONNA AND OPIUM 16.2; 6 MG/1; MG/1
60 SUPPOSITORY RECTAL EVERY 8 HOURS PRN
Status: DISCONTINUED | OUTPATIENT
Start: 2021-11-16 | End: 2021-11-17 | Stop reason: HOSPADM

## 2021-11-16 RX ORDER — PROMETHAZINE HYDROCHLORIDE 25 MG/ML
INJECTION, SOLUTION INTRAMUSCULAR; INTRAVENOUS PRN
Status: DISCONTINUED | OUTPATIENT
Start: 2021-11-16 | End: 2021-11-16

## 2021-11-16 RX ORDER — MIDAZOLAM HYDROCHLORIDE 1 MG/ML
INJECTION INTRAMUSCULAR; INTRAVENOUS PRN
Status: DISCONTINUED | OUTPATIENT
Start: 2021-11-16 | End: 2021-11-16 | Stop reason: SDUPTHER

## 2021-11-16 RX ORDER — ONDANSETRON 4 MG/1
4 TABLET, ORALLY DISINTEGRATING ORAL EVERY 8 HOURS PRN
Status: DISCONTINUED | OUTPATIENT
Start: 2021-11-16 | End: 2021-11-17 | Stop reason: HOSPADM

## 2021-11-16 RX ORDER — OXYCODONE HYDROCHLORIDE 5 MG/1
5 TABLET ORAL EVERY 4 HOURS PRN
Status: DISCONTINUED | OUTPATIENT
Start: 2021-11-16 | End: 2021-11-17 | Stop reason: HOSPADM

## 2021-11-16 RX ORDER — BUPIVACAINE HYDROCHLORIDE 5 MG/ML
INJECTION, SOLUTION EPIDURAL; INTRACAUDAL PRN
Status: DISCONTINUED | OUTPATIENT
Start: 2021-11-16 | End: 2021-11-16 | Stop reason: ALTCHOICE

## 2021-11-16 RX ORDER — NEOSTIGMINE METHYLSULFATE 1 MG/ML
INJECTION, SOLUTION INTRAVENOUS PRN
Status: DISCONTINUED | OUTPATIENT
Start: 2021-11-16 | End: 2021-11-16 | Stop reason: SDUPTHER

## 2021-11-16 RX ORDER — CARVEDILOL 6.25 MG/1
6.25 TABLET ORAL 2 TIMES DAILY
Status: DISCONTINUED | OUTPATIENT
Start: 2021-11-16 | End: 2021-11-17 | Stop reason: HOSPADM

## 2021-11-16 RX ORDER — FENTANYL CITRATE 50 UG/ML
50 INJECTION, SOLUTION INTRAMUSCULAR; INTRAVENOUS EVERY 5 MIN PRN
Status: DISCONTINUED | OUTPATIENT
Start: 2021-11-16 | End: 2021-11-16

## 2021-11-16 RX ORDER — CIPROFLOXACIN 2 MG/ML
400 INJECTION, SOLUTION INTRAVENOUS
Status: COMPLETED | OUTPATIENT
Start: 2021-11-16 | End: 2021-11-16

## 2021-11-16 RX ORDER — KETAMINE HCL IN NACL, ISO-OSM 100MG/10ML
SYRINGE (ML) INJECTION PRN
Status: DISCONTINUED | OUTPATIENT
Start: 2021-11-16 | End: 2021-11-16 | Stop reason: SDUPTHER

## 2021-11-16 RX ORDER — SODIUM CHLORIDE 9 MG/ML
INJECTION, SOLUTION INTRAVENOUS CONTINUOUS
Status: DISCONTINUED | OUTPATIENT
Start: 2021-11-16 | End: 2021-11-16

## 2021-11-16 RX ORDER — FENTANYL CITRATE 50 UG/ML
INJECTION, SOLUTION INTRAMUSCULAR; INTRAVENOUS PRN
Status: DISCONTINUED | OUTPATIENT
Start: 2021-11-16 | End: 2021-11-16 | Stop reason: SDUPTHER

## 2021-11-16 RX ORDER — DIPHENHYDRAMINE HCL 25 MG
25 TABLET ORAL EVERY 6 HOURS PRN
Status: DISCONTINUED | OUTPATIENT
Start: 2021-11-16 | End: 2021-11-17 | Stop reason: HOSPADM

## 2021-11-16 RX ORDER — SODIUM CHLORIDE 9 MG/ML
25 INJECTION, SOLUTION INTRAVENOUS PRN
Status: DISCONTINUED | OUTPATIENT
Start: 2021-11-16 | End: 2021-11-17 | Stop reason: HOSPADM

## 2021-11-16 RX ORDER — ONDANSETRON 2 MG/ML
4 INJECTION INTRAMUSCULAR; INTRAVENOUS EVERY 6 HOURS PRN
Status: DISCONTINUED | OUTPATIENT
Start: 2021-11-16 | End: 2021-11-17 | Stop reason: HOSPADM

## 2021-11-16 RX ORDER — LIDOCAINE HYDROCHLORIDE 20 MG/ML
INJECTION, SOLUTION INTRAVENOUS PRN
Status: DISCONTINUED | OUTPATIENT
Start: 2021-11-16 | End: 2021-11-16 | Stop reason: SDUPTHER

## 2021-11-16 RX ORDER — FENTANYL CITRATE 50 UG/ML
25 INJECTION, SOLUTION INTRAMUSCULAR; INTRAVENOUS EVERY 5 MIN PRN
Status: DISCONTINUED | OUTPATIENT
Start: 2021-11-16 | End: 2021-11-16

## 2021-11-16 RX ORDER — PROPOFOL 10 MG/ML
INJECTION, EMULSION INTRAVENOUS PRN
Status: DISCONTINUED | OUTPATIENT
Start: 2021-11-16 | End: 2021-11-16 | Stop reason: SDUPTHER

## 2021-11-16 RX ORDER — MANNITOL 250 MG/ML
INJECTION, SOLUTION INTRAVENOUS PRN
Status: DISCONTINUED | OUTPATIENT
Start: 2021-11-16 | End: 2021-11-16 | Stop reason: SDUPTHER

## 2021-11-16 RX ORDER — OXYCODONE HYDROCHLORIDE 5 MG/1
10 TABLET ORAL EVERY 4 HOURS PRN
Status: DISCONTINUED | OUTPATIENT
Start: 2021-11-16 | End: 2021-11-17 | Stop reason: HOSPADM

## 2021-11-16 RX ORDER — DICYCLOMINE HYDROCHLORIDE 10 MG/1
10 CAPSULE ORAL 3 TIMES DAILY
Status: DISCONTINUED | OUTPATIENT
Start: 2021-11-16 | End: 2021-11-17 | Stop reason: HOSPADM

## 2021-11-16 RX ORDER — SODIUM CHLORIDE 0.9 % (FLUSH) 0.9 %
10 SYRINGE (ML) INJECTION PRN
Status: DISCONTINUED | OUTPATIENT
Start: 2021-11-16 | End: 2021-11-16

## 2021-11-16 RX ORDER — VECURONIUM BROMIDE 1 MG/ML
INJECTION, POWDER, LYOPHILIZED, FOR SOLUTION INTRAVENOUS PRN
Status: DISCONTINUED | OUTPATIENT
Start: 2021-11-16 | End: 2021-11-16 | Stop reason: SDUPTHER

## 2021-11-16 RX ORDER — PROMETHAZINE HYDROCHLORIDE 25 MG/ML
INJECTION, SOLUTION INTRAMUSCULAR; INTRAVENOUS PRN
Status: DISCONTINUED | OUTPATIENT
Start: 2021-11-16 | End: 2021-11-16 | Stop reason: SDUPTHER

## 2021-11-16 RX ORDER — PROMETHAZINE HYDROCHLORIDE 25 MG/ML
25 INJECTION, SOLUTION INTRAMUSCULAR; INTRAVENOUS PRN
Status: DISCONTINUED | OUTPATIENT
Start: 2021-11-16 | End: 2021-11-16

## 2021-11-16 RX ORDER — FIBRINOGEN HUMAN AND THROMBIN HUMAN 1 ML
KIT TOPICAL PRN
Status: DISCONTINUED | OUTPATIENT
Start: 2021-11-16 | End: 2021-11-16 | Stop reason: ALTCHOICE

## 2021-11-16 RX ADMIN — SODIUM CHLORIDE: 9 INJECTION, SOLUTION INTRAVENOUS at 20:14

## 2021-11-16 RX ADMIN — Medication 5 MG: at 15:48

## 2021-11-16 RX ADMIN — Medication 30 MG: at 13:50

## 2021-11-16 RX ADMIN — FENTANYL CITRATE 50 MCG: 50 INJECTION, SOLUTION INTRAMUSCULAR; INTRAVENOUS at 14:36

## 2021-11-16 RX ADMIN — FENTANYL CITRATE 50 MCG: 50 INJECTION, SOLUTION INTRAMUSCULAR; INTRAVENOUS at 17:02

## 2021-11-16 RX ADMIN — FENTANYL CITRATE 100 MCG: 50 INJECTION, SOLUTION INTRAMUSCULAR; INTRAVENOUS at 13:21

## 2021-11-16 RX ADMIN — Medication 0.6 MG: at 17:29

## 2021-11-16 RX ADMIN — CARVEDILOL 6.25 MG: 6.25 TABLET, FILM COATED ORAL at 21:54

## 2021-11-16 RX ADMIN — PROPOFOL 100 MG: 10 INJECTION, EMULSION INTRAVENOUS at 13:21

## 2021-11-16 RX ADMIN — Medication 5 MG: at 16:12

## 2021-11-16 RX ADMIN — FUROSEMIDE 20 MG: 20 TABLET ORAL at 21:54

## 2021-11-16 RX ADMIN — CIPROFLOXACIN 400 MG: 2 INJECTION, SOLUTION INTRAVENOUS at 13:12

## 2021-11-16 RX ADMIN — OXYCODONE 10 MG: 5 TABLET ORAL at 21:54

## 2021-11-16 RX ADMIN — VECURONIUM BROMIDE 7 MG: 10 INJECTION, POWDER, LYOPHILIZED, FOR SOLUTION INTRAVENOUS at 13:21

## 2021-11-16 RX ADMIN — Medication 5 MG: at 14:35

## 2021-11-16 RX ADMIN — SODIUM CHLORIDE: 9 INJECTION, SOLUTION INTRAVENOUS at 20:52

## 2021-11-16 RX ADMIN — MANNITOL 12.5 G: 250 INJECTION, SOLUTION INTRAVENOUS at 16:26

## 2021-11-16 RX ADMIN — HYDRALAZINE HYDROCHLORIDE 10 MG: 10 TABLET, FILM COATED ORAL at 21:59

## 2021-11-16 RX ADMIN — Medication 5 MG: at 15:24

## 2021-11-16 RX ADMIN — SODIUM CHLORIDE: 9 INJECTION, SOLUTION INTRAVENOUS at 09:48

## 2021-11-16 RX ADMIN — Medication 5 MG: at 15:41

## 2021-11-16 RX ADMIN — Medication 100 MCG: at 16:37

## 2021-11-16 RX ADMIN — SODIUM CHLORIDE, POTASSIUM CHLORIDE, SODIUM LACTATE AND CALCIUM CHLORIDE: 600; 310; 30; 20 INJECTION, SOLUTION INTRAVENOUS at 16:47

## 2021-11-16 RX ADMIN — SODIUM CHLORIDE, POTASSIUM CHLORIDE, SODIUM LACTATE AND CALCIUM CHLORIDE: 600; 310; 30; 20 INJECTION, SOLUTION INTRAVENOUS at 14:53

## 2021-11-16 RX ADMIN — DOFETILIDE 250 MCG: 0.25 CAPSULE ORAL at 21:54

## 2021-11-16 RX ADMIN — Medication 5 MG: at 14:09

## 2021-11-16 RX ADMIN — PROMETHAZINE HYDROCHLORIDE 6.25 MG: 25 INJECTION INTRAMUSCULAR; INTRAVENOUS at 17:04

## 2021-11-16 RX ADMIN — Medication 3 MG: at 17:29

## 2021-11-16 RX ADMIN — SPIRONOLACTONE 25 MG: 25 TABLET ORAL at 21:54

## 2021-11-16 RX ADMIN — VECURONIUM BROMIDE 2 MG: 10 INJECTION, POWDER, LYOPHILIZED, FOR SOLUTION INTRAVENOUS at 14:26

## 2021-11-16 RX ADMIN — Medication 5 MG: at 15:57

## 2021-11-16 RX ADMIN — CIPROFLOXACIN 400 MG: 2 INJECTION, SOLUTION INTRAVENOUS at 10:14

## 2021-11-16 RX ADMIN — MIDAZOLAM 1 MG: 1 INJECTION INTRAMUSCULAR; INTRAVENOUS at 14:25

## 2021-11-16 RX ADMIN — VECURONIUM BROMIDE 2 MG: 10 INJECTION, POWDER, LYOPHILIZED, FOR SOLUTION INTRAVENOUS at 16:08

## 2021-11-16 RX ADMIN — MIDAZOLAM 1 MG: 1 INJECTION INTRAMUSCULAR; INTRAVENOUS at 16:12

## 2021-11-16 RX ADMIN — SODIUM CHLORIDE: 9 INJECTION, SOLUTION INTRAVENOUS at 13:12

## 2021-11-16 RX ADMIN — Medication 20 MG: at 14:01

## 2021-11-16 RX ADMIN — Medication 100 MCG: at 16:45

## 2021-11-16 RX ADMIN — Medication 5 MG: at 14:48

## 2021-11-16 RX ADMIN — MIDAZOLAM 2 MG: 1 INJECTION INTRAMUSCULAR; INTRAVENOUS at 11:07

## 2021-11-16 RX ADMIN — DICYCLOMINE HYDROCHLORIDE 10 MG: 10 CAPSULE ORAL at 21:55

## 2021-11-16 RX ADMIN — SODIUM CHLORIDE, POTASSIUM CHLORIDE, SODIUM LACTATE AND CALCIUM CHLORIDE: 600; 310; 30; 20 INJECTION, SOLUTION INTRAVENOUS at 13:12

## 2021-11-16 RX ADMIN — LIDOCAINE HYDROCHLORIDE 45 MG: 20 INJECTION, SOLUTION INTRAVENOUS at 13:21

## 2021-11-16 RX ADMIN — DEXAMETHASONE SODIUM PHOSPHATE 10 MG: 10 INJECTION INTRAMUSCULAR; INTRAVENOUS at 13:31

## 2021-11-16 ASSESSMENT — PULMONARY FUNCTION TESTS
PIF_VALUE: 22
PIF_VALUE: 29
PIF_VALUE: 22
PIF_VALUE: 29
PIF_VALUE: 28
PIF_VALUE: 31
PIF_VALUE: 28
PIF_VALUE: 2
PIF_VALUE: 29
PIF_VALUE: 28
PIF_VALUE: 23
PIF_VALUE: 22
PIF_VALUE: 29
PIF_VALUE: 32
PIF_VALUE: 29
PIF_VALUE: 23
PIF_VALUE: 29
PIF_VALUE: 16
PIF_VALUE: 28
PIF_VALUE: 28
PIF_VALUE: 29
PIF_VALUE: 28
PIF_VALUE: 22
PIF_VALUE: 28
PIF_VALUE: 29
PIF_VALUE: 5
PIF_VALUE: 33
PIF_VALUE: 23
PIF_VALUE: 30
PIF_VALUE: 23
PIF_VALUE: 30
PIF_VALUE: 28
PIF_VALUE: 28
PIF_VALUE: 30
PIF_VALUE: 28
PIF_VALUE: 29
PIF_VALUE: 29
PIF_VALUE: 28
PIF_VALUE: 32
PIF_VALUE: 28
PIF_VALUE: 32
PIF_VALUE: 29
PIF_VALUE: 20
PIF_VALUE: 31
PIF_VALUE: 29
PIF_VALUE: 23
PIF_VALUE: 29
PIF_VALUE: 28
PIF_VALUE: 29
PIF_VALUE: 22
PIF_VALUE: 29
PIF_VALUE: 28
PIF_VALUE: 29
PIF_VALUE: 32
PIF_VALUE: 28
PIF_VALUE: 29
PIF_VALUE: 22
PIF_VALUE: 29
PIF_VALUE: 29
PIF_VALUE: 28
PIF_VALUE: 21
PIF_VALUE: 30
PIF_VALUE: 29
PIF_VALUE: 28
PIF_VALUE: 26
PIF_VALUE: 30
PIF_VALUE: 24
PIF_VALUE: 29
PIF_VALUE: 22
PIF_VALUE: 29
PIF_VALUE: 26
PIF_VALUE: 23
PIF_VALUE: 32
PIF_VALUE: 30
PIF_VALUE: 30
PIF_VALUE: 23
PIF_VALUE: 22
PIF_VALUE: 29
PIF_VALUE: 31
PIF_VALUE: 5
PIF_VALUE: 29
PIF_VALUE: 28
PIF_VALUE: 21
PIF_VALUE: 29
PIF_VALUE: 28
PIF_VALUE: 23
PIF_VALUE: 28
PIF_VALUE: 23
PIF_VALUE: 21
PIF_VALUE: 30
PIF_VALUE: 28
PIF_VALUE: 28
PIF_VALUE: 30
PIF_VALUE: 29
PIF_VALUE: 29
PIF_VALUE: 28
PIF_VALUE: 29
PIF_VALUE: 29
PIF_VALUE: 22
PIF_VALUE: 29
PIF_VALUE: 31
PIF_VALUE: 29
PIF_VALUE: 29
PIF_VALUE: 28
PIF_VALUE: 31
PIF_VALUE: 30
PIF_VALUE: 30
PIF_VALUE: 3
PIF_VALUE: 29
PIF_VALUE: 29
PIF_VALUE: 28
PIF_VALUE: 31
PIF_VALUE: 28
PIF_VALUE: 29
PIF_VALUE: 22
PIF_VALUE: 28
PIF_VALUE: 22
PIF_VALUE: 3
PIF_VALUE: 30
PIF_VALUE: 29
PIF_VALUE: 30
PIF_VALUE: 31
PIF_VALUE: 32
PIF_VALUE: 29
PIF_VALUE: 32
PIF_VALUE: 0
PIF_VALUE: 31
PIF_VALUE: 28
PIF_VALUE: 22
PIF_VALUE: 29
PIF_VALUE: 23
PIF_VALUE: 23
PIF_VALUE: 30
PIF_VALUE: 31
PIF_VALUE: 21
PIF_VALUE: 28
PIF_VALUE: 18
PIF_VALUE: 28
PIF_VALUE: 28
PIF_VALUE: 31
PIF_VALUE: 28
PIF_VALUE: 32
PIF_VALUE: 24
PIF_VALUE: 22
PIF_VALUE: 28
PIF_VALUE: 30
PIF_VALUE: 22
PIF_VALUE: 32
PIF_VALUE: 28
PIF_VALUE: 23
PIF_VALUE: 29
PIF_VALUE: 31
PIF_VALUE: 3
PIF_VALUE: 21
PIF_VALUE: 29
PIF_VALUE: 28
PIF_VALUE: 22
PIF_VALUE: 30
PIF_VALUE: 29
PIF_VALUE: 3
PIF_VALUE: 4
PIF_VALUE: 30
PIF_VALUE: 0
PIF_VALUE: 4
PIF_VALUE: 32
PIF_VALUE: 24
PIF_VALUE: 28
PIF_VALUE: 28
PIF_VALUE: 29
PIF_VALUE: 33
PIF_VALUE: 17
PIF_VALUE: 28
PIF_VALUE: 20
PIF_VALUE: 30
PIF_VALUE: 30
PIF_VALUE: 28
PIF_VALUE: 30
PIF_VALUE: 29
PIF_VALUE: 20
PIF_VALUE: 30
PIF_VALUE: 29
PIF_VALUE: 2
PIF_VALUE: 28
PIF_VALUE: 29
PIF_VALUE: 29
PIF_VALUE: 27
PIF_VALUE: 0
PIF_VALUE: 28
PIF_VALUE: 22
PIF_VALUE: 28
PIF_VALUE: 0
PIF_VALUE: 29
PIF_VALUE: 29
PIF_VALUE: 1
PIF_VALUE: 29
PIF_VALUE: 32
PIF_VALUE: 31
PIF_VALUE: 28
PIF_VALUE: 33
PIF_VALUE: 29
PIF_VALUE: 29
PIF_VALUE: 15
PIF_VALUE: 22
PIF_VALUE: 32
PIF_VALUE: 28
PIF_VALUE: 29
PIF_VALUE: 33
PIF_VALUE: 29
PIF_VALUE: 23
PIF_VALUE: 28
PIF_VALUE: 29
PIF_VALUE: 32
PIF_VALUE: 29
PIF_VALUE: 33
PIF_VALUE: 23
PIF_VALUE: 29
PIF_VALUE: 28
PIF_VALUE: 28
PIF_VALUE: 23
PIF_VALUE: 3
PIF_VALUE: 31
PIF_VALUE: 31
PIF_VALUE: 28
PIF_VALUE: 31
PIF_VALUE: 22
PIF_VALUE: 23
PIF_VALUE: 30
PIF_VALUE: 28
PIF_VALUE: 32
PIF_VALUE: 30
PIF_VALUE: 27
PIF_VALUE: 29
PIF_VALUE: 23
PIF_VALUE: 28
PIF_VALUE: 29
PIF_VALUE: 31
PIF_VALUE: 22
PIF_VALUE: 29
PIF_VALUE: 28
PIF_VALUE: 28
PIF_VALUE: 31
PIF_VALUE: 29
PIF_VALUE: 28
PIF_VALUE: 31
PIF_VALUE: 21
PIF_VALUE: 33
PIF_VALUE: 30
PIF_VALUE: 22
PIF_VALUE: 22
PIF_VALUE: 29
PIF_VALUE: 32
PIF_VALUE: 28

## 2021-11-16 ASSESSMENT — PAIN SCALES - GENERAL
PAINLEVEL_OUTOF10: 7
PAINLEVEL_OUTOF10: 0

## 2021-11-16 ASSESSMENT — PAIN - FUNCTIONAL ASSESSMENT: PAIN_FUNCTIONAL_ASSESSMENT: 0-10

## 2021-11-16 ASSESSMENT — LIFESTYLE VARIABLES: SMOKING_STATUS: 0

## 2021-11-16 NOTE — OP NOTE
Operative Note      Patient: Delores Dalton  YOB: 1945  MRN: [id number]    Date of Procedure: 11/16/2021    Pre-Op Diagnosis:  Left RENAL MASS, fused left kidney with duplication    Post-Op Diagnosis: Same       Procedure(s):  ROBOT ASSISTED LAPAROSCOPIC COMPLEX LEFT PARTIAL NEPHRECTOMY, with ultrasond guidance    Surgeon(s):  MD Polly Kraft MD    Assistant:   Bartolo Myrick DO    Anesthesia: General    Estimated Blood Loss (mL): Minimal    Complications: None    Specimens:   ID Type Source Tests Collected by Time Destination   A : LEFT RENAL MASS Tissue Tissue SURGICAL PATHOLOGY Peyton Mcneill MD 11/16/2021 1659        Implants:  * No implants in log *      Drains:   Closed/Suction Drain Left LUQ Bulb 15 Romanian (Active)       Urethral Catheter Straight-tip 16 fr (Active)       Findings: complete duplication of ureter, 3 gonadal veins, 2 renal veins, 3 renal arteries. Detailed Description of Procedure:       INDICATION FOR PROCEDURE: Delores Dalton  is a 68 y.o.  female with a renal mass in the lower pole of the left kidney. The patient was given all treatment options, including observation and cryoablation. The patient elected to undergo robot-assisted  laparoscopic partial nephrectomy and understands the risks, benefits, and alternatives of the procedure, including the potential for need for nephrectomy versus open conversion, postoperative bleeding and urinoma formation. Informed consent was obtained and the correct operative side confirmed and marked pre-operatively with the patient prior to anesthesia administration. PROCEDURE: The patient was brought into the operating room, placed under general anesthesia. A Still catheter was placed. The patient was positioned on the left flank up position on the surgical table. All areas in contact with the table were adequately padded, and she was secured to the table with 3-inch cloth tape.  The table was flexed and the patient was prepped and draped in sterile fashion. An 8 mm incision was made 1 handbreadth inferior to the subcostal margin in the mid clavicular line. Veress needle was placed safely into abdominal cavity on the first attempt without concern. The abdomen was insufflated with carbon dioxide to 15 mmHg pressure. The da Gogo camera was placed into the abdomen there is no injury to underlying structures during placement of the Veress needle. 3 additional 8 mm trochars were placed in the mid clavicular line 1 superior and 3 inferior to the camera trocar. A 12 mm trocar was placed in the suprapubic region and a 5 mm trocar in the epigastric region. All trochars were placed under direct visualization without injury to underlying structures. The patient was rotated medially and the Elastera7 robot was safely docked to the patient. The instruments were inserted under direct visualization. The white line of Toldt was taken down, reflecting the colon off of the kidney. The spleen was identified and dissection was carried out lateral to the spleen. The ureter identified in the retroperitoneum and  was elevated superiorly. The dissection was carried superiorly toward the renal hilum. The renal hilum was very complex as there was complete duplication of ureter, 3 renal arteries, 2 renal veins. Due to the complexity of the renal hilum an extensive amount of time and disection was carried out. We were unable to find the superior renal artery after over 40 minutes of dissection. The other two renal arteies were dissected and freed from the underlying tissues. This was cleanly dissected and prepared for placement of bulldog clamps later in the case. Gerota fascia was incised in a longitudinal fashion to the capsule of the kidney. Gerota's was entirely dissected off of the kidney and the tumor was identified in the lower pole.  Gerota's was left attached to the specimen, but the remainder of Gerota's was freed and the kidney was readily mobilized. An [place] \"drop-in\" ultrasound was used in conjunction with TilePro to confirm visualization of the tumor. A vessel loop was placed around the lower two renal arteries. With Doppler flow the tumor was visualized and the vessel loop was pulled tightly. When pulling the color Doppler flow subsided to the tumor confirming that this was in fact the main renal artery. The capsule was scored circumferentially around the tumor, leaving a 1 cm margin of healthy parenchyma. After 3 L of IV fluids was administered, 12.5 g of Mannitol was given IV, and 5 minutes later, the renal artery was clamped using 2 laparoscopic bulldog clamps. The tumor was excised sharply and there was very little to no bleeding noticed at this portion of the procedure. The collecting system was  entered and the tumor margins consisted only of healthy renal parenchyma, identifying no evidence of the tumor invading through the margin. The tumor was placed in a bag and placed into the pelvis for retrieval later in the procedure after complete excision. Any large arteries were cauterized with electrocautery and a 2-0 V-lock suture was run through the capsule and then in the deep layer in a running fashion. Entry into the collecting system was closed tightly, as were any areas of bleeding. Additional 2-0 V-lock sutures were used to close the renorrhaphy in an interrupted fashion using a total of 3 interrupted sutures. These were approximated, leaving exceptional dimpling of the parenchyma and making for a very tight renorrhaphy closure. Additional Hem-o-layne clips were used to prevent sliding of the initial Hem-o-layne clips on the renorrhaphy. The bulldog clamps were taken off and the warm ischemia time was 19 minutes. SNoW hemostatic material was placed over the renorrhaphy and Evicel was drizzled onto the SNoW to adhere it in place.  The Gerota's was closed over the defect using Hem-o-layne clips and a drain was placed in the right lower quadrant up alongside the kidney and alongside the liver. This was secured in place with 3-0 nylon suture. Prior to closure of the Gerota's, the pneumoperitoneum was dropped to 5 mmHg and 2 large Valsalva were performed by Anesthesia, showing no evidence of urine or blood through the renorrhaphy. The specimen was removed through by enlarging slightly the incision lateral to the umbilicus, and this was closed with #0 Vicryl suture in a running fashion. All port sites were injected with Marcaine and copiously irrigated. The skin was closed with 4-0 Vicryl in a subcuticular fashion. Dermabond was applied to the skin. The patient was awakened from anesthesia, taken to recovery in stable condition.      Luis Mora MD  11/16/2021  5:22 PM      Electronically signed by Luis Mora MD on 11/16/2021 at 5:21 PM

## 2021-11-16 NOTE — H&P
Iron Martinez is a 68 y.o. female with left lower pole renal mass. No new changes since seen in the office. Please see paper h and p for full details. Past Medical History:   Diagnosis Date    Afib ([address]     WATCHMAN    Anxiety     Arthritis     Cervical radiculopathy     CHF (congestive heart failure) (HCC)     Chronic sinusitis     Hilar adenopathy     Hx of blood clots     Hypertension     Left ventricular systolic dysfunction     Lesion of left native kidney     Lumbar radiculopathy     Lung nodules     Meige syndrome     partial/ PCP    Microscopic colitis     Mitral regurgitation     Mild to moderate    Nonischemic cardiomyopathy (HCC)     f/u with Dr. Kiya Gaytan Osteopenia     Vertebrobasilar artery syndrome     f/u PCP       Past Surgical History:   Procedure Laterality Date    BLADDER REPAIR      BRONCHOSCOPY N/A 10/5/2021    BRONCHOSCOPY W/EBUS FNA performed by Octavio Levy MD at [facility] 10/5/2021    BRONCHOSCOPY DIAGNOSTIC OR CELL [address] ONLY performed by Octavio Levy MD at [address] TEST  11/25/13    Rt & LT cath    CARDIOVERSION  11/04/2019    Successful CV from AF to NSR   (Dr. Andreina Du)    COLONOSCOPY  07/2020    DIAGNOSTIC CARDIAC CATH LAB PROCEDURE  2/20/10    Dr Raheel Garcia CATH LAB PROCEDURE  8/24/11    Right heart cath @ [facility].     DOPPLER ECHOCARDIOGRAPHY  11/25/13    HYSTERECTOMY  1991    total    OTHER SURGICAL HISTORY  10/14/2020    Dr. Asaf Aguilar- 24mm Watchman device    TRANSESOPHAGEAL ECHOCARDIOGRAM  11/21/2018    Dr. Nelson Renee TRANSESOPHAGEAL ECHOCARDIOGRAM  03/18/2019    WISDOM TOOTH EXTRACTION         Family History   Problem Relation Age of Onset    Heart Attack Mother     Stroke Mother     Heart Attack Father     Heart Failure Father     Heart Disease Father     Anxiety Disorder Sister     Depression Sister     Crohn's Disease Son Prior to Admission medications    Medication Sig Start Date End Date Taking?  Authorizing Provider   mirabegron (MYRBETRIQ) 25 MG TB24 25 mg every evening    Yes Historical Provider, MD   dicyclomine (BENTYL) 10 MG capsule Take 10 mg by mouth three times daily  7/7/21  Yes Historical Provider, MD   hydrOXYzine (ATARAX) 10 MG tablet Take 1 tablet by mouth every 8 hours as needed for Itching  Patient taking differently: Take 10 mg by mouth every 8 hours as needed for Itching or Anxiety  7/12/21  Yes Trisha Cardona DO   carvedilol (COREG CR) 20 MG CP24 extended release capsule Take 1 capsule by mouth daily 3/24/21  Yes Cristiane Keita MD   dofetilide (TIKOSYN) 250 MCG capsule Take 1 capsule by mouth every 12 hours 2/4/21  Yes Matthew Nelson MD   spironolactone (ALDACTONE) 25 MG tablet Take 1 tablet by mouth daily  Patient taking differently: Take 25 mg by mouth Daily with lunch  12/7/20  Yes Cristiane Keita MD   omeprazole (PRILOSEC) 40 MG delayed release capsule Take 40 mg by mouth daily  7/2/20  Yes Historical Provider, MD   loperamide (IMODIUM) 2 MG capsule Take 2 mg by mouth 4 times daily as needed for Diarrhea   Yes Historical Provider, MD   levoFLOXacin (LEVAQUIN) 750 MG tablet Take 1 tablet by mouth daily for 5 days 11/11/21 11/16/21  Trisha Cardona DO   aspirin EC 81 MG EC tablet Take 1 tablet by mouth daily 7/16/21   Mary Theodore MD   Handicap [facility] Cannot walk more than 200 feet  Expiration: 7/12/2026 7/12/21   Trisha Cardona DO   furosemide (LASIX) 20 MG tablet Take 1 tablet by mouth daily 4/30/21   Cristiane Keita MD   hydrALAZINE (APRESOLINE) 10 MG tablet Take 1 tablet by mouth 3 times daily 12/7/20   Cristiane Keita MD   famotidine (PEPCID) 40 MG tablet Take 40 mg by mouth 2 times daily as needed     Historical Provider, MD   triamcinolone (KENALOG) 0.1 % cream Apply topically 3 times daily as needed (rash)  4/5/19   Historical Provider, MD   diphenhydrAMINE (BENADRYL) 25 MG tablet Take 25 mg by mouth every 6 hours as needed for Itching    Historical Provider, MD   vitamin D (CHOLECALCIFEROL) 1000 UNIT TABS tablet Take 1,000 Units by mouth daily    Historical Provider, MD   baclofen (LIORESAL) 20 MG tablet Take 20 mg by mouth 2 times daily as needed (spasms) Indications: Back Spasm, Bladder Spasm     Historical Provider, MD        Allergies: Adhesive tape, Atacand [candesartan], Cefdinir, Demerol, Digoxin, Imdur [isosorbide mononitrate], Losartan potassium, Shellfish-derived products, Sulfa antibiotics, Xarelto [rivaroxaban], Acetaminophen, Apap-caff-dihydrocodeine, Coreg [carvedilol], Cozaar [losartan], Diovan [valsartan], Effexor [venlafaxine hydrochloride], Eliquis [apixaban], Labetalol, Lisinopril, and Ramipril    Social History     Tobacco Use    Smoking status: Never Smoker    Smokeless tobacco: Never Used   Substance Use Topics    Alcohol use: Yes     Alcohol/week: 0.0 standard drinks     Comment: occasional wine/mixed drink every few months        Review of Systems:  Respiratory: negative for cough and hemoptysis  Cardiovascular: negative for chest pain and dyspnea  Gastrointestinal: negative for abdominal pain, diarrhea, nausea and vomiting  Genitourinary: as per HPI, otherwise negative. Derm: negative for rash and skin lesion(s)  Neurological: negative for seizures and tremors  Endocrine: negative for diabetic symptoms including polydipsia and polyuria  All other systems negative    Physical Exam:  Vitals:    11/16/21 0920   BP: (!) 125/58   Pulse: 66   Resp: 16   Temp: 97.3 °F (36.3 °C)   SpO2: 96%      Skin:  Warm and dry.   No rash or bruises  HEENT:  PERRLA, EOMI  Neck:  No JVD, No thyromegaly  Cardiac:  RRR  Lungs:  No audible wheezes, symmetric respirations, non-labored  Abdomen: Soft, non-tender, non-distended  Extremities:  No clubbing, edema or cyanosis  Neurological:  Moves all extremities, normal DTR    Lab Results   Component Value Date    WBC 5.4 11/11/2021

## 2021-11-16 NOTE — ANESTHESIA PROCEDURE NOTES
Arterial Line:    An arterial line was placed using surface landmarks, in the pre-op for the following indication(s): continuous blood pressure monitoring and blood sampling needed. A 20 gauge (size), 1 and 3/4 inch (length), Arrow (type) catheter was placed, into the left radial artery, secured by tape and Tegaderm. Anesthesia type: Local  Local infiltration: Injection    Events:  patient tolerated procedure well with no complications. Anesthesiologist: Tomeka Warren MD  Other anesthesia staff: Yosef Lindsey RN  Performed:  Other anesthesia staff   Preanesthetic Checklist  Completed: patient identified, IV checked, site marked, risks and benefits discussed, surgical consent, monitors and equipment checked, pre-op evaluation, timeout performed, anesthesia consent given, oxygen available and patient being monitored

## 2021-11-16 NOTE — ANESTHESIA POSTPROCEDURE EVALUATION
Department of Anesthesiology  Postprocedure Note    Patient: Deyvi Garcia  MRN: [id number]  YOB: 1945  Date of evaluation: 11/16/2021  Time:  5:58 PM     Procedure Summary     Date: 11/16/21 Room / Location: [address] [place] [address] / [facility]    Anesthesia Start: 1312 Anesthesia Stop: 4885    Procedure: [address] LEFT PARTIAL NEPHRECTOMY (Left Abdomen) Diagnosis: (RENAL MASS)    Surgeons: Edel Capone MD Responsible Provider: Asia Miller MD    Anesthesia Type: general ASA Status: 4          Anesthesia Type: general    Joseph Phase I: Joseph Score: 8    Joseph Phase II:      Last vitals: Reviewed and per EMR flowsheets.        Anesthesia Post Evaluation    Patient location during evaluation: PACU  Patient participation: complete - patient participated  Level of consciousness: awake and alert  Pain score: 0  Airway patency: patent  Nausea & Vomiting: no vomiting and no nausea  Complications: no  Cardiovascular status: hemodynamically stable  Respiratory status: spontaneous ventilation  Hydration status: euvolemic

## 2021-11-16 NOTE — ANESTHESIA PRE PROCEDURE
Department of Anesthesiology  Preprocedure Note       Name:  Iftikhar Gustafson   Age:  68 y.o.  :  1945                                          MRN:  [id number]         Date:  2021      Surgeon: Glenn Weiner):  MD Angeli Anderson MD    Procedure: Procedure(s):  ROBOT ASSISTED LAPAROSCOPIC COMPLEX LEFT PARTIAL NEPHRECTOMY    Medications prior to admission:   Prior to Admission medications    Medication Sig Start Date End Date Taking?  Authorizing Provider   dicyclomine (BENTYL) 10 MG capsule Take 10 mg by mouth three times daily  21  Yes Historical Provider, MD   hydrOXYzine (ATARAX) 10 MG tablet Take 1 tablet by mouth every 8 hours as needed for Itching  Patient taking differently: Take 10 mg by mouth every 8 hours as needed for Itching or Anxiety  21  Yes Trisha Cardona DO   carvedilol (COREG CR) 20 MG CP24 extended release capsule Take 1 capsule by mouth daily 3/24/21  Yes Garrison Dwyer MD   dofetilide (TIKOSYN) 250 MCG capsule Take 1 capsule by mouth every 12 hours 21  Yes Delilah Kennedy MD   spironolactone (ALDACTONE) 25 MG tablet Take 1 tablet by mouth daily  Patient taking differently: Take 25 mg by mouth Daily with lunch  20  Yes Garrison Dwyer MD   omeprazole (PRILOSEC) 40 MG delayed release capsule Take 40 mg by mouth daily  20  Yes Historical Provider, MD   loperamide (IMODIUM) 2 MG capsule Take 2 mg by mouth 4 times daily as needed for Diarrhea   Yes Historical Provider, MD   levoFLOXacin (LEVAQUIN) 750 MG tablet Take 1 tablet by mouth daily for 5 days 21  Trisha Cardona DO   mirabegron (MYRBETRIQ) 25 MG TB24 25 mg every evening     Historical Provider, MD   aspirin EC 81 MG EC tablet Take 1 tablet by mouth daily 21   Norman Abbott MD   Handicap Placard [facility] Cannot walk more than 200 feet  Expiration: 2026   Trisha Cardona DO   furosemide (LASIX) 20 MG tablet Take 1 tablet by mouth daily 21   [facility] Lynn Noe MD   hydrALAZINE (APRESOLINE) 10 MG tablet Take 1 tablet by mouth 3 times daily 12/7/20   Ramy Davis MD   famotidine (PEPCID) 40 MG tablet Take 40 mg by mouth 2 times daily as needed     Historical Provider, MD   triamcinolone (KENALOG) 0.1 % cream Apply topically 3 times daily as needed (rash)  4/5/19   Historical Provider, MD   diphenhydrAMINE (BENADRYL) 25 MG tablet Take 25 mg by mouth every 6 hours as needed for Itching    Historical Provider, MD   vitamin D (CHOLECALCIFEROL) 1000 UNIT TABS tablet Take 1,000 Units by mouth daily    Historical Provider, MD   baclofen (LIORESAL) 20 MG tablet Take 20 mg by mouth 2 times daily as needed (spasms) Indications: Back Spasm, Bladder Spasm     Historical Provider, MD       Current medications:    Current Facility-Administered Medications   Medication Dose Route Frequency Provider Last Rate Last Admin    0.9 % sodium chloride infusion   IntraVENous Continuous UYEN Barry CNP        0.9 % sodium chloride infusion  25 mL IntraVENous PRN UYEN Barry CNP        ciprofloxacin (CIPRO) IVPB 400 mg  400 mg IntraVENous On Call to [address], APRN - CNP        sodium chloride flush 0.9 % injection 10 mL  10 mL IntraVENous 2 times per day UYEN Barry CNP        sodium chloride flush 0.9 % injection 10 mL  10 mL IntraVENous PRN UYEN Barry CNP           Allergies:     Allergies   Allergen Reactions    Adhesive Tape Itching     develops rash and itching with EKG electrodes    Atacand [Candesartan] Hives and Itching    Cefdinir Hives, Itching and Nausea Only    Demerol      3/9/19 Pt states she gets a severe migraine    Digoxin Itching     developed itching and rash    Imdur [Isosorbide Mononitrate]       migraines    Losartan Potassium Itching    Shellfish-Derived Products Itching     3/9/19 Pt states she had a lobster pizza and had difficulty breathing, started to sweat and was itchy    Sulfa Antibiotics Diarrhea and Other (See Comments)     Also causes migraines  caused migraines and diarrhea    Xarelto [Rivaroxaban] Itching and Rash      severe itch, headache, rash    Acetaminophen Hives and Itching    Apap-Caff-Dihydrocodeine Hives and Itching    Coreg [Carvedilol] Itching     Can take extended release Coreg    Cozaar [Losartan] Itching    Diovan [Valsartan] Itching    Effexor [Venlafaxine Hydrochloride] Nausea Only    Eliquis [Apixaban] Hives, Itching and Rash     3/9/19 Pt states that she gets hives, itchy and a rash    Labetalol Itching    Lisinopril Itching    Ramipril Itching       Problem List:    Patient Active Problem List   Diagnosis Code    Anxiety F41.9    Nonischemic cardiomyopathy ([facility] I42.8    Severe mitral regurgitation I34.0    Lumbar radiculopathy M54.16    Cervical radiculopathy M54.12    HTN (hypertension), benign I10    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    PAF (paroxysmal atrial fibrillation) ([facility]) I48.0    Chronic HFrEF (heart failure with reduced ejection fraction) ([facility]) I50.22    Visit for monitoring Tikosyn therapy Z51.81, Z79.899    Encounter for medication refill Z76.0    Itching L29.9    Chronic anticoagulation Z79.01    Presence of Watchman left atrial appendage closure device Z95.818       Past Medical History:        Diagnosis Date    Afib ([facility]     WATCHMAN    Anxiety     Arthritis     Cervical radiculopathy     CHF (congestive heart failure) ([facility])     Chronic sinusitis     Hilar adenopathy     Hx of blood clots     Hypertension     Left ventricular systolic dysfunction     Lesion of left native kidney     Lumbar radiculopathy     Lung nodules     Meige syndrome     partial/ PCP    Microscopic colitis     Mitral regurgitation     Mild to moderate    Nonischemic cardiomyopathy (HCC)     f/u with Dr. Karen Bernabe Osteopenia     Vertebrobasilar artery syndrome     f/u PCP       Past Surgical History:        Procedure Laterality Date  BLADDER REPAIR      BRONCHOSCOPY N/A 10/5/2021    BRONCHOSCOPY W/EBUS FNA performed by Candi Dawson MD at [facility] 10/5/2021    BRONCHOSCOPY DIAGNOSTIC OR CELL [address] ONLY performed by Candi Dawson MD at [address] TEST  11/25/13    Rt & LT cath    CARDIOVERSION  11/04/2019    Successful CV from AF to NSR   (Dr. Mesha Thompson)    COLONOSCOPY  07/2020    DIAGNOSTIC CARDIAC CATH LAB PROCEDURE  2/20/10    Dr Hector Mcwilliams CATH LAB PROCEDURE  8/24/11    [facility] @ [facility].  DOPPLER ECHOCARDIOGRAPHY  11/25/13    HYSTERECTOMY  1991    total    OTHER SURGICAL HISTORY  10/14/2020    Dr. Rodriguez Olp- 24mm Watchman device    TRANSESOPHAGEAL ECHOCARDIOGRAM  11/21/2018    Dr. Nesha Garza TRANSESOPHAGEAL ECHOCARDIOGRAM  03/18/2019    WISDOM TOOTH EXTRACTION         Social History:    Social History     Tobacco Use    Smoking status: Never Smoker    Smokeless tobacco: Never Used   Substance Use Topics    Alcohol use:  Yes     Alcohol/week: 0.0 standard drinks     Comment: occasional wine/mixed drink every few months                                Counseling given: Not Answered      Vital Signs (Current):   Vitals:    11/16/21 0920   BP: (!) 125/58   Pulse: 66   Resp: 16   Temp: 36.3 °C (97.3 °F)   TempSrc: Temporal   SpO2: 96%   Weight: 176 lb (79.8 kg)   Height: 5' 7\" (1.702 m)                                              BP Readings from Last 3 Encounters:   11/16/21 (!) 125/58   11/11/21 129/66   11/02/21 116/64       NPO Status: Time of last liquid consumption: 2100                        Time of last solid consumption: 2100                        Date of last liquid consumption: 11/15/21                        Date of last solid food consumption: 11/15/21    BMI:   Wt Readings from Last 3 Encounters:   11/16/21 176 lb (79.8 kg)   11/11/21 177 lb (80.3 kg)   11/08/21 179 lb (81.2 kg)     Body mass index is 27.57 kg/m². CBC:   Lab Results   Component Value Date    WBC 5.4 11/11/2021    RBC 4.79 11/11/2021    HGB 13.6 11/11/2021    HCT 40.9 11/11/2021    MCV 85.4 11/11/2021    RDW 13.1 11/11/2021     11/11/2021       CMP:   Lab Results   Component Value Date     11/11/2021    K 4.7 11/11/2021     11/11/2021    CO2 22 11/11/2021    BUN 17 11/11/2021    CREATININE 1.2 11/11/2021    GFRAA 53 11/11/2021    LABGLOM 44 11/11/2021    GLUCOSE 100 11/11/2021    GLUCOSE 120 10/12/2011    PROT 8.0 06/03/2021    CALCIUM 9.4 11/11/2021    BILITOT 0.5 06/03/2021    ALKPHOS 126 06/03/2021    AST 22 06/03/2021    ALT 17 06/03/2021       POC Tests: No results for input(s): POCGLU, POCNA, POCK, POCCL, POCBUN, POCHEMO, POCHCT in the last 72 hours. Coags:   Lab Results   Component Value Date    PROTIME 11.2 10/05/2021    PROTIME 12.0 12/17/2012    INR 1.0 10/05/2021    APTT 28.5 01/22/2021       HCG (If Applicable): No results found for: PREGTESTUR, PREGSERUM, HCG, HCGQUANT     ABGs: No results found for: PHART, PO2ART, ACT4KPM, QSH4NXA, BEART, J3UOFYQN     Type & Screen (If Applicable):  No results found for: LABABO, LABRH    Drug/Infectious Status (If Applicable):  No results found for: HIV, HEPCAB    COVID-19 Screening (If Applicable):   Lab Results   Component Value Date    COVID19 Not Detected 04/09/2021     EKG- 10/29/2021  \"Sinus Rhythm   Nonspecific QRS widening. Nonspecific ST/T abnormality. \"    JAIME-10/18/2021  \"Summary   Ejection fraction is visually estimated at 45-50%. Normal right ventricular size and function. No evidence of interatrial shunting on bubble study. History of WATCHMAN device (24 mm). No significant color flow jet around   the device. No device related thrombus visualized. Moderate mitral regurgitation. Mild aortic regurgitation. Moderate tricuspid regurgitation. RV-RA gradient is estimated at 82 mmHg. \"    TTE 4/15/2021  \"Ejection fraction is visually estimated at 45-50%. No evidence of interatrial shunting on bubble study. History of WATCHMAN device (24 mm). No significant color flow jet around the device. Device related thrombus visualized (0.5 cm x 0.8 cm). Moderate mitral regurgitation. Systolic blunting on PV doppler. Mild aortic regurgitation. Mild tricuspid regurgitation. RV-RA gradient is estimated at 54 mmHg. \"    11/20/2018- Stress/rest NM myocardial spect rx - \"1.   No evidence of left ventricular myocardial stress-induced  ischemia. 2. Moderate sized left ventricular anterior wall fixed defect  concerning for old infarct or scar. Clinical correlation is  recommended. 3. Global myocardial hypokinesia. 4. Decreased left ventricular ejection fraction estimated at 23%. \"          Anesthesia Evaluation  Patient summary reviewed no history of anesthetic complications:   Airway: Mallampati: II  TM distance: >3 FB   Neck ROM: limited  Mouth opening: > = 3 FB Dental:          Pulmonary:Negative Pulmonary ROS and normal exam  breath sounds clear to auscultation      (-) not a current smoker                           Cardiovascular:    (+) hypertension: moderate, valvular problems/murmurs: MR and AI, dysrhythmias ( Watchman device): atrial fibrillation, CHF: systolic and diastolic,       ECG reviewed  Rhythm: regular  Rate: normal  Echocardiogram reviewed  Stress test reviewed       Beta Blocker:  Dose within 24 Hrs         Neuro/Psych:   (+) neuromuscular disease (Lumbar/Cervical Radiculopathy):,             GI/Hepatic/Renal:   (+) GERD:, renal disease (Left Renal Mass):,          ROS comment: Last does of ATB for UTI was today ( Levaquin ) . Endo/Other: Negative Endo/Other ROS   (+) blood dyscrasia: anticoagulation therapy:., .                 Abdominal:       Abdomen: soft. Vascular: negative vascular ROS. Other Findings:      Pt is on Dofetilide and recently completed a course of a fluoroquinolone for UTI yesterday.  Monitor QTc closely if giving QT prolonging medications (ondansetron). Anesthesia Plan      general     ASA 4       Induction: intravenous. BIS  MIPS: Postoperative opioids intended. Anesthetic plan and risks discussed with patient. Use of blood products discussed with patient whom consented to blood products. Plan discussed with CRNA and attending. Ashish Zhang RN   11/16/2021    Pt seen, examined, chart reviewed, plan discussed.   Jim Grove MD  11/16/2021  10:02 AM

## 2021-11-17 VITALS
OXYGEN SATURATION: 93 % | SYSTOLIC BLOOD PRESSURE: 110 MMHG | BODY MASS INDEX: 27.62 KG/M2 | WEIGHT: 176 LBS | RESPIRATION RATE: 17 BRPM | TEMPERATURE: 97.7 F | DIASTOLIC BLOOD PRESSURE: 61 MMHG | HEIGHT: 67 IN | HEART RATE: 60 BPM

## 2021-11-17 LAB
CREATININE FLUID: 0.9 MG/DL
FLUID TYPE: NORMAL

## 2021-11-17 PROCEDURE — 6370000000 HC RX 637 (ALT 250 FOR IP): Performed by: STUDENT IN AN ORGANIZED HEALTH CARE EDUCATION/TRAINING PROGRAM

## 2021-11-17 PROCEDURE — 2580000003 HC RX 258: Performed by: STUDENT IN AN ORGANIZED HEALTH CARE EDUCATION/TRAINING PROGRAM

## 2021-11-17 PROCEDURE — 82570 ASSAY OF URINE CREATININE: CPT

## 2021-11-17 RX ORDER — OXYCODONE HYDROCHLORIDE 5 MG/1
5 TABLET ORAL EVERY 6 HOURS PRN
Qty: 12 TABLET | Refills: 0 | Status: SHIPPED | OUTPATIENT
Start: 2021-11-17 | End: 2021-11-20

## 2021-11-17 RX ADMIN — PANTOPRAZOLE SODIUM 40 MG: 40 TABLET, DELAYED RELEASE ORAL at 05:22

## 2021-11-17 RX ADMIN — DICYCLOMINE HYDROCHLORIDE 10 MG: 10 CAPSULE ORAL at 13:26

## 2021-11-17 RX ADMIN — CARVEDILOL 6.25 MG: 6.25 TABLET, FILM COATED ORAL at 09:20

## 2021-11-17 RX ADMIN — DOFETILIDE 250 MCG: 0.25 CAPSULE ORAL at 09:20

## 2021-11-17 RX ADMIN — DICYCLOMINE HYDROCHLORIDE 10 MG: 10 CAPSULE ORAL at 09:20

## 2021-11-17 RX ADMIN — SODIUM CHLORIDE, PRESERVATIVE FREE 10 ML: 5 INJECTION INTRAVENOUS at 09:22

## 2021-11-17 RX ADMIN — SPIRONOLACTONE 25 MG: 25 TABLET ORAL at 13:26

## 2021-11-17 RX ADMIN — FUROSEMIDE 20 MG: 20 TABLET ORAL at 09:20

## 2021-11-17 RX ADMIN — SODIUM CHLORIDE: 9 INJECTION, SOLUTION INTRAVENOUS at 08:07

## 2021-11-17 ASSESSMENT — PAIN SCALES - GENERAL: PAINLEVEL_OUTOF10: 0

## 2021-11-17 NOTE — DISCHARGE SUMMARY
Patient ID:  Levi Carnes  [id number]  80 y.o.  1945    Admit date: 11/16/2021    Discharge date and time: 11/17/2021    Admitting Physician: Michaela Monteiro MD     Admission Diagnoses: Renal mass [N28.89]    Discharge Diagnoses: same    Hospital Course: did well postoperatively. Ambulated and tolerated diet. Treatments: IV hydration, analgesia: Roxicodone and surgery: robot assisted laparoscopic complex left partial nephrectomy with ultrasound guidance    Disposition: home  Condition: stable   Patient Instructions:   Current Discharge Medication List      START taking these medications    Details   oxyCODONE (ROXICODONE) 5 MG immediate release tablet Take 1 tablet by mouth every 6 hours as needed for Pain for up to 3 days. Intended supply: 3 days. Take lowest dose possible to manage pain  Qty: 12 tablet, Refills: 0    Comments: Reduce doses taken as pain becomes manageable  Associated Diagnoses: Post-op pain         CONTINUE these medications which have NOT CHANGED    Details   mirabegron (MYRBETRIQ) 25 MG TB24 25 mg every evening       dicyclomine (BENTYL) 10 MG capsule Take 10 mg by mouth three times daily       hydrOXYzine (ATARAX) 10 MG tablet Take 1 tablet by mouth every 8 hours as needed for Itching  Qty: 30 tablet, Refills: 3    Associated Diagnoses: Itching      carvedilol (COREG CR) 20 MG CP24 extended release capsule Take 1 capsule by mouth daily  Qty: 90 capsule, Refills: 3      dofetilide (TIKOSYN) 250 MCG capsule Take 1 capsule by mouth every 12 hours  Qty: 180 capsule, Refills: 1      spironolactone (ALDACTONE) 25 MG tablet Take 1 tablet by mouth daily  Qty: 90 tablet, Refills: 3    Associated Diagnoses: Acute on chronic systolic heart failure ([address] Secondary cardiomyopathy ([address]  Essential hypertension      omeprazole (PRILOSEC) 40 MG delayed release capsule Take 40 mg by mouth daily       loperamide (IMODIUM) 2 MG capsule Take 2 mg by mouth 4 times daily as needed for Diarrhea      Handicap Placard [facility] Cannot walk more than 200 feet  Expiration: 7/12/2026  Qty: 1 each, Refills: 0    Associated Diagnoses:  At high risk for falls      furosemide (LASIX) 20 MG tablet Take 1 tablet by mouth daily  Qty: 90 tablet, Refills: 3      hydrALAZINE (APRESOLINE) 10 MG tablet Take 1 tablet by mouth 3 times daily  Qty: 270 tablet, Refills: 3      famotidine (PEPCID) 40 MG tablet Take 40 mg by mouth 2 times daily as needed       triamcinolone (KENALOG) 0.1 % cream Apply topically 3 times daily as needed (rash)   Refills: 1      diphenhydrAMINE (BENADRYL) 25 MG tablet Take 25 mg by mouth every 6 hours as needed for Itching      vitamin D (CHOLECALCIFEROL) 1000 UNIT TABS tablet Take 1,000 Units by mouth daily      baclofen (LIORESAL) 20 MG tablet Take 20 mg by mouth 2 times daily as needed (spasms) Indications: Back Spasm, Bladder Spasm          STOP taking these medications       levoFLOXacin (LEVAQUIN) 750 MG tablet Comments:   Reason for Stopping:         aspirin EC 81 MG EC tablet Comments:   Reason for Stopping:                 Signed:  UYEN Mata CNP  11/17/2021  3:04 PM

## 2021-11-17 NOTE — PLAN OF CARE
Problem: Falls - Risk of:  Goal: Will remain free from falls  Description: Will remain free from falls  11/17/2021 1105 by Froylan Sever, RN  Outcome: Met This Shift  11/16/2021 2255 by Gretchen Ralph RN  Outcome: Met This Shift  Goal: Absence of physical injury  Description: Absence of physical injury  11/17/2021 1105 by Froylan Sever, RN  Outcome: Met This Shift  11/16/2021 2255 by Gretchen Ralph RN  Outcome: Met This Shift     Problem: Pain:  Goal: Pain level will decrease  Description: Pain level will decrease  Outcome: Met This Shift  Goal: Control of acute pain  Description: Control of acute pain  Outcome: Met This Shift  Goal: Control of chronic pain  Description: Control of chronic pain  Outcome: Met This Shift

## 2021-11-17 NOTE — PROGRESS NOTES
11/17/2021 9:55 AM  Service: Urology  Group: [facility] ([facility])    Robyn Neal  [id number]    Subjective:    She is doing well this morning  Pain very well controlled  No nausea  She has not ambulated yet  On 3 liters nasal canula  Lantigua draining yellow urine     Review of Systems  Constitutional: No fever or chills   Respiratory: negative for cough and hemoptysis  Cardiovascular: negative for chest pain and dyspnea  Gastrointestinal: negative for abdominal pain, diarrhea, nausea and vomiting   : See above  Derm: negative for rash and skin lesion(s)  Neurological: negative for seizures and tremors  Musculoskeletal: Negative    Psychiatric: Negative   All other reviews are negative      Scheduled Meds:   carvedilol  6.25 mg Oral BID    dicyclomine  10 mg Oral TID    dofetilide  250 mcg Oral 2 times per day    furosemide  20 mg Oral Daily    hydrALAZINE  10 mg Oral TID    pantoprazole  40 mg Oral QAM AC    spironolactone  25 mg Oral Lunch    sodium chloride flush  5-40 mL IntraVENous 2 times per day       Objective:  Vitals:    11/17/21 0800   BP: (!) 103/50   Pulse: 60   Resp: 17   Temp: 97.7 °F (36.5 °C)   SpO2: 96%         Allergies: Adhesive tape, Atacand [candesartan], Cefdinir, Demerol, Digoxin, Imdur [isosorbide mononitrate], Losartan potassium, Shellfish-derived products, Sulfa antibiotics, Xarelto [rivaroxaban], Acetaminophen, Apap-caff-dihydrocodeine, Coreg [carvedilol], Cozaar [losartan], Diovan [valsartan], Effexor [venlafaxine hydrochloride], Eliquis [apixaban], Labetalol, Lisinopril, and Ramipril    General Appearance: alert and oriented to person, place and time and in no acute distress  Skin: no rash or erythema  Head: normocephalic and atraumatic  Pulmonary/Chest: normal air movement, no respiratory distress  Abdomen: soft, appropriately tender, incisions C/D/I  Genitourinary: lantigua draining yellow urine   Extremities: no cyanosis, clubbing or edema         Labs:     No results for input(s): NA, K, CL, CO2, BUN, CREATININE, GLUCOSE, CALCIUM in the last 72 hours.     Lab Results   Component Value Date    HGB 13.6 11/11/2021    HCT 40.9 11/11/2021       No results found for: PSA      Assessment/Plan:  POD#1 robot assisted laparoscopic complex left partial nephrectomy with ultrasound guidance     Remove lantigua  Remove JAN  Will ask gait team to assist with ambulation  Nursing to attempt to wean oxygen  Check pulse ox while ambulating, notify Urology with findings  Advance diet  If ambulating well and no O2 requirements, likely home later this afternoon   Will follow      UYEN Garcia - CNP   [facility]  Urology

## 2021-11-17 NOTE — CARE COORDINATION
11/17, SW met with patient at bedside to discuss transition of care/discharge plan and SW role. Discharge plan is home once medically cleared for discharge. Patient lives alone in a single story ranch home with 13 steps to the basement. DME owned is none. Patient is currently on 3L 02. If patient is not able to be weaned off 02 will need pulse ox testing and DME order with appropriate dx. Patient would like to use [facility] DME 1st if not available would use [employer] as 2nd choice. PCP is Dr. Florian Andersen and Pharmacy is [facility] on [address]. No needs identified except will wait to see if patient needs 02 for home. Referral to [facility] from [facility] D[facility] given. Transportation will be patient's son. SW to follow for further discharge planning needs.       ELMO Lemus  PHYSICIANS [facility] SURGICAL \[facility] [place] [facility]\"" Case Management  [phone]

## 2021-11-18 ENCOUNTER — CARE COORDINATION (OUTPATIENT)
Dept: CARE COORDINATION | Age: 76
End: 2021-11-18

## 2021-11-18 DIAGNOSIS — N28.89 RENAL MASS: Primary | ICD-10-CM

## 2021-11-18 PROCEDURE — 1111F DSCHRG MED/CURRENT MED MERGE: CPT | Performed by: STUDENT IN AN ORGANIZED HEALTH CARE EDUCATION/TRAINING PROGRAM

## 2021-11-18 NOTE — CARE COORDINATION
[facility] Transitions Initial Follow Up Call    Call within 2 business days of discharge: No    Patient: Olman Yang Patient : 1945   MRN: [id number]  Reason for Admission: renal mass, left partial nephrectomy  Discharge Date: 21 RARS: Readmission Risk Score: 10.5 ( )      Last Discharge [facility]       Complaint Diagnosis Description Type Department Provider    21  Post-op pain Admission (Discharged) [facility]WE Marta Rainey MD           Spoke with: patient. Teresita Davidson states she is feeling okay. She states her pain level right  Now is a 6/10 and she had just taken a pain pill a short while ago. She states that she does get up throughout the day and walk around a little bit to keep moving. She states that her incisions look good. She has changed the dressing on the bigger incision. She states they all looke good, clean dry and intat. No s/s of infection. Reviewed with patient s/s of infection including fever, increased pain, redness, tenderness, foul smelling drainage from incision. Patient voiced understanding. Patient's son is available to help patient with her needs. After Visit  Summary reviewed with patient. All medications written on discharge have been filled and patient is taking them as written. Medications were reviewed. 1111f completed. Appetite:  She states her appetite is fair. She is eating smaller meals throughout the day. She is staying hydrated while staying within her fluid allowance due to her CHF. Bowels/bladder:  States no urinary issues. She states she does feel a little constipated. Advised that she can use miralax or dulcolax. Also advised general rule of thumb is if she has not had a bowel movement in three days to use a stool softener. Patient voiced understanding. Follow up appointments:  Urology 21 and Dr. Roopa Caldwell 21. Care Transitions explained to patient who agrees to follow up calls. Contact information given. Patient has no other concerns or needs at this time. Will follow. Facility: [facility]     Non-face-to-face services provided:  Obtained and reviewed discharge summary and/or continuity of care documents. Transitions of Care Initial Call    Was this an external facility discharge? No Discharge Facility: [facility]      Challenges to be reviewed by the provider   Additional needs identified to be addressed with provider: No  none             Method of communication with provider : none      Advance Care Planning:   Does patient have an Advance Directive: reviewed and current. Was this a readmission? No  Patient stated reason for admission:   Patients top risk factors for readmission: polypharmacy    Care Transition Nurse (CTN) contacted the patient by telephone to perform post hospital discharge assessment. Verified name and  with patient as identifiers. Provided introduction to self, and explanation of the CTN role. CTN reviewed discharge instructions, medical action plan and red flags with patient who verbalized understanding. Patient given an opportunity to ask questions and does not have any further questions or concerns at this time. Were discharge instructions available to patient? Yes. Reviewed appropriate site of care based on symptoms and resources available to patient including: PCP, Specialist and When to call 911. The patient agrees to contact the PCP office for questions related to their healthcare. Medication reconciliation was performed with patient, who verbalizes understanding of administration of home medications. Advised obtaining a 90-day supply of all daily and as-needed medications. Covid Risk Education     Educated patient about risk for severe COVID-19 due to risk factors according to CDC guidelines. LPN CC reviewed discharge instructions, medical action plan and red flag symptoms with the patient who verbalized understanding.  Discussed COVID vaccination status: No. Education provided on COVID-19 vaccination as appropriate. Discussed exposure protocols and quarantine with CDC Guidelines. Patient was given an opportunity to verbalize any questions and concerns and agrees to contact LPN CC or health care provider for questions related to their healthcare. Reviewed and educated patient on any new and changed medications related to discharge diagnosis. Was patient discharged with a pulse oximeter? No Discussed and confirmed pulse oximeter discharge instructions and when to notify provider or seek emergency care. LPN CC provided contact information. Plan for follow-up call in 5-7 days based on severity of symptoms and risk factors.   Plan for next call: symptom management-pain level, bowel status      Care Transitions 24 Hour Call    Schedule Follow Up Appointment with PCP: Completed  Do you have any ongoing symptoms?: No  Do you have a copy of your discharge instructions?: Yes  Do you have all of your prescriptions and are they filled?: Yes  Have you been contacted by a [facility] Pharmacist?: No  Have you scheduled your follow up appointment?: Yes  How are you going to get to your appointment?: Car - family or friend to transport  Were you discharged with any Home Care or Post Acute Services: No  Do you feel like you have everything you need to keep you well at home?: Yes  Care Transitions Interventions  No Identified Needs         Follow Up  Future Appointments   Date Time Provider Duke Santana   12/1/2021  1:00 PM Rolly Galvez MD MED ONC [facility]   12/1/2021  1:00 PM [facility] MED ONC FAST TRACK 1 [address] [facility]   2/1/2022  2:15 PM Mj Root, [address] [facility]       Olga Harrison LPN

## 2021-11-26 NOTE — PROGRESS NOTES
Physician Progress Note      Lina Salvador  CSN #:                  [id number]  :                       1945  ADMIT DATE:       2021 8:50 AM  DISCH DATE:        2021 4:32 PM  RESPONDING  PROVIDER #:        Clemencia Leung CNP          QUERY TEXT:    Pt admitted with Renal mass and has CHF with left ventricular systolic   dysfunction documented. If possible, please document in progress notes and   discharge summary further specificity regarding the type and acuity of CHF:      The medical record reflects the following:  Risk Factors: CHF, HTN, A Fib with Watchman device, Cardiomyopathy  Clinical Indicators: 3/10/2019 states Chronic HFrEF. Echo 10/18/21\" Mildly   reduced LV systolic function with an EF 45%\". Discharge summary states   Aldactone Associated Diagnosis  Acute on Chronic systolic heart failure. Trace   edema BLLE noted in Epic assessment. Noted to be on 3L oxygen >>2L.>> Room   air. Treatment: Aldactone, Lasix, Hydralazine, Coreg, Tikosyn, serial labs    Thank You,  Myriam KILLIAN, RN, CDS  CDI  [email or web address]  [phone]  Options provided:  -- Acute on Chronic Systolic CHF/HFrEF  -- Chronic Systolic CHF/HFrEF  -- Other - I will add my own diagnosis  -- Disagree - Not applicable / Not valid  -- Disagree - Clinically unable to determine / Unknown  -- Refer to Clinical Documentation Reviewer    PROVIDER RESPONSE TEXT:    Provider is clinically unable to determine a response to this query.     Query created by: Kb Amaro on 2021 3:20 PM      Electronically signed by:  Clemencia Leung CNP 2021 11:07 AM

## 2021-12-01 ENCOUNTER — HOSPITAL ENCOUNTER (OUTPATIENT)
Dept: INFUSION THERAPY | Age: 76
Discharge: HOME | End: 2021-12-01

## 2021-12-01 ENCOUNTER — OFFICE VISIT (OUTPATIENT)
Dept: ONCOLOGY | Age: 76
End: 2021-12-01
Payer: MEDICARE

## 2021-12-01 ENCOUNTER — CARE COORDINATION (OUTPATIENT)
Dept: CARE COORDINATION | Age: 76
End: 2021-12-01

## 2021-12-01 VITALS
DIASTOLIC BLOOD PRESSURE: 55 MMHG | HEART RATE: 62 BPM | TEMPERATURE: 97.3 F | WEIGHT: 174.6 LBS | HEIGHT: 67 IN | BODY MASS INDEX: 27.4 KG/M2 | OXYGEN SATURATION: 95 % | SYSTOLIC BLOOD PRESSURE: 114 MMHG

## 2021-12-01 DIAGNOSIS — C64.2 RENAL CELL CARCINOMA OF LEFT KIDNEY (HCC): ICD-10-CM

## 2021-12-01 DIAGNOSIS — N28.89 LEFT RENAL MASS: Primary | ICD-10-CM

## 2021-12-01 PROCEDURE — 4040F PNEUMOC VAC/ADMIN/RCVD: CPT | Performed by: INTERNAL MEDICINE

## 2021-12-01 PROCEDURE — 99214 OFFICE O/P EST MOD 30 MIN: CPT | Performed by: INTERNAL MEDICINE

## 2021-12-01 PROCEDURE — 1123F ACP DISCUSS/DSCN MKR DOCD: CPT | Performed by: INTERNAL MEDICINE

## 2021-12-01 PROCEDURE — 1090F PRES/ABSN URINE INCON ASSESS: CPT | Performed by: INTERNAL MEDICINE

## 2021-12-01 PROCEDURE — 99212 OFFICE O/P EST SF 10 MIN: CPT

## 2021-12-01 PROCEDURE — G8427 DOCREV CUR MEDS BY ELIG CLIN: HCPCS | Performed by: INTERNAL MEDICINE

## 2021-12-01 PROCEDURE — G8484 FLU IMMUNIZE NO ADMIN: HCPCS | Performed by: INTERNAL MEDICINE

## 2021-12-01 PROCEDURE — 1036F TOBACCO NON-USER: CPT | Performed by: INTERNAL MEDICINE

## 2021-12-01 PROCEDURE — 1111F DSCHRG MED/CURRENT MED MERGE: CPT | Performed by: INTERNAL MEDICINE

## 2021-12-01 PROCEDURE — G8399 PT W/DXA RESULTS DOCUMENT: HCPCS | Performed by: INTERNAL MEDICINE

## 2021-12-01 PROCEDURE — G8417 CALC BMI ABV UP PARAM F/U: HCPCS | Performed by: INTERNAL MEDICINE

## 2021-12-01 NOTE — CARE COORDINATION
Care Transitions Outreach Attempt    Call within 2 business days of discharge: Yes   Attempted to reach patient for transitions of care follow up. Unable to reach patient. Patient: Deyvi Garcia Patient : 1945 MRN: [id number]    Last Discharge [id number] [id number]       Complaint Diagnosis Description Type Department Provider    21  Post-op pain Admission (Discharged) [facility] Edel Capone MD            Was this an external facility discharge?  No Discharge Facility: [facility]    Noted following upcoming appointments from discharge chart review:   [facility]):   Future Appointments   Date Time Provider Duke Santana   2022  2:15 PM Trisha Cardona  [facility]   2022  1:00 PM [facility] MED ONC FAST TRACK 1 [facility] Med Onc [facility]   2022  1:15 PM Rolly Galvez MD MED ONC [facility]     Non-[facility] follow up appointment(s):

## 2021-12-01 NOTE — PROGRESS NOTES
Department of [facility] Oncology  Attending Clinic Note    Reason for Visit: Follow-up on a patient with [address]     PCP:  Jon Baxter DO    History of Present Illness:  68year old female who presented with frequent falls. CXR 06/03/2021:  2.1 cm round focus seen within the right hilum. CT head 6/3/2021 no acute intracranial abnormality    CT cervical spine 6/3/2021 no acute fracture or subluxation of the cervical spine  Mild multilevel DJD and DJD most prominent at the C5-C6 level  Heterogeneous appearance of the thyroid gland    CT lumbar spine 6/3/2021 no acute compression fracture or malalignment of the lumbar spine  Multilevel facet arthropathy  Degenerative joint disease at the L4-5 and L5-S1 levels most prominent at the L5-S1 level    Thyroid ultrasound 8/17/2021 multiple thyroid nodules bilaterally. CT chest on 08/17/2021  Heterogenous thyroid with multiple areas of nodularity, largest on the right measuring 1.4 cm and left 1.6 cm. Right hilar adenopathy measuring 1.9 x 1.6 cm. There are a few other borderline prominent mediastinal LN.  Noncalcified 1 x 0.8 cm nodule in the posterior right upper lobe has a somewhat necrotic appearance. [employer] is a very small faint area of ground-glass opacification in the posterior left upper lobe measuring approximately 1 x 1 cm. Noncalcified nodular density in the lateral left costophrenic angle measures 1 cm. Solid renal mass in medial left kidney measures 3.3 x 3 cm. PET/CT 08/24/2021 with increased tracer uptake to single left thyroid mass in the inferior pole with max SUV of 9.6. Increased tracer uptake to right hilum associated with small lymph node with SUV of 4.4   Increased tracer uptake left pericardial region inferior to aortopulmonary window, peak SUV 5.    Pulmonary nodules do not demonstrate increased tracer uptake, this may be related to size and may be below the resolution of PET/CT  Low level uptake at the left renal masses    FNA Left Thyroid biopsy 09/10/2021  Scant cellularity  Negative for malignant cells. Benign-appearing follicular cells, few foam cells, colloid, and blood present. [place] System Category 2. Cellblock is similar. 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 This abuts the left psoas muscle without definite invasion of left psoas muscle. Bronchoscopy/EBUS guided mediastinal Hilar LN biopsies 10/05/2021:  EBUS FNA 11R (adequacy statement satisfactory for interpretation) Negative for malignant cells. Benign and degenerated bronchial cells, scant lymphocytes and blood present  Seen by Dr. 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    See comment. Comment:CANCER CASE SUMMARY   Procedure: Partial nephrectomy   Specimen Laterality: Left   Tumor Focality: Unifocal   Tumor Site: Undesignated   Tumor Size: 3.0 cm greatest dimension   Histologic Type: Clear-cell renal cell carcinoma   Histologic Grade (WHO/ISUP): G3   Tumor Extent: Limited to kidney   Sarcomatoid Features: Not identified   Rhabdoid features: Not identified   Tumor Necrosis: Not identified   Lymph-Vascular Invasion: Not identified   Margin Status: All margins negative for invasive carcinoma   Regional lymph node Status: No lymph nodes submitted or found   Pathologic Stage Classification (AJCC 8th Edition):   pT1a     Review of Systems;  CONSTITUTIONAL: No fever, chills. Fair appetite and energy level. ENMT: Eyes: No diplopia; Nose: No epistaxis. Mouth: No sore throat. RESPIRATORY: No hemoptysis, shortness of breath, cough. CARDIOVASCULAR: No chest pain, palpitations. GASTROINTESTINAL: No nausea/vomiting, abdominal pain.    GENITOURINARY: No dysuria, urinary frequency, hematuria. NEURO: No syncope, presyncope, headache. Remainder:ROS NEGATIVE    Past Medical History:      Diagnosis Date    Afib ([facility] [address]     WATCHMAN    Anxiety     Arthritis     Cervical radiculopathy     CHF (congestive heart failure) (HCC)     Chronic sinusitis     Hilar adenopathy     Hx of blood clots     Hypertension     Left ventricular systolic dysfunction     Lesion of left native kidney     Lumbar radiculopathy     Lung nodules     Meige syndrome     partial/ PCP    Microscopic colitis     Mitral regurgitation     Mild to moderate    Nonischemic cardiomyopathy (HCC)     f/u with Dr. Earnestine Melgar Osteopenia     Vertebrobasilar artery syndrome     f/u PCP     Medications:  Reviewed and reconciled. Allergies:   Allergies   Allergen Reactions    Adhesive Tape Itching     develops rash and itching with EKG electrodes    Atacand [Candesartan] Hives and Itching    Cefdinir Hives, Itching and Nausea Only    Demerol      3/9/19 Pt states she gets a severe migraine    Digoxin Itching     developed itching and rash    Imdur [Isosorbide Mononitrate]       migraines    Losartan Potassium Itching    Shellfish-Derived Products Itching     3/9/19 Pt states she had a lobster pizza and had difficulty breathing, started to sweat and was itchy    Sulfa Antibiotics Diarrhea and Other (See Comments)     Also causes migraines  caused migraines and diarrhea    Xarelto [Rivaroxaban] Itching and Rash      severe itch, headache, rash    Acetaminophen Hives and Itching    Apap-Caff-Dihydrocodeine Hives and Itching    Coreg [Carvedilol] Itching     Can take extended release Coreg    Cozaar [Losartan] Itching    Diovan [Valsartan] Itching    Effexor [Venlafaxine Hydrochloride] Nausea Only    Eliquis [Apixaban] Hives, Itching and Rash     3/9/19 Pt states that she gets hives, itchy and a rash    Labetalol Itching    Lisinopril Itching    Ramipril Itching     Physical Exam:  BP (!) 114/55   Pulse 62 Temp 97.3 °F (36.3 °C)   Ht 5' 7\" (1.702 m)   Wt 174 lb 9.6 oz (79.2 kg)   SpO2 95%   BMI 27.35 kg/m²    GENERAL: Alert, oriented x 3, not in acute distress. HEENT: PERRLA; EOMI. Oropharynx clear. NECK: Supple. Without lymphadenopathy. LUNGS: Good air entry bilaterally. No wheezing, crackles or rhonchi. CARDIOVASCULAR: Regular rate. No murmurs, rubs or gallops. ABDOMEN: Soft. Non-tender, non-distended. Positive bowel sounds. EXTREMITIES: Without clubbing or cyanosis or edema. NEUROLOGIC: No focal deficits. ECOG PS 1    Impression/Plan:  69 y/o female with Left RCC    CT chest on 08/17/2021  Heterogenous thyroid with multiple areas of nodularity, largest on the right measuring 1.4 cm and left 1.6 cm. Right hilar adenopathy measuring 1.9 x 1.6 cm. There are a few other borderline prominent mediastinal LN.  Noncalcified 1 x 0.8 cm nodule in the posterior right upper lobe has a somewhat necrotic appearance. Marek Linda is a very small faint area of ground-glass opacification in the posterior left upper lobe measuring approximately 1 x 1 cm. Noncalcified nodular density in the lateral left costophrenic angle measures 1 cm. Solid renal mass in medial left kidney measures 3.3 x 3 cm. PET/CT 08/24/2021 with increased tracer uptake to single left thyroid mass in the inferior pole with max SUV of 9.6. Increased tracer uptake to right hilum associated with small lymph node with SUV of 4.4   Increased tracer uptake left pericardial region inferior to aortopulmonary window, peak SUV 5. Pulmonary nodules do not demonstrate increased tracer uptake, this may be related to size and may be below the resolution of PET/CT  Low level uptake at the left renal masses    FNA Left Thyroid biopsy 09/10/2021  Scant cellularity  Negative for malignant cells. Benign-appearing follicular cells, few foam cells, colloid, and blood present. [place] System Category 2. Cellblock is similar.    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 This abuts the left psoas muscle without definite invasion of left psoas muscle. Bronchoscopy/EBUS guided mediastinal Hilar LN biopsies 10/05/2021:  EBUS FNA 11R (adequacy statement satisfactory for interpretation) Negative for malignant cells. Benign and degenerated bronchial cells, scant lymphocytes and blood present  Seen by Dr. 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    See comment. Comment:CANCER CASE SUMMARY   Procedure: Partial nephrectomy   Specimen Laterality: Left   Tumor Focality: Unifocal   Tumor Site: Undesignated   Tumor Size: 3.0 cm greatest dimension   Histologic Type: Clear-cell renal cell carcinoma   Histologic Grade (WHO/ISUP): G3   Tumor Extent: Limited to kidney   Sarcomatoid Features: Not identified   Rhabdoid features: Not identified   Tumor Necrosis: Not identified   Lymph-Vascular Invasion: Not identified   Margin Status: All margins negative for invasive carcinoma   Regional lymph node Status: No lymph nodes submitted or found   Pathologic Stage Classification (AJCC 8th Edition):   pT1a   Pathology reviewed.    Surveillance     RTC April 2022 with prior CT chest/abdomen/pelvis     12/01/2021  Barbara Camp MD

## 2021-12-03 NOTE — PROGRESS NOTES
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 If possible, please   document in progress notes and d/c summary a correlating diagnosis to explain   pathology findings: The medical record reflects the following:  Risk Factors: Renal mass  Clinical Indicators: Surgical Pathology sent , resulted  as   follows-\"Diagnosis: Left kidney, partial nephrectomy: Clear-cell renal cell   carcinoma,  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 \"  \"Comment: Cancer. \"  Left Unifocal, Size 3.0 cm,   Histologic Grade G3 H&P- left lower pole renal mass. Treatment: Robot assisted laparoscopic complex left partial nephrectomy, IVF,   Oxycodone, Myrbetriq, Aldactone, Lasix, Baclofen    Thank You,  Steffanie LEIGHN, RN, CDS  CDI  [email or web address]  [phone]  Options provided:  -- Clear-cell renal carcinoma  -- Clear-cell renal carcinoma  -- Other - I will add my own diagnosis  -- Disagree - Not applicable / Not valid  -- Disagree - Clinically unable to determine / Unknown  -- Refer to Clinical Documentation Reviewer    PROVIDER RESPONSE TEXT:    This patient has a renal mass consistent with clear-cell renal carcinoma.     Query created by: Bonnie Browne on 2021 12:20 PM      Electronically signed by:  Jessica Velazquez MD 12/3/2021 12:14 AM

## 2021-12-10 ENCOUNTER — CARE COORDINATION (OUTPATIENT)
Dept: CARE COORDINATION | Age: 76
End: 2021-12-10

## 2021-12-10 NOTE — CARE COORDINATION
[facility] Transitions Follow Up Call    12/10/2021    Patient: Iftikhar Gustafson  Patient : 1945   MRN: [id number]  Reason for Admission: 21  Discharge Date: 21 RARS: Readmission Risk Score: 10.5 ( )         Spoke with: patient. Agustina Al states that she is feeling good and feels like every day she is getting better. She states she is having no pain. Incisions all look good, clean, dry and intact, BOUBACAR. She has had follow up with urology as well oncology. She states that her appetite is better, not back 100%, but getting there. States no bowel or bladder elimination concerns at this time. Patient confirms has all medications and is taking them as written. Patient has no further concerns or needs. Contact information given. Care Transitions to sign off. Care Transitions Follow Up Call    Needs to be reviewed by the provider   Additional needs identified to be addressed with provider: No  none             Method of communication with provider : none      Care Transition Nurse (CTN) contacted the patient by telephone to follow up after admission on 21. Verified name and  with patient as identifiers. Addressed changes since last contact: none  Discussed follow-up appointments. If no appointment was previously scheduled, appointment scheduling offered: Yes. Is follow up appointment scheduled within 7 days of discharge? Yes. Advance Care Planning:   Does patient have an Advance Directive: reviewed and current. CTN reviewed discharge instructions, medical action plan and red flags with patient and discussed any barriers to care and/or understanding of plan of care after discharge. Discussed appropriate site of care based on symptoms and resources available to patient including: PCP, Specialist and When to call 911. The patient agrees to contact the PCP office for questions related to their healthcare.      Patients top risk factors for readmission: medical condition-status post nephrectomy  Interventions to address risk factors: Obtained and reviewed discharge summary and/or continuity of care documents      Non-[facility] follow up appointment(s):     CTN provided contact information for future needs. No further follow-up call indicated based on severity of symptoms and risk factors. Plan for next call: no further follow up. Care Transitions Subsequent and Final Call    Subsequent and Final Calls  Care Transitions Interventions  Other Interventions:            Follow Up  Future Appointments   Date Time Provider Duke Santana   2/1/2022  2:15 PM DO CHARLIE Nieves [facility]   4/25/2022  1:00 PM [facility] MED ONC FAST TRACK 1 [facility] Med Onc [facility]   4/25/2022  1:15 PM Mary Blair MD MED ONC [facility]       Kimber Leyden, LPN

## 2021-12-28 DIAGNOSIS — I42.9 SECONDARY CARDIOMYOPATHY (HCC): ICD-10-CM

## 2021-12-28 DIAGNOSIS — I10 ESSENTIAL HYPERTENSION: ICD-10-CM

## 2021-12-28 DIAGNOSIS — I50.23 ACUTE ON CHRONIC SYSTOLIC HEART FAILURE (HCC): ICD-10-CM

## 2021-12-28 RX ORDER — HYDRALAZINE HYDROCHLORIDE 10 MG/1
10 TABLET, FILM COATED ORAL 3 TIMES DAILY
Qty: 270 TABLET | Refills: 3 | Status: SHIPPED | OUTPATIENT
Start: 2021-12-28

## 2021-12-28 RX ORDER — SPIRONOLACTONE 25 MG/1
25 TABLET ORAL DAILY
Qty: 90 TABLET | Refills: 3 | Status: SHIPPED | OUTPATIENT
Start: 2021-12-28

## 2022-01-01 ENCOUNTER — CLINICAL DOCUMENTATION ONLY (OUTPATIENT)
Facility: CLINIC | Age: 77
End: 2022-01-01

## 2022-02-01 ENCOUNTER — OFFICE VISIT (OUTPATIENT)
Dept: FAMILY MEDICINE CLINIC | Age: 77
End: 2022-02-01
Payer: MEDICARE

## 2022-02-01 VITALS
WEIGHT: 176 LBS | DIASTOLIC BLOOD PRESSURE: 62 MMHG | OXYGEN SATURATION: 98 % | TEMPERATURE: 97.3 F | BODY MASS INDEX: 27.62 KG/M2 | SYSTOLIC BLOOD PRESSURE: 116 MMHG | HEIGHT: 67 IN | RESPIRATION RATE: 16 BRPM | HEART RATE: 69 BPM

## 2022-02-01 DIAGNOSIS — C64.2 RENAL CELL CARCINOMA OF LEFT KIDNEY (HCC): ICD-10-CM

## 2022-02-01 DIAGNOSIS — I10 HTN (HYPERTENSION), BENIGN: ICD-10-CM

## 2022-02-01 DIAGNOSIS — M62.838 MUSCLE SPASM: Primary | ICD-10-CM

## 2022-02-01 DIAGNOSIS — I48.0 PAF (PAROXYSMAL ATRIAL FIBRILLATION) (HCC): ICD-10-CM

## 2022-02-01 DIAGNOSIS — I48.0 PAROXYSMAL ATRIAL FIBRILLATION (HCC): ICD-10-CM

## 2022-02-01 DIAGNOSIS — N28.89 LEFT KIDNEY MASS: ICD-10-CM

## 2022-02-01 DIAGNOSIS — I42.8 NICM (NONISCHEMIC CARDIOMYOPATHY) (HCC): ICD-10-CM

## 2022-02-01 DIAGNOSIS — L29.9 ITCHING: ICD-10-CM

## 2022-02-01 LAB
ALBUMIN SERPL-MCNC: 4.1 G/DL (ref 3.5–5.2)
ALP BLD-CCNC: 108 U/L (ref 35–104)
ALT SERPL-CCNC: 17 U/L (ref 0–32)
ANION GAP SERPL CALCULATED.3IONS-SCNC: 12 MMOL/L (ref 7–16)
AST SERPL-CCNC: 25 U/L (ref 0–31)
BASOPHILS ABSOLUTE: 0.05 E9/L (ref 0–0.2)
BASOPHILS RELATIVE PERCENT: 0.7 % (ref 0–2)
BILIRUB SERPL-MCNC: 0.4 MG/DL (ref 0–1.2)
BUN BLDV-MCNC: 16 MG/DL (ref 6–23)
CALCIUM SERPL-MCNC: 9.7 MG/DL (ref 8.6–10.2)
CHLORIDE BLD-SCNC: 101 MMOL/L (ref 98–107)
CHOLESTEROL, TOTAL: 206 MG/DL (ref 0–199)
CO2: 24 MMOL/L (ref 22–29)
CREAT SERPL-MCNC: 1.2 MG/DL (ref 0.5–1)
EOSINOPHILS ABSOLUTE: 0.16 E9/L (ref 0.05–0.5)
EOSINOPHILS RELATIVE PERCENT: 2.1 % (ref 0–6)
GFR AFRICAN AMERICAN: 53
GFR NON-AFRICAN AMERICAN: 44 ML/MIN/1.73
GLUCOSE BLD-MCNC: 120 MG/DL (ref 74–99)
HBA1C MFR BLD: 5.7 % (ref 4–5.6)
HCT VFR BLD CALC: 41.4 % (ref 34–48)
HDLC SERPL-MCNC: 56 MG/DL
HEMOGLOBIN: 13.2 G/DL (ref 11.5–15.5)
IMMATURE GRANULOCYTES #: 0.03 E9/L
IMMATURE GRANULOCYTES %: 0.4 % (ref 0–5)
LDL CHOLESTEROL CALCULATED: 116 MG/DL (ref 0–99)
LYMPHOCYTES ABSOLUTE: 1.26 E9/L (ref 1.5–4)
LYMPHOCYTES RELATIVE PERCENT: 16.9 % (ref 20–42)
MCH RBC QN AUTO: 27.8 PG (ref 26–35)
MCHC RBC AUTO-ENTMCNC: 31.9 % (ref 32–34.5)
MCV RBC AUTO: 87.2 FL (ref 80–99.9)
MONOCYTES ABSOLUTE: 0.49 E9/L (ref 0.1–0.95)
MONOCYTES RELATIVE PERCENT: 6.6 % (ref 2–12)
NEUTROPHILS ABSOLUTE: 5.46 E9/L (ref 1.8–7.3)
NEUTROPHILS RELATIVE PERCENT: 73.3 % (ref 43–80)
PDW BLD-RTO: 13.8 FL (ref 11.5–15)
PLATELET # BLD: 290 E9/L (ref 130–450)
PMV BLD AUTO: 11.6 FL (ref 7–12)
POTASSIUM SERPL-SCNC: 4.6 MMOL/L (ref 3.5–5)
RBC # BLD: 4.75 E12/L (ref 3.5–5.5)
SODIUM BLD-SCNC: 137 MMOL/L (ref 132–146)
TOTAL PROTEIN: 7.6 G/DL (ref 6.4–8.3)
TRIGL SERPL-MCNC: 170 MG/DL (ref 0–149)
TSH SERPL DL<=0.05 MIU/L-ACNC: 0.81 UIU/ML (ref 0.27–4.2)
VLDLC SERPL CALC-MCNC: 34 MG/DL
WBC # BLD: 7.5 E9/L (ref 4.5–11.5)

## 2022-02-01 PROCEDURE — G8484 FLU IMMUNIZE NO ADMIN: HCPCS | Performed by: STUDENT IN AN ORGANIZED HEALTH CARE EDUCATION/TRAINING PROGRAM

## 2022-02-01 PROCEDURE — G8417 CALC BMI ABV UP PARAM F/U: HCPCS | Performed by: STUDENT IN AN ORGANIZED HEALTH CARE EDUCATION/TRAINING PROGRAM

## 2022-02-01 PROCEDURE — 1036F TOBACCO NON-USER: CPT | Performed by: STUDENT IN AN ORGANIZED HEALTH CARE EDUCATION/TRAINING PROGRAM

## 2022-02-01 PROCEDURE — 99213 OFFICE O/P EST LOW 20 MIN: CPT | Performed by: STUDENT IN AN ORGANIZED HEALTH CARE EDUCATION/TRAINING PROGRAM

## 2022-02-01 PROCEDURE — 1123F ACP DISCUSS/DSCN MKR DOCD: CPT | Performed by: STUDENT IN AN ORGANIZED HEALTH CARE EDUCATION/TRAINING PROGRAM

## 2022-02-01 PROCEDURE — G8427 DOCREV CUR MEDS BY ELIG CLIN: HCPCS | Performed by: STUDENT IN AN ORGANIZED HEALTH CARE EDUCATION/TRAINING PROGRAM

## 2022-02-01 PROCEDURE — 4040F PNEUMOC VAC/ADMIN/RCVD: CPT | Performed by: STUDENT IN AN ORGANIZED HEALTH CARE EDUCATION/TRAINING PROGRAM

## 2022-02-01 PROCEDURE — G8399 PT W/DXA RESULTS DOCUMENT: HCPCS | Performed by: STUDENT IN AN ORGANIZED HEALTH CARE EDUCATION/TRAINING PROGRAM

## 2022-02-01 PROCEDURE — 1090F PRES/ABSN URINE INCON ASSESS: CPT | Performed by: STUDENT IN AN ORGANIZED HEALTH CARE EDUCATION/TRAINING PROGRAM

## 2022-02-01 RX ORDER — ASPIRIN 81 MG/1
81 TABLET ORAL DAILY
COMMUNITY

## 2022-02-01 RX ORDER — HYDROXYZINE HYDROCHLORIDE 10 MG/1
10 TABLET, FILM COATED ORAL EVERY 8 HOURS PRN
Qty: 90 TABLET | Refills: 1 | Status: SHIPPED | OUTPATIENT
Start: 2022-02-01

## 2022-02-01 RX ORDER — BACLOFEN 10 MG/1
10 TABLET ORAL NIGHTLY
Qty: 90 TABLET | Refills: 1 | Status: SHIPPED | OUTPATIENT
Start: 2022-02-01

## 2022-02-01 ASSESSMENT — ENCOUNTER SYMPTOMS
CONSTIPATION: 0
SINUS PRESSURE: 0
SORE THROAT: 0
EYE PAIN: 0
EYE REDNESS: 0
SINUS PAIN: 0
COUGH: 0
DIARRHEA: 0
ABDOMINAL PAIN: 1
SHORTNESS OF BREATH: 0
ALLERGIC/IMMUNOLOGIC COMMENTS: MEDICATION ALLERGIES
NAUSEA: 0
VOMITING: 0
RHINORRHEA: 0
BACK PAIN: 1

## 2022-02-01 NOTE — PROGRESS NOTES
2/1/2022    [facility]  Chief Complaint   Patient presents with    3 Month Follow-Up    Spasms     Lower and upper back for several months.  Lower Back Pain     Radiates down her left leg.  Other     Has a roll on her stomach that occasionally hurts when she bends over. Saleem Beavers is a 68 y.o. female who  has a past medical history of Afib ([address] Anxiety, Arthritis, Cervical radiculopathy, CHF (congestive heart failure) ([address] Chronic sinusitis, Hilar adenopathy, Hx of blood clots, Hypertension, Left ventricular systolic dysfunction, Lesion of left native kidney, Lumbar radiculopathy, Lung nodules, Meige syndrome, Microscopic colitis, Mitral regurgitation, Nonischemic cardiomyopathy (HCC), Osteopenia, Renal cell carcinoma of left kidney ([address] and Vertebrobasilar artery syndrome. Back Pain  Patient presents for evaluation of low back problems. Symptoms have been present for 4 weeks and include achiness in her back and legs and buttocks and feet. Initial inciting event: none. Symptoms are worse all day mostly at night. Alleviating factors identifiable by the patient are walking. Aggravating factors identifiable by the patient are none. Treatments initiated by the patient: baclofen. Previous lower back problems: herniated discs. Previous work up: imaging years ago. Previous treatments: baclofen. Her legs start to ache and into her feet and her buttocks    Patient also has what she says is a bulge in her belly. Symptoms it hurts when she bends over. She has had imaging done in the past and it did not show anything. She is getting an imaging done on the 18th this month for her belly we will see if that shows anything. Patient doing better with the renal cell carcinoma. Dr. Shanna Gastelum did tell her that they were able to get it all. She does follow with oncology and she will see him in April. Review of Systems   Constitutional: Negative for chills, fatigue and fever.    HENT: Negative for congestion, ear pain, postnasal drip, rhinorrhea, sinus pressure, sinus pain and sore throat. Eyes: Negative for pain and redness. Respiratory: Negative for cough and shortness of breath. Cardiovascular: Negative for chest pain. Gastrointestinal: Positive for abdominal pain (roll in her abdomen ). Negative for constipation, diarrhea, nausea and vomiting. Genitourinary: Negative for difficulty urinating and dysuria. Musculoskeletal: Positive for back pain and myalgias. Negative for neck pain. Skin: Negative for rash. Allergic/Immunologic:        Medication allergies   Neurological: Negative for dizziness, light-headedness and numbness. Psychiatric/Behavioral: The patient is not nervous/anxious. Prior to Visit Medications    Medication Sig Taking?  Authorizing Provider   aspirin 81 MG EC tablet Take 81 mg by mouth daily Yes Historical Provider, MD   hydrOXYzine (ATARAX) 10 MG tablet Take 1 tablet by mouth every 8 hours as needed for Itching or Anxiety Yes Trisha Cardona DO   baclofen (LIORESAL) 10 MG tablet Take 1 tablet by mouth nightly Yes Trisha Cardona DO   spironolactone (ALDACTONE) 25 MG tablet Take 1 tablet by mouth daily Yes Ana Lilia Ayesr MD   hydrALAZINE (APRESOLINE) 10 MG tablet Take 1 tablet by mouth 3 times daily Yes Ana Lilia Ayers MD   dicyclomine (BENTYL) 10 MG capsule Take 10 mg by mouth three times daily  Yes Historical Provider, MD   Handicap Placard [facility] Cannot walk more than 200 feet  Expiration: 7/12/2026 Yes Trisha Cardona DO   furosemide (LASIX) 20 MG tablet Take 1 tablet by mouth daily Yes Luis Avendaño MD   carvedilol (COREG CR) 20 MG CP24 extended release capsule Take 1 capsule by mouth daily Yes Luis Avendaño MD   dofetilide (TIKOSYN) 250 MCG capsule Take 1 capsule by mouth every 12 hours Yes Marylee Nice, MD   famotidine (PEPCID) 40 MG tablet Take 40 mg by mouth 2 times daily as needed  Yes Historical Provider, MD   omeprazole (PRILOSEC) 40 MG delayed release capsule Take 40 mg by mouth daily  Yes Historical Provider, MD   triamcinolone (KENALOG) 0.1 % cream Apply topically 3 times daily as needed (rash)  Yes Historical Provider, MD   loperamide (IMODIUM) 2 MG capsule Take 2 mg by mouth 4 times daily as needed for Diarrhea Yes Historical Provider, MD   diphenhydrAMINE (BENADRYL) 25 MG tablet Take 25 mg by mouth every 6 hours as needed for Itching Yes Historical Provider, MD   vitamin D (CHOLECALCIFEROL) 1000 UNIT TABS tablet Take 1,000 Units by mouth daily Yes Historical Provider, MD   baclofen (LIORESAL) 20 MG tablet Take 20 mg by mouth 2 times daily as needed (spasms) Indications: Back Spasm, Bladder Spasm  Yes Historical Provider, MD   mirabegron (MYRBETRIQ) 25 MG TB24 25 mg every evening   Patient not taking: Reported on 2/1/2022  Historical Provider, MD        Allergies   Allergen Reactions    Adhesive Tape Itching     develops rash and itching with EKG electrodes    Atacand [Candesartan] Hives and Itching    Cefdinir Hives, Itching and Nausea Only    Demerol      3/9/19 Pt states she gets a severe migraine    Digoxin Itching     developed itching and rash    Imdur [Isosorbide Mononitrate]       migraines    Losartan Potassium Itching    Shellfish-Derived Products Itching     3/9/19 Pt states she had a lobster pizza and had difficulty breathing, started to sweat and was itchy    Sulfa Antibiotics Diarrhea and Other (See Comments)     Also causes migraines  caused migraines and diarrhea    Xarelto [Rivaroxaban] Itching and Rash      severe itch, headache, rash    Acetaminophen Hives and Itching    Apap-Caff-Dihydrocodeine Hives and Itching    Coreg [Carvedilol] Itching     Can take extended release Coreg    Cozaar [Losartan] Itching    Diovan [Valsartan] Itching    Effexor [Venlafaxine Hydrochloride] Nausea Only    Eliquis [Apixaban] Hives, Itching and Rash     3/9/19 Pt states that she gets hives, itchy and a rash    Labetalol Itching    Lisinopril Itching    Ramipril Itching       Past Medical History:   Diagnosis Date    [facility]ib ([facility]     WATCHMAN    Anxiety     Arthritis     Cervical radiculopathy     CHF (congestive heart failure) (HCC)     Chronic sinusitis     Hilar adenopathy     Hx of blood clots     Hypertension     Left ventricular systolic dysfunction     Lesion of left native kidney     Lumbar radiculopathy     Lung nodules     Meige syndrome     partial/ PCP    Microscopic colitis     Mitral regurgitation     Mild to moderate    Nonischemic cardiomyopathy ([address]     f/u with Dr. Sanjeev Jose Osteopenia     Renal cell carcinoma of left kidney ([facility] 2/1/2022    Vertebrobasilar artery syndrome     f/u PCP       Past Surgical History:   Procedure Laterality Date    BLADDER REPAIR      BRONCHOSCOPY N/A 10/5/2021    BRONCHOSCOPY W/EBUS FNA performed by Justyn Betancourt MD at [facility] 10/5/2021    BRONCHOSCOPY DIAGNOSTIC OR CELL [address] ONLY performed by Justyn Betancourt MD at [address] TEST  11/25/13    Rt & LT cath    CARDIOVERSION  11/04/2019    Successful CV from AF to NSR   (Dr. Cinda Peck)    COLONOSCOPY  07/2020    DIAGNOSTIC CARDIAC CATH LAB PROCEDURE  2/20/10    Dr Vinny Radford CATH LAB PROCEDURE  8/24/11    Right heart cath @ [facility].  DOPPLER ECHOCARDIOGRAPHY  11/25/13    HYSTERECTOMY  1991    total    OTHER SURGICAL HISTORY  10/14/2020    Dr. Beverley Knowles Left 11/16/2021    ROBOT ASSISTED LAPAROSCOPIC COMPLEX LEFT PARTIAL NEPHRECTOMY performed by Elyssa Harper MD at [facility] TRANSESOPHAGEAL ECHOCARDIOGRAM  11/21/2018    Dr. Karen Nichols TRANSESOPHAGEAL ECHOCARDIOGRAM  03/18/2019    WISDOM TOOTH EXTRACTION         Social History     Socioeconomic History    Marital status:       Spouse name: Not on file    Number of children: Not on file    Years of education: Not on file    Highest education level: Not on file   Occupational History    Not on file   Tobacco Use    Smoking status: Never Smoker    Smokeless tobacco: Never Used   Vaping Use    Vaping Use: Never used   Substance and Sexual Activity    Alcohol use: Yes     Alcohol/week: 0.0 standard drinks     Comment: occasional wine/mixed drink every few months    Drug use: Never    Sexual activity: Not on file     Comment:    Other Topics Concern    Not on file   Social History Narrative    Drinks 1-2 cups of coffee daily. Social Determinants of Health     Financial Resource Strain: Low Risk     Difficulty of Paying Living Expenses: Not hard at all   Food Insecurity: No Food Insecurity    Worried About Running Out of Food in the Last Year: Never true    Makayla of Food in the Last Year: Never true   Transportation Needs:     Lack of Transportation (Medical): Not on file    Lack of Transportation (Non-Medical):  Not on file   Physical Activity:     Days of Exercise per Week: Not on file    Minutes of Exercise per Session: Not on file   Stress:     Feeling of Stress : Not on file   Social Connections:     Frequency of Communication with Friends and Family: Not on file    Frequency of Social Gatherings with Friends and Family: Not on file    Attends Buddhism Services: Not on file    Active Member of [address] [place] [address] [employer] or Organizations: Not on file    Attends Club or Organization Meetings: Not on file    Marital Status: Not on file   Intimate Partner Violence:     Fear of Current or Ex-Partner: Not on file    Emotionally Abused: Not on file    Physically Abused: Not on file    Sexually Abused: Not on file   Housing Stability:     Unable to Pay for Housing in the Last Year: Not on file    Number of [place] in the Last Year: Not on file    Unstable Housing in the Last Year: Not on file        Family History   Problem Relation Age of Onset    Heart Attack Mother     Stroke Mother  Heart Attack Father     Heart Failure Father     Heart Disease Father     Anxiety Disorder Sister     Depression Sister     Crohn's Disease Son            Vitals:    02/01/22 1357   BP: 116/62   Site: Left Upper Arm   Pulse: 69   Resp: 16   Temp: 97.3 °F (36.3 °C)   TempSrc: Temporal   SpO2: 98%   Weight: 176 lb (79.8 kg)   Height: 5' 7\" (1.702 m)     Estimated body mass index is 27.57 kg/m² as calculated from the following:    Height as of this encounter: 5' 7\" (1.702 m). Weight as of this encounter: 176 lb (79.8 kg).     Most Recent Labs  CBC  Lab Results   Component Value Date    WBC 5.4 11/11/2021    WBC 6.1 10/05/2021    WBC 7.4 06/04/2021    RBC 4.79 11/11/2021    RBC 4.38 10/05/2021    RBC 4.54 06/04/2021    HGB 13.6 11/11/2021    HGB 12.5 10/05/2021    HGB 12.7 06/04/2021    HCT 40.9 11/11/2021    HCT 37.7 10/05/2021    HCT 39.4 06/04/2021    MCV 85.4 11/11/2021    MCV 86.1 10/05/2021    MCV 86.8 06/04/2021     11/11/2021     10/05/2021     06/04/2021      CMP  Lab Results   Component Value Date     11/11/2021     10/05/2021     06/03/2021    K 4.7 11/11/2021    K 3.8 10/05/2021    K 4.4 06/03/2021    K 4.1 01/30/2021    K 4.2 10/16/2020    K 4.1 10/15/2020     11/11/2021     10/05/2021     06/03/2021    CO2 22 11/11/2021    CO2 23 10/05/2021    CO2 25 06/03/2021    ANIONGAP 11 11/11/2021    ANIONGAP 12 10/05/2021    ANIONGAP 9 06/03/2021    GLUCOSE 100 11/11/2021    GLUCOSE 123 10/05/2021    GLUCOSE 123 06/03/2021    GLUCOSE 120 10/12/2011    GLUCOSE 132 08/16/2011    BUN 17 11/11/2021    BUN 18 10/05/2021    BUN 13 08/17/2021    CREATININE 1.2 11/11/2021    CREATININE 1.1 10/05/2021    CREATININE 1.1 08/17/2021    LABGLOM 44 11/11/2021    LABGLOM 48 10/05/2021    LABGLOM 48 08/17/2021    GFRAA 53 11/11/2021    GFRAA 58 10/05/2021    GFRAA 58 08/17/2021    CALCIUM 9.4 11/11/2021    CALCIUM 9.9 10/05/2021    CALCIUM 9.7 06/03/2021    PROT 8.0 06/03/2021    PROT 7.6 09/03/2020    PROT 6.3 03/11/2019    LABALBU 4.2 06/03/2021    LABALBU 3.9 09/03/2020    LABALBU 3.4 03/11/2019    BILITOT 0.5 06/03/2021    BILITOT 0.6 09/03/2020    BILITOT 0.9 03/11/2019    ALKPHOS 126 06/03/2021    ALKPHOS 138 09/03/2020    ALKPHOS 120 03/11/2019    AST 22 06/03/2021    AST 24 09/03/2020    AST 23 03/11/2019    ALT 17 06/03/2021    ALT 20 09/03/2020    ALT 26 03/11/2019     TSH  Lab Results   Component Value Date    TSH 1.300 02/18/2020    TSH 3.110 11/21/2018    TSH 1.050 11/14/2015     LIPID  Lab Results   Component Value Date    CHOL 179 11/21/2018    CHOL 204 11/14/2015    CHOL 235 07/02/2015    HDL 51 11/21/2018    HDL 85 11/14/2015    HDL 69 07/02/2015    LDLCALC 114 11/21/2018    LDLCALC 105 11/14/2015    LDLCALC 130 07/02/2015    TRIG 69 11/21/2018    TRIG 71 11/14/2015    TRIG 182 07/02/2015     VITAMIN D  Lab Results   Component Value Date    VITD25 23 12/02/2019    VITD25 22 11/14/2015     MAGNESIUM  Lab Results   Component Value Date    MG 2.1 01/30/2021    MG 2.1 10/10/2020    MG 2.1 11/07/2019       HEPATITIS C  Lab Results   Component Value Date    HCVABI Non-Reactive 05/01/2018     UA  Lab Results   Component Value Date    COLORU yellow 11/08/2021    COLORU yellow 07/12/2021    COLORU yellow 06/22/2021    COLORU Yellow 06/03/2021    COLORU Yellow 10/10/2020    COLORU Yellow 11/06/2019    CLARITYU cloudy 11/08/2021    CLARITYU clear 07/12/2021    CLARITYU cloudy 06/22/2021    CLARITYU SL CLOUDY 06/03/2021    CLARITYU Clear 10/10/2020    CLARITYU Clear 11/06/2019    GLUCOSEU neg 11/08/2021    GLUCOSEU negative 07/12/2021    GLUCOSEU neg 06/22/2021    GLUCOSEU Negative 06/03/2021    GLUCOSEU Negative 10/10/2020    GLUCOSEU Negative 11/06/2019    BILIRUBINUR neg 11/08/2021    BILIRUBINUR negative 07/12/2021    BILIRUBINUR neg 06/22/2021    KETUA neg 11/08/2021    KETUA negative 07/12/2021    KETUA neg 06/22/2021    KETUA 15 06/03/2021    KETUA Negative 10/10/2020    KETUA Negative 11/06/2019    SPECGRAV 1.015 11/08/2021    SPECGRAV 1.020 07/12/2021    SPECGRAV 1.020 06/22/2021    SPECGRAV >=1.030 06/03/2021    SPECGRAV 1.015 10/10/2020    SPECGRAV 1.025 11/06/2019    BLOODU trace 11/08/2021    BLOODU negative 07/12/2021    BLOODU neg 06/22/2021    BLOODU SMALL 06/03/2021    BLOODU Negative 10/10/2020    BLOODU Negative 11/06/2019    PHUR 5.5 11/08/2021    PHUR 5.0 07/12/2021    PHUR 5.5 06/22/2021    PHUR 6.0 06/03/2021    PHUR 5.5 10/10/2020    PHUR 5.0 11/06/2019    PROTEINU neg 11/08/2021    PROTEINU negative 07/12/2021    PROTEINU neg 06/22/2021    PROTEINU Negative 06/03/2021    PROTEINU Negative 10/10/2020    PROTEINU Negative 11/06/2019    UROBILINOGEN 0.2 06/03/2021    UROBILINOGEN 0.2 10/10/2020    UROBILINOGEN 0.2 11/06/2019    NITRU POSITIVE 06/03/2021    NITRU Negative 10/10/2020    NITRU POSITIVE 11/06/2019    LEUKOCYTESUR moderate 11/08/2021    LEUKOCYTESUR negative 07/12/2021    LEUKOCYTESUR trace 06/22/2021    LEUKOCYTESUR MODERATE 06/03/2021    LEUKOCYTESUR SMALL 10/10/2020    LEUKOCYTESUR TRACE 11/06/2019         Physical Exam  Vitals and nursing note reviewed. Constitutional:       Appearance: Normal appearance. HENT:      Head: Normocephalic and atraumatic. Right Ear: External ear normal.      Left Ear: External ear normal.   Eyes:      Extraocular Movements: Extraocular movements intact. Conjunctiva/sclera: Conjunctivae normal.      Pupils: Pupils are equal, round, and reactive to light. Cardiovascular:      Rate and Rhythm: Normal rate and regular rhythm. Pulses: Normal pulses. Heart sounds: Normal heart sounds. Pulmonary:      Effort: Pulmonary effort is normal.      Breath sounds: Normal breath sounds. Abdominal:      General: Bowel sounds are normal.      Palpations: Abdomen is soft. Comments: Bulge in her abdomen   Musculoskeletal:         General: Normal range of motion.       Cervical back: Normal range of motion and neck supple. Skin:     General: Skin is warm and dry. Capillary Refill: Capillary refill takes less than 2 seconds. Neurological:      General: No focal deficit present. Mental Status: She is alert and oriented to person, place, and time. Psychiatric:         Mood and Affect: Mood normal.         Behavior: Behavior normal.         Thought Content: Thought content normal.         ASSESSMENT/PLAN:  Tia Feliciano was seen today for 3 month follow-up, spasms, lower back pain and other. Diagnoses and all orders for this visit:    Muscle spasm  -     baclofen (LIORESAL) 10 MG tablet; Take 1 tablet by mouth nightly    Itching  -     hydrOXYzine (ATARAX) 10 MG tablet; Take 1 tablet by mouth every 8 hours as needed for Itching or Anxiety    Renal cell carcinoma of left kidney (HCC)    NICM (nonischemic cardiomyopathy) (HCC)    PA[facility]oxysmal [facility]) [facility]         As above. Call or go to the nearest ED immediately if symptoms worsen or persist.  Educational materials and/or home exercises printed for patient's review and were included in patient instructions on his/her After Visit Summary and given to patient at the end of visit. Counseled regarding above diagnosis, including possible risks and complications,  especially if left uncontrolled. Counseled regarding the possible side effects, risks, benefits and alternatives to treatment; patient and/or guardian verbalizes understanding, agrees, feels comfortable with and wishes to proceed with above treatment plan. Advised patient to call with any new medication issues, and read all Rx info from pharmacy to assure aware of all possible risks and side effects of medication before taking. Reviewed age and gender appropriate health screening exams and vaccinations.   Advised patient regarding importance of keeping up with recommended health maintenance and to schedule as soon as possible if overdue, as this is important in assessing for undiagnosed pathology, especially cancer, as well as protecting against potentially harmful/life threatening disease. Patient and/or guardian verbalizes understanding and agrees with above counseling, assessment and plan. All questions answered. No follow-ups on file. An  electronic signature was used to authenticate this note.     --Da Cedeño, DO on 2/1/2022 at 2:19 PM

## 2022-02-18 ENCOUNTER — HOSPITAL ENCOUNTER (OUTPATIENT)
Dept: CT IMAGING | Age: 77
Discharge: HOME | End: 2022-02-20
Payer: MEDICARE

## 2022-02-18 DIAGNOSIS — C64.2 MALIGNANT NEOPLASM OF LEFT KIDNEY, EXCEPT RENAL PELVIS (HCC): ICD-10-CM

## 2022-02-18 PROCEDURE — 74170 CT ABD WO CNTRST FLWD CNTRST: CPT

## 2022-02-18 PROCEDURE — 6360000004 HC RX CONTRAST MEDICATION: Performed by: RADIOLOGY

## 2022-02-18 RX ADMIN — IOPAMIDOL 90 ML: 755 INJECTION, SOLUTION INTRAVENOUS at 14:43

## 2022-03-08 ENCOUNTER — TELEPHONE (OUTPATIENT)
Dept: NON INVASIVE DIAGNOSTICS | Age: 77
End: 2022-03-08

## 2022-03-08 NOTE — TELEPHONE ENCOUNTER
Requesting Tikosyn refill - CrCl calculated off the following information:    Tikosyn dosage: 250 mcg BID    Age: 77   Ht: 1.702 m  Wt: 79.8 kg  Cr: 1.21 mg/dl (based off labs on 2/1/22)  CrCl: 27.75 mL/min    Last QTc: 460 msec (based on last EKG on 10/2021)        Prior Dr. Zay Mcbride patient. Please advise if she can continue on the current dose of medication or if it needs adjusted.

## 2022-03-10 ENCOUNTER — NURSE ONLY (OUTPATIENT)
Dept: CARDIOLOGY CLINIC | Age: 77
End: 2022-03-10

## 2022-03-10 DIAGNOSIS — I48.0 PAF (PAROXYSMAL ATRIAL FIBRILLATION) (HCC): ICD-10-CM

## 2022-03-10 DIAGNOSIS — Z79.899 VISIT FOR MONITORING TIKOSYN THERAPY: Primary | ICD-10-CM

## 2022-03-10 DIAGNOSIS — Z51.81 VISIT FOR MONITORING TIKOSYN THERAPY: Primary | ICD-10-CM

## 2022-03-10 PROCEDURE — 93000 ELECTROCARDIOGRAM COMPLETE: CPT | Performed by: INTERNAL MEDICINE

## 2022-03-10 RX ORDER — DOFETILIDE 0.12 MG/1
125 CAPSULE ORAL 2 TIMES DAILY
COMMUNITY
End: 2022-03-10 | Stop reason: SDUPTHER

## 2022-03-10 RX ORDER — DOFETILIDE 0.12 MG/1
125 CAPSULE ORAL 2 TIMES DAILY
Qty: 180 CAPSULE | Refills: 1 | Status: SHIPPED | OUTPATIENT
Start: 2022-03-10

## 2022-03-10 RX ORDER — DOFETILIDE 0.25 MG/1
250 CAPSULE ORAL EVERY 12 HOURS SCHEDULED
Qty: 180 CAPSULE | Refills: 1 | OUTPATIENT
Start: 2022-03-10

## 2022-03-10 NOTE — PROGRESS NOTES
Patient requested to go to [facility] office because it is closer to her and she will not need to get a ride. Spoke with Tiffany and she ok'd patient to be scheduled around 1:30. Patient stated she will be at appt and order is in chart for ekg under Dr Kayla Luna.

## 2022-03-10 NOTE — TELEPHONE ENCOUNTER
Please reduce the dose to Tikosyn 125 mcg every 12 hours. Please check BMP and EKG in 1 month. Spoke with patient and she verbalized understanding. Patient will check with PCP to see if ekg/blood work can be done in her office.

## 2022-03-10 NOTE — TELEPHONE ENCOUNTER
Requesting Tikosyn refill - CrCl calculated off the following information:    Tikosyn dosage: 250 mcg BID    Age: 77   Ht: 1.702 m  Wt: 79.8 kg  Cr: 1.2 mg/dl (based off labs on 2/1/22)  CrCl: 27.75 mL/min    Last QTc: 460 msec (based on last EKG on 10/2021)  EKG repeated 3/10/2022

## 2022-04-13 ENCOUNTER — HOSPITAL ENCOUNTER (OUTPATIENT)
Dept: CT IMAGING | Age: 77
Discharge: HOME | End: 2022-04-15
Payer: MEDICARE

## 2022-04-13 DIAGNOSIS — R59.0 MEDIASTINAL LYMPHADENOPATHY: ICD-10-CM

## 2022-04-13 PROCEDURE — 71250 CT THORAX DX C-: CPT

## 2022-04-19 ENCOUNTER — TELEPHONE (OUTPATIENT)
Dept: FAMILY MEDICINE CLINIC | Age: 77
End: 2022-04-19

## 2022-04-25 ENCOUNTER — HOSPITAL ENCOUNTER (OUTPATIENT)
Dept: INFUSION THERAPY | Age: 77
Discharge: HOME | End: 2022-04-25
Payer: MEDICARE

## 2022-04-25 ENCOUNTER — OFFICE VISIT (OUTPATIENT)
Dept: ONCOLOGY | Age: 77
End: 2022-04-25
Payer: MEDICARE

## 2022-04-25 VITALS
SYSTOLIC BLOOD PRESSURE: 124 MMHG | TEMPERATURE: 96.6 F | HEART RATE: 65 BPM | BODY MASS INDEX: 27.15 KG/M2 | RESPIRATION RATE: 16 BRPM | HEIGHT: 67 IN | OXYGEN SATURATION: 94 % | WEIGHT: 173 LBS | DIASTOLIC BLOOD PRESSURE: 61 MMHG

## 2022-04-25 DIAGNOSIS — C64.2 RENAL CELL CARCINOMA OF LEFT KIDNEY (HCC): Primary | ICD-10-CM

## 2022-04-25 DIAGNOSIS — N18.31 STAGE 3A CHRONIC KIDNEY DISEASE (HCC): ICD-10-CM

## 2022-04-25 DIAGNOSIS — N28.89 LEFT RENAL MASS: ICD-10-CM

## 2022-04-25 DIAGNOSIS — N28.89 LEFT RENAL MASS: Primary | ICD-10-CM

## 2022-04-25 PROBLEM — N18.30 CHRONIC RENAL DISEASE, STAGE III (HCC): Status: ACTIVE | Noted: 2022-04-25

## 2022-04-25 LAB
ALBUMIN SERPL-MCNC: 3.9 G/DL (ref 3.5–5.2)
ALP BLD-CCNC: 123 U/L (ref 35–104)
ALT SERPL-CCNC: 17 U/L (ref 0–32)
ANION GAP SERPL CALCULATED.3IONS-SCNC: 9 MMOL/L (ref 7–16)
AST SERPL-CCNC: 25 U/L (ref 0–31)
BASOPHILS ABSOLUTE: 0.05 E9/L (ref 0–0.2)
BASOPHILS RELATIVE PERCENT: 0.7 % (ref 0–2)
BILIRUB SERPL-MCNC: 0.5 MG/DL (ref 0–1.2)
BUN BLDV-MCNC: 19 MG/DL (ref 6–23)
CALCIUM SERPL-MCNC: 9.5 MG/DL (ref 8.6–10.2)
CHLORIDE BLD-SCNC: 105 MMOL/L (ref 98–107)
CO2: 24 MMOL/L (ref 22–29)
CREAT SERPL-MCNC: 1.2 MG/DL (ref 0.5–1)
EOSINOPHILS ABSOLUTE: 0.25 E9/L (ref 0.05–0.5)
EOSINOPHILS RELATIVE PERCENT: 3.4 % (ref 0–6)
GFR AFRICAN AMERICAN: 53
GFR NON-AFRICAN AMERICAN: 44 ML/MIN/1.73
GLUCOSE BLD-MCNC: 111 MG/DL (ref 74–99)
HCT VFR BLD CALC: 42.2 % (ref 34–48)
HEMOGLOBIN: 13.4 G/DL (ref 11.5–15.5)
IMMATURE GRANULOCYTES #: 0.03 E9/L
IMMATURE GRANULOCYTES %: 0.4 % (ref 0–5)
LYMPHOCYTES ABSOLUTE: 1.32 E9/L (ref 1.5–4)
LYMPHOCYTES RELATIVE PERCENT: 18.1 % (ref 20–42)
MCH RBC QN AUTO: 27.9 PG (ref 26–35)
MCHC RBC AUTO-ENTMCNC: 31.8 % (ref 32–34.5)
MCV RBC AUTO: 87.9 FL (ref 80–99.9)
MONOCYTES ABSOLUTE: 0.52 E9/L (ref 0.1–0.95)
MONOCYTES RELATIVE PERCENT: 7.1 % (ref 2–12)
NEUTROPHILS ABSOLUTE: 5.12 E9/L (ref 1.8–7.3)
NEUTROPHILS RELATIVE PERCENT: 70.3 % (ref 43–80)
PDW BLD-RTO: 13.4 FL (ref 11.5–15)
PLATELET # BLD: 268 E9/L (ref 130–450)
PMV BLD AUTO: 10.5 FL (ref 7–12)
POTASSIUM SERPL-SCNC: 4.7 MMOL/L (ref 3.5–5)
RBC # BLD: 4.8 E12/L (ref 3.5–5.5)
SODIUM BLD-SCNC: 138 MMOL/L (ref 132–146)
TOTAL PROTEIN: 7.5 G/DL (ref 6.4–8.3)
WBC # BLD: 7.3 E9/L (ref 4.5–11.5)

## 2022-04-25 PROCEDURE — 4040F PNEUMOC VAC/ADMIN/RCVD: CPT | Performed by: INTERNAL MEDICINE

## 2022-04-25 PROCEDURE — 85025 COMPLETE CBC W/AUTO DIFF WBC: CPT

## 2022-04-25 PROCEDURE — G8399 PT W/DXA RESULTS DOCUMENT: HCPCS | Performed by: INTERNAL MEDICINE

## 2022-04-25 PROCEDURE — 99213 OFFICE O/P EST LOW 20 MIN: CPT

## 2022-04-25 PROCEDURE — 99214 OFFICE O/P EST MOD 30 MIN: CPT | Performed by: INTERNAL MEDICINE

## 2022-04-25 PROCEDURE — 36415 COLL VENOUS BLD VENIPUNCTURE: CPT

## 2022-04-25 PROCEDURE — G8417 CALC BMI ABV UP PARAM F/U: HCPCS | Performed by: INTERNAL MEDICINE

## 2022-04-25 PROCEDURE — 80053 COMPREHEN METABOLIC PANEL: CPT

## 2022-04-25 PROCEDURE — 1036F TOBACCO NON-USER: CPT | Performed by: INTERNAL MEDICINE

## 2022-04-25 PROCEDURE — 1090F PRES/ABSN URINE INCON ASSESS: CPT | Performed by: INTERNAL MEDICINE

## 2022-04-25 PROCEDURE — G8427 DOCREV CUR MEDS BY ELIG CLIN: HCPCS | Performed by: INTERNAL MEDICINE

## 2022-04-25 PROCEDURE — 1123F ACP DISCUSS/DSCN MKR DOCD: CPT | Performed by: INTERNAL MEDICINE

## 2022-04-25 NOTE — PROGRESS NOTES
Department of [facility] Oncology  Attending Clinic Note    Reason for Visit: Follow-up on a patient with [address]     PCP:  Paty Luke DO    History of Present Illness:  68year old female who presented with frequent falls. CXR 06/03/2021:  2.1 cm round focus seen within the right hilum. CT head 6/3/2021 no acute intracranial abnormality    CT cervical spine 6/3/2021 no acute fracture or subluxation of the cervical spine  Mild multilevel DJD and DJD most prominent at the C5-C6 level  Heterogeneous appearance of the thyroid gland    CT lumbar spine 6/3/2021 no acute compression fracture or malalignment of the lumbar spine  Multilevel facet arthropathy  Degenerative joint disease at the L4-5 and L5-S1 levels most prominent at the L5-S1 level    Thyroid ultrasound 8/17/2021 multiple thyroid nodules bilaterally. CT chest on 08/17/2021  Heterogenous thyroid with multiple areas of nodularity, largest on the right measuring 1.4 cm and left 1.6 cm. Right hilar adenopathy measuring 1.9 x 1.6 cm. There are a few other borderline prominent mediastinal LN.  Noncalcified 1 x 0.8 cm nodule in the posterior right upper lobe has a somewhat necrotic appearance. Araceli Collar is a very small faint area of ground-glass opacification in the posterior left upper lobe measuring approximately 1 x 1 cm. Noncalcified nodular density in the lateral left costophrenic angle measures 1 cm. Solid renal mass in medial left kidney measures 3.3 x 3 cm. PET/CT 08/24/2021 with increased tracer uptake to single left thyroid mass in the inferior pole with max SUV of 9.6. Increased tracer uptake to right hilum associated with small lymph node with SUV of 4.4   Increased tracer uptake left pericardial region inferior to aortopulmonary window, peak SUV 5.    Pulmonary nodules do not demonstrate increased tracer uptake, this may be related to size and may be below the resolution of PET/CT  Low level uptake at the left renal masses    FNA Left Thyroid biopsy 09/10/2021  Scant cellularity  Negative for malignant cells. Benign-appearing follicular cells, few foam cells, colloid, and blood present. [place] System Category 2. Cellblock is similar. 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 This abuts the left psoas muscle without definite invasion of left psoas muscle. Bronchoscopy/EBUS guided mediastinal Hilar LN biopsies 10/05/2021:  EBUS FNA 11R (adequacy statement satisfactory for interpretation) Negative for malignant cells. Benign and degenerated bronchial cells, scant lymphocytes and blood present  Seen by Dr. 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    See comment. Comment:CANCER CASE SUMMARY   Procedure: Partial nephrectomy   Specimen Laterality: Left   Tumor Focality: Unifocal   Tumor Site: Undesignated   Tumor Size: 3.0 cm greatest dimension   Histologic Type: Clear-cell renal cell carcinoma   Histologic Grade (WHO/ISUP): G3   Tumor Extent: Limited to kidney   Sarcomatoid Features: Not identified   Rhabdoid features: Not identified   Tumor Necrosis: Not identified   Lymph-Vascular Invasion: Not identified   Margin Status: All margins negative for invasive carcinoma   Regional lymph node Status: No lymph nodes submitted or found   Pathologic Stage Classification (AJCC 8th Edition):   pT1a   Surveillance     CT abdomen/pelvis 02/18/2022 Surgical removal of the lower pole of the left kidney. There is no evidence of local recurrence or metastatic disease at this time. CT chest 04/13/2022 stable exam  Today 04/25/2022. No fever, chills. Fair appetite and energy level. No chest pain or SOB. No N/V    Review of Systems;  CONSTITUTIONAL: No fever, chills. Fair appetite and energy level. ENMT: Eyes: No diplopia; Nose: No epistaxis. Mouth: No sore throat. RESPIRATORY: No hemoptysis, shortness of breath, cough. CARDIOVASCULAR: No chest pain, palpitations. GASTROINTESTINAL: No nausea/vomiting, abdominal pain. GENITOURINARY: No dysuria, urinary frequency, hematuria. NEURO: No syncope, presyncope, headache. Remainder:ROS NEGATIVE    Past Medical History:      Diagnosis Date    Afib ([facility] [address]     WATCHMAN    Anxiety     Arthritis     Cervical radiculopathy     CHF (congestive heart failure) (HCC)     Chronic sinusitis     Hilar adenopathy     Hx of blood clots     Hypertension     Left ventricular systolic dysfunction     Lesion of left native kidney     Lumbar radiculopathy     Lung nodules     Meige syndrome     partial/ PCP    Microscopic colitis     Mitral regurgitation     Mild to moderate    Nonischemic cardiomyopathy ([facility] [address]     f/u with Dr. Abraham Fernandez Osteopenia     Renal cell carcinoma of left kidney ([facility] [address] 2/1/2022    Vertebrobasilar artery syndrome     f/u PCP     Medications:  Reviewed and reconciled. Allergies:   Allergies   Allergen Reactions    Adhesive Tape Itching     develops rash and itching with EKG electrodes    Atacand [Candesartan] Hives and Itching    Cefdinir Hives, Itching and Nausea Only    Demerol      3/9/19 Pt states she gets a severe migraine    Digoxin Itching     developed itching and rash    Imdur [Isosorbide Mononitrate]       migraines    Losartan Potassium Itching    Shellfish-Derived Products Itching     3/9/19 Pt states she had a lobster pizza and had difficulty breathing, started to sweat and was itchy    Sulfa Antibiotics Diarrhea and Other (See Comments)     Also causes migraines  caused migraines and diarrhea    Xarelto [Rivaroxaban] Itching and Rash      severe itch, headache, rash    Acetaminophen Hives and Itching    Apap-Caff-Dihydrocodeine Hives and Itching    Coreg [Carvedilol] Itching Can take extended release Coreg    Cozaar [Losartan] Itching    Diovan [Valsartan] Itching    Effexor [Venlafaxine Hydrochloride] Nausea Only    Eliquis [Apixaban] Hives, Itching and Rash     3/9/19 Pt states that she gets hives, itchy and a rash    Labetalol Itching    Lisinopril Itching    Ramipril Itching     Physical Exam:  /61 (Site: Left Upper Arm, Position: Sitting, Cuff Size: Medium Adult)   Pulse 65   Temp 96.6 °F (35.9 °C) (Infrared)   Resp 16   Ht 5' 7\" (1.702 m)   Wt 173 lb (78.5 kg)   SpO2 94%   BMI 27.10 kg/m²    GENERAL: Alert, oriented x 3, not in acute distress. HEENT: PERRLA; EOMI. Oropharynx clear. NECK: Supple. Without lymphadenopathy. LUNGS: Good air entry bilaterally. No wheezing, crackles or rhonchi. CARDIOVASCULAR: Regular rate. No murmurs, rubs or gallops. ABDOMEN: Soft. Non-tender, non-distended. Positive bowel sounds. EXTREMITIES: Without clubbing or cyanosis or edema. NEUROLOGIC: No focal deficits. ECOG PS 1    Lab Results   Component Value Date    WBC 7.3 04/25/2022    HGB 13.4 04/25/2022    HCT 42.2 04/25/2022    MCV 87.9 04/25/2022     04/25/2022     Lab Results   Component Value Date     04/25/2022    K 4.7 04/25/2022     04/25/2022    CO2 24 04/25/2022    BUN 19 04/25/2022    CREATININE 1.2 (H) 04/25/2022    GLUCOSE 111 (H) 04/25/2022    CALCIUM 9.5 04/25/2022    PROT 7.5 04/25/2022    LABALBU 3.9 04/25/2022    BILITOT 0.5 04/25/2022    ALKPHOS 123 (H) 04/25/2022    AST 25 04/25/2022    ALT 17 04/25/2022    LABGLOM 44 04/25/2022    GFRAA 53 04/25/2022     Impression/Plan:  67 y/o female with Left RCC    CT chest on 08/17/2021  Heterogenous thyroid with multiple areas of nodularity, largest on the right measuring 1.4 cm and left 1.6 cm. Right hilar adenopathy measuring 1.9 x 1.6 cm.  There are a few other borderline prominent mediastinal LN.  Noncalcified 1 x 0.8 cm nodule in the posterior right upper lobe has a somewhat necrotic appearance. Cassandra Ashleigh is a very small faint area of ground-glass opacification in the posterior left upper lobe measuring approximately 1 x 1 cm. Noncalcified nodular density in the lateral left costophrenic angle measures 1 cm. Solid renal mass in medial left kidney measures 3.3 x 3 cm. PET/CT 08/24/2021 with increased tracer uptake to single left thyroid mass in the inferior pole with max SUV of 9.6. Increased tracer uptake to right hilum associated with small lymph node with SUV of 4.4   Increased tracer uptake left pericardial region inferior to aortopulmonary window, peak SUV 5. Pulmonary nodules do not demonstrate increased tracer uptake, this may be related to size and may be below the resolution of PET/CT  Low level uptake at the left renal masses    FNA Left Thyroid biopsy 09/10/2021  Scant cellularity  Negative for malignant cells. Benign-appearing follicular cells, few foam cells, colloid, and blood present. [place] System Category 2. Cellblock is similar. 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 This abuts the left psoas muscle without definite invasion of left psoas muscle. Bronchoscopy/EBUS guided mediastinal Hilar LN biopsies 10/05/2021:  EBUS FNA 11R (adequacy statement satisfactory for interpretation) Negative for malignant cells. Benign and degenerated bronchial cells, scant lymphocytes and blood present  Seen by Dr. 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    See comment.      Comment:CANCER CASE SUMMARY   Procedure: Partial nephrectomy   Specimen Laterality: Left   Tumor Focality: Unifocal   Tumor Site: Undesignated   Tumor Size: 3.0 cm greatest dimension   Histologic Type: Clear-cell renal cell carcinoma   Histologic Grade (WHO/ISUP): G3   Tumor Extent: Limited to kidney   Sarcomatoid Features: Not identified   Rhabdoid features: Not identified   Tumor Necrosis: Not identified   Lymph-Vascular Invasion: Not identified   Margin Status: All margins negative for invasive carcinoma   Regional lymph node Status: No lymph nodes submitted or found   Pathologic Stage Classification (AJCC 8th Edition):   pT1a   Surveillance     CT abdomen/pelvis 02/18/2022 Surgical removal of the lower pole of the left kidney. There is no evidence of local recurrence or metastatic disease at this time. CT chest 04/13/2022 stable exam  Imaging reviewed. Labs reviewed. PING    RTC Sept 2022 with prior CT abdomen/pelvis.  Follow with pulmonary team (Dr. Majo Eid) for f/u CT chest     04/25/2022  Rashaad Shepherd MD

## 2022-05-06 ENCOUNTER — OFFICE VISIT (OUTPATIENT)
Dept: CARDIOLOGY CLINIC | Age: 77
End: 2022-05-06
Payer: MEDICARE

## 2022-05-06 VITALS
SYSTOLIC BLOOD PRESSURE: 110 MMHG | HEIGHT: 67 IN | OXYGEN SATURATION: 98 % | BODY MASS INDEX: 27.65 KG/M2 | WEIGHT: 176.2 LBS | DIASTOLIC BLOOD PRESSURE: 60 MMHG | RESPIRATION RATE: 16 BRPM | HEART RATE: 65 BPM

## 2022-05-06 DIAGNOSIS — I38 VHD (VALVULAR HEART DISEASE): ICD-10-CM

## 2022-05-06 DIAGNOSIS — I48.0 PAF (PAROXYSMAL ATRIAL FIBRILLATION) (HCC): Primary | ICD-10-CM

## 2022-05-06 DIAGNOSIS — I10 ESSENTIAL HYPERTENSION: ICD-10-CM

## 2022-05-06 PROCEDURE — 1090F PRES/ABSN URINE INCON ASSESS: CPT | Performed by: INTERNAL MEDICINE

## 2022-05-06 PROCEDURE — 99214 OFFICE O/P EST MOD 30 MIN: CPT | Performed by: INTERNAL MEDICINE

## 2022-05-06 PROCEDURE — G8427 DOCREV CUR MEDS BY ELIG CLIN: HCPCS | Performed by: INTERNAL MEDICINE

## 2022-05-06 PROCEDURE — 1123F ACP DISCUSS/DSCN MKR DOCD: CPT | Performed by: INTERNAL MEDICINE

## 2022-05-06 PROCEDURE — 93000 ELECTROCARDIOGRAM COMPLETE: CPT | Performed by: INTERNAL MEDICINE

## 2022-05-06 PROCEDURE — G8417 CALC BMI ABV UP PARAM F/U: HCPCS | Performed by: INTERNAL MEDICINE

## 2022-05-06 PROCEDURE — G8399 PT W/DXA RESULTS DOCUMENT: HCPCS | Performed by: INTERNAL MEDICINE

## 2022-05-06 PROCEDURE — 1036F TOBACCO NON-USER: CPT | Performed by: INTERNAL MEDICINE

## 2022-05-06 RX ORDER — FUROSEMIDE 20 MG/1
20 TABLET ORAL DAILY
Qty: 90 TABLET | Refills: 3 | Status: SHIPPED | OUTPATIENT
Start: 2022-05-06

## 2022-05-06 RX ORDER — METOPROLOL SUCCINATE 25 MG/1
25 TABLET, EXTENDED RELEASE ORAL DAILY
Qty: 90 TABLET | Refills: 2 | Status: SHIPPED | OUTPATIENT
Start: 2022-05-06

## 2022-05-06 NOTE — PROGRESS NOTES
OFFICE VISIT     PRIMARY CARE PHYSICIAN:      Trisha Cardona DO       ALLERGIES / SENSITIVITIES:        Allergies   Allergen Reactions    Bentyl [Dicyclomine] Shortness Of Breath    Adhesive Tape Itching     develops rash and itching with EKG electrodes    Atacand [Candesartan] Hives and Itching    Cefdinir Hives, Itching and Nausea Only    Demerol      3/9/19 Pt states she gets a severe migraine    Digoxin Itching     developed itching and rash    Imdur [Isosorbide Mononitrate]       migraines    Losartan Potassium Itching    Shellfish-Derived Products Itching     3/9/19 Pt states she had a lobster pizza and had difficulty breathing, started to sweat and was itchy    Sulfa Antibiotics Diarrhea and Other (See Comments)     Also causes migraines  caused migraines and diarrhea    Xarelto [Rivaroxaban] Itching and Rash      severe itch, headache, rash    Acetaminophen Hives and Itching    Apap-Caff-Dihydrocodeine Hives and Itching    Coreg [Carvedilol] Itching     Can take extended release Coreg    Cozaar [Losartan] Itching    Diovan [Valsartan] Itching    Effexor [Venlafaxine Hydrochloride] Nausea Only    Eliquis [Apixaban] Hives, Itching and Rash     3/9/19 Pt states that she gets hives, itchy and a rash    Labetalol Itching    Lisinopril Itching    Ramipril Itching          REVIEWED MEDICATIONS:        Current Outpatient Medications:     furosemide (LASIX) 20 MG tablet, Take 1 tablet by mouth daily, Disp: 90 tablet, Rfl: 3    metoprolol succinate (TOPROL XL) 25 MG extended release tablet, Take 1 tablet by mouth daily, Disp: 90 tablet, Rfl: 2    dofetilide (TIKOSYN) 125 MCG capsule, Take 1 capsule by mouth 2 times daily, Disp: 180 capsule, Rfl: 1    aspirin 81 MG EC tablet, Take 81 mg by mouth daily, Disp: , Rfl:     hydrOXYzine (ATARAX) 10 MG tablet, Take 1 tablet by mouth every 8 hours as needed for Itching or Anxiety, Disp: 90 tablet, Rfl: 1    baclofen (LIORESAL) 10 MG tablet, Take 1 tablet by mouth nightly, Disp: 90 tablet, Rfl: 1    spironolactone (ALDACTONE) 25 MG tablet, Take 1 tablet by mouth daily, Disp: 90 tablet, Rfl: 3    hydrALAZINE (APRESOLINE) 10 MG tablet, Take 1 tablet by mouth 3 times daily, Disp: 270 tablet, Rfl: 3    famotidine (PEPCID) 40 MG tablet, Take 40 mg by mouth 2 times daily as needed , Disp: , Rfl:     omeprazole (PRILOSEC) 40 MG delayed release capsule, Take 40 mg by mouth daily , Disp: , Rfl:     triamcinolone (KENALOG) 0.1 % cream, Apply topically 3 times daily as needed (rash) , Disp: , Rfl: 1    loperamide (IMODIUM) 2 MG capsule, Take 2 mg by mouth 4 times daily as needed for Diarrhea, Disp: , Rfl:     diphenhydrAMINE (BENADRYL) 25 MG tablet, Take 25 mg by mouth every 6 hours as needed for Itching, Disp: , Rfl:     vitamin D (CHOLECALCIFEROL) 1000 UNIT TABS tablet, Take 1,000 Units by mouth daily, Disp: , Rfl:     Handicap Placard MISC, Cannot walk more than 200 feet Expiration: 7/12/2026, Disp: 1 each, Rfl: 0      S: REASON FOR VISIT:       Chief Complaint   Patient presents with    Cardiomyopathy     6 month ov. S/P left partial nephrectomy 11/16/21. Labs 4/22. Is c/o SOBOE, dizziness (no syncope). No other cardiac complaints.  Atrial Fibrillation    Cardiac Valve Problem    Hypertension    Shortness of Breath    Dizziness          History of Present Illness:       Office Visit for follow up of A Fib, CMP, VHD   68year old Jeremiah Plata with Hx of A fib, VHD, CMP came for f/u visit   Had Left partial nephrectomy on 11/16/21   C/o mild SOBOE, chronic, No orthopnea   Patient is compliant with all medications   Matthew any exertional chest pain    No palpitations, dizzy or syncope.    Active at home   Try to watch diet          Past Medical History:   Diagnosis Date    Afib ([facility] [address]     WATCHMAN    Anxiety     Arthritis     Cervical radiculopathy     CHF (congestive heart failure) (HCC)     Chronic sinusitis     Hilar adenopathy     Hx of blood clots     Hypertension     Left ventricular systolic dysfunction     Lesion of left native kidney     Lumbar radiculopathy     Lung nodules     Meige syndrome     partial/ PCP    Microscopic colitis     Mitral regurgitation     Mild to moderate    Nonischemic cardiomyopathy ([address]     f/u with Dr. Luis Fernando Ron Osteopenia     Renal cell carcinoma of left kidney ([facility] [address] 2/1/2022    Vertebrobasilar artery syndrome     f/u PCP            Past Surgical History:   Procedure Laterality Date    BLADDER REPAIR      BRONCHOSCOPY N/A 10/5/2021    BRONCHOSCOPY W/EBUS FNA performed by Treva Molina MD at [address] 10/5/2021    BRONCHOSCOPY DIAGNOSTIC OR CELL [address] ONLY performed by Treva Molina MD at [address] TEST  11/25/13    Rt & LT cath    CARDIOVERSION  11/04/2019    Successful CV from AF to NSR   (Dr. Gunnar Reynoso)    COLONOSCOPY  07/2020    DIAGNOSTIC CARDIAC CATH LAB PROCEDURE  2/20/10    Dr Shepard Gamma CATH LAB PROCEDURE  8/24/11    Right heart cath @ [facility].  DOPPLER ECHOCARDIOGRAPHY  11/25/13    HYSTERECTOMY  1991    total    OTHER SURGICAL HISTORY  10/14/2020    Dr. Ciara Campbell- 24mm Watchman device    PARTIAL NEPHRECTOMY Left 11/16/2021    ROBOT ASSISTED LAPAROSCOPIC COMPLEX LEFT PARTIAL NEPHRECTOMY performed by Jose Funez MD at [facility] TRANSESOPHAGEAL ECHOCARDIOGRAM  11/21/2018    Dr. Melquiades Bach TRANSESOPHAGEAL ECHOCARDIOGRAM  03/18/2019    WISDOM TOOTH EXTRACTION            Family History   Problem Relation Age of Onset    Heart Attack Mother     Stroke Mother     Heart Attack Father     Heart Failure Father     Heart Disease Father     Anxiety Disorder Sister     Depression Sister     Crohn's Disease Son           Social History     Tobacco Use    Smoking status: Never Smoker    Smokeless tobacco: Never Used   Substance Use Topics    Alcohol use:  Yes     Alcohol/week: 0.0 standard drinks     Comment: occasional wine/mixed drink every few months         Review of Systems:     Constitutional: negative for fever and chills, or significant weight loss  HEENT: negative for acute visual symptoms or auditory problems, no dysphagia  Respiratory: negative for cough, wheezing, or hemoptysis  Cardiovascular: negative for chest pain, palpitations, and +ve dyspnea  Gastrointestinal: negative for abdominal pain, diarrhea, nausea and vomiting  Endocrine: Negative for polyuria and polydyspsia  Genitourinary: negative for dysuria and hematuria  Derm: negative for rash and skin lesion(s)  Neurological: negative for tingling, numbness, weakness, seizures  Endocrine: negative for polydipsia and polyuria  Musculoskeletal: negative for pain or tenderness  Psychiatric: negative for anxiety, depression, or suicidal ideations         O:  COMPLETE PHYSICAL EXAM:       /60 (Site: Right Upper Arm, Position: Sitting, Cuff Size: Medium Adult)   Pulse 65   Resp 16   Ht 5' 7\" (1.702 m)   Wt 176 lb 3.2 oz (79.9 kg)   SpO2 98%   BMI 27.60 kg/m²       General:   Patient alert, comfortable, no distress. Appears stated age. HEENT:    Pupils equal, no icterus; Tongue moist.   Neck:              No masses, Thyroid not palpable. + elevated JVD, No carotid bruit. Chest:   Normal configuration, non tender. Lungs:   Clear to auscultation bilaterally except few scattered rhonchi. Cardiovascular:  Regular rhythm, 9-7/7 systolic murmur, No S3, no palpable thrills. Abdomen:  Soft, Bowel sounds normal, no pulsatile abdominal aorta, no palpable masses. Extremities:  Trace edema. Distal pulses palpable. No cyanosis, no clubbing. Skin:   Good turgor, warm and dry, no cyanosis. Musculoskeletal: No joint swelling or deformity. Neuro:   Cranial nerves grossly intact; No focal neurologic deficit. Psych:   Alert, good mood and effect.      REVIEW OF DIAGNOSTIC TESTS:        Electrocardiogram: Reviewed     JAIME 10/18/2021 Shiraz William MD)   Summary   Ejection fraction is visually estimated at 45-50%. Normal right ventricular size and function. No evidence of interatrial shunting on bubble study. History of WATCHMAN device (24 mm). No significant color flow jet around   the device. No device related thrombus visualized. Moderate mitral regurgitation. Mild aortic regurgitation. Moderate tricuspid regurgitation. RV-RA gradient is estimated at 82 mmHg. A/P:   ASSESSMENT / PLAN:    Steve Soria was seen today for cardiomyopathy, atrial fibrillation, cardiac valve problem, hypertension, shortness of breath and dizziness. Diagnoses and all orders for this visit:     Nonischemic cardiomyopathy ([address] EF 38% by Echo 12/2016; JAIME 3/16/2019- EF 25-30%; JAIME 5/6/2019- EF 25-30%; Benefits of Life Vest discussed-She wants to think about it. Lexiscan stress 11/21/2018 -Fixed anterior defect EF 23%; EF 35-40% by Echo 7/8/2019 - EF 45-50% by JAIME 10/2021. On maximal tolerable BB and Aldactone; Allergic/Intolerance to ACE/ARB/ Nitrates. Consider adding Jardiance; change Coreg to Metoprolol per her request.  -     EKG 12 Lead     Paroxysmal atrial fibrillation ([address]- Dx 11/2018 - CHADS2 VASc score 5. On Xarelto (did not tolerated Eliquis) Rates controlled on Coreg; add Digoxin for rate control if needed; Seen by EP at [facility]- Dr. Horacio Estrada 4/2019 - He recommended Rhythm management; Seen by Dr Angela Dahl, s/p Watchman #[id number] on 10/14/2020,  On ASA+Plavix, Now on ASA and [address] [facility] EP for opinion on continuation of Tikosyn.     Hx of Left atrial appendage thrombus Found on JAIME 11/21/18 and 3/2019, On Xarelto for 2 months, then DAPT,  Resolved on JAIME 5/6/2019 and 7/16/21 and 10/18/21      SOBOE - Multifactorial (VHD, Pul HTN, CMP)    Pulmonary hypertension ([address] Secondary: PA and RV 50mmHg by [address] in 2010 at [facility] and in 2011 at [address] acting Nitrates;  Not on Nitrates due to Nitrate intolerance. JAIME 10//2021 RVSP 82mmHg. -     EKG 12 Lead     Essential hypertension - Controlled     Valvular heart disease - Moderate Mitral and TR; mild AR - JAIME 10/2021      S/p LEFT partial nephrectomy - 11/2021, Dr Stevo Grubbs    Migraine without aura and without status migrainosus, not intractable     Multiple drug allergies     Allergy to ACE inhibitors     Lumbar radiculopathy - chronic     Non morbid obesity;  Diet, exercise and weight loss discussed. Preventive Cardiology: Low cholesterol diet, regular exercise as tolerate, and gradual weight loss discussed. Other orders  -     furosemide (LASIX) 20 MG tablet; Take 1 tablet by mouth daily  -     metoprolol succinate (TOPROL XL) 25 MG extended release tablet; Take 1 tablet by mouth daily       Above recommendations discussed. The patient's current medication list, allergies, problem list and results of prior tests (as available) were reviewed at today's visit   All questions answered about cardiac diagnoses and cardiac medications. Continue current medications. Monitor BP and heart rates. Compliance with medications and f/u with all physicians discussed. Risk factor modification based on risk profile discussed. Call if any exertional chest pain, short of breath, dizzy or palpitations. Follow up in 6 months or earlier if needed.          [facility]  [address] [facility], [address] [facility]  [phone]

## 2022-07-08 ENCOUNTER — OFFICE VISIT (OUTPATIENT)
Dept: FAMILY MEDICINE CLINIC | Age: 77
End: 2022-07-08
Payer: MEDICARE

## 2022-07-08 VITALS
DIASTOLIC BLOOD PRESSURE: 80 MMHG | OXYGEN SATURATION: 96 % | SYSTOLIC BLOOD PRESSURE: 132 MMHG | BODY MASS INDEX: 27 KG/M2 | HEART RATE: 77 BPM | RESPIRATION RATE: 15 BRPM | WEIGHT: 172 LBS | HEIGHT: 67 IN | TEMPERATURE: 97.5 F

## 2022-07-08 DIAGNOSIS — R31.9 URINARY TRACT INFECTION WITH HEMATURIA, SITE UNSPECIFIED: Primary | ICD-10-CM

## 2022-07-08 DIAGNOSIS — N39.0 URINARY TRACT INFECTION WITH HEMATURIA, SITE UNSPECIFIED: Primary | ICD-10-CM

## 2022-07-08 LAB
BILIRUBIN, POC: NEGATIVE
BLOOD URINE, POC: ABNORMAL
CLARITY, POC: ABNORMAL
COLOR, POC: YELLOW
GLUCOSE URINE, POC: NEGATIVE
KETONES, POC: NEGATIVE
LEUKOCYTE EST, POC: ABNORMAL
NITRITE, POC: POSITIVE
PH, POC: 5
PROTEIN, POC: NEGATIVE
SPECIFIC GRAVITY, POC: 1.02
UROBILINOGEN, POC: 0.2

## 2022-07-08 PROCEDURE — 1123F ACP DISCUSS/DSCN MKR DOCD: CPT

## 2022-07-08 PROCEDURE — 99213 OFFICE O/P EST LOW 20 MIN: CPT

## 2022-07-08 PROCEDURE — 81002 URINALYSIS NONAUTO W/O SCOPE: CPT

## 2022-07-08 RX ORDER — CIPROFLOXACIN 500 MG/1
500 TABLET, FILM COATED ORAL 2 TIMES DAILY
Qty: 14 TABLET | Refills: 0 | Status: SHIPPED | OUTPATIENT
Start: 2022-07-08 | End: 2022-07-15

## 2022-07-08 NOTE — PROGRESS NOTES
Chief Complaint       Abdominal Pain (Pressure low abd x 2 days ), Chills, and Lower Back Pain    History of Present Illness   Source of history provided by:  patient. Levi Carnes is a 68 y.o. old female presenting to the walk in clinic for evaluation of suprapubic pressure x 3 days. Reports associated frequency and urgency. Denies gross hematuria or dysuria. Denies associated flank pain. Denies any fever, chills, vaginal discharge, vaginal bleeding, possibility of pregnancy, vomiting, diarrhea, or lethargy. No LMP recorded. Patient has had a hysterectomy. ROS    Unless otherwise stated in this report or unable to obtain because of the patient's clinical or mental status as evidenced by the medical record, this patients's positive and negative responses for Review of Systems, constitutional, psych, eyes, ENT, cardiovascular, respiratory, gastrointestinal, neurological, genitourinary, musculoskeletal, integument systems and systems related to the presenting problem are either stated in the preceding or were not pertinent or were negative for the symptoms and/or complaints related to the medical problem. Physical Exam         VS:  /80   Pulse 77   Temp 97.5 °F (36.4 °C) (Temporal)   Resp 15   Ht 5' 7\" (1.702 m)   Wt 172 lb (78 kg)   SpO2 96%   BMI 26.94 kg/m²    Oxygen Saturation Interpretation: Normal.    Constitutional:  A&Ox3, development consistent with age, NAD. Lungs:  CTAB without wheezing, rales, or rhonchi. Heart:  RRR without pathologic murmurs, rubs, or gallops. Abdomen: Soft, nondistended, with mild suprapubic tenderness. No rebound, rigidity, or guarding. BS+ X4. No organomegaly. Back: No CVA tenderness. Skin:  Normal turgor. Warm, dry, without visible rash, unless noted elsewhere. Neurological:  Alert and oriented. Motor functions intact. Responds to verbal commands.     Lab / Imaging Results   (All laboratory and radiology results have been personally reviewed by myself)  Labs:  Results for orders placed or performed in visit on 07/08/22   POCT Urinalysis no Micro   Result Value Ref Range    Color, UA yellow     Clarity, UA cloudy     Glucose, UA POC negative     Bilirubin, UA negative     Ketones, UA negative     Spec Grav, UA 1.025     Blood, UA POC trace     pH, UA 5.0     Protein, UA POC negative     Urobilinogen, UA 0.2     Leukocytes, UA small     Nitrite, UA positive        Imaging: All Radiology results interpreted by Radiologist unless otherwise noted. Assessment / Plan     Impression(s):  Jayson Rubalcava was seen today for abdominal pain, chills and lower back pain. Diagnoses and all orders for this visit:    Urinary tract infection with hematuria, site unspecified  -     POCT Urinalysis no Micro  -     Culture, Urine  -     ciprofloxacin (CIPRO) 500 MG tablet; Take 1 tablet by mouth 2 times daily for 7 days      Disposition:  Disposition: Discharge to home. UA appears positive for a UTI. Urine C&S pending, will call with results once available. Script written for Ciprofloxacin, side effects discussed. Increase fluids and rest. F/u PCP in 3-5 days if symptoms persist. ED sooner if symptoms worsen or change. ED immediately with the development of fever, shaking chills, body aches, flank pain, vomiting, CP, or SOB. Pt is in agreement with this care plan. All questions answered. MARIBELL Costello    **This report was transcribed using voice recognition software. Every effort was made to ensure accuracy; however, inadvertent computerized transcription errors may be present.

## 2022-07-11 LAB — URINE CULTURE, ROUTINE: NORMAL

## 2022-08-15 DIAGNOSIS — C64.2 RENAL CELL CARCINOMA OF LEFT KIDNEY (HCC): Primary | ICD-10-CM

## 2022-08-17 DIAGNOSIS — C64.2 RENAL CELL CARCINOMA OF LEFT KIDNEY (HCC): Primary | ICD-10-CM

## 2022-08-19 ENCOUNTER — HOSPITAL ENCOUNTER (OUTPATIENT)
Dept: CT IMAGING | Age: 77
Discharge: HOME | End: 2022-08-21
Payer: MEDICARE

## 2022-08-19 ENCOUNTER — HOSPITAL ENCOUNTER (OUTPATIENT)
Age: 77
Discharge: HOME | End: 2022-08-19
Payer: MEDICARE

## 2022-08-19 DIAGNOSIS — C64.2 RENAL CELL CARCINOMA OF LEFT KIDNEY (HCC): ICD-10-CM

## 2022-08-19 LAB
BUN BLDV-MCNC: 12 MG/DL (ref 6–23)
CREAT SERPL-MCNC: 1.1 MG/DL (ref 0.5–1)
GFR AFRICAN AMERICAN: 58
GFR NON-AFRICAN AMERICAN: 48 ML/MIN/1.73

## 2022-08-19 PROCEDURE — 82565 ASSAY OF CREATININE: CPT

## 2022-08-19 PROCEDURE — 36415 COLL VENOUS BLD VENIPUNCTURE: CPT

## 2022-08-19 PROCEDURE — 6360000004 HC RX CONTRAST MEDICATION: Performed by: RADIOLOGY

## 2022-08-19 PROCEDURE — 2580000003 HC RX 258: Performed by: RADIOLOGY

## 2022-08-19 PROCEDURE — 74178 CT ABD&PLV WO CNTR FLWD CNTR: CPT

## 2022-08-19 PROCEDURE — 84520 ASSAY OF UREA NITROGEN: CPT

## 2022-08-19 RX ORDER — SODIUM CHLORIDE 0.9 % (FLUSH) 0.9 %
10 SYRINGE (ML) INJECTION
Status: COMPLETED | OUTPATIENT
Start: 2022-08-19 | End: 2022-08-19

## 2022-08-19 RX ADMIN — Medication 10 ML: at 12:34

## 2022-08-19 RX ADMIN — IOPAMIDOL 75 ML: 755 INJECTION, SOLUTION INTRAVENOUS at 12:34

## 2022-08-19 RX ADMIN — IOHEXOL 50 ML: 240 INJECTION, SOLUTION INTRATHECAL; INTRAVASCULAR; INTRAVENOUS; ORAL at 12:34

## 2022-08-24 ENCOUNTER — TELEPHONE (OUTPATIENT)
Dept: NON INVASIVE DIAGNOSTICS | Age: 77
End: 2022-08-24

## 2022-08-31 NOTE — PROGRESS NOTES
[address] [facility] Electrophysiology  Outpatient Progress Note  Robyn Neal  1945  Date of Service: 9/1/2022  PCP: Go Graham DO  Cardiologist: Braden Pressley MD  Electrophysiologist: Neena Adorno MD        Subjective: Robyn Neal is seen for follow-up and management of persistent atrial fibrillation. Robyn Neal previously followed with Dr. Connie Feliciano and last saw Jose Haro NP in December 2019. Since that time she had a Watchman LAAO procedure on 10/14/20, left partial nephrectomy on 11/16/21 and her [place] was reduced from 250 mcg BID to 125 mcg BID due to reduced CrCl on 3/7/22. She reports feeling overall well. She denies any recurrent AF episode. The patient denies any chest pain, dyspnea, palpitations, dizziness, syncope, orthopnea or paroxysmal nocturnal dyspnea. She presents today in SR. She remains on Tikosyn for rhythm control and Toprol XL for rate control.     Patient Active Problem List   Diagnosis    Anxiety    Nonischemic cardiomyopathy ([facility]    Severe mitral regurgitation    Lumbar radiculopathy    Cervical radiculopathy    HTN (hypertension), benign    Left atrial thrombus    PAF (paroxysmal atrial fibrillation) (HCC)    Chronic HFrEF (heart failure with reduced ejection fraction) ([facility]    Visit for monitoring Tikosyn therapy    Encounter for medication refill    Itching    Chronic anticoagulation    Presence of Watchman left atrial appendage closure device    Renal mass    Renal cell carcinoma of left kidney (HCC)    Chronic renal disease, stage III (HCC) [[id number]]       Current Outpatient Medications   Medication Sig Dispense Refill    furosemide (LASIX) 20 MG tablet Take 1 tablet by mouth daily 90 tablet 3    metoprolol succinate (TOPROL XL) 25 MG extended release tablet Take 1 tablet by mouth daily 90 tablet 2    dofetilide (TIKOSYN) 125 MCG capsule Take 1 capsule by mouth 2 times daily 180 capsule 1    aspirin 81 MG EC tablet Take 81 mg by mouth daily      hydrOXYzine (ATARAX) 10 MG tablet Take 1 tablet by mouth every 8 hours as needed for Itching or Anxiety 90 tablet 1    baclofen (LIORESAL) 10 MG tablet Take 1 tablet by mouth nightly 90 tablet 1    spironolactone (ALDACTONE) 25 MG tablet Take 1 tablet by mouth daily 90 tablet 3    hydrALAZINE (APRESOLINE) 10 MG tablet Take 1 tablet by mouth 3 times daily 270 tablet 3    famotidine (PEPCID) 40 MG tablet Take 40 mg by mouth 2 times daily as needed       omeprazole (PRILOSEC) 40 MG delayed release capsule Take 40 mg by mouth daily       triamcinolone (KENALOG) 0.1 % cream Apply topically 3 times daily as needed (rash)   1    loperamide (IMODIUM) 2 MG capsule Take 2 mg by mouth 4 times daily as needed for Diarrhea      diphenhydrAMINE (BENADRYL) 25 MG tablet Take 25 mg by mouth every 6 hours as needed for Itching      vitamin D (CHOLECALCIFEROL) 1000 UNIT TABS tablet Take 1,000 Units by mouth daily      Handicap Placard MISC Cannot walk more than 200 feet  Expiration: 7/12/2026 1 each 0     No current facility-administered medications for this visit.         Allergies   Allergen Reactions    Bentyl [Dicyclomine] Shortness Of Breath    Adhesive Tape Itching     develops rash and itching with EKG electrodes    Atacand [Candesartan] Hives and Itching    Cefdinir Hives, Itching and Nausea Only    Demerol      3/9/19 Pt states she gets a severe migraine    Digoxin Itching     developed itching and rash    Imdur [Isosorbide Mononitrate]       migraines    Losartan Potassium Itching    Shellfish-Derived Products Itching     3/9/19 Pt states she had a lobster pizza and had difficulty breathing, started to sweat and was itchy    Sulfa Antibiotics Diarrhea and Other (See Comments)     Also causes migraines  caused migraines and diarrhea    Xarelto [Rivaroxaban] Itching and Rash      severe itch, headache, rash    Acetaminophen Hives and Itching    Apap-Caff-Dihydrocodeine Hives and Itching Coreg [Carvedilol] Itching     Can take extended release Coreg    Cozaar [Losartan] Itching    Diovan [Valsartan] Itching    Effexor [Venlafaxine Hydrochloride] Nausea Only    Eliquis [Apixaban] Hives, Itching and Rash     3/9/19 Pt states that she gets hives, itchy and a rash    Labetalol Itching    Lisinopril Itching    Ramipril Itching     ROS:   Constitutional: Negative for fever, activity change and appetite change. HENT: Negative for epistaxis. Eyes: Negative for diploplia, blurred vision. Respiratory: Negative for cough, chest tightness, shortness of breath and wheezing. Cardiovascular: pertinent positives in HPI  Gastrointestinal: Negative for abdominal pain and blood in stool. All other review of systems are negative     PHYSICAL EXAM:      Vitals:    09/01/22 1513   BP: 118/70   Pulse: 75   Resp: 18   Weight: 169 lb 12.8 oz (77 kg)   Height: 5' 7\" (1.702 m)     Constitutional: well-developed, no acute distress  Eyes: conjunctivae normal, no xanthelasma   Ears, Nose, Throat: oral mucosa moist, no cyanosis   CV: no JVD. Regular rate and rhythm. Normal S1S2 and no S3. No murmurs, rubs, or gallops. PMI is nondisplaced  Lungs: clear to auscultation bilaterally, normal respiratory effort without used of accessory muscles  Abdomen: soft, non-tender, bowel sounds present, no masses or hepatomegaly   Musculoskeletal: no digital clubbing, no edema   Skin: warm, no rashes     Data:    No results for input(s): WBC, HGB, HCT, PLT in the last 72 hours. No results for input(s): NA, K, CL, CO2, BUN, CREATININE, GLU, CALCIUM in the last 72 hours. Invalid input(s):  MAGNESIUM  Lab Results   Component Value Date/Time    MG 2.1 01/30/2021 12:48 PM     No results for input(s): TSH in the last 72 hours. No results for input(s): INR in the last 72 hours. EKG 09/01/22: SR w/ PVC, LBBB, rate: 75bpm, , QRS: 162, QTc: 484  Please see scan in Cardiology.     JAIME 10/18/2021:    Findings      Left Ventricle Left ventricle size is normal.   Mildly reduced left ventricular systolic function. Ejection fraction is visually estimated at 45-50%. Right Ventricle   Normal right ventricular size and function. Left Atrium   No evidence of interatrial shunting on bubble study. History of WATCHMAN device (24 mm). No significant color flow jet around   the device. No device related thrombus visualized. Right Atrium   Dilated right atrium. Mitral Valve   Moderate mitral regurgitation. No evidence of hemodynamically mitral stenosis. Tricuspid Valve   Moderate tricuspid regurgitation. RV-RA gradient is estimated at 82 mmHg. Aortic Valve   Aortic valve opens well. The aortic valve is trileaflet. No evidence of hemodynamically significant aortic stenosis. Mild aortic regurgitation. Pulmonic Valve   No evidence of hemodynamically significant pulmonic regurgitation. Pericardial Effusion   No evidence of a hemodynamically significant pericardial effusion. Aorta   Aortic root dimension within normal limits. Conclusions      Summary   Ejection fraction is visually estimated at 45-50%. Normal right ventricular size and function. No evidence of interatrial shunting on bubble study. History of WATCHMAN device (24 mm). No significant color flow jet around   the device. No device related thrombus visualized. Moderate mitral regurgitation. Mild aortic regurgitation. Moderate tricuspid regurgitation. RV-RA gradient is estimated at 82 mmHg. Signature      ----------------------------------------------------------------   Electronically signed by Marylen Bourbon MD(Interpreting   physician) on 10/18/2021 05:20 PM   ----------------------------------------------------------------     Doppler Measurements & Calculations        TR Velocity:4.54 m/s     TR Gradient:82.56 mmHg    Echocardiogram 10/14/20:    Findings      Left Ventricle   Normal left ventricle size.    Estimated left ventricle ejection fraction 50 %. Right Ventricle   Normal right ventricular size and function. Left Atrium   Enlarged left atrium. Right Atrium   Normal right atrium size. Pericardial Effusion   Resolved pericardial effusion adjacent to LV. Very trivial effusion   adjacent to the RV mostly in systole. Overall the pericardial effusion is   decreased compared to 10/14/2020 study. Conclusions      Summary   Resolved pericardial effusion adjacent to LV. Very trivial effusion adjacent to the RV mostly in systole. Overall the pericardial effusion is decreased compared to 10/14/2020   study. Signature      ----------------------------------------------------------------   Electronically signed by Norman Abbott MD(Interpreting physician)   on 10/16/2020 10:37 AM      I have independently reviewed all of the ECGs and rhythm strips per above     Assessment and plan:    1. Persistent atrial fibrillation  - [id number]: 6  - History of CHERYL thrombus  - History of  JAIME and DC-CV on 5/6/19 ad 11/4/19.  - Tikosyn 250 mcg BID: initiation 11/4/19 and in March 2022 was reduced to 125 mcg every 12 hours due CrCL of 27.75 mL/min based off Cr of 1.2mg/dl (based off labs on 2/1/22). - Status post  Watchman #[id number] on 10/14/2020 with Dr Belle Cotto. - Presents in NSR with stable QTc.  - CrCL of 41.66 mL/min based off Cr of 1.1mg/dl (based off labs on 8/19/22)  - Re-education on importance of well controlled HTN (goal BP < 130/80), adequate weight control (goal BMI of < 27), physical activity consisting of moderate cardiopulmonary exercise up to a goal of 250 min/wk, daily compliance with CPAP in treating sleep apnea, smoking cessation and limited ETOH intake.      2. Nonischemic cardiomyopathy  - Diagnosed originally in 2013  - TTE: 12/2016: EF 38%  - JAIME: 3/2019: EF 25-30%  - JAIME: 5/2019: EF 25-30%  - TTE: 10/2020: EF 50%  - JAIME: 11/30/2020: EF 45-50%  - JAIME: 7/16/2021: EF 45-50%  - JAIME: 10/18/2021: EF 45-50%  - GDMT: Toprol XL, Lasix, Apresoline and Aldactone   - Allergic to ACE-I/ARB and Nitrates  - NYHA II, ACC/AHA stage C  - Compensated and euvolemic. 3. Hypertension  - On Toprol XL, Lasix, Apresoline and Aldactone   - Managed by Dr. Jeremy Avalos     4. Obesity  - Body mass index is 26.59 kg/m². 5. Lumbar and cervical radiculopathy     6. History of anxiety     7. History of TIA's     8. History of Meige syndrome    9. Status post LEFT partial nephrectomy   - 11/2021 - Dr Saji Arias    Recommendations:    1. Continue Tikosyn 125 mcg every 12 hours. 2. Continue Toprol XL 25 mg daily. 3. Life style modifications as above. 4. Obtain EKG, BMP and magnesium every 3 months. 5. Follow up in 6 months or sooner PRN. Encouraged the patient to call the office for any questions or concerns. I have spent a total of 40 minutes with the patient and the family reviewing the above stated recommendations. And a total of >50% of that time involved face-to-face time providing counseling and/or coordination of care with the other providers, preparation for the clinic visit, reviewing records/tests, counseling/education of the patient, ordering medications/tests/procedures, coordinating care, and documenting clinical information in the EHR. Thank you for allowing me to participate in your patient's care. Please call me if there are any questions or concerns.       Jena Liu MD  Cardiac Electrophysiology  [facility]) Physicians  [facility] [place] [facility]  9/1/22   3:19 PM

## 2022-09-01 ENCOUNTER — OFFICE VISIT (OUTPATIENT)
Dept: NON INVASIVE DIAGNOSTICS | Age: 77
End: 2022-09-01
Payer: MEDICARE

## 2022-09-01 VITALS
WEIGHT: 169.8 LBS | HEART RATE: 75 BPM | BODY MASS INDEX: 26.65 KG/M2 | SYSTOLIC BLOOD PRESSURE: 118 MMHG | DIASTOLIC BLOOD PRESSURE: 70 MMHG | HEIGHT: 67 IN | RESPIRATION RATE: 18 BRPM

## 2022-09-01 DIAGNOSIS — Z51.81 VISIT FOR MONITORING TIKOSYN THERAPY: Primary | ICD-10-CM

## 2022-09-01 DIAGNOSIS — I48.0 PAF (PAROXYSMAL ATRIAL FIBRILLATION) (HCC): ICD-10-CM

## 2022-09-01 DIAGNOSIS — Z79.899 VISIT FOR MONITORING TIKOSYN THERAPY: Primary | ICD-10-CM

## 2022-09-01 PROCEDURE — 1036F TOBACCO NON-USER: CPT | Performed by: INTERNAL MEDICINE

## 2022-09-01 PROCEDURE — G8417 CALC BMI ABV UP PARAM F/U: HCPCS | Performed by: INTERNAL MEDICINE

## 2022-09-01 PROCEDURE — 99215 OFFICE O/P EST HI 40 MIN: CPT | Performed by: INTERNAL MEDICINE

## 2022-09-01 PROCEDURE — 93000 ELECTROCARDIOGRAM COMPLETE: CPT | Performed by: INTERNAL MEDICINE

## 2022-09-01 PROCEDURE — 1090F PRES/ABSN URINE INCON ASSESS: CPT | Performed by: INTERNAL MEDICINE

## 2022-09-01 PROCEDURE — G8427 DOCREV CUR MEDS BY ELIG CLIN: HCPCS | Performed by: INTERNAL MEDICINE

## 2022-09-01 PROCEDURE — G8399 PT W/DXA RESULTS DOCUMENT: HCPCS | Performed by: INTERNAL MEDICINE

## 2022-09-01 PROCEDURE — 1123F ACP DISCUSS/DSCN MKR DOCD: CPT | Performed by: INTERNAL MEDICINE

## 2022-09-02 DIAGNOSIS — C64.2 RENAL CELL CARCINOMA OF LEFT KIDNEY (HCC): Primary | ICD-10-CM

## 2022-09-07 ENCOUNTER — TELEPHONE (OUTPATIENT)
Dept: CASE MANAGEMENT | Age: 77
End: 2022-09-07

## 2022-09-07 ENCOUNTER — OFFICE VISIT (OUTPATIENT)
Dept: ONCOLOGY | Age: 77
End: 2022-09-07
Payer: MEDICARE

## 2022-09-07 ENCOUNTER — HOSPITAL ENCOUNTER (OUTPATIENT)
Dept: INFUSION THERAPY | Age: 77
Discharge: HOME | End: 2022-09-07
Payer: MEDICARE

## 2022-09-07 VITALS
BODY MASS INDEX: 26.84 KG/M2 | DIASTOLIC BLOOD PRESSURE: 67 MMHG | SYSTOLIC BLOOD PRESSURE: 127 MMHG | WEIGHT: 171 LBS | HEART RATE: 77 BPM | HEIGHT: 67 IN | TEMPERATURE: 96.4 F | RESPIRATION RATE: 16 BRPM | OXYGEN SATURATION: 94 %

## 2022-09-07 DIAGNOSIS — C64.9 RENAL CELL CARCINOMA, UNSPECIFIED LATERALITY (HCC): Primary | ICD-10-CM

## 2022-09-07 DIAGNOSIS — C64.2 RENAL CELL CARCINOMA OF LEFT KIDNEY (HCC): ICD-10-CM

## 2022-09-07 LAB
ALBUMIN SERPL-MCNC: 3.8 G/DL (ref 3.5–5.2)
ALP BLD-CCNC: 108 U/L (ref 35–104)
ALT SERPL-CCNC: 12 U/L (ref 0–32)
ANION GAP SERPL CALCULATED.3IONS-SCNC: 12 MMOL/L (ref 7–16)
AST SERPL-CCNC: 19 U/L (ref 0–31)
BASOPHILS ABSOLUTE: 0.05 E9/L (ref 0–0.2)
BASOPHILS RELATIVE PERCENT: 0.7 % (ref 0–2)
BILIRUB SERPL-MCNC: 0.6 MG/DL (ref 0–1.2)
BUN BLDV-MCNC: 14 MG/DL (ref 6–23)
CALCIUM SERPL-MCNC: 9.4 MG/DL (ref 8.6–10.2)
CHLORIDE BLD-SCNC: 108 MMOL/L (ref 98–107)
CO2: 21 MMOL/L (ref 22–29)
CREAT SERPL-MCNC: 1.2 MG/DL (ref 0.5–1)
EOSINOPHILS ABSOLUTE: 0.14 E9/L (ref 0.05–0.5)
EOSINOPHILS RELATIVE PERCENT: 2.1 % (ref 0–6)
GFR AFRICAN AMERICAN: 53
GFR NON-AFRICAN AMERICAN: 44 ML/MIN/1.73
GLUCOSE BLD-MCNC: 111 MG/DL (ref 74–99)
HCT VFR BLD CALC: 41.5 % (ref 34–48)
HEMOGLOBIN: 13.6 G/DL (ref 11.5–15.5)
IMMATURE GRANULOCYTES #: 0.03 E9/L
IMMATURE GRANULOCYTES %: 0.4 % (ref 0–5)
LYMPHOCYTES ABSOLUTE: 1.03 E9/L (ref 1.5–4)
LYMPHOCYTES RELATIVE PERCENT: 15.4 % (ref 20–42)
MCH RBC QN AUTO: 28 PG (ref 26–35)
MCHC RBC AUTO-ENTMCNC: 32.8 % (ref 32–34.5)
MCV RBC AUTO: 85.6 FL (ref 80–99.9)
MONOCYTES ABSOLUTE: 0.41 E9/L (ref 0.1–0.95)
MONOCYTES RELATIVE PERCENT: 6.1 % (ref 2–12)
NEUTROPHILS ABSOLUTE: 5.02 E9/L (ref 1.8–7.3)
NEUTROPHILS RELATIVE PERCENT: 75.3 % (ref 43–80)
PDW BLD-RTO: 14.3 FL (ref 11.5–15)
PLATELET # BLD: 248 E9/L (ref 130–450)
PMV BLD AUTO: 10.6 FL (ref 7–12)
POTASSIUM SERPL-SCNC: 4.3 MMOL/L (ref 3.5–5)
RBC # BLD: 4.85 E12/L (ref 3.5–5.5)
SODIUM BLD-SCNC: 141 MMOL/L (ref 132–146)
TOTAL PROTEIN: 7.6 G/DL (ref 6.4–8.3)
WBC # BLD: 6.7 E9/L (ref 4.5–11.5)

## 2022-09-07 PROCEDURE — 99214 OFFICE O/P EST MOD 30 MIN: CPT | Performed by: INTERNAL MEDICINE

## 2022-09-07 PROCEDURE — 1090F PRES/ABSN URINE INCON ASSESS: CPT | Performed by: INTERNAL MEDICINE

## 2022-09-07 PROCEDURE — 36415 COLL VENOUS BLD VENIPUNCTURE: CPT

## 2022-09-07 PROCEDURE — G8417 CALC BMI ABV UP PARAM F/U: HCPCS | Performed by: INTERNAL MEDICINE

## 2022-09-07 PROCEDURE — 1123F ACP DISCUSS/DSCN MKR DOCD: CPT | Performed by: INTERNAL MEDICINE

## 2022-09-07 PROCEDURE — G8427 DOCREV CUR MEDS BY ELIG CLIN: HCPCS | Performed by: INTERNAL MEDICINE

## 2022-09-07 PROCEDURE — 85025 COMPLETE CBC W/AUTO DIFF WBC: CPT

## 2022-09-07 PROCEDURE — 80053 COMPREHEN METABOLIC PANEL: CPT

## 2022-09-07 PROCEDURE — 99213 OFFICE O/P EST LOW 20 MIN: CPT

## 2022-09-07 PROCEDURE — G8399 PT W/DXA RESULTS DOCUMENT: HCPCS | Performed by: INTERNAL MEDICINE

## 2022-09-07 PROCEDURE — 1036F TOBACCO NON-USER: CPT | Performed by: INTERNAL MEDICINE

## 2022-09-07 NOTE — TELEPHONE ENCOUNTER
Met with patient during her follow up appointment with Dr. Sherry Mack today. Patient completed her active treatment for her renal cell cancer. Patient appears in good spirits. Provided support and encouragement. Instructed patient in detail on her Cancer Treatment Summary and Survivorship Care Plan. Copy sent to patient's PCP Dr. Calli Glover. Provided patient with Patient Resouce Survivorship booklet and Local Resources for Cancer Survivors. I also provided patient with written information on [employer] information, Nutrition after Cancer and ACS Life after Cancer. . Instructed to call with any questions or concerns. Verbally agreed. Patient appreciative of visit and information. I will discharge patient from a navigation standpoint.

## 2022-09-07 NOTE — PROGRESS NOTES
Department of [facility] Oncology  Attending Clinic Note    Reason for Visit: Follow-up on a patient with [address]     PCP:  Frederick Blue DO    History of Present Illness:  68year old female who presented with frequent falls. CXR 06/03/2021:  2.1 cm round focus seen within the right hilum. CT head 6/3/2021 no acute intracranial abnormality    CT cervical spine 6/3/2021 no acute fracture or subluxation of the cervical spine  Mild multilevel DJD and DJD most prominent at the C5-C6 level  Heterogeneous appearance of the thyroid gland    CT lumbar spine 6/3/2021 no acute compression fracture or malalignment of the lumbar spine  Multilevel facet arthropathy  Degenerative joint disease at the L4-5 and L5-S1 levels most prominent at the L5-S1 level    Thyroid ultrasound 8/17/2021 multiple thyroid nodules bilaterally. CT chest on 08/17/2021  Heterogenous thyroid with multiple areas of nodularity, largest on the right measuring 1.4 cm and left 1.6 cm. Right hilar adenopathy measuring 1.9 x 1.6 cm. There are a few other borderline prominent mediastinal LN. Noncalcified 1 x 0.8 cm nodule in the posterior right upper lobe has a somewhat necrotic appearance. There is a very small faint area of ground-glass opacification in the posterior left upper lobe measuring approximately 1 x 1 cm. Noncalcified nodular density in the lateral left costophrenic angle measures 1 cm. Solid renal mass in medial left kidney measures 3.3 x 3 cm. PET/CT 08/24/2021 with increased tracer uptake to single left thyroid mass in the inferior pole with max SUV of 9.6. Increased tracer uptake to right hilum associated with small lymph node with SUV of 4.4   Increased tracer uptake left pericardial region inferior to aortopulmonary window, peak SUV 5.    Pulmonary nodules do not demonstrate increased tracer uptake, this may be related to size and may be below the resolution of PET/CT  Low level uptake at the left renal masses    FNA Left Thyroid biopsy 09/10/2021  Scant cellularity  Negative for malignant cells. Benign-appearing follicular cells, few foam cells, colloid, and blood present. [place] System Category 2. Cellblock is similar. 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 This abuts the left psoas muscle without definite invasion of left psoas muscle. Bronchoscopy/EBUS guided mediastinal Hilar LN biopsies 10/05/2021:  EBUS FNA 11R (adequacy statement satisfactory for interpretation) Negative for malignant cells. Benign and degenerated bronchial cells, scant lymphocytes and blood present  Seen by Dr. 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 See comment. Comment:CANCER CASE SUMMARY   Procedure: Partial nephrectomy   Specimen Laterality: Left   Tumor Focality: Unifocal   Tumor Site: Undesignated   Tumor Size: 3.0 cm greatest dimension   Histologic Type: Clear-cell renal cell carcinoma   Histologic Grade (WHO/ISUP): G3   Tumor Extent: Limited to kidney   Sarcomatoid Features: Not identified   Rhabdoid features: Not identified   Tumor Necrosis: Not identified   Lymph-Vascular Invasion: Not identified   Margin Status: All margins negative for invasive carcinoma   Regional lymph node Status: No lymph nodes submitted or found   Pathologic Stage Classification (AJCC 8th Edition):   pT1a   Surveillance     CT abdomen/pelvis 02/18/2022 Surgical removal of the lower pole of the left kidney. There is no evidence of local recurrence or metastatic disease at this time. CT chest 04/13/2022 stable exam  CT abdomen/pelvis 08/19/2022: No evidence of local recurrence or abdominal metastasis    Today 09/07/2022. No fever chills.   Fair appetite and energy itch, headache, rash    Acetaminophen Hives and Itching    Apap-Caff-Dihydrocodeine Hives and Itching    Coreg [Carvedilol] Itching     Can take extended release Coreg    Cozaar [Losartan] Itching    Diovan [Valsartan] Itching    Effexor [Venlafaxine Hydrochloride] Nausea Only    Eliquis [Apixaban] Hives, Itching and Rash     3/9/19 Pt states that she gets hives, itchy and a rash    Labetalol Itching    Lisinopril Itching    Ramipril Itching     Physical Exam:  /67   Pulse 77   Temp (!) 96.4 °F (35.8 °C) (Infrared)   Resp 16   Ht 5' 7\" (1.702 m)   Wt 171 lb (77.6 kg)   SpO2 94%   BMI 26.78 kg/m²    GENERAL: Alert, oriented x 3, not in acute distress. HEENT: PERRLA; EOMI. Oropharynx clear. LUNGS: Good air entry bilaterally. No wheezing, crackles or rhonchi. CARDIOVASCULAR: Regular rate. No murmurs, rubs or gallops. EXTREMITIES: Without clubbing or cyanosis or edema. NEUROLOGIC: No focal deficits. ECOG PS 1    Impression/Plan:  69 y/o female with Left RCC    CT chest on 08/17/2021  Heterogenous thyroid with multiple areas of nodularity, largest on the right measuring 1.4 cm and left 1.6 cm. Right hilar adenopathy measuring 1.9 x 1.6 cm. There are a few other borderline prominent mediastinal LN. Noncalcified 1 x 0.8 cm nodule in the posterior right upper lobe has a somewhat necrotic appearance. There is a very small faint area of ground-glass opacification in the posterior left upper lobe measuring approximately 1 x 1 cm. Noncalcified nodular density in the lateral left costophrenic angle measures 1 cm. Solid renal mass in medial left kidney measures 3.3 x 3 cm. PET/CT 08/24/2021 with increased tracer uptake to single left thyroid mass in the inferior pole with max SUV of 9.6. Increased tracer uptake to right hilum associated with small lymph node with SUV of 4.4   Increased tracer uptake left pericardial region inferior to aortopulmonary window, peak SUV 5.    Pulmonary nodules do not demonstrate increased tracer uptake, this may be related to size and may be below the resolution of PET/CT  Low level uptake at the left renal masses    FNA Left Thyroid biopsy 09/10/2021  Scant cellularity  Negative for malignant cells. Benign-appearing follicular cells, few foam cells, colloid, and blood present. [place] System Category 2. Cellblock is similar. 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 This abuts the left psoas muscle without definite invasion of left psoas muscle. Bronchoscopy/EBUS guided mediastinal Hilar LN biopsies 10/05/2021:  EBUS FNA 11R (adequacy statement satisfactory for interpretation) Negative for malignant cells. Benign and degenerated bronchial cells, scant lymphocytes and blood present  Seen by Dr. 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 See comment. Comment:CANCER CASE SUMMARY   Procedure: Partial nephrectomy   Specimen Laterality: Left   Tumor Focality: Unifocal   Tumor Site: Undesignated   Tumor Size: 3.0 cm greatest dimension   Histologic Type: Clear-cell renal cell carcinoma   Histologic Grade (WHO/ISUP): G3   Tumor Extent: Limited to kidney   Sarcomatoid Features: Not identified   Rhabdoid features: Not identified   Tumor Necrosis: Not identified   Lymph-Vascular Invasion: Not identified   Margin Status: All margins negative for invasive carcinoma   Regional lymph node Status: No lymph nodes submitted or found   Pathologic Stage Classification (AJCC 8th Edition):   pT1a   Surveillance     CT abdomen/pelvis 02/18/2022 Surgical removal of the lower pole of the left kidney. There is no evidence of local recurrence or metastatic disease at this time.   CT chest 04/13/2022 stable exam    CT abdomen/pelvis 08/19/2022:   Postop changes compatible with previous left renal lower pole tumor excision  No evidence of local recurrence or abdominal metastasis  Imaging reviewed. Labs reviewed. PING    RTC 6 months with prior CT abdomen/pelvis.  Recommended to continue follow-up pulmonary team (Dr. Lia Montero) for f/u CT chest     09/07/2022  Chayito Garcia MD

## 2022-10-04 ENCOUNTER — TELEPHONE (OUTPATIENT)
Dept: FAMILY MEDICINE CLINIC | Age: 77
End: 2022-10-04

## 2022-10-04 NOTE — TELEPHONE ENCOUNTER
I called patient and [facility] requesting a call back to schedule Medicare AWV.      Last seen 2/1/22  Next appt Visit date not found

## 2022-10-17 ENCOUNTER — TELEPHONE (OUTPATIENT)
Dept: ADMINISTRATIVE | Age: 77
End: 2022-10-17

## 2022-10-17 NOTE — TELEPHONE ENCOUNTER
I called Tari Encarnacion on Fri 10/14/22 & today to conduct a 2 yr Watchman FU call over the phone.   There was no answer & messages left both times to return the jorge

## 2022-10-19 ENCOUNTER — OFFICE VISIT (OUTPATIENT)
Dept: FAMILY MEDICINE CLINIC | Age: 77
End: 2022-10-19
Payer: MEDICARE

## 2022-10-19 VITALS
SYSTOLIC BLOOD PRESSURE: 110 MMHG | DIASTOLIC BLOOD PRESSURE: 68 MMHG | RESPIRATION RATE: 16 BRPM | HEART RATE: 77 BPM | HEIGHT: 67 IN | OXYGEN SATURATION: 97 % | TEMPERATURE: 97.4 F | WEIGHT: 171 LBS | BODY MASS INDEX: 26.84 KG/M2

## 2022-10-19 DIAGNOSIS — R05.9 COUGH, UNSPECIFIED TYPE: ICD-10-CM

## 2022-10-19 DIAGNOSIS — B34.9 VIRAL ILLNESS: Primary | ICD-10-CM

## 2022-10-19 DIAGNOSIS — H60.501 ACUTE OTITIS EXTERNA OF RIGHT EAR, UNSPECIFIED TYPE: ICD-10-CM

## 2022-10-19 DIAGNOSIS — J34.89 SINUS DRAINAGE: ICD-10-CM

## 2022-10-19 LAB
Lab: NORMAL
PERFORMING INSTRUMENT: NORMAL
QC PASS/FAIL: NORMAL
SARS-COV-2, POC: NORMAL

## 2022-10-19 PROCEDURE — G8417 CALC BMI ABV UP PARAM F/U: HCPCS | Performed by: NURSE PRACTITIONER

## 2022-10-19 PROCEDURE — 1123F ACP DISCUSS/DSCN MKR DOCD: CPT | Performed by: NURSE PRACTITIONER

## 2022-10-19 PROCEDURE — G8484 FLU IMMUNIZE NO ADMIN: HCPCS | Performed by: NURSE PRACTITIONER

## 2022-10-19 PROCEDURE — 87426 SARSCOV CORONAVIRUS AG IA: CPT | Performed by: NURSE PRACTITIONER

## 2022-10-19 PROCEDURE — 99213 OFFICE O/P EST LOW 20 MIN: CPT | Performed by: NURSE PRACTITIONER

## 2022-10-19 PROCEDURE — 4130F TOPICAL PREP RX AOE: CPT | Performed by: NURSE PRACTITIONER

## 2022-10-19 PROCEDURE — G8399 PT W/DXA RESULTS DOCUMENT: HCPCS | Performed by: NURSE PRACTITIONER

## 2022-10-19 PROCEDURE — G8427 DOCREV CUR MEDS BY ELIG CLIN: HCPCS | Performed by: NURSE PRACTITIONER

## 2022-10-19 PROCEDURE — 1090F PRES/ABSN URINE INCON ASSESS: CPT | Performed by: NURSE PRACTITIONER

## 2022-10-19 PROCEDURE — 1036F TOBACCO NON-USER: CPT | Performed by: NURSE PRACTITIONER

## 2022-10-19 RX ORDER — FLUTICASONE PROPIONATE 50 MCG
SPRAY, SUSPENSION (ML) NASAL
Qty: 16 G | Refills: 0 | Status: SHIPPED | OUTPATIENT
Start: 2022-10-19

## 2022-10-19 RX ORDER — CETIRIZINE HYDROCHLORIDE 10 MG/1
5 TABLET ORAL DAILY
Qty: 30 TABLET | Refills: 0 | Status: SHIPPED | OUTPATIENT
Start: 2022-10-19

## 2022-10-19 RX ORDER — ACETIC ACID 20.65 MG/ML
4 SOLUTION AURICULAR (OTIC) 3 TIMES DAILY
Qty: 1 EACH | Refills: 0 | Status: SHIPPED | OUTPATIENT
Start: 2022-10-19 | End: 2022-10-26

## 2022-10-19 NOTE — PROGRESS NOTES
10/19/22  Altaf Pike : 1945 Sex: female  Age 68 y.o. Subjective:  Chief Complaint   Patient presents with    Head Congestion     X 2days / requesting covid test    Ear Problem     B/l ear itching RT > LT       HPI:   Altaf Pike , 68 y.o. female presents to the clinic for evaluation of sinus congestion x 2 days. The patient also reports mild cough and right ear discomfort. The patient has taken ear drops for symptoms. The patient reports improving symptoms over time. The patient denies known ill exposure. The patient denies hx of COVID-19. The patient denies acute loss of taste and smell, headache, sore throat, rash, and fever. The patient also denies chest pain, abdominal pain, shortness of breath, and nausea / vomiting / diarrhea. ROS:   Unless otherwise stated in this report the patient's positive and negative responses for review of systems for constitutional, eyes, ENT, cardiovascular, respiratory, gastrointestinal, neurological, , musculoskeletal, and integument systems and related systems to the presenting problem are either stated in the history of present illness or were not pertinent or were negative for the symptoms and/or complaints related to the presenting medical problem. Positives and pertinent negatives as per HPI. All others reviewed and are negative.       PMH:     Past Medical History:   Diagnosis Date    Afib ([address]     WATCHMAN    Anxiety     Arthritis     Cervical radiculopathy     CHF (congestive heart failure) (HCC)     Chronic sinusitis     Hilar adenopathy     Hx of blood clots     Hypertension     Left ventricular systolic dysfunction     Lesion of left native kidney     Lumbar radiculopathy     Lung nodules     Meige syndrome     partial/ PCP    Microscopic colitis     Mitral regurgitation     Mild to moderate    Nonischemic cardiomyopathy ([address]     f/u with Dr. Gregorio Greer    Osteopenia     Renal cell carcinoma of left kidney ([address] 2022    Vertebrobasilar artery syndrome     f/u PCP       Past Surgical History:   Procedure Laterality Date    BLADDER REPAIR      BRONCHOSCOPY N/A 10/5/2021    BRONCHOSCOPY W/EBUS FNA performed by Nikunj Echols MD at [address] [facility] N/A 10/5/2021    BRONCHOSCOPY DIAGNOSTIC OR CELL [address] ONLY performed by Nikunj Echols MD at [address] [facility] TEST  11/25/13    Rt & LT cath    CARDIOVERSION  11/04/2019    Successful CV from AF to NSR   (Dr. Sai Worthington)    COLONOSCOPY  07/2020    DIAGNOSTIC CARDIAC CATH LAB PROCEDURE  2/20/10    Dr Barakat [facility] CATH LAB PROCEDURE  8/24/11    Right heart cath @ [facility]. DOPPLER ECHOCARDIOGRAPHY  11/25/13    HYSTERECTOMY (CERVIX STATUS UNKNOWN)  1991    total    OTHER SURGICAL HISTORY  10/14/2020    Dr. Hsieh Chimera- 24mm Watchman device    PARTIAL NEPHRECTOMY Left 11/16/2021    ROBOT ASSISTED LAPAROSCOPIC COMPLEX LEFT PARTIAL NEPHRECTOMY performed by Jose Ramirez MD at [address]    TRANSESOPHAGEAL ECHOCARDIOGRAM  11/21/2018    Dr. Lamar Donis    TRANSESOPHAGEAL ECHOCARDIOGRAM  03/18/2019    WISDOM TOOTH EXTRACTION         Family History   Problem Relation Age of Onset    Heart Attack Mother     Stroke Mother     Heart Attack Father     Heart Failure Father     Heart Disease Father     Anxiety Disorder Sister     Depression Sister     Crohn's Disease Son        Medications:     Current Outpatient Medications:     acetic acid (VOSOL) 2 % otic solution, Place 4 drops into the left ear 3 times daily for 7 days, Disp: 1 each, Rfl: 0    cetirizine (ZYRTEC ALLERGY) 10 MG tablet, Take 0.5 tablets by mouth daily, Disp: 30 tablet, Rfl: 0    fluticasone (FLONASE) 50 MCG/ACT nasal spray, 1-2 sprays, each nostril daily as needed for nasal congestion. , Disp: 16 g, Rfl: 0    furosemide (LASIX) 20 MG tablet, Take 1 tablet by mouth daily, Disp: 90 tablet, Rfl: 3    metoprolol succinate (TOPROL XL) 25 MG extended release tablet, Take 1 tablet by mouth daily, Disp: 90 tablet, Rfl: 2    dofetilide (TIKOSYN) 125 MCG capsule, Take 1 capsule by mouth 2 times daily, Disp: 180 capsule, Rfl: 1    aspirin 81 MG EC tablet, Take 81 mg by mouth daily, Disp: , Rfl:     hydrOXYzine (ATARAX) 10 MG tablet, Take 1 tablet by mouth every 8 hours as needed for Itching or Anxiety, Disp: 90 tablet, Rfl: 1    baclofen (LIORESAL) 10 MG tablet, Take 1 tablet by mouth nightly, Disp: 90 tablet, Rfl: 1    spironolactone (ALDACTONE) 25 MG tablet, Take 1 tablet by mouth daily, Disp: 90 tablet, Rfl: 3    hydrALAZINE (APRESOLINE) 10 MG tablet, Take 1 tablet by mouth 3 times daily, Disp: 270 tablet, Rfl: 3    Handicap Placard MISC, Cannot walk more than 200 feet Expiration: 7/12/2026, Disp: 1 each, Rfl: 0    famotidine (PEPCID) 40 MG tablet, Take 40 mg by mouth 2 times daily as needed , Disp: , Rfl:     omeprazole (PRILOSEC) 40 MG delayed release capsule, Take 40 mg by mouth daily , Disp: , Rfl:     triamcinolone (KENALOG) 0.1 % cream, Apply topically 3 times daily as needed (rash) , Disp: , Rfl: 1    loperamide (IMODIUM) 2 MG capsule, Take 2 mg by mouth 4 times daily as needed for Diarrhea, Disp: , Rfl:     diphenhydrAMINE (BENADRYL) 25 MG tablet, Take 25 mg by mouth every 6 hours as needed for Itching, Disp: , Rfl:     vitamin D (CHOLECALCIFEROL) 1000 UNIT TABS tablet, Take 1,000 Units by mouth daily, Disp: , Rfl:     Allergies:      Allergies   Allergen Reactions    Bentyl [Dicyclomine] Shortness Of Breath    Adhesive Tape Itching     develops rash and itching with EKG electrodes    Atacand [Candesartan] Hives and Itching    Cefdinir Hives, Itching and Nausea Only    Demerol      3/9/19 Pt states she gets a severe migraine    Digoxin Itching     developed itching and rash    Imdur [Isosorbide Mononitrate]       migraines    Losartan Potassium Itching    Shellfish-Derived Products Itching     3/9/19 Pt states she had a lobster pizza and had difficulty breathing, started to sweat and was itchy    Sulfa Antibiotics Diarrhea and Other (See Comments)     Also causes migraines  caused migraines and diarrhea    Xarelto [Rivaroxaban] Itching and Rash      severe itch, headache, rash    Acetaminophen Hives and Itching    Apap-Caff-Dihydrocodeine Hives and Itching    Coreg [Carvedilol] Itching     Can take extended release Coreg    Cozaar [Losartan] Itching    Diovan [Valsartan] Itching    Effexor [Venlafaxine Hydrochloride] Nausea Only    Eliquis [Apixaban] Hives, Itching and Rash     3/9/19 Pt states that she gets hives, itchy and a rash    Labetalol Itching    Lisinopril Itching    Ramipril Itching       Social History:     Social History     Tobacco Use    Smoking status: Never    Smokeless tobacco: Never   Vaping Use    Vaping Use: Never used   Substance Use Topics    Alcohol use: Yes     Alcohol/week: 0.0 standard drinks     Comment: occasional wine/mixed drink every few months    Drug use: Never       Physical Exam:     Vitals:    10/19/22 1123   BP: 110/68   Pulse: 77   Resp: 16   Temp: 97.4 °F (36.3 °C)   TempSrc: Temporal   SpO2: 97%   Weight: 171 lb (77.6 kg)   Height: 5' 7\" (1.702 m)       Physical Exam (PE)    Physical Exam  Constitutional:       Appearance: Normal appearance. HENT:      Head: Normocephalic. Right Ear: Tympanic membrane and external ear normal.      Left Ear: Tympanic membrane, ear canal and external ear normal.      Ears:      Comments: Right Ear Canal: Mild erythema and edema. Nose: Congestion and rhinorrhea present. Rhinorrhea is clear. Mouth/Throat:      Mouth: Mucous membranes are moist.      Pharynx: Oropharynx is clear. Eyes:      Pupils: Pupils are equal, round, and reactive to light. Cardiovascular:      Rate and Rhythm: Normal rate and regular rhythm. Pulses: Normal pulses. Heart sounds: Normal heart sounds. Pulmonary:      Effort: Pulmonary effort is normal.      Breath sounds: Normal breath sounds. No wheezing, rhonchi or rales.    Abdominal: General: Bowel sounds are normal.      Palpations: Abdomen is soft. Musculoskeletal:         General: Normal range of motion. Cervical back: Normal range of motion and neck supple. Lymphadenopathy:      Cervical: No cervical adenopathy. Skin:     General: Skin is warm and dry. Capillary Refill: Capillary refill takes less than 2 seconds. Neurological:      General: No focal deficit present. Mental Status: She is alert and oriented to person, place, and time. Psychiatric:         Mood and Affect: Mood normal.         Behavior: Behavior normal.        Testing:   (All laboratory and radiology results have been personally reviewed by myself)  Labs:  Results for orders placed or performed in visit on 10/19/22   POCT COVID-19, Antigen   Result Value Ref Range    SARS-COV-2, POC Not-Detected Not Detected    Lot Number [id number]     QC Pass/Fail pass     Performing Instrument BD Veritor        Imaging: All Radiology results interpreted by Radiologist unless otherwise noted. No orders to display       Assessment / Plan:   The patient's vitals, allergies, medications, and past medical history have been reviewed. Luz Altamirano was seen today for head congestion and ear problem. Diagnoses and all orders for this visit:    Viral illness    Cough, unspecified type  -     POCT COVID-19, Antigen    Sinus drainage  -     POCT COVID-19, Antigen  -     cetirizine (ZYRTEC ALLERGY) 10 MG tablet; Take 0.5 tablets by mouth daily  -     fluticasone (FLONASE) 50 MCG/ACT nasal spray; 1-2 sprays, each nostril daily as needed for nasal congestion. Acute otitis externa of right ear, unspecified type  -     acetic acid (VOSOL) 2 % otic solution; Place 4 drops into the left ear 3 times daily for 7 days      - Disposition: Home    - Educational material printed for patient's review and were included in patient instructions. After Visit Summary was given to patient at the end of visit.     -  Encouraged oral fluids and rest. Discussed symptomatic treatments with patient today. The patient is to schedule a follow-up with PCP in the next 2-3 days for reevaluation. Red flag symptoms were also discussed with the patient today. If symptoms worsen the patient is to go directly to the emergency department for reevaluation and treatment. Pt verbalizes understanding and is in agreement with plan of care. All questions answered. SIGNATURE: REY Dean    *NOTE: This report was transcribed using voice recognition software. Every effort was made to ensure accuracy; however, inadvertent computerized transcription errors may be present.

## 2022-10-20 ENCOUNTER — TELEPHONE (OUTPATIENT)
Dept: ADMINISTRATIVE | Age: 77
End: 2022-10-20

## 2022-10-20 NOTE — TELEPHONE ENCOUNTER
MARYSOL 2 YEAR FU CALL    I called Qing Lancaster to see how she is doing 2 years post Marysol done 10/14/20    She said she's doing fine & not had any problems with bleeding or strokes. She is currently taking EC ASA 81mg PO daily as prescribed    For purpose of the [address], I also conducted the Modified Clemencia Scale & the Barthol Index Evaluation over the phone. The results are below.     MODIFIED CLEMENCIA SCALE  No symptoms at all    Barthol Index Evaluation   Feeding: Independent  Bathing: Independent  Grooming: Independent  Dressing: Independent  Bowels: Continent  Bladder: Inconsistent  Toilet Use: Independent  Transfers: Independent  Mobility:Independent  Stairs: Independent

## 2022-11-16 ENCOUNTER — TELEPHONE (OUTPATIENT)
Dept: NON INVASIVE DIAGNOSTICS | Age: 77
End: 2022-11-16

## 2022-11-16 NOTE — TELEPHONE ENCOUNTER
CK pt, The patient is on the [employer] for [employer]. She is currently on 125mcg. Labs and EKG in computer. Can she continue to stay on current dosage?

## 2022-12-04 NOTE — PROGRESS NOTES
Outpatient Cardiology Office Visit    Primary Cardiologist: Dr Pavel Roman    Reason for Visit: Cardiology follow-up      HISTORY OF PRESENT ILLNESS: 67 yo  female with a history of PAF, intolerant to multiple cardiac medications including OAC, on Tikosyn, hx CHERYL thrombus in 2018/2019, s/p Watchman 10/14/2020, NICMP, Normal coronaries on LHC in 2010, Chronic HFiEF, HTN, VHD, PHTN unable to tolerate long acting nitrates, lumbar radiculopathy, renal cell carcinoma 2021 s/p partial L nephrectomy, and anxiety    Complains of sinus problem causing drainage resulting in coughing. Denies CP or swelling. Breathing \"off\" since noticing this sinus problem. Told it was viral 2 months ago (no ATB). Problem List  Patient Active Problem List    Diagnosis Date Noted    Chronic renal disease, stage III [facility]) [537065] 04/25/2022     Priority: Medium    Renal cell carcinoma of left kidney ([facility] 02/01/2022    Renal mass 11/16/2021    Presence of Watchman left atrial appendage closure device 10/14/2020     24-mm Watchman 2.5 (Dr. Jose Harper 10/14/2020)      Chronic anticoagulation 08/28/2020    Encounter for medication refill 07/20/2020    Itching 07/20/2020    Visit for monitoring Tikosyn therapy 11/04/2019    PAF (paroxysmal atrial fibrillation) ([facility] 03/10/2019    Chronic HFrEF (heart failure with reduced ejection fraction) ([facility] 03/10/2019    Left atrial thrombus 01/02/2019    HTN (hypertension), benign 11/21/2018    Lumbar radiculopathy 10/11/2013    Cervical radiculopathy 10/11/2013    Nonischemic cardiomyopathy ([facility]      Mild nonischemic cardiomyopathy. (Ejection fraction 45-50%)  Cardiac catheterization (0/25/42): PA systolic 58, wedge 11 indicating slightly elevated pulmonary pressures not secondary to left heart failure. Cardiac output 5.47, cardiac index 2.9, no coronary artery disease   Moderately elevated pulmonary systolic pressure.       Severe mitral regurgitation      Mild to moderate      Anxiety 04/14/2011       Please note: past medical records were reviewed per electronic medical record (EMR) - see detailed reports under Past Medical/ Surgical History. Past Medical History:    PAF  status post DC cardioversion 11/2019  Maintained on dofetilide  History of left atrial appendage thrombus in November 2018 and March 2019. Resolved on rivaroxaban. Allergic to rivaroxaban with significant skin reaction that she manages with hydroxyzine. Also allergic to dabigatran and apixaban. All the above documented additionally in [facility] clinic notes  9/9/2020 JAIME: Mildly reduced LV systolic function with an EF 45%. Normal RV size and systolic function. Moderate MR. Mild AR. Mild TR. Moderate pulmonary hypertension. No CHERYL thrombus. CHERYL anatomy amenable to watchman. 10/14/2020 Dr Jose Harper: successful left atrial appendage closure using Watchman with 24 mm device. The procedure was complicated by a small pericardial effusion, not hemodynamically significant and did not require intervention. 11/30/2020 JAIME: follow-up on her watchman device which was in place with no significant color flow jet around the device and a negative bubble study  11/30/2020 ASA and Xarelto stopped. Started  mg and Plavix 75  mg QD--> developed red eyes and bleeding ASA was stopped by Dr Jose Harper. 6 month JAIME with DRT; no inge-device leak. Stopped APT and restarted Xarelto  7/2021 JAIME Dr Cornelio Mcclain: no DRT (device related thrombus). D/c rivaroxaban and start ASA EC 81 mg QD and clopidogrel 75 mg QD.  10/18/2021 JAIME Dr Denita Barrera: EF estimated at 45-50%. Normal right ventricular size and function. No evidence of interatrial shunting on bubble study. History of WATCHMAN device (24 mm). No significant color flow jet around the device. No device related thrombus visualized. Moderate MR. Mild AI. Moderate TR. RV-RA gradient is estimated at 82 mmHg. 10/11/2021 per Dr Barney-->Check 12-month JAIME.   If no issues then discontinue clopidogrel and maintain aspirin 81 mg daily indefinitely  3/7/2022 Tikosyn was reduced from 250 mcg BID to 125 mcg BID due to reduced CrCl   NICMP/HFrEF (coronary angiogram without significant CAD in 2010). 11/2013 Lexiscan MPS: Nonischemic EF 40%  2016 EF 38%  2016 Lexiscan MPS: Non ischemic stress. EF 35-40  3/2019 EF 25-30%  5/2019 TTE: EF 25-30%  7/2019 TTE: EF 35 to 40% with moderate MR moderate PH  10/16/2020 Limited TTE Dr Loredo Peaks: EF 50%. Resolved pericardial effusion adjacent to LV. Very trivial effusion adjacent to the RV mostly in systole. Overall the pericardial effusion is decreased compared to 10/14/2020 study. NYHA II, ACC/AHA Stage C   PHTN by [facility] in 2010 followed@  they recommended long acting nitrates but patient was unable to tolerate. cLBBB  HTN  Microscopic colitis  Numerous medication allergies including ACE inhibitors and ARB's  Hypercholesterolemia  Anxiety  Lifelong non smoker  Lumbar radiculopathy  Vertebrobasilar syndrome  Hx TIA's  Meige syndrome  Migraines  IBS  Hx bladder repair. Oophorectomy, and hysterectomy  9/10/2021 Thyroid biopsy negative for malignant cells-->compatible with colloid nodule/nodular goiter  9/13/2021 CT Abdomen/Pelvis: with a mass exophytic from lower pole of left kidney measures 3.7 x 3.1 x 3.2 cm. This abuts the left psoas muscle without definite invasion of left psoas muscle. 10/5/2021  Flexible Fiberoptic Bronchoscopy. EBUS Bronchoscopy (R hilar lymphadenopathy)-->Negative for malignant cells. Benign and degenerated bronchial cells, scant lymphocytes and blood present  Renal cell carcinoma L kidney  11/16/2021 Robot assisted laparoscopic complex L partial nephrectomy--> Clear-cell renal cell carcinoma   2/2022 CT Abdomen/Pelvis: Surgical removal of the lower pole of the left kidney. There is no evidence of local recurrence or metastatic disease at this time.   4/13/2022 CT chest:stable exam  8/19/2022 CT abdomen/pelvis: No evidence of local recurrence or abdominal metastasis. [facility] x 4 last one 11/8/2022 11/8/2022 flu shot      Home Medications   Outpatient Medications Marked as Taking for the 12/6/22 encounter (Office Visit) with LEONOR Randolph   Medication Sig Dispense Refill    cetirizine (ZYRTEC ALLERGY) 10 MG tablet Take 0.5 tablets by mouth daily 30 tablet 0    fluticasone (FLONASE) 50 MCG/ACT nasal spray 1-2 sprays, each nostril daily as needed for nasal congestion.  16 g 0    furosemide (LASIX) 20 MG tablet Take 1 tablet by mouth daily 90 tablet 3    metoprolol succinate (TOPROL XL) 25 MG extended release tablet Take 1 tablet by mouth daily 90 tablet 2    dofetilide (TIKOSYN) 125 MCG capsule Take 1 capsule by mouth 2 times daily 180 capsule 1    aspirin 81 MG EC tablet Take 81 mg by mouth daily      hydrOXYzine (ATARAX) 10 MG tablet Take 1 tablet by mouth every 8 hours as needed for Itching or Anxiety 90 tablet 1    baclofen (LIORESAL) 10 MG tablet Take 1 tablet by mouth nightly 90 tablet 1    spironolactone (ALDACTONE) 25 MG tablet Take 1 tablet by mouth daily 90 tablet 3    hydrALAZINE (APRESOLINE) 10 MG tablet Take 1 tablet by mouth 3 times daily 270 tablet 3    famotidine (PEPCID) 40 MG tablet Take 40 mg by mouth 2 times daily as needed       omeprazole (PRILOSEC) 40 MG delayed release capsule Take 40 mg by mouth daily       triamcinolone (KENALOG) 0.1 % cream Apply topically 3 times daily as needed (rash)   1    loperamide (IMODIUM) 2 MG capsule Take 2 mg by mouth 4 times daily as needed for Diarrhea      diphenhydrAMINE (BENADRYL) 25 MG tablet Take 25 mg by mouth every 6 hours as needed for Itching      vitamin D (CHOLECALCIFEROL) 1000 UNIT TABS tablet Take 1,000 Units by mouth daily           Allergies:  Bentyl [dicyclomine], Adhesive tape, Atacand [candesartan], Cefdinir, Demerol, Digoxin, Imdur [isosorbide mononitrate], Losartan potassium, Shellfish-derived products, Sulfa antibiotics, Xarelto [rivaroxaban], Acetaminophen, Apap-caff-dihydrocodeine, Coreg [carvedilol], Cozaar [losartan], Diovan [valsartan], Effexor [venlafaxine hydrochloride], Eliquis [apixaban], Labetalol, Lisinopril, and Ramipril    Social History:    Lifelong non smoker  ETOH rarely  Denies Illicit Drugs  Activity: Live alone in ranch with basement. Able to climb steps slowly when walking up. + Drives. Able to walk up and down the aisles in grocery store with no CP or SOB. No assistive devices. Code Status: full Code      REVIEW OF SYSTEMS:     Constitutional: Denies fatigue, fevers, chills or night sweats  Eyes: Denies visual changes or drainage  ENT: + sinus issues/drainage. Denies headaches or hearing loss. No mouth sores or sore throat. No epistaxis   Cardiovascular: Denies chest pain, pressure or palpitations. No lower extremity swelling. Respiratory: Denies CAIN. +cough due to sinus drainage. Denies orthopnea or PND. No hemoptysis   Gastrointestinal: Denies hematemesis or anorexia. No hematochezia or melena    Genitourinary: Denies urgency, dysuria or hematuria. Musculoskeletal: Denies gait disturbance, weakness or joint complaints  Integumentary: Denies rash, hives or pruritis   Neurological: Denies dizziness, headaches or seizures. No numbness or tingling  Psychiatric: Denies anxiety or depression. Endocrine: Denies temperature intolerance. No recent weight change. .  Hematologic/Lymphatic: Denies abnormal bruising or bleeding. No swollen lymph nodes    PHYSICAL EXAM:   Wt Readings from Last 3 Encounters:   12/06/22 174 lb (78.9 kg)   10/19/22 171 lb (77.6 kg)   09/07/22 171 lb (77.6 kg)     /62   Pulse 78   Resp 18   Ht 5' 7\" (1.702 m)   Wt 174 lb (78.9 kg)   BMI 27.25 kg/m²   CONST:  Well developed, well nourished elderly  female who appears of stated age. Awake, alert and cooperative. No apparent distress.    HEENT:   Head- Normocephalic, atraumatic   Eyes- Conjunctivae pink, anicteric  Throat- Oral mucosa pink and moist  Neck-  No stridor, trachea midline, no jugular venous distention. No carotid bruit. CHEST: Chest symmetrical and non-tender to palpation. No accessory muscle use or intercostal retractions  RESPIRATORY: Lung sounds - clear throughout fields   CARDIOVASCULAR:     Heart Ausculation- Regular rate and rhythm, no murmur. No s3, s4 or rub   PV: Thick shins. No lower extremity edema. No varicosities. Pedal pulses palpable, no clubbing or cyanosis   ABDOMEN: Soft, non-tender to light palpation. Bowel sounds present. No palpable masses; no abdominal bruit  MS: Good muscle strength and tone. No atrophy or abnormal movements. : Deferred  SKIN: Warm and dry no statis dermatitis or ulcers   NEURO / PSYCH: Oriented to person, place and time. Speech clear and appropriate. Follows all commands. Pleasant affect     DATA:      EKG: SR rate 78 PVC's. LBBB        Diagnostic:  Labs:   TFT:   Lab Results   Component Value Date    TSH 0.807 02/01/2022    T4FREE 1.42 12/30/2014      HgA1c:   Lab Results   Component Value Date    LABA1C 5.7 (H) 02/01/2022     proBNP:   Lab Results   Component Value Date/Time    PROBNP 5,512 03/09/2019 09:35 AM    PROBNP 2,907 11/20/2018 08:35 PM    PROBNP 5,797 12/16/2016 11:45 AM     FASTING LIPID PANEL:  Lab Results   Component Value Date/Time    CHOL 206 02/01/2022 12:00 PM    HDL 56 02/01/2022 12:00 PM    LDLCALC 116 02/01/2022 12:00 PM    TRIG 170 02/01/2022 12:00 PM       ASSESSMENT:  PAF maintaining SR on Tikosyn/Toprol XL  S/p Watchman 10/14/2020  Hx CHERYL thrombus in 2018 & 2019 and device related thrombus 4/13/2021 which resolved on JAIME 7/2021  NICMP  Chronic HFiEF appears euvolemic  VHD mild MR, mild to moderate TR.  Mild AI  PHTN with inability to tolerate long acting nitrates  HTN: controlled  Renal cell carcinoma s/p partial L nephrectomy in 2021  Anxiety  Intolerant to multiple cardiac medications including OAC  Lumbar radiculopathy  \"Sinus\" drainage resulting in cough    PLAN:  Schedule for JAIME (assessment of Watchman) as per Dr Les Canas recommendation. Valve clinic will address with patient (message sent to [facility])  Continue home cardiac medications  Follow-up with Dr Tiff Allison in 6 months, sooner if needed        Electronically signed by Melisa Holly.  LEONOR Mota on 12/6/2022 at 2:18 PM

## 2022-12-06 ENCOUNTER — OFFICE VISIT (OUTPATIENT)
Dept: CARDIOLOGY CLINIC | Age: 77
End: 2022-12-06
Payer: MEDICARE

## 2022-12-06 VITALS
BODY MASS INDEX: 27.31 KG/M2 | WEIGHT: 174 LBS | HEIGHT: 67 IN | SYSTOLIC BLOOD PRESSURE: 116 MMHG | HEART RATE: 78 BPM | DIASTOLIC BLOOD PRESSURE: 62 MMHG | RESPIRATION RATE: 18 BRPM

## 2022-12-06 DIAGNOSIS — I42.8 NONISCHEMIC CARDIOMYOPATHY (HCC): Primary | ICD-10-CM

## 2022-12-06 PROCEDURE — G8417 CALC BMI ABV UP PARAM F/U: HCPCS | Performed by: NURSE PRACTITIONER

## 2022-12-06 PROCEDURE — 1090F PRES/ABSN URINE INCON ASSESS: CPT | Performed by: NURSE PRACTITIONER

## 2022-12-06 PROCEDURE — 1036F TOBACCO NON-USER: CPT | Performed by: NURSE PRACTITIONER

## 2022-12-06 PROCEDURE — 99213 OFFICE O/P EST LOW 20 MIN: CPT | Performed by: NURSE PRACTITIONER

## 2022-12-06 PROCEDURE — G8399 PT W/DXA RESULTS DOCUMENT: HCPCS | Performed by: NURSE PRACTITIONER

## 2022-12-06 PROCEDURE — 1123F ACP DISCUSS/DSCN MKR DOCD: CPT | Performed by: NURSE PRACTITIONER

## 2022-12-06 PROCEDURE — 3074F SYST BP LT 130 MM HG: CPT | Performed by: NURSE PRACTITIONER

## 2022-12-06 PROCEDURE — G8484 FLU IMMUNIZE NO ADMIN: HCPCS | Performed by: NURSE PRACTITIONER

## 2022-12-06 PROCEDURE — 93000 ELECTROCARDIOGRAM COMPLETE: CPT | Performed by: INTERNAL MEDICINE

## 2022-12-06 PROCEDURE — 3078F DIAST BP <80 MM HG: CPT | Performed by: NURSE PRACTITIONER

## 2022-12-06 PROCEDURE — G8427 DOCREV CUR MEDS BY ELIG CLIN: HCPCS | Performed by: NURSE PRACTITIONER

## 2022-12-06 NOTE — PATIENT INSTRUCTIONS
Schedule for JAIME (assessment of Watchman) Valve clinic will address with patient  Continue home cardiac medications  Follow-up with Dr Gemini Cordero in 6 months, sooner if needed

## 2022-12-07 ENCOUNTER — TELEPHONE (OUTPATIENT)
Dept: ADMINISTRATIVE | Age: 77
End: 2022-12-07

## 2022-12-07 ENCOUNTER — HOSPITAL ENCOUNTER (OUTPATIENT)
Dept: CT IMAGING | Age: 77
Discharge: HOME | End: 2022-12-09
Payer: MEDICARE

## 2022-12-07 DIAGNOSIS — Z95.818 PRESENCE OF WATCHMAN LEFT ATRIAL APPENDAGE CLOSURE DEVICE: ICD-10-CM

## 2022-12-07 DIAGNOSIS — R59.0 MEDIASTINAL LYMPHADENOPATHY: ICD-10-CM

## 2022-12-07 DIAGNOSIS — I48.0 PAF (PAROXYSMAL ATRIAL FIBRILLATION) (HCC): Primary | ICD-10-CM

## 2022-12-07 DIAGNOSIS — R91.8 LUNG NODULES: ICD-10-CM

## 2022-12-07 PROCEDURE — 71250 CT THORAX DX C-: CPT

## 2022-12-07 NOTE — TELEPHONE ENCOUNTER
I called Anthony Smallwood to let her know that after review of Dr. Diane Gonzalez office note from 10/11/21 that says   \"We will continue with annual JAIME afterwards\", she needs a 1 year fu Watchman JAIME. She said she wants to have it in January 2023 after the holidays.

## 2022-12-16 ENCOUNTER — APPOINTMENT (OUTPATIENT)
Dept: CT IMAGING | Age: 77
End: 2022-12-16
Payer: MEDICARE

## 2022-12-16 ENCOUNTER — HOSPITAL ENCOUNTER (INPATIENT)
Age: 77
LOS: 5 days | Discharge: HOME | End: 2022-12-22
Attending: EMERGENCY MEDICINE | Admitting: FAMILY MEDICINE
Payer: MEDICARE

## 2022-12-16 ENCOUNTER — APPOINTMENT (OUTPATIENT)
Dept: GENERAL RADIOLOGY | Age: 77
End: 2022-12-16
Payer: MEDICARE

## 2022-12-16 DIAGNOSIS — M54.12 CERVICAL RADICULOPATHY: ICD-10-CM

## 2022-12-16 DIAGNOSIS — I50.9 ACUTE ON CHRONIC CONGESTIVE HEART FAILURE, UNSPECIFIED HEART FAILURE TYPE (HCC): ICD-10-CM

## 2022-12-16 DIAGNOSIS — I50.33 ACUTE ON CHRONIC DIASTOLIC CONGESTIVE HEART FAILURE (HCC): Primary | ICD-10-CM

## 2022-12-16 DIAGNOSIS — I26.94 MULTIPLE SUBSEGMENTAL PULMONARY EMBOLI WITHOUT ACUTE COR PULMONALE (HCC): ICD-10-CM

## 2022-12-16 LAB
ALBUMIN SERPL-MCNC: 3.9 G/DL (ref 3.5–5.2)
ALP BLD-CCNC: 120 U/L (ref 35–104)
ALT SERPL-CCNC: 26 U/L (ref 0–32)
ANION GAP SERPL CALCULATED.3IONS-SCNC: 14 MMOL/L (ref 7–16)
APTT: 30.2 SEC (ref 24.5–35.1)
AST SERPL-CCNC: 28 U/L (ref 0–31)
BACTERIA: ABNORMAL /HPF
BASOPHILS ABSOLUTE: 0.06 E9/L (ref 0–0.2)
BASOPHILS RELATIVE PERCENT: 0.9 % (ref 0–2)
BILIRUB SERPL-MCNC: 1.1 MG/DL (ref 0–1.2)
BILIRUBIN URINE: NEGATIVE
BLOOD, URINE: NEGATIVE
BUN BLDV-MCNC: 19 MG/DL (ref 6–23)
CALCIUM SERPL-MCNC: 9.6 MG/DL (ref 8.6–10.2)
CHLORIDE BLD-SCNC: 106 MMOL/L (ref 98–107)
CLARITY: CLEAR
CO2: 21 MMOL/L (ref 22–29)
COLOR: YELLOW
CREAT SERPL-MCNC: 1.2 MG/DL (ref 0.5–1)
EKG ATRIAL RATE: 72 BPM
EKG P AXIS: 70 DEGREES
EKG P-R INTERVAL: 202 MS
EKG Q-T INTERVAL: 466 MS
EKG QRS DURATION: 158 MS
EKG QTC CALCULATION (BAZETT): 510 MS
EKG R AXIS: -76 DEGREES
EKG T AXIS: 99 DEGREES
EKG VENTRICULAR RATE: 72 BPM
EOSINOPHILS ABSOLUTE: 0.09 E9/L (ref 0.05–0.5)
EOSINOPHILS RELATIVE PERCENT: 1.3 % (ref 0–6)
GFR SERPL CREATININE-BSD FRML MDRD: 46 ML/MIN/1.73
GLUCOSE BLD-MCNC: 112 MG/DL (ref 74–99)
GLUCOSE URINE: NEGATIVE MG/DL
HCT VFR BLD CALC: 45.7 % (ref 34–48)
HEMOGLOBIN: 14.6 G/DL (ref 11.5–15.5)
IMMATURE GRANULOCYTES #: 0.02 E9/L
IMMATURE GRANULOCYTES %: 0.3 % (ref 0–5)
INR BLD: 1.1
KETONES, URINE: NEGATIVE MG/DL
LEUKOCYTE ESTERASE, URINE: ABNORMAL
LIPASE: 25 U/L (ref 13–60)
LYMPHOCYTES ABSOLUTE: 1.03 E9/L (ref 1.5–4)
LYMPHOCYTES RELATIVE PERCENT: 15.1 % (ref 20–42)
MAGNESIUM: 2.1 MG/DL (ref 1.6–2.6)
MCH RBC QN AUTO: 28.2 PG (ref 26–35)
MCHC RBC AUTO-ENTMCNC: 31.9 % (ref 32–34.5)
MCV RBC AUTO: 88.4 FL (ref 80–99.9)
MONOCYTES ABSOLUTE: 0.38 E9/L (ref 0.1–0.95)
MONOCYTES RELATIVE PERCENT: 5.6 % (ref 2–12)
NEUTROPHILS ABSOLUTE: 5.24 E9/L (ref 1.8–7.3)
NEUTROPHILS RELATIVE PERCENT: 76.8 % (ref 43–80)
NITRITE, URINE: NEGATIVE
PDW BLD-RTO: 15.7 FL (ref 11.5–15)
PH UA: 6 (ref 5–9)
PLATELET # BLD: 235 E9/L (ref 130–450)
PMV BLD AUTO: 11.1 FL (ref 7–12)
POTASSIUM SERPL-SCNC: 3.9 MMOL/L (ref 3.5–5)
PRO-BNP: 7473 PG/ML (ref 0–450)
PROTEIN UA: NEGATIVE MG/DL
PROTHROMBIN TIME: 12.2 SEC (ref 9.3–12.4)
RBC # BLD: 5.17 E12/L (ref 3.5–5.5)
RBC UA: ABNORMAL /HPF (ref 0–2)
SODIUM BLD-SCNC: 141 MMOL/L (ref 132–146)
SPECIFIC GRAVITY UA: 1.01 (ref 1–1.03)
TOTAL PROTEIN: 7.3 G/DL (ref 6.4–8.3)
TROPONIN, HIGH SENSITIVITY: 38 NG/L (ref 0–9)
TROPONIN, HIGH SENSITIVITY: 39 NG/L (ref 0–9)
UROBILINOGEN, URINE: 0.2 E.U./DL
WBC # BLD: 6.8 E9/L (ref 4.5–11.5)
WBC UA: ABNORMAL /HPF (ref 0–5)

## 2022-12-16 PROCEDURE — 71046 X-RAY EXAM CHEST 2 VIEWS: CPT

## 2022-12-16 PROCEDURE — 85610 PROTHROMBIN TIME: CPT

## 2022-12-16 PROCEDURE — 99285 EMERGENCY DEPT VISIT HI MDM: CPT

## 2022-12-16 PROCEDURE — 83690 ASSAY OF LIPASE: CPT

## 2022-12-16 PROCEDURE — 73030 X-RAY EXAM OF SHOULDER: CPT

## 2022-12-16 PROCEDURE — 81001 URINALYSIS AUTO W/SCOPE: CPT

## 2022-12-16 PROCEDURE — 72125 CT NECK SPINE W/O DYE: CPT

## 2022-12-16 PROCEDURE — 83880 ASSAY OF NATRIURETIC PEPTIDE: CPT

## 2022-12-16 PROCEDURE — 80053 COMPREHEN METABOLIC PANEL: CPT

## 2022-12-16 PROCEDURE — 83735 ASSAY OF MAGNESIUM: CPT

## 2022-12-16 PROCEDURE — 93005 ELECTROCARDIOGRAM TRACING: CPT | Performed by: PHYSICIAN ASSISTANT

## 2022-12-16 PROCEDURE — 85730 THROMBOPLASTIN TIME PARTIAL: CPT

## 2022-12-16 PROCEDURE — 93010 ELECTROCARDIOGRAM REPORT: CPT | Performed by: INTERNAL MEDICINE

## 2022-12-16 PROCEDURE — 84484 ASSAY OF TROPONIN QUANT: CPT

## 2022-12-16 PROCEDURE — 85025 COMPLETE CBC W/AUTO DIFF WBC: CPT

## 2022-12-16 PROCEDURE — 96374 THER/PROPH/DIAG INJ IV PUSH: CPT

## 2022-12-16 ASSESSMENT — PAIN - FUNCTIONAL ASSESSMENT: PAIN_FUNCTIONAL_ASSESSMENT: NONE - DENIES PAIN

## 2022-12-16 ASSESSMENT — LIFESTYLE VARIABLES
HOW OFTEN DO YOU HAVE A DRINK CONTAINING ALCOHOL: NEVER
HOW MANY STANDARD DRINKS CONTAINING ALCOHOL DO YOU HAVE ON A TYPICAL DAY: PATIENT DOES NOT DRINK

## 2022-12-16 NOTE — ED NOTES
Department of Emergency Medicine  FIRST PROVIDER TRIAGE NOTE             Independent MLP           12/16/22  12:06 PM EST    Date of Encounter: 12/16/22   MRN: [id number]      HPI: Jaylon Gomes is a 68 y.o. female who presents to the ED for Neck Pain, Shortness of Breath, and Back Pain (Patient has multiple complaints for 11 days. Neck spasms, SOB, foot swelling, toe cramping, shoulder pain. Went to urgent care Sunday and given muscle relaxers but not feeling better.)     Multiple complaints. ROS: Negative for fever or rash. PE: Gen Appearance/Constitutional: alert     Initial Plan of Care: All treatment areas with department are currently occupied. Plan to order/Initiate the following while awaiting opening in ED: labs, EKG, and imaging studies.   Initiate Treatment-Testing, Proceed toTreatment Area When Bed Available for ED Attending/MLP to Continue Care    Electronically signed by Shirley Batista PA-C   DD: 12/16/22       Shirley Batista PA-C  12/16/22 4109

## 2022-12-17 ENCOUNTER — APPOINTMENT (OUTPATIENT)
Dept: CT IMAGING | Age: 77
End: 2022-12-17
Payer: MEDICARE

## 2022-12-17 PROBLEM — I26.99 PULMONARY EMBOLISM WITHOUT ACUTE COR PULMONALE, UNSPECIFIED CHRONICITY, UNSPECIFIED PULMONARY EMBOLISM TYPE (HCC): Status: ACTIVE | Noted: 2022-12-17

## 2022-12-17 PROBLEM — I26.94 MULTIPLE SUBSEGMENTAL PULMONARY EMBOLI WITHOUT ACUTE COR PULMONALE (HCC): Status: ACTIVE | Noted: 2022-12-17

## 2022-12-17 PROBLEM — I50.9 ACUTE ON CHRONIC CONGESTIVE HEART FAILURE (HCC): Status: ACTIVE | Noted: 2022-12-17

## 2022-12-17 LAB
ANION GAP SERPL CALCULATED.3IONS-SCNC: 14 MMOL/L (ref 7–16)
BUN BLDV-MCNC: 19 MG/DL (ref 6–23)
CALCIUM SERPL-MCNC: 9.3 MG/DL (ref 8.6–10.2)
CHLORIDE BLD-SCNC: 105 MMOL/L (ref 98–107)
CO2: 21 MMOL/L (ref 22–29)
CREAT SERPL-MCNC: 1.3 MG/DL (ref 0.5–1)
GFR SERPL CREATININE-BSD FRML MDRD: 42 ML/MIN/1.73
GLUCOSE BLD-MCNC: 119 MG/DL (ref 74–99)
INFLUENZA A: NOT DETECTED
INFLUENZA B: NOT DETECTED
MAGNESIUM: 2.2 MG/DL (ref 1.6–2.6)
POTASSIUM SERPL-SCNC: 3.8 MMOL/L (ref 3.5–5)
SARS-COV-2 RNA, RT PCR: NOT DETECTED
SODIUM BLD-SCNC: 140 MMOL/L (ref 132–146)

## 2022-12-17 PROCEDURE — 2580000003 HC RX 258: Performed by: FAMILY MEDICINE

## 2022-12-17 PROCEDURE — 6370000000 HC RX 637 (ALT 250 FOR IP): Performed by: FAMILY MEDICINE

## 2022-12-17 PROCEDURE — 93306 TTE W/DOPPLER COMPLETE: CPT

## 2022-12-17 PROCEDURE — 6360000002 HC RX W HCPCS: Performed by: STUDENT IN AN ORGANIZED HEALTH CARE EDUCATION/TRAINING PROGRAM

## 2022-12-17 PROCEDURE — 6360000002 HC RX W HCPCS: Performed by: FAMILY MEDICINE

## 2022-12-17 PROCEDURE — 6360000004 HC RX CONTRAST MEDICATION: Performed by: RADIOLOGY

## 2022-12-17 PROCEDURE — 80048 BASIC METABOLIC PNL TOTAL CA: CPT

## 2022-12-17 PROCEDURE — 1200000000 HC SEMI PRIVATE

## 2022-12-17 PROCEDURE — 83735 ASSAY OF MAGNESIUM: CPT

## 2022-12-17 PROCEDURE — 71275 CT ANGIOGRAPHY CHEST: CPT

## 2022-12-17 PROCEDURE — 87636 SARSCOV2 & INF A&B AMP PRB: CPT

## 2022-12-17 PROCEDURE — 99223 1ST HOSP IP/OBS HIGH 75: CPT | Performed by: INTERNAL MEDICINE

## 2022-12-17 PROCEDURE — 6370000000 HC RX 637 (ALT 250 FOR IP): Performed by: EMERGENCY MEDICINE

## 2022-12-17 PROCEDURE — 6370000000 HC RX 637 (ALT 250 FOR IP): Performed by: STUDENT IN AN ORGANIZED HEALTH CARE EDUCATION/TRAINING PROGRAM

## 2022-12-17 PROCEDURE — APPSS60 APP SPLIT SHARED TIME 46-60 MINUTES

## 2022-12-17 RX ORDER — SODIUM CHLORIDE 0.9 % (FLUSH) 0.9 %
5-40 SYRINGE (ML) INJECTION EVERY 12 HOURS SCHEDULED
Status: DISCONTINUED | OUTPATIENT
Start: 2022-12-17 | End: 2022-12-22 | Stop reason: HOSPADM

## 2022-12-17 RX ORDER — SODIUM CHLORIDE 0.9 % (FLUSH) 0.9 %
5-40 SYRINGE (ML) INJECTION PRN
Status: DISCONTINUED | OUTPATIENT
Start: 2022-12-17 | End: 2022-12-22 | Stop reason: HOSPADM

## 2022-12-17 RX ORDER — VITAMIN B COMPLEX
1000 TABLET ORAL DAILY
Status: DISCONTINUED | OUTPATIENT
Start: 2022-12-17 | End: 2022-12-22 | Stop reason: HOSPADM

## 2022-12-17 RX ORDER — DOFETILIDE 0.12 MG/1
125 CAPSULE ORAL 2 TIMES DAILY
Status: DISCONTINUED | OUTPATIENT
Start: 2022-12-17 | End: 2022-12-19

## 2022-12-17 RX ORDER — PANTOPRAZOLE SODIUM 40 MG/1
40 TABLET, DELAYED RELEASE ORAL
Status: DISCONTINUED | OUTPATIENT
Start: 2022-12-17 | End: 2022-12-22 | Stop reason: HOSPADM

## 2022-12-17 RX ORDER — ENOXAPARIN SODIUM 100 MG/ML
1 INJECTION SUBCUTANEOUS ONCE
Status: COMPLETED | OUTPATIENT
Start: 2022-12-17 | End: 2022-12-17

## 2022-12-17 RX ORDER — BACLOFEN 10 MG/1
10 TABLET ORAL NIGHTLY
Status: DISCONTINUED | OUTPATIENT
Start: 2022-12-17 | End: 2022-12-22 | Stop reason: HOSPADM

## 2022-12-17 RX ORDER — METOPROLOL SUCCINATE 25 MG/1
25 TABLET, EXTENDED RELEASE ORAL DAILY
Status: DISCONTINUED | OUTPATIENT
Start: 2022-12-17 | End: 2022-12-22 | Stop reason: HOSPADM

## 2022-12-17 RX ORDER — SPIRONOLACTONE 25 MG/1
25 TABLET ORAL DAILY
Status: DISCONTINUED | OUTPATIENT
Start: 2022-12-17 | End: 2022-12-22 | Stop reason: HOSPADM

## 2022-12-17 RX ORDER — POTASSIUM CHLORIDE 20 MEQ/1
40 TABLET, EXTENDED RELEASE ORAL ONCE
Status: COMPLETED | OUTPATIENT
Start: 2022-12-17 | End: 2022-12-17

## 2022-12-17 RX ORDER — FAMOTIDINE 20 MG/1
40 TABLET, FILM COATED ORAL DAILY
Status: DISCONTINUED | OUTPATIENT
Start: 2022-12-17 | End: 2022-12-19

## 2022-12-17 RX ORDER — CETIRIZINE HYDROCHLORIDE 5 MG/1
5 TABLET ORAL DAILY
Status: DISCONTINUED | OUTPATIENT
Start: 2022-12-17 | End: 2022-12-22 | Stop reason: HOSPADM

## 2022-12-17 RX ORDER — HYDRALAZINE HYDROCHLORIDE 10 MG/1
10 TABLET, FILM COATED ORAL 3 TIMES DAILY
Status: DISCONTINUED | OUTPATIENT
Start: 2022-12-17 | End: 2022-12-22 | Stop reason: HOSPADM

## 2022-12-17 RX ORDER — FUROSEMIDE 10 MG/ML
40 INJECTION INTRAMUSCULAR; INTRAVENOUS ONCE
Status: COMPLETED | OUTPATIENT
Start: 2022-12-17 | End: 2022-12-17

## 2022-12-17 RX ORDER — LIDOCAINE 4 G/G
1 PATCH TOPICAL DAILY
Status: DISCONTINUED | OUTPATIENT
Start: 2022-12-17 | End: 2022-12-22 | Stop reason: HOSPADM

## 2022-12-17 RX ORDER — ASPIRIN 81 MG/1
81 TABLET ORAL DAILY
Status: DISCONTINUED | OUTPATIENT
Start: 2022-12-17 | End: 2022-12-22 | Stop reason: HOSPADM

## 2022-12-17 RX ORDER — POLYETHYLENE GLYCOL 3350 17 G/17G
17 POWDER, FOR SOLUTION ORAL DAILY PRN
Status: DISCONTINUED | OUTPATIENT
Start: 2022-12-17 | End: 2022-12-22 | Stop reason: HOSPADM

## 2022-12-17 RX ORDER — SODIUM CHLORIDE 9 MG/ML
INJECTION, SOLUTION INTRAVENOUS PRN
Status: DISCONTINUED | OUTPATIENT
Start: 2022-12-17 | End: 2022-12-22 | Stop reason: HOSPADM

## 2022-12-17 RX ORDER — FUROSEMIDE 10 MG/ML
40 INJECTION INTRAMUSCULAR; INTRAVENOUS 2 TIMES DAILY
Status: DISCONTINUED | OUTPATIENT
Start: 2022-12-17 | End: 2022-12-20

## 2022-12-17 RX ORDER — ENOXAPARIN SODIUM 100 MG/ML
1 INJECTION SUBCUTANEOUS 2 TIMES DAILY
Status: DISCONTINUED | OUTPATIENT
Start: 2022-12-17 | End: 2022-12-19

## 2022-12-17 RX ADMIN — HYDRALAZINE HYDROCHLORIDE 10 MG: 10 TABLET, FILM COATED ORAL at 20:56

## 2022-12-17 RX ADMIN — ENOXAPARIN SODIUM 80 MG: 100 INJECTION SUBCUTANEOUS at 04:28

## 2022-12-17 RX ADMIN — Medication 1000 UNITS: at 09:07

## 2022-12-17 RX ADMIN — ASPIRIN 81 MG: 81 TABLET, COATED ORAL at 09:08

## 2022-12-17 RX ADMIN — CETIRIZINE HYDROCHLORIDE 5 MG: 5 TABLET ORAL at 09:13

## 2022-12-17 RX ADMIN — FUROSEMIDE 40 MG: 10 INJECTION, SOLUTION INTRAMUSCULAR; INTRAVENOUS at 09:08

## 2022-12-17 RX ADMIN — HYDRALAZINE HYDROCHLORIDE 10 MG: 10 TABLET, FILM COATED ORAL at 09:07

## 2022-12-17 RX ADMIN — IOPAMIDOL 75 ML: 755 INJECTION, SOLUTION INTRAVENOUS at 02:22

## 2022-12-17 RX ADMIN — POTASSIUM CHLORIDE 40 MEQ: 1500 TABLET, EXTENDED RELEASE ORAL at 03:08

## 2022-12-17 RX ADMIN — HYDRALAZINE HYDROCHLORIDE 10 MG: 10 TABLET, FILM COATED ORAL at 16:01

## 2022-12-17 RX ADMIN — BACLOFEN 10 MG: 10 TABLET ORAL at 20:56

## 2022-12-17 RX ADMIN — FUROSEMIDE 40 MG: 10 INJECTION, SOLUTION INTRAMUSCULAR; INTRAVENOUS at 17:31

## 2022-12-17 RX ADMIN — PANTOPRAZOLE SODIUM 40 MG: 40 TABLET, DELAYED RELEASE ORAL at 09:08

## 2022-12-17 RX ADMIN — SODIUM CHLORIDE, PRESERVATIVE FREE 10 ML: 5 INJECTION INTRAVENOUS at 20:56

## 2022-12-17 RX ADMIN — FAMOTIDINE 40 MG: 20 TABLET, FILM COATED ORAL at 09:08

## 2022-12-17 RX ADMIN — METOPROLOL SUCCINATE 25 MG: 25 TABLET, EXTENDED RELEASE ORAL at 09:08

## 2022-12-17 RX ADMIN — FUROSEMIDE 40 MG: 10 INJECTION, SOLUTION INTRAMUSCULAR; INTRAVENOUS at 03:07

## 2022-12-17 RX ADMIN — ENOXAPARIN SODIUM 80 MG: 100 INJECTION SUBCUTANEOUS at 22:32

## 2022-12-17 RX ADMIN — SPIRONOLACTONE 25 MG: 25 TABLET ORAL at 09:07

## 2022-12-17 RX ADMIN — SODIUM CHLORIDE, PRESERVATIVE FREE 10 ML: 5 INJECTION INTRAVENOUS at 09:13

## 2022-12-17 ASSESSMENT — PAIN DESCRIPTION - DESCRIPTORS: DESCRIPTORS: ACHING;DISCOMFORT

## 2022-12-17 ASSESSMENT — ENCOUNTER SYMPTOMS
COUGH: 0
SHORTNESS OF BREATH: 1
EYE DISCHARGE: 0
SORE THROAT: 0
SINUS PRESSURE: 0
DIARRHEA: 0
VOMITING: 0
BACK PAIN: 0
WHEEZING: 0
NAUSEA: 0
EYE PAIN: 0
ABDOMINAL PAIN: 0

## 2022-12-17 ASSESSMENT — PAIN DESCRIPTION - ORIENTATION: ORIENTATION: LOWER

## 2022-12-17 ASSESSMENT — PAIN DESCRIPTION - LOCATION: LOCATION: BACK

## 2022-12-17 ASSESSMENT — PAIN SCALES - GENERAL: PAINLEVEL_OUTOF10: 6

## 2022-12-17 ASSESSMENT — PAIN DESCRIPTION - PAIN TYPE: TYPE: ACUTE PAIN

## 2022-12-17 NOTE — PROGRESS NOTES
Hospitalist Progress Note      Synopsis:   Briefly, patient with PMH significant for A. fib, CHF, hilar adenopathy, blood clots, HPT, left ventricular systolic dysfunction, lesion of left native kidney, lumbar and cervical radiculopathy, lung nodule, colitis, mitral regurgitation, nonischemic cardiomyopathy, renal cell carcinoma of left kidney presented to ED for neck pain, increased lower extremity swelling shortness of breath and concern for issues with her CHF. CT of chest revealed pleural effusions along with pulmonary embolism. Patient was given Lasix and Lovenox and admitted for management of CHF exacerbation and pulmonary embolism. Hospital day 0     Subjective:  Patient seen and examined at bedside with son present in ED patient states she has considerable neck pain that radiates into her arm with intermittent weakness. Patient states she has long history of back pain would like to see neurosurgeryStable overnight. No issues reported. Patient seen and examined  Records reviewed. Temp (24hrs), Av.4 °F (36.3 °C), Min:97.4 °F (36.3 °C), Max:97.4 °F (36.3 °C)    DIET: ADULT DIET; Regular; Low Sodium (2 gm)  CODE: Full Code  No intake or output data in the 24 hours ending 22 0982    Review of Systems: All bolded are positive; please see HPI  General:  Fever, chills, diaphoresis, fatigue, malaise, night sweats, weight loss  Psychological:  Anxiety, disorientation, hallucinations. ENT:  Epistaxis, headaches, vertigo, visual changes. Cardiovascular:  Chest pain, irregular heartbeats, palpitations, paroxysmal nocturnal dyspnea. Respiratory:  Shortness of breath, coughing, sputum production, hemoptysis, wheezing, orthopnea.   Gastrointestinal:  Nausea, vomiting, diarrhea, heartburn, constipation, results, hematemesis, hematochezia, melena, acholic stools  Genito-Urinary:  Dysuria, urgency, frequency, hematuria  Musculoskeletal:  Joint pain, joint stiffness, joint swelling, muscle pain  Neurology:  Headache, focal neurological deficits, weakness, numbness, paresthesia  Derm:  Rashes, ulcers, excoriations, bruising  Extremities:  Decreased ROM, peripheral edema, mottling    Objective:    /71   Pulse 82   Temp 97.4 °F (36.3 °C) (Oral)   Resp 20   Ht 5' 7\" (1.702 m)   Wt 174 lb (78.9 kg)   SpO2 97%   BMI 27.25 kg/m²     General appearance: No apparent distress, appears stated age and cooperative. HEENT: Conjunctivae/corneas clear. Mucous membranes moist.  Neck: Supple. No JVD. Respiratory:  Clear to auscultation bilaterally. Normal respiratory effort. Cardiovascular:  RRR. S1, S2 without MRG. PV: Pulses palpable. No edema. Abdomen: Soft, non-tender, non-distended. +BS  Musculoskeletal: No obvious deformities. Skin: Normal skin color. No rashes or lesions. Good turgor. Neurologic:  Grossly non-focal. Awake, alert, following commands.    Psychiatric: Alert and oriented, thought content appropriate, normal insight and judgement    Medications:  REVIEWED DAILY    Infusion Medications    sodium chloride       Scheduled Medications    aspirin  81 mg Oral Daily    baclofen  10 mg Oral Nightly    cetirizine  5 mg Oral Daily    dofetilide  125 mcg Oral BID    famotidine  40 mg Oral Daily    hydrALAZINE  10 mg Oral TID    metoprolol succinate  25 mg Oral Daily    pantoprazole  40 mg Oral QAM AC    spironolactone  25 mg Oral Daily    Vitamin D  1,000 Units Oral Daily    sodium chloride flush  5-40 mL IntraVENous 2 times per day    enoxaparin  1 mg/kg SubCUTAneous BID    furosemide  40 mg IntraVENous BID    lidocaine  1 patch TransDERmal Daily     PRN Meds: sodium chloride flush, sodium chloride, polyethylene glycol    Labs:     Recent Labs     12/16/22  1225   WBC 6.8   HGB 14.6   HCT 45.7          Recent Labs     12/16/22  1225 12/17/22  0524    140   K 3.9 3.8    105   CO2 21* 21*   BUN 19 19   CREATININE 1.2* 1.3*   CALCIUM 9.6 9.3       Recent Labs 12/16/22  1225   PROT 7.3   ALKPHOS 120*   ALT 26   AST 28   BILITOT 1.1   LIPASE 25       Recent Labs     12/16/22  1225   INR 1.1       No results for input(s): Jeet Alfaro in the last 72 hours. Chronic labs:    Lab Results   Component Value Date    CHOL 206 (H) 02/01/2022    TRIG 170 (H) 02/01/2022    HDL 56 02/01/2022    LDLCALC 116 (H) 02/01/2022    TSH 0.807 02/01/2022    INR 1.1 12/16/2022    LABA1C 5.7 (H) 02/01/2022       Radiology: REVIEWED DAILY    Assessment:  Shortness of breath/chest pain likely secondary to pulmonary emboli and congestive heart failure  Acute on chronic congestive heart failure JAIME completed on 10/18/2021 showed EF 45 to 50%, moderate mitral regurgitation, mild aortic regurgitation, moderate tricuspid regurgitation  Elevated troponin 39 on presentation, repeat 38  proBNP elevated at 7473  History of watchman device  Pulmonary embolism  Chest pain  Hypertension  GERD  History of anxiety depression  Cervical neck pain  Plan:  Cardiology consulted  Echo ordered  Lovenox 1 mg/kg twice daily  Lasix 40 mg IV twice daily  Daily weights and strict intake and output  MRI ordered, neurosurgery consulted  Continue home medications Tikosyn, Lasix, hydralazine, Toprol, Aldactone, Pepcid, Protonix, aspirin baclofen, Zyrtec      DVT Prophylaxis [x] Lovenox  []  Heparin [] DOAC [] PCDs [] Ambulation    GI Prophylaxis [] PPI  [] H2 Blocker   [] Carafate  [x] Diet/Tube Feeds   Level of care [] Med/Surg  [x] Intermediate  []  ICU   Diet ADULT DIET; Regular; Low Sodium (2 gm)    Family contact [x]  N/A    [] At bedside  [] Phone call   Disposition Patient requires continued admission further evaluation and treatment of PE and congestive heart failure   MDM [] Low    [x] Moderate  []   High       Discharge Plan:  To home when clinically stable    +++++++++++++++++++++++++++++++++++++++++++++++++  UYEN Shaw Physician Xochitl Cifuentes [id number] [place] [address] [place], [place]  +++++++++++++++++++++++++++++++++++++++++++++++++  NOTE: This report was transcribed using voice recognition software. Every effort was made to ensure accuracy; however, inadvertent computerized transcription errors may be present.

## 2022-12-17 NOTE — PROGRESS NOTES
Dr. Ivan Viera,    Your patient is on a medication that requires a renal and/or weight dose adjustment. Renal/Body Weight Function Assessment:    Date Body Weight IBW  Adjusted BW SCr  CrCl Dialysis status   12/17/2022 174 lb (78.9 kg) Ideal body weight: 61.6 kg (135 lb 12.9 oz)  Adjusted ideal body weight: 68.5 kg (151 lb 1.3 oz) Serum creatinine: 1.3 mg/dL (H) 12/17/22 0524  Estimated creatinine clearance: 39 mL/min (A) N/a       Pharmacy has dose-adjusted the following medication(s):    Date Previous Order Adjusted Order   12/17/2022 Pepcid 40 mg bid Pepcid 40 mg qd       These changes were made per protocol according to the [facility] Renal Dosing Policy/ [facility] Pharmacist Anticoagulant Review. *Please note this dose may need readjusted if your patient's condition changes. Please contact pharmacy with any questions regarding these changes.     Zachary Crawford PharmD, BCPS 12/17/2022 8:58 AM

## 2022-12-17 NOTE — H&P
H&P    Name: Miguelito Morales    Age: 68 y.o. Date of Admission: 7/16/2021  6:33 AM    Date of Service: 7/16/2021    CC: Watchman device associated thrombus    Referring Physician: Eimly Luis MD    Primary Cardiologist: Monalisa Butler MD    History of Present Illness:   Miguelito Morales is a 68 y.o. female underwent CHERYL occlusion with Watchman 10/14/21.  6 mo JAIME showed device thrombus. Here for repeat imaging. Review of Systems:   Complete review of systems negative except as described above. Past Medical History:  Past Medical History:   Diagnosis Date    Afib ([address]     history    Anxiety     Arthritis     CHF (congestive heart failure) (HCC)     Chronic sinusitis     Hypertension     Left ventricular systolic dysfunction     Meige syndrome     partial/ PCP    Microscopic colitis     Mitral regurgitation     Mild to moderate    Nonischemic cardiomyopathy (HCC)     f/u with Dr. Abraham Fernandez Osteopenia     Vertebrobasilar artery syndrome     f/u PCP       Past Surgical History:  Past Surgical History:   Procedure Laterality Date    BLADDER REPAIR      BUNIONECTOMY      CARDIOVASCULAR STRESS TEST  11/25/13    Rt & LT cath    CARDIOVERSION  11/04/2019    Successful CV from AF to NSR   (Dr. Alo Harris)    COLONOSCOPY  07/2020    DIAGNOSTIC CARDIAC CATH LAB PROCEDURE  2/20/10    Dr Anand Ingram CATH LAB PROCEDURE  8/24/11    Right heart cath @ [facility].     DOPPLER ECHOCARDIOGRAPHY  11/25/13    HYSTERECTOMY  1991    total    OTHER SURGICAL HISTORY  10/14/2020    Dr. Eliezer Gitelman- 24mm Watchman device    TRANSESOPHAGEAL ECHOCARDIOGRAM  11/21/2018    Dr. Eleni Vázquez TRANSESOPHAGEAL ECHOCARDIOGRAM  03/18/2019    WISDOM TOOTH EXTRACTION         Family History:  Family History   Problem Relation Age of Onset    Heart Attack Mother     Stroke Mother     Heart Attack Father     Heart Failure Father     Heart Disease Father     Anxiety Disorder Sister     Depression Sister  Crohn's Disease Son        Social History:  Social History     Tobacco Use    Smoking status: Never Smoker    Smokeless tobacco: Never Used   Vaping Use    Vaping Use: Never used   Substance Use Topics    Alcohol use: Yes     Alcohol/week: 0.0 standard drinks     Comment: occasional wine/mixed drink.  Drug use: Never       Allergies: Allergies   Allergen Reactions    Atacand [Candesartan] Hives and Itching    Xarelto [Rivaroxaban] Itching and Rash      severe itch, headache, rash    Adhesive Tape Itching     develops rash and itching with EKG electrodes    Demerol      3/9/19 Pt states she gets a severe migraine    Digoxin Itching     developed itching and rash    Imdur [Isosorbide Mononitrate]       migraines    Losartan Potassium Itching    Shellfish-Derived Products Itching     3/9/19 Pt states she had a lobster pizza and had difficulty breathing, started to sweat and was itchy    Sulfa Antibiotics Diarrhea and Other (See Comments)     Also causes migraines  caused migraines and diarrhea    Cefdinir Hives, Itching and Nausea Only    Acetaminophen Hives and Itching    Apap-Caff-Dihydrocodeine Hives and Itching    Coreg [Carvedilol] Itching     Can take extended release Coreg    Cozaar [Losartan] Itching    Diovan [Valsartan] Itching    Effexor [Venlafaxine Hydrochloride] Nausea Only    Eliquis [Apixaban] Hives, Itching and Rash     3/9/19 Pt states that she gets hives, itchy and a rash    Labetalol Itching    Lisinopril Itching    Ramipril Itching       Home Medications:  Prior to Admission medications    Medication Sig Start Date End Date Taking?  Authorizing Provider   dicyclomine (BENTYL) 10 MG capsule  7/7/21  Yes Historical Provider, MD   hydrOXYzine (ATARAX) 10 MG tablet Take 1 tablet by mouth every 8 hours as needed for Itching 7/12/21  Yes Trisha Cardona DO   furosemide (LASIX) 20 MG tablet Take 1 tablet by mouth daily 4/30/21  Yes Joyce Barroso MD   rivaroxaban (XARELTO) 20 MG TABS tablet Take 1 tablet by mouth Daily with supper 4/15/21  Yes Jay Sheikh MD   carvedilol (COREG CR) 20 MG CP24 extended release capsule Take 1 capsule by mouth daily 3/24/21  Yes Gabrielle Smith MD   dofetilide (TIKOSYN) 250 MCG capsule Take 1 capsule by mouth every 12 hours 2/4/21  Yes Trupti Maguire MD   hydrALAZINE (APRESOLINE) 10 MG tablet Take 1 tablet by mouth 3 times daily 12/7/20  Yes Gabrielle Smith MD   spironolactone (ALDACTONE) 25 MG tablet Take 1 tablet by mouth daily 12/7/20  Yes Gabrielle Smith MD   famotidine (PEPCID) 40 MG tablet Take 40 mg by mouth 2 times daily as needed    Yes Historical Provider, MD   omeprazole (PRILOSEC) 40 MG delayed release capsule Take 40 mg by mouth daily  7/2/20  Yes Historical Provider, MD   triamcinolone (KENALOG) 0.1 % cream Apply topically 3 times daily as needed (rash)  4/5/19  Yes Historical Provider, MD   loperamide (IMODIUM) 2 MG capsule Take 2 mg by mouth 4 times daily as needed for Diarrhea   Yes Historical Provider, MD   diphenhydrAMINE (BENADRYL) 25 MG tablet Take 25 mg by mouth every 6 hours as needed for Itching   Yes Historical Provider, MD   vitamin D (CHOLECALCIFEROL) 1000 UNIT TABS tablet Take 1,000 Units by mouth daily   Yes Historical Provider, MD   baclofen (LIORESAL) 20 MG tablet Take 20 mg by mouth 2 times daily as needed (spasms) Indications: Back Spasm, Bladder Spasm    Yes Historical Provider, MD   Handicap Placard [facility] Cannot walk more than 200 feet  Expiration: 7/12/2026 7/12/21   Trisha Cardona DO       Current Medications:    Current Outpatient Medications:     dicyclomine (BENTYL) 10 MG capsule, , Disp: , Rfl:     hydrOXYzine (ATARAX) 10 MG tablet, Take 1 tablet by mouth every 8 hours as needed for Itching, Disp: 30 tablet, Rfl: 3    furosemide (LASIX) 20 MG tablet, Take 1 tablet by mouth daily, Disp: 90 tablet, Rfl: 3    rivaroxaban (XARELTO) 20 MG TABS tablet, Take 1 tablet by mouth Daily with murmurs  Abdomen: Soft, nontender, +bowel sounds  Extremities: Moves all extremities x 4, no lower extremity edema  Neurologic: No focal motor deficits apparent, normal mood and affect  Peripheral Pulses: Intact posterior tibial pulses bilaterally    Intake/Output:  No intake or output data in the 24 hours ending 07/16/21 0841  No intake/output data recorded. Laboratory Tests:  No results for input(s): NA, K, CL, CO2, BUN, CREATININE, GLUCOSE, CALCIUM in the last 72 hours. Lab Results   Component Value Date    MG 2.1 01/30/2021     No results for input(s): ALKPHOS, ALT, AST, PROT, BILITOT, BILIDIR, LABALBU in the last 72 hours. No results for input(s): WBC, RBC, HGB, HCT, MCV, MCH, MCHC, RDW, PLT, MPV in the last 72 hours.   Lab Results   Component Value Date    CKTOTAL 109 06/03/2021    CKMB 6.6 (H) 11/25/2013    TROPONINI <0.01 03/09/2019    TROPONINI <0.01 11/21/2018    TROPONINI <0.01 11/20/2018     Lab Results   Component Value Date    INR 1.0 01/22/2021    INR 1.0 10/14/2020    INR 2.3 10/10/2020    PROTIME 11.5 01/22/2021    PROTIME 11.3 10/14/2020    PROTIME 26.4 (H) 10/10/2020     Lab Results   Component Value Date    TSH 1.300 02/18/2020     No results found for: LABA1C  No results found for: EAG  Lab Results   Component Value Date    CHOL 179 11/21/2018    CHOL 204 (H) 11/14/2015    CHOL 235 (H) 07/02/2015     Lab Results   Component Value Date    TRIG 69 11/21/2018    TRIG 71 11/14/2015    TRIG 182 (H) 07/02/2015     Lab Results   Component Value Date    HDL 51 11/21/2018    HDL 85 11/14/2015    HDL 69 07/02/2015     Lab Results   Component Value Date    LDLCALC 114 (H) 11/21/2018    LDLCALC 105 (H) 11/14/2015    LDLCALC 130 (H) 07/02/2015     Lab Results   Component Value Date    LABVLDL 14 11/21/2018    LABVLDL 14 11/14/2015    LABVLDL 36 07/02/2015     No results found for: CHOLHDLRATIO  No results for input(s): PROBNP in the last 72 hours.      -------------------------------------------------------------------------------------------------------------------------------------------------------------  IMPRESSION:  1. Watchman thrombus    RECOMMENDATIONS:     JAIME    Risks and benefits of JAIME explained including risk of esophageal perforation. Understands and agrees to proceed. Thank you for allowing me to participate in your patient's care. Please feel free to contact me if you have any questions or concerns. Alisa Sanford MD, [address] [facility] Cardiology    NOTE: This report was transcribed using voice recognition software. Every effort was made to ensure accuracy; however, inadvertent computerized transcription errors may be present. Resident

## 2022-12-17 NOTE — ED PROVIDER NOTES
80-year-old female presenting emergency department for neck pain, with radicular symptoms that she down her arm, on the right side, she is also had some shortness of breath and leg swelling, gradual onset, has a history of heart failure and she is concerned she may be retaining fluid, has not changed her diet has been compliant with her diuretics. States last few days she is been more dyspneic on exertion, symptoms are moderate in severity, persistent, worsening at time, improved by nothing. Does have a history of cervical and lumbar radiculopathy. Denies any weakness or numbness. Denies any chest pain. But did mention she had some discomfort that went across her chest once asked a few times, that she attributed to her neck. Review of Systems   Constitutional:  Negative for chills and fever. HENT:  Negative for ear pain, sinus pressure and sore throat. Eyes:  Negative for pain and discharge. Respiratory:  Positive for shortness of breath. Negative for cough and wheezing. Cardiovascular:  Positive for chest pain. Gastrointestinal:  Negative for abdominal pain, diarrhea, nausea and vomiting. Genitourinary:  Negative for dysuria and frequency. Musculoskeletal:  Negative for arthralgias and back pain. Skin:  Negative for rash and wound. Neurological:  Negative for weakness and headaches. Hematological:  Negative for adenopathy. All other systems reviewed and are negative. Physical Exam  Vitals and nursing note reviewed. Constitutional:       Appearance: Normal appearance. HENT:      Head: Normocephalic and atraumatic. Right Ear: External ear normal.      Left Ear: External ear normal.      Nose: Nose normal.      Mouth/Throat:      Mouth: Mucous membranes are moist.   Eyes:      Extraocular Movements: Extraocular movements intact. Pupils: Pupils are equal, round, and reactive to light. Cardiovascular:      Rate and Rhythm: Normal rate and regular rhythm. Pulses: Normal pulses. Heart sounds: Normal heart sounds. Pulmonary:      Effort: Pulmonary effort is normal. No respiratory distress. Breath sounds: Normal breath sounds. No stridor. No wheezing. Abdominal:      General: Abdomen is flat. Bowel sounds are normal.      Palpations: Abdomen is soft. Musculoskeletal:         General: Normal range of motion. Cervical back: Normal range of motion and neck supple. Right lower leg: Edema present. Left lower leg: Edema present. Skin:     General: Skin is warm and dry. Neurological:      General: No focal deficit present. Mental Status: She is alert and oriented to person, place, and time. Cranial Nerves: No cranial nerve deficit. Sensory: No sensory deficit. Motor: No weakness. Procedures     MDM     Amount and/or Complexity of Data Reviewed  Clinical lab tests: reviewed  Tests in the radiology section of CPT®: reviewed  Tests in the medicine section of CPT®: reviewed  Decide to obtain previous medical records or to obtain history from someone other than the patient: yes         ED Course as of 12/17/22 0403   Sat Dec 17, 2022   0006 EKG: This EKG is signed by emergency department physician. Rate: 72  Rhythm: Sinus  Interpretation: Left bundle bundle, left axis deviation  Comparison: stable as compared to patient's most recent EKG      [JG]   0336 Patient found to have pulmonary embolism on CT, she is much significant short of breath, now requiring oxygen, troponins mildly elevated compared to previous, will treat with Lovenox and admit. [JG]   0401 Dr. Sarah Hernandez will admit patient [JG]   362 49-year-old female presenting the department for neck pain, shooting down her right arm, shortness of breath. Neurologically intact on exam, thought she may be having issues with her CHF as she has a history of congestive heart failure, was getting more short of breath, and had more swelling in her legs.   Patient not hypoxic, did not have chest pain, was not significant tachycardic. However upon later questioning she did admit she had some chest pain earlier and she thought was related to her neck pain. A CTA was ordered due to her chest pain and shortness of breath. Noted pleural effusions along with pulmonary embolism. She was given Lasix and Lovenox, admitted to hospital for further work-up management of CHF exacerbation and pulmonary embolism. [JG]      ED Course User Index  [JG] Ingrid Kirby MD      66-year-old female presenting the department for neck pain, shooting down her right arm, shortness of breath. Neurologically intact on exam, thought she may be having issues with her CHF as she has a history of congestive heart failure, was getting more short of breath, and had more swelling in her legs. Patient not hypoxic, did not have chest pain, was not significant tachycardic. However upon later questioning she did admit she had some chest pain earlier and she thought was related to her neck pain. A CTA was ordered due to her chest pain and shortness of breath. Noted pleural effusions along with pulmonary embolism. She was given Lasix and Lovenox, admitted to hospital for further work-up management of CHF exacerbation and pulmonary embolism. ED Course as of 12/17/22 0403   Sat Dec 17, 2022   0006 EKG: This EKG is signed by emergency department physician. Rate: 72  Rhythm: Sinus  Interpretation: Left bundle bundle, left axis deviation  Comparison: stable as compared to patient's most recent EKG      [JG]   0336 Patient found to have pulmonary embolism on CT, she is much significant short of breath, now requiring oxygen, troponins mildly elevated compared to previous, will treat with Lovenox and admit. [JG]   0401 Dr. Warren Sheets will admit patient [JG]   362 66-year-old female presenting the department for neck pain, shooting down her right arm, shortness of breath.   Neurologically intact on exam, thought she may be having issues with her CHF as she has a history of congestive heart failure, was getting more short of breath, and had more swelling in her legs. Patient not hypoxic, did not have chest pain, was not significant tachycardic. However upon later questioning she did admit she had some chest pain earlier and she thought was related to her neck pain. A CTA was ordered due to her chest pain and shortness of breath. Noted pleural effusions along with pulmonary embolism. She was given Lasix and Lovenox, admitted to hospital for further work-up management of CHF exacerbation and pulmonary embolism. [JG]      ED Course User Index  [JG] Olga Duran MD       --------------------------------------------- PAST HISTORY ---------------------------------------------  Past Medical History:  has a past medical history of Afib ([address] Anxiety, Arthritis, Cervical radiculopathy, CHF (congestive heart failure) ([address] Chronic sinusitis, Hilar adenopathy, Hx of blood clots, Hypertension, Left ventricular systolic dysfunction, Lesion of left native kidney, Lumbar radiculopathy, Lung nodules, Meige syndrome, Microscopic colitis, Mitral regurgitation, Nonischemic cardiomyopathy ([address] Osteopenia, Renal cell carcinoma of left kidney ([address] and Vertebrobasilar artery syndrome. Past Surgical History:  has a past surgical history that includes bladder repair; Bunionectomy; Diagnostic Cardiac Cath Lab Procedure (2/20/10); Diagnostic Cardiac Cath Lab Procedure (8/24/11); cardiovascular stress test (11/25/13); doppler echocardiography (11/25/13); transesophageal echocardiogram (11/21/2018); transesophageal echocardiogram (03/18/2019); Cardioversion (11/04/2019); Colonoscopy (07/2020); [place] tooth extraction; other surgical history (10/14/2020); Hysterectomy (1991); bronchoscopy (N/A, 10/5/2021); bronchoscopy (N/A, 10/5/2021); and partial nephrectomy (Left, 11/16/2021). Social History:  reports that she has never smoked.  She has never used smokeless tobacco. She reports current alcohol use. She reports that she does not use drugs. Family History: family history includes Anxiety Disorder in her sister; Crohn's Disease in her son; Depression in her sister; Heart Attack in her father and mother; Heart Disease in her father; Heart Failure in her father; Stroke in her mother. The patients home medications have been reviewed.     Allergies: Bentyl [dicyclomine], Adhesive tape, Atacand [candesartan], Cefdinir, Demerol, Digoxin, Imdur [isosorbide mononitrate], Losartan potassium, Shellfish-derived products, Sulfa antibiotics, Xarelto [rivaroxaban], Acetaminophen, Apap-caff-dihydrocodeine, Coreg [carvedilol], Cozaar [losartan], Diovan [valsartan], Effexor [venlafaxine hydrochloride], Eliquis [apixaban], Labetalol, Lisinopril, and Ramipril    -------------------------------------------------- RESULTS -------------------------------------------------    Lab  Results for orders placed or performed during the hospital encounter of 12/16/22   COVID-19 & Influenza Combo    Specimen: Nasopharyngeal Swab   Result Value Ref Range    SARS-CoV-2 RNA, RT PCR NOT DETECTED NOT DETECTED    INFLUENZA A NOT DETECTED NOT DETECTED    INFLUENZA B NOT DETECTED NOT DETECTED   CBC with Auto Differential   Result Value Ref Range    WBC 6.8 4.5 - 11.5 E9/L    RBC 5.17 3.50 - 5.50 E12/L    Hemoglobin 14.6 11.5 - 15.5 g/dL    Hematocrit 45.7 34.0 - 48.0 %    MCV 88.4 80.0 - 99.9 fL    MCH 28.2 26.0 - 35.0 pg    MCHC 31.9 (L) 32.0 - 34.5 %    RDW 15.7 (H) 11.5 - 15.0 fL    Platelets 279 442 - 403 E9/L    MPV 11.1 7.0 - 12.0 fL    Neutrophils % 76.8 43.0 - 80.0 %    Immature Granulocytes % 0.3 0.0 - 5.0 %    Lymphocytes % 15.1 (L) 20.0 - 42.0 %    Monocytes % 5.6 2.0 - 12.0 %    Eosinophils % 1.3 0.0 - 6.0 %    Basophils % 0.9 0.0 - 2.0 %    Neutrophils Absolute 5.24 1.80 - 7.30 E9/L    Immature Granulocytes # 0.02 E9/L    Lymphocytes Absolute 1.03 (L) 1.50 - 4.00 E9/L Monocytes Absolute 0.38 0.10 - 0.95 E9/L    Eosinophils Absolute 0.09 0.05 - 0.50 E9/L    Basophils Absolute 0.06 0.00 - 0.20 E9/L   Comprehensive Metabolic Panel   Result Value Ref Range    Sodium 141 132 - 146 mmol/L    Potassium 3.9 3.5 - 5.0 mmol/L    Chloride 106 98 - 107 mmol/L    CO2 21 (L) 22 - 29 mmol/L    Anion Gap 14 7 - 16 mmol/L    Glucose 112 (H) 74 - 99 mg/dL    BUN 19 6 - 23 mg/dL    Creatinine 1.2 (H) 0.5 - 1.0 mg/dL    Est, Glom Filt Rate 46 >=60 mL/min/1.73    Calcium 9.6 8.6 - 10.2 mg/dL    Total Protein 7.3 6.4 - 8.3 g/dL    Albumin 3.9 3.5 - 5.2 g/dL    Total Bilirubin 1.1 0.0 - 1.2 mg/dL    Alkaline Phosphatase 120 (H) 35 - 104 U/L    ALT 26 0 - 32 U/L    AST 28 0 - 31 U/L   Troponin   Result Value Ref Range    Troponin, High Sensitivity 39 (H) 0 - 9 ng/L   APTT   Result Value Ref Range    aPTT 30.2 24.5 - 35.1 sec   Protime-INR   Result Value Ref Range    Protime 12.2 9.3 - 12.4 sec    INR 1.1    Brain Natriuretic Peptide   Result Value Ref Range    Pro-BNP 7,473 (H) 0 - 450 pg/mL   Lipase   Result Value Ref Range    Lipase 25 13 - 60 U/L   Urinalysis with Microscopic   Result Value Ref Range    Color, UA Yellow Straw/Yellow    Clarity, UA Clear Clear    Glucose, Ur Negative Negative mg/dL    Bilirubin Urine Negative Negative    Ketones, Urine Negative Negative mg/dL    Specific Gravity, UA 1.010 1.005 - 1.030    Blood, Urine Negative Negative    pH, UA 6.0 5.0 - 9.0    Protein, UA Negative Negative mg/dL    Urobilinogen, Urine 0.2 <2.0 E.U./dL    Nitrite, Urine Negative Negative    Leukocyte Esterase, Urine SMALL (A) Negative    WBC, UA 2-5 0 - 5 /HPF    RBC, UA NONE 0 - 2 /HPF    Bacteria, UA NONE SEEN None Seen /HPF   Magnesium   Result Value Ref Range    Magnesium 2.1 1.6 - 2.6 mg/dL   Troponin   Result Value Ref Range    Troponin, High Sensitivity 38 (H) 0 - 9 ng/L   EKG 12 Lead   Result Value Ref Range    Ventricular Rate 72 BPM    Atrial Rate 72 BPM    P-R Interval 202 ms    QRS Duration 158 ms    Q-T Interval 466 ms    QTc Calculation (Bazett) 510 ms    P Axis 70 degrees    R Axis -76 degrees    T Axis 99 degrees       Radiology  CTA PULMONARY W CONTRAST   Final Result   Small burden acute subsegmental pulmonary emboli to the bilateral lower lobes. RECOMMENDATIONS:   Careful clinical correlation and follow up recommended. CT CERVICAL SPINE WO CONTRAST   Final Result   1. No acute abnormality involving the cervical spine. 2. Redemonstration of heterogeneous appearance of the thyroid gland. Ultrasound could be helpful for further evaluation. XR CHEST (2 VW)   Final Result   COPD changes, congestive changes. XR SHOULDER RIGHT (MIN 2 VIEWS)   Final Result   Osteopenia and mild degenerative changes. No acute bony abnormality.                 ------------------------- NURSING NOTES AND VITALS REVIEWED ---------------------------  Date / Time Roomed:  12/16/2022 11:44 PM  ED Bed Assignment:  14A/14A-14    The nursing notes within the ED encounter and vital signs as below have been reviewed. Patient Vitals for the past 24 hrs:   BP Temp Temp src Pulse Resp SpO2 Height Weight   12/17/22 0310 124/81 -- -- 73 20 97 % -- --   12/16/22 2356 127/85 97.4 °F (36.3 °C) Oral 72 20 97 % -- --   12/16/22 1206 -- -- -- -- 16 -- 5' 7\" (1.702 m) 174 lb (78.9 kg)   12/16/22 1124 131/64 97.4 °F (36.3 °C) Temporal 85 -- 97 % -- --       Oxygen Saturation Interpretation: Normal      ------------------------------------------ PROGRESS NOTES ------------------------------------------      I have spoken with the patient and discussed todays results, in addition to providing specific details for the plan of care and counseling regarding the diagnosis and prognosis.   Their questions are answered at this time and they are agreeable with the plan.      --------------------------------- ADDITIONAL PROVIDER NOTES ---------------------------------  Consultations:  Spoke with Dr. Warren Sheets, They will admit this patient. This patient's ED course included: a personal history and physicial examination, re-evaluation prior to disposition, multiple bedside re-evaluations, IV medications, cardiac monitoring, continuous pulse oximetry, and complex medical decision making and emergency management    This patient has remained hemodynamically stable and been closely monitored during their ED course. Please note that the withdrawal or failure to initiate urgent interventions for this patient would likely result in a life threatening deterioration or permanent disability. Accordingly this patient received 30 minutes of critical care time, excluding separately billable procedures. Clinical Impression  1. Multiple subsegmental pulmonary emboli without acute cor pulmonale (HCC)    2. Acute on chronic congestive heart failure, unspecified heart failure type ([facility] [address]    3. Cervical radiculopathy          Disposition  Patient's disposition: Admit to telemetry  Patient's condition is stable.          Betty Villagran MD  Resident  12/17/22 1410

## 2022-12-17 NOTE — ED NOTES
Nurse to nurse called to [facility]. [facility] faxed. Patient in transport to go to the unit.       Jeff Mcclellan RN  12/17/22 0392

## 2022-12-17 NOTE — CONSULTS
Inpatient Cardiology Consultation      Reason for Consult: Heart failure    Consulting Physician: Dr. Bevin Ahumada    Requesting Physician:  Lauren Ervin DO    Date of Consultation: 12/17/2022    HISTORY OF PRESENT ILLNESS:   Patient is a 42-year-old female who is known to [facility] cardiology through Dr Arnie Paulino. She was last seen outpatient by UYEN Abreu 12/6/2022 for follow-up with complaint of sinus drainage. Patient scheduled for JAIME for watchman assessment and to follow-up with valve clinic. PMHx: Nonischemic HFimpEF (JAIME 10/2021: EF 45-50%. No interatrial shunting. Watchman with no significant color flow jet or thrombus. Moderate MR, moderate TR RV-RA gradient 82 mmHg, mild AR), PAF on Tikosyn with intolerance to OAC>> s/p watchman 10/2020, history of CHERYL thrombus 2018/2019 held on Xarelto, chronic LBBB, hypertension, hypercholesteremia, TIA, pulmonary hypertension unable to tolerate long-acting nitrates, lumbar radiculopathy, renal cell carcinoma 2021 s/p partial left nephrectomy, anxiety, CKD baseline 1.2    NM stress 11/2018: No stress-induced ischemia. Moderate sized left ventricle anterior wall fixed defect concerning for old infarct. Global myocardial hypokinesia. EF 23%. [address] 2010 with normal coronaries    HPI:  Patient presented to ER 12/16/2022 at 11:24 AM for neck pain with radicular symptoms down her right arm with shortness of breath and leg swelling that has been with gradual onset. Burning worsening CAIN over the last several days. CTA revealed pleural effusions along with pulmonary embolism. Patient was given Lasix and Lovenox and admitted for CHF management along with PE. Patient started on therapeutic Lovenox 1 mg - 1 kg twice daily and Lasix 40 mg IV twice daily. VS upon arrival 97.4, 16 respirations, 85 pulse, 131/64, 97% room air.   Labs: Potassium 3.8, CO2 21, BUN 19, creatinine 1.2 >1.3, magnesium 2.2, glucose 119, serum calcium 9.3, proBNP 7473 (3/2019: 5512), troponin 39> 38 (6/2021: 14), WBC 6.8, H&H 14.6/45.7, platelet 380, INR 1.1, influenza/COVID-19 negative, UA noninfectious  CTA pulm: Small burden acute subsegmental pulmonary emboli to the bilateral lower lobes. CT cervical: No acute abnormality  CXR: COPD changes, congestive changes    On assessment today 12/17/2022 patient is standing at bedside having just gone to the bathroom. Patient is on room air, speaking full sentences, and is no apparent distress at this time. Patient reports for the last 11 days she has had continued neck pain with radiculopathy pain down her right arm. She also reports she has had progressing CAIN with stairs, leg swelling, and abdominal bloating. She denies chest pain, dizziness, orthopnea, PND, dizziness, diaphoresis, or any recent falls. Patient reports she is feeling mildly better since having IV diuresis since being in the hospital.  Patient reports she is taking all of her medications as prescribed. Patient is a non-smoker, does not drink alcohol or use drugs. She reports she has not scheduled for the JAIME that was ordered last visit yet. Patient reports she did take Xarelto for CHERYL thrombus in the past and was able to tolerate it. She reports she was unable to tolerate Eliquis and does not want to be on Coumadin for PE management. Most recent VS 97.4, 20 respirations, 68 pulse, 121/86, 97% room air. Please note: past medical records were reviewed per electronic medical record (EMR) - see detailed reports under Past Medical/ Surgical History. Past Medical History:    PAF  status post DC cardioversion 11/2019  Maintained on dofetilide  History of left atrial appendage thrombus in November 2018 and March 2019. Resolved on rivaroxaban. Allergic to rivaroxaban with significant skin reaction that she manages with hydroxyzine. Also allergic to dabigatran and apixaban.   All the above documented additionally in [place] clinic notes  9/9/2020 JAIME: Mildly reduced LV systolic function with an EF 45%. Normal RV size and systolic function. Moderate MR. Mild AR. Mild TR. Moderate pulmonary hypertension. No CHERYL thrombus. CHERYL anatomy amenable to watchman. 10/14/2020 Dr Haroldo Verde: successful left atrial appendage closure using Watchman with 24 mm device. The procedure was complicated by a small pericardial effusion, not hemodynamically significant and did not require intervention. 11/30/2020 JAIME: follow-up on her watchman device which was in place with no significant color flow jet around the device and a negative bubble study  11/30/2020 ASA and Xarelto stopped. Started  mg and Plavix 75  mg QD--> developed red eyes and bleeding ASA was stopped by Dr Haroldo Verde. 6 month JAIME with DRT; no inge-device leak. Stopped APT and restarted Xarelto  7/2021 JAIME Dr Meyer Levels: no DRT (device related thrombus). D/c rivaroxaban and start ASA EC 81 mg QD and clopidogrel 75 mg QD.  10/18/2021 JAIME Dr Rosa Isela Plaza: EF estimated at 45-50%. Normal right ventricular size and function. No evidence of interatrial shunting on bubble study. History of WATCHMAN device (24 mm). No significant color flow jet around the device. No device related thrombus visualized. Moderate moderate TR Mild AI. Moderate TR. RV-RA gradient is estimated at 82 mmHg. 10/11/2021 per Dr Barney-->Check 12-month JAIME. If no issues then discontinue clopidogrel and maintain aspirin 81 mg daily indefinitely  3/7/2022 Tikosyn was reduced from 250 mcg BID to 125 mcg BID due to reduced CrCl   NICMP/HFrEF (coronary angiogram without significant CAD in 2010). 11/2013 Lexiscan MPS: Nonischemic EF 40%  2016 EF 38%  2016 Lexiscan MPS: Non ischemic stress. EF 35-40  3/2019 EF 25-30%  5/2019 TTE: EF 25-30%  7/2019 TTE: EF 35 to 40% with moderate MR moderate PH  10/16/2020 Limited TTE Dr Haroldo Verde: EF 50%. Resolved pericardial effusion adjacent to LV. Very trivial effusion adjacent to the RV mostly in systole.  Overall the pericardial effusion is decreased compared to 10/14/2020 study. NYHA II, ACC/AHA Stage C   PHTN by [facility] in 2010 followed@  they recommended long acting nitrates but patient was unable to tolerate. cLBBB  HTN  Microscopic colitis  Numerous medication allergies including ACE inhibitors and ARB's  Hypercholesterolemia  Anxiety  Lifelong non smoker  Lumbar radiculopathy  Vertebrobasilar syndrome  Hx TIA's  Meige syndrome  Migraines  IBS  Hx bladder repair. Oophorectomy, and hysterectomy  9/10/2021 Thyroid biopsy negative for malignant cells-->compatible with colloid nodule/nodular goiter  9/13/2021 CT Abdomen/Pelvis: with a mass exophytic from lower pole of left kidney measures 3.7 x 3.1 x 3.2 cm. This abuts the left psoas muscle without definite invasion of left psoas muscle. 10/5/2021  Flexible Fiberoptic Bronchoscopy. EBUS Bronchoscopy (R hilar lymphadenopathy)-->Negative for malignant cells. Benign and degenerated bronchial cells, scant lymphocytes and blood present  Renal cell carcinoma L kidney  11/16/2021 Robot assisted laparoscopic complex L partial nephrectomy--> Clear-cell renal cell carcinoma   2/2022 CT Abdomen/Pelvis: Surgical removal of the lower pole of the left kidney. There is no evidence of local recurrence or metastatic disease at this time. 4/13/2022 CT chest:stable exam  8/19/2022 CT abdomen/pelvis: No evidence of local recurrence or abdominal metastasis.   [address] x 4 last one 11/8/2022 11/8/2022 flu shot      Past Surgical History:    Past Surgical History:   Procedure Laterality Date    BLADDER REPAIR      BRONCHOSCOPY N/A 10/5/2021    BRONCHOSCOPY W/EBUS FNA performed by Amaury Rodriguez MD at [facility] N/A 10/5/2021    BRONCHOSCOPY DIAGNOSTIC OR CELL 8 Vidya Colvin Labidi ONLY performed by Amaury Rodriguez MD at [facility] TEST  11/25/13    Rt & LT cath    CARDIOVERSION  11/04/2019    Successful CV from AF to NSR   (Dr. Lucero Crawford)    COLONOSCOPY  07/2020 DIAGNOSTIC CARDIAC CATH LAB PROCEDURE  2/20/10    Dr Leeroy England CATH LAB PROCEDURE  8/24/11    Right heart cath @ [facility]. DOPPLER ECHOCARDIOGRAPHY  11/25/13    HYSTERECTOMY (CERVIX STATUS UNKNOWN)  1991    total    OTHER SURGICAL HISTORY  10/14/2020    Dr. Donaldson Ing- 24mm Watchman device    PARTIAL NEPHRECTOMY Left 11/16/2021    ROBOT ASSISTED LAPAROSCOPIC COMPLEX LEFT PARTIAL NEPHRECTOMY performed by Melvin David MD at [address]    TRANSESOPHAGEAL ECHOCARDIOGRAM  11/21/2018    Dr. Terra Morris    TRANSESOPHAGEAL ECHOCARDIOGRAM  03/18/2019    WISDOM TOOTH EXTRACTION         Medications Prior to admit:  Prior to Admission medications    Medication Sig Start Date End Date Taking? Authorizing Provider   cetirizine (ZYRTEC ALLERGY) 10 MG tablet Take 0.5 tablets by mouth daily 10/19/22   Yamilka Kwon, APRN - CNP   fluticasone [facility]) 50 MCG/ACT nasal spray 1-2 sprays, each nostril daily as needed for nasal congestion.  10/19/22   Sim Gage, APRN - CNP   furosemide (LASIX) 20 MG tablet Take 1 tablet by mouth daily 5/6/22   Sylvia Shelton MD   metoprolol succinate (TOPROL XL) 25 MG extended release tablet Take 1 tablet by mouth daily 5/6/22   Sylvia Shelton MD   [facility]) 125 MCG capsule Take 1 capsule by mouth 2 times daily 3/10/22   Ada Díaz MD   aspirin 81 MG EC tablet Take 81 mg by mouth daily    Historical Provider, MD   hydrOXYzine (ATARAX) 10 MG tablet Take 1 tablet by mouth every 8 hours as needed for Itching or Anxiety 2/1/22   Trisah Cardona DO   baclofen (LIORESAL) 10 MG tablet Take 1 tablet by mouth nightly 2/1/22   Trisha Cardona DO   spironolactone (ALDACTONE) 25 MG tablet Take 1 tablet by mouth daily 12/28/21   Brenda Solorio MD   hydrALAZINE (APRESOLINE) 10 MG tablet Take 1 tablet by mouth 3 times daily 12/28/21   Brenda Solorio MD   Handicap Placard [facility] Cannot walk more than 200 feet  Expiration: 7/12/2026 7/12/21   Trisha Cardona DO famotidine (PEPCID) 40 MG tablet Take 40 mg by mouth 2 times daily as needed     Historical Provider, MD   omeprazole (PRILOSEC) 40 MG delayed release capsule Take 40 mg by mouth daily  7/2/20   Historical Provider, MD   triamcinolone (KENALOG) 0.1 % cream Apply topically 3 times daily as needed (rash)  4/5/19   Historical Provider, MD   loperamide (IMODIUM) 2 MG capsule Take 2 mg by mouth 4 times daily as needed for Diarrhea    Historical Provider, MD   diphenhydrAMINE (BENADRYL) 25 MG tablet Take 25 mg by mouth every 6 hours as needed for Itching    Historical Provider, MD   vitamin D (CHOLECALCIFEROL) 1000 UNIT TABS tablet Take 1,000 Units by mouth daily    Historical Provider, MD       Current Medications:    Current Facility-Administered Medications: aspirin EC tablet 81 mg, 81 mg, Oral, Daily  baclofen (LIORESAL) tablet 10 mg, 10 mg, Oral, Nightly  cetirizine (ZYRTEC) tablet 5 mg, 5 mg, Oral, Daily  dofetilide (TIKOSYN) capsule 125 mcg, 125 mcg, Oral, BID  famotidine (PEPCID) tablet 40 mg, 40 mg, Oral, BID  hydrALAZINE (APRESOLINE) tablet 10 mg, 10 mg, Oral, TID  metoprolol succinate (TOPROL XL) extended release tablet 25 mg, 25 mg, Oral, Daily  pantoprazole (PROTONIX) tablet 40 mg, 40 mg, Oral, QAM AC  spironolactone (ALDACTONE) tablet 25 mg, 25 mg, Oral, Daily  vitamin D (CHOLECALCIFEROL) tablet 1,000 Units, 1,000 Units, Oral, Daily  sodium chloride flush 0.9 % injection 5-40 mL, 5-40 mL, IntraVENous, 2 times per day  sodium chloride flush 0.9 % injection 5-40 mL, 5-40 mL, IntraVENous, PRN  0.9 % sodium chloride infusion, , IntraVENous, PRN  polyethylene glycol (GLYCOLAX) packet 17 g, 17 g, Oral, Daily PRN  enoxaparin (LOVENOX) injection 80 mg, 1 mg/kg, SubCUTAneous, BID  furosemide (LASIX) injection 40 mg, 40 mg, IntraVENous, BID  lidocaine 4 % external patch 1 patch, 1 patch, TransDERmal, Daily    Allergies:  Bentyl [dicyclomine], Adhesive tape, Atacand [candesartan], Cefdinir, Demerol, Digoxin, Imdur [isosorbide mononitrate], Losartan potassium, Shellfish-derived products, Sulfa antibiotics, Xarelto [rivaroxaban], Acetaminophen, Apap-caff-dihydrocodeine, Coreg [carvedilol], Cozaar [losartan], Diovan [valsartan], Effexor [venlafaxine hydrochloride], Eliquis [apixaban], Labetalol, Lisinopril, and Ramipril    Social History:    Social History     Socioeconomic History    Marital status:      Spouse name: Not on file    Number of children: Not on file    Years of education: Not on file    Highest education level: Not on file   Occupational History    Not on file   Tobacco Use    Smoking status: Never    Smokeless tobacco: Never   Vaping Use    Vaping Use: Never used   Substance and Sexual Activity    Alcohol use: Yes     Alcohol/week: 0.0 standard drinks     Comment: occasional wine/mixed drink every few months    Drug use: Never    Sexual activity: Not on file     Comment:    Other Topics Concern    Not on file   Social History Narrative    Drinks 1-2 cups of coffee daily. Social Determinants of Health     Financial Resource Strain: Not on file   Food Insecurity: Not on file   Transportation Needs: Not on file   Physical Activity: Not on file   Stress: Not on file   Social Connections: Not on file   Intimate Partner Violence: Not on file   Housing Stability: Not on file       Family History:   Family History   Problem Relation Age of Onset    Heart Attack Mother     Stroke Mother     Heart Attack Father     Heart Failure Father     Heart Disease Father     Anxiety Disorder Sister     Depression Sister     Crohn's Disease Son          REVIEW OF SYSTEMS:     Constitutional: Denies fevers, chills, night sweats, and fatigue  HEENT: Denies headaches, nose bleeds, and blurred vision,oral pain, abscess or lesion. Musculoskeletal: Denies falls, pain to BLE with ambulation. Bilateral lower extremity edema.   Neck pain  Neurological: Denies dizziness and lightheadedness, numbness and tingling  Cardiovascular: Denies chest pain, palpitations, and feelings of heart racing. Respiratory: Denies orthopnea and PND. CAIN  Gastrointestinal: Denies heartburn, nausea/vomiting, diarrhea and constipation, black/bloody, and tarry stools. Abdominal distention  Genitourinary: Denies dysuria and hematuria  Hematologic: Denies excessive bruising or bleeding  Lymphatic: Denies lumps and bumps to neck, axilla, breast, and groin  Endocrine: Denies excessive thirst. Denies intolerance to hot and cold  GYN: Postmenopausal state; Denies vaginal bleeding. Psychiatric: Denies anxiety and depression. PHYSICAL EXAM:   /86   Pulse 68   Temp 97.4 °F (36.3 °C) (Oral)   Resp 20   Ht 5' 7\" (1.702 m)   Wt 174 lb (78.9 kg)   SpO2 97%   BMI 27.25 kg/m²   CONST:  Well developed, well nourished 71-year-old  female who appears stated age. Awake, alert, cooperative, no apparent distress  HEENT:   Head- Normocephalic, atraumatic   Eyes- Conjunctivae pink, anicteric  Throat- Oral mucosa pink and moist  Neck-  No stridor, trachea midline, no jugular venous distention. No adenopathy   CHEST: Chest symmetrical and non-tender to palpation. No accessory muscle use or intercostal retractions  RESPIRATORY: Lung sounds - clear throughout fields   CARDIOVASCULAR:     No carotid bruit  Heart Inspection- shows no noted pulsations  Heart Palpation- no heaves or thrills; PMI is non-displaced   Heart Ausculation- Regular rate and rhythm, no murmur. No s3, s4 or rub   PV: Nonpitting bilateral lower extremity edema. No varicosities. Pedal pulses palpable, no clubbing or cyanosis   ABDOMEN: Soft, non-tender to light palpation. Bowel sounds present. No palpable masses no organomegaly; no abdominal bruit  MS: Good muscle strength and tone. No atrophy or abnormal movements. : Deferred  SKIN: Warm and dry no statis dermatitis or ulcers   NEURO / PSYCH: Oriented to person, place and time. Speech clear and appropriate. Follows all commands. Pleasant affect     DATA:    ECG: Normal sinus rhythm with left axis deviation and left bundle branch block. Vent rate 72, AZ interval 1/2/2002, QRS duration 158, QTc 510  Tele strips: Sinus rhythm with PVCs, 65 heart rate  Diagnostic:    Labs:   CBC:   Recent Labs     12/16/22  1225   WBC 6.8   HGB 14.6   HCT 45.7        BMP:   Recent Labs     12/16/22  1225 12/17/22  0524    140   K 3.9 3.8   CO2 21* 21*   BUN 19 19   CREATININE 1.2* 1.3*   LABGLOM 46 42   CALCIUM 9.6 9.3     Mag:   Recent Labs     12/16/22  1225 12/17/22  0524   MG 2.1 2.2       HgA1c:   Lab Results   Component Value Date    LABA1C 5.7 (H) 02/01/2022       PT/INR:   Recent Labs     12/16/22  1225   PROTIME 12.2   INR 1.1     APTT:  Recent Labs     12/16/22  1225   APTT 30.2       FASTING LIPID PANEL:  Lab Results   Component Value Date/Time    CHOL 206 02/01/2022 12:00 PM    HDL 56 02/01/2022 12:00 PM    LDLCALC 116 02/01/2022 12:00 PM    TRIG 170 02/01/2022 12:00 PM     LIVER PROFILE:  Recent Labs     12/16/22  1225   AST 28   ALT 26   LABALBU 3.9      Latest Reference Range & Units 12/16/22 12:25 12/16/22 20:20   Pro-BNP 0 - 450 pg/mL 7,473 (H)    Troponin, High Sensitivity 0 - 9 ng/L 39 (H) 38 (H)   (H): Data is abnormally high    CXR:     Impression   COPD changes, congestive changes. CTA:  Impression   Small burden acute subsegmental pulmonary emboli to the bilateral lower lobes. Assessment and plan to follow as per Dr. Garima Jaramillo. Electronically signed by UYEN Dawkins CNP on 12/17/2022 at 8:30 AM      I independently performed an evaluation on the patient. I have reviewed the above documentation completed by the advance practitioner. Please see my additional contributions to the HPI, physical exam, assessment and medical decision making.     Physical Exam   /71   Pulse 86   Temp 97.4 °F (36.3 °C) (Oral)   Resp 23   Ht 5' 7\" (1.702 m)   Wt 174 lb (78.9 kg)   SpO2 95%   BMI 27.25 kg/m²   Constitutional: Oriented to person, place, and time. Well-developed and well-nourished. No distress. Head: Normocephalic and atraumatic. Eyes: EOM are normal. Pupils are equal, round, and reactive to light. Neck: Normal range of motion. Neck supple. No hepatojugular reflux and no JVD present. Carotid bruit is not present. No tracheal deviation present. No thyromegaly present. Cardiovascular: Normal rate, regular rhythm, normal heart sounds and intact distal pulses. Exam reveals no gallop and no friction rub. No murmur heard. Pulmonary/Chest: Effort normal and breath sounds normal. No respiratory distress. No wheezes. No rales. No tenderness. Abdominal: Soft. Bowel sounds are normal. No distension and no mass. No tenderness. No rebound and no guarding. Musculoskeletal: Normal range of motion. No edema and no tenderness. Lymphadenopathy:   No cervical adenopathy. Neurological: Alert and oriented to person, place, and time. Skin: Skin is warm and dry. No rash noted. Not diaphoretic. No erythema. Psychiatric: Normal mood and affect. Behavior is normal.     JAIME Summary 10/18/2021:   Ejection fraction is visually estimated at 45-50%. Normal right ventricular size and function. No evidence of interatrial shunting on bubble study. History of WATCHMAN device (24 mm). No significant color flow jet around the device. No device related thrombus visualized. Moderate mitral regurgitation. Mild aortic regurgitation. Moderate tricuspid regurgitation. RV-RA gradient is estimated at 82 mmHg. See accompanying documentation for full consult. ASSESSMENT AND PLAN:  1. Acute on chronic systolic CHF/CAIN:     EF 50-25% by JAIME 10/2021. Echo ordered. Gently diurese and follow labs. Toprol/hydralazine/aldactone. Allergic/Intolerance to ACE/ARB/ nitrates    Consider SGLT2. 2. PAF:     In sinus. Hx of Watchman 10/14/2020. Due for follow up JAIME. ASA.      Rhythm controlled with tikosyn/BB. Follows with [facility]. 3. VHD: Mild AI/mod MR/mod TR/severe PH by JAIME 10/2021. Medically manage. 4. Chronic LBBB. 5. PE: Per primary service. Echo. Lovenox to DOAC. 6. HTN: Observe. 7. Hx of left partial nephrectomy for renal CA 11/2021 Dr Uma Levine     8. Lipids: Consider statin. 9. TIA: ASA. Consider statin. 10. CKD: Follow labs. 11. Migraines    12. IBS    iHwot Lagos D.O.   Cardiologist  Cardiology, [facility]

## 2022-12-17 NOTE — ED NOTES
Pt taken to er 14 A ambulated to room, placed in gown cardiac monitor applied and vital signs rechecked at this time. Sr x 2 up and call light in reach. Er Resident at bedside with pt now.      Elisabeth Plata LPN  53/28/07 [id number]

## 2022-12-17 NOTE — H&P
Hospitalist History & Physical      PCP: Andriy Cooney DO    Date of Service: Pt seen/examined on     Chief Complaint:  had concerns including Neck Pain, Shortness of Breath, and Back Pain (Patient has multiple complaints for 11 days. Neck spasms, SOB, foot swelling, toe cramping, shoulder pain. Went to urgent care Sunday and given muscle relaxers but not feeling better. ). History Of Present Illness:    Ms. En Ball, a 68y.o. year old female  who  has a past medical history of Afib ([address] Anxiety, Arthritis, Cervical radiculopathy, CHF (congestive heart failure) (HCC), Chronic sinusitis, Hilar adenopathy, Hx of blood clots, Hypertension, Left ventricular systolic dysfunction, Lesion of left native kidney, Lumbar radiculopathy, Lung nodules, Meige syndrome, Microscopic colitis, Mitral regurgitation, Nonischemic cardiomyopathy (HCC), Osteopenia, Renal cell carcinoma of left kidney ([address] and Vertebrobasilar artery syndrome. 70-year-old female presenting the department for neck pain, shooting down her right arm, shortness of breath. Neurologically intact on exam, thought she may be having issues with her CHF as she has a history of congestive heart failure, was getting more short of breath, and had more swelling in her legs. Patient not hypoxic, did not have chest pain, was not significant tachycardic. However upon later questioning she did admit she had some chest pain earlier and she thought was related to her neck pain. A CTA was ordered due to her chest pain and shortness of breath. Noted pleural effusions along with pulmonary embolism.   She was given Lasix and Lovenox, admitted to hospital for further work-up management of CHF exacerbation and pulmonary embolism      Past Medical History:   Diagnosis Date    Afib ([address]     WATCHMAN    Anxiety     Arthritis     Cervical radiculopathy     CHF (congestive heart failure) (HCC)     Chronic sinusitis     Hilar adenopathy     Hx of blood clots Hypertension     Left ventricular systolic dysfunction     Lesion of left native kidney     Lumbar radiculopathy     Lung nodules     Meige syndrome     partial/ PCP    Microscopic colitis     Mitral regurgitation     Mild to moderate    Nonischemic cardiomyopathy ([facility]     f/u with Dr. Haroldo Verde    Osteopenia     Renal cell carcinoma of left kidney ([facility] 2/1/2022    Vertebrobasilar artery syndrome     f/u PCP       Past Surgical History:   Procedure Laterality Date    BLADDER REPAIR      BRONCHOSCOPY N/A 10/5/2021    BRONCHOSCOPY W/EBUS FNA performed by Francois Garza MD at [facility] N/A 10/5/2021    BRONCHOSCOPY DIAGNOSTIC OR CELL [address] ONLY performed by Francois Garza MD at [facility] TEST  11/25/13    Rt & LT cath    CARDIOVERSION  11/04/2019    Successful CV from AF to NSR   (Dr. Robert Hester)    COLONOSCOPY  07/2020    DIAGNOSTIC CARDIAC CATH LAB PROCEDURE  2/20/10    Dr Rukhsana Porter CATH LAB PROCEDURE  8/24/11    Right heart cath @ [facility]. DOPPLER ECHOCARDIOGRAPHY  11/25/13    HYSTERECTOMY (CERVIX STATUS UNKNOWN)  1991    total    OTHER SURGICAL HISTORY  10/14/2020    Dr. Haroldo Verde- 24mm Watchman device    PARTIAL NEPHRECTOMY Left 11/16/2021    ROBOT ASSISTED LAPAROSCOPIC COMPLEX LEFT PARTIAL NEPHRECTOMY performed by Rosalba Pulliam MD at [address]    TRANSESOPHAGEAL ECHOCARDIOGRAM  11/21/2018    Dr. Juan Lipscomb    TRANSESOPHAGEAL ECHOCARDIOGRAM  03/18/2019    WISDOM TOOTH EXTRACTION         Prior to Admission medications    Medication Sig Start Date End Date Taking? Authorizing Provider   cetirizine (ZYRTEC ALLERGY) 10 MG tablet Take 0.5 tablets by mouth daily 10/19/22   Latasha Rivas., APRN - CNP   fluticasone [facility]) 50 MCG/ACT nasal spray 1-2 sprays, each nostril daily as needed for nasal congestion.  10/19/22   Abdi Ramsey., APRN - CNP   furosemide (LASIX) 20 MG tablet Take 1 tablet by mouth daily 5/6/22 Melvin Kate MD   metoprolol succinate (TOPROL XL) 25 MG extended release tablet Take 1 tablet by mouth daily 5/6/22   Melvin Kate MD   dofetilide [facility]) 125 MCG capsule Take 1 capsule by mouth 2 times daily 3/10/22   Thao Sexton MD   aspirin 81 MG EC tablet Take 81 mg by mouth daily    Historical Provider, MD   hydrOXYzine (ATARAX) 10 MG tablet Take 1 tablet by mouth every 8 hours as needed for Itching or Anxiety 2/1/22   Trisha Cardona DO   baclofen (LIORESAL) 10 MG tablet Take 1 tablet by mouth nightly 2/1/22   Trisha Cardona DO   spironolactone (ALDACTONE) 25 MG tablet Take 1 tablet by mouth daily 12/28/21   Artie Carter MD   hydrALAZINE (APRESOLINE) 10 MG tablet Take 1 tablet by mouth 3 times daily 12/28/21   Artie Carter MD   Handicap Placard [facility] Cannot walk more than 200 feet  Expiration: 7/12/2026 7/12/21   Trisha Cardona DO   famotidine (PEPCID) 40 MG tablet Take 40 mg by mouth 2 times daily as needed     Historical Provider, MD   omeprazole (PRILOSEC) 40 MG delayed release capsule Take 40 mg by mouth daily  7/2/20   Historical Provider, MD   triamcinolone (KENALOG) 0.1 % cream Apply topically 3 times daily as needed (rash)  4/5/19   Historical Provider, MD   loperamide (IMODIUM) 2 MG capsule Take 2 mg by mouth 4 times daily as needed for Diarrhea    Historical Provider, MD   diphenhydrAMINE (BENADRYL) 25 MG tablet Take 25 mg by mouth every 6 hours as needed for Itching    Historical Provider, MD   vitamin D (CHOLECALCIFEROL) 1000 UNIT TABS tablet Take 1,000 Units by mouth daily    Historical Provider, MD         Allergies:  Bentyl [dicyclomine], Adhesive tape, Atacand [candesartan], Cefdinir, Demerol, Digoxin, Imdur [isosorbide mononitrate], Losartan potassium, Shellfish-derived products, Sulfa antibiotics, Xarelto [rivaroxaban], Acetaminophen, Apap-caff-dihydrocodeine, Coreg [carvedilol], Cozaar [losartan], Diovan [valsartan], Effexor [venlafaxine hydrochloride], Eliquis [apixaban], Labetalol, Lisinopril, and Ramipril    Social History:    TOBACCO:   reports that she has never smoked. She has never used smokeless tobacco.  ETOH:   reports current alcohol use. Family History:    Reviewed in detail and negative for DM, CAD, Cancer, CVA. Positive as follows\"      Problem Relation Age of Onset    Heart Attack Mother     Stroke Mother     Heart Attack Father     Heart Failure Father     Heart Disease Father     Anxiety Disorder Sister     Depression Sister     Crohn's Disease Son        REVIEW OF SYSTEMS:   Pertinent positives as noted in the HPI. All other systems reviewed and negative. PHYSICAL EXAM:  /81   Pulse 73   Temp 97.4 °F (36.3 °C) (Oral)   Resp 20   Ht 5' 7\" (1.702 m)   Wt 174 lb (78.9 kg)   SpO2 97%   BMI 27.25 kg/m²   General appearance: No apparent distress, appears stated age and cooperative. HEENT: Normal cephalic, atraumatic without obvious deformity. Pupils equal, round, and reactive to light. Extra ocular muscles intact. Conjunctivae/corneas clear. Neck: Supple, with full range of motion. No jugular venous distention. Trachea midline. Respiratory: CTA  Cardiovascular: RRR  Abdomen: S/NT/ND  Musculoskeletal: No clubbing, cyanosis, edema of bilateral lower extremities. Brisk capillary refill. Skin: Normal skin color. No rashes or lesions. Neurologic:  Neurovascularly intact without any focal sensory/motor deficits.  Cranial nerves: II-XII intact, grossly non-focal.  Psychiatric: Alert and oriented, thought content appropriate, normal insight    Reviewed EKG and CXR personally      CBC:   Recent Labs     12/16/22  1225   WBC 6.8   RBC 5.17   HGB 14.6   HCT 45.7   MCV 88.4   RDW 15.7*        BMP:   Recent Labs     12/16/22  1225      K 3.9      CO2 21*   BUN 19   CREATININE 1.2*   MG 2.1     LFT:  Recent Labs     12/16/22  1225   PROT 7.3   ALKPHOS 120*   ALT 26   AST 28   BILITOT 1.1   LIPASE 25     CE:  No results for input(s): Kurt Bates in the last 72 hours. PT/INR:   Recent Labs     12/16/22  1225   INR 1.1   APTT 30.2     BNP: No results for input(s): BNP in the last 72 hours. ESR:   Lab Results   Component Value Date    SEDRATE 25 (H) 01/22/2016     CRP: No results found for: CRP  D Dimer:   Lab Results   Component Value Date    DDIMER 248 11/24/2013      Folate and B12:   Lab Results   Component Value Date    [id number] 0006 (H) 12/02/2019   ,   Lab Results   Component Value Date    FOLATE 14.9 12/02/2019     Lactic Acid:   Lab Results   Component Value Date    LACTA 1.5 06/03/2021     Thyroid Studies:   Lab Results   Component Value Date    TSH 0.807 02/01/2022       Oupatient labs:  Lab Results   Component Value Date    CHOL 206 (H) 02/01/2022    TRIG 170 (H) 02/01/2022    HDL 56 02/01/2022    LDLCALC 116 (H) 02/01/2022    TSH 0.807 02/01/2022    INR 1.1 12/16/2022    LABA1C 5.7 (H) 02/01/2022       Urinalysis:    Lab Results   Component Value Date/Time    NITRU Negative 12/16/2022 12:25 PM    WBCUA 2-5 12/16/2022 12:25 PM    BACTERIA NONE SEEN 12/16/2022 12:25 PM    RBCUA NONE 12/16/2022 12:25 PM    RBCUA NONE 11/24/2013 08:45 PM    BLOODU Negative 12/16/2022 12:25 PM    SPECGRAV 1.010 12/16/2022 12:25 PM    GLUCOSEU Negative 12/16/2022 12:25 PM       Imaging:  XR CHEST (2 VW)    Result Date: 12/16/2022  EXAMINATION: TWO XRAY VIEWS OF THE CHEST 12/16/2022 1:06 pm COMPARISON: 12/07/2022. HISTORY: ORDERING SYSTEM PROVIDED HISTORY: sob TECHNOLOGIST PROVIDED HISTORY: Reason for exam:->sob What reading provider will be dictating this exam?->CRC FINDINGS: The cardiomediastinal silhouette is slightly prominent. Peribronchial cuffing bilateral hilar prominence is seen. Mild prominence of the bronchovascular interstitial lung markings is visualized. Haziness overlying bilateral costophrenic angles with fluid level consistent with small bilateral pleural effusions. Biapical prominence consistent with COPD changes.   No evidence of pneumothorax is seen. The osseous structures are without acute process. COPD changes, congestive changes. XR SHOULDER RIGHT (MIN 2 VIEWS)    Result Date: 12/16/2022  EXAMINATION: THREE XRAY VIEWS OF THE RIGHT SHOULDER 12/16/2022 1:06 pm COMPARISON: 11/20/2018 HISTORY: ORDERING SYSTEM PROVIDED HISTORY: fall TECHNOLOGIST PROVIDED HISTORY: Reason for exam:->fall What reading provider will be dictating this exam?->CRC FINDINGS: Osteopenia is noted. No acute fracture or dislocation is identified. Relatively mild degenerative changes are seen at the glenohumeral and acromioclavicular joints. Osteopenia and mild degenerative changes. No acute bony abnormality. CT CHEST WO CONTRAST    Result Date: 12/7/2022  EXAMINATION: CT OF THE CHEST WITHOUT CONTRAST 12/7/2022 12:26 pm TECHNIQUE: CT of the chest was performed without the administration of intravenous contrast. Multiplanar reformatted images are provided for review. Automated exposure control, iterative reconstruction, and/or weight based adjustment of the mA/kV was utilized to reduce the radiation dose to as low as reasonably achievable. COMPARISON: April 13, 2022 HISTORY: ORDERING SYSTEM PROVIDED HISTORY: Mediastinal lymphadenopathy TECHNOLOGIST PROVIDED HISTORY: What reading provider will be dictating this exam?->CRC FINDINGS: There is borderline cardiac size. The great vessels are normal.  Moderately enlarged mediastinal lymph nodes are noted, lymph nodes measuring up to 1.1 -1.3 cm in the mediastinum which are slightly progressive. The trachea and major bronchi are patent. There is a previously noted 1.1 cm nodule in the right lower lobe abutting the major fissure. A  7 mm ground-glass nodule in the left upper lobe is unchanged. The remainder of the lungs are clear. There is scarring in the left lung base. The liver is of normal architecture. There is a small hiatal hernia.  Impression persistent slightly progressive mediastinal adenopathy. Surveillance with CT or PET-CT scan is recommended. Persistent nodular density in the right upper lobe and ground-glass nodule in the left upper lobe. Continued surveillance according to Fleischner society guidelines is recommended. CT CERVICAL SPINE WO CONTRAST    Result Date: 12/16/2022  EXAMINATION: CT OF THE CERVICAL SPINE WITHOUT CONTRAST 12/16/2022 2:54 pm TECHNIQUE: CT of the cervical spine was performed without the administration of intravenous contrast. Multiplanar reformatted images are provided for review. Automated exposure control, iterative reconstruction, and/or weight based adjustment of the mA/kV was utilized to reduce the radiation dose to as low as reasonably achievable. COMPARISON: Rose 3 2021 HISTORY: ORDERING SYSTEM PROVIDED HISTORY: pain TECHNOLOGIST PROVIDED HISTORY: Reason for exam:->pain Decision Support Exception - unselect if not a suspected or confirmed emergency medical condition->Emergency Medical Condition (MA) What reading provider will be dictating this exam?->CRC FINDINGS: No cervical spine fracture or malalignment. Moderate disc space narrowing at C5/C6. Degenerative posterior disc/osteophyte complex also at C5/C6 results in mild effacement of ventral thecal sac. Facet arthropathy at multiple segments notable on the right at C3/C4. No prevertebral soft tissue edema. Redemonstration of heterogeneous appearance of the thyroid gland. 1. No acute abnormality involving the cervical spine. 2. Redemonstration of heterogeneous appearance of the thyroid gland. Ultrasound could be helpful for further evaluation. CTA PULMONARY W CONTRAST    Result Date: 12/17/2022  EXAMINATION: CTA OF THE CHEST 12/17/2022 2:19 am TECHNIQUE: CTA of the chest was performed after the administration of intravenous contrast.  Multiplanar reformatted images are provided for review. MIP images are provided for review.  Automated exposure control, iterative reconstruction, and/or weight based adjustment of the mA/kV was utilized to reduce the radiation dose to as low as reasonably achievable. COMPARISON: None. HISTORY: ORDERING SYSTEM PROVIDED HISTORY: r/o pe TECHNOLOGIST PROVIDED HISTORY: Reason for exam:->r/o pe Decision Support Exception - unselect if not a suspected or confirmed emergency medical condition->Emergency Medical Condition (MA) What reading provider will be dictating this exam?->CRC FINDINGS: Pulmonary Arteries: Pulmonary arteries are adequately opacified for evaluation. Small burden pulmonary emboli to the bilateral lower lobe basilar divisions, right greater than left. Main pulmonary artery is normal in caliber. Mediastinum: No evidence of mediastinal lymphadenopathy. The heart and pericardium demonstrate no acute abnormality. There is no acute abnormality of the thoracic aorta. Moderate cardiomegaly with no pericardial effusions. Left atrial appendage closure device noted. Lungs/pleura: The lungs are without acute process. No focal consolidation or pulmonary edema. No evidence of  pneumothorax. Trace bilateral pleural effusions are present. Upper Abdomen: Limited images of the upper abdomen are unremarkable. Soft Tissues/Bones: No acute bone or soft tissue abnormality. Small burden acute subsegmental pulmonary emboli to the bilateral lower lobes. RECOMMENDATIONS: Careful clinical correlation and follow up recommended.        ASSESSMENT:  -Pulmonary emboli  -Acute on chronic congestive heart failure  -Hypertension  -Anxiety/depression  -GERD      PLAN:  -Admit to medicine  -Consult cardiology  -Lovenox 1 mg/kg twice daily  -Lasix 40 mg IV twice daily  -Telemetry  -Daily weights  -I's and O's  -Continue home medications      Diet: No diet orders on file  Code Status: Prior  Surrogate decision maker confirmed with patient:   Extended Emergency Contact Information  Primary Emergency Contact: Mitchell Charles  Address: Vidya Newman [address] [place] [address] of [address] [facility] Phone: [phone]  Mobile Phone: [phone]  Relation: Child  Secondary Emergency Contact: Purvi Díaz [facility] [address] [facility] Phone: [phone]  Mobile Phone: [phone]  Relation: Brother/Sister    DVT Prophylaxis: []Lovenox []Heparin []PCD [] [address] []Encouraged ambulation  Disposition: []Med/Surg [] Intermediate [] ICU/CCU  Admit status: [] Observation [] Inpatient     +++++++++++++++++++++++++++++++++++++++++++++++++  Pat Mantle, DO  +++++++++++++++++++++++++++++++++++++++++++++++++  NOTE: This report was transcribed using voice recognition software. Every effort was made to ensure accuracy; however, inadvertent computerized transcription errors may be present.

## 2022-12-17 NOTE — CONSULTS
I independently performed an evaluation on the patient. I have reviewed the above documentation completed by the advance practitioner. Please see my additional contributions to the HPI, physical exam, assessment and medical decision making. Physical Exam   /71   Pulse 82   Temp 97.4 °F (36.3 °C) (Oral)   Resp 20   Ht 5' 7\" (1.702 m)   Wt 174 lb (78.9 kg)   SpO2 97%   BMI 27.25 kg/m²   Constitutional: Oriented to person, place, and time. Well-developed and well-nourished. No distress. Head: Normocephalic and atraumatic. Eyes: EOM are normal. Pupils are equal, round, and reactive to light. Neck: Normal range of motion. Neck supple. No hepatojugular reflux and no JVD present. Carotid bruit is not present. No tracheal deviation present. No thyromegaly present. Cardiovascular: Normal rate, regular rhythm, normal heart sounds and intact distal pulses. Exam reveals no gallop and no friction rub. No murmur heard. Pulmonary/Chest: Effort normal and breath sounds normal. No respiratory distress. No wheezes. No rales. No tenderness. Abdominal: Soft. Bowel sounds are normal. No distension and no mass. No tenderness. No rebound and no guarding. Musculoskeletal: Normal range of motion. No edema and no tenderness. Lymphadenopathy:   No cervical adenopathy. Neurological: Alert and oriented to person, place, and time. Skin: Skin is warm and dry. No rash noted. Not diaphoretic. No erythema. Psychiatric: Normal mood and affect. Behavior is normal.     JAIME Summary 10/18/2021:   Ejection fraction is visually estimated at 45-50%. Normal right ventricular size and function. No evidence of interatrial shunting on bubble study. History of WATCHMAN device (24 mm). No significant color flow jet around the device. No device related thrombus visualized. Moderate mitral regurgitation. Mild aortic regurgitation. Moderate tricuspid regurgitation. RV-RA gradient is estimated at 82 mmHg.      See accompanying documentation for full consult. ASSESSMENT AND PLAN:  1. Acute on chronic systolic CHF/CAIN:     EF 96-01% by JAIME 10/2021. Echo ordered. Gently diurese and follow labs. Toprol/hydralazine/aldactone. Allergic/Intolerance to ACE/ARB/ nitrates    Consider SGLT2. 2. PAF:     In sinus. Hx of Watchman 10/14/2020. Due for follow up JAIME. ASA. Rhythm controlled with tikosyn/BB. Follows with [facility] EP. 3. VHD: Mild AI/mod MR/mod TR/severe PH by JAIME 10/2021. Medically manage. 4. Chronic LBBB. 5. PE: Per primary service. Echo. Lovenox to DOAC. 6. HTN: Observe. 7. Hx of left partial nephrectomy for renal CA 11/2021 Dr Khan See     8. Lipids: Consider statin. 9. TIA: ASA. Consider statin. 10. CKD: Follow labs. 11. Migraines    12. IBS    Abrahan White D.O.   Cardiologist  Cardiology, [facility]

## 2022-12-18 ENCOUNTER — APPOINTMENT (OUTPATIENT)
Dept: MRI IMAGING | Age: 77
End: 2022-12-18
Payer: MEDICARE

## 2022-12-18 LAB
ALBUMIN SERPL-MCNC: 4 G/DL (ref 3.5–5.2)
ALP BLD-CCNC: 123 U/L (ref 35–104)
ALT SERPL-CCNC: 22 U/L (ref 0–32)
ANION GAP SERPL CALCULATED.3IONS-SCNC: 17 MMOL/L (ref 7–16)
AST SERPL-CCNC: 30 U/L (ref 0–31)
BASOPHILS ABSOLUTE: 0.09 E9/L (ref 0–0.2)
BASOPHILS RELATIVE PERCENT: 1.1 % (ref 0–2)
BILIRUB SERPL-MCNC: 1.5 MG/DL (ref 0–1.2)
BUN BLDV-MCNC: 21 MG/DL (ref 6–23)
CALCIUM SERPL-MCNC: 9.7 MG/DL (ref 8.6–10.2)
CHLORIDE BLD-SCNC: 99 MMOL/L (ref 98–107)
CHOLESTEROL, TOTAL: 151 MG/DL (ref 0–199)
CO2: 20 MMOL/L (ref 22–29)
CREAT SERPL-MCNC: 1.3 MG/DL (ref 0.5–1)
EOSINOPHILS ABSOLUTE: 0.4 E9/L (ref 0.05–0.5)
EOSINOPHILS RELATIVE PERCENT: 4.7 % (ref 0–6)
GFR SERPL CREATININE-BSD FRML MDRD: 42 ML/MIN/1.73
GLUCOSE BLD-MCNC: 147 MG/DL (ref 74–99)
HCT VFR BLD CALC: 46.8 % (ref 34–48)
HDLC SERPL-MCNC: 55 MG/DL
HEMOGLOBIN: 15 G/DL (ref 11.5–15.5)
IMMATURE GRANULOCYTES #: 0.03 E9/L
IMMATURE GRANULOCYTES %: 0.4 % (ref 0–5)
LDL CHOLESTEROL CALCULATED: 82 MG/DL (ref 0–99)
LYMPHOCYTES ABSOLUTE: 0.96 E9/L (ref 1.5–4)
LYMPHOCYTES RELATIVE PERCENT: 11.3 % (ref 20–42)
MAGNESIUM: 2 MG/DL (ref 1.6–2.6)
MCH RBC QN AUTO: 28.4 PG (ref 26–35)
MCHC RBC AUTO-ENTMCNC: 32.1 % (ref 32–34.5)
MCV RBC AUTO: 88.5 FL (ref 80–99.9)
MONOCYTES ABSOLUTE: 0.5 E9/L (ref 0.1–0.95)
MONOCYTES RELATIVE PERCENT: 5.9 % (ref 2–12)
NEUTROPHILS ABSOLUTE: 6.54 E9/L (ref 1.8–7.3)
NEUTROPHILS RELATIVE PERCENT: 76.6 % (ref 43–80)
PDW BLD-RTO: 15.6 FL (ref 11.5–15)
PLATELET # BLD: 223 E9/L (ref 130–450)
PMV BLD AUTO: 10.7 FL (ref 7–12)
POTASSIUM SERPL-SCNC: 3.6 MMOL/L (ref 3.5–5)
RBC # BLD: 5.29 E12/L (ref 3.5–5.5)
SODIUM BLD-SCNC: 136 MMOL/L (ref 132–146)
TOTAL PROTEIN: 7.6 G/DL (ref 6.4–8.3)
TRIGL SERPL-MCNC: 68 MG/DL (ref 0–149)
VLDLC SERPL CALC-MCNC: 14 MG/DL
WBC # BLD: 8.5 E9/L (ref 4.5–11.5)

## 2022-12-18 PROCEDURE — 2580000003 HC RX 258: Performed by: FAMILY MEDICINE

## 2022-12-18 PROCEDURE — 85025 COMPLETE CBC W/AUTO DIFF WBC: CPT

## 2022-12-18 PROCEDURE — 6370000000 HC RX 637 (ALT 250 FOR IP): Performed by: FAMILY MEDICINE

## 2022-12-18 PROCEDURE — 72141 MRI NECK SPINE W/O DYE: CPT

## 2022-12-18 PROCEDURE — 93005 ELECTROCARDIOGRAM TRACING: CPT

## 2022-12-18 PROCEDURE — 80053 COMPREHEN METABOLIC PANEL: CPT

## 2022-12-18 PROCEDURE — 99233 SBSQ HOSP IP/OBS HIGH 50: CPT | Performed by: INTERNAL MEDICINE

## 2022-12-18 PROCEDURE — 83735 ASSAY OF MAGNESIUM: CPT

## 2022-12-18 PROCEDURE — 80061 LIPID PANEL: CPT

## 2022-12-18 PROCEDURE — 36415 COLL VENOUS BLD VENIPUNCTURE: CPT

## 2022-12-18 PROCEDURE — 1200000000 HC SEMI PRIVATE

## 2022-12-18 PROCEDURE — 99222 1ST HOSP IP/OBS MODERATE 55: CPT | Performed by: NEUROLOGICAL SURGERY

## 2022-12-18 PROCEDURE — 6370000000 HC RX 637 (ALT 250 FOR IP): Performed by: EMERGENCY MEDICINE

## 2022-12-18 PROCEDURE — 6360000002 HC RX W HCPCS: Performed by: FAMILY MEDICINE

## 2022-12-18 RX ADMIN — ENOXAPARIN SODIUM 80 MG: 100 INJECTION SUBCUTANEOUS at 20:34

## 2022-12-18 RX ADMIN — PANTOPRAZOLE SODIUM 40 MG: 40 TABLET, DELAYED RELEASE ORAL at 05:28

## 2022-12-18 RX ADMIN — METOPROLOL SUCCINATE 25 MG: 25 TABLET, EXTENDED RELEASE ORAL at 09:28

## 2022-12-18 RX ADMIN — SODIUM CHLORIDE, PRESERVATIVE FREE 10 ML: 5 INJECTION INTRAVENOUS at 20:35

## 2022-12-18 RX ADMIN — HYDRALAZINE HYDROCHLORIDE 10 MG: 10 TABLET, FILM COATED ORAL at 20:34

## 2022-12-18 RX ADMIN — ASPIRIN 81 MG: 81 TABLET, COATED ORAL at 09:28

## 2022-12-18 RX ADMIN — Medication 1000 UNITS: at 09:31

## 2022-12-18 RX ADMIN — HYDRALAZINE HYDROCHLORIDE 10 MG: 10 TABLET, FILM COATED ORAL at 09:28

## 2022-12-18 RX ADMIN — BACLOFEN 10 MG: 10 TABLET ORAL at 20:34

## 2022-12-18 RX ADMIN — FUROSEMIDE 40 MG: 10 INJECTION, SOLUTION INTRAMUSCULAR; INTRAVENOUS at 17:47

## 2022-12-18 RX ADMIN — HYDRALAZINE HYDROCHLORIDE 10 MG: 10 TABLET, FILM COATED ORAL at 13:43

## 2022-12-18 RX ADMIN — SODIUM CHLORIDE, PRESERVATIVE FREE 10 ML: 5 INJECTION INTRAVENOUS at 09:30

## 2022-12-18 RX ADMIN — FAMOTIDINE 40 MG: 20 TABLET, FILM COATED ORAL at 09:28

## 2022-12-18 RX ADMIN — CETIRIZINE HYDROCHLORIDE 5 MG: 5 TABLET ORAL at 09:28

## 2022-12-18 RX ADMIN — SPIRONOLACTONE 25 MG: 25 TABLET ORAL at 09:38

## 2022-12-18 RX ADMIN — ENOXAPARIN SODIUM 80 MG: 100 INJECTION SUBCUTANEOUS at 09:30

## 2022-12-18 RX ADMIN — FUROSEMIDE 40 MG: 10 INJECTION, SOLUTION INTRAMUSCULAR; INTRAVENOUS at 09:29

## 2022-12-18 ASSESSMENT — PAIN SCALES - GENERAL: PAINLEVEL_OUTOF10: 4

## 2022-12-18 NOTE — PROGRESS NOTES
bruising  Extremities:  Decreased ROM, peripheral edema, mottling    Objective:    /70   Pulse 83   Temp 98 °F (36.7 °C) (Oral)   Resp 20   Ht 5' 7\" (1.702 m)   Wt 176 lb 2.4 oz (79.9 kg)   SpO2 95%   BMI 27.59 kg/m²     General appearance: No apparent distress, appears stated age and cooperative. HEENT: Conjunctivae/corneas clear. Mucous membranes moist.  Neck: Supple. No JVD. Respiratory:  Clear to auscultation bilaterally. Normal respiratory effort. Cardiovascular:  RRR. S1, S2 without MRG. PV: Pulses palpable. No edema. Abdomen: Soft, non-tender, non-distended. +BS  Musculoskeletal: No obvious deformities. Skin: Normal skin color. No rashes or lesions. Good turgor. Neurologic:  Grossly non-focal. Awake, alert, following commands.    Psychiatric: Alert and oriented, thought content appropriate, normal insight and judgement    Medications:  REVIEWED DAILY    Infusion Medications    sodium chloride       Scheduled Medications    aspirin  81 mg Oral Daily    baclofen  10 mg Oral Nightly    cetirizine  5 mg Oral Daily    dofetilide  125 mcg Oral BID    famotidine  40 mg Oral Daily    hydrALAZINE  10 mg Oral TID    metoprolol succinate  25 mg Oral Daily    pantoprazole  40 mg Oral QAM AC    spironolactone  25 mg Oral Daily    Vitamin D  1,000 Units Oral Daily    sodium chloride flush  5-40 mL IntraVENous 2 times per day    enoxaparin  1 mg/kg SubCUTAneous BID    furosemide  40 mg IntraVENous BID    lidocaine  1 patch TransDERmal Daily     PRN Meds: sodium chloride flush, sodium chloride, polyethylene glycol, perflutren lipid microspheres    Labs:     Recent Labs     12/16/22  1225   WBC 6.8   HGB 14.6   HCT 45.7          Recent Labs     12/16/22  1225 12/17/22  0524    140   K 3.9 3.8    105   CO2 21* 21*   BUN 19 19   CREATININE 1.2* 1.3*   CALCIUM 9.6 9.3       Recent Labs     12/16/22  1225   PROT 7.3   ALKPHOS 120*   ALT 26   AST 28   BILITOT 1.1   LIPASE 25       Recent Labs     12/16/22  1225   INR 1.1       No results for input(s): [employer] in the last 72 hours. Chronic labs:    Lab Results   Component Value Date    CHOL 206 (H) 02/01/2022    TRIG 170 (H) 02/01/2022    HDL 56 02/01/2022    LDLCALC 116 (H) 02/01/2022    TSH 0.807 02/01/2022    INR 1.1 12/16/2022    LABA1C 5.7 (H) 02/01/2022       Radiology: REVIEWED DAILY    Assessment:  Shortness of breath/chest pain likely secondary to pulmonary emboli and congestive heart failure  Acute on chronic congestive heart failure JAIME completed on 10/18/2021 showed EF 45 to 50%, moderate mitral regurgitation, mild aortic regurgitation, moderate tricuspid regurgitation  Elevated troponin 39 on presentation, repeat 38  proBNP elevated at 7473  History of watchman device  Pulmonary embolism  Chest pain  Hypertension  GERD  History of anxiety depression  Cervical neck pain  Plan:  Cardiology consulted  Consult pulmonology  Echo ordered  Lovenox 1 mg/kg twice daily  Lasix 40 mg IV twice daily  Daily weights and strict intake and output  MRI ordered, neurosurgery consulted. No acute abnormality or significant stenosis appreciated on MRI, no neurosurgical intervention. Continue home medications Tikosyn, Lasix, hydralazine, Toprol, Aldactone, Pepcid, Protonix, aspirin baclofen, Zyrtec      DVT Prophylaxis [x] Lovenox  []  Heparin [] DOAC [] PCDs [] Ambulation    GI Prophylaxis [] PPI  [] H2 Blocker   [] Carafate  [x] Diet/Tube Feeds   Level of care [] Med/Surg  [x] Intermediate  []  ICU   Diet ADULT DIET; Regular; Low Sodium (2 gm)    Family contact [x]  N/A    [] At bedside  [] Phone call   Disposition Patient requires continued admission further evaluation and treatment of PEA congestive heart failure   MDM [] Low    [x] Moderate  []   High       Discharge Plan:  To home when clinically stable    +++++++++++++++++++++++++++++++++++++++++++++++++  UYEN Soria Physician - Valeria Keane [address] [address] [address]  +++++++++++++++++++++++++++++++++++++++++++++++++  NOTE: This report was transcribed using voice recognition software. Every effort was made to ensure accuracy; however, inadvertent computerized transcription errors may be present.

## 2022-12-18 NOTE — CONSULTS
Pulmonary Consultation    Admit Date: 12/16/2022  Requesting Physician: Lauryn Resendiz DO    CC:  pulmonary embolism     HPI:  66-year-old female presenting the department for neck pain, shooting down her right arm, shortness of breath. Neurologically intact on exam, thought she may be having issues with her CHF as she has a history of congestive heart failure, was getting more short of breath, and had more swelling in her legs. Patient not hypoxic, did not have chest pain, was not significant tachycardic. However upon later questioning she did admit she had some chest pain earlier and she thought was related to her neck pain. A CTA was ordered due to her chest pain and shortness of breath. Noted pleural effusions along with pulmonary embolism. She was given Lasix and Lovenox, admitted to hospital for further work-up management of CHF exacerbation and pulmonary embolism  She has known pulmonary nodules and lymphadenopathy follows with Dr. Naomi Hernandez.  No underlying lung disease on no inhalers or supplemental oxygen     Sitting up on side of bed on room air, daughter at bedside  Denies cough, dyspnea at rest          PMH:    Past Medical History:   Diagnosis Date    Afib ([address]     WATCHMAN    Anxiety     Arthritis     Cervical radiculopathy     CHF (congestive heart failure) (HCC)     Chronic sinusitis     Hilar adenopathy     Hx of blood clots     Hypertension     Left ventricular systolic dysfunction     Lesion of left native kidney     Lumbar radiculopathy     Lung nodules     Meige syndrome     partial/ PCP    Microscopic colitis     Mitral regurgitation     Mild to moderate    Nonischemic cardiomyopathy ([address]     f/u with Dr. Jane Mancilla    Osteopenia     Renal cell carcinoma of left kidney ([address] 2/1/2022    Vertebrobasilar artery syndrome     f/u PCP   HLD  Anxiety  TIA  Migraines  Irritable bowel syndrome          PSH:    Past Surgical History:   Procedure Laterality Date    BLADDER REPAIR      BRONCHOSCOPY edema  Otherwise, a complete review of systems is undertaken and is negative. Social History:  Social History     Socioeconomic History    Marital status:      Spouse name: Not on file    Number of children: Not on file    Years of education: Not on file    Highest education level: Not on file   Occupational History    Not on file   Tobacco Use    Smoking status: Never    Smokeless tobacco: Never   Vaping Use    Vaping Use: Never used   Substance and Sexual Activity    Alcohol use: Yes     Alcohol/week: 0.0 standard drinks     Comment: occasional wine/mixed drink every few months    Drug use: Never    Sexual activity: Not on file     Comment:    Other Topics Concern    Not on file   Social History Narrative    Drinks 1-2 cups of coffee daily.       Social Determinants of Health     Financial Resource Strain: Not on file   Food Insecurity: Not on file   Transportation Needs: Not on file   Physical Activity: Not on file   Stress: Not on file   Social Connections: Not on file   Intimate Partner Violence: Not on file   Housing Stability: Not on file       Family History:  Family History   Problem Relation Age of Onset    Heart Attack Mother     Stroke Mother     Heart Attack Father     Heart Failure Father     Heart Disease Father     Anxiety Disorder Sister     Depression Sister     Crohn's Disease Son    Father with  at age 79 mother  of CVA at age 79    Medications:     sodium chloride        aspirin  81 mg Oral Daily    baclofen  10 mg Oral Nightly    cetirizine  5 mg Oral Daily    [Held by provider] dofetilide  125 mcg Oral BID    famotidine  40 mg Oral Daily    hydrALAZINE  10 mg Oral TID    metoprolol succinate  25 mg Oral Daily    pantoprazole  40 mg Oral QAM AC    spironolactone  25 mg Oral Daily    Vitamin D  1,000 Units Oral Daily    sodium chloride flush  5-40 mL IntraVENous 2 times per day    enoxaparin  1 mg/kg SubCUTAneous BID    furosemide  40 mg IntraVENous BID    lidocaine  1 patch TransDERmal Daily         Vitals:    VITALS:  /81   Pulse 75   Temp 98 °F (36.7 °C) (Oral)   Resp 20   Ht 5' 7\" (1.702 m)   Wt 176 lb 2.4 oz (79.9 kg)   SpO2 95%   BMI 27.59 kg/m²   24HR INTAKE/OUTPUT:    Intake/Output Summary (Last 24 hours) at 2022 1502  Last data filed at 2022 0531  Gross per 24 hour   Intake 480 ml   Output --   Net 480 ml     CURRENT PULSE OXIMETRY:  SpO2: 95 %  24HR PULSE OXIMETRY RANGE:  SpO2  Av.3 %  Min: 95 %  Max: 96 %      EXAM:  General: No distress. Eyes: PERRL. No sclera icterus. No conjunctival injection. ENT: No discharge. Pharynx clear. Neck: Trachea midline. Normal thyroid. Resp: No accessory muscle use. No crackles. No wheezing. No rhonchi. CV: Regular rate. Regular rhythm. No mumur or rub. ABD: Non-tender. Non-distended. No masses. No organmegaly. Normal bowel sounds. Skin: Warm and dry. No nodule on exposed extremities. No rash on exposed extremities. Lymph: No cervical LAD. No supraclavicular LAD. Ext: No joint deformity. No clubbing. No cyanosis. 1+ BLE > RLE edema  Neuro: Awake. Follows commands      Lab Results:  CBC:   Recent Labs     22  1225 22  1102   WBC 6.8 8.5   HGB 14.6 15.0   HCT 45.7 46.8   MCV 88.4 88.5    223     BMP:   Recent Labs     22  1225 22  0524 22  1102    140 136   K 3.9 3.8 3.6    105 99   CO2 21* 21* 20*   BUN 19 19 21   CREATININE 1.2* 1.3* 1.3*     LFT:   Recent Labs     22  1225 22  1102   ALKPHOS 120* 123*   ALT 26 22   AST 28 30   PROT 7.3 7.6   BILITOT 1.1 1.5*   LABALBU 3.9 4.0     PT/INR:   Recent Labs     22  1225   PROTIME 12.2   INR 1.1       Cultures:  No results for input(s): CULTRESP in the last 72 hours. ABG:   No results for input(s): PH, PO2, PCO2, HCO3, BE, O2SAT in the last 72 hours. Films:  XR CHEST (2 VW)    Result Date: 2022  COPD changes, congestive changes.      XR SHOULDER RIGHT (MIN 2 VIEWS)    Result Date: 12/16/2022  Osteopenia and mild degenerative changes. No acute bony abnormality. CT CERVICAL SPINE WO CONTRAST    Result Date: 12/16/2022  1. No acute abnormality involving the cervical spine. 2. Redemonstration of heterogeneous appearance of the thyroid gland. Ultrasound could be helpful for further evaluation. CTA PULMONARY W CONTRAST  Result Date: 12/17/2022  Small burden acute subsegmental pulmonary emboli to the bilateral lower lobes. RECOMMENDATIONS: Careful clinical correlation and follow up recommended. Assessment:  80-year-old female vaccinated for corona and flu vaccine, non-smoker retired jewelry  with history of PAF, intolerant to multiple cardiac medications including oral anticoagulation, on Tikosyn, history of left atrial thrombus in 2018. Status post watchman 10/14/2020, chronic heart failure intermediate EF, HTN, valvular heart disease, pulmonary hypertension unable to tolerate long-acting nitrates, renal cell carcinoma 2021 status post partial nephrectomy, anxiety  With chronic neck pain  12/16 presented to urgent care with neck pain shooting down right arm with shortness of breath. Also complains of toe cramping  CT chest showing multisegmental PE  CT cervical negative  Chest x-ray with congestion  12/17 COVID influenza negative  12/18 MRI cervical spine showing mild degenerative changes  saturating 94% on room air      PE  history of allergic reactions to anticoagulations allergic to  Allergic to rivaroxaban with significant skin reaction that she manages with hydroxyzine. Also allergic to dabigatran and apixaban. With rash itching. Acute on chronic systolic heart failure EF 45% on JAIME 10/2022  A.  Fib status post CV on Tikosyn  Watchman's procedure placed 10/14/2020  History of left atrial thrombus 2018 and 2019 with device related thrombus 4/11/2021 resolved on JAIME  Nonischemic CMP EF 45% negative cardiac catheterization 4/10/2010  Valvular heart disease moderate to severe MR echo  Pulmonary hypertension not able to tolerate long-acting nitrates  Left bundle branch block  CKD with h/o left renal cell carcinoma status post resection 11/2021  History of lumbar and cervical radiculopathy worked up with negative CT head and MRI and neurosurgical evaluation        Pulmonary embolism  Small bilateral lower lobes  On lovenox  Has a problem with oral anticoagulants from past with itch and rash, only tolerated xeralto   Ok to transition to xeralto in am  History of kidney cancer s/p left partial nephrectomy , high risk for clots? Ok to consult hematology for discrasia work up   [address] >90%  Supplemental O2 if needed   BLE edema > right,   2. Neck pain  Neurosurgery following  Budging discs cervical level, no intervention   3. Pulmonary nodules   Follows with Dr. Carol Irving chest WO for Fu 12/7 /22, reviewed and no change from previous, moderately enlarged mediastinal lymph node 1.3 cm from 1.1  , unchanged 1.1 cm nodule RLL, 7mm GG nodule YELITZA unchanged   Recommend Fu Ct chest in one year   4. Afib   Previous watchman device,   On tikosyn  Caradiology consulted     Currently on Lovenox treatment doses  Being diuresed on Lasix and spironolactone  With history of allergies to oral anticoagulant medications. Patient benefit from being on Coumadin  Or other option isr being discharged on the molecular weight Lovenox subcutaneously 1.5 mg/kg daily . We discussed with her her options and benefits and complications of the above medications  Diurese patient  Patient also has multiple medication allergies to ACE inhibitor's ARB       Thank you for allowing us to see this patient in consultation. Please contact us with any questions.       Electronically signed by UYEN Reyes on 12/18/2022 at 3:02 PM

## 2022-12-18 NOTE — CONSULTS
Chief Complaint:   Chief Complaint   Patient presents with    Neck Pain    Shortness of Breath    Back Pain     Patient has multiple complaints for 11 days. Neck spasms, SOB, foot swelling, toe cramping, shoulder pain. Went to urgent care Sunday and given muscle relaxers but not feeling better. HPI:     I had the pleasure of seeing Vincent Friend today in house. As you know, this 68year old, right handed,  mother of three, non-smoker and retired [id number]  presents with a chronic history of neck pain but now with an acute exacerbation of spasms. She endorses right shoulder pain but no clear radiculopathy. She denies loss of bowel, bladder, sensation or power.   She was also worked up for cough and SOB and found to have PE    Past Medical History:   Diagnosis Date    Afib ([address]     WATCHMAN    Anxiety     Arthritis     Cervical radiculopathy     CHF (congestive heart failure) (HCC)     Chronic sinusitis     Hilar adenopathy     Hx of blood clots     Hypertension     Left ventricular systolic dysfunction     Lesion of left native kidney     Lumbar radiculopathy     Lung nodules     Meige syndrome     partial/ PCP    Microscopic colitis     Mitral regurgitation     Mild to moderate    Nonischemic cardiomyopathy ([address]     f/u with Dr. Janett Licea    Osteopenia     Renal cell carcinoma of left kidney ([address] 2/1/2022    Vertebrobasilar artery syndrome     f/u PCP     Past Surgical History:   Procedure Laterality Date    BLADDER REPAIR      BRONCHOSCOPY N/A 10/5/2021    BRONCHOSCOPY W/EBUS FNA performed by Carlos Graham MD at [facility] N/A 10/5/2021    BRONCHOSCOPY DIAGNOSTIC OR [facility] ONLY performed by Carlos Graham MD at [address] [facility] TEST  11/25/13    Rt & LT cath    CARDIOVERSION  11/04/2019    Successful CV from AF to NSR   (Dr. Margarita Lopez)    COLONOSCOPY  07/2020    DIAGNOSTIC CARDIAC CATH LAB PROCEDURE  2/20/10 Dr Santi Mccann CATH LAB PROCEDURE  8/24/11    Right heart cath @ [facility]. DOPPLER ECHOCARDIOGRAPHY  11/25/13    HYSTERECTOMY (CERVIX STATUS UNKNOWN)  1991    total    OTHER SURGICAL HISTORY  10/14/2020    Dr. Carl Wallace- 24mm Watchman device    PARTIAL NEPHRECTOMY Left 11/16/2021    ROBOT ASSISTED LAPAROSCOPIC COMPLEX LEFT PARTIAL NEPHRECTOMY performed by Razia Mueller MD at [address] [place]    TRANSESOPHAGEAL ECHOCARDIOGRAM  11/21/2018    Dr. Pearl Carreon    TRANSESOPHAGEAL ECHOCARDIOGRAM  03/18/2019    WISDOM TOOTH EXTRACTION        Family History   Problem Relation Age of Onset    Heart Attack Mother     Stroke Mother     Heart Attack Father     Heart Failure Father     Heart Disease Father     Anxiety Disorder Sister     Depression Sister     Crohn's Disease Son       Social History     Socioeconomic History    Marital status:      Spouse name: Not on file    Number of children: Not on file    Years of education: Not on file    Highest education level: Not on file   Occupational History    Not on file   Tobacco Use    Smoking status: Never    Smokeless tobacco: Never   Vaping Use    Vaping Use: Never used   Substance and Sexual Activity    Alcohol use: Yes     Alcohol/week: 0.0 standard drinks     Comment: occasional wine/mixed drink every few months    Drug use: Never    Sexual activity: Not on file     Comment:    Other Topics Concern    Not on file   Social History Narrative    Drinks 1-2 cups of coffee daily.       Social Determinants of Health     Financial Resource Strain: Not on file   Food Insecurity: Not on file   Transportation Needs: Not on file   Physical Activity: Not on file   Stress: Not on file   Social Connections: Not on file   Intimate Partner Violence: Not on file   Housing Stability: Not on file       Medications:   Current Facility-Administered Medications   Medication Dose Route Frequency Provider Last Rate Last Admin    aspirin EC tablet 81 mg  81 mg Oral Daily Lauren Mile, DO   81 mg at 12/18/22 4848    baclofen (LIORESAL) tablet 10 mg  10 mg Oral Nightly Maile Mile, DO   10 mg at 12/17/22 2056    cetirizine (ZYRTEC) tablet 5 mg  5 mg Oral Daily Maile Mile, DO   5 mg at 12/18/22 9033    dofetilide (TIKOSYN) capsule 125 mcg  125 mcg Oral BID Maile Mile, DO        famotidine (PEPCID) tablet 40 mg  40 mg Oral Daily Maile Mile, DO   40 mg at 12/18/22 1269    hydrALAZINE (APRESOLINE) tablet 10 mg  10 mg Oral TID Maile Mile, DO   10 mg at 12/18/22 8191    metoprolol succinate (TOPROL XL) extended release tablet 25 mg  25 mg Oral Daily Maile Mile, DO   25 mg at 12/18/22 0928    pantoprazole (PROTONIX) tablet 40 mg  40 mg Oral QAM AC Maile Mile, DO   40 mg at 12/18/22 8973    spironolactone (ALDACTONE) tablet 25 mg  25 mg Oral Daily Maile Mile, DO   25 mg at 12/18/22 4781    vitamin D (CHOLECALCIFEROL) tablet 1,000 Units  1,000 Units Oral Daily Maile Mile, DO   1,000 Units at 12/18/22 0931    sodium chloride flush 0.9 % injection 5-40 mL  5-40 mL IntraVENous 2 times per day Maile Mile, DO   10 mL at 12/18/22 0930    sodium chloride flush 0.9 % injection 5-40 mL  5-40 mL IntraVENous PRN Maile Mile, DO        0.9 % sodium chloride infusion   IntraVENous PRN Maile Mile, DO        polyethylene glycol (GLYCOLAX) packet 17 g  17 g Oral Daily PRN Maile Mile, DO        enoxaparin (LOVENOX) injection 80 mg  1 mg/kg SubCUTAneous BID Maile Mile, DO   80 mg at 12/18/22 0930    furosemide (LASIX) injection 40 mg  40 mg IntraVENous BID Maile Mile, DO   40 mg at 12/18/22 0929    lidocaine 4 % external patch 1 patch  1 patch TransDERmal Daily Arnie Paulino MD   1 patch at 12/18/22 1990    perflutren lipid microspheres (DEFINITY) injection 1.5 mL  1.5 mL IntraVENous ONCE PRN Shelley Para, APRN - CNP            Allergies:    Bentyl [dicyclomine], Adhesive tape, Atacand [candesartan], Cefdinir, Demerol, Digoxin, Imdur [isosorbide mononitrate], Losartan potassium, Shellfish-derived products, Sulfa antibiotics, Xarelto [rivaroxaban], Acetaminophen, Apap-caff-dihydrocodeine, Coreg [carvedilol], Cozaar [losartan], Diovan [valsartan], Effexor [venlafaxine hydrochloride], Eliquis [apixaban], Labetalol, Lisinopril, and Ramipril       Review of Systems:    Denies any recent weight loss, fevers, chills or night sweats. Physical Examination:    /70   Pulse 83   Temp 98 °F (36.7 °C) (Oral)   Resp 20   Ht 5' 7\" (1.702 m)   Wt 176 lb 2.4 oz (79.9 kg)   SpO2 95%   BMI 27.59 kg/m²      On focused neurological examination, she  is awake alert oriented and rationally conversant. Speech is clear and fluent. Pupils are equal and reactive to light bilaterally, extraocular movements are intact, visual fields are full to confrontation. Her  face is symmetric and grossly intact to fine touch. Uvula and tongue are both midline. Shoulder shrug is symmetric and strong. Cervical spine is well aligned tender to direct palpation. She  can forward flex, extend , laterally rotate and laterally extend without limitation or pain. Motor examination reveals preserved power in the upper and lower extremities at 5 out of 5 throughout. Reflexes are symmetric and brisk. Plantar responses are downgoing. There is no clonus. Patient is intact to fine touch in all dermatomes throughout. ASSESSMENT:    I personally reviewed Janesramses Swansonandrez Charles's radiographic images, particularly both her CT of the c spine and MRI which show mild degenerative changes, intact alignment, no cord signal change, nor spinal cord or neural compression. MEDICAL DECISION MAKING & PLAN:    No acute Nsx intervention is required at this time. No Nsx recommendations. Please recall if new Nsx issues arise. Thank you so much for allowing us to participate in the care of this patient.       Electronically signed by Ganesh Morin MD on 12/18/2022 at 9:51 AM       NOTE: This report was transcribed using voice recognition software.  Every effort was made to ensure accuracy; however, inadvertent computerized transcription errors may be present

## 2022-12-18 NOTE — PROGRESS NOTES
Nutrition Education    Educated on CHF dietary guidelines   Learners: Patient  Readiness: Eager  Method: Explanation and Handout  Response: Verbalizes Understanding  Contact name and number provided.     Kandy Haile RD  Contact Number: ext [phone]

## 2022-12-18 NOTE — PROGRESS NOTES
[facility] Cardiology Inpatient Progress Note    Patient is a 68 y.o. female of Trisha Cardona DO seen in hospital follow up. Chief complaint: CHF    HPI: Some SOB. No CP.      Patient Active Problem List   Diagnosis    Anxiety    Nonischemic cardiomyopathy ([facility]    Severe mitral regurgitation    Lumbar radiculopathy    Cervical radiculopathy    HTN (hypertension), benign    Left atrial thrombus    PAF (paroxysmal atrial fibrillation) ([facility])    Chronic HFrEF (heart failure with reduced ejection fraction) ([facility]    Visit for monitoring Tikosyn therapy    Encounter for medication refill    Itching    Chronic anticoagulation    Presence of Watchman left atrial appendage closure device    Renal mass    Renal cell carcinoma of left kidney (HCC)    Chronic renal disease, stage III ([facility] [636350]    Multiple subsegmental pulmonary emboli without acute cor pulmonale (HCC)    Acute on chronic congestive heart failure (HCC)       Allergies   Allergen Reactions    Bentyl [Dicyclomine] Shortness Of Breath    Adhesive Tape Itching     develops rash and itching with EKG electrodes    Atacand [Candesartan] Hives and Itching    Cefdinir Hives, Itching and Nausea Only    Demerol      3/9/19 Pt states she gets a severe migraine    Digoxin Itching     developed itching and rash    Imdur [Isosorbide Mononitrate]       migraines    Losartan Potassium Itching    Shellfish-Derived Products Itching     3/9/19 Pt states she had a lobster pizza and had difficulty breathing, started to sweat and was itchy    Sulfa Antibiotics Diarrhea and Other (See Comments)     Also causes migraines  caused migraines and diarrhea    Xarelto [Rivaroxaban] Itching and Rash      severe itch, headache, rash    Acetaminophen Hives and Itching    Apap-Caff-Dihydrocodeine Hives and Itching    Coreg [Carvedilol] Itching     Can take extended release Coreg    Cozaar [Losartan] Itching    Diovan [Valsartan] Itching    Effexor [Venlafaxine Hydrochloride] Nausea Only    Eliquis [Apixaban] Hives, Itching and Rash     3/9/19 Pt states that she gets hives, itchy and a rash    Labetalol Itching    Lisinopril Itching    Ramipril Itching       Current Facility-Administered Medications   Medication Dose Route Frequency Provider Last Rate Last Admin    aspirin EC tablet 81 mg  81 mg Oral Daily Lestine Rockwall, DO   81 mg at 12/17/22 0908    baclofen (LIORESAL) tablet 10 mg  10 mg Oral Nightly Lestine Rockwall, DO   10 mg at 12/17/22 2056    cetirizine (ZYRTEC) tablet 5 mg  5 mg Oral Daily Lestine Rockwall, DO   5 mg at 12/17/22 0913    dofetilide (TIKOSYN) capsule 125 mcg  125 mcg Oral BID Lestine Rockwall, DO        famotidine (PEPCID) tablet 40 mg  40 mg Oral Daily Lestine Rockwall, DO   40 mg at 12/17/22 6211    hydrALAZINE (APRESOLINE) tablet 10 mg  10 mg Oral TID Lestine Rockwall, DO   10 mg at 12/17/22 2056    metoprolol succinate (TOPROL XL) extended release tablet 25 mg  25 mg Oral Daily Lestine Caridad, DO   25 mg at 12/17/22 0908    pantoprazole (PROTONIX) tablet 40 mg  40 mg Oral QAM AC Lestine Caridad, DO   40 mg at 12/18/22 1076    spironolactone (ALDACTONE) tablet 25 mg  25 mg Oral Daily Lestine Rockwall, DO   25 mg at 12/17/22 5452    vitamin D (CHOLECALCIFEROL) tablet 1,000 Units  1,000 Units Oral Daily Lestine Rockwall, DO   1,000 Units at 12/17/22 8264    sodium chloride flush 0.9 % injection 5-40 mL  5-40 mL IntraVENous 2 times per day Lestine Rockwall, DO   10 mL at 12/17/22 2056    sodium chloride flush 0.9 % injection 5-40 mL  5-40 mL IntraVENous PRN Lestine Rockwall, DO        0.9 % sodium chloride infusion   IntraVENous PRN Lestine Caridad, DO        polyethylene glycol (GLYCOLAX) packet 17 g  17 g Oral Daily PRN Lestine Rockwall, DO        enoxaparin (LOVENOX) injection 80 mg  1 mg/kg SubCUTAneous BID Lestine Caridad, DO   80 mg at 12/17/22 2232    furosemide (LASIX) injection 40 mg  40 mg IntraVENous BID Leslee Mclean DO   40 mg at 12/17/22 1731    lidocaine 4 % external patch 1 patch  1 patch TransDERmal Daily Piotr Hinkle MD   1 patch at 12/17/22 0624    perflutren lipid microspheres (DEFINITY) injection 1.5 mL  1.5 mL IntraVENous ONCE PRN Maxi Padilla, APRN - CNP           Review of systems:   Heart: as above   Lungs: as above   Eyes: denies changes in vision or discharge. Ears: denies changes in hearing or pain. Nose: denies epistaxis or masses   Throat: denies sore throat or trouble swallowing. Neuro: denies numbness, tingling, tremors. Skin: denies rashes or itching. : denies hematuria, dysuria   GI: denies vomiting, diarrhea   Psych: denies mood changed, anxiety, depression. Physical Exam   /73   Pulse 74   Temp 98 °F (36.7 °C) (Oral)   Resp 20   Ht 5' 7\" (1.702 m)   Wt 176 lb 2.4 oz (79.9 kg)   SpO2 95%   BMI 27.59 kg/m²   Constitutional: Oriented to person, place, and time. No distress. Well developed. Head: Normocephalic and atraumatic. Neck: Neck supple. No hepatojugular reflux. No JVD present. Carotid bruit is not present. No tracheal deviation present. No thyromegaly present. Cardiovascular: Normal rate, regular rhythm, normal heart sounds. intact distal pulses. No gallop and no friction rub. No murmur heard. Pulmonary: Breath sounds normal. No respiratory distress. No wheezes. No rales. Chest: Effort normal. No tenderness. Abdominal: Soft. Bowel sounds are normal. No distension or mass. No tenderness, rebound or guarding. Musculoskeletal: . No tenderness. No clubbing or cyanosis. Extremitites: Intact distal pulses. No edema  Neurological: Alert and oriented to person, place, and time. Skin: Skin is warm and dry. No rash noted. Not diaphoretic. No erythema. Psychiatric: Normal mood and affect. Behavior is normal.   Lymphadenopathy: No cervical adenopathy. No groin adenopathy.     CBC:   Lab Results   Component Value Date/Time    WBC 6.8 12/16/2022 12:25 PM    RBC 5.17 12/16/2022 12:25 PM    HGB 14.6 12/16/2022 12:25 PM    HCT 45.7 12/16/2022 12:25 PM    MCV 88.4 12/16/2022 12:25 PM    MCH 28.2 12/16/2022 12:25 PM    MCHC 31.9 12/16/2022 12:25 PM    RDW 15.7 12/16/2022 12:25 PM     12/16/2022 12:25 PM    MPV 11.1 12/16/2022 12:25 PM     BMP:   Lab Results   Component Value Date/Time     12/17/2022 05:24 AM    K 3.8 12/17/2022 05:24 AM    K 4.7 11/11/2021 11:45 AM     12/17/2022 05:24 AM    CO2 21 12/17/2022 05:24 AM    BUN 19 12/17/2022 05:24 AM    LABALBU 3.9 12/16/2022 12:25 PM    CREATININE 1.3 12/17/2022 05:24 AM    CALCIUM 9.3 12/17/2022 05:24 AM    GFRAA 53 09/07/2022 01:12 PM    LABGLOM 42 12/17/2022 05:24 AM     Magnesium:    Lab Results   Component Value Date/Time    MG 2.2 12/17/2022 05:24 AM     Cardiac Enzymes:   Lab Results   Component Value Date    CKTOTAL 109 06/03/2021    CKTOTAL 83 11/25/2013    CKTOTAL 110 11/24/2013    CKMB 6.6 (H) 11/25/2013    CKMB 3.9 (H) 11/24/2013    TROPHS 38 (H) 12/16/2022    TROPHS 39 (H) 12/16/2022    TROPHS 14 (H) 06/03/2021      PT/INR:    Lab Results   Component Value Date/Time    PROTIME 12.2 12/16/2022 12:25 PM    PROTIME 12.0 12/17/2012 04:03 PM    INR 1.1 12/16/2022 12:25 PM     TSH:    Lab Results   Component Value Date/Time    TSH 0.807 02/01/2022 12:00 PM     Rhythm Strip: normal sinus rhythm. JAIME Summary 10/18/2021:   Ejection fraction is visually estimated at 45-50%. Normal right ventricular size and function. No evidence of interatrial shunting on bubble study. History of WATCHMAN device (24 mm). No significant color flow jet around the device. No device related thrombus visualized. Moderate mitral regurgitation. Mild aortic regurgitation. Moderate tricuspid regurgitation. RV-RA gradient is estimated at 82 mmHg. Echo Summary 12/17/2022:   Ejection fraction is visually estimated at 25%. Overall ejection fraction severely decreased. Mild left ventricular concentric hypertrophy noted. Dilated right ventricle with reduced function.    Left atrial volume index of 50 ml per meters squared BSA. Moderate aortic regurgitation is noted. Moderate mitral regurgitation is present. Moderate tricuspid regurgitation. Pulmonary hypertension is severe. RVSP is 70 mmHg. No evidence for hemodynamically significant pericardial effusion. ASSESSMENT & PLAN:    Patient Active Problem List   Diagnosis    Anxiety    Nonischemic cardiomyopathy ([facility] [address]    Severe mitral regurgitation    Lumbar radiculopathy    Cervical radiculopathy    HTN (hypertension), benign    Left atrial thrombus    PAF (paroxysmal atrial fibrillation) ([facility])    Chronic HFrEF (heart failure with reduced ejection fraction) ([facility]    Visit for monitoring Tikosyn therapy    Encounter for medication refill    Itching    Chronic anticoagulation    Presence of Watchman left atrial appendage closure device    Renal mass    Renal cell carcinoma of left kidney (HCC)    Chronic renal disease, stage III ([facility] [address] [128298]    Multiple subsegmental pulmonary emboli without acute cor pulmonale (HCC)    Ac[facility] on chronic congest[facility]     1. Acute on chronic systolic CHF/CAIN:     Echo with LVEF 25%. Gently diurese and follow labs. Toprol/hydralazine/aldactone. Allergic/Intolerance to ACE/ARB/nitrates    Consider SGLT2. 2. PAF:     In sinus. Hx of Watchman 10/14/2020. Due for follow up JAIME. ASA. Hold tikosyn due to prolonged QT. Continue BB. Follows with [facility] EP. 3. VHD: Mild AI/mod MR/mod TR/severe PH by JAIME 10/2021. Mod AI/mod MR/mod TR/sev PH by echo 12/17/2022. Medically manage. 4. Chronic LBBB. 5. PE: Per primary service. Echo with some RV dysfunction. Severe PH, which predated PE. Lovenox to DOAC. 6. HTN: Observe. 7. Hx of left partial nephrectomy for renal CA 11/2021 Dr Shelia Wright     8. Lipids: Consider statin. 9. TIA: ASA. Consider statin. 10. CKD: Follow labs. 11. Migraines    12.  BORIS Solomon, ZEINAB  Cardiologist  Cardiology, [facility]) Physicians

## 2022-12-19 ENCOUNTER — APPOINTMENT (OUTPATIENT)
Dept: ULTRASOUND IMAGING | Age: 77
End: 2022-12-19
Payer: MEDICARE

## 2022-12-19 PROBLEM — I48.19 PERSISTENT ATRIAL FIBRILLATION (HCC): Status: ACTIVE | Noted: 2019-11-04

## 2022-12-19 LAB
ALBUMIN SERPL-MCNC: 3.5 G/DL (ref 3.5–5.2)
ALP BLD-CCNC: 108 U/L (ref 35–104)
ALT SERPL-CCNC: 19 U/L (ref 0–32)
ANION GAP SERPL CALCULATED.3IONS-SCNC: 15 MMOL/L (ref 7–16)
AST SERPL-CCNC: 24 U/L (ref 0–31)
BACTERIA: NORMAL /HPF
BASOPHILS ABSOLUTE: 0.08 E9/L (ref 0–0.2)
BASOPHILS RELATIVE PERCENT: 1 % (ref 0–2)
BILIRUB SERPL-MCNC: 0.9 MG/DL (ref 0–1.2)
BILIRUBIN URINE: NEGATIVE
BLOOD, URINE: NEGATIVE
BUN BLDV-MCNC: 29 MG/DL (ref 6–23)
CALCIUM SERPL-MCNC: 9.4 MG/DL (ref 8.6–10.2)
CHLORIDE BLD-SCNC: 99 MMOL/L (ref 98–107)
CLARITY: CLEAR
CO2: 23 MMOL/L (ref 22–29)
COLOR: YELLOW
CREAT SERPL-MCNC: 1.6 MG/DL (ref 0.5–1)
EKG ATRIAL RATE: 79 BPM
EKG P AXIS: 59 DEGREES
EKG P-R INTERVAL: 202 MS
EKG Q-T INTERVAL: 456 MS
EKG QRS DURATION: 158 MS
EKG QTC CALCULATION (BAZETT): 522 MS
EKG R AXIS: -65 DEGREES
EKG T AXIS: 105 DEGREES
EKG VENTRICULAR RATE: 79 BPM
EOSINOPHILS ABSOLUTE: 0.57 E9/L (ref 0.05–0.5)
EOSINOPHILS RELATIVE PERCENT: 7.4 % (ref 0–6)
GFR SERPL CREATININE-BSD FRML MDRD: 33 ML/MIN/1.73
GLUCOSE BLD-MCNC: 108 MG/DL (ref 74–99)
GLUCOSE URINE: NEGATIVE MG/DL
HCT VFR BLD CALC: 41.8 % (ref 34–48)
HEMOGLOBIN: 14 G/DL (ref 11.5–15.5)
IMMATURE GRANULOCYTES #: 0.02 E9/L
IMMATURE GRANULOCYTES %: 0.3 % (ref 0–5)
KETONES, URINE: NEGATIVE MG/DL
LEUKOCYTE ESTERASE, URINE: ABNORMAL
LYMPHOCYTES ABSOLUTE: 1.03 E9/L (ref 1.5–4)
LYMPHOCYTES RELATIVE PERCENT: 13.3 % (ref 20–42)
MAGNESIUM: 2 MG/DL (ref 1.6–2.6)
MCH RBC QN AUTO: 28.1 PG (ref 26–35)
MCHC RBC AUTO-ENTMCNC: 33.5 % (ref 32–34.5)
MCV RBC AUTO: 83.9 FL (ref 80–99.9)
MONOCYTES ABSOLUTE: 0.58 E9/L (ref 0.1–0.95)
MONOCYTES RELATIVE PERCENT: 7.5 % (ref 2–12)
NEUTROPHILS ABSOLUTE: 5.47 E9/L (ref 1.8–7.3)
NEUTROPHILS RELATIVE PERCENT: 70.5 % (ref 43–80)
NITRITE, URINE: NEGATIVE
OSMOLALITY URINE: 206 MOSM/KG (ref 300–900)
PDW BLD-RTO: 15.6 FL (ref 11.5–15)
PH UA: 6 (ref 5–9)
PLATELET # BLD: 219 E9/L (ref 130–450)
PMV BLD AUTO: 10.6 FL (ref 7–12)
POTASSIUM SERPL-SCNC: 3.4 MMOL/L (ref 3.5–5)
PROTEIN UA: NEGATIVE MG/DL
RBC # BLD: 4.98 E12/L (ref 3.5–5.5)
RBC UA: NORMAL /HPF (ref 0–2)
SODIUM BLD-SCNC: 137 MMOL/L (ref 132–146)
SODIUM URINE: 78 MMOL/L
SPECIFIC GRAVITY UA: <=1.005 (ref 1–1.03)
TOTAL PROTEIN: 6.7 G/DL (ref 6.4–8.3)
UROBILINOGEN, URINE: 0.2 E.U./DL
WBC # BLD: 7.8 E9/L (ref 4.5–11.5)
WBC UA: NORMAL /HPF (ref 0–5)

## 2022-12-19 PROCEDURE — 84300 ASSAY OF URINE SODIUM: CPT

## 2022-12-19 PROCEDURE — 93010 ELECTROCARDIOGRAM REPORT: CPT | Performed by: INTERNAL MEDICINE

## 2022-12-19 PROCEDURE — 1200000000 HC SEMI PRIVATE

## 2022-12-19 PROCEDURE — 6370000000 HC RX 637 (ALT 250 FOR IP)

## 2022-12-19 PROCEDURE — 6370000000 HC RX 637 (ALT 250 FOR IP): Performed by: NURSE PRACTITIONER

## 2022-12-19 PROCEDURE — 36415 COLL VENOUS BLD VENIPUNCTURE: CPT

## 2022-12-19 PROCEDURE — 6370000000 HC RX 637 (ALT 250 FOR IP): Performed by: FAMILY MEDICINE

## 2022-12-19 PROCEDURE — 99223 1ST HOSP IP/OBS HIGH 75: CPT | Performed by: INTERNAL MEDICINE

## 2022-12-19 PROCEDURE — 2580000003 HC RX 258: Performed by: FAMILY MEDICINE

## 2022-12-19 PROCEDURE — 85025 COMPLETE CBC W/AUTO DIFF WBC: CPT

## 2022-12-19 PROCEDURE — 80053 COMPREHEN METABOLIC PANEL: CPT

## 2022-12-19 PROCEDURE — 99222 1ST HOSP IP/OBS MODERATE 55: CPT | Performed by: INTERNAL MEDICINE

## 2022-12-19 PROCEDURE — 83935 ASSAY OF URINE OSMOLALITY: CPT

## 2022-12-19 PROCEDURE — 6360000002 HC RX W HCPCS: Performed by: FAMILY MEDICINE

## 2022-12-19 PROCEDURE — 83735 ASSAY OF MAGNESIUM: CPT

## 2022-12-19 PROCEDURE — 6360000002 HC RX W HCPCS

## 2022-12-19 PROCEDURE — 99233 SBSQ HOSP IP/OBS HIGH 50: CPT | Performed by: INTERNAL MEDICINE

## 2022-12-19 PROCEDURE — 76770 US EXAM ABDO BACK WALL COMP: CPT

## 2022-12-19 PROCEDURE — 81001 URINALYSIS AUTO W/SCOPE: CPT

## 2022-12-19 RX ORDER — FAMOTIDINE 20 MG/1
20 TABLET, FILM COATED ORAL DAILY
Status: DISCONTINUED | OUTPATIENT
Start: 2022-12-20 | End: 2022-12-22 | Stop reason: HOSPADM

## 2022-12-19 RX ORDER — POTASSIUM CHLORIDE 20 MEQ/1
40 TABLET, EXTENDED RELEASE ORAL 2 TIMES DAILY WITH MEALS
Status: DISCONTINUED | OUTPATIENT
Start: 2022-12-19 | End: 2022-12-21

## 2022-12-19 RX ORDER — DIPHENHYDRAMINE HCL 25 MG
25 TABLET ORAL ONCE
Status: COMPLETED | OUTPATIENT
Start: 2022-12-19 | End: 2022-12-19

## 2022-12-19 RX ORDER — FLUTICASONE PROPIONATE 50 MCG
1 SPRAY, SUSPENSION (ML) NASAL DAILY
Status: DISCONTINUED | OUTPATIENT
Start: 2022-12-19 | End: 2022-12-22 | Stop reason: HOSPADM

## 2022-12-19 RX ORDER — HEPARIN SODIUM 10000 [USP'U]/ML
5000 INJECTION, SOLUTION INTRAVENOUS; SUBCUTANEOUS EVERY 8 HOURS SCHEDULED
Status: DISCONTINUED | OUTPATIENT
Start: 2022-12-19 | End: 2022-12-20

## 2022-12-19 RX ORDER — ENOXAPARIN SODIUM 100 MG/ML
1 INJECTION SUBCUTANEOUS 2 TIMES DAILY
Status: DISCONTINUED | OUTPATIENT
Start: 2022-12-19 | End: 2022-12-19

## 2022-12-19 RX ADMIN — POTASSIUM CHLORIDE 40 MEQ: 20 TABLET, EXTENDED RELEASE ORAL at 09:33

## 2022-12-19 RX ADMIN — ENOXAPARIN SODIUM 80 MG: 100 INJECTION SUBCUTANEOUS at 09:23

## 2022-12-19 RX ADMIN — PANTOPRAZOLE SODIUM 40 MG: 40 TABLET, DELAYED RELEASE ORAL at 06:15

## 2022-12-19 RX ADMIN — POTASSIUM CHLORIDE 40 MEQ: 20 TABLET, EXTENDED RELEASE ORAL at 16:29

## 2022-12-19 RX ADMIN — FUROSEMIDE 40 MG: 10 INJECTION, SOLUTION INTRAMUSCULAR; INTRAVENOUS at 16:29

## 2022-12-19 RX ADMIN — HYDRALAZINE HYDROCHLORIDE 10 MG: 10 TABLET, FILM COATED ORAL at 21:14

## 2022-12-19 RX ADMIN — CETIRIZINE HYDROCHLORIDE 5 MG: 5 TABLET ORAL at 09:22

## 2022-12-19 RX ADMIN — SODIUM CHLORIDE, PRESERVATIVE FREE 10 ML: 5 INJECTION INTRAVENOUS at 09:40

## 2022-12-19 RX ADMIN — DIPHENHYDRAMINE HYDROCHLORIDE 25 MG: 25 TABLET ORAL at 09:34

## 2022-12-19 RX ADMIN — BACLOFEN 10 MG: 10 TABLET ORAL at 21:14

## 2022-12-19 RX ADMIN — SPIRONOLACTONE 25 MG: 25 TABLET ORAL at 09:27

## 2022-12-19 RX ADMIN — ASPIRIN 81 MG: 81 TABLET, COATED ORAL at 09:22

## 2022-12-19 RX ADMIN — HYDRALAZINE HYDROCHLORIDE 10 MG: 10 TABLET, FILM COATED ORAL at 09:24

## 2022-12-19 RX ADMIN — HEPARIN SODIUM 5000 UNITS: 10000 INJECTION INTRAVENOUS; SUBCUTANEOUS at 21:15

## 2022-12-19 RX ADMIN — POLYETHYLENE GLYCOL 3350 17 G: 17 POWDER, FOR SOLUTION ORAL at 21:14

## 2022-12-19 RX ADMIN — Medication 1000 UNITS: at 09:27

## 2022-12-19 RX ADMIN — HYDRALAZINE HYDROCHLORIDE 10 MG: 10 TABLET, FILM COATED ORAL at 14:49

## 2022-12-19 RX ADMIN — METOPROLOL SUCCINATE 25 MG: 25 TABLET, EXTENDED RELEASE ORAL at 09:26

## 2022-12-19 RX ADMIN — FAMOTIDINE 40 MG: 20 TABLET, FILM COATED ORAL at 09:23

## 2022-12-19 RX ADMIN — SODIUM CHLORIDE, PRESERVATIVE FREE 10 ML: 5 INJECTION INTRAVENOUS at 21:14

## 2022-12-19 RX ADMIN — FUROSEMIDE 40 MG: 10 INJECTION, SOLUTION INTRAMUSCULAR; INTRAVENOUS at 09:24

## 2022-12-19 ASSESSMENT — PAIN DESCRIPTION - ORIENTATION: ORIENTATION: LOWER

## 2022-12-19 ASSESSMENT — PAIN SCALES - GENERAL
PAINLEVEL_OUTOF10: 4
PAINLEVEL_OUTOF10: 4

## 2022-12-19 ASSESSMENT — PAIN DESCRIPTION - PAIN TYPE: TYPE: ACUTE PAIN

## 2022-12-19 ASSESSMENT — PAIN DESCRIPTION - LOCATION: LOCATION: BACK

## 2022-12-19 ASSESSMENT — PAIN DESCRIPTION - DESCRIPTORS: DESCRIPTORS: ACHING;DISCOMFORT

## 2022-12-19 NOTE — CONSULTS
[facility] DOPPLER ECHOCARDIOGRAPHY  11/25/13    HYSTERECTOMY (CERVIX STATUS UNKNOWN)  1991    total    OTHER SURGICAL HISTORY  10/14/2020    Dr. Antonio Abbasi- 24mm Watchman device    PARTIAL NEPHRECTOMY Left 11/16/2021    ROBOT ASSISTED LAPAROSCOPIC COMPLEX LEFT PARTIAL NEPHRECTOMY performed by Gerardo Farley MD at [address]    TRANSESOPHAGEAL ECHOCARDIOGRAM  11/21/2018    Dr. Yony Cooper    TRANSESOPHAGEAL ECHOCARDIOGRAM  03/18/2019    WISDOM TOOTH EXTRACTION       Family History:  Family History   Problem Relation Age of Onset    Heart Attack Mother     Stroke Mother     Heart Attack Father     Heart Failure Father     Heart Disease Father     Anxiety Disorder Sister     Depression Sister     Crohn's Disease Son      Medications:  Reviewed and reconciled. Social History:  Social History     Socioeconomic History    Marital status:      Spouse name: Not on file    Number of children: Not on file    Years of education: Not on file    Highest education level: Not on file   Occupational History    Not on file   Tobacco Use    Smoking status: Never    Smokeless tobacco: Never   Vaping Use    Vaping Use: Never used   Substance and Sexual Activity    Alcohol use: Yes     Alcohol/week: 0.0 standard drinks     Comment: occasional wine/mixed drink every few months    Drug use: Never    Sexual activity: Not on file     Comment:    Other Topics Concern    Not on file   Social History Narrative    Drinks 1-2 cups of coffee daily. Social Determinants of Health     Financial Resource Strain: Not on file   Food Insecurity: Not on file   Transportation Needs: Not on file   Physical Activity: Not on file   Stress: Not on file   Social Connections: Not on file   Intimate Partner Violence: Not on file   Housing Stability: Not on file     Allergies:   Allergies   Allergen Reactions    Bentyl [Dicyclomine] Shortness Of Breath    Adhesive Tape Itching     develops rash and itching with EKG electrodes    Atacand [Candesartan] Hives and Itching    Cefdinir Hives, Itching and Nausea Only    Demerol      3/9/19 Pt states she gets a severe migraine    Digoxin Itching     developed itching and rash    Imdur [Isosorbide Mononitrate]       migraines    Losartan Potassium Itching    Shellfish-Derived Products Itching     3/9/19 Pt states she had a lobster pizza and had difficulty breathing, started to sweat and was itchy    Sulfa Antibiotics Diarrhea and Other (See Comments)     Also causes migraines  caused migraines and diarrhea    Xarelto [Rivaroxaban] Itching and Rash      severe itch, headache, rash    Acetaminophen Hives and Itching    Apap-Caff-Dihydrocodeine Hives and Itching    Coreg [Carvedilol] Itching     Can take extended release Coreg    Cozaar [Losartan] Itching    Diovan [Valsartan] Itching    Effexor [Venlafaxine Hydrochloride] Nausea Only    Eliquis [Apixaban] Hives, Itching and Rash     3/9/19 Pt states that she gets hives, itchy and a rash    Labetalol Itching    Lisinopril Itching    Ramipril Itching     Physical Exam:  /71   Pulse 74   Temp 98 °F (36.7 °C) (Oral)   Resp 18   Ht 5' 7\" (1.702 m)   Wt 176 lb 2.4 oz (79.9 kg)   SpO2 95%   BMI 27.59 kg/m²   GENERAL: Alert, oriented x 3, not in acute distress. HEENT: PERRLA; EOMI. Oropharynx clear. NECK: Supple. Without lymphadenopathy. LUNGS: Good air entry bilaterally. No wheezing, crackles or ronchi. CARDIOVASCULAR: Regular rate. No murmurs, rubs or gallops. ABDOMEN: Soft. Non-tender, non-distended. EXTREMITIES: Without clubbing, cyanosis, or edema. NEUROLOGIC: No focal deficits. ECOG PS 1    Impression/Plan: Thank you for allowing us to participate in the care of   Eli Salcedo MD   12/19/2022

## 2022-12-19 NOTE — CONSULTS
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 See comment. Comment:CANCER CASE SUMMARY   Procedure: Partial nephrectomy   Specimen Laterality: Left   Tumor Focality: Unifocal   Tumor Site: Undesignated   Tumor Size: 3.0 cm greatest dimension   Histologic Type: Clear-cell renal cell carcinoma   Histologic Grade (WHO/ISUP): G3   Tumor Extent: Limited to kidney   Sarcomatoid Features: Not identified   Rhabdoid features: Not identified   Tumor Necrosis: Not identified   Lymph-Vascular Invasion: Not identified   Margin Status: All margins negative for invasive carcinoma   Regional lymph node Status: No lymph nodes submitted or found   Pathologic Stage Classification (AJCC 8th Edition):   pT1a   Surveillance      CT abdomen/pelvis 02/18/2022 Surgical removal of the lower pole of the left kidney. There is no evidence of local recurrence or metastatic disease at this time. CT chest 04/13/2022 stable exam     CT abdomen/pelvis 08/19/2022:   Postop changes compatible with previous left renal lower pole tumor excision  No evidence of local recurrence or abdominal metastasis    Follows with Dr. Leo Marsh  CT chest without contrast on 12/07/2022, reviewed and no change from previous, moderately enlarged mediastinal lymph node 1.3 cm from 1.1  unchanged 1.1 cm nodule RLL, 7mm ground glass nodule YELITZA unchanged   Recommend F/U CT chest in one year    Presented to the ED for shortness of breath. CTA chest 12/17/2022 small burden acute subsegmental pulmonary emboli to the bilateral lower lobes. Trace bilateral pleural effusions.      Started on Lovenox, admitted to hospital for further work-up management of CHF exacerbation and pulmonary embolism    Review of Systems;  CONSTITUTIONAL: No fever, chills. Fair appetite and energy level. ENMT: Eyes: No diplopia; Nose: No epistaxis. Mouth: No sore throat. RESPIRATORY: No hemoptysis. + shortness of breath  CARDIOVASCULAR: No chest pain, palpitations. GASTROINTESTINAL: No nausea/vomiting, abdominal pain, diarrhea/constipation. GENITOURINARY: No dysuria, urinary frequency, hematuria. NEURO: No syncope, presyncope, headache. Remainder:  ROS NEGATIVE    Past Medical History:      Diagnosis Date    Afib (HCC)     WATCHMAN    Anxiety     Arthritis     Cervical radiculopathy     CHF (congestive heart failure) (HCC)     Chronic sinusitis     Hilar adenopathy     Hx of blood clots     Hypertension     Left ventricular systolic dysfunction     Lesion of left native kidney     Lumbar radiculopathy     Lung nodules     Meige syndrome     partial/ PCP    Microscopic colitis     Mitral regurgitation     Mild to moderate    Nonischemic cardiomyopathy ([address]     f/u with Dr. Marissa Rivera    Osteopjori     Renal cell carcinoma of left kidney ([facility] 2/1/2022    Vertebrobasilar artery syndrome     f/u PCP     Past Surgical History:      Procedure Laterality Date    BLADDER REPAIR      BRONCHOSCOPY N/A 10/5/2021    BRONCHOSCOPY W/EBUS FNA performed by Garrison Bear MD at [facility] N/A 10/5/2021    BRONCHOSCOPY DIAGNOSTIC OR CELL [address] ONLY performed by Garrison Bear MD at [facility] TEST  11/25/13    Rt & LT cath    CARDIOVERSION  11/04/2019    Successful CV from AF to NSR   (Dr. Gem Levine)    COLONOSCOPY  07/2020    DIAGNOSTIC CARDIAC CATH LAB PROCEDURE  2/20/10    Dr Julián Shell CATH LAB PROCEDURE  8/24/11    Right heart cath @ [facility].     DOPPLER ECHOCARDIOGRAPHY  11/25/13    HYSTERECTOMY (CERVIX STATUS UNKNOWN)  1991    total    OTHER SURGICAL HISTORY  10/14/2020    Dr. Marissa Green Watchman device    PARTIAL NEPHRECTOMY Left 11/16/2021    ROBOT ASSISTED LAPAROSCOPIC COMPLEX LEFT PARTIAL NEPHRECTOMY performed by Deena Qureshi MD at [facility] OR    TRANSESOPHAGEAL ECHOCARDIOGRAM  11/21/2018    Dr. Nathalie Sepulveda    TRANSESOPHAGEAL ECHOCARDIOGRAM  03/18/2019    WISDOM TOOTH EXTRACTION       Family History:  Family History   Problem Relation Age of Onset    Heart Attack Mother     Stroke Mother     Heart Attack Father     Heart Failure Father     Heart Disease Father     Anxiety Disorder Sister     Depression Sister     Crohn's Disease Son      Medications:  Reviewed and reconciled. Social History:  Social History     Socioeconomic History    Marital status:      Spouse name: Not on file    Number of children: Not on file    Years of education: Not on file    Highest education level: Not on file   Occupational History    Not on file   Tobacco Use    Smoking status: Never    Smokeless tobacco: Never   Vaping Use    Vaping Use: Never used   Substance and Sexual Activity    Alcohol use: Yes     Alcohol/week: 0.0 standard drinks     Comment: occasional wine/mixed drink every few months    Drug use: Never    Sexual activity: Not on file     Comment:    Other Topics Concern    Not on file   Social History Narrative    Drinks 1-2 cups of coffee daily. Social Determinants of Health     Financial Resource Strain: Not on file   Food Insecurity: Not on file   Transportation Needs: Not on file   Physical Activity: Not on file   Stress: Not on file   Social Connections: Not on file   Intimate Partner Violence: Not on file   Housing Stability: Not on file     Allergies:   Allergies   Allergen Reactions    Bentyl [Dicyclomine] Shortness Of Breath    Adhesive Tape Itching     develops rash and itching with EKG electrodes    Atacand [Candesartan] Hives and Itching    Cefdinir Hives, Itching and Nausea Only    Demerol      3/9/19 Pt states she gets a severe migraine    Digoxin Itching     developed itching and rash    Imdur [Isosorbide Mononitrate]       migraines    Losartan Potassium Itching    Shellfish-Derived Products Itching     3/9/19 Pt states she had a lobster pizza and had difficulty breathing, started to sweat and was itchy    Sulfa Antibiotics Diarrhea and Other (See Comments)     Also causes migraines  caused migraines and diarrhea    Xarelto [Rivaroxaban] Itching and Rash      severe itch, headache, rash    Acetaminophen Hives and Itching    Apap-Caff-Dihydrocodeine Hives and Itching    Coreg [Carvedilol] Itching     Can take extended release Coreg    Cozaar [Losartan] Itching    Diovan [Valsartan] Itching    Effexor [Venlafaxine Hydrochloride] Nausea Only    Eliquis [Apixaban] Hives, Itching and Rash     3/9/19 Pt states that she gets hives, itchy and a rash    Labetalol Itching    Lisinopril Itching    Ramipril Itching     Physical Exam:  /66   Pulse 74   Temp 98 °F (36.7 °C) (Oral)   Resp 18   Ht 5' 7\" (1.702 m)   Wt 176 lb 2.4 oz (79.9 kg)   SpO2 95%   BMI 27.59 kg/m²   GENERAL: Alert, oriented x 3, not in acute distress. HEENT: PERRLA; EOMI. Oropharynx clear. NECK: Supple. Without lymphadenopathy. LUNGS: Good air entry bilaterally. No wheezing, crackles or ronchi. CARDIOVASCULAR: Regular rate. No murmurs, rubs or gallops. ABDOMEN: Soft. Non-tender, non-distended. EXTREMITIES: Without clubbing, cyanosis, or edema. NEUROLOGIC: No focal deficits.    ECOG PS 1    Lab Results   Component Value Date    WBC 7.8 12/19/2022    HGB 14.0 12/19/2022    HCT 41.8 12/19/2022    MCV 83.9 12/19/2022     12/19/2022     Lab Results   Component Value Date     12/19/2022    K 3.4 (L) 12/19/2022    CL 99 12/19/2022    CO2 23 12/19/2022    BUN 29 (H) 12/19/2022    CREATININE 1.6 (H) 12/19/2022    GLUCOSE 108 (H) 12/19/2022    CALCIUM 9.4 12/19/2022    PROT 6.7 12/19/2022    LABALBU 3.5 12/19/2022    BILITOT 0.9 12/19/2022    ALKPHOS 108 (H) 12/19/2022    AST 24 12/19/2022    ALT 19 12/19/2022    LABGLOM 33 12/19/2022    GFRAA 53 09/07/2022     Impression/Plan:  69 y/o female with Left RCC      Left partial nephrectomy on 11/16/2021  Left kidney, partial nephrectomy: Clear-cell renal cell carcinoma, 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 See comment. Comment:CANCER CASE SUMMARY   Procedure: Partial nephrectomy   Specimen Laterality: Left   Tumor Focality: Unifocal   Tumor Site: Undesignated   Tumor Size: 3.0 cm greatest dimension   Histologic Type: Clear-cell renal cell carcinoma   Histologic Grade (WHO/ISUP): G3   Tumor Extent: Limited to kidney   Sarcomatoid Features: Not identified   Rhabdoid features: Not identified   Tumor Necrosis: Not identified   Lymph-Vascular Invasion: Not identified   Margin Status: All margins negative for invasive carcinoma   Regional lymph node Status: No lymph nodes submitted or found   Pathologic Stage Classification (AJCC 8th Edition):   pT1a   Surveillance      CT abdomen/pelvis 02/18/2022 Surgical removal of the lower pole of the left kidney. There is no evidence of local recurrence or metastatic disease at this time. CT chest 04/13/2022 stable exam     CT abdomen/pelvis 08/19/2022:   Postop changes compatible with previous left renal lower pole tumor excision  No evidence of local recurrence or abdominal metastasis    Follows with Dr. River Snell  CT chest without contrast on 12/07/2022, reviewed and no change from previous, moderately enlarged mediastinal lymph node 1.3 cm from 1.1  unchanged 1.1 cm nodule RLL, 7mm ground glass nodule YELITZA unchanged   Recommend F/U CT chest in one year    Presented to the ED for shortness of breath. CTA chest 12/17/2022 small burden acute subsegmental pulmonary emboli to the bilateral lower lobes. Trace bilateral pleural effusions.      Started on Lovenox, admitted to hospital for further work-up management of CHF exacerbation and pulmonary embolism  On lasix  History of allergic reactions to anticoagulations - Allergic to rivaroxaban with significant skin reaction that she manages with hydroxyzine. Also allergic to dabigatran and apixaban  Consider warfarin at the time of discharge. Imaging reviewed. Labs reviewed. Pulmonary and cardiology notes reviewed. Repeat CT abdomen/pelvis in March 2023 for f/u on hx of RCC. Recommended to f/u with pulmonary team for f/u CT chest    Thank you for allowing us to participate in the care of   Jessica Aguilar MD   12/19/2022

## 2022-12-19 NOTE — CONSULTS
[facility],[address] and [address] [facility] [place] Electrophysiology  Consultation Report  PATIENT: [address] RECORD NUMBER: [id number]  DATE OF SERVICE:  12/19/2022  ATTENDING ELECTROPHYSIOLOGIST: John Bermudez MD  PRIMARY ELECTROPHYSIOLOGIST: John Bermudez MD  Cardiologist: Emily Charles MD  REFERRING PHYSICIAN: Gail Veloz DO  CHIEF COMPLAINT: Neck pain and slight dyspnea on exertion. HPI: This is a 68 y.o. female with a history of persistent atrial fibrillation, with ongoing Tikosyn use, Intolerance to Eliquis and Xarelto S/p Watchman on 10/14/2020 LBBB, Nonischemic Cardiomyopathy with recovered LVEF, Hypertension, Obesity, Lumbar and cervical radiculopathy, Anxiety, TIAs, Meige, LEFT partial nephrectomy, CKD baseline creat 1.2-1.3. She presented on 12/16/202 with complaints of neck pain with radicular symptoms down her right am with Dyspnea and BLE edema, she underwent a CTA in the ER which showed pleural effusion with a segmental PE, she was then admitted and started on Lovenox. Cardiology was consulted who started her on IV Lasix and stopped her Tikosyn due to QTc of 510 with a QRS of 158ms. Her Creat increased to 1.6 with IV diuresis. She remained in sinus with rates in the 70's. Today she notes no recurrence of AF prior to admission.            Patient Active Problem List   Diagnosis    Anxiety    Nonischemic cardiomyopathy ([address]    Severe mitral regurgitation    Lumbar radiculopathy    Cervical radiculopathy    HTN (hypertension), benign    Left atrial thrombus    PAF (paroxysmal atrial fibrillation) (HCC)    Chronic HFrEF (heart failure with reduced ejection fraction) ([address]    Visit for monitoring Tikosyn therapy    Encounter for medication refill    Itching    Chronic anticoagulation    Presence of Watchman left atrial appendage closure device    Renal mass    Renal cell carcinoma of left kidney ([address]    Chronic renal disease, stage III ([address] [070441] Multiple subsegmental pulmonary emboli without acute cor pulmonale (HCC)    Acute on chronic congestive heart failure ([facility]   who presents to the hospital complaining of neck pain. Cardiac electrophysiology service is consulted for Persistent atrial fibrillation, Tikosyn dosing. Patient Active Problem List    Diagnosis Date Noted    Multiple subsegmental pulmonary emboli without acute cor pulmonale ([facility] 12/17/2022     Priority: Medium    Acute on chronic congestive heart failure ([facility] 12/17/2022     Priority: Medium    Chronic renal disease, stage III [facility]) [324611] 04/25/2022     Priority: Medium    Renal cell carcinoma of left kidney ([facility] 02/01/2022    Renal mass 11/16/2021    Presence of Watchman left atrial appendage closure device 10/14/2020     Overview Note:     24-mm Watchman 2.5 (Dr. Braxton Gonzalez 10/14/2020)      Chronic anticoagulation 08/28/2020    Encounter for medication refill 07/20/2020    Itching 07/20/2020    Visit for monitoring Tikosyn therapy 11/04/2019    PAF (paroxysmal atrial fibrillation) ([facility] 03/10/2019    Chronic HFrEF (heart failure with reduced ejection fraction) ([facility] 03/10/2019    Left atrial thrombus 01/02/2019    HTN (hypertension), benign 11/21/2018    Lumbar radiculopathy 10/11/2013    Cervical radiculopathy 10/11/2013    Nonischemic cardiomyopathy ([facility]      Overview Note:     Mild nonischemic cardiomyopathy. (Ejection fraction 45-50%)  Cardiac catheterization (7/67/94): PA systolic 58, wedge 11 indicating slightly elevated pulmonary pressures not secondary to left heart failure. Cardiac output 5.47, cardiac index 2.9, no coronary artery disease   Moderately elevated pulmonary systolic pressure.       Severe mitral regurgitation      Overview Note:     Mild to moderate      Anxiety 04/14/2011       Past Medical History:   Diagnosis Date    Afib (HCC)     WATCHMAN    Anxiety     Arthritis     Cervical radiculopathy     CHF (congestive heart failure) (HCC)     Chronic sinusitis Hilar adenopathy     Hx of blood clots     Hypertension     Left ventricular systolic dysfunction     Lesion of left native kidney     Lumbar radiculopathy     Lung nodules     Meige syndrome     partial/ PCP    Microscopic colitis     Mitral regurgitation     Mild to moderate    Nonischemic cardiomyopathy ([facility]     f/u with Dr. Aanlilia Levy    Osteopenia     Renal cell carcinoma of left kidney ([facility] 2/1/2022    Vertebrobasilar artery syndrome     f/u PCP       Family History   Problem Relation Age of Onset    Heart Attack Mother     Stroke Mother     Heart Attack Father     Heart Failure Father     Heart Disease Father     Anxiety Disorder Sister     Depression Sister     Crohn's Disease Son        Social History     Tobacco Use    Smoking status: Never    Smokeless tobacco: Never   Substance Use Topics    Alcohol use:  Yes     Alcohol/week: 0.0 standard drinks     Comment: occasional wine/mixed drink every few months       Current Facility-Administered Medications   Medication Dose Route Frequency Provider Last Rate Last Admin    potassium chloride (KLOR-CON M) extended release tablet 40 mEq  40 mEq Oral BID WC Glorious Donato APRN - CNP   40 mEq at 12/19/22 0933    fluticasone (FLONASE) 50 MCG/ACT nasal spray 1 spray  1 spray Each Nostril Daily UYEN Peralta CNP        enoxaparin (LOVENOX) injection 80 mg  1 mg/kg SubCUTAneous BID Adenike Gonzalez MD        [START ON 12/20/2022] famotidine (PEPCID) tablet 20 mg  20 mg Oral Daily Dennis Moscoso MD        aspirin EC tablet 81 mg  81 mg Oral Daily Jerrye Winsome, DO   81 mg at 12/19/22 4213    baclofen (LIORESAL) tablet 10 mg  10 mg Oral Nightly Jerrye Winsome, DO   10 mg at 12/18/22 2034    cetirizine (ZYRTEC) tablet 5 mg  5 mg Oral Daily Jerrye Winsome, DO   5 mg at 12/19/22 4003    hydrALAZINE (APRESOLINE) tablet 10 mg  10 mg Oral TID Jerrye Winsome, DO   10 mg at 12/19/22 1449    metoprolol succinate (TOPROL XL) extended release tablet 25 mg  25 mg Oral Daily Mary Capo, DO   25 mg at 12/19/22 0926    pantoprazole (PROTONIX) tablet 40 mg  40 mg Oral QAM AC Mary Capo, DO   40 mg at 12/19/22 9215    spironolactone (ALDACTONE) tablet 25 mg  25 mg Oral Daily Mary Capo, DO   25 mg at 12/19/22 7407    vitamin D (CHOLECALCIFEROL) tablet 1,000 Units  1,000 Units Oral Daily Mary Capo, DO   1,000 Units at 12/19/22 9650    sodium chloride flush 0.9 % injection 5-40 mL  5-40 mL IntraVENous 2 times per day Mary Capo, DO   10 mL at 12/19/22 0940    sodium chloride flush 0.9 % injection 5-40 mL  5-40 mL IntraVENous PRN Mary Capo, DO        0.9 % sodium chloride infusion   IntraVENous PRN Mary Capo, DO        polyethylene glycol (GLYCOLAX) packet 17 g  17 g Oral Daily PRN Mary Capo, DO        furosemide (LASIX) injection 40 mg  40 mg IntraVENous BID Mary Capo, DO   40 mg at 12/19/22 6268    lidocaine 4 % external patch 1 patch  1 patch TransDERmal Daily Lowry Olszewski, MD   1 patch at 12/18/22 0015    perflutren lipid microspheres (DEFINITY) injection 1.5 mL  1.5 mL IntraVENous ONCE PRN UYEN Beltran - CNP            Allergies   Allergen Reactions    Bentyl [Dicyclomine] Shortness Of Breath    Adhesive Tape Itching     develops rash and itching with EKG electrodes    Atacand [Candesartan] Hives and Itching    Cefdinir Hives, Itching and Nausea Only    Demerol      3/9/19 Pt states she gets a severe migraine    Digoxin Itching     developed itching and rash    Imdur [Isosorbide Mononitrate]       migraines    Losartan Potassium Itching    Shellfish-Derived Products Itching     3/9/19 Pt states she had a lobster pizza and had difficulty breathing, started to sweat and was itchy    Sulfa Antibiotics Diarrhea and Other (See Comments)     Also causes migraines  caused migraines and diarrhea    Xarelto [Rivaroxaban] Itching and Rash      severe itch, headache, rash    Acetaminophen Hives and Itching    Apap-Caff-Dihydrocodeine Hives and Itching    Coreg [Carvedilol] Itching     Can take extended release Coreg    Cozaar [Losartan] Itching    Diovan [Valsartan] Itching    Effexor [Venlafaxine Hydrochloride] Nausea Only    Eliquis [Apixaban] Hives, Itching and Rash     3/9/19 Pt states that she gets hives, itchy and a rash    Labetalol Itching    Lisinopril Itching    Ramipril Itching       ROS:   Constitutional: Negative for fever, activity change and appetite change. HENT: Negative for epistaxis. Eyes: Negative for diploplia, blurred vision. Respiratory: Negative for cough, chest tightness, and wheezing. + for shortness of breath   Cardiovascular: pertinent positives in HPI  Gastrointestinal: Negative for abdominal pain and blood in stool. All other review of systems are negative     PHYSICAL EXAM:   Vitals:    12/19/22 0800 12/19/22 0924 12/19/22 0926 12/19/22 1449   BP: 101/71 101/71  111/66   Pulse: 74  74    Resp: 18      Temp: 98 °F (36.7 °C)      TempSrc: Oral      SpO2: 95%      Weight:       Height:          Constitutional: Well-developed, no acute distress, seen in room with daughter at side. Eyes: conjunctivae normal, no xanthelasma   Ears, Nose, Throat: oral mucosa moist, no cyanosis   CV: no JVD. Regular rate and rhythm. Normal S1S2 and no S3. No murmurs, rubs, or gallops.  PMI is nondisplaced  Lungs: clear to auscultation bilaterally, normal respiratory effort without used of accessory muscles  Abdomen: soft, non-tender, bowel sounds present, no masses or hepatomegaly   Musculoskeletal: no digital clubbing,+ 2 BLE  edema   Skin: warm, no rashes     I have personally reviewed the laboratory, cardiac diagnostic and radiographic testing as outlined below:    Data:    Recent Labs     12/18/22  1102 12/19/22  0521   WBC 8.5 7.8   HGB 15.0 14.0   HCT 46.8 41.8    219     Recent Labs     12/17/22  0524 12/18/22  1102 12/19/22  0521    136 137   K 3.8 3.6 3.4*    99 99   CO2 21* 20* 23   BUN 19 21 29*   CREATININE 1.3* 1.3* 1.6* CALCIUM 9.3 9.7 9.4      Lab Results   Component Value Date/Time    MG 2.0 12/19/2022 05:21 AM     No results for input(s): TSH in the last 72 hours. No results for input(s): INR in the last 72 hours. CTA 12/17/2022:   FINDINGS:   Pulmonary Arteries: Pulmonary arteries are adequately opacified for   evaluation. Small burden pulmonary emboli to the bilateral lower lobe   basilar divisions, right greater than left. Main pulmonary artery is normal   in caliber. Mediastinum: No evidence of mediastinal lymphadenopathy. The heart and   pericardium demonstrate no acute abnormality. There is no acute abnormality   of the thoracic aorta. Moderate cardiomegaly with no pericardial effusions. Left atrial appendage closure device noted. Lungs/pleura: The lungs are without acute process. No focal consolidation or   pulmonary edema. No evidence of  pneumothorax. Trace bilateral pleural   effusions are present. Upper Abdomen: Limited images of the upper abdomen are unremarkable. Soft Tissues/Bones: No acute bone or soft tissue abnormality. Impression   Small burden acute subsegmental pulmonary emboli to the bilateral lower lobes. RECOMMENDATIONS:   Careful clinical correlation and follow up recommended. Telemetry: Sinus rate 70-80 bpm     EKG 12/18/2022: NSR rate 79 bpm, , QRS 158ms, QTc 522ms  Please see scan in Cardiology. Echocardiogram 12/17/2022:    Findings      Left Ventricle   Ejection fraction is visually estimated at 25%. Overall ejection fraction   severely decreased. Mild left ventricular concentric hypertrophy noted. Left ventricle size is normal. Indeterminate diastolic function. Right Ventricle   Dilated right ventricle with reduced function. Left Atrium   Left atrial volume index of 50 ml per meters squared BSA. Right Atrium   Mildly enlarged right atrium size. Mitral Valve   Mild thickening of the mitral valve leaflets.  No evidence of mitral valve   stenosis. Moderate mitral regurgitation is present. Tricuspid Valve   The tricuspid valve appears structurally normal.   Moderate tricuspid regurgitation. Pulmonary hypertension is severe. RVSP is 70 mmHg. Aortic Valve   The aortic valve is trileaflet. No hemodynamically significant aortic   stenosis is present. Moderate aortic regurgitation is noted. Pulmonic Valve   Pulmonic valve is structurally normal. Physiologic and/or trace pulmonic   regurgitation present. Pericardial Effusion   No evidence for hemodynamically significant pericardial effusion. Aorta   Normal aortic root and ascending aorta. Miscellaneous   The inferior vena cava diameter is normal with normal respiratory   variation. Conclusions      Summary   Ejection fraction is visually estimated at 25%. Overall ejection fraction severely decreased. Mild left ventricular concentric hypertrophy noted. Dilated right ventricle with reduced function. Left atrial volume index of 50 ml per meters squared BSA. Moderate aortic regurgitation is noted. Moderate mitral regurgitation is present. Moderate tricuspid regurgitation. Pulmonary hypertension is severe. RVSP is 70 mmHg. No evidence for hemodynamically significant pericardial effusion. Signature      ----------------------------------------------------------------   Electronically signed by Clarence Francois DO(Interpreting   physician) on 12/17/2022 06:57 PM   ----------------------------------------------------------------     JAIME 10/18/2021:    Findings      Left Ventricle   Left ventricle size is normal.   Mildly reduced left ventricular systolic function. Ejection fraction is visually estimated at 45-50%. Right Ventricle   Normal right ventricular size and function. Left Atrium   No evidence of interatrial shunting on bubble study. History of WATCHMAN device (24 mm).  No significant color flow jet around   the device. No device related thrombus visualized. Right Atrium   Dilated right atrium. Mitral Valve   Moderate mitral regurgitation. No evidence of hemodynamically mitral stenosis. Tricuspid Valve   Moderate tricuspid regurgitation. RV-RA gradient is estimated at 82 mmHg. Aortic Valve   Aortic valve opens well. The aortic valve is trileaflet. No evidence of hemodynamically significant aortic stenosis. Mild aortic regurgitation. Pulmonic Valve   No evidence of hemodynamically significant pulmonic regurgitation. Pericardial Effusion   No evidence of a hemodynamically significant pericardial effusion. Aorta   Aortic root dimension within normal limits. Conclusions      Summary   Ejection fraction is visually estimated at 45-50%. Normal right ventricular size and function. No evidence of interatrial shunting on bubble study. History of WATCHMAN device (24 mm). No significant color flow jet around   the device. No device related thrombus visualized. Moderate mitral regurgitation. Mild aortic regurgitation. Moderate tricuspid regurgitation. RV-RA gradient is estimated at 82 mmHg. Signature      ----------------------------------------------------------------   Electronically signed by Monica Hernandez MD(Interpreting   physician) on 10/18/2021 05:20 PM   ----------------------------------------------------------------     Doppler Measurements & Calculations        TR Velocity:4.54 m/s     TR Gradient:82.56 mmHg     Echocardiogram 10/14/20:    Findings      Left Ventricle   Normal left ventricle size. Estimated left ventricle ejection fraction 50 %. Right Ventricle   Normal right ventricular size and function. Left Atrium   Enlarged left atrium. Right Atrium   Normal right atrium size. Pericardial Effusion   Resolved pericardial effusion adjacent to LV. Very trivial effusion   adjacent to the RV mostly in systole.  Overall the pericardial effusion is   decreased compared to 10/14/2020 study. Conclusions      Summary   Resolved pericardial effusion adjacent to LV. Very trivial effusion adjacent to the RV mostly in systole. Overall the pericardial effusion is decreased compared to 10/14/2020   study. Signature      ----------------------------------------------------------------   Electronically signed by Sy Alarcon MD(Interpreting physician)   on 10/16/2020 10:37 AM       Stress test 11/25/2013     Impression-    1. Negative myocardial perfusion study for the pharmacologic   stress-induced myocardial ischemia. 2. Subjectively, there is good contraction of left ventricular   myocardium in the gated SPECT study. The left ventricle is not dilated. However, the estimated LVEF = mildly decreased at 40%. Please correlate   with ECHO. I have independently reviewed all of the ECGs and rhythm strips per above     Assessment/Plan:     1. Persistent atrial fibrillation  - [id number]: 6  - History of CHERYL thrombus  - History of  JAIME and DC-CV on 5/6/19 ad 11/4/19.  - Tikosyn 250 mcg BID: initiation 11/4/19 and in March 2022 was reduced to 125 mcg every 12 hours due CrCL of 27.75 mL/min based off Cr of 1.2mg/dl (based off labs on 2/1/22). - Status post  Watchman #[id number] on 10/14/2020 with Dr Joes Harper. - Tikosyn stopped during acute hospitalization for HFrEF (IV diuretics) and elevated creat. - CrCL of 28.56  mL/min based off Cr of 1.6mg/dl (based off labs on 8/19/22)  - Re-education on importance of well controlled HTN (goal BP < 130/80), adequate weight control (goal BMI of < 27), physical activity consisting of moderate cardiopulmonary exercise up to a goal of 250 min/wk, daily compliance with CPAP in treating sleep apnea, smoking cessation and limited ETOH intake.       2. Nonischemic cardiomyopathy  - Diagnosed originally in 2013  - TTE: 12/2016: EF 38%  - JAIME: 3/2019: EF 25-30%  - JAIME: 5/2019: EF 25-30%  - TTE: 10/2020: EF 50%  - JAIME: 11/30/2020: EF 45-50%  - JAIME: 7/16/2021: EF 45-50%  - JAIME: 10/18/2021: EF 45-50%  - TTE: 12/17/2022: LVEF 25%   - GDMT: Toprol XL, Lasix, Apresoline and altactone  - Allergic to ACE-I/ARB and Nitrates  - NYHA II, ACC/AHA stage C  - Compensated and euvolemic. 3. Hypertension  - On Toprol XL, Lasix, Apresoline and Aldactone   - Managed by Dr. Maru Gaytan     4. Obesity  - Body mass index is 26.59 kg/m². 5. Lumbar and cervical radiculopathy     6. History of anxiety     7. History of TIA's     8. History of Meige syndrome     9. Status post LEFT partial nephrectomy   - 11/2021 - Dr Les Escoto    Recommendations:  Stop Tikosyn. Continue Toprol 25mg daily. IF recurrent AF will need Amiodarone. Given history of Allergy to Eliquis and Xarelto consider Warfarin at the time of discharge. EP to sign off please call if needed. I have spent a total of 10 minutes with the patient and the family reviewing the above stated recommendations. And a total of >50% of that time involved face-to-face time providing counseling and or coordination of care with the other providers. Thank you for allowing me to participate in your patient's care. Please call me if there are any questions or concerns. Discussed with Dr. Dotti Aschoff. Ermias Murcia, UYEN - CNP  Cardiac Electrophysiology  [facility]) Physicians  [facility] [place]: [place] [facility]  3:55 PM  12/19/2022    ______________________________________________________________________    I have personally seen and evaluated the patient. I personally obtained the history and performed the physical exam. I personally reviewed all of the above labs and data. All of the assessments and recommendations are from me. I have reviewed the above documentation completed by the MONIKA. Please see my additional contributions to the HPI, physical exam, and assessment, and recommendations below. History of Present Illness:   The patient is a 68year-old lady with significant past medical history of persistent atrial fibrillation, s/p Watchman on 10/14/2020, LBBB, nonischemic Cardiomyopathy with recovered LVEF, hypertension, obesity, lumbar and cervical radiculopathy, anxiety, TIAs, Meige, LEFT partial nephrectomy and CKD. She presented on 12/16/22 with complaints of spasm of her neck radiated down to her right arm. She also reported dyspnea and BLE edema. In ED she underwent a CTA which showed pleural effusion with a segmental PE. She was then admitted and started on Lovenox. Cardiology was consulted and she was started on IV Lasix. Her Tikosyn was discontinued. Her Creatinine increased to 1.6 today with IV diuresis. She remained in sinus with rates in the 70's. No recurrent AF noted overnight. The patient denies any chest pain, palpitations, dizziness, syncope, orthopnea or paroxysmal nocturnal dyspnea. ROS:   Constitutional: Negative for fever. Positive for activity change and negative for appetite change. HENT: Negative for epistaxis. Eyes: Negative for diploplia, blurred vision. Respiratory: Negative for cough, chest tightness, shortness of breath and wheezing. Cardiovascular: pertinent positives in HPI  Gastrointestinal: Negative for abdominal pain and blood in stool. All other review of systems are negative     PHYSICAL EXAM:   Vitals:    12/19/22 0800 12/19/22 0924 12/19/22 0926 12/19/22 1449   BP: 101/71 101/71  111/66   Pulse: 74  74    Resp: 18      Temp: 98 °F (36.7 °C)      TempSrc: Oral      SpO2: 95%      Weight:       Height:          Constitutional: Well-developed, no acute distress  Eyes: conjunctivae normal, no xanthelasma   Ears, Nose, Throat: oral mucosa moist, no cyanosis   CV: no JVD. Regular rate and rhythm. Normal S1S2 and no S3. No murmurs, rubs, or gallops.  PMI is nondisplaced  Lungs: clear to auscultation bilaterally, normal respiratory effort without used of accessory muscles  Abdomen: soft, non-tender, bowel sounds present, no masses or hepatomegaly   Musculoskeletal: no digital clubbing, trace edema of LEs  Skin: warm, no rashes     EKG 12/18/22: NSR 79 bpm, LAD, LBBB, Qtc 522 ms    Telemetry 12/19/22 : NSR     Echocardiogram 12/17/2022:    Findings      Left Ventricle   Ejection fraction is visually estimated at 25%. Overall ejection fraction   severely decreased. Mild left ventricular concentric hypertrophy noted. Left ventricle size is normal. Indeterminate diastolic function. Right Ventricle   Dilated right ventricle with reduced function. Left Atrium   Left atrial volume index of 50 ml per meters squared BSA. Right Atrium   Mildly enlarged right atrium size. Mitral Valve   Mild thickening of the mitral valve leaflets. No evidence of mitral valve   stenosis. Moderate mitral regurgitation is present. Tricuspid Valve   The tricuspid valve appears structurally normal.   Moderate tricuspid regurgitation. Pulmonary hypertension is severe. RVSP is 70 mmHg. Aortic Valve   The aortic valve is trileaflet. No hemodynamically significant aortic   stenosis is present. Moderate aortic regurgitation is noted. Pulmonic Valve   Pulmonic valve is structurally normal. Physiologic and/or trace pulmonic   regurgitation present. Pericardial Effusion   No evidence for hemodynamically significant pericardial effusion. Aorta   Normal aortic root and ascending aorta. Miscellaneous   The inferior vena cava diameter is normal with normal respiratory   variation. Conclusions      Summary   Ejection fraction is visually estimated at 25%. Overall ejection fraction severely decreased. Mild left ventricular concentric hypertrophy noted. Dilated right ventricle with reduced function. Left atrial volume index of 50 ml per meters squared BSA. Moderate aortic regurgitation is noted. Moderate mitral regurgitation is present. Moderate tricuspid regurgitation. Pulmonary hypertension is severe.  RVSP is 70 mmHg. No evidence for hemodynamically significant pericardial effusion. Signature      ----------------------------------------------------------------   Electronically signed by Yanelis Chen DO(Interpreting   physician) on 12/17/2022 06:57 PM   ----------------------------------------------------------------     JAIME 10/18/2021:    Findings      Left Ventricle   Left ventricle size is normal.   Mildly reduced left ventricular systolic function. Ejection fraction is visually estimated at 45-50%. Right Ventricle   Normal right ventricular size and function. Left Atrium   No evidence of interatrial shunting on bubble study. History of WATCHMAN device (24 mm). No significant color flow jet around   the device. No device related thrombus visualized. Right Atrium   Dilated right atrium. Mitral Valve   Moderate mitral regurgitation. No evidence of hemodynamically mitral stenosis. Tricuspid Valve   Moderate tricuspid regurgitation. RV-RA gradient is estimated at 82 mmHg. Aortic Valve   Aortic valve opens well. The aortic valve is trileaflet. No evidence of hemodynamically significant aortic stenosis. Mild aortic regurgitation. Pulmonic Valve   No evidence of hemodynamically significant pulmonic regurgitation. Pericardial Effusion   No evidence of a hemodynamically significant pericardial effusion. Aorta   Aortic root dimension within normal limits. Conclusions      Summary   Ejection fraction is visually estimated at 45-50%. Normal right ventricular size and function. No evidence of interatrial shunting on bubble study. History of WATCHMAN device (24 mm). No significant color flow jet around   the device. No device related thrombus visualized. Moderate mitral regurgitation. Mild aortic regurgitation. Moderate tricuspid regurgitation. RV-RA gradient is estimated at 82 mmHg.       Signature ----------------------------------------------------------------   Electronically signed by Kat Haynes MD(Interpreting   physician) on 10/18/2021 05:20 PM   ----------------------------------------------------------------     Doppler Measurements & Calculations        TR Velocity:4.54 m/s     TR Gradient:82.56 mmHg     Echocardiogram 10/14/20:    Findings      Left Ventricle   Normal left ventricle size. Estimated left ventricle ejection fraction 50 %. Right Ventricle   Normal right ventricular size and function. Left Atrium   Enlarged left atrium. Right Atrium   Normal right atrium size. Pericardial Effusion   Resolved pericardial effusion adjacent to LV. Very trivial effusion   adjacent to the RV mostly in systole. Overall the pericardial effusion is   decreased compared to 10/14/2020 study. Conclusions      Summary   Resolved pericardial effusion adjacent to LV. Very trivial effusion adjacent to the RV mostly in systole. Overall the pericardial effusion is decreased compared to 10/14/2020   study. Signature      ----------------------------------------------------------------   Electronically signed by Michael Duarte MD(Interpreting physician)   on 10/16/2020 10:37 AM       Stress test 11/25/2013     Impression-    1. Negative myocardial perfusion study for the pharmacologic   stress-induced myocardial ischemia. 2. Subjectively, there is good contraction of left ventricular   myocardium in the gated SPECT study. The left ventricle is not dilated. However, the estimated LVEF = mildly decreased at 40%. Please correlate   with ECHO. Assessment/Plan:    1. Persistent atrial fibrillation  - [id number]: 7  - History of CHERYL thrombus  - History of  JAIME and DC-CV on 5/6/19 ad 11/4/19.  - Tikosyn 250 mcg BID: initiation 11/4/19 and in March 2022 was reduced to 125 mcg every 12 hours due CrCL of 27.75 mL/min based off Cr of 1.2mg/dl (based off labs on 2/1/22).   - Status post Watchman #[id number] on 10/14/2020 with Dr Braxton Gonzalez. - Tikosyn stopped during elevated Cr on IV Diuretic.  - CrCL of 28.56  mL/min based off Cr of 1.6mg/dl (based off labs on 8/19/22)  - Re-education on importance of well controlled HTN (goal BP < 130/80), adequate weight control (goal BMI of < 27), physical activity consisting of moderate cardiopulmonary exercise up to a goal of 250 min/wk, daily compliance with CPAP in treating sleep apnea, smoking cessation and limited ETOH intake. 2. Nonischemic cardiomyopathy  - Diagnosed originally in 2013  - TTE: 12/2016: EF 38%  - JAIME: 3/2019: EF 25-30%  - JAIME: 5/2019: EF 25-30%  - TTE: 10/2020: EF 50%  - JAIME: 11/30/2020: EF 45-50%  - JAIME: 7/16/2021: EF 45-50%  - JAIME: 10/18/2021: EF 45-50%  - TTE: 12/17/2022: LVEF 25%   - GDMT: Toprol XL, Lasix, Apresoline and altactone  - Allergic to ACE-I/ARB and Nitrates  - NYHA III, ACC/AHA stage C  - Compensated and euvolemic. 3. Hypertension  - On Toprol XL, Lasix, Apresoline and Aldactone   - Managed by Dr. Mona Mills     4. Obesity  Body mass index is 27.59 kg/m². 5. Lumbar and cervical radiculopathy     6. History of anxiety     7. History of TIA's     8. History of Meige syndrome     9. Status post LEFT partial nephrectomy   - 11/2021 - Dr Fuentes Barrera    Recommendations:  Ann Marie Schroeder permanently. Continue Toprol 25mg daily and titrate dose up as tolerated. .   If AF recurs, consider start Amiodarone. [address] per Primary. EP will sign off. Please call for any questions or concerns. I have spent a total of 70 minutes with the patient and the family reviewing the above stated recommendations. And a total of >50% of that time involved face-to-face time providing counseling and/or coordination of care with the other providers, reviewing records/tests, counseling/education of the patient, ordering medications/tests/procedures, coordinating care, and documenting clinical information in the EHR.      Thank you for allowing me to participate in your patient's care. Please call me if there are any questions or concerns.      Savita Clay MD  Cardiac Electrophysiology  [address]  [facility]: [facility]  6:07 PM  12/19/2022

## 2022-12-19 NOTE — PROGRESS NOTES
Hospitalist Progress Note      Synopsis:   Briefly, patient with PMH significant for A. fib, CHF, hilar adenopathy, blood clots, HPT, left ventricular systolic dysfunction, lesion of left native kidney, lumbar and cervical radiculopathy, lung nodule, colitis, mitral regurgitation, nonischemic cardiomyopathy, renal cell carcinoma of left kidney presented to ED for neck pain, increased lower extremity swelling shortness of breath and concern for issues with her CHF. CT of chest revealed pleural effusions along with pulmonary embolism. Patient was given Lasix and Lovenox and admitted for management of CHF exacerbation and pulmonary embolism. Patient had echo that revealed decreased EF of 25%. Patient is for stress test tomorrow. During hospital course patient also with NIKA, renal ultrasound and urine studies ordered. Hospital day 2     Subjective:  Stable overnight. No issues reported. Patient seen and examined  Records reviewed. Temp (24hrs), Av.7 °F (36.5 °C), Min:97.4 °F (36.3 °C), Max:98 °F (36.7 °C)    DIET: ADULT DIET; Regular; Low Sodium (2 gm)  CODE: Full Code    Intake/Output Summary (Last 24 hours) at 2022 1012  Last data filed at 2022 0940  Gross per 24 hour   Intake 490 ml   Output --   Net 490 ml       Review of Systems: All bolded are positive; please see HPI  General:  Fever, chills, diaphoresis, fatigue, malaise, night sweats, weight loss  Psychological:  Anxiety, disorientation, hallucinations. ENT:  Epistaxis, headaches, vertigo, visual changes. Cardiovascular:  Chest pain, irregular heartbeats, palpitations, paroxysmal nocturnal dyspnea. Respiratory:  Shortness of breath, coughing, sputum production, hemoptysis, wheezing, orthopnea.   Gastrointestinal:  Nausea, vomiting, diarrhea, heartburn, constipation, abdominal pain, hematemesis, hematochezia, melena, acholic stools  Genito-Urinary:  Dysuria, urgency, frequency, hematuria  Musculoskeletal:  Joint pain, joint stiffness, joint swelling, muscle pain  Neurology:  Headache, focal neurological deficits, weakness, numbness, paresthesia  Derm:  Rashes, ulcers, excoriations, bruising  Extremities:  Decreased ROM, peripheral edema, mottling    Objective:    /71   Pulse 74   Temp 97.4 °F (36.3 °C) (Axillary)   Resp 18   Ht 5' 7\" (1.702 m)   Wt 176 lb 2.4 oz (79.9 kg)   SpO2 98%   BMI 27.59 kg/m²     General appearance: No apparent distress, appears stated age and cooperative. HEENT: Conjunctivae/corneas clear. Mucous membranes moist.  Neck: Supple. No JVD. Respiratory:  Clear to auscultation bilaterally. Normal respiratory effort. Cardiovascular:  RRR. S1, S2 without MRG. PV: Pulses palpable. No edema. Abdomen: Soft, non-tender, non-distended. +BS  Musculoskeletal: No obvious deformities. Skin: Normal skin color. No rashes or lesions. Good turgor. Neurologic:  Grossly non-focal. Awake, alert, following commands.    Psychiatric: Alert and oriented, thought content appropriate, normal insight and judgement    Medications:  REVIEWED DAILY    Infusion Medications    sodium chloride       Scheduled Medications    potassium chloride  40 mEq Oral BID WC    fluticasone  1 spray Each Nostril Daily    aspirin  81 mg Oral Daily    baclofen  10 mg Oral Nightly    cetirizine  5 mg Oral Daily    [Held by provider] dofetilide  125 mcg Oral BID    famotidine  40 mg Oral Daily    hydrALAZINE  10 mg Oral TID    metoprolol succinate  25 mg Oral Daily    pantoprazole  40 mg Oral QAM AC    spironolactone  25 mg Oral Daily    Vitamin D  1,000 Units Oral Daily    sodium chloride flush  5-40 mL IntraVENous 2 times per day    enoxaparin  1 mg/kg SubCUTAneous BID    furosemide  40 mg IntraVENous BID    lidocaine  1 patch TransDERmal Daily     PRN Meds: sodium chloride flush, sodium chloride, polyethylene glycol, perflutren lipid microspheres    Labs:     Recent Labs     12/16/22  1225 12/18/22  1102 12/19/22  0521   WBC 6.8 8.5 7.8   HGB 14.6 15.0 14.0   HCT 45.7 46.8 41.8    223 219       Recent Labs     12/17/22  0524 12/18/22  1102 12/19/22  0521    136 137   K 3.8 3.6 3.4*    99 99   CO2 21* 20* 23   BUN 19 21 29*   CREATININE 1.3* 1.3* 1.6*   CALCIUM 9.3 9.7 9.4       Recent Labs     12/16/22  1225 12/18/22  1102 12/19/22  0521   PROT 7.3 7.6 6.7   ALKPHOS 120* 123* 108*   ALT 26 22 19   AST 28 30 24   BILITOT 1.1 1.5* 0.9   LIPASE 25  --   --        Recent Labs     12/16/22  1225   INR 1.1       No results for input(s): CKTOTAL, TROPONINI in the last 72 hours. Chronic labs:    Lab Results   Component Value Date    CHOL 151 12/18/2022    TRIG 68 12/18/2022    HDL 55 12/18/2022    LDLCALC 82 12/18/2022    TSH 0.807 02/01/2022    INR 1.1 12/16/2022    LABA1C 5.7 (H) 02/01/2022       Radiology: REVIEWED DAILY    Assessment:  Shortness of breath/chest pain likely secondary to pulmonary emboli and congestive heart failure  Acute on chronic congestive heart failure JAIME completed on 10/18/2021 showed EF 45 to 50%, moderate mitral regurgitation, mild aortic regurgitation, moderate tricuspid regurgitation  Elevated troponin 39 on presentation, repeat 38  proBNP elevated at 7473  History of watchman device  Pulmonary embolism  Chest pain  Hypertension  GERD  History of anxiety depression  Cervical neck pain  Acute kidney injury creatinine 1.6, baseline seems to be 1.3  Plan:  Cardiology consulted  Consult pulmonology  Echo performed on 12/17/2022 shows EF of 25% with moderate aortic regurgitation, mitral regurgitation and tricuspid regurgitation with severe pulmonary hypertension RVSP is 70  Stop Lovenox 1 mg/kg twice daily, start subcu heparin  Lasix 40 mg IV twice daily  Daily weights and strict intake and output  MRI ordered, neurosurgery consulted. No acute abnormality or significant stenosis appreciated on MRI, no neurosurgical intervention.   Stress test prior to discharge given reduced reduced EF  Consult EP to prolong QT and underlying LBBB  Consider nephrology consult as patient developed NIKA, check urine studies renal ultrasound  Continue home medications Tikosyn, Lasix, hydralazine, Toprol, Aldactone, Pepcid, Protonix, aspirin baclofen, Zyrtec      DVT Prophylaxis [x] Lovenox  []  Heparin [] DOAC [] PCDs [] Ambulation    GI Prophylaxis [] PPI  [] H2 Blocker   [] Carafate  [x] Diet/Tube Feeds   Level of care [] Med/Surg  [] Intermediate  []  ICU   Diet ADULT DIET; Regular; Low Sodium (2 gm)    Family contact [x]  N/A    [] At bedside  [] Phone call   Disposition Patient requires continued admission further evaluation and treatment of PEA congestive heart failure   MDM [] Low    [x] Moderate  []   High       Discharge Plan: To home when clinically stable    +++++++++++++++++++++++++++++++++++++++++++++++++  UYEN Laureano Physician - [address] [place] [address]  +++++++++++++++++++++++++++++++++++++++++++++++++  NOTE: This report was transcribed using voice recognition software. Every effort was made to ensure accuracy; however, inadvertent computerized transcription errors may be present.

## 2022-12-19 NOTE — PROGRESS NOTES
INPATIENT CARDIOLOGY FOLLOW-UP    Name: Bijan Baker    Age: 68 y.o. Date of Admission: 12/16/2022 11:44 PM    Date of Service: 12/19/2022    Primary Cardiologist: Dr Maciej Bazan    Chief Complaint: Follow-up for Cardiomyopathy    Interim History:  Feels well. Denies chest pain or shortness of breath. Still has some lower extremity edema. Her presenting symptoms was neck pain so she is understandably upset about all of the other issues that have been discovered during this admission. On IV furosemide with strict I's and O's ordered, nary 1 mL of urine output has been documented.     Review of Systems:   Negative except as described above    Problem List:  Patient Active Problem List   Diagnosis    Anxiety    Nonischemic cardiomyopathy ([facility]    Severe mitral regurgitation    Lumbar radiculopathy    Cervical radiculopathy    HTN (hypertension), benign    Left atrial thrombus    PAF (paroxysmal atrial fibrillation) ([facility])    Chronic HFrEF (heart failure with reduced ejection fraction) ([facility]    Visit for monitoring Tikosyn therapy    Encounter for medication refill    Itching    Chronic anticoagulation    Presence of Watchman left atrial appendage closure device    Renal mass    Renal cell carcinoma of left kidney (HCC)    Chronic renal disease, stage III ([facility] [[id number]]    Multiple subsegmental pulmonary emboli without acute cor pulmonale (HCC)    Acute on chronic congestive heart failure (HCC)       Current Medications:    Current Facility-Administered Medications:     potassium chloride (KLOR-CON M) extended release tablet 40 mEq, 40 mEq, Oral, BID WC, Seth Hall APRN - CNP, 40 mEq at 12/19/22 0933    fluticasone (FLONASE) 50 MCG/ACT nasal spray 1 spray, 1 spray, Each Nostril, Daily, UYEN Aguirre - CNP    aspirin EC tablet 81 mg, 81 mg, Oral, Daily, Gauri Molinar, DO, 81 mg at 12/19/22 3094    baclofen (LIORESAL) tablet 10 mg, 10 mg, Oral, Nightly, Gauri Grayerger, DO, 10 mg at 12/18/22 2034 cetirizine (ZYRTEC) tablet 5 mg, 5 mg, Oral, Daily, Bettylou Sicks, DO, 5 mg at 12/19/22 1370    [Held by provider] dofetilide (TIKOSYN) capsule 125 mcg, 125 mcg, Oral, BID, Bettylou Sicks, DO    famotidine (PEPCID) tablet 40 mg, 40 mg, Oral, Daily, Bettylou Sicks, DO, 40 mg at 12/19/22 0922    hydrALAZINE (APRESOLINE) tablet 10 mg, 10 mg, Oral, TID, Bettylou Sicks, DO, 10 mg at 12/19/22 4190    metoprolol succinate (TOPROL XL) extended release tablet 25 mg, 25 mg, Oral, Daily, Bettylou Sicks, DO, 25 mg at 12/19/22 0926    pantoprazole (PROTONIX) tablet 40 mg, 40 mg, Oral, QAM AC, Bettylou Sicks, DO, 40 mg at 12/19/22 5065    spironolactone (ALDACTONE) tablet 25 mg, 25 mg, Oral, Daily, Bettylou Sicks, DO, 25 mg at 12/19/22 4416    vitamin D (CHOLECALCIFEROL) tablet 1,000 Units, 1,000 Units, Oral, Daily, Bettylou Sicks, DO, 1,000 Units at 12/19/22 2675    sodium chloride flush 0.9 % injection 5-40 mL, 5-40 mL, IntraVENous, 2 times per day, Bettylou Sicks, DO, 10 mL at 12/19/22 0940    sodium chloride flush 0.9 % injection 5-40 mL, 5-40 mL, IntraVENous, PRN, Bettylou Sicks, DO    0.9 % sodium chloride infusion, , IntraVENous, PRN, Bettylou Sicks, DO    polyethylene glycol (GLYCOLAX) packet 17 g, 17 g, Oral, Daily PRN, Bettylou Sicks, DO    enoxaparin (LOVENOX) injection 80 mg, 1 mg/kg, SubCUTAneous, BID, Bettylou Sicks, DO, 80 mg at 12/19/22 2834    furosemide (LASIX) injection 40 mg, 40 mg, IntraVENous, BID, Bettylou Sicks, DO, 40 mg at 12/19/22 0924    lidocaine 4 % external patch 1 patch, 1 patch, TransDERmal, Daily, Pavel Roman MD, 1 patch at 12/18/22 4634    perflutren lipid microspheres (DEFINITY) injection 1.5 mL, 1.5 mL, IntraVENous, ONCE PRN, Terese Keller, APRN - CNP    Physical Exam:  /71   Pulse 74   Temp 97.4 °F (36.3 °C) (Axillary)   Resp 18   Ht 5' 7\" (1.702 m)   Wt 176 lb 2.4 oz (79.9 kg)   SpO2 98%   BMI 27.59 kg/m²   Wt Readings from Last 3 Encounters:   12/18/22 176 lb 2.4 oz (79.9 kg)   12/06/22 174 lb (78.9 kg)   10/19/22 171 lb (77.6 kg)     Appearance: Awake, alert, no acute respiratory distress  Skin: Intact, no rash  Head: Normocephalic, atraumatic  Eyes: EOMI, no conjunctival erythema  ENMT: No pharyngeal erythema, MMM, no rhinorrhea  Neck: Supple, no elevated JVP, no carotid bruits  Lungs: Clear to auscultation bilaterally. No wheezes, rales, or rhonchi.   Cardiac: PMI nondisplaced, Regular rhythm with a normal rate, frequent ectopy, S1 & S2 normal, no murmurs  Abdomen: Soft, nontender, +bowel sounds  Extremities: Moves all extremities x 4, trace to 1+ pitting bilateral lower extremity edema  Neurologic: No focal motor deficits apparent, normal mood and affect  Peripheral Pulses: Intact posterior tibial pulses bilaterally    Intake/Output:    Intake/Output Summary (Last 24 hours) at 12/19/2022 0940  Last data filed at 12/19/2022 0940  Gross per 24 hour   Intake 490 ml   Output --   Net 490 ml     I/O this shift:  In: 10 [I.V.:10]  Out: -     Laboratory Tests:  Recent Labs     12/17/22  0524 12/18/22  1102 12/19/22  0521    136 137   K 3.8 3.6 3.4*    99 99   CO2 21* 20* 23   BUN 19 21 29*   CREATININE 1.3* 1.3* 1.6*   GLUCOSE 119* 147* 108*   CALCIUM 9.3 9.7 9.4     Lab Results   Component Value Date/Time    MG 2.0 12/19/2022 05:21 AM     Recent Labs     12/16/22  1225 12/18/22  1102 12/19/22  0521   ALKPHOS 120* 123* 108*   ALT 26 22 19   AST 28 30 24   PROT 7.3 7.6 6.7   BILITOT 1.1 1.5* 0.9   LABALBU 3.9 4.0 3.5     Recent Labs     12/16/22  1225 12/18/22  1102 12/19/22  0521   WBC 6.8 8.5 7.8   RBC 5.17 5.29 4.98   HGB 14.6 15.0 14.0   HCT 45.7 46.8 41.8   MCV 88.4 88.5 83.9   MCH 28.2 28.4 28.1   MCHC 31.9* 32.1 33.5   RDW 15.7* 15.6* 15.6*    223 219   MPV 11.1 10.7 10.6     Lab Results   Component Value Date    CKTOTAL 109 06/03/2021    CKMB 6.6 (H) 11/25/2013    TROPONINI <0.01 03/09/2019    TROPONINI <0.01 11/21/2018    TROPONINI <0.01 11/20/2018     Lab Results   Component Value Date    INR 1.1 2022    INR 1.0 10/05/2021    INR 1.0 2021    PROTIME 12.2 2022    PROTIME 11.2 10/05/2021    PROTIME 11.5 2021     Lab Results   Component Value Date    TSH 0.807 2022     Lab Results   Component Value Date    LABA1C 5.7 (H) 2022     No results found for: EAG  Lab Results   Component Value Date    CHOL 151 2022    CHOL 206 (H) 2022    CHOL 179 2018     Lab Results   Component Value Date    TRIG 68 2022    TRIG 170 (H) 2022    TRIG 69 2018     Lab Results   Component Value Date    HDL 55 2022    HDL 56 2022    HDL 51 2018     Lab Results   Component Value Date    LDLCALC 82 2022    LDLCALC 116 (H) 2022    LDLCALC 114 (H) 2018     Lab Results   Component Value Date    LABVLDL 14 2022    LABVLDL 34 2022    LABVLDL 14 2018     No results found for: CHOLHDLRATIO  Recent Labs     22  1225   PROBNP 7,473*       Cardiac Tests:    EK2022: Sinus rhythm 79 bpm.  Left axis. Left bundle branch block. Telemetry: Sinus rhythm 70s, frequent PVCs, NSVT 6 beats    Chest X-ray:   22    Impression   COPD changes, congestive changes. CTA chest 22:  Impression   Small burden acute subsegmental pulmonary emboli to the bilateral lower lobes. Echocardiogram:   TTE 22 [place]   Summary   Ejection fraction is visually estimated at 25%. Overall ejection fraction severely decreased. Mild left ventricular concentric hypertrophy noted. Dilated right ventricle with reduced function. Left atrial volume index of 50 ml per meters squared BSA. Moderate aortic regurgitation is noted. Moderate mitral regurgitation is present. Moderate tricuspid regurgitation. Pulmonary hypertension is severe. RVSP is 70 mmHg. No evidence for hemodynamically significant pericardial effusion. JAIME 10/2021: EF 45-50  JAIME 2021: 45-50.  DRT resolved  JAIME 4/2021: EF 45-50. + DRT on Watchman    Stress test:    2018  Left ventricular end systolic volume estimated at 85 ml and   end-diastolic volume estimated at 110 ml. Resting left ventricular   ejection fraction over 23%. Impression   1. No evidence of left ventricular myocardial stress-induced   ischemia. 2. Moderate sized left ventricular anterior wall fixed defect   concerning for old infarct or scar. Clinical correlation is   recommended. 3. Global myocardial hypokinesia. 4. Decreased left ventricular ejection fraction estimated at 23%. Cardiac catheterization:     ----------------------------------------------------------------------------------------------------------------------------------------------------------------  IMPRESSION:  Acute on chronic HFrEF.  proBNP 7500. I's and O's inaccurate  Cardiomyopathy, recurrent. EF now 25%; previously improved from 35% in 2016 to about 50%  PAF. On dofetilide, held since admission. S/p watchman 10/2020. Maintaining sinus  Frequent PVCs/NSVT  Device related thrombus noted on 6-month JAIME; subsequently resolved  Valvular heart disease: Moderate MR, moderate TR, moderate AR  Pulmonary hypertension  Chronic left bundle branch block  Acute bilateral subsegmental PE  NIKA/CKD creatinine 1.2 -> 1.6. Baseline 1.2-1.3.   Hypertension  Renal cancer s/p partial left nephrectomy 11/2021  History of TIA  Hyperlipidemia  Migraines  Multiple medication intolerances/allergies including ACE and ARB, nitrates, DOAC's    RECOMMENDATIONS:    Continue IV furosemide today, please document strict I's and O's  Optimize GDMT for cardiomyopathy; limited by allergy/intolerance and relative hypotension  Metoprolol succinate 25 mg daily  Hydralazine 10 mg 3 times daily  Spironolactone 25 mg daily  SGLT2 inhibitor if tolerated  Will arrange ischemic evaluation given newly reduced EF -pharmacologic stress test prior to discharge -hold off on cath for now given uptrending creatinine and to avoid need to interrupt anticoagulation given acute PE; also currently no anginal symptoms at all  Will request EP consult as dofetilide has been on hold since admission due to prolonged QT, although patient has underlying LBBB, and now with severe LV dysfunction, frequent PVCs and NSVT will need to be discharged with wearable cardioverter defibrillator and consideration of device therapy if EF does not improve  Maintain K> 4.0, mag> 2.0  Further care per primary service and consultants  Aggressive risk factor modification    Ivonne Fuller MD, [address] [place] [address] Cardiology    NOTE: This report was transcribed using voice recognition software. Every effort was made to ensure accuracy; however, inadvertent computerized transcription errors may be present.

## 2022-12-19 NOTE — PROGRESS NOTES
Pulmonary Progress Note  [facility]    Admit Date: 2022                            PCP: Felicia Mcgraw DO  Principal Problem:    Multiple subsegmental pulmonary emboli without acute cor pulmonale (HCC)  Active Problems:    Acute on chronic congestive heart failure (HCC)  Resolved Problems:    * No resolved hospital problems. *      Subjective:  Resting on RA- sats 98%. C/o nasal congestion, dry cough, itchiness. No dyspnea. Medications:   sodium chloride          potassium chloride  40 mEq Oral BID WC    aspirin  81 mg Oral Daily    baclofen  10 mg Oral Nightly    cetirizine  5 mg Oral Daily    [Held by provider] dofetilide  125 mcg Oral BID    famotidine  40 mg Oral Daily    hydrALAZINE  10 mg Oral TID    metoprolol succinate  25 mg Oral Daily    pantoprazole  40 mg Oral QAM AC    spironolactone  25 mg Oral Daily    Vitamin D  1,000 Units Oral Daily    sodium chloride flush  5-40 mL IntraVENous 2 times per day    enoxaparin  1 mg/kg SubCUTAneous BID    furosemide  40 mg IntraVENous BID    lidocaine  1 patch TransDERmal Daily       Vitals:  VITALS:  /82   Pulse 82   Temp 97.4 °F (36.3 °C) (Axillary)   Resp 18   Ht 5' 7\" (1.702 m)   Wt 176 lb 2.4 oz (79.9 kg)   SpO2 98%   BMI 27.59 kg/m²   24HR INTAKE/OUTPUT:    Intake/Output Summary (Last 24 hours) at 2022 0914  Last data filed at 2022 1634  Gross per 24 hour   Intake 480 ml   Output --   Net 480 ml     CURRENT PULSE OXIMETRY:  SpO2: 98 %  24HR PULSE OXIMETRY RANGE:  SpO2  Av %  Min: 94 %  Max: 98 %  CVP:    VENT SETTINGS:      Additional Respiratory Assessments  Heart Rate: 82  Resp: 18  SpO2: 98 %      EXAM:  General: No distress. Alert. On RA  Neck: Trachea midline. Resp: No accessory muscle use. No crackles. No wheezing. No rhonchi. Clear, diminished  CV: Regular rate. Regular rhythm. No mumur or rub. BLE 1+ edema. ABD: Non-tender. Non-distended. No masses. No organmegaly. Normal bowel sounds. Skin: Warm and dry. Lymph: No cervical LAD. No supraclavicular LAD. M/S: genao  Neuro: Aox4    I/O: I/O last 3 completed shifts: In: 960 [P.O.:960]  Out: -   No intake/output data recorded. Results:  CBC:   Recent Labs     12/16/22  1225 12/18/22  1102 12/19/22  0521   WBC 6.8 8.5 7.8   HGB 14.6 15.0 14.0   HCT 45.7 46.8 41.8   MCV 88.4 88.5 83.9    223 219     BMP:   Recent Labs     12/17/22  0524 12/18/22  1102 12/19/22  0521    136 137   K 3.8 3.6 3.4*    99 99   CO2 21* 20* 23   BUN 19 21 29*   CREATININE 1.3* 1.3* 1.6*     LFT:   Recent Labs     12/16/22  1225 12/18/22  1102 12/19/22  0521   ALKPHOS 120* 123* 108*   ALT 26 22 19   AST 28 30 24   PROT 7.3 7.6 6.7   BILITOT 1.1 1.5* 0.9   LABALBU 3.9 4.0 3.5     PT/INR:   Recent Labs     12/16/22  1225   PROTIME 12.2   INR 1.1     Cultures:  No results for input(s): CULTRESP in the last 72 hours. ABG:   No results for input(s): PH, PO2, PCO2, HCO3, BE, O2SAT in the last 72 hours. Films:  XR CHEST (2 VW)    Result Date: 12/16/2022  EXAMINATION: TWO XRAY VIEWS OF THE CHEST 12/16/2022 1:06 pm COMPARISON: 12/07/2022. HISTORY: ORDERING SYSTEM PROVIDED HISTORY: sob TECHNOLOGIST PROVIDED HISTORY: Reason for exam:->sob What reading provider will be dictating this exam?->CRC FINDINGS: The cardiomediastinal silhouette is slightly prominent. Peribronchial cuffing bilateral hilar prominence is seen. Mild prominence of the bronchovascular interstitial lung markings is visualized. Haziness overlying bilateral costophrenic angles with fluid level consistent with small bilateral pleural effusions. Biapical prominence consistent with COPD changes. No evidence of pneumothorax is seen. The osseous structures are without acute process. COPD changes, congestive changes.      XR SHOULDER RIGHT (MIN 2 VIEWS)    Result Date: 12/16/2022  EXAMINATION: THREE XRAY VIEWS OF THE RIGHT SHOULDER 12/16/2022 1:06 pm COMPARISON: 11/20/2018 HISTORY: ORDERING SYSTEM PROVIDED HISTORY: fall TECHNOLOGIST PROVIDED HISTORY: Reason for exam:->fall What reading provider will be dictating this exam?->CRC FINDINGS: Osteopenia is noted. No acute fracture or dislocation is identified. Relatively mild degenerative changes are seen at the glenohumeral and acromioclavicular joints. Osteopenia and mild degenerative changes. No acute bony abnormality. CT CERVICAL SPINE WO CONTRAST    Result Date: 12/16/2022  EXAMINATION: CT OF THE CERVICAL SPINE WITHOUT CONTRAST 12/16/2022 2:54 pm TECHNIQUE: CT of the cervical spine was performed without the administration of intravenous contrast. Multiplanar reformatted images are provided for review. Automated exposure control, iterative reconstruction, and/or weight based adjustment of the mA/kV was utilized to reduce the radiation dose to as low as reasonably achievable. COMPARISON: [facility] 3 2021 HISTORY: ORDERING SYSTEM PROVIDED HISTORY: pain TECHNOLOGIST PROVIDED HISTORY: Reason for exam:->pain Decision Support Exception - unselect if not a suspected or confirmed emergency medical condition->Emergency Medical Condition (MA) What reading provider will be dictating this exam?->CRC FINDINGS: No cervical spine fracture or malalignment. Moderate disc space narrowing at C5/C6. Degenerative posterior disc/osteophyte complex also at C5/C6 results in mild effacement of ventral thecal sac. Facet arthropathy at multiple segments notable on the right at C3/C4. No prevertebral soft tissue edema. Redemonstration of heterogeneous appearance of the thyroid gland. 1. No acute abnormality involving the cervical spine. 2. Redemonstration of heterogeneous appearance of the thyroid gland. Ultrasound could be helpful for further evaluation. CTA PULMONARY W CONTRAST    Addendum Date: 12/17/2022    ADDENDUM: I contacted Dr. Lyly Rhoades via telephone with the result.      Result Date: 12/17/2022  EXAMINATION: CTA OF THE CHEST 12/17/2022 2:19 am TECHNIQUE: CTA of the chest was performed after the administration of intravenous contrast.  Multiplanar reformatted images are provided for review. MIP images are provided for review. Automated exposure control, iterative reconstruction, and/or weight based adjustment of the mA/kV was utilized to reduce the radiation dose to as low as reasonably achievable. COMPARISON: None. HISTORY: ORDERING SYSTEM PROVIDED HISTORY: r/o pe TECHNOLOGIST PROVIDED HISTORY: Reason for exam:->r/o pe Decision Support Exception - unselect if not a suspected or confirmed emergency medical condition->Emergency Medical Condition (MA) What reading provider will be dictating this exam?->CRC FINDINGS: Pulmonary Arteries: Pulmonary arteries are adequately opacified for evaluation. Small burden pulmonary emboli to the bilateral lower lobe basilar divisions, right greater than left. Main pulmonary artery is normal in caliber. Mediastinum: No evidence of mediastinal lymphadenopathy. The heart and pericardium demonstrate no acute abnormality. There is no acute abnormality of the thoracic aorta. Moderate cardiomegaly with no pericardial effusions. Left atrial appendage closure device noted. Lungs/pleura: The lungs are without acute process. No focal consolidation or pulmonary edema. No evidence of  pneumothorax. Trace bilateral pleural effusions are present. Upper Abdomen: Limited images of the upper abdomen are unremarkable. Soft Tissues/Bones: No acute bone or soft tissue abnormality. Small burden acute subsegmental pulmonary emboli to the bilateral lower lobes. RECOMMENDATIONS: Careful clinical correlation and follow up recommended. Assessment/plan:  45-year-old   female vaccinated for corona and flu vaccine, non-smoker retired jewelry  with history of PAF, intolerant to multiple cardiac medications including oral anticoagulation, on Tikosyn, history of left atrial thrombus in 2018.   Status post watchman 10/14/2020, chronic heart failure intermediate EF, HTN, valvular heart disease, pulmonary hypertension unable to tolerate long-acting nitrates, renal cell carcinoma 2021 status post partial nephrectomy, anxiety presented to [facility] 12/16 presented to urgent care with neck pain shooting down right arm with shortness of breath. In ER, CT chest showing multisegmental PE  CT cervical negative  Chest x-ray with congestion  12/17 COVID influenza negative  12/18 MRI cervical spine showing mild degenerative changes  saturating 94% on room air  12/19 on RA         PE  history of allergic reactions to anticoagulations - Allergic to rivaroxaban with significant skin reaction that she manages with hydroxyzine. Also allergic to dabigatran and apixaban. With rash itching. On Lovenox gtt   Only tolerates Xarelto   Acute on chronic systolic heart failure EF 45% on JAIME 10/2022  History of left atrial thrombus 2018 and 2019 with device related thrombus 4/11/2021 resolved on JAIME  Nonischemic CMP EF 45% negative cardiac catheterization 4/10/2010  Valvular heart disease moderate to severe MR echo  On lasix   Pulmonary hypertension not able to tolerate long-acting nitrates  History of kidney cancer s/p left partial nephrectomy  hematology /onc following  Pulmonary nodules   Follows with Dr. Regina Koch chest WO for Fu 12/7 /22, reviewed and no change from previous, moderately enlarged mediastinal lymph node 1.3 cm from 1.1  , unchanged 1.1 cm nodule RLL, 7mm GG nodule YELITZA unchanged   Recommend Fu Ct chest in one year   Afib  Previous watchman device 10/2020   On tikosyn  Caradiology consulted   Nasal congestion  Claritin and flonase        Electronically signed by UYEN Meyers - CNP on 12/19/2022 at 9:14 AM       Attending Attestation Note:    Patient seen and examined with Hospital Staff, NP. I have extensively reviewed the chart lab work and imaging. I agree with above. Modifications and amendment of note made as necessary.     In addition, the following apply:    Current Facility-Administered Medications   Medication Dose Route Frequency Provider Last Rate Last Admin    potassium chloride (KLOR-CON M) extended release tablet 40 mEq  40 mEq Oral BID  UYEN Bourne CNP   40 mEq at 12/19/22 1629    fluticasone (FLONASE) 50 MCG/ACT nasal spray 1 spray  1 spray Each Nostril Daily UYEN Barnett CNP        [START ON 12/20/2022] famotidine (PEPCID) tablet 20 mg  20 mg Oral Daily Dennis Moscoso MD        heparin (porcine) injection 5,000 Units  5,000 Units SubCUTAneous 3 times per day UYEN Bourne CNP        aspirin EC tablet 81 mg  81 mg Oral Daily Pat Mantle, DO   81 mg at 12/19/22 1329    baclofen (LIORESAL) tablet 10 mg  10 mg Oral Nightly Pat Mantle, DO   10 mg at 12/18/22 2034    cetirizine (ZYRTEC) tablet 5 mg  5 mg Oral Daily Pat Mantle, DO   5 mg at 12/19/22 0300    hydrALAZINE (APRESOLINE) tablet 10 mg  10 mg Oral TID Pat Mantle, DO   10 mg at 12/19/22 1449    metoprolol succinate (TOPROL XL) extended release tablet 25 mg  25 mg Oral Daily Pat Mantle, DO   25 mg at 12/19/22 0926    pantoprazole (PROTONIX) tablet 40 mg  40 mg Oral QAM AC Pat Mantle, DO   40 mg at 12/19/22 2579    spironolactone (ALDACTONE) tablet 25 mg  25 mg Oral Daily Pat Mantle, DO   25 mg at 12/19/22 3742    vitamin D (CHOLECALCIFEROL) tablet 1,000 Units  1,000 Units Oral Daily Pat Mantle, DO   1,000 Units at 12/19/22 7455    sodium chloride flush 0.9 % injection 5-40 mL  5-40 mL IntraVENous 2 times per day Pat Mantle, DO   10 mL at 12/19/22 0940    sodium chloride flush 0.9 % injection 5-40 mL  5-40 mL IntraVENous PRN Pat Mantle, DO        0.9 % sodium chloride infusion   IntraVENous PRN Pat Mantle, DO        polyethylene glycol (GLYCOLAX) packet 17 g  17 g Oral Daily PRN Pat Mantle, DO        furosemide (LASIX) injection 40 mg  40 mg IntraVENous BID Pat Mantle, DO   40 mg at 12/19/22 1629    lidocaine 4 % external patch 1 patch  1 patch TransDERmal Daily Lennie Pineda MD   1 patch at 12/18/22 9476    perflutren lipid microspheres (DEFINITY) injection 1.5 mL  1.5 mL IntraVENous ONCE PRN Hortensia Murray APRN - CNP          - lovenox to Xarelto   - O2, IS  - await D/C planning for patient     Alena Kee MD  12/19/2022  4:47 PM

## 2022-12-20 ENCOUNTER — APPOINTMENT (OUTPATIENT)
Dept: ULTRASOUND IMAGING | Age: 77
End: 2022-12-20
Payer: MEDICARE

## 2022-12-20 ENCOUNTER — APPOINTMENT (OUTPATIENT)
Dept: NON INVASIVE DIAGNOSTICS | Age: 77
End: 2022-12-20
Payer: MEDICARE

## 2022-12-20 ENCOUNTER — APPOINTMENT (OUTPATIENT)
Dept: NUCLEAR MEDICINE | Age: 77
End: 2022-12-20
Payer: MEDICARE

## 2022-12-20 LAB
ALBUMIN SERPL-MCNC: 3.7 G/DL (ref 3.5–5.2)
ALP BLD-CCNC: 104 U/L (ref 35–104)
ALT SERPL-CCNC: 20 U/L (ref 0–32)
ANION GAP SERPL CALCULATED.3IONS-SCNC: 12 MMOL/L (ref 7–16)
AST SERPL-CCNC: 38 U/L (ref 0–31)
BASOPHILS ABSOLUTE: 0.06 E9/L (ref 0–0.2)
BASOPHILS RELATIVE PERCENT: 0.8 % (ref 0–2)
BILIRUB SERPL-MCNC: 0.7 MG/DL (ref 0–1.2)
BUN BLDV-MCNC: 36 MG/DL (ref 6–23)
CALCIUM SERPL-MCNC: 9.9 MG/DL (ref 8.6–10.2)
CHLORIDE BLD-SCNC: 97 MMOL/L (ref 98–107)
CO2: 27 MMOL/L (ref 22–29)
CREAT SERPL-MCNC: 1.7 MG/DL (ref 0.5–1)
EOSINOPHILS ABSOLUTE: 0.52 E9/L (ref 0.05–0.5)
EOSINOPHILS RELATIVE PERCENT: 7.2 % (ref 0–6)
GFR SERPL CREATININE-BSD FRML MDRD: 31 ML/MIN/1.73
GLUCOSE BLD-MCNC: 95 MG/DL (ref 74–99)
HCT VFR BLD CALC: 44.8 % (ref 34–48)
HEMOGLOBIN: 14.6 G/DL (ref 11.5–15.5)
IMMATURE GRANULOCYTES #: 0.02 E9/L
IMMATURE GRANULOCYTES %: 0.3 % (ref 0–5)
LYMPHOCYTES ABSOLUTE: 1.31 E9/L (ref 1.5–4)
LYMPHOCYTES RELATIVE PERCENT: 18.1 % (ref 20–42)
MAGNESIUM: 2.6 MG/DL (ref 1.6–2.6)
MCH RBC QN AUTO: 28.2 PG (ref 26–35)
MCHC RBC AUTO-ENTMCNC: 32.6 % (ref 32–34.5)
MCV RBC AUTO: 86.5 FL (ref 80–99.9)
MONOCYTES ABSOLUTE: 0.68 E9/L (ref 0.1–0.95)
MONOCYTES RELATIVE PERCENT: 9.4 % (ref 2–12)
NEUTROPHILS ABSOLUTE: 4.66 E9/L (ref 1.8–7.3)
NEUTROPHILS RELATIVE PERCENT: 64.2 % (ref 43–80)
PDW BLD-RTO: 15.5 FL (ref 11.5–15)
PLATELET # BLD: 228 E9/L (ref 130–450)
PMV BLD AUTO: 11 FL (ref 7–12)
POTASSIUM SERPL-SCNC: 4.7 MMOL/L (ref 3.5–5)
PRO-BNP: 5563 PG/ML (ref 0–450)
RBC # BLD: 5.18 E12/L (ref 3.5–5.5)
SODIUM BLD-SCNC: 136 MMOL/L (ref 132–146)
TOTAL PROTEIN: 7.5 G/DL (ref 6.4–8.3)
WBC # BLD: 7.3 E9/L (ref 4.5–11.5)

## 2022-12-20 PROCEDURE — 93970 EXTREMITY STUDY: CPT

## 2022-12-20 PROCEDURE — 6370000000 HC RX 637 (ALT 250 FOR IP): Performed by: STUDENT IN AN ORGANIZED HEALTH CARE EDUCATION/TRAINING PROGRAM

## 2022-12-20 PROCEDURE — A9500 TC99M SESTAMIBI: HCPCS | Performed by: RADIOLOGY

## 2022-12-20 PROCEDURE — 78452 HT MUSCLE IMAGE SPECT MULT: CPT | Performed by: INTERNAL MEDICINE

## 2022-12-20 PROCEDURE — 83880 ASSAY OF NATRIURETIC PEPTIDE: CPT

## 2022-12-20 PROCEDURE — 6370000000 HC RX 637 (ALT 250 FOR IP)

## 2022-12-20 PROCEDURE — 93016 CV STRESS TEST SUPVJ ONLY: CPT | Performed by: INTERNAL MEDICINE

## 2022-12-20 PROCEDURE — 6360000002 HC RX W HCPCS: Performed by: INTERNAL MEDICINE

## 2022-12-20 PROCEDURE — 83735 ASSAY OF MAGNESIUM: CPT

## 2022-12-20 PROCEDURE — 36415 COLL VENOUS BLD VENIPUNCTURE: CPT

## 2022-12-20 PROCEDURE — 99233 SBSQ HOSP IP/OBS HIGH 50: CPT | Performed by: INTERNAL MEDICINE

## 2022-12-20 PROCEDURE — 1200000000 HC SEMI PRIVATE

## 2022-12-20 PROCEDURE — 6370000000 HC RX 637 (ALT 250 FOR IP): Performed by: FAMILY MEDICINE

## 2022-12-20 PROCEDURE — 6360000002 HC RX W HCPCS

## 2022-12-20 PROCEDURE — 93018 CV STRESS TEST I&R ONLY: CPT | Performed by: INTERNAL MEDICINE

## 2022-12-20 PROCEDURE — 80053 COMPREHEN METABOLIC PANEL: CPT

## 2022-12-20 PROCEDURE — 6370000000 HC RX 637 (ALT 250 FOR IP): Performed by: NURSE PRACTITIONER

## 2022-12-20 PROCEDURE — 99232 SBSQ HOSP IP/OBS MODERATE 35: CPT | Performed by: INTERNAL MEDICINE

## 2022-12-20 PROCEDURE — 78452 HT MUSCLE IMAGE SPECT MULT: CPT

## 2022-12-20 PROCEDURE — 3430000000 HC RX DIAGNOSTIC RADIOPHARMACEUTICAL: Performed by: RADIOLOGY

## 2022-12-20 PROCEDURE — 85025 COMPLETE CBC W/AUTO DIFF WBC: CPT

## 2022-12-20 PROCEDURE — 93017 CV STRESS TEST TRACING ONLY: CPT

## 2022-12-20 PROCEDURE — 2580000003 HC RX 258: Performed by: FAMILY MEDICINE

## 2022-12-20 RX ORDER — TECHNETIUM TC-99M SESTAMIBI 1 MG/10ML
30 INJECTION INTRAVENOUS
Status: COMPLETED | OUTPATIENT
Start: 2022-12-20 | End: 2022-12-20

## 2022-12-20 RX ORDER — ENOXAPARIN SODIUM 100 MG/ML
1 INJECTION SUBCUTANEOUS 2 TIMES DAILY
Status: DISCONTINUED | OUTPATIENT
Start: 2022-12-20 | End: 2022-12-21

## 2022-12-20 RX ORDER — TECHNETIUM TC-99M SESTAMIBI 1 MG/10ML
12.8 INJECTION INTRAVENOUS
Status: COMPLETED | OUTPATIENT
Start: 2022-12-20 | End: 2022-12-20

## 2022-12-20 RX ADMIN — BACLOFEN 10 MG: 10 TABLET ORAL at 20:48

## 2022-12-20 RX ADMIN — Medication 12.8 MILLICURIE: at 09:50

## 2022-12-20 RX ADMIN — SODIUM CHLORIDE, PRESERVATIVE FREE 10 ML: 5 INJECTION INTRAVENOUS at 20:48

## 2022-12-20 RX ADMIN — REGADENOSON 0.4 MG: 0.08 INJECTION, SOLUTION INTRAVENOUS at 11:24

## 2022-12-20 RX ADMIN — HYDRALAZINE HYDROCHLORIDE 10 MG: 10 TABLET, FILM COATED ORAL at 09:29

## 2022-12-20 RX ADMIN — HEPARIN SODIUM 5000 UNITS: 10000 INJECTION INTRAVENOUS; SUBCUTANEOUS at 14:47

## 2022-12-20 RX ADMIN — CETIRIZINE HYDROCHLORIDE 5 MG: 5 TABLET ORAL at 09:21

## 2022-12-20 RX ADMIN — FAMOTIDINE 20 MG: 20 TABLET, FILM COATED ORAL at 09:21

## 2022-12-20 RX ADMIN — Medication 1000 UNITS: at 09:21

## 2022-12-20 RX ADMIN — ENOXAPARIN SODIUM 80 MG: 100 INJECTION SUBCUTANEOUS at 20:48

## 2022-12-20 RX ADMIN — SODIUM CHLORIDE, PRESERVATIVE FREE 10 ML: 5 INJECTION INTRAVENOUS at 09:21

## 2022-12-20 RX ADMIN — HYDRALAZINE HYDROCHLORIDE 10 MG: 10 TABLET, FILM COATED ORAL at 20:48

## 2022-12-20 RX ADMIN — HYDRALAZINE HYDROCHLORIDE 10 MG: 10 TABLET, FILM COATED ORAL at 14:47

## 2022-12-20 RX ADMIN — POTASSIUM CHLORIDE 40 MEQ: 20 TABLET, EXTENDED RELEASE ORAL at 18:36

## 2022-12-20 RX ADMIN — POTASSIUM CHLORIDE 40 MEQ: 20 TABLET, EXTENDED RELEASE ORAL at 09:21

## 2022-12-20 RX ADMIN — HEPARIN SODIUM 5000 UNITS: 10000 INJECTION INTRAVENOUS; SUBCUTANEOUS at 05:25

## 2022-12-20 RX ADMIN — Medication 31.9 MILLICURIE: at 11:25

## 2022-12-20 RX ADMIN — FLUTICASONE PROPIONATE 1 SPRAY: 50 SPRAY, METERED NASAL at 09:22

## 2022-12-20 RX ADMIN — METOPROLOL SUCCINATE 25 MG: 25 TABLET, EXTENDED RELEASE ORAL at 12:47

## 2022-12-20 RX ADMIN — ASPIRIN 81 MG: 81 TABLET, COATED ORAL at 09:20

## 2022-12-20 ASSESSMENT — PAIN SCALES - GENERAL
PAINLEVEL_OUTOF10: 0
PAINLEVEL_OUTOF10: 0

## 2022-12-20 NOTE — CARE COORDINATION
Patient with a past medical history of Afib ([address] Anxiety, Arthritis, Cervical radiculopathy, CHF (congestive heart failure) ([address] Chronic sinusitis, Hilar adenopathy, Hx of blood clots, Hypertension, Left ventricular systolic dysfunction, Lesion of left native kidney, Lumbar radiculopathy, Lung nodules, Meige syndrome, Microscopic colitis, Mitral regurgitation, Nonischemic cardiomyopathy (HCC), Osteopenia, Renal cell carcinoma of left kidney ([address] and Vertebrobasilar artery syndrome is admitted with Acute on chronic HFrEF. Cardiology, EP, Hem/Onc, neurosurgery and pulmonary are all following. New order for Life Vest ordered. Referral made to Atilio at [place]. Await approval for vest. I met with patient in room to explain role and discuss transition of care. She lives home alone but her son will be staying with her at discharge. She said that in the past she has followed with [facility] and asked if she could follow with them again since she may be starting up on Xeralto again. Consult CHF nurse ordered. Patient said her son or daughter will transport her home at time of discharge.   Dipika Ardon RN CM  [phone]

## 2022-12-20 NOTE — PROGRESS NOTES
Hospitalist Progress Note      PCP: Charleen Davis DO    Date of Admission: 12/16/2022  Days in the hospital: 3    Hospital Course:   Briefly, patient with PMH significant for A. fib, CHF, hilar adenopathy, blood clots, HPT, left ventricular systolic dysfunction, lesion of left native kidney, lumbar and cervical radiculopathy, lung nodule, colitis, mitral regurgitation, nonischemic cardiomyopathy, renal cell carcinoma of left kidney presented to ED for neck pain, increased lower extremity swelling shortness of breath and concern for issues with her CHF. CT of chest revealed pleural effusions along with pulmonary embolism. Patient was given Lasix and Lovenox and admitted for management of CHF exacerbation and pulmonary embolism. Patient had echo that revealed decreased EF of 25%. Patient is for stress test.  During hospital course patient also with NIKA, renal ultrasound and urine studies ordered which did not show any acute abnormality. Pulmonary saw the patient and recommended continuing Xarelto as patient is refusing Coumadin. Cardiology also recommending LifeVest upon discharge. Subjective  Patient seen and examined at bedside. Patient denies any fevers, chills, chest pain, shortness of breath, nausea, vomiting. Stress results pending. Exam:    /74   Pulse 87   Temp 97.5 °F (36.4 °C) (Oral)   Resp 18   Ht 5' 7\" (1.702 m)   Wt 173 lb 1 oz (78.5 kg)   SpO2 94%   BMI 27.11 kg/m²     HEENT: No pallor, no icterus. Respiratory:  CTA, good air entry. Cardiovascular: RRR, no murmur. Abdomen: Soft, non-tender, BS noted. Musculoskeletal: No joint pains or joint swelling noted. Neurologic: awake, alert and following commands       Assessment/Plan:  Acute on chronic HFrEF.  proBNP 7500-> 5600. Improved clinically. Currently off diuretics, follow-up with cardiology, stress test pending  Cardiomyopathy, recurrent.   EF now 25%; previously improved from 35% in 2016 to about 50%, plan is for LifeVest upon discharge and continuing medical treatment, follow-up with cardiology and EP  PAF. Patient taken off dofetilide, seen by EP, continue Xarelto  Frequent PVCs/NSVT, plans for LifeVest upon discharge and follow-up with EP and cardiology as outpatient  Device related thrombus noted on 6-month JAIME; subsequently resolved  Valvular heart disease: Moderate MR, moderate TR, moderate AR  Pulmonary hypertension  Chronic left bundle branch block  Acute bilateral subsegmental PE, on Xarelto, seen by pulmonary  NIKA/CKD creatinine 1.2 -> 1.7. Baseline 1.2-1.3. Hypertension, blood pressure controlled  Renal cancer s/p partial left nephrectomy 11/2021  History of TIA  Hyperlipidemia    Labs:   Recent Labs     12/18/22  1102 12/19/22  0521 12/20/22  0437   WBC 8.5 7.8 7.3   HGB 15.0 14.0 14.6   HCT 46.8 41.8 44.8    219 228     Recent Labs     12/18/22  1102 12/19/22  0521 12/20/22  0437    137 136   K 3.6 3.4* 4.7   CL 99 99 97*   CO2 20* 23 27   BUN 21 29* 36*   CREATININE 1.3* 1.6* 1.7*   CALCIUM 9.7 9.4 9.9     Recent Labs     12/18/22  1102 12/19/22  0521 12/20/22  0437   AST 30 24 38*   ALT 22 19 20   BILITOT 1.5* 0.9 0.7   ALKPHOS 123* 108* 104     No results for input(s): INR in the last 72 hours. No results for input(s): Corky Stallion in the last 72 hours.     Medications:  Reviewed    Infusion Medications    sodium chloride       Scheduled Medications    potassium chloride  40 mEq Oral BID WC    fluticasone  1 spray Each Nostril Daily    famotidine  20 mg Oral Daily    heparin (porcine)  5,000 Units SubCUTAneous 3 times per day    aspirin  81 mg Oral Daily    baclofen  10 mg Oral Nightly    cetirizine  5 mg Oral Daily    hydrALAZINE  10 mg Oral TID    metoprolol succinate  25 mg Oral Daily    pantoprazole  40 mg Oral QAM AC    [Held by provider] spironolactone  25 mg Oral Daily    Vitamin D  1,000 Units Oral Daily    sodium chloride flush  5-40 mL IntraVENous 2 times per day    lidocaine 1 patch TransDERmal Daily     PRN Meds: technetium sestamibi, regadenoson, sodium chloride flush, sodium chloride, polyethylene glycol, perflutren lipid microspheres      Intake/Output Summary (Last 24 hours) at 12/20/2022 1020  Last data filed at 12/20/2022 0502  Gross per 24 hour   Intake --   Output 800 ml   Net -800 ml     Body mass index is 27.11 kg/m². Diet  Diet NPO    Code Status  Full Code       Electronically signed by Cleve Albert MD on 12/20/2022 at 10:20 AM  [employer]   Please contact me through perfect serve    NOTE: This report was transcribed using voice recognition software. Every effort was made to ensure accuracy; however, inadvertent computerized transcription errors may be present.

## 2022-12-20 NOTE — PROGRESS NOTES
INPATIENT CARDIOLOGY FOLLOW-UP    Name: Jacey Harper    Age: 68 y.o. Date of Admission: 12/16/2022 11:44 PM    Date of Service: 12/20/2022    Primary Cardiologist: Dr Shona Hyman    Chief Complaint: Follow-up for Cardiomyopathy    Interim History:  Feels better. Denies chest pain or shortness of breath. Feels lower extremity edema improved.     Review of Systems:   Negative except as described above    Problem List:  Patient Active Problem List   Diagnosis    Anxiety    Nonischemic cardiomyopathy ([facility]    Severe mitral regurgitation    Lumbar radiculopathy    Cervical radiculopathy    HTN (hypertension), benign    Left atrial thrombus    Paroxysmal atrial fibrillation (HCC)    Chronic HFrEF (heart failure with reduced ejection fraction) ([facility]    Visit for monitoring Tikosyn therapy    Persistent atrial fibrillation ([facility]    Encounter for medication refill    Itching    Chronic anticoagulation    Presence of Watchman left atrial appendage closure device    Renal mass    Renal cell carcinoma of left kidney (HCC)    Chronic renal disease, stage III ([facility] [[id number]]    Multiple subsegmental pulmonary emboli without acute cor pulmonale (HCC)    Acute on chronic congestive heart failure (HCC)       Current Medications:    Current Facility-Administered Medications:     potassium chloride (KLOR-CON M) extended release tablet 40 mEq, 40 mEq, Oral, BID WC, Dayo Odom APRN - CNP, 40 mEq at 12/19/22 1629    fluticasone (FLONASE) 50 MCG/ACT nasal spray 1 spray, 1 spray, Each Nostril, Daily, UYEN Rehman - CNP    famotidine (PEPCID) tablet 20 mg, 20 mg, Oral, Daily, Dennis Moscoso MD    heparin (porcine) injection 5,000 Units, 5,000 Units, SubCUTAneous, 3 times per day, Dayo Odom APRN - CNP, 5,000 Units at 12/20/22 0525    regadenoson (LEXISCAN) injection 0.4 mg, 0.4 mg, IntraVENous, ONCE PRN, Tuan Montero MD    aspirin EC tablet 81 mg, 81 mg, Oral, Daily, Bennie Kang DO, 81 mg at 12/19/22 5228    baclofen (LIORESAL) tablet 10 mg, 10 mg, Oral, Nightly, Starlene Card, DO, 10 mg at 12/19/22 2114    cetirizine (ZYRTEC) tablet 5 mg, 5 mg, Oral, Daily, Starlene Card, DO, 5 mg at 12/19/22 8565    hydrALAZINE (APRESOLINE) tablet 10 mg, 10 mg, Oral, TID, Starlene Card, DO, 10 mg at 12/19/22 2114    metoprolol succinate (TOPROL XL) extended release tablet 25 mg, 25 mg, Oral, Daily, Starlene Card, DO, 25 mg at 12/19/22 0926    pantoprazole (PROTONIX) tablet 40 mg, 40 mg, Oral, QAM AC, Starlene Card, DO, 40 mg at 12/19/22 0615    [Held by provider] spironolactone (ALDACTONE) tablet 25 mg, 25 mg, Oral, Daily, Starlene Card, DO, 25 mg at 12/19/22 9072    vitamin D (CHOLECALCIFEROL) tablet 1,000 Units, 1,000 Units, Oral, Daily, Starlene Card, DO, 1,000 Units at 12/19/22 3720    sodium chloride flush 0.9 % injection 5-40 mL, 5-40 mL, IntraVENous, 2 times per day, Starlene Card, DO, 10 mL at 12/19/22 2114    sodium chloride flush 0.9 % injection 5-40 mL, 5-40 mL, IntraVENous, PRN, Starlene Card, DO    0.9 % sodium chloride infusion, , IntraVENous, PRN, Starlene Card, DO    polyethylene glycol (GLYCOLAX) packet 17 g, 17 g, Oral, Daily PRN, Starlene Card, DO, 17 g at 12/19/22 2114    lidocaine 4 % external patch 1 patch, 1 patch, TransDERmal, Daily, Wendy Crain MD, 1 patch at 12/18/22 7065    perflutren lipid microspheres (DEFINITY) injection 1.5 mL, 1.5 mL, IntraVENous, ONCE PRN, UYEN Vergara - CNP    Physical Exam:  /70   Pulse 81   Temp 97.8 °F (36.6 °C) (Oral)   Resp 18   Ht 5' 7\" (1.702 m)   Wt 173 lb 1 oz (78.5 kg)   SpO2 92%   BMI 27.11 kg/m²   Wt Readings from Last 3 Encounters:   12/20/22 173 lb 1 oz (78.5 kg)   12/06/22 174 lb (78.9 kg)   10/19/22 171 lb (77.6 kg)     Appearance: Awake, alert, no acute respiratory distress  Skin: Intact, no rash  Head: Normocephalic, atraumatic  Eyes: EOMI, no conjunctival erythema  ENMT: No pharyngeal erythema, MMM, no rhinorrhea  Neck: Supple, no elevated JVP, no carotid bruits  Lungs: Clear to auscultation bilaterally. No wheezes, rales, or rhonchi. Cardiac: PMI nondisplaced, Regular rhythm with a normal rate, frequent ectopy, S1 & S2 normal, no murmurs  Abdomen: Soft, nontender, +bowel sounds  Extremities: Moves all extremities x 4, trace pitting bilateral lower extremity edema  Neurologic: No focal motor deficits apparent, normal mood and affect  Peripheral Pulses: Intact posterior tibial pulses bilaterally    Intake/Output:    Intake/Output Summary (Last 24 hours) at 12/20/2022 0823  Last data filed at 12/20/2022 0502  Gross per 24 hour   Intake 10 ml   Output 800 ml   Net -790 ml     No intake/output data recorded.     Laboratory Tests:  Recent Labs     12/18/22  1102 12/19/22  0521 12/20/22 0437    137 136   K 3.6 3.4* 4.7   CL 99 99 97*   CO2 20* 23 27   BUN 21 29* 36*   CREATININE 1.3* 1.6* 1.7*   GLUCOSE 147* 108* 95   CALCIUM 9.7 9.4 9.9     Lab Results   Component Value Date/Time    MG 2.6 12/20/2022 04:37 AM     Recent Labs     12/18/22  1102 12/19/22  0521 12/20/22  0437   ALKPHOS 123* 108* 104   ALT 22 19 20   AST 30 24 38*   PROT 7.6 6.7 7.5   BILITOT 1.5* 0.9 0.7   LABALBU 4.0 3.5 3.7     Recent Labs     12/18/22  1102 12/19/22  0521 12/20/22  0437   WBC 8.5 7.8 7.3   RBC 5.29 4.98 5.18   HGB 15.0 14.0 14.6   HCT 46.8 41.8 44.8   MCV 88.5 83.9 86.5   MCH 28.4 28.1 28.2   MCHC 32.1 33.5 32.6   RDW 15.6* 15.6* 15.5*    219 228   MPV 10.7 10.6 11.0     Lab Results   Component Value Date    CKTOTAL 109 06/03/2021    CKMB 6.6 (H) 11/25/2013    TROPONINI <0.01 03/09/2019    TROPONINI <0.01 11/21/2018    TROPONINI <0.01 11/20/2018     Lab Results   Component Value Date    INR 1.1 12/16/2022    INR 1.0 10/05/2021    INR 1.0 01/22/2021    PROTIME 12.2 12/16/2022    PROTIME 11.2 10/05/2021    PROTIME 11.5 01/22/2021     Lab Results   Component Value Date    TSH 0.807 02/01/2022     Lab Results   Component Value Date    LABA1C 5.7 (H) 2022     No results found for: EAG  Lab Results   Component Value Date    CHOL 151 2022    CHOL 206 (H) 2022    CHOL 179 2018     Lab Results   Component Value Date    TRIG 68 2022    TRIG 170 (H) 2022    TRIG 69 2018     Lab Results   Component Value Date    HDL 55 2022    HDL 56 2022    HDL 51 2018     Lab Results   Component Value Date    LDLCALC 82 2022    LDLCALC 116 (H) 2022    LDLCALC 114 (H) 2018     Lab Results   Component Value Date    LABVLDL 14 2022    LABVLDL 34 2022    LABVLDL 14 2018     No results found for: CHOLHDLRATIO  Recent Labs     22  0437   PROBNP 5,563*       Cardiac Tests:    EK2022: Sinus rhythm 79 bpm.  Left axis. Left bundle branch block. Telemetry: Sinus rhythm 70s, frequent PVCs, NSVT 6 beats    Chest X-ray:   22    Impression   COPD changes, congestive changes. CTA chest 22:  Impression   Small burden acute subsegmental pulmonary emboli to the bilateral lower lobes. Echocardiogram:   TTE 22 [place]   Summary   Ejection fraction is visually estimated at 25%. Overall ejection fraction severely decreased. Mild left ventricular concentric hypertrophy noted. Dilated right ventricle with reduced function. Left atrial volume index of 50 ml per meters squared BSA. Moderate aortic regurgitation is noted. Moderate mitral regurgitation is present. Moderate tricuspid regurgitation. Pulmonary hypertension is severe. RVSP is 70 mmHg. No evidence for hemodynamically significant pericardial effusion. JAIME 10/2021: EF 45-50  JAIME 2021: 45-50. DRT resolved  JAIME 2021: EF 45-50. + DRT on Watchman    Stress test:    2018  Left ventricular end systolic volume estimated at 85 ml and   end-diastolic volume estimated at 110 ml. Resting left ventricular   ejection fraction over 23%. Impression   1.    No evidence of left ventricular myocardial stress-induced   ischemia. 2. Moderate sized left ventricular anterior wall fixed defect   concerning for old infarct or scar. Clinical correlation is   recommended. 3. Global myocardial hypokinesia. 4. Decreased left ventricular ejection fraction estimated at 23%. Cardiac catheterization:   Right and left heart cath 2010 Dr. Heller Peaks  No angiographic CAD  Right heart catheterization: RA 4, RV systolic 64, PA systolic 58, wedge 11  CO 5.47 by thermo  CI 2.9    ----------------------------------------------------------------------------------------------------------------------------------------------------------------  IMPRESSION:  Acute on chronic HFrEF.  proBNP 7500-> 5600. Improved clinically. I's and O's inaccurate  Cardiomyopathy, recurrent. EF now 25%; previously improved from 35% in 2016 to about 50%  PAF. On dofetilide, held since admission. S/p watchman 10/2020. Maintaining sinus  Frequent PVCs/NSVT  Device related thrombus noted on 6-month JAIME; subsequently resolved  Valvular heart disease: Moderate MR, moderate TR, moderate AR  Pulmonary hypertension  Chronic left bundle branch block  Acute bilateral subsegmental PE  NIKA/CKD creatinine 1.2 -> 1.7. Baseline 1.2-1.3.   Hypertension  Renal cancer s/p partial left nephrectomy 11/2021  History of TIA  Hyperlipidemia  Migraines  Multiple medication intolerances/allergies including ACE and ARB, nitrates, DOAC's    RECOMMENDATIONS:    Stop furosemide  Watch creatinine  Optimize GDMT for cardiomyopathy; limited by allergy/intolerance and relative hypotension  Metoprolol succinate 25 mg daily  Hydralazine 10 mg 3 times daily  Spironolactone 25 mg daily -hold for now until creatinine normalizes  SGLT2 inhibitor if tolerated  Pharmacologic stress test today given drop in EF  EP input noted regarding dofetilide, has been discontinued permanently  Unfortunately no comment about the frequent PVCs/NSVT/cardiomyopathy  I will order wearable cardioverter defibrillator and she will have EP follow-up as outpatient  Maintain K> 4.0, mag> 2.0  Aggressive risk factor modification  If no significant ischemia on stress test, okay for discharge cardiology standpoint once vest delivered  Outpatient follow-up with Dr. Jamison Media  Further care per primary service and consultants    Jackie Rockwell MD, [address] [place] [address] Cardiology    NOTE: This report was transcribed using voice recognition software. Every effort was made to ensure accuracy; however, inadvertent computerized transcription errors may be present.

## 2022-12-20 NOTE — PROCEDURES
[facility] Nuclear Stress Test:    Cardiologist: Dr. Ria Hernandez MD    Date: 12/20/2022    Indications for study: Chest pain    No chest pain  No new arrhythmias  No EKG changes suggestive of stress induced ischemia  Nuclear images pending    Ria Hernandez MD  [address]  Cardiology

## 2022-12-20 NOTE — PROGRESS NOTES
Pulmonary Progress Note  [facility]    Admit Date: 2022                            PCP: Lucila Hines DO  Principal Problem:    Multiple subsegmental pulmonary emboli without acute cor pulmonale (HCC)  Active Problems:    Acute on chronic congestive heart failure (HCC)    Paroxysmal atrial fibrillation (HCC)    Persistent atrial fibrillation (HCC)  Resolved Problems:    * No resolved hospital problems. *      Subjective:  Resting on RA- sats 98%. Denies shortness of breath, cough, wz. Plans for stress test today. Medications:   sodium chloride          potassium chloride  40 mEq Oral BID WC    fluticasone  1 spray Each Nostril Daily    famotidine  20 mg Oral Daily    heparin (porcine)  5,000 Units SubCUTAneous 3 times per day    aspirin  81 mg Oral Daily    baclofen  10 mg Oral Nightly    cetirizine  5 mg Oral Daily    hydrALAZINE  10 mg Oral TID    metoprolol succinate  25 mg Oral Daily    pantoprazole  40 mg Oral QAM AC    [Held by provider] spironolactone  25 mg Oral Daily    Vitamin D  1,000 Units Oral Daily    sodium chloride flush  5-40 mL IntraVENous 2 times per day    lidocaine  1 patch TransDERmal Daily       Vitals:  VITALS:  BP 99/69   Pulse 87   Temp 97.5 °F (36.4 °C) (Oral)   Resp 18   Ht 5' 7\" (1.702 m)   Wt 173 lb 1 oz (78.5 kg)   SpO2 94%   BMI 27.11 kg/m²   24HR INTAKE/OUTPUT:    Intake/Output Summary (Last 24 hours) at 2022 0924  Last data filed at 2022 0502  Gross per 24 hour   Intake 10 ml   Output 800 ml   Net -790 ml       CURRENT PULSE OXIMETRY:  SpO2: 94 %  24HR PULSE OXIMETRY RANGE:  SpO2  Av %  Min: 92 %  Max: 94 %  CVP:    VENT SETTINGS:      Additional Respiratory Assessments  Heart Rate: 87  Resp: 18  SpO2: 94 %      EXAM:  General: No distress. Alert. On RA  Neck: Trachea midline. Resp: No accessory muscle use. No crackles. No wheezing. No rhonchi. Clear, diminished  CV: Regular rate. Regular rhythm. No mumur or rub. BLE 1+ edema. ABD: Non-tender. Non-distended. No masses. No organmegaly. Normal bowel sounds. Skin: Warm and dry. Lymph: No cervical LAD. No supraclavicular LAD. M/S: genao  Neuro: Aox4    I/O: I/O last 3 completed shifts: In: 10 [I.V.:10]  Out: 800 [Urine:800]  No intake/output data recorded. Results:  CBC:   Recent Labs     12/18/22  1102 12/19/22 0521 12/20/22 0437   WBC 8.5 7.8 7.3   HGB 15.0 14.0 14.6   HCT 46.8 41.8 44.8   MCV 88.5 83.9 86.5    219 228       BMP:   Recent Labs     12/18/22 1102 12/19/22 0521 12/20/22 0437    137 136   K 3.6 3.4* 4.7   CL 99 99 97*   CO2 20* 23 27   BUN 21 29* 36*   CREATININE 1.3* 1.6* 1.7*       LFT:   Recent Labs     12/18/22 1102 12/19/22 0521 12/20/22 0437   ALKPHOS 123* 108* 104   ALT 22 19 20   AST 30 24 38*   PROT 7.6 6.7 7.5   BILITOT 1.5* 0.9 0.7   LABALBU 4.0 3.5 3.7       PT/INR:   No results for input(s): PROTIME, INR in the last 72 hours. Cultures:  No results for input(s): CULTRESP in the last 72 hours. ABG:   No results for input(s): PH, PO2, PCO2, HCO3, BE, O2SAT in the last 72 hours. Films:  XR CHEST (2 VW)    Result Date: 12/16/2022  EXAMINATION: TWO XRAY VIEWS OF THE CHEST 12/16/2022 1:06 pm COMPARISON: 12/07/2022. HISTORY: ORDERING SYSTEM PROVIDED HISTORY: sob TECHNOLOGIST PROVIDED HISTORY: Reason for exam:->sob What reading provider will be dictating this exam?->CRC FINDINGS: The cardiomediastinal silhouette is slightly prominent. Peribronchial cuffing bilateral hilar prominence is seen. Mild prominence of the bronchovascular interstitial lung markings is visualized. Haziness overlying bilateral costophrenic angles with fluid level consistent with small bilateral pleural effusions. Biapical prominence consistent with COPD changes. No evidence of pneumothorax is seen. The osseous structures are without acute process. COPD changes, congestive changes.      XR SHOULDER RIGHT (MIN 2 VIEWS)    Result Date: 12/16/2022  EXAMINATION: THREE XRAY VIEWS OF THE RIGHT SHOULDER 12/16/2022 1:06 pm COMPARISON: 11/20/2018 HISTORY: ORDERING SYSTEM PROVIDED HISTORY: fall TECHNOLOGIST PROVIDED HISTORY: Reason for exam:->fall What reading provider will be dictating this exam?->CRC FINDINGS: Osteopenia is noted. No acute fracture or dislocation is identified. Relatively mild degenerative changes are seen at the glenohumeral and acromioclavicular joints. Osteopenia and mild degenerative changes. No acute bony abnormality. CT CERVICAL SPINE WO CONTRAST    Result Date: 12/16/2022  EXAMINATION: CT OF THE CERVICAL SPINE WITHOUT CONTRAST 12/16/2022 2:54 pm TECHNIQUE: CT of the cervical spine was performed without the administration of intravenous contrast. Multiplanar reformatted images are provided for review. Automated exposure control, iterative reconstruction, and/or weight based adjustment of the mA/kV was utilized to reduce the radiation dose to as low as reasonably achievable. COMPARISON: [facility] 3 2021 HISTORY: ORDERING SYSTEM PROVIDED HISTORY: pain TECHNOLOGIST PROVIDED HISTORY: Reason for exam:->pain Decision Support Exception - unselect if not a suspected or confirmed emergency medical condition->Emergency Medical Condition (MA) What reading provider will be dictating this exam?->CRC FINDINGS: No cervical spine fracture or malalignment. Moderate disc space narrowing at C5/C6. Degenerative posterior disc/osteophyte complex also at C5/C6 results in mild effacement of ventral thecal sac. Facet arthropathy at multiple segments notable on the right at C3/C4. No prevertebral soft tissue edema. Redemonstration of heterogeneous appearance of the thyroid gland. 1. No acute abnormality involving the cervical spine. 2. Redemonstration of heterogeneous appearance of the thyroid gland. Ultrasound could be helpful for further evaluation. CTA PULMONARY W CONTRAST    Addendum Date: 12/17/2022    ADDENDUM: I contacted Dr. Nereida Ardon via telephone with the result. Result Date: 12/17/2022  EXAMINATION: CTA OF THE CHEST 12/17/2022 2:19 am TECHNIQUE: CTA of the chest was performed after the administration of intravenous contrast.  Multiplanar reformatted images are provided for review. MIP images are provided for review. Automated exposure control, iterative reconstruction, and/or weight based adjustment of the mA/kV was utilized to reduce the radiation dose to as low as reasonably achievable. COMPARISON: None. HISTORY: ORDERING SYSTEM PROVIDED HISTORY: r/o pe TECHNOLOGIST PROVIDED HISTORY: Reason for exam:->r/o pe Decision Support Exception - unselect if not a suspected or confirmed emergency medical condition->Emergency Medical Condition (MA) What reading provider will be dictating this exam?->CRC FINDINGS: Pulmonary Arteries: Pulmonary arteries are adequately opacified for evaluation. Small burden pulmonary emboli to the bilateral lower lobe basilar divisions, right greater than left. Main pulmonary artery is normal in caliber. Mediastinum: No evidence of mediastinal lymphadenopathy. The heart and pericardium demonstrate no acute abnormality. There is no acute abnormality of the thoracic aorta. Moderate cardiomegaly with no pericardial effusions. Left atrial appendage closure device noted. Lungs/pleura: The lungs are without acute process. No focal consolidation or pulmonary edema. No evidence of  pneumothorax. Trace bilateral pleural effusions are present. Upper Abdomen: Limited images of the upper abdomen are unremarkable. Soft Tissues/Bones: No acute bone or soft tissue abnormality. Small burden acute subsegmental pulmonary emboli to the bilateral lower lobes. RECOMMENDATIONS: Careful clinical correlation and follow up recommended.        Assessment/plan:  69-year-old   female vaccinated for corona and flu vaccine, non-smoker retired jewelry  with history of PAF, intolerant to multiple cardiac medications including oral anticoagulation, on Tikosyn, history of left atrial thrombus in 2018. Status post watchman 10/14/2020, chronic heart failure intermediate EF, HTN, valvular heart disease, pulmonary hypertension unable to tolerate long-acting nitrates, renal cell carcinoma 2021 status post partial nephrectomy, anxiety presented [facility]  [facility] presented to urgent care with neck pain shooting down right arm with shortness of breath. In ER, CT chest showing multisegmental PE  CT cervical negative  Chest x-ray with congestion  12/17 COVID influenza negative  12/18 MRI cervical spine showing mild degenerative changes  saturating 94% on room air  12/19 on RA   12/20 plans for stress test         PE  history of allergic reactions to anticoagulations - Allergic to rivaroxaban with significant skin reaction that she manages with hydroxyzine. Also allergic to dabigatran and apixaban. With rash itching. S/p Lovenox  Wants to go home on Xarelto, refusing coumadin   Acute on chronic systolic heart failure EF 45% on JAIME 10/2022  History of left atrial thrombus 2018 and 2019 with device related thrombus 4/11/2021 resolved on JAIME  Nonischemic CMP  S/p lasix   Plans for stress test today   To be discharged with wearable defibrillator   Pulmonary hypertension not able to tolerate long-acting nitrates  History of kidney cancer s/p left partial nephrectomy  hematology /onc following  Pulmonary nodules   Follows with Dr. King Costa chest WO for Fu 12/7 /22, reviewed and no change from previous, moderately enlarged mediastinal lymph node 1.3 cm from 1.1  , unchanged 1.1 cm nodule RLL, 7mm GG nodule YELITZA unchanged   Recommend Fu Ct chest in one year   Afib  Previous watchman device 10/2020   Off tikosyn  Caradiology consulted   Nasal congestion  Claritin and flonase     Ok for d/c from pulm on xarelto after stress test. Follow up with Kristen Wilson in 2-4 weeks.       Electronically signed by UYEN Rouse CNP on 12/20/2022 at 9:24 AM    Attending Attestation Note:    Patient seen and examined with Hospital Staff, NP. I have extensively reviewed the chart lab work and imaging. I agree with above. Modifications and amendment of note made as necessary.     In addition, the following apply:    Current Facility-Administered Medications   Medication Dose Route Frequency Provider Last Rate Last Admin    potassium chloride (KLOR-CON M) extended release tablet 40 mEq  40 mEq Oral BID  UYEN Cesar CNP   40 mEq at 12/20/22 0921    fluticasone (FLONASE) 50 MCG/ACT nasal spray 1 spray  1 spray Each Nostril Daily UEYN Chirinos CNP   1 spray at 12/20/22 6724    famotidine (PEPCID) tablet 20 mg  20 mg Oral Daily Dennis Moscoso MD   20 mg at 12/20/22 0396    heparin (porcine) injection 5,000 Units  5,000 Units SubCUTAneous 3 times per day UYEN Cesar CNP   5,000 Units at 12/20/22 1447    aspirin EC tablet 81 mg  81 mg Oral Daily Elane Loosen, DO   81 mg at 12/20/22 0920    baclofen (LIORESAL) tablet 10 mg  10 mg Oral Nightly Elane Loosen, DO   10 mg at 12/19/22 2114    cetirizine (ZYRTEC) tablet 5 mg  5 mg Oral Daily Elane Loosen, DO   5 mg at 12/20/22 7838    hydrALAZINE (APRESOLINE) tablet 10 mg  10 mg Oral TID Elane Loosen, DO   10 mg at 12/20/22 1447    metoprolol succinate (TOPROL XL) extended release tablet 25 mg  25 mg Oral Daily Elane Loosen, DO   25 mg at 12/20/22 1247    pantoprazole (PROTONIX) tablet 40 mg  40 mg Oral QAM AC Elane Loosen, DO   40 mg at 12/19/22 0615    [Held by provider] spironolactone (ALDACTONE) tablet 25 mg  25 mg Oral Daily Elane Loosen, DO   25 mg at 12/19/22 2360    vitamin D (CHOLECALCIFEROL) tablet 1,000 Units  1,000 Units Oral Daily Elane Loosen, DO   1,000 Units at 12/20/22 7815    sodium chloride flush 0.9 % injection 5-40 mL  5-40 mL IntraVENous 2 times per day Elane Loosen, DO   10 mL at 12/20/22 4252    sodium chloride flush 0.9 % injection 5-40 mL  5-40 mL IntraVENous PRN Laura New DO 0.9 % sodium chloride infusion   IntraVENous PRN Elane Loosen, DO        polyethylene glycol (GLYCOLAX) packet 17 g  17 g Oral Daily PRN Elane Loosen, DO   17 g at 12/19/22 2066    lidocaine 4 % external patch 1 patch  1 patch TransDERmal Daily Annmarie Ruiz MD   1 patch at 12/18/22 5371    perflutren lipid microspheres (DEFINITY) injection 1.5 mL  1.5 mL IntraVENous ONCE PRN Sundra Spore, APRN - CNP          - lovenox to Xarelto, patient refusing coumadin   - O2, IS  - await D/C planning for patient     - NM Cardiac Stress Test:  1: No definite evidence of  ischemia or scar except soft tissue   attenuation. The left ventricle is visually dilated both rest and   stress without evidence of TID     2  Dilated left ventricle with Moderate to severe global left   ventricular hypokinesis with estimated left ventricular ejection   fraction of 32%. 3:  Please see separate report for EKG and hemodynamic aspect of the   stress test.     4:  Low dose CT attenuation correction protocol was utilized which   revealed minimal to mild coronary artery ossifications. 5: RISK SCAN:  High risk scan due to dilated left ventricle with   severe global hypokinesis and EF of 32%.        Luci Schneider MD  12/20/2022  5:37 PM

## 2022-12-20 NOTE — PROGRESS NOTES
Hematology oncology inpatient follow-up    Reason for Visit: patient with [address] admitted for PE    Referring Physician: Susan Kiser MD    PCP:  Truong Elizondo DO    Subjective: The patient is feeling better overall, shortness of breath is better. 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 See comment. Comment:CANCER CASE SUMMARY   Procedure: Partial nephrectomy   Specimen Laterality: Left   Tumor Focality: Unifocal   Tumor Site: Undesignated   Tumor Size: 3.0 cm greatest dimension   Histologic Type: Clear-cell renal cell carcinoma   Histologic Grade (WHO/ISUP): G3   Tumor Extent: Limited to kidney   Sarcomatoid Features: Not identified   Rhabdoid features: Not identified   Tumor Necrosis: Not identified   Lymph-Vascular Invasion: Not identified   Margin Status: All margins negative for invasive carcinoma   Regional lymph node Status: No lymph nodes submitted or found   Pathologic Stage Classification (AJCC 8th Edition):   pT1a   Surveillance      CT abdomen/pelvis 02/18/2022 Surgical removal of the lower pole of the left kidney. There is no evidence of local recurrence or metastatic disease at this time. CT chest 04/13/2022 stable exam     CT abdomen/pelvis 08/19/2022:   Postop changes compatible with previous left renal lower pole tumor excision  No evidence of local recurrence or abdominal metastasis    Follows with Dr. Gaby Licea  CT chest without contrast on 12/07/2022, reviewed and no change from previous, moderately enlarged mediastinal lymph node 1.3 cm from 1.1  unchanged 1.1 cm nodule RLL, 7mm ground glass nodule YELITZA unchanged   Recommend F/U CT chest in one year    Presented to the ED for shortness of breath. CTA chest 12/17/2022 small burden acute subsegmental pulmonary emboli to the bilateral lower lobes. Trace bilateral pleural effusions. Started on Lovenox, admitted to hospital for further work-up management of CHF exacerbation and pulmonary embolism    Past Medical History:      Diagnosis Date    Afib ([address]     WATCHMAN    Anxiety     Arthritis     Cervical radiculopathy     CHF (congestive heart failure) (HCC)     Chronic sinusitis     Hilar adenopathy     Hx of blood clots     Hypertension     Left ventricular systolic dysfunction     Lesion of left native kidney     Lumbar radiculopathy     Lung nodules     Meige syndrome     partial/ PCP    Microscopic colitis     Mitral regurgitation     Mild to moderate    Nonischemic cardiomyopathy ([address]     f/u with Dr. Andressa Garcia    Osteopenia     Renal cell carcinoma of left kidney ([address] 2/1/2022    Vertebrobasilar artery syndrome     f/u PCP     Past Surgical History:      Procedure Laterality Date    BLADDER REPAIR      BRONCHOSCOPY N/A 10/5/2021    BRONCHOSCOPY W/EBUS FNA performed by Waldemar Ridley MD at [facility] N/A 10/5/2021    BRONCHOSCOPY DIAGNOSTIC OR CELL [address] ONLY performed by Waldemar Ridley MD at [address] [facility] TEST  11/25/13    Rt & LT cath    CARDIOVERSION  11/04/2019    Successful CV from AF to NSR   (Dr. Crystal Menard)    COLONOSCOPY  07/2020    DIAGNOSTIC CARDIAC CATH LAB PROCEDURE  2/20/10    Dr Sow Prior CATH LAB PROCEDURE  8/24/11    Right heart cath @ [facility].     DOPPLER ECHOCARDIOGRAPHY  11/25/13    HYSTERECTOMY (CERVIX STATUS UNKNOWN)  1991    total    OTHER SURGICAL HISTORY  10/14/2020    Dr. Andressa Garcia- 24mm Watchman device    PARTIAL NEPHRECTOMY Left 11/16/2021    ROBOT ASSISTED LAPAROSCOPIC COMPLEX LEFT PARTIAL NEPHRECTOMY performed by Elicia Hancock MD at [address]    TRANSESOPHAGEAL ECHOCARDIOGRAM  11/21/2018    Dr. Naty Thompson    TRANSESOPHAGEAL ECHOCARDIOGRAM  03/18/2019    WISDOM TOOTH EXTRACTION       Family History:  Family History   Problem Relation Age of Onset    Heart Attack Mother Stroke Mother     Heart Attack Father     Heart Failure Father     Heart Disease Father     Anxiety Disorder Sister     Depression Sister     Crohn's Disease Son      Medications:  Reviewed and reconciled. Social History:  Social History     Socioeconomic History    Marital status:      Spouse name: Not on file    Number of children: Not on file    Years of education: Not on file    Highest education level: Not on file   Occupational History    Not on file   Tobacco Use    Smoking status: Never    Smokeless tobacco: Never   Vaping Use    Vaping Use: Never used   Substance and Sexual Activity    Alcohol use: Yes     Alcohol/week: 0.0 standard drinks     Comment: occasional wine/mixed drink every few months    Drug use: Never    Sexual activity: Not on file     Comment:    Other Topics Concern    Not on file   Social History Narrative    Drinks 1-2 cups of coffee daily. Social Determinants of Health     Financial Resource Strain: Not on file   Food Insecurity: Not on file   Transportation Needs: Not on file   Physical Activity: Not on file   Stress: Not on file   Social Connections: Not on file   Intimate Partner Violence: Not on file   Housing Stability: Not on file     Allergies:   Allergies   Allergen Reactions    Bentyl [Dicyclomine] Shortness Of Breath    Adhesive Tape Itching     develops rash and itching with EKG electrodes    Atacand [Candesartan] Hives and Itching    Cefdinir Hives, Itching and Nausea Only    Demerol      3/9/19 Pt states she gets a severe migraine    Digoxin Itching     developed itching and rash    Imdur [Isosorbide Mononitrate]       migraines    Losartan Potassium Itching    Sulfa Antibiotics Diarrhea and Other (See Comments)     Also causes migraines  caused migraines and diarrhea    Xarelto [Rivaroxaban] Itching and Rash      severe itch, headache, rash    Acetaminophen Hives and Itching    Apap-Caff-Dihydrocodeine Hives and Itching    Coreg [Carvedilol] Itching Can take extended release Coreg    Cozaar [Losartan] Itching    Diovan [Valsartan] Itching    Effexor [Venlafaxine Hydrochloride] Nausea Only    Eliquis [Apixaban] Hives, Itching and Rash     3/9/19 Pt states that she gets hives, itchy and a rash    Labetalol Itching    Lisinopril Itching    Ramipril Itching     Physical Exam:  /79   Pulse 91   Temp 97.5 °F (36.4 °C) (Oral)   Resp 18   Ht 5' 7\" (1.702 m)   Wt 173 lb 1 oz (78.5 kg)   SpO2 94%   BMI 27.11 kg/m²   GENERAL: Alert, oriented x 3, not in acute distress. HEENT: PERRLA; EOMI. Oropharynx clear. NECK: Supple. Without lymphadenopathy. LUNGS: Good air entry bilaterally. No wheezing, crackles or ronchi. CARDIOVASCULAR: Regular rate. No murmurs, rubs or gallops. ABDOMEN: Soft. Non-tender, non-distended. EXTREMITIES: Without clubbing, cyanosis, mild lower leg edema  NEUROLOGIC: No focal deficits. ECOG PS 1    Lab Results   Component Value Date    WBC 7.3 12/20/2022    HGB 14.6 12/20/2022    HCT 44.8 12/20/2022    MCV 86.5 12/20/2022     12/20/2022     Lab Results   Component Value Date     12/20/2022    K 4.7 12/20/2022    CL 97 (L) 12/20/2022    CO2 27 12/20/2022    BUN 36 (H) 12/20/2022    CREATININE 1.7 (H) 12/20/2022    GLUCOSE 95 12/20/2022    CALCIUM 9.9 12/20/2022    PROT 7.5 12/20/2022    LABALBU 3.7 12/20/2022    BILITOT 0.7 12/20/2022    ALKPHOS 104 12/20/2022    AST 38 (H) 12/20/2022    ALT 20 12/20/2022    LABGLOM 31 12/20/2022    GFRAA 53 09/07/2022     Impression/Plan:  67 y/o female with Left RCC      Left partial nephrectomy on 11/16/2021  Left kidney, partial nephrectomy: Clear-cell renal cell carcinoma, 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 See comment.      Comment:CANCER CASE SUMMARY   Procedure: Partial nephrectomy   Specimen Laterality: Left   Tumor Focality: Unifocal   Tumor Site: Undesignated   Tumor Size: 3.0 cm greatest dimension   Histologic Type: Clear-cell renal cell carcinoma   Histologic Grade (WHO/ISUP): G3 Tumor Extent: Limited to kidney   Sarcomatoid Features: Not identified   Rhabdoid features: Not identified   Tumor Necrosis: Not identified   Lymph-Vascular Invasion: Not identified   Margin Status: All margins negative for invasive carcinoma   Regional lymph node Status: No lymph nodes submitted or found   Pathologic Stage Classification (AJCC 8th Edition):   pT1a   Surveillance      CT abdomen/pelvis 02/18/2022 Surgical removal of the lower pole of the left kidney. There is no evidence of local recurrence or metastatic disease at this time. CT chest 04/13/2022 stable exam     CT abdomen/pelvis 08/19/2022:   Postop changes compatible with previous left renal lower pole tumor excision  No evidence of local recurrence or abdominal metastasis    Follows with Dr. Ban Bell  CT chest without contrast on 12/07/2022, reviewed and no change from previous, moderately enlarged mediastinal lymph node 1.3 cm from 1.1  unchanged 1.1 cm nodule RLL, 7mm ground glass nodule YELITZA unchanged   Recommend F/U CT chest in one year    Presented to the ED for shortness of breath. CTA chest 12/17/2022 small burden acute subsegmental pulmonary emboli to the bilateral lower lobes. Trace bilateral pleural effusions. Images reviewed. The patient was having spasms in the neck when she moves, which had led to decreased mobility.  -Patient is on heparin subcu, will be switched to Lovenox.  -Diuresis per cardiology.  -Discussed with the patient today, she is able to tolerate the Xarelto, anticoagulation to be changed to Xarelto upon discharge.  -We will order bilateral lower extremity venous ultrasound.  -Outpatient follow-up to continue surveillance for history of RCC. Thank you for allowing us to participate in the care of Mrs.  Bhanu Edwards MD   HEMATOLOGY/MEDICAL [address] ONCOLOGY  [facility] [address] [facility] [address]  Dept: [phone]  Loc: [phone]

## 2022-12-20 NOTE — PLAN OF CARE
Problem: Pain  Goal: Verbalizes/displays adequate comfort level or baseline comfort level  12/20/2022 1830 by Hayder Abel RN  Outcome: Progressing     Problem: Safety - Adult  Goal: Free from fall injury  12/20/2022 1830 by Hayder Abel RN  Outcome: Progressing

## 2022-12-21 LAB
ANION GAP SERPL CALCULATED.3IONS-SCNC: 12 MMOL/L (ref 7–16)
BUN BLDV-MCNC: 27 MG/DL (ref 6–23)
CALCIUM SERPL-MCNC: 9.8 MG/DL (ref 8.6–10.2)
CHLORIDE BLD-SCNC: 105 MMOL/L (ref 98–107)
CO2: 22 MMOL/L (ref 22–29)
CREAT SERPL-MCNC: 1.2 MG/DL (ref 0.5–1)
GFR SERPL CREATININE-BSD FRML MDRD: 46 ML/MIN/1.73
GLUCOSE BLD-MCNC: 106 MG/DL (ref 74–99)
MAGNESIUM: 2.7 MG/DL (ref 1.6–2.6)
POTASSIUM SERPL-SCNC: 5 MMOL/L (ref 3.5–5)
SODIUM BLD-SCNC: 139 MMOL/L (ref 132–146)

## 2022-12-21 PROCEDURE — 99232 SBSQ HOSP IP/OBS MODERATE 35: CPT | Performed by: INTERNAL MEDICINE

## 2022-12-21 PROCEDURE — 6370000000 HC RX 637 (ALT 250 FOR IP): Performed by: INTERNAL MEDICINE

## 2022-12-21 PROCEDURE — 6370000000 HC RX 637 (ALT 250 FOR IP): Performed by: FAMILY MEDICINE

## 2022-12-21 PROCEDURE — 2580000003 HC RX 258: Performed by: FAMILY MEDICINE

## 2022-12-21 PROCEDURE — 36415 COLL VENOUS BLD VENIPUNCTURE: CPT

## 2022-12-21 PROCEDURE — 83735 ASSAY OF MAGNESIUM: CPT

## 2022-12-21 PROCEDURE — 6370000000 HC RX 637 (ALT 250 FOR IP): Performed by: STUDENT IN AN ORGANIZED HEALTH CARE EDUCATION/TRAINING PROGRAM

## 2022-12-21 PROCEDURE — 6360000002 HC RX W HCPCS: Performed by: INTERNAL MEDICINE

## 2022-12-21 PROCEDURE — 1200000000 HC SEMI PRIVATE

## 2022-12-21 PROCEDURE — 80048 BASIC METABOLIC PNL TOTAL CA: CPT

## 2022-12-21 RX ORDER — FUROSEMIDE 20 MG/1
20 TABLET ORAL DAILY
Status: DISCONTINUED | OUTPATIENT
Start: 2022-12-21 | End: 2022-12-22 | Stop reason: HOSPADM

## 2022-12-21 RX ORDER — ENOXAPARIN SODIUM 100 MG/ML
1 INJECTION SUBCUTANEOUS 2 TIMES DAILY
Status: DISCONTINUED | OUTPATIENT
Start: 2022-12-21 | End: 2022-12-22

## 2022-12-21 RX ORDER — ENOXAPARIN SODIUM 100 MG/ML
1 INJECTION SUBCUTANEOUS 2 TIMES DAILY
Status: DISCONTINUED | OUTPATIENT
Start: 2022-12-21 | End: 2022-12-21 | Stop reason: SDUPTHER

## 2022-12-21 RX ADMIN — ENOXAPARIN SODIUM 80 MG: 100 INJECTION SUBCUTANEOUS at 20:24

## 2022-12-21 RX ADMIN — Medication 1000 UNITS: at 10:06

## 2022-12-21 RX ADMIN — PANTOPRAZOLE SODIUM 40 MG: 40 TABLET, DELAYED RELEASE ORAL at 05:44

## 2022-12-21 RX ADMIN — HYDRALAZINE HYDROCHLORIDE 10 MG: 10 TABLET, FILM COATED ORAL at 14:47

## 2022-12-21 RX ADMIN — ENOXAPARIN SODIUM 80 MG: 100 INJECTION SUBCUTANEOUS at 10:02

## 2022-12-21 RX ADMIN — SODIUM CHLORIDE, PRESERVATIVE FREE 10 ML: 5 INJECTION INTRAVENOUS at 10:09

## 2022-12-21 RX ADMIN — CETIRIZINE HYDROCHLORIDE 5 MG: 5 TABLET ORAL at 10:02

## 2022-12-21 RX ADMIN — SODIUM CHLORIDE, PRESERVATIVE FREE 10 ML: 5 INJECTION INTRAVENOUS at 20:24

## 2022-12-21 RX ADMIN — FLUTICASONE PROPIONATE 1 SPRAY: 50 SPRAY, METERED NASAL at 10:04

## 2022-12-21 RX ADMIN — HYDRALAZINE HYDROCHLORIDE 10 MG: 10 TABLET, FILM COATED ORAL at 10:02

## 2022-12-21 RX ADMIN — ASPIRIN 81 MG: 81 TABLET, COATED ORAL at 10:02

## 2022-12-21 RX ADMIN — FAMOTIDINE 20 MG: 20 TABLET, FILM COATED ORAL at 10:03

## 2022-12-21 RX ADMIN — HYDRALAZINE HYDROCHLORIDE 10 MG: 10 TABLET, FILM COATED ORAL at 20:24

## 2022-12-21 RX ADMIN — FUROSEMIDE 20 MG: 20 TABLET ORAL at 10:05

## 2022-12-21 RX ADMIN — BACLOFEN 10 MG: 10 TABLET ORAL at 20:24

## 2022-12-21 RX ADMIN — METOPROLOL SUCCINATE 25 MG: 25 TABLET, EXTENDED RELEASE ORAL at 10:02

## 2022-12-21 ASSESSMENT — PAIN DESCRIPTION - ORIENTATION: ORIENTATION: LOWER

## 2022-12-21 ASSESSMENT — PAIN DESCRIPTION - DESCRIPTORS: DESCRIPTORS: ACHING;DISCOMFORT

## 2022-12-21 ASSESSMENT — PAIN DESCRIPTION - PAIN TYPE: TYPE: ACUTE PAIN

## 2022-12-21 ASSESSMENT — PAIN SCALES - GENERAL
PAINLEVEL_OUTOF10: 0
PAINLEVEL_OUTOF10: 7

## 2022-12-21 ASSESSMENT — PAIN DESCRIPTION - LOCATION: LOCATION: BACK

## 2022-12-21 NOTE — PLAN OF CARE
Patient's chart updated to reflect:      . - HF care plan, HF education points and HF discharge instructions.  -Orders: 2 gram sodium diet, daily weights, I/O.  -PCP and cardiology follow up appointments to be scheduled within 7 days of hospital discharge. -CHF education session will be provided to the patient prior to hospital discharge.     Ange Soto RN RN, BSN  Heart Failure Navigator

## 2022-12-21 NOTE — DISCHARGE SUMMARY
Hospital Medicine Discharge Summary    Patient ID: Clement Boles      Patient's PCP: Jahaira Flynn DO    Admit Date: 12/16/2022     Discharge Date:    12/21/2022    Admitting Physician: Elba Peralta DO     Discharge Physician: Farzana Madsen MD      Condition at discharge: Stable    Disposition: Home    Discharge Diagnoses:  Acute on chronic CHF  Cardiomyopathy  Paroxysmal atrial fibrillation  Pulmonary embolism  Valvular heart disease  Acute kidney injury  CKD  Hypertension  Dyslipidemia     Active Hospital Problems    Diagnosis Date Noted    Multiple subsegmental pulmonary emboli without acute cor pulmonale ([address] [I26.94] 12/17/2022     Priority: Medium    Acute on chronic congestive heart failure ([address] [I50.9] 12/17/2022     Priority: Medium    Persistent atrial fibrillation ([address] [I48.19] 11/04/2019    Paroxysmal atrial fibrillation ([address] [I48.0] 03/10/2019       The patient was seen and examined on day of discharge and this discharge summary is in conjunction with any daily progress note from day of discharge. Hospital Course:   Ms. Clement Boles, a 68y.o. year old female  who  has a past medical history of Afib ([address] Anxiety, Arthritis, Cervical radiculopathy, CHF (congestive heart failure) (HCC), Chronic sinusitis, Hilar adenopathy, Hx of blood clots, Hypertension, Left ventricular systolic dysfunction, Lesion of left native kidney, Lumbar radiculopathy, Lung nodules, Meige syndrome, Microscopic colitis, Mitral regurgitation, Nonischemic cardiomyopathy (HCC), Osteopenia, Renal cell carcinoma of left kidney ([address] and Vertebrobasilar artery syndrome.      Briefly, patient with PMH significant for A. fib, CHF, hilar adenopathy, blood clots, HPT, left ventricular systolic dysfunction, lesion of left native kidney, lumbar and cervical radiculopathy, lung nodule, colitis, mitral regurgitation, nonischemic cardiomyopathy, renal cell carcinoma of left kidney presented to ED for neck pain, increased lower extremity swelling shortness of breath and concern for issues with her CHF. CT of chest revealed pleural effusions along with pulmonary embolism. Patient was given Lasix and Lovenox and admitted for management of CHF exacerbation and pulmonary embolism. Patient had echo that revealed decreased EF of 25%. Patient is for stress test.  During hospital course patient also with NIKA, renal ultrasound and urine studies ordered which did not show any acute abnormality. Pulmonary saw the patient and recommended continuing Xarelto as patient is refusing Coumadin. Cardiology also recommending LifeVest upon discharge. Patient only wants to be on Xarelto upon discharge and refusing warfarin. Consults:     IP CONSULT TO INTERNAL MEDICINE  IP CONSULT TO HEART FAILURE NURSE/COORDINATOR  IP CONSULT TO DIETITIAN  IP CONSULT TO CARDIOLOGY  IP CONSULT TO CARDIOLOGY  IP CONSULT TO NEUROSURGERY  IP CONSULT TO PULMONOLOGY  IP CONSULT TO HEM/ONC  IP CONSULT TO ELECTROPHYSIOLOGY  IP CONSULT TO HEART FAILURE NURSE/COORDINATOR    Significant Diagnostic Studies:  As above      Discharge Instructions/Follow-up:  As above       Activity: activity as tolerated    Physical Exam:  Vitals:    12/21/22 0800   BP: 108/76   Pulse: 83   Resp: 18   Temp: 97 °F (36.1 °C)   SpO2: 96%       Respiratory:  Clear to auscultation, good air entry. Cardiovascular: RRR, no murmur. Abdomen: Soft, non-tender, non-distended with normal bowel sounds. Neurologic: awake, alert and following commands     Labs:  For convenience and continuity at follow-up the following most recent labs are provided:      CBC:    Lab Results   Component Value Date/Time    WBC 7.3 12/20/2022 04:37 AM    HGB 14.6 12/20/2022 04:37 AM    HCT 44.8 12/20/2022 04:37 AM     12/20/2022 04:37 AM       Renal:    Lab Results   Component Value Date/Time     12/21/2022 05:43 AM    K 5.0 12/21/2022 05:43 AM    K 4.7 11/11/2021 11:45 AM     12/21/2022 05:43 AM    CO2 22 12/21/2022 05:43 AM    BUN 27 12/21/2022 05:43 AM    CREATININE 1.2 12/21/2022 05:43 AM    CALCIUM 9.8 12/21/2022 05:43 AM         Discharge Medications:     Current Discharge Medication List             Details   !! rivaroxaban (XARELTO) 15 MG TABS tablet Take 1 tablet by mouth 2 times daily (with meals) for 42 doses  Qty: 42 tablet, Refills: 0      !! rivaroxaban (XARELTO) 20 MG TABS tablet Take 1 tablet by mouth Daily with supper  Qty: 30 tablet, Refills: 0       !! - Potential duplicate medications found. Please discuss with provider. Details   cetirizine (ZYRTEC ALLERGY) 10 MG tablet Take 0.5 tablets by mouth daily  Qty: 30 tablet, Refills: 0    Associated Diagnoses: Sinus drainage      fluticasone (FLONASE) 50 MCG/ACT nasal spray 1-2 sprays, each nostril daily as needed for nasal congestion. Qty: 16 g, Refills: 0    Associated Diagnoses: Sinus drainage      furosemide (LASIX) 20 MG tablet Take 1 tablet by mouth daily  Qty: 90 tablet, Refills: 3      metoprolol succinate (TOPROL XL) 25 MG extended release tablet Take 1 tablet by mouth daily  Qty: 90 tablet, Refills: 2      aspirin 81 MG EC tablet Take 81 mg by mouth daily      hydrOXYzine (ATARAX) 10 MG tablet Take 1 tablet by mouth every 8 hours as needed for Itching or Anxiety  Qty: 90 tablet, Refills: 1    Associated Diagnoses: Itching      baclofen (LIORESAL) 10 MG tablet Take 1 tablet by mouth nightly  Qty: 90 tablet, Refills: 1    Associated Diagnoses: Muscle spasm      spironolactone (ALDACTONE) 25 MG tablet Take 1 tablet by mouth daily  Qty: 90 tablet, Refills: 3    Associated Diagnoses: Acute on chronic systolic heart failure ([address] Secondary cardiomyopathy ([address] Essential hypertension      hydrALAZINE (APRESOLINE) 10 MG tablet Take 1 tablet by mouth 3 times daily  Qty: 270 tablet, Refills: 3      Handicap Placard MISC Cannot walk more than 200 feet  Expiration: 7/12/2026  Qty: 1 each, Refills: 0    Associated Diagnoses:  At high risk for falls      omeprazole (PRILOSEC) 40 MG delayed release capsule Take 40 mg by mouth daily       triamcinolone (KENALOG) 0.1 % cream Apply topically 3 times daily as needed (rash)   Refills: 1      loperamide (IMODIUM) 2 MG capsule Take 2 mg by mouth 4 times daily as needed for Diarrhea      diphenhydrAMINE (BENADRYL) 25 MG tablet Take 25 mg by mouth every 6 hours as needed for Itching      vitamin D (CHOLECALCIFEROL) 1000 UNIT TABS tablet Take 1,000 Units by mouth daily             Time Spent on discharge is more than 31 min in the examination, evaluation, counseling and review of medications and discharge plan. Signed:    Ngozi Loera MD   12/21/2022      Thank you Darrell Garcia DO for the opportunity to be involved in this patient's care. If you have any questions or concerns please feel free to contact me. NOTE: This report was transcribed using voice recognition software. Every effort was made to ensure accuracy; however, inadvertent computerized transcription errors may be present.

## 2022-12-21 NOTE — PROGRESS NOTES
Inpatient Medical Oncology Consult Note    Subjective:   SOB improved. No fever, chills. Fair appetite. No N.V    Objective:  /76   Pulse 83   Temp 97 °F (36.1 °C) (Temporal)   Resp 18   Ht 5' 7\" (1.702 m)   Wt 173 lb 1 oz (78.5 kg)   SpO2 96%   BMI 27.11 kg/m²   GENERAL: Alert, oriented x 3, not in acute distress. HEENT: PERRLA; EOMI. Oropharynx clear. NECK: Supple. Without lymphadenopathy. LUNGS: Good air entry bilaterally. No wheezing, crackles or ronchi. CARDIOVASCULAR: Regular rate. No murmurs, rubs or gallops. ABDOMEN: Soft. Non-tender, non-distended. EXTREMITIES: Without clubbing, cyanosis, or edema. NEUROLOGIC: No focal deficits. ECOG PS 1    Lab Results   Component Value Date    WBC 7.3 12/20/2022    HGB 14.6 12/20/2022    HCT 44.8 12/20/2022    MCV 86.5 12/20/2022     12/20/2022     Lab Results   Component Value Date     12/21/2022    K 5.0 12/21/2022     12/21/2022    CO2 22 12/21/2022    BUN 27 (H) 12/21/2022    CREATININE 1.2 (H) 12/21/2022    GLUCOSE 106 (H) 12/21/2022    CALCIUM 9.8 12/21/2022    PROT 7.5 12/20/2022    LABALBU 3.7 12/20/2022    BILITOT 0.7 12/20/2022    ALKPHOS 104 12/20/2022    AST 38 (H) 12/20/2022    ALT 20 12/20/2022    LABGLOM 46 12/21/2022    GFRAA 53 09/07/2022     Impression/Plan:  67 y/o female with Left RCC      Left partial nephrectomy on 11/16/2021  Left kidney, partial nephrectomy: Clear-cell renal cell carcinoma, 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 See comment. Comment:CANCER CASE SUMMARY   Procedure: Partial nephrectomy   Specimen Laterality: Left   Tumor Focality: Unifocal   Tumor Site: Undesignated   Tumor Size: 3.0 cm greatest dimension   Histologic Type: Clear-cell renal cell carcinoma   Histologic Grade (WHO/ISUP): G3   Tumor Extent: Limited to kidney   Sarcomatoid Features: Not identified   Rhabdoid features: Not identified   Tumor Necrosis: Not identified   Lymph-Vascular Invasion: Not identified   Margin Status:  All margins negative for invasive carcinoma   Regional lymph node Status: No lymph nodes submitted or found   Pathologic Stage Classification (AJCC 8th Edition):   pT1a   Surveillance      CT abdomen/pelvis 02/18/2022 Surgical removal of the lower pole of the left kidney. There is no evidence of local recurrence or metastatic disease at this time. CT chest 04/13/2022 stable exam     CT abdomen/pelvis 08/19/2022:   Postop changes compatible with previous left renal lower pole tumor excision  No evidence of local recurrence or abdominal metastasis    Follows with Dr. Mar Tran  CT chest without contrast on 12/07/2022, reviewed and no change from previous, moderately enlarged mediastinal lymph node 1.3 cm from 1.1  unchanged 1.1 cm nodule RLL, 7mm ground glass nodule YELITZA unchanged   Recommend F/U CT chest in one year    Presented to the ED for shortness of breath. CTA chest 12/17/2022 small burden acute subsegmental pulmonary emboli to the bilateral lower lobes. Trace bilateral pleural effusions. Imaging reviewed. Patient on heparin sb switched to lovenox  Diuresis per cardiology  Monitor BMP. Creat 1.2 today 12/21/2022. Labs reviewed. Javier LE u/s 12/20/2022 noted no evidence of DVT. Imaging reviewed. She is able to tolerate xarelto. Anticoagulation to Xarelto upon discharge. Ok to D/C; F/U as outpatient March 2023 with prior CT abdomen/pelvis for f/u on hx of RCC.  Recommended to f/u with pulmonary team for f/u CT chest    Salvador Blanchard MD   12/21/2022

## 2022-12-21 NOTE — PROGRESS NOTES
Pulmonary Progress Note  [facility]    Admit Date: 2022                            PCP: Jahaira Flynn DO  Principal Problem:    Multiple subsegmental pulmonary emboli without acute cor pulmonale (HCC)  Active Problems:    Acute on chronic congestive heart failure (HCC)    Paroxysmal atrial fibrillation (HCC)    Persistent atrial fibrillation (HCC)  Resolved Problems:    * No resolved hospital problems. *      Subjective:  Resting on RA- sats 95%. Denies shortness of breath, cough, wz. Awaiting patient assistance for xarelto then can be discharged     Medications:   sodium chloride          enoxaparin  1 mg/kg SubCUTAneous BID    potassium chloride  40 mEq Oral BID WC    fluticasone  1 spray Each Nostril Daily    famotidine  20 mg Oral Daily    aspirin  81 mg Oral Daily    baclofen  10 mg Oral Nightly    cetirizine  5 mg Oral Daily    hydrALAZINE  10 mg Oral TID    metoprolol succinate  25 mg Oral Daily    pantoprazole  40 mg Oral QAM AC    [Held by provider] spironolactone  25 mg Oral Daily    Vitamin D  1,000 Units Oral Daily    sodium chloride flush  5-40 mL IntraVENous 2 times per day    lidocaine  1 patch TransDERmal Daily       Vitals:  VITALS:  /76   Pulse 83   Temp 97 °F (36.1 °C) (Temporal)   Resp 18   Ht 5' 7\" (1.702 m)   Wt 173 lb 1 oz (78.5 kg)   SpO2 95%   BMI 27.11 kg/m²   24HR INTAKE/OUTPUT:    Intake/Output Summary (Last 24 hours) at 2022 0856  Last data filed at 2022 0545  Gross per 24 hour   Intake 240 ml   Output --   Net 240 ml       CURRENT PULSE OXIMETRY:  SpO2: 95 %  24HR PULSE OXIMETRY RANGE:  SpO2  Av %  Min: 94 %  Max: 96 %  CVP:    VENT SETTINGS:      Additional Respiratory Assessments  Heart Rate: 83  Resp: 18  SpO2: 95 %      EXAM:  General: No distress. Alert. On RA  Neck: Trachea midline. Resp: No accessory muscle use. No crackles. No wheezing. No rhonchi. Clear, diminished  CV: Regular rate. Regular rhythm. No mumur or rub. BLE 1+ edema.    ABD: Non-tender. Non-distended. No masses. No organmegaly. Normal bowel sounds. Skin: Warm and dry. Lymph: No cervical LAD. No supraclavicular LAD. M/S: genao  Neuro: Aox4    I/O: I/O last 3 completed shifts: In: 240 [P.O.:240]  Out: 800 [Urine:800]  No intake/output data recorded. Results:  CBC:   Recent Labs     12/18/22  1102 12/19/22  0521 12/20/22  0437   WBC 8.5 7.8 7.3   HGB 15.0 14.0 14.6   HCT 46.8 41.8 44.8   MCV 88.5 83.9 86.5    219 228       BMP:   Recent Labs     12/19/22  0521 12/20/22  0437 12/21/22  0543    136 139   K 3.4* 4.7 5.0   CL 99 97* 105   CO2 23 27 22   BUN 29* 36* 27*   CREATININE 1.6* 1.7* 1.2*       LFT:   Recent Labs     12/18/22  1102 12/19/22  0521 12/20/22  0437   ALKPHOS 123* 108* 104   ALT 22 19 20   AST 30 24 38*   PROT 7.6 6.7 7.5   BILITOT 1.5* 0.9 0.7   LABALBU 4.0 3.5 3.7       PT/INR:   No results for input(s): PROTIME, INR in the last 72 hours. Cultures:  No results for input(s): CULTRESP in the last 72 hours. ABG:   No results for input(s): PH, PO2, PCO2, HCO3, BE, O2SAT in the last 72 hours. Films:  XR CHEST (2 VW)    Result Date: 12/16/2022  EXAMINATION: TWO XRAY VIEWS OF THE CHEST 12/16/2022 1:06 pm COMPARISON: 12/07/2022. HISTORY: ORDERING SYSTEM PROVIDED HISTORY: sob TECHNOLOGIST PROVIDED HISTORY: Reason for exam:->sob What reading provider will be dictating this exam?->CRC FINDINGS: The cardiomediastinal silhouette is slightly prominent. Peribronchial cuffing bilateral hilar prominence is seen. Mild prominence of the bronchovascular interstitial lung markings is visualized. Haziness overlying bilateral costophrenic angles with fluid level consistent with small bilateral pleural effusions. Biapical prominence consistent with COPD changes. No evidence of pneumothorax is seen. The osseous structures are without acute process. COPD changes, congestive changes.      XR SHOULDER RIGHT (MIN 2 VIEWS)    Result Date: 12/16/2022  EXAMINATION: THREE XRAY VIEWS OF THE RIGHT SHOULDER 12/16/2022 1:06 pm COMPARISON: 11/20/2018 HISTORY: ORDERING SYSTEM PROVIDED HISTORY: fall TECHNOLOGIST PROVIDED HISTORY: Reason for exam:->fall What reading provider will be dictating this exam?->CRC FINDINGS: Osteopenia is noted. No acute fracture or dislocation is identified. Relatively mild degenerative changes are seen at the glenohumeral and acromioclavicular joints. Osteopenia and mild degenerative changes. No acute bony abnormality. CT CERVICAL SPINE WO CONTRAST    Result Date: 12/16/2022  EXAMINATION: CT OF THE CERVICAL SPINE WITHOUT CONTRAST 12/16/2022 2:54 pm TECHNIQUE: CT of the cervical spine was performed without the administration of intravenous contrast. Multiplanar reformatted images are provided for review. Automated exposure control, iterative reconstruction, and/or weight based adjustment of the mA/kV was utilized to reduce the radiation dose to as low as reasonably achievable. COMPARISON: Rose 3 2021 HISTORY: ORDERING SYSTEM PROVIDED HISTORY: pain TECHNOLOGIST PROVIDED HISTORY: Reason for exam:->pain Decision Support Exception - unselect if not a suspected or confirmed emergency medical condition->Emergency Medical Condition (MA) What reading provider will be dictating this exam?->CRC FINDINGS: No cervical spine fracture or malalignment. Moderate disc space narrowing at C5/C6. Degenerative posterior disc/osteophyte complex also at C5/C6 results in mild effacement of ventral thecal sac. Facet arthropathy at multiple segments notable on the right at C3/C4. No prevertebral soft tissue edema. Redemonstration of heterogeneous appearance of the thyroid gland. 1. No acute abnormality involving the cervical spine. 2. Redemonstration of heterogeneous appearance of the thyroid gland. Ultrasound could be helpful for further evaluation.      CTA PULMONARY W CONTRAST    Addendum Date: 12/17/2022    ADDENDUM: I contacted Dr. Shad Steiner via telephone with the result. Result Date: 12/17/2022  EXAMINATION: CTA OF THE CHEST 12/17/2022 2:19 am TECHNIQUE: CTA of the chest was performed after the administration of intravenous contrast.  Multiplanar reformatted images are provided for review. MIP images are provided for review. Automated exposure control, iterative reconstruction, and/or weight based adjustment of the mA/kV was utilized to reduce the radiation dose to as low as reasonably achievable. COMPARISON: None. HISTORY: ORDERING SYSTEM PROVIDED HISTORY: r/o pe TECHNOLOGIST PROVIDED HISTORY: Reason for exam:->r/o pe Decision Support Exception - unselect if not a suspected or confirmed emergency medical condition->Emergency Medical Condition (MA) What reading provider will be dictating this exam?->CRC FINDINGS: Pulmonary Arteries: Pulmonary arteries are adequately opacified for evaluation. Small burden pulmonary emboli to the bilateral lower lobe basilar divisions, right greater than left. Main pulmonary artery is normal in caliber. Mediastinum: No evidence of mediastinal lymphadenopathy. The heart and pericardium demonstrate no acute abnormality. There is no acute abnormality of the thoracic aorta. Moderate cardiomegaly with no pericardial effusions. Left atrial appendage closure device noted. Lungs/pleura: The lungs are without acute process. No focal consolidation or pulmonary edema. No evidence of  pneumothorax. Trace bilateral pleural effusions are present. Upper Abdomen: Limited images of the upper abdomen are unremarkable. Soft Tissues/Bones: No acute bone or soft tissue abnormality. Small burden acute subsegmental pulmonary emboli to the bilateral lower lobes. RECOMMENDATIONS: Careful clinical correlation and follow up recommended.      12/20 stress test ef 32%    12/20 US no dvt     Assessment/plan:  25-year-old   female vaccinated for corona and flu vaccine, non-smoker retired [employer]  with history of PAF, intolerant to multiple cardiac medications including oral anticoagulation, on Tikosyn, history of left atrial thrombus in 2018. Status post watchman 10/14/2020, chronic heart failure intermediate EF, HTN, valvular heart disease, pulmonary hypertension unable to tolerate long-acting nitrates, renal cell carcinoma 2021 status post partial nephrectomy, anxiety presented to [facility] 12/16 presented to urgent care with neck pain shooting down right arm with shortness of breath. In ER, CT chest showing multisegmental PE  CT cervical negative  Chest x-ray with congestion  12/17 COVID influenza negative  12/18 MRI cervical spine showing mild degenerative changes  saturating 94% on room air  12/19 on RA   12/20 stress test ef 32%, US no dvt  12/21 on room air       PE  history of allergic reactions to anticoagulations - Allergic to rivaroxaban with significant skin reaction that she manages with hydroxyzine. Also allergic to dabigatran and apixaban. With rash itching.  Refusing coumadin   On Lovenox  Ok to discharge on xarelto when patient assistance is set up   Acute on chronic systolic heart failure EF 45% on JAIME 10/2022  History of left atrial thrombus 2018 and 2019 with device related thrombus 4/11/2021 resolved on JAIME  Nonischemic CMP  S/p lasix   12/20 stress test EF 32%  To be discharged with wearable defibrillator   Pulmonary hypertension not able to tolerate long-acting nitrates  History of kidney cancer s/p left partial nephrectomy  hematology /onc following  Pulmonary nodules   Follows with Dr. Delatorre Abts chest WO for Fu 12/7 /22, reviewed and no change from previous, moderately enlarged mediastinal lymph node 1.3 cm from 1.1  , unchanged 1.1 cm nodule RLL, 7mm GG nodule YELITZA unchanged   Recommend Fu Ct chest in one year   Afib  Previous watchman device 10/2020   Off tikosyn  Caradiology consulted   Nasal congestion  Claritin and flonase     Ok for d/c from pulm on xarelto after stress test. Follow up with [facility] in 2-4 weeks. Electronically signed by UYEN Cordoba CNP on 12/21/2022 at 8:51 AM    Attending Attestation Note:    Patient seen and examined with Hospital Staff, NP. I have extensively reviewed the chart lab work and imaging. I agree with above. Modifications and amendment of note made as necessary.     In addition, the following apply:    Current Facility-Administered Medications   Medication Dose Route Frequency Provider Last Rate Last Admin    furosemide (LASIX) tablet 20 mg  20 mg Oral Daily Mei Feliciano MD   20 mg at 12/21/22 1005    rivaroxaban (XARELTO) tablet 15 mg  15 mg Oral BID  Lizeth Lugo MD        Followed by    Abraham Laguna ON 1/11/2023] rivaroxaban (XARELTO) tablet 20 mg  20 mg Oral Dinner Lizeth Lugo MD        fluticasone (FLONASE) 50 MCG/ACT nasal spray 1 spray  1 spray Each Nostril Daily UYEN Cordoba CNP   1 spray at 12/21/22 1004    famotidine (PEPCID) tablet 20 mg  20 mg Oral Daily Dennis Moscoso MD   20 mg at 12/21/22 1003    aspirin EC tablet 81 mg  81 mg Oral Daily Alexander Hire, DO   81 mg at 12/21/22 1002    baclofen (LIORESAL) tablet 10 mg  10 mg Oral Nightly Alexander Hire, DO   10 mg at 12/20/22 2048    cetirizine (ZYRTEC) tablet 5 mg  5 mg Oral Daily Alexander Hire, DO   5 mg at 12/21/22 1002    hydrALAZINE (APRESOLINE) tablet 10 mg  10 mg Oral TID Alexander Hire, DO   10 mg at 12/21/22 1447    metoprolol succinate (TOPROL XL) extended release tablet 25 mg  25 mg Oral Daily Alexander Hire, DO   25 mg at 12/21/22 1002    pantoprazole (PROTONIX) tablet 40 mg  40 mg Oral QAM AC Alexander Hire, DO   40 mg at 12/21/22 0544    spironolactone (ALDACTONE) tablet 25 mg  25 mg Oral Daily Alexander Hire, DO   25 mg at 12/19/22 2101    vitamin D (CHOLECALCIFEROL) tablet 1,000 Units  1,000 Units Oral Daily Alexander Hire, DO   1,000 Units at 12/21/22 1006    sodium chloride flush 0.9 % injection 5-40 mL  5-40 mL IntraVENous 2 times per day Arcelia Harvey, DO   10 mL at 12/21/22 1009    sodium chloride flush 0.9 % injection 5-40 mL  5-40 mL IntraVENous PRN Mary Capo, DO        0.9 % sodium chloride infusion   IntraVENous PRN Mary Capo, DO        polyethylene glycol (GLYCOLAX) packet 17 g  17 g Oral Daily PRN Mary Capo, DO   17 g at 12/19/22 4774    lidocaine 4 % external patch 1 patch  1 patch TransDERmal Daily Lowry Olszewski, MD   1 patch at 12/18/22 3682    perflutren lipid microspheres (DEFINITY) injection 1.5 mL  1.5 mL IntraVENous ONCE PRN UYEN Beltran - CNP          - Xarelto, patient refusing coumadin   - O2, IS  - await D/C planning for patient      - NM Cardiac Stress Test:  1: No definite evidence of  ischemia or scar except soft tissue   attenuation. The left ventricle is visually dilated both rest and   stress without evidence of TID     2  Dilated left ventricle with Moderate to severe global left   ventricular hypokinesis with estimated left ventricular ejection   fraction of 32%. 3:  Please see separate report for EKG and hemodynamic aspect of the   stress test.     4:  Low dose CT attenuation correction protocol was utilized which   revealed minimal to mild coronary artery ossifications. 5: RISK SCAN:  High risk scan due to dilated left ventricle with   severe global hypokinesis and EF of 32%.        - OK for discharge when patient assistance set up     Tashia Rapp MD  12/21/2022  4:25 PM

## 2022-12-21 NOTE — PLAN OF CARE
Problem: Pain  Goal: Verbalizes/displays adequate comfort level or baseline comfort level  12/20/2022 2344 by Malissa Mata RN  Outcome: Progressing  12/20/2022 1830 by Enrike Rhoades RN  Outcome: Progressing  12/20/2022 1613 by Sandra Jason RN  Outcome: Progressing     Problem: Safety - Adult  Goal: Free from fall injury  12/20/2022 2344 by Malissa Mata RN  Outcome: Progressing  12/20/2022 1830 by Enrike Rhoades RN  Outcome: Progressing  12/20/2022 1613 by Sandra Jason RN  Outcome: Progressing  Flowsheets (Taken 12/20/2022 1234 by Enrike Rhoades RN)  Free From Fall Injury: Instruct family/caregiver on patient safety

## 2022-12-21 NOTE — PROGRESS NOTES
INPATIENT CARDIOLOGY FOLLOW-UP    Name: Vincent Friend    Age: 68 y.o. Date of Admission: 12/16/2022 11:44 PM    Date of Service: 12/21/2022    Primary Cardiologist: Dr Memo Anderson    Chief Complaint: Follow-up for Cardiomyopathy    Interim History:  Feels better. Denies chest pain or shortness of breath. Feels lower extremity edema improved. Stress test showed severe LV dysfunction but no ischemia.     Review of Systems:   Negative except as described above    Problem List:  Patient Active Problem List   Diagnosis    Anxiety    Nonischemic cardiomyopathy ([facility]    Severe mitral regurgitation    Lumbar radiculopathy    Cervical radiculopathy    HTN (hypertension), benign    Left atrial thrombus    Paroxysmal atrial fibrillation (HCC)    Chronic HFrEF (heart failure with reduced ejection fraction) ([facility]    Visit for monitoring Tikosyn therapy    Persistent atrial fibrillation ([facility]    Encounter for medication refill    Itching    Chronic anticoagulation    Presence of Watchman left atrial appendage closure device    Renal mass    Renal cell carcinoma of left kidney (HCC)    Chronic renal disease, stage III ([facility] [200687]    Multiple subsegmental pulmonary emboli without acute cor pulmonale (HCC)    Acute on chronic congestive heart failure (HCC)       Current Medications:    Current Facility-Administered Medications:     enoxaparin (LOVENOX) injection 80 mg, 1 mg/kg, SubCUTAneous, BID, Rogelio Blanco MD, 80 mg at 12/20/22 2048    potassium chloride (KLOR-CON M) extended release tablet 40 mEq, 40 mEq, Oral, BID , Yordan Singh APRN - CNP, 40 mEq at 12/20/22 1836    fluticasone (FLONASE) 50 MCG/ACT nasal spray 1 spray, 1 spray, Each Nostril, Daily, Lysle Go, APRN - CNP, 1 spray at 12/20/22 6628    famotidine (PEPCID) tablet 20 mg, 20 mg, Oral, Daily, Dennis Moscoso MD, 20 mg at 12/20/22 3435    aspirin EC tablet 81 mg, 81 mg, Oral, Daily, Hedy Boast, DO, 81 mg at 12/20/22 0920    baclofen (LIORESAL) tablet 10 mg, 10 mg, Oral, Nightly, Leretha Niece, DO, 10 mg at 12/20/22 2048    cetirizine (ZYRTEC) tablet 5 mg, 5 mg, Oral, Daily, Leretha Niece, DO, 5 mg at 12/20/22 4435    hydrALAZINE (APRESOLINE) tablet 10 mg, 10 mg, Oral, TID, Leretha Niece, DO, 10 mg at 12/20/22 2048    metoprolol succinate (TOPROL XL) extended release tablet 25 mg, 25 mg, Oral, Daily, Leretha Niece, DO, 25 mg at 12/20/22 1247    pantoprazole (PROTONIX) tablet 40 mg, 40 mg, Oral, QAM AC, Leretha Niece, DO, 40 mg at 12/21/22 0544    [Held by provider] spironolactone (ALDACTONE) tablet 25 mg, 25 mg, Oral, Daily, Leretha Niece, DO, 25 mg at 12/19/22 6927    vitamin D (CHOLECALCIFEROL) tablet 1,000 Units, 1,000 Units, Oral, Daily, Leretha Niece, DO, 1,000 Units at 12/20/22 8975    sodium chloride flush 0.9 % injection 5-40 mL, 5-40 mL, IntraVENous, 2 times per day, Leretha Niece, DO, 10 mL at 12/20/22 2048    sodium chloride flush 0.9 % injection 5-40 mL, 5-40 mL, IntraVENous, PRN, Leretha Niece, DO    0.9 % sodium chloride infusion, , IntraVENous, PRN, Leretha Niece, DO    polyethylene glycol (GLYCOLAX) packet 17 g, 17 g, Oral, Daily PRN, Leretha Niece, DO, 17 g at 12/19/22 2114    lidocaine 4 % external patch 1 patch, 1 patch, TransDERmal, Daily, Ebony Montes MD, 1 patch at 12/18/22 2814    perflutren lipid microspheres (DEFINITY) injection 1.5 mL, 1.5 mL, IntraVENous, ONCE PRN, Trent Castillo, APRN - CNP    Physical Exam:  /76   Pulse 83   Temp 97 °F (36.1 °C) (Temporal)   Resp 18   Ht 5' 7\" (1.702 m)   Wt 173 lb 1 oz (78.5 kg)   SpO2 95%   BMI 27.11 kg/m²   Wt Readings from Last 3 Encounters:   12/20/22 173 lb 1 oz (78.5 kg)   12/06/22 174 lb (78.9 kg)   10/19/22 171 lb (77.6 kg)     Appearance: Awake, alert, no acute respiratory distress  Skin: Intact, no rash  Head: Normocephalic, atraumatic  Eyes: EOMI, no conjunctival erythema  ENMT: No pharyngeal erythema, MMM, no rhinorrhea  Neck: Supple, no elevated JVP, no carotid bruits  Lungs: Clear to auscultation bilaterally. No wheezes, rales, or rhonchi. Cardiac: PMI nondisplaced, Regular rhythm with a normal rate, frequent ectopy, S1 & S2 normal, no murmurs  Abdomen: Soft, nontender, +bowel sounds  Extremities: Moves all extremities x 4, trace bilateral lower extremity edema  Neurologic: No focal motor deficits apparent, normal mood and affect  Peripheral Pulses: Intact posterior tibial pulses bilaterally    Intake/Output:    Intake/Output Summary (Last 24 hours) at 12/21/2022 0910  Last data filed at 12/21/2022 0545  Gross per 24 hour   Intake 240 ml   Output --   Net 240 ml     No intake/output data recorded.     Laboratory Tests:  Recent Labs     12/19/22  0521 12/20/22 0437 12/21/22  0543    136 139   K 3.4* 4.7 5.0   CL 99 97* 105   CO2 23 27 22   BUN 29* 36* 27*   CREATININE 1.6* 1.7* 1.2*   GLUCOSE 108* 95 106*   CALCIUM 9.4 9.9 9.8     Lab Results   Component Value Date/Time    MG 2.7 12/21/2022 05:43 AM     Recent Labs     12/18/22  1102 12/19/22  0521 12/20/22  0437   ALKPHOS 123* 108* 104   ALT 22 19 20   AST 30 24 38*   PROT 7.6 6.7 7.5   BILITOT 1.5* 0.9 0.7   LABALBU 4.0 3.5 3.7     Recent Labs     12/18/22  1102 12/19/22  0521 12/20/22  0437   WBC 8.5 7.8 7.3   RBC 5.29 4.98 5.18   HGB 15.0 14.0 14.6   HCT 46.8 41.8 44.8   MCV 88.5 83.9 86.5   MCH 28.4 28.1 28.2   MCHC 32.1 33.5 32.6   RDW 15.6* 15.6* 15.5*    219 228   MPV 10.7 10.6 11.0     Lab Results   Component Value Date    CKTOTAL 109 06/03/2021    CKMB 6.6 (H) 11/25/2013    TROPONINI <0.01 03/09/2019    TROPONINI <0.01 11/21/2018    TROPONINI <0.01 11/20/2018     Lab Results   Component Value Date    INR 1.1 12/16/2022    INR 1.0 10/05/2021    INR 1.0 01/22/2021    PROTIME 12.2 12/16/2022    PROTIME 11.2 10/05/2021    PROTIME 11.5 01/22/2021     Lab Results   Component Value Date    TSH 0.807 02/01/2022     Lab Results   Component Value Date    LABA1C 5.7 (H) 02/01/2022     No results found for: EAG  Lab Results   Component Value Date    CHOL 151 2022    CHOL 206 (H) 2022    CHOL 179 2018     Lab Results   Component Value Date    TRIG 68 2022    TRIG 170 (H) 2022    TRIG 69 2018     Lab Results   Component Value Date    HDL 55 2022    HDL 56 2022    HDL 51 2018     Lab Results   Component Value Date    LDLCALC 82 2022    LDLCALC 116 (H) 2022    LDLCALC 114 (H) 2018     Lab Results   Component Value Date    LABVLDL 14 2022    LABVLDL 34 2022    LABVLDL 14 2018     No results found for: CHOLHDLRATIO  Recent Labs     22  0437   PROBNP 5,563*       Cardiac Tests:    EK2022: Sinus rhythm 79 bpm.  Left axis. Left bundle branch block. Telemetry: Sinus rhythm 70s, frequent PVCs, NSVT 6 beats    Chest X-ray:   22    Impression   COPD changes, congestive changes. CTA chest 22:  Impression   Small burden acute subsegmental pulmonary emboli to the bilateral lower lobes. Echocardiogram:   TTE 22 [place]   Summary   Ejection fraction is visually estimated at 25%. Overall ejection fraction severely decreased. Mild left ventricular concentric hypertrophy noted. Dilated right ventricle with reduced function. Left atrial volume index of 50 ml per meters squared BSA. Moderate aortic regurgitation is noted. Moderate mitral regurgitation is present. Moderate tricuspid regurgitation. Pulmonary hypertension is severe. RVSP is 70 mmHg. No evidence for hemodynamically significant pericardial effusion. JAIME 10/2021: EF 45-50  JAIME 2021: 45-50. DRT resolved  JAIME 2021: EF 45-50. + DRT on Watchman    Stress test:    Pharmacologic stress 2022     Impression   1: No definite evidence of  ischemia or scar except soft tissue   attenuation.  The left ventricle is visually dilated both rest and   stress without evidence of TID     2  Dilated left ventricle with Moderate to severe global left   ventricular hypokinesis with estimated left ventricular ejection   fraction of 32%. 3:  Please see separate report for EKG and hemodynamic aspect of the   stress test.     4:  Low dose CT attenuation correction protocol was utilized which   revealed minimal to mild coronary artery ossifications. 5: RISK SCAN:  High risk scan due to dilated left ventricle with   severe global hypokinesis and EF of 32%. 2018  Left ventricular end systolic volume estimated at 85 ml and   end-diastolic volume estimated at 110 ml. Resting left ventricular   ejection fraction over 23%. Impression   1. No evidence of left ventricular myocardial stress-induced   ischemia. 2. Moderate sized left ventricular anterior wall fixed defect   concerning for old infarct or scar. Clinical correlation is   recommended. 3. Global myocardial hypokinesia. 4. Decreased left ventricular ejection fraction estimated at 23%. Cardiac catheterization:   Right and left heart cath 2010 Dr. Yony Cooper  No angiographic CAD  Right heart catheterization: RA 4, RV systolic 64, PA systolic 58, wedge 11  CO 5.47 by thermo  CI 2.9    ----------------------------------------------------------------------------------------------------------------------------------------------------------------  IMPRESSION:  Acute on chronic HFrEF.  proBNP 7500-> 5600. Improved clinically. I's and O's inaccurate  Cardiomyopathy, recurrent. EF now 25%; previously improved from 35% in 2016 to about 50%. Stress negative for ischemia  PAF. On dofetilide, held since admission. S/p watchman 10/2020. Maintaining sinus  Frequent PVCs/NSVT  Device related thrombus noted on 6-month JAIME; subsequently resolved  Valvular heart disease: Moderate MR, moderate TR, moderate AR  Pulmonary hypertension  Chronic left bundle branch block  Acute bilateral subsegmental PE  NIKA/CKD creatinine 1.2 -> 1.7. Baseline 1.2-1.3.   Resolved, back to 1. 2  Hypertension  Renal cancer s/p partial left nephrectomy 11/2021  History of TIA  Hyperlipidemia  Migraines  Multiple medication intolerances/allergies including ACE and ARB, nitrates, DOAC's    RECOMMENDATIONS:    Resume oral diuretics  Optimize GDMT for cardiomyopathy; limited by allergy/intolerance and relative hypotension  Metoprolol succinate 25 mg daily  Hydralazine 10 mg 3 times daily  Spironolactone 25 mg daily -resume on discharge  SGLT2 inhibitor if tolerated  EP input noted regarding dofetilide, has been discontinued permanently  I have ordered wearable cardioverter defibrillator   Maintain K> 4.0, mag> 2.0 -de-escalate potassium segmentation given potassium 5.0 and resumption of spironolactone  Aggressive risk factor modification  Further care per primary service and consultants  Okay for discharge from cardiology standpoint once vest delivered  Outpatient follow-up with Dr. Viviana Arzate  Will be available as needed, please call with questions    Emmett Rosales MD, [facility]    NOTE: This report was transcribed using voice recognition software. Every effort was made to ensure accuracy; however, inadvertent computerized transcription errors may be present.

## 2022-12-21 NOTE — DISCHARGE INSTRUCTIONS
HEART FAILURE  / CONGESTIVE HEART FAILURE  DISCHARGE INSTRUCTIONS:  GUIDELINES TO FOLLOW AT HOME    Self- Managed Care:     MEDICATIONS:  Take your medication as directed. If you are experiencing any side effects, inform your doctor, Do not stop taking any of your medications without letting your doctor know. Check with your doctor before taking any over-the-counter medications / herbal / or dietary supplements. They may interfere with your other medications. Do not take ibuprofen (Advil or Motrin) and naproxen (Aleve) without talking to your doctor first. They could make your heart failure worse. WEIGHT MONITORING:   Weigh yourself everyday (with the same scale) around the same time of the day and write it down. (you can chart them on a calendar or keep track of them on paper. Notify your doctor of a weight gain of 3 pounds or more in 1 day   OR a total of 5 pounds or more in 1 week    Take your weight record to your doctor visits  Also, the same goes if you loose more than 3# in one day, let your heart doctor know. DIET:   Cardiac heart healthy diet- Low saturated / low trans fat, no added salt, caffeine restricted, Low sodium diet-   No more than 2,000mg (2 grams) of salt / sodium per day (which equals to a little less than  a teaspoon of salt)  If your doctor wants you on a fluid restriction. ..it is usually recommended a fluid limit of 2,000cc -  Fluid restriction- 2,000 ml (milliliters) = 64 ounces = you can have 8 glasses of fluid per day (each glass 8 ounces)    Follow a low salt diet - avoid using salt at the table, avoid / limit use of canned soups, processed / packaged foods, salted snacks, olives and pickles. Do not use a salt substitute without checking with your doctor, they may contain a high amount of potassioum. (Mrs. Jackelyn Rahman is safe to use).     Limit the use of alcohol       CALL YOUR DOCTOR THE FIRST DAY YOU NOTICE ANY OF THESE   SYMPTOMS:  You have a weight gain of 3 pounds or more in 1 day         OR 5 pounds or more in one week  More shortness of breath  More swelling of your stomach, legs, ankles or feet  Feeling more tired, No energy  Dry hacky cough  Dizziness  More chest pain / discomfort       (CALL 911 IF ANY OF THE FOLLOWING OCCURS  Chest pain (not relieved with nitroglycerine, if you have been prescribed this medication)  Severe shortness of breath  Faint / Pass out  Confusion / cannot think clearly  If symptoms get worse           SMOKING - TOBACCO USE:  * IF YOU SMOKE - STOP! Kick the habit. [address] Program is offered at [facility] and [address] [facility]. Call [phone] extension [phone] for more information. ACTIVITY:   (Ask your doctor when you will be able to return to work and before starting any exercise program.  Do not drive unless unless your doctor has given you permission to do so). Start light exercise. Even if you can only do a small amount, exercise will help you get stronger, have more energy, help manage your weight and decrease  stress. Walking is an easy way to get exercise. Start out slowly and  increase the amount you walk as tolerated  If you become short of breath, dizzy or have chest pain; stop, sit down, and rest.  If you feel \"wiped out\" the day after you exercise, walk at a slower pace or for a shorter distance. You can gradually increase the pace or amount of time. (Do not exercise right after a meal or in extreme temperatures, such as above 85 degrees, if the air is really humid, or wind chill is less than 20 degrees)                                             ADDITIONAL INFORMATION:  Avoid getting sick from colds and the flu. Stay away from friends or family that you know may have a contagious illness  Get plenty of rest   Get a flu shot each year. Get a pneumococcal vaccine shot.  If you have had one before, ask your doctor whether you need another dose. My Goal for Self-management of Heart Failure Includes 5 steps :    1. Notice a change in symptoms ( weight gain, short of breath, leg swelling, decreased activity level, bloating. ...)    2. Evaluate the change: (use the Heart Failure Zones )     3. Decide to take action: decide what your options are, such as: (call your doctor for an extra visit, take a prescribed medication, such as your water pill if your doctor has given you directions to do so, [address])    4. Come up with a strategy:  (now you call the doctor for advice / appointment. This is where you take action!!! Do not wait, catch the symptom early and treat it before it worsens. 5. Evaluate the response: The next day, check your Heart Failure Zones: are you in the GREEN ZONE (safe zone)? Worsening symptoms of YELLOW ZONE? Or have you moved to the RED ZONE and need to call 911 or go to the Emergency Room for evaluation? Call your doctor's office to update them on your symptoms of heart failure.

## 2022-12-21 NOTE — CARE COORDINATION
Discharge order noted. Life Vest approved. Await delivery and placement by RN representative. Xeralto 30 day free coupon placed in soft chart. Patient updated and she will follow outpatient with [facility]. Patient's son or daughter will transport her home today. German Estrada RN CM  [phone]        Received call from out outpatient pharmacy who said the patient already used her Xeralto 30 day free trial and out of pocket cost is $600. Messaged internal med via [employer] who said patient does not want Warfarin and she is allergic to Eliquis. Good RX coupon is for over $700. Voicemail message left for prescription assistance to see if they can assist patient with obtaining this medication. Await call back. German Estrada RN CM  [phone]        Received call back from [employer] at prescription assistance. She said that since patient already had free trial of Xeralto and no Medicare part D drug plan, she will need to contact the [place] [address] phone# [phone] for her long term Xeralto. Informed internal med Dr. Barbie Peñaloza who said he will discontinue discharge and she will need to be on Lovenox and Warfarin then. Pateint and RN both notified.   German Estrada RN CM  [phone]

## 2022-12-21 NOTE — CONSULTS
CHF NURSE NAVIGATOR CONSULT NOTE:      Patient currently admitted with diagnosis of Diastolic heart failure. Patient was alert and oriented during the consultation. She was engaged and asked appropriate questions throughout the education session. She is agreeable to self monitoring, following a lows sodium diet, outpatient follow up, and medication compliance. Scheduling with the CHF clinic, cardiology Joan. PCP patient declined to follow up at this time. Future Appointments   Date Time Provider Duke Santana   1/6/2023 12:00 PM [facility] CHF ROOM 1 [facility]F [facility]   1/18/2023 12:00 PM UYEN Carmona - CNP [facility]F [facility]   3/8/2023  1:30 PM [facility] MED ONC FAST TRACK 2 SEYZ Med Onc [facility]   3/8/2023  1:45 PM Minesh Chadwick MD MED ONC [facility]       Barriers to Care:  Contributing risk factors for Heart Failure are identified as Patient is in the process of changing her PCP, she has a new provider in mind and will call to schedule appointment. The patient is ordered:  Diet: ADULT DIET; Regular; Low Sodium (2 gm)   Sodium controlled diet Yes  Fluid restriction daily ordered (fluid restriction recommended if patient is hyponatremic and/or diuretic is initiated or increased) No  FR:   Daily Weights: Patient Vitals for the past 96 hrs (Last 3 readings):   Weight   12/20/22 0635 173 lb 1 oz (78.5 kg)   12/18/22 0531 176 lb 2.4 oz (79.9 kg)   12/17/22 1920 182 lb 1.6 oz (82.6 kg)     I/O:   Intake/Output Summary (Last 24 hours) at 12/21/2022 1323  Last data filed at 12/21/2022 0545  Gross per 24 hour   Intake 240 ml   Output --   Net 240 ml              We reviewed the introduction to Heart Failure, the HF zones, signs and symptoms to report on day 1 of onset, medications, medication compliance, the importance of obtaining daily weights, following a low sodium diet, reading food labels for the sodium content, keeping physician appointments, and smoking cessation.  We discussed writing / tracking daily weights on a calendar / log because a 5 pound gain in 1 week can sneak up if you are not tracking it. I advised patient they can reduce the risk for Heart Failure exacerbations by modifying / controlling the risk factors. We discussed self-managed care which includes the following:  to take medications as prescribed, report any intolerable side effects of medications to the cardiologist / doctor, do not just stop taking the medication; follow a cardiac heart healthy / low sodium diet; weigh yourself daily, exercise regularly- per doctor recommendation and not to smoke or use an excess amount of alcohol. We discussed calling the cardiologist / doctor with a weight gain of 3 pounds in one day or a total of 5 pounds or more in one week. Also, if you should have a significant weight loss of 3# or more in one day to call the doctor, they may need to decrease or hold the diuretic dose. On days you feels nauseated and not eating / drinking, having emesis or diarrhea,  informed to call the cardiologist  / doctor, they may need to decrease or hold diuretic to avoid dehydration. I stressed the importance of informing their cardiologist the first day of onset of any of the signs and symptoms in the \"Yellow Zone\" of the HF Zones. Patient verbalizes understanding. Greater than 30 minutes was spent educating patient. The Heart Failure Booklet given to the patient with additional patient education addressing:  What is Heart Failure? Things You Can Do to Live Well with HF  How to Take Your Medications  How to Eat Less Salt  [place] its Salt?   Exercising Well with Heart Failure  Signs and symptoms of HF to report  Weight Yourself Each Day  Home Self Management- activity, weight tracking, taking medications as prescribed, meals /diet planning (sodium and fluid restriction), how to read food labels, keeping physician follow ups, smoking cessation, follow the Heart Failure Zones  The Heart Failure zones  Every Dose Every Day      Instructed  to call 911 if you have any of the following symptoms:       Struggling to breathe unrelieved with rest,     Having chest pain     Have confusion or cant think clearly          Geremias Pedro RN BSN, RN  Heart Failure [address] [facility],[address] (CHF) AHA GUIDELINES  (Must be completed for Primary Diagnosis CHF or History of CHF)    Discharge Plan:  I placed the Heart Failure Home Instructions in patient's discharge instructions. Per Heart Failure GWTG, the patient should have a follow-up appointment made within 7 days of discharge.     New Diagnosis No    ECHO Results most recent:  Lab Results   Component Value Date    LVEF 48 10/18/2021    LVEFMODE Echo 11/21/2018                                        Social History     Tobacco Use   Smoking Status Never   Smokeless Tobacco Never        Immunization History   Administered Date(s) Administered    COVID-19, PFIZER Bivalent BOOSTER, DO NOT Dilute, (age 12y+), IM, 30 mcg/0.3 mL 11/08/2022    COVID-19, PFIZER GRAY top, DO NOT Dilute, (age 15 y+), IM, 30 mcg/0.3 mL 05/09/2022    COVID-19, PFIZER PURPLE top, DILUTE for use, (age 15 y+), 30mcg/0.3mL 02/08/2021, 03/02/2021, 10/30/2021    Influenza 10/18/2011, 10/17/2012    Influenza Vaccine, unspecified formulation 11/26/2014    Influenza Virus Vaccine 10/15/2013, 09/23/2015    Influenza, High Dose (Fluzone 65 yrs and older) 09/14/2016, 11/29/2018    Pneumococcal Conjugate 13-valent ([id number]) 09/23/2015    Pneumococcal Polysaccharide ([id number]) 01/01/2010, 09/14/2016    Tdap (Boostrix, Adacel) 03/18/2015          Angiotensin-Converting-Enzyme (ACE) inhibitor ordered:  [] Yes  [x] No (specify contraindication):    [] Contraindicated  [] Hypotensive patient who was at immediate risk of cardiogenic shock  [] Hospitalized patient who experienced marked azotemia  [] Other Contraindications  [] Not Eligible  [] Not Tolerant  [] Patient Reason  [] System Reason  [] Other Reason    Angiotensin II receptor blockers (ARB) ordered:  [] Yes  [x] No (specify contraindication):    [] Contraindicated  [] Hypotensive patient who was at immediate risk of cardiogenic shock  [] Hospitalized patient who experienced marked azotemia  [] Other Contraindications    ARNI - Angiotensin Receptor Neprilysin Inhibitor ordered:  [] Yes  [x] No (specify contraindication):    [] ACE inhibitor use within the prior 36 hours  [] Allergy  [] Hyperkalemia  [] Hypotension  [] Renal dysfunction defined as creatinine > 2.5 mg/dL in men or > 2.0 mg/dL in women  [] Other Contraindications  [] Not Eligible  [] Not Tolerant  [] Patient Reason  []System Reason  []Other Reason      Beta Blocker ordered:    [x] Yes Toprol XL  [] No (specify contraindication):    [] Contraindicated  [] Asthma  [] Fluid Overload  [] Low Blood Pressure  [] Patient recently treated with an intravenous positive inotropic agent  [] Other Contraindications  [] Not Eligible  [] Not Tolerant  [] Patient Reason  [] System Reason    SGLT2 Inhibitor ordered:  [] Yes  [x] No (specify contraindication):    [] Contraindicated  [] Patient currently on dialysis  [] Ketoacidosis  [] Known hypersensitivity to the medication  [] Type I diabetes (not approved for use in patients with Type I diabetes due to increased risk of ketoacidosis)  [] Other Contraindications  [] Not Eligible  [] Not Tolerant  [] Patient Reason  [] System Reason  [] Other Reason    Aldosterone Antagonist ordered:  [x] Yes  [] No (specify contraindication):    [] Contraindicated  [] Allergy due to aldosterone receptor antagonist  [] Hyperkalemia  [] Renal dysfunction defined as creatinine >2.5 mg/dL in men or >2.0 mg/dL in women.   [] Other contraindications  [] Not Eligible  [] Not Tolerant  [] Patient Reason  [] System Reason  [] Other Reason

## 2022-12-22 VITALS
DIASTOLIC BLOOD PRESSURE: 65 MMHG | OXYGEN SATURATION: 97 % | HEIGHT: 67 IN | SYSTOLIC BLOOD PRESSURE: 102 MMHG | RESPIRATION RATE: 20 BRPM | WEIGHT: 180.56 LBS | TEMPERATURE: 97.4 F | HEART RATE: 83 BPM | BODY MASS INDEX: 28.34 KG/M2

## 2022-12-22 PROCEDURE — 6370000000 HC RX 637 (ALT 250 FOR IP): Performed by: INTERNAL MEDICINE

## 2022-12-22 PROCEDURE — 6370000000 HC RX 637 (ALT 250 FOR IP): Performed by: STUDENT IN AN ORGANIZED HEALTH CARE EDUCATION/TRAINING PROGRAM

## 2022-12-22 PROCEDURE — 2580000003 HC RX 258: Performed by: FAMILY MEDICINE

## 2022-12-22 PROCEDURE — 6370000000 HC RX 637 (ALT 250 FOR IP): Performed by: FAMILY MEDICINE

## 2022-12-22 PROCEDURE — 6360000002 HC RX W HCPCS: Performed by: INTERNAL MEDICINE

## 2022-12-22 RX ORDER — BACLOFEN 10 MG/1
10 TABLET ORAL ONCE
Status: COMPLETED | OUTPATIENT
Start: 2022-12-22 | End: 2022-12-22

## 2022-12-22 RX ADMIN — FUROSEMIDE 20 MG: 20 TABLET ORAL at 09:07

## 2022-12-22 RX ADMIN — SPIRONOLACTONE 25 MG: 25 TABLET ORAL at 09:07

## 2022-12-22 RX ADMIN — FAMOTIDINE 20 MG: 20 TABLET, FILM COATED ORAL at 09:07

## 2022-12-22 RX ADMIN — ENOXAPARIN SODIUM 80 MG: 100 INJECTION SUBCUTANEOUS at 09:07

## 2022-12-22 RX ADMIN — BACLOFEN 10 MG: 10 TABLET ORAL at 09:29

## 2022-12-22 RX ADMIN — Medication 1000 UNITS: at 09:08

## 2022-12-22 RX ADMIN — FLUTICASONE PROPIONATE 1 SPRAY: 50 SPRAY, METERED NASAL at 09:09

## 2022-12-22 RX ADMIN — ASPIRIN 81 MG: 81 TABLET, COATED ORAL at 09:07

## 2022-12-22 RX ADMIN — CETIRIZINE HYDROCHLORIDE 5 MG: 5 TABLET ORAL at 09:07

## 2022-12-22 RX ADMIN — PANTOPRAZOLE SODIUM 40 MG: 40 TABLET, DELAYED RELEASE ORAL at 05:46

## 2022-12-22 RX ADMIN — SODIUM CHLORIDE, PRESERVATIVE FREE 10 ML: 5 INJECTION INTRAVENOUS at 09:10

## 2022-12-22 RX ADMIN — METOPROLOL SUCCINATE 25 MG: 25 TABLET, EXTENDED RELEASE ORAL at 09:07

## 2022-12-22 ASSESSMENT — EJECTION FRACTION
EF_SOURCE: 2D ECHO
EF_VALUE: 25%

## 2022-12-22 NOTE — PLAN OF CARE
Problem: Pain  Goal: Verbalizes/displays adequate comfort level or baseline comfort level  12/22/2022 1131 by Greg Velazquez RN  Outcome: Progressing  12/22/2022 0006 by Chintan Adorno RN  Outcome: Progressing     Problem: Safety - Adult  Goal: Free from fall injury  12/22/2022 1131 by Greg Velazquez RN  Outcome: Progressing  12/22/2022 0006 by Chintan Adorno RN  Outcome: Progressing     Problem: Cardiovascular - Adult  Goal: Maintains optimal cardiac output and hemodynamic stability  12/22/2022 1131 by Greg Vleazquez RN  Outcome: Progressing  12/22/2022 0006 by Chintan Adorno RN  Outcome: Progressing

## 2022-12-22 NOTE — CARE COORDINATION
Discharge order noted. Plan is home with son and he will be transporting her home today.   Tristan Graves RN CM  [phone]

## 2022-12-22 NOTE — PLAN OF CARE
Problem: Pain  Goal: Verbalizes/displays adequate comfort level or baseline comfort level  12/22/2022 0006 by Yeni Pelletier RN  Outcome: Progressing  12/21/2022 1119 by Merlin Parkinson RN  Outcome: Progressing  12/21/2022 1056 by Merlin Parkinson RN  Outcome: Progressing  12/21/2022 1055 by Merlin Parkinson RN  Outcome: Progressing     Problem: Safety - Adult  Goal: Free from fall injury  12/22/2022 0006 by Yeni Pelletier RN  Outcome: Progressing  12/21/2022 1119 by Merlin Parkinson RN  Outcome: Progressing  12/21/2022 1056 by Merlin Parkinson RN  Outcome: Progressing  12/21/2022 1055 by Merlin Parkinson RN  Outcome: Progressing     Problem: Cardiovascular - Adult  Goal: Maintains optimal cardiac output and hemodynamic stability  12/22/2022 0006 by Yeni Pelletier RN  Outcome: Progressing  12/21/2022 1119 by Merlin Parkinson RN  Outcome: Progressing  12/21/2022 1056 by Merlin Parkinson RN  Outcome: Progressing  12/21/2022 1055 by Merlin Parkinson RN  Outcome: Progressing     Problem: Metabolic/Fluid and Electrolytes - Adult  Goal: Hemodynamic stability and optimal renal function maintained  12/22/2022 0006 by Yeni Pelletier RN  Outcome: Progressing  12/21/2022 1119 by Merlin Parkinson RN  Outcome: Progressing  12/21/2022 1056 by Merlin Parkinson RN  Outcome: Progressing  12/21/2022 1055 by Merlin Parkinson RN  Outcome: Progressing

## 2022-12-22 NOTE — CARE COORDINATION
I met with patient and son in room to discuss Hannah Russell. They said the plan is to take the [place] script to a pharmacy in the community and pay out of pocket for the Xeralto at this time until she establishes with assistance from [employer] for the medication. RN aware and is notifying internal med for paper script. Patient's son or daughter will transport her home when discharging.   Mati Medina RN CM  [phone]

## 2022-12-22 NOTE — DISCHARGE SUMMARY
Hospital Medicine Discharge Summary    Patient ID: Eva Ceballos      Patient's PCP: Cristian Moore DO    Admit Date: 12/16/2022     Discharge Date:    12/22/2022    Admitting Physician: Sidney Singh DO     Discharge Physician: Suellyn Rubinstein, MD      Condition at discharge: Stable    Disposition: Home    Discharge Diagnoses:  Acute on chronic CHF  Cardiomyopathy  Paroxysmal atrial fibrillation  Pulmonary embolism  Valvular heart disease  Acute kidney injury  CKD  Hypertension  Dyslipidemia      Active Hospital Problems    Diagnosis Date Noted    Multiple subsegmental pulmonary emboli without acute cor pulmonale ([address] [I26.94] 12/17/2022     Priority: Medium    Acute on chronic congestive heart failure ([address] [I50.9] 12/17/2022     Priority: Medium    Persistent atrial fibrillation ([address] [I48.19] 11/04/2019    Paroxysmal atrial fibrillation ([address] [I48.0] 03/10/2019       The patient was seen and examined on day of discharge and this discharge summary is in conjunction with any daily progress note from day of discharge. Hospital Course:   Ms. Eva Ceballos, a 68y.o. year old female  who  has a past medical history of Afib ([address] Anxiety, Arthritis, Cervical radiculopathy, CHF (congestive heart failure) (HCC), Chronic sinusitis, Hilar adenopathy, Hx of blood clots, Hypertension, Left ventricular systolic dysfunction, Lesion of left native kidney, Lumbar radiculopathy, Lung nodules, Meige syndrome, Microscopic colitis, Mitral regurgitation, Nonischemic cardiomyopathy (HCC), Osteopenia, Renal cell carcinoma of left kidney ([address] and Vertebrobasilar artery syndrome.       Briefly, patient with PMH significant for A. fib, CHF, hilar adenopathy, blood clots, HPT, left ventricular systolic dysfunction, lesion of left native kidney, lumbar and cervical radiculopathy, lung nodule, colitis, mitral regurgitation, nonischemic cardiomyopathy, renal cell carcinoma of left kidney presented to ED for neck pain, increased lower extremity swelling shortness of breath and concern for issues with her CHF. CT of chest revealed pleural effusions along with pulmonary embolism. Patient was given Lasix and Lovenox and admitted for management of CHF exacerbation and pulmonary embolism. Patient had echo that revealed decreased EF of 25%. Patient is for stress test.  During hospital course patient also with NIKA, renal ultrasound and urine studies ordered which did not show any acute abnormality. Pulmonary saw the patient and recommended continuing Xarelto as patient is refusing Coumadin. Cardiology also recommending LifeVest upon discharge. Patient only wants to be on Xarelto upon discharge and refusing warfarin. Discharge was held yesterday as patient stated she was not able to afford Xarelto as she has no prescription assistance. Patient's family is going to provide assistance for Xarelto until she applies for prescription assistance. Plan is for her to go home today on Xarelto. Consults:     IP CONSULT TO INTERNAL MEDICINE  IP CONSULT TO HEART FAILURE NURSE/COORDINATOR  IP CONSULT TO DIETITIAN  IP CONSULT TO CARDIOLOGY  IP CONSULT TO CARDIOLOGY  IP CONSULT TO NEUROSURGERY  IP CONSULT TO HEM/ONC  IP CONSULT TO ELECTROPHYSIOLOGY  IP CONSULT TO HEART FAILURE NURSE/COORDINATOR    Significant Diagnostic Studies:  As above      Discharge Instructions/Follow-up:  As above       Activity: activity as tolerated    Labs:  For convenience and continuity at follow-up the following most recent labs are provided:      CBC:    Lab Results   Component Value Date/Time    WBC 7.3 12/20/2022 04:37 AM    HGB 14.6 12/20/2022 04:37 AM    HCT 44.8 12/20/2022 04:37 AM     12/20/2022 04:37 AM       Renal:    Lab Results   Component Value Date/Time     12/21/2022 05:43 AM    K 5.0 12/21/2022 05:43 AM    K 4.7 11/11/2021 11:45 AM     12/21/2022 05:43 AM    CO2 22 12/21/2022 05:43 AM    BUN 27 12/21/2022 05:43 AM    CREATININE 1.2 12/21/2022 05:43 AM    CALCIUM 9.8 12/21/2022 05:43 AM         Discharge Medications:     Current Discharge Medication List             Details   !! rivaroxaban (XARELTO) 15 MG TABS tablet Take 1 tablet by mouth 2 times daily (with meals) for 42 doses  Qty: 42 tablet, Refills: 0      !! rivaroxaban (XARELTO) 20 MG TABS tablet Take 1 tablet by mouth Daily with supper  Qty: 30 tablet, Refills: 0       !! - Potential duplicate medications found. Please discuss with provider. Details   cetirizine (ZYRTEC ALLERGY) 10 MG tablet Take 0.5 tablets by mouth daily  Qty: 30 tablet, Refills: 0    Associated Diagnoses: Sinus drainage      fluticasone (FLONASE) 50 MCG/ACT nasal spray 1-2 sprays, each nostril daily as needed for nasal congestion. Qty: 16 g, Refills: 0    Associated Diagnoses: Sinus drainage      furosemide (LASIX) 20 MG tablet Take 1 tablet by mouth daily  Qty: 90 tablet, Refills: 3      metoprolol succinate (TOPROL XL) 25 MG extended release tablet Take 1 tablet by mouth daily  Qty: 90 tablet, Refills: 2      aspirin 81 MG EC tablet Take 81 mg by mouth daily      hydrOXYzine (ATARAX) 10 MG tablet Take 1 tablet by mouth every 8 hours as needed for Itching or Anxiety  Qty: 90 tablet, Refills: 1    Associated Diagnoses: Itching      baclofen (LIORESAL) 10 MG tablet Take 1 tablet by mouth nightly  Qty: 90 tablet, Refills: 1    Associated Diagnoses: Muscle spasm      spironolactone (ALDACTONE) 25 MG tablet Take 1 tablet by mouth daily  Qty: 90 tablet, Refills: 3    Associated Diagnoses: Acute on chronic systolic heart failure ([address] Secondary cardiomyopathy ([address] Essential hypertension      hydrALAZINE (APRESOLINE) 10 MG tablet Take 1 tablet by mouth 3 times daily  Qty: 270 tablet, Refills: 3      Handicap Placard MISC Cannot walk more than 200 feet  Expiration: 7/12/2026  Qty: 1 each, Refills: 0    Associated Diagnoses:  At high risk for falls      omeprazole (PRILOSEC) 40 MG delayed release capsule Take 40 mg by mouth daily       triamcinolone (KENALOG) 0.1 % cream Apply topically 3 times daily as needed (rash)   Refills: 1      loperamide (IMODIUM) 2 MG capsule Take 2 mg by mouth 4 times daily as needed for Diarrhea      diphenhydrAMINE (BENADRYL) 25 MG tablet Take 25 mg by mouth every 6 hours as needed for Itching      vitamin D (CHOLECALCIFEROL) 1000 UNIT TABS tablet Take 1,000 Units by mouth daily             Time Spent on discharge is more than 31 min in the examination, evaluation, counseling and review of medications and discharge plan. Signed:    Katarina Sky MD   12/22/2022      Thank you Abraham Peñaloza DO for the opportunity to be involved in this patient's care. If you have any questions or concerns please feel free to contact me. NOTE: This report was transcribed using voice recognition software. Every effort was made to ensure accuracy; however, inadvertent computerized transcription errors may be present.

## 2022-12-22 NOTE — PROGRESS NOTES
Hospitalist Progress Note      PCP: Shi Bass DO    Date of Admission: 12/16/2022  Days in the hospital: [address] [facility] Hospital Course:   Ms. Felix Pink, a 68y.o. year old female  who  has a past medical history of Afib ([address] Anxiety, Arthritis, Cervical radiculopathy, CHF (congestive heart failure) ([address] Chronic sinusitis, Hilar adenopathy, Hx of blood clots, Hypertension, Left ventricular systolic dysfunction, Lesion of left native kidney, Lumbar radiculopathy, Lung nodules, Meige syndrome, Microscopic colitis, Mitral regurgitation, Nonischemic cardiomyopathy ([address] Osteopenia, Renal cell carcinoma of left kidney ([address] and Vertebrobasilar artery syndrome. Briefly, patient with PMH significant for A. fib, CHF, hilar adenopathy, blood clots, HPT, left ventricular systolic dysfunction, lesion of left native kidney, lumbar and cervical radiculopathy, lung nodule, colitis, mitral regurgitation, nonischemic cardiomyopathy, renal cell carcinoma of left kidney presented to ED for neck pain, increased lower extremity swelling shortness of breath and concern for issues with her CHF. CT of chest revealed pleural effusions along with pulmonary embolism. Patient was given Lasix and Lovenox and admitted for management of CHF exacerbation and pulmonary embolism. Patient had echo that revealed decreased EF of 25%. Patient is for stress test.  During hospital course patient also with NIKA, renal ultrasound and urine studies ordered which did not show any acute abnormality. Pulmonary saw the patient and recommended continuing Xarelto as patient is refusing Coumadin. Cardiology also recommending LifeVest upon discharge. Patient only wants to be on Xarelto upon discharge and refusing warfarin. Discharge was held yesterday as patient stated she was not able to afford Xarelto as she has no prescription assistance. Subjective  Patient seen and examined at bedside.   Today she states that she would like to be on Xarelto again and states that her family is going to help her with the prescription coverage. I did discuss with case management and they will look into it and get back to me. Exam:    /65   Pulse 83   Temp 97.4 °F (36.3 °C) (Temporal)   Resp 20   Ht 5' 7\" (1.702 m)   Wt 180 lb 8.9 oz (81.9 kg)   SpO2 97%   BMI 28.28 kg/m²     HEENT: No pallor, no icterus. Respiratory:  CTA, good air entry. Cardiovascular: RRR, no murmur. Abdomen: Soft, non-tender, BS noted. Musculoskeletal: No joint pains or joint swelling noted. Neurologic: awake, alert and following commands         Assessment/Plan:  Acute on chronic HFrEF.  proBNP 7500-> 5600. Improved clinically. Currently off diuretics, follow-up with cardiology, stress test pending  Cardiomyopathy, recurrent. EF now 25%; previously improved from 35% in 2016 to about 50%, plan is for LifeVest upon discharge and continuing medical treatment, follow-up with cardiology and EP  PAF. Patient taken off dofetilide, seen by EP, continue Xarelto  Frequent PVCs/NSVT, plans for LifeVest upon discharge and follow-up with EP and cardiology as outpatient  Device related thrombus noted on 6-month JAIME; subsequently resolved  Valvular heart disease: Moderate MR, moderate TR, moderate AR  Pulmonary hypertension  Chronic left bundle branch block  Acute bilateral subsegmental PE, currently on Lovenox and plan to be on Xarelto, seen by pulmonary  NIKA/CKD creatinine 1.2 -> 1.7. Baseline 1.2-1.3. Hypertension, blood pressure controlled  Renal cancer s/p partial left nephrectomy 11/2021  History of TIA  Hyperlipidemia  Home-going anticoagulation is to be finalized. Awaiting response back from case management.       Labs:   Recent Labs     12/20/22 0437   WBC 7.3   HGB 14.6   HCT 44.8        Recent Labs     12/20/22 0437 12/21/22  0543    139   K 4.7 5.0   CL 97* 105   CO2 27 22   BUN 36* 27*   CREATININE 1.7* 1.2*   CALCIUM 9.9 9.8     Recent Labs 12/20/22  0437   AST 38*   ALT 20   BILITOT 0.7   ALKPHOS 104     No results for input(s): INR in the last 72 hours. No results for input(s): [place]ne Knock in the last 72 hours. Medications:  Reviewed    Infusion Medications    sodium chloride       Scheduled Medications    furosemide  20 mg Oral Daily    enoxaparin  1 mg/kg SubCUTAneous BID    fluticasone  1 spray Each Nostril Daily    famotidine  20 mg Oral Daily    aspirin  81 mg Oral Daily    baclofen  10 mg Oral Nightly    cetirizine  5 mg Oral Daily    hydrALAZINE  10 mg Oral TID    metoprolol succinate  25 mg Oral Daily    pantoprazole  40 mg Oral QAM AC    spironolactone  25 mg Oral Daily    Vitamin D  1,000 Units Oral Daily    sodium chloride flush  5-40 mL IntraVENous 2 times per day    lidocaine  1 patch TransDERmal Daily     PRN Meds: sodium chloride flush, sodium chloride, polyethylene glycol, perflutren lipid microspheres      Intake/Output Summary (Last 24 hours) at 12/22/2022 1157  Last data filed at 12/22/2022 0546  Gross per 24 hour   Intake 240 ml   Output --   Net 240 ml     Body mass index is 28.28 kg/m². Diet  ADULT DIET; Regular; Low Sodium (2 gm)    Code Status  Full Code       Electronically signed by Sarai Espinal MD on 12/22/2022 at 11:57 AM  [employer]   Please contact me through perfect serve    NOTE: This report was transcribed using voice recognition software. Every effort was made to ensure accuracy; however, inadvertent computerized transcription errors may be present.

## 2022-12-23 ENCOUNTER — TELEPHONE (OUTPATIENT)
Dept: FAMILY MEDICINE CLINIC | Age: 77
End: 2022-12-23

## 2022-12-23 NOTE — TELEPHONE ENCOUNTER
Patient called, was prescribed Xarelto. Qty 42. States it is over $600.00. Her children can help her pay for this one time, and patient is going to see if she can just get a partial fill. She is ultimately looking for something less expensive. Please advise.    Last seen Visit date not found  Next appt Visit date not found  [facility]

## 2022-12-23 NOTE — TELEPHONE ENCOUNTER
Patient can always discuss with pharmacy or her insurance to see if there is anything to get it cheaper (goodrx coupons) or anything like that. She can always see what other alternatives they recommend.

## 2022-12-27 ENCOUNTER — TELEPHONE (OUTPATIENT)
Dept: FAMILY MEDICINE CLINIC | Age: 77
End: 2022-12-27

## 2022-12-27 NOTE — TELEPHONE ENCOUNTER
Patient called stating she's having a problem taking Xarelto and is c/o itching, nose bleeds, back ache, bowel incontinence and excruciating headaches since taking RX. Patient stated she cannot take Eliquis due to an allergy and asked if Pradaxa can be sent to the pharmacy. I informed that Dr. Leelee Russell is not in the ofc and if symptoms increase or worsen, go to ED.     Last seen 2/1/22  Next appt Visit date not found  [facility]

## 2022-12-27 NOTE — TELEPHONE ENCOUNTER
I called patient and advised, as noted by Dr. Jose Daniel Gutierrez, and informed patient can go to [address] [place] [address] or ED. Patient verbalized understanding and stated she will have her son take her to the [facility] ED tomorrow since he is working 12 hours today. I advised if symptoms increase or worsen, go to ED.

## 2022-12-29 ENCOUNTER — APPOINTMENT (OUTPATIENT)
Dept: GENERAL RADIOLOGY | Age: 77
End: 2022-12-29
Payer: MEDICARE

## 2022-12-29 ENCOUNTER — TELEPHONE (OUTPATIENT)
Dept: OTHER | Facility: CLINIC | Age: 77
End: 2022-12-29

## 2022-12-29 ENCOUNTER — HOSPITAL ENCOUNTER (EMERGENCY)
Age: 77
Discharge: HOME | End: 2022-12-29
Attending: EMERGENCY MEDICINE
Payer: MEDICARE

## 2022-12-29 VITALS
RESPIRATION RATE: 17 BRPM | WEIGHT: 171 LBS | HEART RATE: 100 BPM | OXYGEN SATURATION: 97 % | TEMPERATURE: 98.2 F | DIASTOLIC BLOOD PRESSURE: 78 MMHG | BODY MASS INDEX: 26.78 KG/M2 | SYSTOLIC BLOOD PRESSURE: 125 MMHG

## 2022-12-29 DIAGNOSIS — T88.7XXA NON-DOSE-RELATED ADVERSE EFFECT OF MEDICATION, INITIAL ENCOUNTER: Primary | ICD-10-CM

## 2022-12-29 LAB
ALBUMIN SERPL-MCNC: 4.2 G/DL (ref 3.5–5.2)
ALP BLD-CCNC: 118 U/L (ref 35–104)
ALT SERPL-CCNC: 17 U/L (ref 0–32)
ANION GAP SERPL CALCULATED.3IONS-SCNC: 15 MMOL/L (ref 7–16)
APTT: 47.3 SEC (ref 24.5–35.1)
AST SERPL-CCNC: 27 U/L (ref 0–31)
BASOPHILS ABSOLUTE: 0.05 E9/L (ref 0–0.2)
BASOPHILS RELATIVE PERCENT: 0.7 % (ref 0–2)
BILIRUB SERPL-MCNC: 1 MG/DL (ref 0–1.2)
BUN BLDV-MCNC: 30 MG/DL (ref 6–23)
CALCIUM SERPL-MCNC: 9.9 MG/DL (ref 8.6–10.2)
CHLORIDE BLD-SCNC: 101 MMOL/L (ref 98–107)
CO2: 21 MMOL/L (ref 22–29)
CREAT SERPL-MCNC: 1.1 MG/DL (ref 0.5–1)
EKG ATRIAL RATE: 96 BPM
EKG P AXIS: 72 DEGREES
EKG P-R INTERVAL: 204 MS
EKG Q-T INTERVAL: 394 MS
EKG QRS DURATION: 150 MS
EKG QTC CALCULATION (BAZETT): 497 MS
EKG R AXIS: -75 DEGREES
EKG T AXIS: 96 DEGREES
EKG VENTRICULAR RATE: 96 BPM
EOSINOPHILS ABSOLUTE: 0.16 E9/L (ref 0.05–0.5)
EOSINOPHILS RELATIVE PERCENT: 2.3 % (ref 0–6)
GFR SERPL CREATININE-BSD FRML MDRD: 51 ML/MIN/1.73
GLUCOSE BLD-MCNC: 117 MG/DL (ref 74–99)
HCT VFR BLD CALC: 43.9 % (ref 34–48)
HEMOGLOBIN: 14.6 G/DL (ref 11.5–15.5)
IMMATURE GRANULOCYTES #: 0.02 E9/L
IMMATURE GRANULOCYTES %: 0.3 % (ref 0–5)
INR BLD: 2
LACTIC ACID: 1.7 MMOL/L (ref 0.5–2.2)
LYMPHOCYTES ABSOLUTE: 0.9 E9/L (ref 1.5–4)
LYMPHOCYTES RELATIVE PERCENT: 12.7 % (ref 20–42)
MAGNESIUM: 2.2 MG/DL (ref 1.6–2.6)
MCH RBC QN AUTO: 28.5 PG (ref 26–35)
MCHC RBC AUTO-ENTMCNC: 33.3 % (ref 32–34.5)
MCV RBC AUTO: 85.7 FL (ref 80–99.9)
MONOCYTES ABSOLUTE: 0.54 E9/L (ref 0.1–0.95)
MONOCYTES RELATIVE PERCENT: 7.6 % (ref 2–12)
NEUTROPHILS ABSOLUTE: 5.41 E9/L (ref 1.8–7.3)
NEUTROPHILS RELATIVE PERCENT: 76.4 % (ref 43–80)
PDW BLD-RTO: 14.7 FL (ref 11.5–15)
PLATELET # BLD: 318 E9/L (ref 130–450)
PMV BLD AUTO: 10.6 FL (ref 7–12)
POTASSIUM SERPL-SCNC: 4 MMOL/L (ref 3.5–5)
PROTHROMBIN TIME: 22.1 SEC (ref 9.3–12.4)
RBC # BLD: 5.12 E12/L (ref 3.5–5.5)
SODIUM BLD-SCNC: 137 MMOL/L (ref 132–146)
TOTAL CK: 88 U/L (ref 20–180)
TOTAL PROTEIN: 8.3 G/DL (ref 6.4–8.3)
WBC # BLD: 7.1 E9/L (ref 4.5–11.5)

## 2022-12-29 PROCEDURE — 93005 ELECTROCARDIOGRAM TRACING: CPT | Performed by: EMERGENCY MEDICINE

## 2022-12-29 PROCEDURE — 85025 COMPLETE CBC W/AUTO DIFF WBC: CPT

## 2022-12-29 PROCEDURE — 85610 PROTHROMBIN TIME: CPT

## 2022-12-29 PROCEDURE — 6360000002 HC RX W HCPCS: Performed by: EMERGENCY MEDICINE

## 2022-12-29 PROCEDURE — 83605 ASSAY OF LACTIC ACID: CPT

## 2022-12-29 PROCEDURE — 36415 COLL VENOUS BLD VENIPUNCTURE: CPT

## 2022-12-29 PROCEDURE — 82550 ASSAY OF CK (CPK): CPT

## 2022-12-29 PROCEDURE — 80053 COMPREHEN METABOLIC PANEL: CPT

## 2022-12-29 PROCEDURE — 83735 ASSAY OF MAGNESIUM: CPT

## 2022-12-29 PROCEDURE — 99285 EMERGENCY DEPT VISIT HI MDM: CPT

## 2022-12-29 PROCEDURE — 93010 ELECTROCARDIOGRAM REPORT: CPT | Performed by: INTERNAL MEDICINE

## 2022-12-29 PROCEDURE — 71045 X-RAY EXAM CHEST 1 VIEW: CPT

## 2022-12-29 PROCEDURE — 85730 THROMBOPLASTIN TIME PARTIAL: CPT

## 2022-12-29 RX ORDER — FAMOTIDINE 40 MG/1
40 TABLET, FILM COATED ORAL DAILY
COMMUNITY

## 2022-12-29 RX ORDER — WARFARIN SODIUM 5 MG/1
5 TABLET ORAL DAILY
Qty: 14 TABLET | Refills: 0 | Status: SHIPPED | OUTPATIENT
Start: 2022-12-29

## 2022-12-29 RX ORDER — ENOXAPARIN SODIUM 100 MG/ML
1 INJECTION SUBCUTANEOUS ONCE
Status: COMPLETED | OUTPATIENT
Start: 2022-12-29 | End: 2022-12-29

## 2022-12-29 RX ORDER — DOFETILIDE 0.12 MG/1
125 CAPSULE ORAL 2 TIMES DAILY
COMMUNITY

## 2022-12-29 RX ADMIN — ENOXAPARIN SODIUM 80 MG: 100 INJECTION SUBCUTANEOUS at 14:01

## 2022-12-29 ASSESSMENT — PAIN DESCRIPTION - LOCATION: LOCATION: BACK

## 2022-12-29 ASSESSMENT — PAIN SCALES - GENERAL: PAINLEVEL_OUTOF10: 10

## 2022-12-29 ASSESSMENT — PAIN - FUNCTIONAL ASSESSMENT: PAIN_FUNCTIONAL_ASSESSMENT: NONE - DENIES PAIN

## 2022-12-29 NOTE — ED PROVIDER NOTES
HPI:  12/29/22, Time: 12:10 PM MORGAN Fox is a 68 y.o. female presenting to the ED for muscle spasms in her thoracic back, beginning when she started her Xarelto 7 days ago upon hospital discharge. She also c/o that she's been having nosebleeds (last one 2 days ago), itching, headache. The complaint has been persistent, moderate in severity, and worsened by nothing. She would like changed to Pradaxa and called her PCP to get it changed but was sent to the ER. She is on Xarelto for pulmonary embolism she also has a past medical history of A. fib, congestive heart failure with ejection fraction less than 50% and is currently wearing a LifeVest.  Patient denies fever/chills, sore throat, cough, congestion, chest pain, shortness of breath, edema, headache, visual disturbances, focal paresthesias, focal weakness, abdominal pain, nausea, vomiting, diarrhea, constipation, dysuria, hematuria, trauma, neck or back pain, rash or other complaints. ROS:   A complete review of systems was performed and all pertinent positives and negatives are stated within HPI, all other systems reviewed and are negative.      --------------------------------------------- PAST HISTORY ---------------------------------------------  Past Medical History:  has a past medical history of Afib ([address] Anxiety, Arthritis, Cervical radiculopathy, CHF (congestive heart failure) ([address] Chronic sinusitis, Ejection fraction < 50%, Hilar adenopathy, Hx of blood clots, Hypertension, Left ventricular systolic dysfunction, Lesion of left native kidney, Lumbar radiculopathy, Lung nodules, Meige syndrome, Microscopic colitis, Mitral regurgitation, Nonischemic cardiomyopathy ([address] Osteopenia, PE (pulmonary thromboembolism) ([address] Renal cell carcinoma of left kidney ([address] and Vertebrobasilar artery syndrome.     Past Surgical History:  has a past surgical history that includes bladder repair; Bunionectomy; Diagnostic Cardiac Cath Lab Procedure (2/20/10); Diagnostic Cardiac Cath Lab Procedure (8/24/11); cardiovascular stress test (11/25/13); doppler echocardiography (11/25/13); transesophageal echocardiogram (11/21/2018); transesophageal echocardiogram (03/18/2019); Cardioversion (11/04/2019); Colonoscopy (07/2020); [place] tooth extraction; other surgical history (10/14/2020); Hysterectomy (1991); bronchoscopy (N/A, 10/5/2021); bronchoscopy (N/A, 10/5/2021); and partial nephrectomy (Left, 11/16/2021). Social History:  reports that she has never smoked. She has never used smokeless tobacco. She reports current alcohol use. She reports that she does not use drugs. Family History: family history includes Anxiety Disorder in her sister; Crohn's Disease in her son; Depression in her sister; Heart Attack in her father and mother; Heart Disease in her father; Heart Failure in her father; Stroke in her mother. The patients home medications have been reviewed. Allergies: Bentyl [dicyclomine], Adhesive tape, Atacand [candesartan], Cefdinir, Demerol, Digoxin, Imdur [isosorbide mononitrate], Losartan potassium, Sulfa antibiotics, Xarelto [rivaroxaban], Acetaminophen, Apap-caff-dihydrocodeine, Coreg [carvedilol], Cozaar [losartan], Diovan [valsartan], Effexor [venlafaxine hydrochloride], Eliquis [apixaban], Labetalol, Lisinopril, and Ramipril        ----------------------------------------PHYSICAL EXAM--------------------------------------  Constitutional:  Well developed, well nourished, no acute distress, non-toxic appearance   Eyes:  PERRL, conjunctiva normal, EOMI  HENT:  Atraumatic, external ears normal, nose normal, oropharynx moist. Neck- normal range of motion, no nuchal rigidity   Respiratory:  No respiratory distress, normal breath sounds, no rales, no wheezing   Cardiovascular:  Normal rate, normal rhythm, no murmurs, no gallops, no rubs. Radial and DP pulses 2+ bilaterally. Compartments are soft. She is warm and well perfused.   Cap refill less than 3 seconds. GI:  Soft, nondistended, normal bowel sounds, nontender, no organomegaly, no mass, no rebound, no guarding   :  No costovertebral angle tenderness   Musculoskeletal:  No edema, no tenderness, no deformities. Back- no tenderness  Integument:  Well hydrated, no rash. Adequate perfusion. Lymphatic:  No cervical lymphadenopathy noted   Neurologic:  Alert & oriented x 3, CN 2-12 normal, no focal deficits noted. Normal gait. Psychiatric:  Speech and behavior appropriate       -------------------------------------------------- RESULTS -------------------------------------------------  I have personally reviewed all laboratory and imaging results for this patient. Results are listed below.      LABS:  Results for orders placed or performed during the hospital encounter of 12/29/22   CBC with Auto Differential   Result Value Ref Range    WBC 7.1 4.5 - 11.5 E9/L    RBC 5.12 3.50 - 5.50 E12/L    Hemoglobin 14.6 11.5 - 15.5 g/dL    Hematocrit 43.9 34.0 - 48.0 %    MCV 85.7 80.0 - 99.9 fL    MCH 28.5 26.0 - 35.0 pg    MCHC 33.3 32.0 - 34.5 %    RDW 14.7 11.5 - 15.0 fL    Platelets 732 734 - 745 E9/L    MPV 10.6 7.0 - 12.0 fL    Neutrophils % 76.4 43.0 - 80.0 %    Immature Granulocytes % 0.3 0.0 - 5.0 %    Lymphocytes % 12.7 (L) 20.0 - 42.0 %    Monocytes % 7.6 2.0 - 12.0 %    Eosinophils % 2.3 0.0 - 6.0 %    Basophils % 0.7 0.0 - 2.0 %    Neutrophils Absolute 5.41 1.80 - 7.30 E9/L    Immature Granulocytes # 0.02 E9/L    Lymphocytes Absolute 0.90 (L) 1.50 - 4.00 E9/L    Monocytes Absolute 0.54 0.10 - 0.95 E9/L    Eosinophils Absolute 0.16 0.05 - 0.50 E9/L    Basophils Absolute 0.05 0.00 - 0.20 E9/L   Comprehensive Metabolic Panel   Result Value Ref Range    Sodium 137 132 - 146 mmol/L    Potassium 4.0 3.5 - 5.0 mmol/L    Chloride 101 98 - 107 mmol/L    CO2 21 (L) 22 - 29 mmol/L    Anion Gap 15 7 - 16 mmol/L    Glucose 117 (H) 74 - 99 mg/dL    BUN 30 (H) 6 - 23 mg/dL    Creatinine 1.1 (H) 0.5 - 1.0 mg/dL    Est, Glom Filt Rate 51 >=60 mL/min/1.73    Calcium 9.9 8.6 - 10.2 mg/dL    Total Protein 8.3 6.4 - 8.3 g/dL    Albumin 4.2 3.5 - 5.2 g/dL    Total Bilirubin 1.0 0.0 - 1.2 mg/dL    Alkaline Phosphatase 118 (H) 35 - 104 U/L    ALT 17 0 - 32 U/L    AST 27 0 - 31 U/L   Lactic Acid   Result Value Ref Range    Lactic Acid 1.7 0.5 - 2.2 mmol/L   CK   Result Value Ref Range    Total CK 88 20 - 180 U/L   APTT   Result Value Ref Range    aPTT 47.3 (H) 24.5 - 35.1 sec   Protime-INR   Result Value Ref Range    Protime 22.1 (H) 9.3 - 12.4 sec    INR 2.0    Magnesium   Result Value Ref Range    Magnesium 2.2 1.6 - 2.6 mg/dL   EKG 12 Lead   Result Value Ref Range    Ventricular Rate 96 BPM    Atrial Rate 96 BPM    P-R Interval 204 ms    QRS Duration 150 ms    Q-T Interval 394 ms    QTc Calculation (Bazett) 497 ms    P Axis 72 degrees    R Axis -75 degrees    T Axis 96 degrees       RADIOLOGY:  Interpreted by Radiologist.  XR CHEST PORTABLE   Final Result   1. Cardiomegaly. There are no findings of failure or pneumonia. EKG Interpretation  Time: 11:32 AM EDT  Rhythm: normal sinus   Rate: 96  Axis: left  Conduction: left bundle branch block (complete)  ST Segments: no acute change  T Waves: no acute change  Clinical Impression: left bundle branch block  Comparison to prior EKG: stable as compared to patient's most recent EKG      ------------------------- NURSING NOTES AND VITALS REVIEWED ---------------------------  The nursing notes within the ED encounter and vital signs as below have been reviewed by myself. /78   Pulse 100   Temp 98.2 °F (36.8 °C) (Oral)   Resp 17   Wt 171 lb (77.6 kg)   SpO2 97%   BMI 26.78 kg/m²   Oxygen Saturation Interpretation: Normal      The patients available past medical records and past encounters were reviewed.         ------------------------------ ED COURSE/MEDICAL DECISION MAKING----------------------  Medications   enoxaparin (LOVENOX) injection 80 mg (80 mg SubCUTAneous Given 12/29/22 1401)           Procedures:   none      Medical Decision Making:    Neg acute on workup. Is symptomatic in ER now. Is likely having side effects from the Xarelto. She states she cannot have Eliquis. Since she has been taking Xarelto we will give 1 dose of Lovenox now and started on Coumadin per primary care physician instructions. Prescription for Coumadin 5 mg tablets daily given to patient, with instructions to follow-up with primary care physician this coming Monday in office. Patient was explicitly instructed on specific signs and symptoms on which to return to the emergency room for. Patient was instructed to return to the ER for any new or worsening symptoms. Additional discharge instructions were given verbally. All questions were answered. Patient is comfortable and agreeable with discharge plan. Patient in no acute distress and non-toxic in appearance. This patient's ED course included: re-evaluation prior to disposition, cardiac monitoring, continuous pulse oximetry, and a personal history and physicial eaxmination    This patient has remained hemodynamically stable, improved, and been closely monitored during their ED course. Re-Evaluations:   Re-evaluation. Patients symptoms are improving  Repeat physical examination is not changed      Consultations:   Spoke with Dr Cordelia Villalta, PCP, discussed case, ok with starting coumadin now, will see patient in office this [address]: none    I, Jonnie Matthew, DO, am the Primary Provider of Record    Counseling: The emergency provider has spoken with the patient and son and discussed todays results, in addition to providing specific details for the plan of care and counseling regarding the diagnosis and prognosis.   Questions are answered at this time and they are agreeable with the plan.    --------------------------- IMPRESSION AND DISPOSITION ---------------------------------    IMPRESSION  1. Non-dose-related adverse effect of medication, initial encounter        DISPOSITION  Disposition: Discharge to home  Patient condition is stable              Rojas Coulter DO  12/29/22 7965

## 2022-12-29 NOTE — TELEPHONE ENCOUNTER
Writer contacted provider Dr Leblanc to inform of 30 day readmission risk. ED provider informed writer of intention to discharge and follow up recommended.       Call Back: If you need to call back to inform of disposition you can contact me at [phone]

## 2022-12-30 ENCOUNTER — TELEPHONE (OUTPATIENT)
Dept: OTHER | Facility: CLINIC | Age: 77
End: 2022-12-30

## 2023-01-01 ENCOUNTER — CLINICAL DOCUMENTATION ONLY (OUTPATIENT)
Facility: CLINIC | Age: 78
End: 2023-01-01

## 2023-01-03 ENCOUNTER — ANTI-COAG VISIT (OUTPATIENT)
Dept: FAMILY MEDICINE CLINIC | Age: 78
End: 2023-01-03

## 2023-01-03 ENCOUNTER — NURSE ONLY (OUTPATIENT)
Dept: FAMILY MEDICINE CLINIC | Age: 78
End: 2023-01-03
Payer: MEDICARE

## 2023-01-03 ENCOUNTER — TELEPHONE (OUTPATIENT)
Dept: CARDIOLOGY CLINIC | Age: 78
End: 2023-01-03

## 2023-01-03 DIAGNOSIS — I48.0 PAROXYSMAL ATRIAL FIBRILLATION (HCC): Primary | ICD-10-CM

## 2023-01-03 LAB
INTERNATIONAL NORMALIZATION RATIO, POC: 2.7
PROTHROMBIN TIME, POC: 31.9

## 2023-01-03 PROCEDURE — 93792 PT/CAREGIVER TRAING HOME INR: CPT | Performed by: STUDENT IN AN ORGANIZED HEALTH CARE EDUCATION/TRAINING PROGRAM

## 2023-01-03 PROCEDURE — 85610 PROTHROMBIN TIME: CPT | Performed by: STUDENT IN AN ORGANIZED HEALTH CARE EDUCATION/TRAINING PROGRAM

## 2023-01-03 NOTE — PROGRESS NOTES
She is having a reaction to coumadin-rash, itchiness and headache. Wants to be on Lovenox.  Please advise

## 2023-01-03 NOTE — PROGRESS NOTES
Pt states that the reaction gets worse every time she takes a pill the itching drives her crazy. Thank you for allowing us to care for your child at [facility] today. Thank you for your patience.     Your child has been seen and evaluated. We reviewed the results of the workup. Please read the instructions provided, and if given prescriptions, give as instructed.     Remember, your child's care process does not end after the visit today. Please follow-up with their pediatrician within 1-2 days for a follow-up check to ensure your child is improving, to see if they need any further evaluation/testing, or to evaluate for any alternate diagnoses. If your child does not have a primary care provider, call the Pediatric Clinic at [phone] or the [facility] at [phone] to establish care. Call [phone] if you are having difficult scheduling an appointment and they will be able to assist you further.     Please return to the emergency department if your child develops fevers of any kind, difficulty breathing, bloody stool or vomit, or if they develop any other new or concerning symptoms as these could be signs of more serious medical illness.    We hope your child feels better.

## 2023-01-03 NOTE — TELEPHONE ENCOUNTER
I called Grace Kahn to see when I could schedule her 1 year post watchman JAIME and she said it needs to be delayed because she is currently wearing a life vest.

## 2023-01-04 NOTE — PROGRESS NOTES
Pt notified. She is still having a bad reaction. She wants to be on Lovenox. Pt notified that this medication is not usually used long term or for what she has now.

## 2023-01-06 ENCOUNTER — HOSPITAL ENCOUNTER (OUTPATIENT)
Dept: OTHER | Age: 78
Setting detail: THERAPIES SERIES
Discharge: HOME | End: 2023-01-06
Payer: MEDICARE

## 2023-01-06 NOTE — PLAN OF CARE
Problem: Chronic Conditions and Co-morbidities  Goal: Patient's chronic conditions and co-morbidity symptoms are monitored and maintained or improved  Flowsheets (Taken 1/6/2023 2131)  Care Plan - Patient's Chronic Conditions and Co-Morbidity Symptoms are Monitored and Maintained or Improved: Monitor and assess patient's chronic conditions and comorbid symptoms for stability, deterioration, or improvement

## 2023-01-13 ENCOUNTER — HOSPITAL ENCOUNTER (OUTPATIENT)
Dept: OTHER | Age: 78
Setting detail: THERAPIES SERIES
Discharge: HOME | End: 2023-01-13
Payer: MEDICARE

## 2023-01-13 ENCOUNTER — TELEPHONE (OUTPATIENT)
Dept: NON INVASIVE DIAGNOSTICS | Age: 78
End: 2023-01-13

## 2023-01-13 VITALS
WEIGHT: 165 LBS | DIASTOLIC BLOOD PRESSURE: 68 MMHG | HEART RATE: 110 BPM | BODY MASS INDEX: 25.84 KG/M2 | SYSTOLIC BLOOD PRESSURE: 107 MMHG | OXYGEN SATURATION: 99 % | RESPIRATION RATE: 18 BRPM

## 2023-01-13 DIAGNOSIS — Z79.899 LONG TERM CURRENT USE OF AMIODARONE: Primary | ICD-10-CM

## 2023-01-13 LAB
ANION GAP SERPL CALCULATED.3IONS-SCNC: 17 MMOL/L (ref 7–16)
BUN BLDV-MCNC: 28 MG/DL (ref 6–23)
CALCIUM SERPL-MCNC: 10 MG/DL (ref 8.6–10.2)
CHLORIDE BLD-SCNC: 103 MMOL/L (ref 98–107)
CO2: 18 MMOL/L (ref 22–29)
CREAT SERPL-MCNC: 1.3 MG/DL (ref 0.5–1)
EKG ATRIAL RATE: 153 BPM
EKG Q-T INTERVAL: 346 MS
EKG QRS DURATION: 152 MS
EKG QTC CALCULATION (BAZETT): 448 MS
EKG R AXIS: -65 DEGREES
EKG T AXIS: 104 DEGREES
EKG VENTRICULAR RATE: 101 BPM
GFR SERPL CREATININE-BSD FRML MDRD: 42 ML/MIN/1.73
GLUCOSE BLD-MCNC: 111 MG/DL (ref 74–99)
POTASSIUM SERPL-SCNC: 3.9 MMOL/L (ref 3.5–5)
PRO-BNP: 7266 PG/ML (ref 0–450)
SODIUM BLD-SCNC: 138 MMOL/L (ref 132–146)

## 2023-01-13 PROCEDURE — 93005 ELECTROCARDIOGRAM TRACING: CPT | Performed by: INTERNAL MEDICINE

## 2023-01-13 PROCEDURE — 80076 HEPATIC FUNCTION PANEL: CPT

## 2023-01-13 PROCEDURE — 99214 OFFICE O/P EST MOD 30 MIN: CPT

## 2023-01-13 PROCEDURE — 80048 BASIC METABOLIC PNL TOTAL CA: CPT

## 2023-01-13 PROCEDURE — 84439 ASSAY OF FREE THYROXINE: CPT

## 2023-01-13 PROCEDURE — 84443 ASSAY THYROID STIM HORMONE: CPT

## 2023-01-13 PROCEDURE — 36415 COLL VENOUS BLD VENIPUNCTURE: CPT

## 2023-01-13 PROCEDURE — 84481 FREE ASSAY (FT-3): CPT

## 2023-01-13 PROCEDURE — 99204 OFFICE O/P NEW MOD 45 MIN: CPT

## 2023-01-13 PROCEDURE — 83880 ASSAY OF NATRIURETIC PEPTIDE: CPT

## 2023-01-13 RX ORDER — ENOXAPARIN SODIUM 300 MG/3ML
INJECTION INTRAVENOUS; SUBCUTANEOUS DAILY
COMMUNITY

## 2023-01-13 NOTE — TELEPHONE ENCOUNTER
Patient wishes to try Amio because she doesn't want to be in AF for months while waiting for the PE to dissolve. She understands that a CV cannot be performed until cleared by pulmonary. Baseline TFTs and LFTs are ordered and will be added on to the lab since she already had labs done at [facility] ([facility]).

## 2023-01-13 NOTE — TELEPHONE ENCOUNTER
She is only on Lovenox due to rash/itchiness on warfarin. She is being treated for a PE. The only labs done was BMP,BNP. I can call the lab and add on TFTs and LFTs.

## 2023-01-13 NOTE — PLAN OF CARE
Problem: Chronic Conditions and Co-morbidities  Goal: Patient's chronic conditions and co-morbidity symptoms are monitored and maintained or improved  Flowsheets (Taken 1/13/2023 4617)  Care Plan - Patient's Chronic Conditions and Co-Morbidity Symptoms are Monitored and Maintained or Improved: Monitor and assess patient's chronic conditions and comorbid symptoms for stability, deterioration, or improvement

## 2023-01-13 NOTE — TELEPHONE ENCOUNTER
Jose Juan Camargo from CHF clinic called and states patient is in AF with a HR of 101. Patient is asymptomatic. Her tikosyn was stopped last month during his hospital stay due to poor CrCl. You made mention in your notes that Amio would be started if AF reoccurred. Please advise. EKG and labs were done today at CHF clinic.

## 2023-01-13 NOTE — PROGRESS NOTES
Congestive Heart Failure [address] [place] [address]   1945          Referring Provider: Dread Arreguin( CHF Navigator )  Primary Care Physician: Dr Cesia Reynaga  Cardiologist: Dr Matthew Rabago  Nephrologist: N/A        History of Present Illness:     Altaf Pike is a 68 y.o. female with a history of HFrEF, most recent EF 25% on 12-17-22. Patient Story:    She does  have dyspnea with exertion, shortness of breath, or decline in overall functional capacity. She does have orthopnea, PND, nocturnal cough or hemoptysis. She does not have abdominal distention or bloating, early satiety, anorexia/change in appetite. She does has a good urinary response to  oral diuretic. She does not have  lower extremity edema. She denies lightheadedness, dizziness. She denies palpitations, syncope or near syncope. She does not complain of chest pain, pressure, discomfort.          Allergies   Allergen Reactions    Bentyl [Dicyclomine] Shortness Of Breath    Adhesive Tape Itching     develops rash and itching with EKG electrodes    Atacand [Candesartan] Hives and Itching    Cefdinir Hives, Itching and Nausea Only    Demerol      3/9/19 Pt states she gets a severe migraine    Digoxin Itching     developed itching and rash    Imdur [Isosorbide Mononitrate]       migraines    Losartan Potassium Itching    Sulfa Antibiotics Diarrhea and Other (See Comments)     Also causes migraines  caused migraines and diarrhea    Xarelto [Rivaroxaban] Itching and Rash      severe itch, headache, rash    Acetaminophen Hives and Itching    Apap-Caff-Dihydrocodeine Hives and Itching    Coreg [Carvedilol] Itching     Can take extended release Coreg    Cozaar [Losartan] Itching    Diovan [Valsartan] Itching    Effexor [Venlafaxine Hydrochloride] Nausea Only    Eliquis [Apixaban] Hives, Itching and Rash     3/9/19 Pt states that she gets hives, itchy and a rash    Labetalol Itching    Lisinopril Itching    Ramipril Itching         Outpatient Medications Marked as Taking for the 1/13/23 encounter [facility] HOSPITAL Encounter) with [facility] ROOM 1   Medication Sig Dispense Refill    enoxaparin (LOVENOX) 300 MG/3ML injection Inject into the skin daily Took last on 1-11-23       Current Outpatient Medications on File Prior to Encounter   Medication Sig Dispense Refill    enoxaparin (LOVENOX) 300 MG/3ML injection Inject into the skin daily Took last on 1-11-23      famotidine (PEPCID) 40 MG tablet Take 40 mg by mouth daily      warfarin (COUMADIN) 5 MG tablet Take 1 tablet by mouth daily (Patient not taking: Reported on 1/13/2023) 14 tablet 0    cetirizine (ZYRTEC ALLERGY) 10 MG tablet Take 0.5 tablets by mouth daily 30 tablet 0    fluticasone (FLONASE) 50 MCG/ACT nasal spray 1-2 sprays, each nostril daily as needed for nasal congestion.  16 g 0    furosemide (LASIX) 20 MG tablet Take 1 tablet by mouth daily 90 tablet 3    metoprolol succinate (TOPROL XL) 25 MG extended release tablet Take 1 tablet by mouth daily 90 tablet 2    aspirin 81 MG EC tablet Take 81 mg by mouth daily (Patient not taking: Reported on 1/13/2023)      hydrOXYzine (ATARAX) 10 MG tablet Take 1 tablet by mouth every 8 hours as needed for Itching or Anxiety 90 tablet 1    baclofen (LIORESAL) 10 MG tablet Take 1 tablet by mouth nightly 90 tablet 1    spironolactone (ALDACTONE) 25 MG tablet Take 1 tablet by mouth daily 90 tablet 3    hydrALAZINE (APRESOLINE) 10 MG tablet Take 1 tablet by mouth 3 times daily 270 tablet 3    Handicap Placard MISC Cannot walk more than 200 feet  Expiration: 7/12/2026 1 each 0    omeprazole (PRILOSEC) 40 MG delayed release capsule Take 40 mg by mouth daily       triamcinolone (KENALOG) 0.1 % cream Apply topically 3 times daily as needed (rash)   1    loperamide (IMODIUM) 2 MG capsule Take 2 mg by mouth 4 times daily as needed for Diarrhea      diphenhydrAMINE (BENADRYL) 25 MG tablet Take 25 mg by mouth every 6 hours as needed for Itching vitamin D (CHOLECALCIFEROL) 1000 UNIT TABS tablet Take 1,000 Units by mouth daily       No current facility-administered medications on file prior to encounter. Guideline directed medical:  ARNI/ACE I/ARB: No  Beta blocker:   Yes  Aldosterone antagonist:  Yes  SGLT2i:  No        Physical Examination:     /68   Pulse (!) 110   Resp 18   Wt 165 lb (74.8 kg)   SpO2 99%   BMI 25.84 kg/m²     Assessment  Charting Type: Shift assessment    Neurological  Level of Consciousness: Alert (0)  Orientation Level: Oriented X4  Cognition: Appropriate safety awareness, Appropriate judgement, Appropriate attention/concentration, Follows commands  Speech: Clear         HEENT (Head, Ears, Eyes, Nose, & Throat)  HEENT (WDL): Exceptions to WDL  Right Eye: Glasses  Left Eye: Glasses    Respiratory  Respiratory Pattern: Regular  Respiratory Depth: Normal  Respiratory Quality/Effort: Unlabored  L Breath Sounds: Diminished  R Breath Sounds: Diminished              Cardiac  Cardiac Regularity: Irregular  Cardiac Rhythm: Atrial fib    Rhythm Interpretation  Heart Rate: (!) 110         Gastrointestinal  Abdominal (WDL): Within Defined Limits               Peripheral Vascular  Peripheral Vascular (WDL): Exceptions to WDL  Edema: Right lower extremity, Left lower extremity  RLE Edema: Non-pitting, Trace  LLE Edema: Non-pitting, Trace                   Genitourinary  Genitourinary (WDL): Within Defined Limits    Psychosocial  Psychosocial (WDL): Within Defined Limits                        Heart Rate: (!) 110                     LAB DATA:    Last 3 BMP      Sodium (mmol/L)   Date Value   12/29/2022 137   12/21/2022 139   12/20/2022 136     Potassium (mmol/L)   Date Value   12/29/2022 4.0   12/21/2022 5.0   12/20/2022 4.7     Potassium reflex Magnesium (mmol/L)   Date Value   11/11/2021 4.7   10/16/2020 4.2   10/15/2020 4.1     Chloride (mmol/L)   Date Value   12/29/2022 101   12/21/2022 105   12/20/2022 97 (L)     CO2 (mmol/L)   Date Value   12/29/2022 21 (L)   12/21/2022 22   12/20/2022 27     BUN (mg/dL)   Date Value   12/29/2022 30 (H)   12/21/2022 27 (H)   12/20/2022 36 (H)     Glucose (mg/dL)   Date Value   12/29/2022 117 (H)   12/21/2022 106 (H)   12/20/2022 95   10/12/2011 120 (H)   08/16/2011 132 (H)     Calcium (mg/dL)   Date Value   12/29/2022 9.9   12/21/2022 9.8   12/20/2022 9.9       Last 3 BNP       Pro-BNP (pg/mL)   Date Value   12/20/2022 5,563 (H)   12/16/2022 7,473 (H)   03/09/2019 5,512 (H)          CBC: No results for input(s): WBC, HGB, PLT in the last 72 hours. BMP:  No results for input(s): NA, K, CL, CO2, BUN, CREATININE, GLUCOSE in the last 72 hours. Hepatic: No results for input(s): AST, ALT, ALB, BILITOT, ALKPHOS in the last 72 hours. Troponin: No results for input(s): TROPONINI in the last 72 hours. BNP: No results for input(s): BNP in the last 72 hours. Lipids: No results for input(s): CHOL, HDL in the last 72 hours. Invalid input(s): LDLCALCU  INR: No results for input(s): INR in the last 72 hours.         WEIGHTS:    Wt Readings from Last 3 Encounters:   01/13/23 165 lb (74.8 kg)   12/29/22 171 lb (77.6 kg)   12/22/22 180 lb 8.9 oz (81.9 kg)         TELEMETRY:  Cardiac Regularity: Irregular  Cardiac Rhythm/Interpretation: A Fib        ASSESSMENT:  Jaylon Gomes is evolemic with stable weights     Interventions completed this visit:  IV diuretics given no  Lab work obtained yes, BMP/BNP   Reviewed currently prescribed medications with patient, educated on importance of compliance and answered any questions regarding their medication  Educated on signs and symptoms of HF  Educated on low sodium diet    PLAN:  Scheduled to follow up in CHF clinic on   Future Appointments   Date Time Provider Duke Santana   1/18/2023 12:00 PM Federico Cooper APRN - CNP [facility]   1/19/2023 10:30 AM [facility] MED ONC FAST TRACK 1 [address] [facility]   1/19/2023 10:45 AM Daxa Villa MD MED ONC [facility]   1/26/2023 10:30 AM [facility] CVL SPECIAL PROCEDURES LAB [facility]   1/27/2023 12:30 PM SE CHF ROOM 1 [facility] CHF [facility]   3/8/2023  1:30 PM [facility] MED ONC FAST TRACK 2 [facility] Med Onc [facility]   3/8/2023  1:45 PM Chris Couch MD MED ONC [facility]   4/6/2023 10:00 AM Heavenly Dawson MD [facility] AND [facility]     Given clinic phone number and aware of signs and symptoms to call with any HF change in symptoms. First visit with CHF clinic today. . Discussed with patient  the purpose of CHF clinic and importance of daily weights and doing a self check every day to monitor for changes. Went over the three heart failure zones and to call cardiologist if in yellow zone immediately to prevent any further decline. Patient has a working scale. Patient is agreeable to future CHF clinic visits. Patient found to be in A Fib today. I called Dr Jaswinder Beck office and informed them.

## 2023-01-14 LAB
ALBUMIN SERPL-MCNC: 4 G/DL (ref 3.5–5.2)
ALP BLD-CCNC: 103 U/L (ref 35–104)
ALT SERPL-CCNC: 18 U/L (ref 0–32)
AST SERPL-CCNC: 24 U/L (ref 0–31)
BILIRUB SERPL-MCNC: 0.6 MG/DL (ref 0–1.2)
BILIRUBIN DIRECT: <0.2 MG/DL (ref 0–0.3)
BILIRUBIN, INDIRECT: NORMAL MG/DL (ref 0–1)
T3 FREE: 3.6 PG/ML (ref 2–4.4)
T4 FREE: 1.89 NG/DL (ref 0.93–1.7)
TOTAL PROTEIN: 7.6 G/DL (ref 6.4–8.3)
TSH SERPL DL<=0.05 MIU/L-ACNC: 1.52 UIU/ML (ref 0.27–4.2)

## 2023-01-16 NOTE — TELEPHONE ENCOUNTER
Spoke with patient let her know recommendations. She verbalized understanding, will have CHF clinic draw labs.

## 2023-01-18 ENCOUNTER — HOSPITAL ENCOUNTER (OUTPATIENT)
Dept: OTHER | Age: 78
Setting detail: THERAPIES SERIES
Discharge: HOME | End: 2023-01-18
Payer: MEDICARE

## 2023-01-18 VITALS
HEART RATE: 127 BPM | SYSTOLIC BLOOD PRESSURE: 107 MMHG | OXYGEN SATURATION: 96 % | WEIGHT: 167.1 LBS | RESPIRATION RATE: 16 BRPM | DIASTOLIC BLOOD PRESSURE: 77 MMHG | BODY MASS INDEX: 26.23 KG/M2 | HEIGHT: 67 IN

## 2023-01-18 DIAGNOSIS — L29.9 ITCHING: ICD-10-CM

## 2023-01-18 DIAGNOSIS — I10 ESSENTIAL HYPERTENSION: ICD-10-CM

## 2023-01-18 DIAGNOSIS — I50.23 ACUTE ON CHRONIC SYSTOLIC HEART FAILURE (HCC): ICD-10-CM

## 2023-01-18 DIAGNOSIS — I42.9 SECONDARY CARDIOMYOPATHY (HCC): ICD-10-CM

## 2023-01-18 LAB
ALBUMIN SERPL-MCNC: 4 G/DL (ref 3.5–5.2)
ALP BLD-CCNC: 112 U/L (ref 35–104)
ALT SERPL-CCNC: 20 U/L (ref 0–32)
ANION GAP SERPL CALCULATED.3IONS-SCNC: 16 MMOL/L (ref 7–16)
AST SERPL-CCNC: 23 U/L (ref 0–31)
BILIRUB SERPL-MCNC: 0.7 MG/DL (ref 0–1.2)
BILIRUBIN DIRECT: 0.2 MG/DL (ref 0–0.3)
BILIRUBIN, INDIRECT: 0.5 MG/DL (ref 0–1)
BUN BLDV-MCNC: 20 MG/DL (ref 6–23)
CALCIUM SERPL-MCNC: 10 MG/DL (ref 8.6–10.2)
CHLORIDE BLD-SCNC: 101 MMOL/L (ref 98–107)
CO2: 18 MMOL/L (ref 22–29)
CREAT SERPL-MCNC: 1.3 MG/DL (ref 0.5–1)
GFR SERPL CREATININE-BSD FRML MDRD: 42 ML/MIN/1.73
GLUCOSE BLD-MCNC: 134 MG/DL (ref 74–99)
POTASSIUM SERPL-SCNC: 3.9 MMOL/L (ref 3.5–5)
PRO-BNP: 7843 PG/ML (ref 0–450)
SODIUM BLD-SCNC: 135 MMOL/L (ref 132–146)
T3 FREE: 3.6 PG/ML (ref 2–4.4)
T4 TOTAL: 7.4 MCG/DL (ref 4.5–11.7)
TOTAL PROTEIN: 7.9 G/DL (ref 6.4–8.3)
TSH SERPL DL<=0.05 MIU/L-ACNC: 1.1 UIU/ML (ref 0.27–4.2)

## 2023-01-18 PROCEDURE — 80048 BASIC METABOLIC PNL TOTAL CA: CPT

## 2023-01-18 PROCEDURE — 84436 ASSAY OF TOTAL THYROXINE: CPT

## 2023-01-18 PROCEDURE — 80076 HEPATIC FUNCTION PANEL: CPT

## 2023-01-18 PROCEDURE — 36415 COLL VENOUS BLD VENIPUNCTURE: CPT

## 2023-01-18 PROCEDURE — 99214 OFFICE O/P EST MOD 30 MIN: CPT | Performed by: NURSE PRACTITIONER

## 2023-01-18 PROCEDURE — 6360000002 HC RX W HCPCS: Performed by: NURSE PRACTITIONER

## 2023-01-18 PROCEDURE — 2580000003 HC RX 258: Performed by: NURSE PRACTITIONER

## 2023-01-18 PROCEDURE — 96374 THER/PROPH/DIAG INJ IV PUSH: CPT

## 2023-01-18 PROCEDURE — 84443 ASSAY THYROID STIM HORMONE: CPT

## 2023-01-18 PROCEDURE — 84481 FREE ASSAY (FT-3): CPT

## 2023-01-18 PROCEDURE — 83880 ASSAY OF NATRIURETIC PEPTIDE: CPT

## 2023-01-18 RX ORDER — FUROSEMIDE 10 MG/ML
40 INJECTION INTRAMUSCULAR; INTRAVENOUS ONCE
Status: COMPLETED | OUTPATIENT
Start: 2023-01-18 | End: 2023-01-18

## 2023-01-18 RX ORDER — SODIUM CHLORIDE 0.9 % (FLUSH) 0.9 %
10 SYRINGE (ML) INJECTION ONCE
Status: COMPLETED | OUTPATIENT
Start: 2023-01-18 | End: 2023-01-18

## 2023-01-18 RX ADMIN — Medication 10 ML: at 12:52

## 2023-01-18 RX ADMIN — FUROSEMIDE 40 MG: 10 INJECTION, SOLUTION INTRAMUSCULAR; INTRAVENOUS at 12:50

## 2023-01-18 ASSESSMENT — EJECTION FRACTION
EF_VALUE: 25
EF_SOURCE: 2D ECHO

## 2023-01-18 NOTE — PROGRESS NOTES
[facility]  Office Visit         Reason for Visit: Heart failure    Primary Cardiologist: Dr. Alee Ambriz      History of Present Illness:     Ms. Matthew Sheffield is a 68year old female with a PMHx of  chronic HFrEF, nonischemic cardiomyopathy, dilated RV, persistent atrial fibrillation, s/p watchman, recent PE, CKD, HTN, renal cancer s/p partial left nephrectomy, hx of TIA, HLD, migraines. She recently presented to the ED with complaints of increased shortness of breath, lower extremity edema. She was admitted for acute heart failure and acute PE. IV diuresed and started on [address], she had allergic reaction to coumadin and was transitioned to lovenox. Tikosyn was discontinued and she is following with EP for plans to initiate amiodarone. In CHF clinic today she complains of a 3 lb weight gain, increased shortness of breath and abdominal bloating. She has occasional lightheadedness when going from a sitting to a standing position but denies near syncope, syncope, or falls. Denies chest pain, pressure, heaviness, palpitations, orthopnea however has occasional PND.        Patient Active Problem List    Diagnosis Date Noted    Multiple subsegmental pulmonary emboli without acute cor pulmonale ([facility] [address] 12/17/2022     Priority: Medium    Acute on chronic congestive heart failure ([address] 12/17/2022     Priority: Medium    Chronic renal disease, stage III [facility]) [038173] 04/25/2022     Priority: Medium    Renal cell carcinoma of left kidney ([address] 02/01/2022    Renal mass 11/16/2021    Presence of Watchman left atrial appendage closure device 10/14/2020     24-mm Watchman 2.5 (Dr. Robyn Zazueta 10/14/2020)      Chronic anticoagulation 08/28/2020    Encounter for medication refill 07/20/2020    Itching 07/20/2020    Visit for monitoring Tikosyn therapy 11/04/2019    Persistent atrial fibrillation ([address] 11/04/2019    Paroxysmal atrial fibrillation ([address] 03/10/2019    Chronic HFrEF (heart failure with reduced ejection fraction) ([address] 03/10/2019    Left atrial thrombus 01/02/2019    HTN (hypertension), benign 11/21/2018    Lumbar radiculopathy 10/11/2013    Cervical radiculopathy 10/11/2013    Nonischemic cardiomyopathy (HCC)      Mild nonischemic cardiomyopathy. (Ejection fraction 45-50%)  Cardiac catheterization (3/32/44): PA systolic 58, wedge 11 indicating slightly elevated pulmonary pressures not secondary to left heart failure. Cardiac output 5.47, cardiac index 2.9, no coronary artery disease   Moderately elevated pulmonary systolic pressure. Severe mitral regurgitation      Mild to moderate      Anxiety 04/14/2011           Past Medical History:   Diagnosis Date    Afib (HCC)     WATCHMAN    Anxiety     Arthritis     Cervical radiculopathy     CHF (congestive heart failure) (HCC)     Chronic sinusitis     Ejection fraction < 50%     25%.  Wearing life vest    Hilar adenopathy     Hx of blood clots     Hypertension     Left ventricular systolic dysfunction     Lesion of left native kidney     Lumbar radiculopathy     Lung nodules     Meige syndrome     partial/ PCP    Microscopic colitis     Mitral regurgitation     Mild to moderate    Nonischemic cardiomyopathy ([address]     f/u with Dr. Lizzy Maldonado    Osteopenia     PE (pulmonary thromboembolism) [facility])     Renal cell carcinoma of left kidney ([facility] [address] 02/01/2022    Vertebrobasilar artery syndrome     f/u PCP           Past Surgical History:   Procedure Laterality Date    BLADDER REPAIR      BRONCHOSCOPY N/A 10/5/2021    BRONCHOSCOPY W/EBUS FNA performed by Brittany Cardoso MD at [facility] N/A 10/5/2021    BRONCHOSCOPY DIAGNOSTIC OR CELL [address] ONLY performed by Brittany Cardoso MD at [address] TEST  11/25/13    Rt & LT cath    CARDIOVERSION  11/04/2019    Successful CV from AF to NSR   (Dr. Rodrigo Sherwood)    COLONOSCOPY  07/2020    DIAGNOSTIC CARDIAC CATH LAB PROCEDURE  2/20/10    Dr Virgilio Castro CATH LAB PROCEDURE 8/24/11    Right heart cath @ [facility].     DOPPLER ECHOCARDIOGRAPHY  11/25/13    HYSTERECTOMY (CERVIX STATUS UNKNOWN)  1991    total    OTHER SURGICAL HISTORY  10/14/2020    Dr. Mauricio Tejada- 24mm Watchman device    PARTIAL NEPHRECTOMY Left 11/16/2021    ROBOT ASSISTED LAPAROSCOPIC COMPLEX LEFT PARTIAL NEPHRECTOMY performed by Maciej Napoles MD at [facility] OR    TRANSESOPHAGEAL ECHOCARDIOGRAM  11/21/2018    Dr. Nesha Karimi    TRANSESOPHAGEAL ECHOCARDIOGRAM  03/18/2019    WISDOM TOOTH EXTRACTION               Allergies   Allergen Reactions    Bentyl [Dicyclomine] Shortness Of Breath    Adhesive Tape Itching     develops rash and itching with EKG electrodes    Atacand [Candesartan] Hives and Itching    Cefdinir Hives, Itching and Nausea Only    Demerol      3/9/19 Pt states she gets a severe migraine    Digoxin Itching     developed itching and rash    Imdur [Isosorbide Mononitrate]       migraines    Losartan Potassium Itching    Sulfa Antibiotics Diarrhea and Other (See Comments)     Also causes migraines  caused migraines and diarrhea    Xarelto [Rivaroxaban] Itching and Rash      severe itch, headache, rash    Acetaminophen Hives and Itching    Apap-Caff-Dihydrocodeine Hives and Itching    Coreg [Carvedilol] Itching     Can take extended release Coreg    Cozaar [Losartan] Itching    Diovan [Valsartan] Itching    Effexor [Venlafaxine Hydrochloride] Nausea Only    Eliquis [Apixaban] Hives, Itching and Rash     3/9/19 Pt states that she gets hives, itchy and a rash    Labetalol Itching    Lisinopril Itching    Ramipril Itching         Current Outpatient Medications:     enoxaparin (LOVENOX) 300 MG/3ML injection, Inject into the skin daily Took last on 1-11-23, Disp: , Rfl:     famotidine (PEPCID) 40 MG tablet, Take 40 mg by mouth daily, Disp: , Rfl:     warfarin (COUMADIN) 5 MG tablet, Take 1 tablet by mouth daily, Disp: 14 tablet, Rfl: 0    cetirizine (ZYRTEC ALLERGY) 10 MG tablet, Take 0.5 tablets by mouth daily, Disp: 30 tablet, Rfl: 0    fluticasone (FLONASE) 50 MCG/ACT nasal spray, 1-2 sprays, each nostril daily as needed for nasal congestion. , Disp: 16 g, Rfl: 0    furosemide (LASIX) 20 MG tablet, Take 1 tablet by mouth daily, Disp: 90 tablet, Rfl: 3    metoprolol succinate (TOPROL XL) 25 MG extended release tablet, Take 1 tablet by mouth daily, Disp: 90 tablet, Rfl: 2    aspirin 81 MG EC tablet, Take 81 mg by mouth daily, Disp: , Rfl:     hydrOXYzine (ATARAX) 10 MG tablet, Take 1 tablet by mouth every 8 hours as needed for Itching or Anxiety, Disp: 90 tablet, Rfl: 1    baclofen (LIORESAL) 10 MG tablet, Take 1 tablet by mouth nightly, Disp: 90 tablet, Rfl: 1    spironolactone (ALDACTONE) 25 MG tablet, Take 1 tablet by mouth daily, Disp: 90 tablet, Rfl: 3    hydrALAZINE (APRESOLINE) 10 MG tablet, Take 1 tablet by mouth 3 times daily, Disp: 270 tablet, Rfl: 3    Handicap Placard MISC, Cannot walk more than 200 feet Expiration: 7/12/2026, Disp: 1 each, Rfl: 0    omeprazole (PRILOSEC) 40 MG delayed release capsule, Take 40 mg by mouth daily , Disp: , Rfl:     triamcinolone (KENALOG) 0.1 % cream, Apply topically 3 times daily as needed (rash) , Disp: , Rfl: 1    loperamide (IMODIUM) 2 MG capsule, Take 2 mg by mouth 4 times daily as needed for Diarrhea, Disp: , Rfl:     diphenhydrAMINE (BENADRYL) 25 MG tablet, Take 25 mg by mouth every 6 hours as needed for Itching, Disp: , Rfl:     vitamin D (CHOLECALCIFEROL) 1000 UNIT TABS tablet, Take 1,000 Units by mouth daily, Disp: , Rfl:       Review of Systems:   Cardiac: As per HPI  General: No fever, chills, rigors  Pulmonary: As per HPI  HEENT: No visual disturbances, difficult swallowing  GI: No nausea, vomiting, abdominal pain  : No dysuria or hematuria  Endocrine: No thyroid disease or diabetes  Musculoskeletal: DEJESUS x 4, no focal motor deficits  Skin: Intact, no rashes  Neuro/Psych: No headache or seizures          Weights:   Wt Readings from Last 3 Encounters:   01/18/23 167 lb 1.6 oz (75.8 kg)   01/13/23 165 lb (74.8 kg)   12/29/22 171 lb (77.6 kg)             Physical Examination:     /77 Comment: standing bp  Pulse (!) 127   Resp 16   Ht 5' 7\" (1.702 m)   Wt 167 lb 1.6 oz (75.8 kg) Comment: 168lbs home scale  SpO2 96%   BMI 26.17 kg/m²     CONSTITUTIONAL: Alert and oriented times 3, no acute distress and cooperative to examination with proper mood and affect. SKIN: Skin color, texture, turgor normal. No rashes or lesions. LYMPH: no cervical nodes, no inguinal nodes  HEENT: Head is normocephalic, atraumatic. EOMI, PERRLA. NECK: Supple, symmetrical, trachea midline, no adenopathy, thyroid symmetric, not enlarged and no tenderness, skin normal. +JVD/HJR  CHEST/LUNGS: chest symmetric with normal A/P diameter, normal respiratory rate and rhythm, lungs clear to auscultation without wheezes, rales or rhonchi. No accessory muscle use. Scars None   CARDIOVASCULAR: Heart sounds are normal.  Irregular rate and rhythm without murmur, gallop or rub. Normal S1 and S2. . Carotid and femoral pulses 2+/4 and equal bilaterally. ABDOMEN: Rounded. No and Laparoscopic scar(s) present. Normal bowel sounds. No bruits. soft, nondistended, no masses or organomegaly. no evidence of hernia. Percussion: Normal without hepatosplenomegally. Tenderness: absent. RECTAL: deferred, not clinically indicated  NEUROLOGIC: There are no focalizing motor or sensory deficits. CN II-XII are grossly intact. [place] EXTREMITIES: no cyanosis, no clubbing. 1+ bilateral lower extremity edema. Warm and well perfused. All the following diagnostics were personally reviewed and interpreted by me.        LAB DATA:     12/29/22 12:07 1/13/23 09:30   Sodium 137 138   Potassium 4.0 3.9   Chloride 101 103   CO2 21 (L) 18 (L)   BUN,BUNPL 30 (H) 28 (H)   Creatinine 1.1 (H) 1.3 (H)   Anion Gap 15 17 (H)   Est, Glom Filt Rate 51 42   Magnesium 2.2    Lactic Acid 1.7    Glucose, Random 117 (H) 111 (H)   CALCIUM, SERUM, 935693 9.9 10.0   Total Protein 8.3 7.6   Prothrombin time,(POC)     Total CK 88    Pro-BNP  7,266 (H)   Albumin 4.2 4.0   Alk Phos 118 (H) 103   ALT 17 18   AST 27 24   Bilirubin 1.0 0.6   Bilirubin, Direct  <0.2   Bilirubin, Indirect  see below   T3,FREE,FT3  3.6   TSH  1.520   T4 Free  1.89 (H)   WBC 7.1    RBC 5.12    Hemoglobin Quant 14.6    Hematocrit 43.9    MCV 85.7    MCH 28.5    MCHC 33.3    MPV 10.6    RDW 14.7    Platelet Count 022        IMAGING:    CTA pulmonary (12/17/2022)  Impression  Small burden acute subsegmental pulmonary emboli to the bilateral lower lobes. RECOMMENDATIONS:  Careful clinical correlation and follow up recommended. CXR (12/29/2022)  FINDINGS:  The heart is mildly enlarged. There are no findings of failure. There is no mediastinal widening. The lungs are clear. There is no focal infiltrate or effusion. Impression  1. Cardiomegaly. There are no findings of failure or pneumonia. CARDIAC TESTING:    NM Stress (12/20/2022)  Impression:  1: No definite evidence of  ischemia or scar except soft tissue  attenuation. The left ventricle is visually dilated both rest and  stress without evidence of TID    2  Dilated left ventricle with Moderate to severe global left  ventricular hypokinesis with estimated left ventricular ejection  fraction of 32%. 3:  Please see separate report for EKG and hemodynamic aspect of the  stress test.    4:  Low dose CT attenuation correction protocol was utilized which  revealed minimal to mild coronary artery ossifications. 5: RISK SCAN:  High risk scan due to dilated left ventricle with  severe global hypokinesis and EF of 32%. TTE (12/17/2022)   Summary:   Ejection fraction is visually estimated at 25%. Overall ejection fraction severely decreased. Mild left ventricular concentric hypertrophy noted. Dilated right ventricle with reduced function. Left atrial volume index of 50 ml per meters squared BSA.    Moderate aortic regurgitation is noted.   Moderate mitral regurgitation is present. Moderate tricuspid regurgitation. Pulmonary hypertension is severe. RVSP is 70 mmHg. No evidence for hemodynamically significant pericardial effusion. Telemetry (1/13/2023)  Atrial fibrillation       ASSESSMENT:  Acute on chronic HFrEF  ACC stage C / NYHA class III  Hypervolemic   Nonischemic cardiomyopathy  LVEF 25%, LVEDD 3.8, LVMI 80  GDMT limited by tolerability and hypotension   LifeVest in place  Persistent atrial fibrillation - following with EP, anticipating starting amiodarone. S/p watchman (10/2020)  Device related thrombus noted on 6-month JAIME; subsequently resolved  Frequent PVCs/NSVT  Valvular heart disease:  Moderate MR, moderate TR, moderate AR  Dilated RV with reduced function, moderate TR and evidence for pulmonary hypertension  Chronic left bundle branch block  Recent bilateral subsegmental PE - on lovenox   CKD, Baseline SCR 1.2-1.3  HTN  Renal cancer s/p partial left nephrectomy 11/2021  History of TIA  HLD  Possible RADHA  Migraines  Multiple medication intolerances/allergies including ACE and ARB, nitrates, DOAC's      PLAN:  Get blood work today  Will give dose of IV diuretic today   Take 40 mg of lasix in AM tomorrow and Friday then return to 20 mg Lasix daily  Continue rest of current cardiac medications  Elevate legs as much as possible   Wear compression stockings and take transitioning from sitting to standing slowly  Follow up with CHF clinic in 1 week  Consider sleep study   Follow up with Dr. Jeannie Rosales in 1-2 months     Weigh yourself daily    -Stay Hydrated, 64 oz     -Diet should sodium restricted to 2 grams    -Again watch your daily weight trends and if you gain water weight please follow below instructions.    -If you gain 3-5 pounds in 2-3 days OR notice that you are retaining fluid in anyway just like you did before then take an extra dose of your water pill (furosemide/Lasix) every day until you lose the weight or feel better.     -If you notice that you have taken more than 2 extra doses in 1 week then please call and let us know. -If at any time you feel that you are retaining fluid, your medications are not working, or you feel ill in anyway, then please call us for either same day appointment or the next day, and for instructions. Our goal is to keep you out of the emergency room and the hospital and we have ways to do it. You just need to call us in a timely manner.     -If you become sick for other reasons, and notice that you are not urinating as much, the urine is very dark, you have significant diarrhea or vomiting, then please DO NOT take your water pill and CALL US immediately.      Bradley Her APRN-CNP  [id number] [facility]

## 2023-01-19 ENCOUNTER — HOSPITAL ENCOUNTER (OUTPATIENT)
Dept: INFUSION THERAPY | Age: 78
Discharge: HOME | End: 2023-01-19
Payer: MEDICARE

## 2023-01-19 ENCOUNTER — OFFICE VISIT (OUTPATIENT)
Dept: ONCOLOGY | Age: 78
End: 2023-01-19
Payer: MEDICARE

## 2023-01-19 VITALS
HEIGHT: 67 IN | RESPIRATION RATE: 22 BRPM | OXYGEN SATURATION: 96 % | SYSTOLIC BLOOD PRESSURE: 117 MMHG | DIASTOLIC BLOOD PRESSURE: 85 MMHG | TEMPERATURE: 97.1 F | HEART RATE: 112 BPM | BODY MASS INDEX: 26.06 KG/M2 | WEIGHT: 166 LBS

## 2023-01-19 DIAGNOSIS — C64.2 RENAL CELL CARCINOMA OF LEFT KIDNEY (HCC): Primary | ICD-10-CM

## 2023-01-19 DIAGNOSIS — C64.2 RENAL CELL CARCINOMA OF LEFT KIDNEY (HCC): ICD-10-CM

## 2023-01-19 LAB
ALBUMIN SERPL-MCNC: 3.7 G/DL (ref 3.5–5.2)
ALP BLD-CCNC: 108 U/L (ref 35–104)
ALT SERPL-CCNC: 21 U/L (ref 0–32)
ANION GAP SERPL CALCULATED.3IONS-SCNC: 17 MMOL/L (ref 7–16)
AST SERPL-CCNC: 26 U/L (ref 0–31)
BASOPHILS ABSOLUTE: 0.05 E9/L (ref 0–0.2)
BASOPHILS RELATIVE PERCENT: 0.6 % (ref 0–2)
BILIRUB SERPL-MCNC: 0.8 MG/DL (ref 0–1.2)
BUN BLDV-MCNC: 23 MG/DL (ref 6–23)
CALCIUM SERPL-MCNC: 9.9 MG/DL (ref 8.6–10.2)
CHLORIDE BLD-SCNC: 95 MMOL/L (ref 98–107)
CO2: 17 MMOL/L (ref 22–29)
CREAT SERPL-MCNC: 1.5 MG/DL (ref 0.5–1)
EOSINOPHILS ABSOLUTE: 0.1 E9/L (ref 0.05–0.5)
EOSINOPHILS RELATIVE PERCENT: 1.3 % (ref 0–6)
GFR SERPL CREATININE-BSD FRML MDRD: 35 ML/MIN/1.73
GLUCOSE BLD-MCNC: 129 MG/DL (ref 74–99)
HCT VFR BLD CALC: 43.2 % (ref 34–48)
HEMOGLOBIN: 14 G/DL (ref 11.5–15.5)
IMMATURE GRANULOCYTES #: 0.03 E9/L
IMMATURE GRANULOCYTES %: 0.4 % (ref 0–5)
LYMPHOCYTES ABSOLUTE: 1.38 E9/L (ref 1.5–4)
LYMPHOCYTES RELATIVE PERCENT: 17.3 % (ref 20–42)
MCH RBC QN AUTO: 28.1 PG (ref 26–35)
MCHC RBC AUTO-ENTMCNC: 32.4 % (ref 32–34.5)
MCV RBC AUTO: 86.7 FL (ref 80–99.9)
MONOCYTES ABSOLUTE: 0.58 E9/L (ref 0.1–0.95)
MONOCYTES RELATIVE PERCENT: 7.3 % (ref 2–12)
NEUTROPHILS ABSOLUTE: 5.84 E9/L (ref 1.8–7.3)
NEUTROPHILS RELATIVE PERCENT: 73.1 % (ref 43–80)
PDW BLD-RTO: 15.5 FL (ref 11.5–15)
PLATELET # BLD: 222 E9/L (ref 130–450)
PMV BLD AUTO: 10.5 FL (ref 7–12)
POTASSIUM SERPL-SCNC: 3.6 MMOL/L (ref 3.5–5)
RBC # BLD: 4.98 E12/L (ref 3.5–5.5)
SODIUM BLD-SCNC: 129 MMOL/L (ref 132–146)
TOTAL PROTEIN: 7.3 G/DL (ref 6.4–8.3)
WBC # BLD: 8 E9/L (ref 4.5–11.5)

## 2023-01-19 PROCEDURE — G8417 CALC BMI ABV UP PARAM F/U: HCPCS | Performed by: INTERNAL MEDICINE

## 2023-01-19 PROCEDURE — G8399 PT W/DXA RESULTS DOCUMENT: HCPCS | Performed by: INTERNAL MEDICINE

## 2023-01-19 PROCEDURE — 99214 OFFICE O/P EST MOD 30 MIN: CPT | Performed by: INTERNAL MEDICINE

## 2023-01-19 PROCEDURE — 3074F SYST BP LT 130 MM HG: CPT | Performed by: INTERNAL MEDICINE

## 2023-01-19 PROCEDURE — G8427 DOCREV CUR MEDS BY ELIG CLIN: HCPCS | Performed by: INTERNAL MEDICINE

## 2023-01-19 PROCEDURE — 36415 COLL VENOUS BLD VENIPUNCTURE: CPT

## 2023-01-19 PROCEDURE — 99212 OFFICE O/P EST SF 10 MIN: CPT

## 2023-01-19 PROCEDURE — 85025 COMPLETE CBC W/AUTO DIFF WBC: CPT

## 2023-01-19 PROCEDURE — 1090F PRES/ABSN URINE INCON ASSESS: CPT | Performed by: INTERNAL MEDICINE

## 2023-01-19 PROCEDURE — 1123F ACP DISCUSS/DSCN MKR DOCD: CPT | Performed by: INTERNAL MEDICINE

## 2023-01-19 PROCEDURE — 80053 COMPREHEN METABOLIC PANEL: CPT

## 2023-01-19 PROCEDURE — 3079F DIAST BP 80-89 MM HG: CPT | Performed by: INTERNAL MEDICINE

## 2023-01-19 PROCEDURE — G8484 FLU IMMUNIZE NO ADMIN: HCPCS | Performed by: INTERNAL MEDICINE

## 2023-01-19 PROCEDURE — 1111F DSCHRG MED/CURRENT MED MERGE: CPT | Performed by: INTERNAL MEDICINE

## 2023-01-19 PROCEDURE — 1036F TOBACCO NON-USER: CPT | Performed by: INTERNAL MEDICINE

## 2023-01-19 RX ORDER — HYDROXYZINE HYDROCHLORIDE 10 MG/1
10 TABLET, FILM COATED ORAL EVERY 8 HOURS PRN
Qty: 90 TABLET | Refills: 1 | Status: SHIPPED | OUTPATIENT
Start: 2023-01-19

## 2023-01-19 RX ORDER — SPIRONOLACTONE 25 MG/1
25 TABLET ORAL DAILY
Qty: 90 TABLET | Refills: 3 | OUTPATIENT
Start: 2023-01-19

## 2023-01-19 NOTE — PROGRESS NOTES
[facility]cology  Attending Clinic Note    Reason for Visit: Follow-up on a patient with [address]     PCP:  Crystal Mcclellan DO    History of Present Illness:  68year old female who presented with frequent falls. CXR 06/03/2021:  2.1 cm round focus seen within the right hilum. CT head 6/3/2021 no acute intracranial abnormality    CT cervical spine 6/3/2021 no acute fracture or subluxation of the cervical spine  Mild multilevel DJD and DJD most prominent at the C5-C6 level  Heterogeneous appearance of the thyroid gland    CT lumbar spine 6/3/2021 no acute compression fracture or malalignment of the lumbar spine  Multilevel facet arthropathy  Degenerative joint disease at the L4-5 and L5-S1 levels most prominent at the L5-S1 level    Thyroid ultrasound 8/17/2021 multiple thyroid nodules bilaterally. CT chest on 08/17/2021  Heterogenous thyroid with multiple areas of nodularity, largest on the right measuring 1.4 cm and left 1.6 cm. Right hilar adenopathy measuring 1.9 x 1.6 cm. There are a few other borderline prominent mediastinal LN. Noncalcified 1 x 0.8 cm nodule in the posterior right upper lobe has a somewhat necrotic appearance. There is a very small faint area of ground-glass opacification in the posterior left upper lobe measuring approximately 1 x 1 cm. Noncalcified nodular density in the lateral left costophrenic angle measures 1 cm. Solid renal mass in medial left kidney measures 3.3 x 3 cm. PET/CT 08/24/2021 with increased tracer uptake to single left thyroid mass in the inferior pole with max SUV of 9.6. Increased tracer uptake to right hilum associated with small lymph node with SUV of 4.4   Increased tracer uptake left pericardial region inferior to aortopulmonary window, peak SUV 5.    Pulmonary nodules do not demonstrate increased tracer uptake, this may be related to size and may be below the resolution of PET/CT  Low level uptake at the left renal masses    FNA Left Thyroid biopsy 09/10/2021  Scant cellularity  Negative for malignant cells. Benign-appearing follicular cells, few foam cells, colloid, and blood present. [place] System Category 2. Cellblock is similar. 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 This abuts the left psoas muscle without definite invasion of left psoas muscle. Bronchoscopy/EBUS guided mediastinal Hilar LN biopsies 10/05/2021:  EBUS FNA 11R (adequacy statement satisfactory for interpretation) Negative for malignant cells. Benign and degenerated bronchial cells, scant lymphocytes and blood present  Seen by Dr. 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 See comment. Comment:CANCER CASE SUMMARY   Procedure: Partial nephrectomy   Specimen Laterality: Left   Tumor Focality: Unifocal   Tumor Site: Undesignated   Tumor Size: 3.0 cm greatest dimension   Histologic Type: Clear-cell renal cell carcinoma   Histologic Grade (WHO/ISUP): G3   Tumor Extent: Limited to kidney   Sarcomatoid Features: Not identified   Rhabdoid features: Not identified   Tumor Necrosis: Not identified   Lymph-Vascular Invasion: Not identified   Margin Status: All margins negative for invasive carcinoma   Regional lymph node Status: No lymph nodes submitted or found   Pathologic Stage Classification (AJCC 8th Edition):   pT1a   Surveillance     CT abdomen/pelvis 02/18/2022 Surgical removal of the lower pole of the left kidney. There is no evidence of local recurrence or metastatic disease at this time.   CT chest 04/13/2022 stable exam  CT abdomen/pelvis 08/19/2022: No evidence of local recurrence or abdominal metastasis    She presented to the ED December 2022 with complaints of increased shortness of breath lower extremity edema she was admitted for acute heart failure and acute PE.  IV diuresed and started on OAC, she had allergic reaction to Coumadin and was transitioned to Lovenox.  Tikosyn was discontinued and she is following with EP for plans to initiate amiodarone.    Today 01/19/2023; No fever chills. Fatigue. Shortness of breath on exertion.  No nausea vomiting. A.Fib follows with EP    Review of Systems;  CONSTITUTIONAL: No fever, chills. Fatigue  ENMT: Eyes: No diplopia; Nose: No epistaxis. Mouth: No sore throat.  RESPIRATORY: No hemoptysis. Shortness of breath on exertion  CARDIOVASCULAR: A.Fib follows with EP team  GASTROINTESTINAL: No nausea/vomiting, abdominal pain.   GENITOURINARY: No dysuria, urinary frequency, hematuria.   NEURO: No syncope, presyncope, headache.  Remainder: ROS NEGATIVE    Past Medical History:      Diagnosis Date    Afib ([facility])     WATCHMAN    Anxiety     Arthritis     Cervical radiculopathy     CHF (congestive heart failure) ([facility])     Chronic sinusitis     Ejection fraction < 50%     25%. Wearing life vest    Hilar adenopathy     Hx of blood clots     Hypertension     Left ventricular systolic dysfunction     Lesion of left native kidney     Lumbar radiculopathy     Lung nodules     Meige syndrome     partial/ PCP    Microscopic colitis     Mitral regurgitation     Mild to moderate    Nonischemic cardiomyopathy ([facility])     f/u with Dr. Savita Sauceda     PE (pulmonary thromboembolism) ([facility])     Renal cell carcinoma of left kidney ([facility]) 02/01/2022    Vertebrobasilar artery syndrome     f/u PCP     Medications:  Reviewed and reconciled.    Allergies:  Allergies   Allergen Reactions    Bentyl [Dicyclomine] Shortness Of Breath    Adhesive Tape Itching     develops rash and itching with EKG electrodes    Atacand [Candesartan] Hives and Itching    Cefdinir Hives, Itching and Nausea Only    Demerol      3/9/19 Pt states she gets a severe migraine    Digoxin  Itching     developed itching and rash    Imdur [Isosorbide Mononitrate]       migraines    Losartan Potassium Itching    Sulfa Antibiotics Diarrhea and Other (See Comments)     Also causes migraines  caused migraines and diarrhea    Xarelto [Rivaroxaban] Itching and Rash      severe itch, headache, rash    Acetaminophen Hives and Itching    Apap-Caff-Dihydrocodeine Hives and Itching    Coreg [Carvedilol] Itching     Can take extended release Coreg    Cozaar [Losartan] Itching    Diovan [Valsartan] Itching    Effexor [Venlafaxine Hydrochloride] Nausea Only    Eliquis [Apixaban] Hives, Itching and Rash     3/9/19 Pt states that she gets hives, itchy and a rash    Labetalol Itching    Lisinopril Itching    Ramipril Itching     Physical Exam:  /85 (Site: Left Upper Arm, Position: Sitting)   Pulse (!) 112 Comment: RN notified  Temp 97.1 °F (36.2 °C) (Infrared)   Resp 22   Ht 5' 7\" (1.702 m)   Wt 166 lb (75.3 kg)   SpO2 96%   BMI 26.00 kg/m²    GENERAL: Alert, oriented x 3, tired  HEENT: PERRLA; EOMI. Oropharynx clear. LUNGS: Good air entry bilaterally. No wheezing, crackles or rhonchi. CARDIOVASCULAR: tachycardic   EXTREMITIES: Without clubbing or cyanosis or edema. NEUROLOGIC: No focal deficits.    ECOG PS 2    Lab Results   Component Value Date    WBC 8.0 01/19/2023    HGB 14.0 01/19/2023    HCT 43.2 01/19/2023    MCV 86.7 01/19/2023     01/19/2023     Lab Results   Component Value Date     (L) 01/19/2023    K 3.6 01/19/2023    CL 95 (L) 01/19/2023    CO2 17 (L) 01/19/2023    BUN 23 01/19/2023    CREATININE 1.5 (H) 01/19/2023    GLUCOSE 129 (H) 01/19/2023    CALCIUM 9.9 01/19/2023    PROT 7.3 01/19/2023    LABALBU 3.7 01/19/2023    BILITOT 0.8 01/19/2023    ALKPHOS 108 (H) 01/19/2023    AST 26 01/19/2023    ALT 21 01/19/2023    LABGLOM 35 01/19/2023    GFRAA 53 09/07/2022     Impression/Plan:  67 y/o female with Left RCC    CT chest on 08/17/2021  Heterogenous thyroid with multiple areas of nodularity, largest on the right measuring 1.4 cm and left 1.6 cm. Right hilar adenopathy measuring 1.9 x 1.6 cm. There are a few other borderline prominent mediastinal LN. Noncalcified 1 x 0.8 cm nodule in the posterior right upper lobe has a somewhat necrotic appearance. There is a very small faint area of ground-glass opacification in the posterior left upper lobe measuring approximately 1 x 1 cm. Noncalcified nodular density in the lateral left costophrenic angle measures 1 cm. Solid renal mass in medial left kidney measures 3.3 x 3 cm. PET/CT 08/24/2021 with increased tracer uptake to single left thyroid mass in the inferior pole with max SUV of 9.6. Increased tracer uptake to right hilum associated with small lymph node with SUV of 4.4   Increased tracer uptake left pericardial region inferior to aortopulmonary window, peak SUV 5. Pulmonary nodules do not demonstrate increased tracer uptake, this may be related to size and may be below the resolution of PET/CT  Low level uptake at the left renal masses    FNA Left Thyroid biopsy 09/10/2021  Scant cellularity  Negative for malignant cells. Benign-appearing follicular cells, few foam cells, colloid, and blood present. [place] System Category 2. Cellblock is similar. 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 This abuts the left psoas muscle without definite invasion of left psoas muscle. Bronchoscopy/EBUS guided mediastinal Hilar LN biopsies 10/05/2021:  EBUS FNA 11R (adequacy statement satisfactory for interpretation) Negative for malignant cells.  Benign and degenerated bronchial cells, scant lymphocytes and blood present  Seen by Dr. 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 See comment. Comment:CANCER CASE SUMMARY   Procedure: Partial nephrectomy   Specimen Laterality: Left   Tumor Focality: Unifocal   Tumor Site: Undesignated   Tumor Size: 3.0 cm greatest dimension   Histologic Type: Clear-cell renal cell carcinoma   Histologic Grade (WHO/ISUP): G3   Tumor Extent: Limited to kidney   Sarcomatoid Features: Not identified   Rhabdoid features: Not identified   Tumor Necrosis: Not identified   Lymph-Vascular Invasion: Not identified   Margin Status: All margins negative for invasive carcinoma   Regional lymph node Status: No lymph nodes submitted or found   Pathologic Stage Classification (AJCC 8th Edition):   pT1a   Surveillance     CT abdomen/pelvis 02/18/2022 Surgical removal of the lower pole of the left kidney. There is no evidence of local recurrence or metastatic disease at this time. CT chest 04/13/2022 stable exam    CT abdomen/pelvis 08/19/2022:   Postop changes compatible with previous left renal lower pole tumor excision  No evidence of local recurrence or abdominal metastasis    Presented to the ED Dec 2022 for shortness of breath. CTA chest 12/17/2022 small burden acute subsegmental pulmonary emboli to the bilateral lower lobes. Trace bilateral pleural effusions. Heparin subcutaneous switched to Lovenox. Allergic to Coumadin  Javier LE U/S 12/20/2022 noted no evidence of DVT. Hospital course reviewed. Imaging reviewed. Labs reviewed. PING    RTC March 2023 with prior CT abdomen/pelvis. Recommended to f/u with pulmonary (Dr. Miryam Akhtar) and cardiology team for f/u on anticoagulation for PE and A. Fib     01/19/2023  Casa Benoit MD  I spent a total of 30 minutes on the date of the service which included preparing to see the patient, face-to-face patient care, completing clinical documentation, counseling and educating the family/family members/caregiver, ordering medications, tests, or procedures complicating results with the patient/family/caregiver

## 2023-01-19 NOTE — PROGRESS NOTES
Patient did stop at check out window, ok'd to leave without AVS.  Patient has [address]   Also called patient with next follow up appointment per request.

## 2023-01-20 ENCOUNTER — TELEPHONE (OUTPATIENT)
Dept: NON INVASIVE DIAGNOSTICS | Age: 78
End: 2023-01-20

## 2023-01-20 RX ORDER — AMIODARONE HYDROCHLORIDE 200 MG/1
200 TABLET ORAL DAILY
COMMUNITY
End: 2023-01-20 | Stop reason: SDUPTHER

## 2023-01-20 RX ORDER — AMIODARONE HYDROCHLORIDE 200 MG/1
200 TABLET ORAL DAILY
Qty: 90 TABLET | Refills: 1 | Status: SHIPPED | OUTPATIENT
Start: 2023-01-20

## 2023-01-20 NOTE — TELEPHONE ENCOUNTER
----- Message from Cherelle Back MD sent at 1/20/2023 10:45 AM EST -----  Check INR now and start Amiodarone 200 mg daily. Check INR every 2 to 3 days until it stable. Amiodarone will increase INR level and warfarin dose will likely need to be adjusted. Please let her PCP or whoever adjusting her INR knows. Thanks.  ----- Message -----  From: Cornelio Payment  Sent: 1/20/2023   8:22 AM EST  To: Cherelle Back MD    Please review labs and advise if patient can start Amiodarone.  Thank you

## 2023-01-20 NOTE — TELEPHONE ENCOUNTER
Spoke with patient she is currently not on Coumadin and taking Levenox injections. CK recommendations patient to start Amiodarone 200 mg daily with EKG in 1 wk. Patient verbalized understating. Will have EKG done at CHF clinic next week and script sent to pharmacy.

## 2023-01-25 ENCOUNTER — TELEPHONE (OUTPATIENT)
Dept: NON INVASIVE DIAGNOSTICS | Age: 78
End: 2023-01-25

## 2023-01-25 NOTE — TELEPHONE ENCOUNTER
Reminded patient of scheduled procedure on 1/26. Instructions given and COVID questionnaire completed.

## 2023-01-26 ENCOUNTER — ANESTHESIA EVENT (OUTPATIENT)
Dept: CARDIAC CATH/INVASIVE PROCEDURES | Age: 78
End: 2023-01-26

## 2023-01-26 ENCOUNTER — HOSPITAL ENCOUNTER (OUTPATIENT)
Dept: CARDIAC CATH/INVASIVE PROCEDURES | Age: 78
Discharge: HOME | End: 2023-01-26
Payer: MEDICARE

## 2023-01-26 ENCOUNTER — ANESTHESIA (OUTPATIENT)
Dept: CARDIAC CATH/INVASIVE PROCEDURES | Age: 78
End: 2023-01-26

## 2023-01-26 VITALS
TEMPERATURE: 97.7 F | SYSTOLIC BLOOD PRESSURE: 104 MMHG | HEIGHT: 67 IN | WEIGHT: 166 LBS | DIASTOLIC BLOOD PRESSURE: 75 MMHG | BODY MASS INDEX: 26.06 KG/M2 | RESPIRATION RATE: 20 BRPM | HEART RATE: 86 BPM

## 2023-01-26 PROCEDURE — 93312 ECHO TRANSESOPHAGEAL: CPT

## 2023-01-26 PROCEDURE — 3700000000 HC ANESTHESIA ATTENDED CARE

## 2023-01-26 PROCEDURE — 93321 DOPPLER ECHO F-UP/LMTD STD: CPT

## 2023-01-26 PROCEDURE — 2709999900 HC NON-CHARGEABLE SUPPLY

## 2023-01-26 PROCEDURE — 93325 DOPPLER ECHO COLOR FLOW MAPG: CPT

## 2023-01-26 PROCEDURE — 3700000001 HC ADD 15 MINUTES (ANESTHESIA)

## 2023-01-26 RX ORDER — ONDANSETRON 2 MG/ML
INJECTION INTRAMUSCULAR; INTRAVENOUS PRN
Status: DISCONTINUED | OUTPATIENT
Start: 2023-01-26 | End: 2023-01-26 | Stop reason: SDUPTHER

## 2023-01-26 RX ORDER — PROPOFOL 10 MG/ML
INJECTION, EMULSION INTRAVENOUS PRN
Status: DISCONTINUED | OUTPATIENT
Start: 2023-01-26 | End: 2023-01-26 | Stop reason: SDUPTHER

## 2023-01-26 RX ORDER — ETOMIDATE 2 MG/ML
INJECTION, SOLUTION INTRAVENOUS PRN
Status: DISCONTINUED | OUTPATIENT
Start: 2023-01-26 | End: 2023-01-26 | Stop reason: SDUPTHER

## 2023-01-26 RX ORDER — SODIUM CHLORIDE 9 MG/ML
INJECTION, SOLUTION INTRAVENOUS CONTINUOUS PRN
Status: DISCONTINUED | OUTPATIENT
Start: 2023-01-26 | End: 2023-01-26 | Stop reason: SDUPTHER

## 2023-01-26 RX ADMIN — PROPOFOL 20 MG: 10 INJECTION, EMULSION INTRAVENOUS at 10:06

## 2023-01-26 RX ADMIN — ETOMIDATE 4 MG: 2 INJECTION, SOLUTION INTRAVENOUS at 10:01

## 2023-01-26 RX ADMIN — ETOMIDATE 6 MG: 2 INJECTION, SOLUTION INTRAVENOUS at 09:58

## 2023-01-26 RX ADMIN — PROPOFOL 20 MG: 10 INJECTION, EMULSION INTRAVENOUS at 10:00

## 2023-01-26 RX ADMIN — ONDANSETRON 4 MG: 2 INJECTION INTRAMUSCULAR; INTRAVENOUS at 09:46

## 2023-01-26 RX ADMIN — SODIUM CHLORIDE: 9 INJECTION, SOLUTION INTRAVENOUS at 09:46

## 2023-01-26 RX ADMIN — ETOMIDATE 2 MG: 2 INJECTION, SOLUTION INTRAVENOUS at 10:05

## 2023-01-26 ASSESSMENT — ENCOUNTER SYMPTOMS: SHORTNESS OF BREATH: 1

## 2023-01-26 NOTE — H&P
H&P    Name: Efrain Prasad    Age: 68 y.o. Date of Service: 1/26/2023    Primary Cardiologist: Dr Marcus Campos    Chief Complaint: Post-Watchman JAIME    Interim History:  No chest pain or palpitations. +SOB. Problem List:  Patient Active Problem List   Diagnosis    Anxiety    Nonischemic cardiomyopathy (HCC)    Severe mitral regurgitation    Lumbar radiculopathy    Cervical radiculopathy    HTN (hypertension), benign    Left atrial thrombus    Paroxysmal atrial fibrillation (HCC)    Chronic HFrEF (heart failure with reduced ejection fraction) ([facility] [address]    Visit for monitoring Tikosyn therapy    Persistent atrial fibrillation ([facility] [address]    Encounter for medication refill    Itching    Chronic anticoagulation    Presence of Watchman left atrial appendage closure device    Renal mass    Renal cell carcinoma of left kidney (HCC)    Chronic renal disease, stage III (HCC) [182022]    Multiple subsegmental pulmonary emboli without acute cor pulmonale (HCC)    Acute on chronic congestive heart failure (HCC)       Current Medications:    Current Outpatient Medications:     amiodarone (CORDARONE) 200 MG tablet, Take 1 tablet by mouth daily, Disp: 90 tablet, Rfl: 1    hydrOXYzine HCl (ATARAX) 10 MG tablet, Take 1 tablet by mouth every 8 hours as needed for Itching or Anxiety, Disp: 90 tablet, Rfl: 1    spironolactone (ALDACTONE) 25 MG tablet, Take 1 tablet by mouth daily, Disp: 90 tablet, Rfl: 3    famotidine (PEPCID) 40 MG tablet, Take 40 mg by mouth daily, Disp: , Rfl:     cetirizine (ZYRTEC ALLERGY) 10 MG tablet, Take 0.5 tablets by mouth daily, Disp: 30 tablet, Rfl: 0    fluticasone (FLONASE) 50 MCG/ACT nasal spray, 1-2 sprays, each nostril daily as needed for nasal congestion. , Disp: 16 g, Rfl: 0    furosemide (LASIX) 20 MG tablet, Take 1 tablet by mouth daily, Disp: 90 tablet, Rfl: 3    metoprolol succinate (TOPROL XL) 25 MG extended release tablet, Take 1 tablet by mouth daily, Disp: 90 tablet, Rfl: 2    aspirin 81 MG EC tablet, Take 81 mg by mouth daily, Disp: , Rfl:     baclofen (LIORESAL) 10 MG tablet, Take 1 tablet by mouth nightly, Disp: 90 tablet, Rfl: 1    hydrALAZINE (APRESOLINE) 10 MG tablet, Take 1 tablet by mouth 3 times daily, Disp: 270 tablet, Rfl: 3    Handicap Placard MISC, Cannot walk more than 200 feet Expiration: 7/12/2026, Disp: 1 each, Rfl: 0    omeprazole (PRILOSEC) 40 MG delayed release capsule, Take 40 mg by mouth daily , Disp: , Rfl:     triamcinolone (KENALOG) 0.1 % cream, Apply topically 3 times daily as needed (rash) , Disp: , Rfl: 1    loperamide (IMODIUM) 2 MG capsule, Take 2 mg by mouth 4 times daily as needed for Diarrhea, Disp: , Rfl:     diphenhydrAMINE (BENADRYL) 25 MG tablet, Take 25 mg by mouth every 6 hours as needed for Itching, Disp: , Rfl:     vitamin D (CHOLECALCIFEROL) 1000 UNIT TABS tablet, Take 1,000 Units by mouth daily, Disp: , Rfl:     Physical Exam:  /75   Pulse 86   Temp 97.7 °F (36.5 °C)   Resp 20   Ht 5' 7\" (1.702 m)   Wt 166 lb (75.3 kg)   BMI 26.00 kg/m²   Wt Readings from Last 3 Encounters:   01/26/23 166 lb (75.3 kg)   01/19/23 166 lb (75.3 kg)   01/18/23 167 lb 1.6 oz (75.8 kg)     Appearance: Awake, alert, no acute respiratory distress  Skin: Intact, no rash  Head: Normocephalic, atraumatic  Eyes: EOMI, no conjunctival erythema  ENMT: No pharyngeal erythema, MMM, no rhinorrhea  Neck: Supple, no carotid bruits  Lungs: Clear to auscultation bilaterally. No wheezes, rales, or rhonchi.   Cardiac: IRRR, 2/6 systolic murmur  Abdomen: Soft, nontender, +bowel sounds  Extremities: Moves all extremities x 4, +bilateral lower extremity edema  Neurologic: No focal motor deficits apparent, normal mood and affect    Intake/Output:    Intake/Output Summary (Last 24 hours) at 1/26/2023 1447  Last data filed at 1/26/2023 1008  Gross per 24 hour   Intake 250 ml   Output --   Net 250 ml     I/O this shift:  In: 250 [I.V.:250]  Out: -     Laboratory Tests:  Lab Results   Component Value Date    CREATININE 1.5 (H) 01/19/2023    BUN 23 01/19/2023     (L) 01/19/2023    K 3.6 01/19/2023    CL 95 (L) 01/19/2023    CO2 17 (L) 01/19/2023         Lab Results   Component Value Date/Time    MG 2.2 12/29/2022 12:07 PM     No results for input(s): ALKPHOS, ALT, AST, PROT, BILITOT, BILIDIR, LABALBU in the last 72 hours. No results for input(s): WBC, RBC, HGB, HCT, MCV, MCH, MCHC, RDW, PLT, MPV in the last 72 hours. Lab Results   Component Value Date    CKTOTAL 88 12/29/2022    CKMB 6.6 (H) 11/25/2013    TROPONINI <0.01 03/09/2019    TROPONINI <0.01 11/21/2018    TROPONINI <0.01 11/20/2018     Lab Results   Component Value Date    INR 2.7 01/03/2023    INR 2.0 12/29/2022    INR 1.1 12/16/2022    PROTIME 31.9 01/03/2023    PROTIME 22.1 (H) 12/29/2022    PROTIME 12.2 12/16/2022     Lab Results   Component Value Date    TSH 1.100 01/18/2023     Lab Results   Component Value Date    LABA1C 5.7 (H) 02/01/2022     No results found for: EAG  Lab Results   Component Value Date    CHOL 151 12/18/2022    CHOL 206 (H) 02/01/2022    CHOL 179 11/21/2018     Lab Results   Component Value Date    TRIG 68 12/18/2022    TRIG 170 (H) 02/01/2022    TRIG 69 11/21/2018     Lab Results   Component Value Date    HDL 55 12/18/2022    HDL 56 02/01/2022    HDL 51 11/21/2018     Lab Results   Component Value Date    LDLCALC 82 12/18/2022    LDLCALC 116 (H) 02/01/2022    LDLCALC 114 (H) 11/21/2018     Lab Results   Component Value Date    LABVLDL 14 12/18/2022    LABVLDL 34 02/01/2022    LABVLDL 14 11/21/2018     No results found for: CHOLHDLRATIO  No results for input(s): PROBNP in the last 72 hours. Chest X-ray:   12/16/22    Impression   COPD changes, congestive changes. CTA chest 12/17/22:  Impression   Small burden acute subsegmental pulmonary emboli to the bilateral lower lobes. Echocardiogram:   TTE 12/17/22 [place]   Summary   Ejection fraction is visually estimated at 25%.    Overall ejection fraction severely decreased. Mild left ventricular concentric hypertrophy noted. Dilated right ventricle with reduced function. Left atrial volume index of 50 ml per meters squared BSA. Moderate aortic regurgitation is noted. Moderate mitral regurgitation is present. Moderate tricuspid regurgitation. Pulmonary hypertension is severe. RVSP is 70 mmHg. No evidence for hemodynamically significant pericardial effusion. JAIME 10/2021: EF 45-50  JAIME 7/2021: 45-50. DRT resolved  JAIME 4/2021: EF 45-50. + DRT on Watchman    Stress test:    Pharmacologic stress 12/20/2022     Impression   1: No definite evidence of  ischemia or scar except soft tissue   attenuation. The left ventricle is visually dilated both rest and   stress without evidence of TID     2  Dilated left ventricle with Moderate to severe global left   ventricular hypokinesis with estimated left ventricular ejection   fraction of 32%. 3:  Please see separate report for EKG and hemodynamic aspect of the   stress test.     4:  Low dose CT attenuation correction protocol was utilized which   revealed minimal to mild coronary artery ossifications. 5: RISK SCAN:  High risk scan due to dilated left ventricle with   severe global hypokinesis and EF of 32%. 2018  Left ventricular end systolic volume estimated at 85 ml and   end-diastolic volume estimated at 110 ml. Resting left ventricular   ejection fraction over 23%. Impression   1. No evidence of left ventricular myocardial stress-induced   ischemia. 2. Moderate sized left ventricular anterior wall fixed defect   concerning for old infarct or scar. Clinical correlation is   recommended. 3. Global myocardial hypokinesia. 4. Decreased left ventricular ejection fraction estimated at 23%.      Cardiac catheterization:   Right and left heart cath 2010 Dr. Roula Severino  No angiographic CAD  Right heart catheterization: RA 4, RV systolic 64, PA systolic 58, wedge 11  CO 5.47 by thermo  CI 2.9    ----------------------------------------------------------------------------------------------------------------------------------------------------------------  IMPRESSION:  Chronic HFrEF  Cardiomyopathy, recurrent. EF now 25%; previously improved from 35% in 2016 to about 50%. Stress negative for ischemia  PAF. Previously on dofetilide, now on amiodarone. S/p watchman 10/2020. Frequent PVCs/NSVT  Device related thrombus noted on 6-month JAIME; subsequently resolved  Valvular heart disease: Moderate MR, moderate TR, moderate AR  Pulmonary hypertension  Chronic left bundle branch block  Acute bilateral subsegmental PE  NIKA/CKD creatinine 1.2 -> 1.7. Baseline 1.2-1.3. Most recent Cr 1.5.   Hypertension  Renal cancer s/p partial left nephrectomy 11/2021  History of TIA  Hyperlipidemia  Migraines  Multiple medication intolerances/allergies including ACE and ARB, nitrates, DOAC's    RECOMMENDATIONS:  Will proceed with post-Watchman JAIME today as scheduled      Catrachito Pitts MD

## 2023-01-26 NOTE — PROCEDURES
PRELIMINARY TRANSESOPHAGEAL ECHOCARDIOGRAPHY REPORT    Cardiologist: Dr. Shalonda Dewitt    Date of Procedure: 1/26/2023    Indications for study:  Post-Watchman JAIME, PAF, MR    Study performed using (Sedation): Per anesthesia    Complications: None    Findings:   Severely reduced left ventricular systolic function. Reduced right ventricular function. Bi-atrial enlargement. No evidence of interatrial shunting on bubble study. History of WATCHMAN device (24 mm). No significant color flow jet around the device. No device related thrombus visualized. Severe mitral regurgitation. Mild tricuspid regurgitation. RV-RA gradient is estimated at 27 mmHg.       Jann Yang MD  [facility]) Cardiology

## 2023-01-26 NOTE — ANESTHESIA POSTPROCEDURE EVALUATION
Department of Anesthesiology  Postprocedure Note    Patient: Dany Theodore  MRN: [id number]  YOB: 1945  Date of evaluation: 1/26/2023      Procedure Summary     Date: 01/26/23 Room / Location: [facility]; [facility] ECHO    Anesthesia Start: 0946 Anesthesia Stop: 5988    Procedure: SEY JAIME Diagnosis:     Scheduled Providers:  Responsible Provider: Jose Ortiz MD    Anesthesia Type: MAC ASA Status: 4          Anesthesia Type: No value filed.     Joseph Phase I:      Joseph Phase II:        Anesthesia Post Evaluation    Patient location during evaluation: PACU  Patient participation: complete - patient participated  Level of consciousness: awake  Pain score: 0  Airway patency: patent  Nausea & Vomiting: no nausea  Complications: no  Cardiovascular status: hemodynamically stable  Respiratory status: acceptable  Hydration status: stable

## 2023-01-26 NOTE — ANESTHESIA PRE PROCEDURE
Department of Anesthesiology  Preprocedure Note       Name:  Mario Urias   Age:  68 y.o.  :  1945                                          MRN:  [id number]         Date:  2023      Surgeon: * No surgeons listed *    Procedure:  JAIME    Medications prior to admission:   Prior to Admission medications    Medication Sig Start Date End Date Taking? Authorizing Provider   amiodarone (CORDARONE) 200 MG tablet Take 1 tablet by mouth daily 23   Chintan Garcia MD   hydrOXYzine HCl (ATARAX) 10 MG tablet Take 1 tablet by mouth every 8 hours as needed for Itching or Anxiety 23   Trisha Cardona DO   spironolactone (ALDACTONE) 25 MG tablet Take 1 tablet by mouth daily 23   Cesar Martinez MD   famotidine (PEPCID) 40 MG tablet Take 40 mg by mouth daily    Historical Provider, MD   cetirizine (ZYRTEC ALLERGY) 10 MG tablet Take 0.5 tablets by mouth daily 10/19/22   Sowmyabernardino Alarcon, APRN - CNP   fluticasone (FLONASE) 50 MCG/ACT nasal spray 1-2 sprays, each nostril daily as needed for nasal congestion.  10/19/22   Sowmya Alarcon, APRN - CNP   furosemide (LASIX) 20 MG tablet Take 1 tablet by mouth daily 22   Cesar Martinez MD   metoprolol succinate (TOPROL XL) 25 MG extended release tablet Take 1 tablet by mouth daily 22   Cesar Martinez MD   aspirin 81 MG EC tablet Take 81 mg by mouth daily    Historical Provider, MD   baclofen (LIORESAL) 10 MG tablet Take 1 tablet by mouth nightly 22   Trisha Cardona DO   hydrALAZINE (APRESOLINE) 10 MG tablet Take 1 tablet by mouth 3 times daily 21   Ishmael Fabry, MD   Handicap Placard [facility] Cannot walk more than 200 feet  Expiration: 2026   Trisha Cardona DO   omeprazole (PRILOSEC) 40 MG delayed release capsule Take 40 mg by mouth daily  20   Historical Provider, MD   triamcinolone (KENALOG) 0.1 % cream Apply topically 3 times daily as needed (rash)  19   Historical Provider, MD   loperamide (IMODIUM) 2 MG capsule Take 2 mg by mouth 4 times daily as needed for Diarrhea    Historical Provider, MD   diphenhydrAMINE (BENADRYL) 25 MG tablet Take 25 mg by mouth every 6 hours as needed for Itching    Historical Provider, MD   vitamin D (CHOLECALCIFEROL) 1000 UNIT TABS tablet Take 1,000 Units by mouth daily    Historical Provider, MD       Current medications:    Current Outpatient Medications   Medication Sig Dispense Refill    amiodarone (CORDARONE) 200 MG tablet Take 1 tablet by mouth daily 90 tablet 1    hydrOXYzine HCl (ATARAX) 10 MG tablet Take 1 tablet by mouth every 8 hours as needed for Itching or Anxiety 90 tablet 1    spironolactone (ALDACTONE) 25 MG tablet Take 1 tablet by mouth daily 90 tablet 3    famotidine (PEPCID) 40 MG tablet Take 40 mg by mouth daily      cetirizine (ZYRTEC ALLERGY) 10 MG tablet Take 0.5 tablets by mouth daily 30 tablet 0    fluticasone (FLONASE) 50 MCG/ACT nasal spray 1-2 sprays, each nostril daily as needed for nasal congestion.  16 g 0    furosemide (LASIX) 20 MG tablet Take 1 tablet by mouth daily 90 tablet 3    metoprolol succinate (TOPROL XL) 25 MG extended release tablet Take 1 tablet by mouth daily 90 tablet 2    aspirin 81 MG EC tablet Take 81 mg by mouth daily      baclofen (LIORESAL) 10 MG tablet Take 1 tablet by mouth nightly 90 tablet 1    hydrALAZINE (APRESOLINE) 10 MG tablet Take 1 tablet by mouth 3 times daily 270 tablet 3    Handicap Placard MISC Cannot walk more than 200 feet  Expiration: 7/12/2026 1 each 0    omeprazole (PRILOSEC) 40 MG delayed release capsule Take 40 mg by mouth daily       triamcinolone (KENALOG) 0.1 % cream Apply topically 3 times daily as needed (rash)   1    loperamide (IMODIUM) 2 MG capsule Take 2 mg by mouth 4 times daily as needed for Diarrhea      diphenhydrAMINE (BENADRYL) 25 MG tablet Take 25 mg by mouth every 6 hours as needed for Itching      vitamin D (CHOLECALCIFEROL) 1000 UNIT TABS tablet Take 1,000 Units by mouth daily No current facility-administered medications for this encounter. Facility-Administered Medications Ordered in Other Encounters   Medication Dose Route Frequency Provider Last Rate Last Admin    0.9 % sodium chloride infusion   IntraVENous Continuous PRN Sonali Narducci   New Bag at 01/26/23 0946    ondansetron (ZOFRAN) injection   IntraVENous PRN Sonali Narducci   4 mg at 01/26/23 0946       Allergies:     Allergies   Allergen Reactions    Bentyl [Dicyclomine] Shortness Of Breath    Adhesive Tape Itching     develops rash and itching with EKG electrodes    Atacand [Candesartan] Hives and Itching    Cefdinir Hives, Itching and Nausea Only    Demerol      3/9/19 Pt states she gets a severe migraine    Digoxin Itching     developed itching and rash    Imdur [Isosorbide Mononitrate]       migraines    Losartan Potassium Itching    Sulfa Antibiotics Diarrhea and Other (See Comments)     Also causes migraines  caused migraines and diarrhea    Xarelto [Rivaroxaban] Itching and Rash      severe itch, headache, rash    Lovenox [Enoxaparin Sodium] Itching    Acetaminophen Hives and Itching    Apap-Caff-Dihydrocodeine Hives and Itching    Coreg [Carvedilol] Itching     Can take extended release Coreg    Cozaar [Losartan] Itching    Diovan [Valsartan] Itching    Effexor [Venlafaxine Hydrochloride] Nausea Only    Eliquis [Apixaban] Hives, Itching and Rash     3/9/19 Pt states that she gets hives, itchy and a rash    Labetalol Itching    Lisinopril Itching    Ramipril Itching       Problem List:    Patient Active Problem List   Diagnosis Code    Anxiety F41.9    Nonischemic cardiomyopathy ([facility] [address] I42.8    Severe mitral regurgitation I34.0    Lumbar radiculopathy M54.16    Cervical radiculopathy M54.12    HTN (hypertension), benign I10    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    Paroxysmal atrial fibrillation (HCC) I48.0    Chronic HFrEF (heart failure with reduced ejection fraction) ([facility]) I50.22    Visit for monitoring Tikosyn therapy Z51.81, Z79.899    Persistent atrial fibrillation (HCC) I48.19    Encounter for medication refill Z76.0    Itching L29.9    Chronic anticoagulation Z79.01    Presence of Watchman left atrial appendage closure device Z95.818    Renal mass N28.89    Renal cell carcinoma of left kidney (HCC) C64.2    Chronic renal disease, stage III (HCC) [620502] N18.30    Multiple subsegmental pulmonary emboli without acute cor pulmonale (HCC) I26.94    Acute on chronic congestive heart failure (HCC) I50.9       Past Medical History:        Diagnosis Date    Afib (HCC)     WATCHMAN    Anxiety     Arthritis     Cervical radiculopathy     CHF (congestive heart failure) (HCC)     Chronic sinusitis     Ejection fraction < 50%     25%.  Wearing life vest    Hilar adenopathy     Hx of blood clots     Hypertension     Left ventricular systolic dysfunction     Lesion of left native kidney     Lumbar radiculopathy     Lung nodules     Meige syndrome     partial/ PCP    Microscopic colitis     Mitral regurgitation     Mild to moderate    Nonischemic cardiomyopathy (HCC)     f/u with Dr. Chuck Ryan PE (pulmonary thromboembolism) ([facility] [address]     Renal cell carcinoma of left kidney ([facility] [address] 02/01/2022    Vertebrobasilar artery syndrome     f/u PCP       Past Surgical History:        Procedure Laterality Date    BLADDER REPAIR      BRONCHOSCOPY N/A 10/5/2021    BRONCHOSCOPY W/EBUS FNA performed by Patricia Brito MD at [facility] 10/5/2021    BRONCHOSCOPY DIAGNOSTIC OR CELL [address] ONLY performed by Patricia Brito MD at [address] TEST  11/25/13    Rt & LT cath    CARDIOVERSION  11/04/2019    Successful CV from AF to NSR   (Dr. Merline Fuller)    COLONOSCOPY  07/2020    DIAGNOSTIC CARDIAC CATH LAB PROCEDURE  2/20/10    Dr Luis Felipe Pérez CATH LAB PROCEDURE  8/24/11    Right heart cath @ [employer].  DOPPLER ECHOCARDIOGRAPHY  11/25/13    HYSTERECTOMY (CERVIX STATUS UNKNOWN)  1991    total    OTHER SURGICAL HISTORY  10/14/2020    Dr. Belle Cotto- [facility] Left 11/16/2021    ROBOT ASSISTED LAPAROSCOPIC COMPLEX LEFT PARTIAL NEPHRECTOMY performed by Aisha Garcia MD at [facility] TRANSESOPHAGEAL ECHOCARDIOGRAM  11/21/2018    Dr. Alexis Raines TRANSESOPHAGEAL ECHOCARDIOGRAM  03/18/2019    WISDOM TOOTH EXTRACTION         Social History:    Social History     Tobacco Use    Smoking status: Never    Smokeless tobacco: Never   Substance Use Topics    Alcohol use: Yes     Alcohol/week: 0.0 standard drinks     Comment: occasional wine/mixed drink every few months                                Counseling given: Not Answered      Vital Signs (Current):   Vitals:    01/26/23 0923   BP: 104/75   Pulse: 86   Resp: 20   Temp: 36.5 °C (97.7 °F)   Weight: 166 lb (75.3 kg)   Height: 5' 7\" (1.702 m)                                              BP Readings from Last 3 Encounters:   01/26/23 104/75   01/19/23 117/85   01/18/23 107/77       NPO Status:  > 8 hours except sip of water with morning meds                                                                               BMI:   Wt Readings from Last 3 Encounters:   01/26/23 166 lb (75.3 kg)   01/19/23 166 lb (75.3 kg)   01/18/23 167 lb 1.6 oz (75.8 kg)     Body mass index is 26 kg/m².     CBC:   Lab Results   Component Value Date/Time    WBC 8.0 01/19/2023 10:35 AM    RBC 4.98 01/19/2023 10:35 AM    HGB 14.0 01/19/2023 10:35 AM    HCT 43.2 01/19/2023 10:35 AM    MCV 86.7 01/19/2023 10:35 AM    RDW 15.5 01/19/2023 10:35 AM     01/19/2023 10:35 AM       CMP:   Lab Results   Component Value Date/Time     01/19/2023 10:35 AM    K 3.6 01/19/2023 10:35 AM    K 4.7 11/11/2021 11:45 AM    CL 95 01/19/2023 10:35 AM    CO2 17 01/19/2023 10:35 AM    BUN 23 01/19/2023 10:35 AM    CREATININE 1.5 01/19/2023 10:35 AM    GFRAA 53 2022 01:12 PM    LABGLOM 35 2023 10:35 AM    GLUCOSE 129 2023 10:35 AM    GLUCOSE 120 10/12/2011 08:10 PM    PROT 7.3 2023 10:35 AM    CALCIUM 9.9 2023 10:35 AM    BILITOT 0.8 2023 10:35 AM    ALKPHOS 108 2023 10:35 AM    AST 26 2023 10:35 AM    ALT 21 2023 10:35 AM       POC Tests: No results for input(s): POCGLU, POCNA, POCK, POCCL, POCBUN, POCHEMO, POCHCT in the last 72 hours.     Coags:   Lab Results   Component Value Date/Time    PROTIME 31.9 2023 10:34 AM    INR 2.7 2023 10:34 AM    INR 2.0 2022 12:07 PM    APTT 47.3 2022 12:07 PM       HCG (If Applicable): No results found for: PREGTESTUR, PREGSERUM, HCG, HCGQUANT     ABGs: No results found for: PHART, PO2ART, OTE3TXP, UXI8EKE, BEART, D4JGAIMI     Type & Screen (If Applicable):  No results found for: LABABO, LABRH    Drug/Infectious Status (If Applicable):  No results found for: HIV, HEPCAB    COVID-19 Screening (If Applicable):   Lab Results   Component Value Date/Time    COVID19 NOT DETECTED 2022 12:14 AM    COVID19 Not Detected 2021 10:15 AM     EK23  Ventricular Rate   96  79  72  78  64  65    Atrial Rate   96  79  72  78  64  65    QRS Duration ms 152  150  158  158  104  98  96    Q-T Interval ms 346  394  456  466  418  456  432    QTc Calculation (Bazett) ms 448  497  522  510  476  470  449    R Axis degrees -65  -75  -65  -76  100  67  69    T Axis degrees 104  96  105  99  88  150  [facility]           Narrative & Impression    Atrial fibrillation with rapid ventricular response  Left axis deviation  Left bundle branch block  Abnormal ECG  When compared with ECG of 29-DEC-2022 11:32,  Significant changes have occurred  Confirmed by Ricardo Bhagat ([id number]) on 2023 5:57:04 PM             ECHO: 22      Findings      Left Ventricle   Ejection fraction is visually estimated at 25%. Overall ejection fraction   severely decreased. Mild left ventricular concentric hypertrophy noted. Left ventricle size is normal. Indeterminate diastolic function. Right Ventricle   Dilated right ventricle with reduced function. Left Atrium   Left atrial volume index of 50 ml per meters squared BSA. Right Atrium   Mildly enlarged right atrium size. Mitral Valve   Mild thickening of the mitral valve leaflets. No evidence of mitral valve   stenosis. Moderate mitral regurgitation is present. Tricuspid Valve   The tricuspid valve appears structurally normal.   Moderate tricuspid regurgitation. Pulmonary hypertension is severe. RVSP is 70 mmHg. Aortic Valve   The aortic valve is trileaflet. No hemodynamically significant aortic   stenosis is present. Moderate aortic regurgitation is noted. Pulmonic Valve   Pulmonic valve is structurally normal. Physiologic and/or trace pulmonic   regurgitation present. Pericardial Effusion   No evidence for hemodynamically significant pericardial effusion. Aorta   Normal aortic root and ascending aorta. Miscellaneous   The inferior vena cava diameter is normal with normal respiratory   variation. Conclusions      Summary   Ejection fraction is visually estimated at 25%. Overall ejection fraction severely decreased. Mild left ventricular concentric hypertrophy noted. Dilated right ventricle with reduced function. Left atrial volume index of 50 ml per meters squared BSA. Moderate aortic regurgitation is noted. Moderate mitral regurgitation is present. Moderate tricuspid regurgitation. Pulmonary hypertension is severe. RVSP is 70 mmHg. No evidence for hemodynamically significant pericardial effusion.       NM Stress (12/20/2022)  Impression:  1: No definite evidence of  ischemia or scar except soft tissue  attenuation.  The left ventricle is visually dilated both rest and  stress without evidence of TID    2  Dilated left ventricle with Moderate to severe global left  ventricular hypokinesis with estimated left ventricular ejection  fraction of 32%. 3:  Please see separate report for EKG and hemodynamic aspect of the  stress test.    4:  Low dose CT attenuation correction protocol was utilized which  revealed minimal to mild coronary artery ossifications. 5: RISK SCAN:  High risk scan due to dilated left ventricle with  severe global hypokinesis and EF of 32%.      Anesthesia Evaluation  Patient summary reviewed no history of anesthetic complications:   Airway: Mallampati: II  TM distance: >3 FB   Neck ROM: limited  Mouth opening: > = 3 FB   Dental:          Pulmonary:normal exam  breath sounds clear to auscultation  (+) shortness of breath:                            ROS comment: Chronic sinusitis   Cardiovascular:  Exercise tolerance: poor (<4 METS),   (+) hypertension: moderate, valvular problems/murmurs: MR and AI, dysrhythmias ( Watchman device ): atrial fibrillation, CHF: systolic and diastolic,       ECG reviewed  Rhythm: irregular  Rate: normal  Echocardiogram reviewed  Stress test reviewed       Beta Blocker:  Dose within 24 Hrs      ROS comment: Nonischemic cardiomyopathy   Wears life vest     Neuro/Psych:   (+) neuromuscular disease (Lumbar/Cervical Radiculopathy):,              ROS comment: Meige syndrome     GI/Hepatic/Renal:   (+) GERD:, renal disease (Left Renal Mass): CRI,          ROS comment: Feeling nausea recently due to blood thinners/decreased appetite per pt. Endo/Other:    (+) blood dyscrasia (on Lovenox and to start Pradaxa today): anticoagulation therapy, arthritis:., electrolyte abnormalities (h/o hyponatremia), malignancy/cancer (renal CA-s/p partial left nephrectomy). Abdominal:       Abdomen: soft. Vascular:   + PE.        ROS comment: Vertebrobasilar artery syndrome f/u PCP     LE edema.  Other Findings:             Anesthesia Plan      MAC ASA 4       Induction: intravenous. Plan discussed with attending.                     Malik Laurel   1/26/2023      Malik Laurel  1/26/2023  9:53 AM

## 2023-01-27 ENCOUNTER — TELEPHONE (OUTPATIENT)
Dept: NON INVASIVE DIAGNOSTICS | Age: 78
End: 2023-01-27

## 2023-01-27 ENCOUNTER — HOSPITAL ENCOUNTER (OUTPATIENT)
Dept: OTHER | Age: 78
Setting detail: THERAPIES SERIES
Discharge: HOME | End: 2023-01-27
Payer: MEDICARE

## 2023-01-27 VITALS
BODY MASS INDEX: 26.16 KG/M2 | HEART RATE: 94 BPM | OXYGEN SATURATION: 98 % | RESPIRATION RATE: 20 BRPM | SYSTOLIC BLOOD PRESSURE: 104 MMHG | DIASTOLIC BLOOD PRESSURE: 64 MMHG | WEIGHT: 167 LBS

## 2023-01-27 LAB
ANION GAP SERPL CALCULATED.3IONS-SCNC: 17 MMOL/L (ref 7–16)
BUN BLDV-MCNC: 32 MG/DL (ref 6–23)
CALCIUM SERPL-MCNC: 9.5 MG/DL (ref 8.6–10.2)
CHLORIDE BLD-SCNC: 97 MMOL/L (ref 98–107)
CO2: 16 MMOL/L (ref 22–29)
CREAT SERPL-MCNC: 1.7 MG/DL (ref 0.5–1)
EKG ATRIAL RATE: 72 BPM
EKG Q-T INTERVAL: 440 MS
EKG QRS DURATION: 166 MS
EKG QTC CALCULATION (BAZETT): 552 MS
EKG R AXIS: -79 DEGREES
EKG T AXIS: 108 DEGREES
EKG VENTRICULAR RATE: 95 BPM
GFR SERPL CREATININE-BSD FRML MDRD: 31 ML/MIN/1.73
GLUCOSE BLD-MCNC: 178 MG/DL (ref 74–99)
POTASSIUM SERPL-SCNC: 3.6 MMOL/L (ref 3.5–5)
PRO-BNP: ABNORMAL PG/ML (ref 0–450)
SODIUM BLD-SCNC: 130 MMOL/L (ref 132–146)

## 2023-01-27 PROCEDURE — 96374 THER/PROPH/DIAG INJ IV PUSH: CPT

## 2023-01-27 PROCEDURE — 93005 ELECTROCARDIOGRAM TRACING: CPT | Performed by: INTERNAL MEDICINE

## 2023-01-27 PROCEDURE — 83880 ASSAY OF NATRIURETIC PEPTIDE: CPT

## 2023-01-27 PROCEDURE — 36415 COLL VENOUS BLD VENIPUNCTURE: CPT

## 2023-01-27 PROCEDURE — 99214 OFFICE O/P EST MOD 30 MIN: CPT

## 2023-01-27 PROCEDURE — 80048 BASIC METABOLIC PNL TOTAL CA: CPT

## 2023-01-27 RX ORDER — DABIGATRAN ETEXILATE 150 MG/1
150 CAPSULE ORAL 2 TIMES DAILY
COMMUNITY

## 2023-01-27 RX ORDER — FUROSEMIDE 10 MG/ML
40 INJECTION INTRAMUSCULAR; INTRAVENOUS ONCE
Status: DISCONTINUED | OUTPATIENT
Start: 2023-01-27 | End: 2023-01-28 | Stop reason: HOSPADM

## 2023-01-27 RX ORDER — SODIUM CHLORIDE 0.9 % (FLUSH) 0.9 %
10 SYRINGE (ML) INJECTION 2 TIMES DAILY
Status: DISCONTINUED | OUTPATIENT
Start: 2023-01-27 | End: 2023-01-28 | Stop reason: HOSPADM

## 2023-01-27 NOTE — TELEPHONE ENCOUNTER
Faxed to her pulmonologist Dr. Toma Landry so they can let us know when to proceed with CV. Patient is aware too.

## 2023-01-27 NOTE — PLAN OF CARE
Problem: Chronic Conditions and Co-morbidities  Goal: Patient's chronic conditions and co-morbidity symptoms are monitored and maintained or improved  Flowsheets (Taken 1/27/2023 [id number])  Care Plan - Patient's Chronic Conditions and Co-Morbidity Symptoms are Monitored and Maintained or Improved: Monitor and assess patient's chronic conditions and comorbid symptoms for stability, deterioration, or improvement

## 2023-01-27 NOTE — RESULT ENCOUNTER NOTE
Spoke with Umu Thacker in CHF clinic  Patient hypervolemic and in atrial fibrillation at CHF clinic   Will have her increase lasix to 40 mg daily   Return to CHF clinic in 4-5 days     Thank you

## 2023-01-27 NOTE — PROGRESS NOTES
Congestive Heart Failure [address] [place] [address]   1945          Referring Provider: Klarissa Jeffrey( CHF Navigator )  Primary Care Physician: Dr Alie Chance  Cardiologist: Dr Myriam Farah  Nephrologist: N/A        History of Present Illness:     Irving Mancilla is a 68 y.o. female with a history of HFrEF, most recent EF 25% on 12-17-22. Patient Story:    She does  have dyspnea with exertion, shortness of breath, or decline in overall functional capacity. She does have orthopnea, PND, nocturnal cough or hemoptysis. She does not have abdominal distention or bloating, early satiety, anorexia/change in appetite. She does has a good urinary response to  oral diuretic. She DOES  have  lower extremity edema. She denies lightheadedness, dizziness. She denies palpitations, syncope or near syncope. She does not complain of chest pain, pressure, discomfort.          Allergies   Allergen Reactions    Bentyl [Dicyclomine] Shortness Of Breath    Adhesive Tape Itching     develops rash and itching with EKG electrodes    Atacand [Candesartan] Hives and Itching    Cefdinir Hives, Itching and Nausea Only    Demerol      3/9/19 Pt states she gets a severe migraine    Digoxin Itching     developed itching and rash    Imdur [Isosorbide Mononitrate]       migraines    Losartan Potassium Itching    Sulfa Antibiotics Diarrhea and Other (See Comments)     Also causes migraines  caused migraines and diarrhea    Xarelto [Rivaroxaban] Itching and Rash      severe itch, headache, rash    Lovenox [Enoxaparin Sodium] Itching     States  her pulmonary doctor-DR Shaq Martinez took her off  lovenox (rash-itching)and she is starting on pradaxa today 1/26/2023    Acetaminophen Hives and Itching    Apap-Caff-Dihydrocodeine Hives and Itching    Coreg [Carvedilol] Itching     Can take extended release Coreg    Cozaar [Losartan] Itching    Diovan [Valsartan] Itching    Effexor [Venlafaxine Hydrochloride] Nausea Only    Eliquis [Apixaban] Hives, Itching and Rash     3/9/19 Pt states that she gets hives, itchy and a rash    Labetalol Itching    Lisinopril Itching    Ramipril Itching         Outpatient Medications Marked as Taking for the 1/27/23 encounter [facility] Encounter) with [facility] ROOM 1   Medication Sig Dispense Refill    dabigatran (PRADAXA) 150 MG capsule Take 150 mg by mouth 2 times daily       Current Outpatient Medications on File Prior to Encounter   Medication Sig Dispense Refill    dabigatran (PRADAXA) 150 MG capsule Take 150 mg by mouth 2 times daily      amiodarone (CORDARONE) 200 MG tablet Take 1 tablet by mouth daily 90 tablet 1    hydrOXYzine HCl (ATARAX) 10 MG tablet Take 1 tablet by mouth every 8 hours as needed for Itching or Anxiety 90 tablet 1    spironolactone (ALDACTONE) 25 MG tablet Take 1 tablet by mouth daily 90 tablet 3    famotidine (PEPCID) 40 MG tablet Take 40 mg by mouth daily (Patient not taking: Reported on 1/27/2023)      cetirizine (ZYRTEC ALLERGY) 10 MG tablet Take 0.5 tablets by mouth daily 30 tablet 0    fluticasone (FLONASE) 50 MCG/ACT nasal spray 1-2 sprays, each nostril daily as needed for nasal congestion.  16 g 0    furosemide (LASIX) 20 MG tablet Take 1 tablet by mouth daily 90 tablet 3    metoprolol succinate (TOPROL XL) 25 MG extended release tablet Take 1 tablet by mouth daily 90 tablet 2    aspirin 81 MG EC tablet Take 81 mg by mouth daily      baclofen (LIORESAL) 10 MG tablet Take 1 tablet by mouth nightly (Patient not taking: Reported on 1/27/2023) 90 tablet 1    hydrALAZINE (APRESOLINE) 10 MG tablet Take 1 tablet by mouth 3 times daily 270 tablet 3    Handicap Placard MISC Cannot walk more than 200 feet  Expiration: 7/12/2026 1 each 0    omeprazole (PRILOSEC) 40 MG delayed release capsule Take 40 mg by mouth daily  (Patient not taking: Reported on 1/27/2023)      triamcinolone (KENALOG) 0.1 % cream Apply topically 3 times daily as needed (rash)   1    loperamide (IMODIUM) 2 MG capsule Take 2 mg by mouth 4 times daily as needed for Diarrhea      diphenhydrAMINE (BENADRYL) 25 MG tablet Take 25 mg by mouth every 6 hours as needed for Itching      vitamin D (CHOLECALCIFEROL) 1000 UNIT TABS tablet Take 1,000 Units by mouth daily       No current facility-administered medications on file prior to encounter. Guideline directed medical:  ARNI/ACE I/ARB: No  Beta blocker:   Yes  Aldosterone antagonist:  Yes  SGLT2i:  No        Physical Examination:     /64   Pulse 94   Resp 20   Wt 167 lb (75.8 kg)   SpO2 98%   BMI 26.16 kg/m²          Neurological  Level of Consciousness: Alert (0)         HEENT (Head, Ears, Eyes, Nose, & Throat)  HEENT (WDL): Exceptions to WDL  Right Eye: Glasses  Left Eye: Glasses    Respiratory  Respiratory Pattern: Regular  Respiratory Depth: Normal  Respiratory Quality/Effort: Dyspnea with exertion  L Breath Sounds: Clear, Diminished  R Breath Sounds: Clear, Diminished              Cardiac  Cardiac Rhythm: A fib RVR    Rhythm Interpretation  Additional Details: AFIB  Heart Rate: 94         Gastrointestinal  Abdominal (WDL): Within Defined Limits               Peripheral Vascular  Peripheral Vascular (WDL): Exceptions to WDL  Edema: Right lower extremity, Left lower extremity  RLE Edema: +1, Pitting  LLE Edema: +1, Pitting                   Genitourinary  Genitourinary (WDL): Within Defined Limits                             Heart Rate: 94                     LAB DATA:    Last 3 BMP      Sodium (mmol/L)   Date Value   01/19/2023 129 (L)   01/18/2023 135   01/13/2023 138     Potassium (mmol/L)   Date Value   01/19/2023 3.6   01/18/2023 3.9   01/13/2023 3.9     Potassium reflex Magnesium (mmol/L)   Date Value   11/11/2021 4.7   10/16/2020 4.2   10/15/2020 4.1     Chloride (mmol/L)   Date Value   01/19/2023 95 (L)   01/18/2023 101   01/13/2023 103     CO2 (mmol/L)   Date Value   01/19/2023 17 (L)   01/18/2023 18 (L)   01/13/2023 18 (L) BUN (mg/dL)   Date Value   01/19/2023 23   01/18/2023 20   01/13/2023 28 (H)     Glucose (mg/dL)   Date Value   01/19/2023 129 (H)   01/18/2023 134 (H)   01/13/2023 111 (H)   10/12/2011 120 (H)   08/16/2011 132 (H)     Calcium (mg/dL)   Date Value   01/19/2023 9.9   01/18/2023 10.0   01/13/2023 10.0       Last 3 BNP       Pro-BNP (pg/mL)   Date Value   01/18/2023 7,843 (H)   01/13/2023 7,266 (H)   12/20/2022 5,563 (H)          CBC: No results for input(s): WBC, HGB, PLT in the last 72 hours. BMP:  No results for input(s): NA, K, CL, CO2, BUN, CREATININE, GLUCOSE in the last 72 hours. Hepatic: No results for input(s): AST, ALT, ALB, BILITOT, ALKPHOS in the last 72 hours. Troponin: No results for input(s): TROPONINI in the last 72 hours. BNP: No results for input(s): BNP in the last 72 hours. Lipids: No results for input(s): CHOL, HDL in the last 72 hours. Invalid input(s): LDLCALCU  INR: No results for input(s): INR in the last 72 hours.         WEIGHTS:    Wt Readings from Last 3 Encounters:   01/27/23 167 lb (75.8 kg)   01/26/23 166 lb (75.3 kg)   01/19/23 166 lb (75.3 kg)         TELEMETRY:  Cardiac Regularity: Irregular  Cardiac Rhythm/Interpretation: A Fib        ASSESSMENT:  Andriy Charles HAS 2LB WEIGHT GAIN AND C/O INCREASE SHORTNESS OF BREATH AND INCREASE IN BLE/  EKG COMPLETED PER DR. Patric Mirza REQUEST  Interventions completed this visit:  IV diuretics given YES  LASIX 40 MG IV  Lab work obtained yes, BMP/BNP   Reviewed currently prescribed medications with patient, educated on importance of compliance and answered any questions regarding their medication  Educated on signs and symptoms of HF  Educated on low sodium diet    PLAN:  Scheduled to follow up in CHF clinic on   Future Appointments   Date Time Provider Duke Santana   2/3/2023 12:15 PM [facility] CHF ROOM 1 [place] [facility]   2/7/2023  9:30 AM Jose Morejon MD [facility] CARDIO [facility]   3/16/2023  1:15 PM [place] MED ONC FAST TRACK 2 [address] [facility]   3/16/2023  1:30 PM Ed Winslow MD MED ONC [facility]   4/6/2023 10:00 AM [facility]     Given clinic phone number and aware of signs and symptoms to call with any HF change in symptoms.     Vandana Sue

## 2023-01-27 NOTE — PROGRESS NOTES
UYEN Myers - CNP   Nurse Practitioner   Specialty:  Nurse Practitioner   Result Encounter Note      Signed   Date of Service:  1/27/2023 12:30 PM                 Signed                Spoke with Marina Alston in CHF clinic  Patient hypervolemic and in atrial fibrillation at CHF clinic   Will have her increase lasix to 40 mg daily   Return to CHF clinic in 4-5 days             Spoke to [facility], informed of above orders, pt repeated instructions and verbalized understanding. Will return to [facility] on Tuesday 1/31/23.

## 2023-01-27 NOTE — TELEPHONE ENCOUNTER
----- Message from Chace Campbell MD sent at 1/27/2023  1:12 PM EST -----  DC-CV after is cleared by Pulmonary post PE. Please make sure that she on appropriate anticoagulation for at least 3 straight week before CV.

## 2023-01-27 NOTE — PROGRESS NOTES
Dr. Graham Sender notified of ekg result. Office will notify pt with further orders. Pt. Informed of same.

## 2023-01-31 ENCOUNTER — HOSPITAL ENCOUNTER (OUTPATIENT)
Dept: OTHER | Age: 78
Setting detail: THERAPIES SERIES
Discharge: HOME | End: 2023-01-31
Payer: MEDICARE

## 2023-01-31 ENCOUNTER — TELEPHONE (OUTPATIENT)
Dept: NON INVASIVE DIAGNOSTICS | Age: 78
End: 2023-01-31

## 2023-01-31 VITALS
SYSTOLIC BLOOD PRESSURE: 100 MMHG | WEIGHT: 162 LBS | HEART RATE: 95 BPM | BODY MASS INDEX: 25.37 KG/M2 | RESPIRATION RATE: 20 BRPM | OXYGEN SATURATION: 97 % | DIASTOLIC BLOOD PRESSURE: 60 MMHG

## 2023-01-31 LAB
ANION GAP SERPL CALCULATED.3IONS-SCNC: 17 MMOL/L (ref 7–16)
BUN BLDV-MCNC: 40 MG/DL (ref 6–23)
CALCIUM SERPL-MCNC: 10 MG/DL (ref 8.6–10.2)
CHLORIDE BLD-SCNC: 96 MMOL/L (ref 98–107)
CO2: 21 MMOL/L (ref 22–29)
CREAT SERPL-MCNC: 2.2 MG/DL (ref 0.5–1)
GFR SERPL CREATININE-BSD FRML MDRD: 22 ML/MIN/1.73
GLUCOSE BLD-MCNC: 164 MG/DL (ref 74–99)
POTASSIUM SERPL-SCNC: 3.9 MMOL/L (ref 3.5–5)
PRO-BNP: ABNORMAL PG/ML (ref 0–450)
SODIUM BLD-SCNC: 134 MMOL/L (ref 132–146)

## 2023-01-31 PROCEDURE — 2580000003 HC RX 258: Performed by: INTERNAL MEDICINE

## 2023-01-31 PROCEDURE — 96374 THER/PROPH/DIAG INJ IV PUSH: CPT

## 2023-01-31 PROCEDURE — 36415 COLL VENOUS BLD VENIPUNCTURE: CPT

## 2023-01-31 PROCEDURE — 83880 ASSAY OF NATRIURETIC PEPTIDE: CPT

## 2023-01-31 PROCEDURE — 6360000002 HC RX W HCPCS: Performed by: INTERNAL MEDICINE

## 2023-01-31 PROCEDURE — 80048 BASIC METABOLIC PNL TOTAL CA: CPT

## 2023-01-31 PROCEDURE — 99214 OFFICE O/P EST MOD 30 MIN: CPT

## 2023-01-31 RX ORDER — SODIUM CHLORIDE 0.9 % (FLUSH) 0.9 %
10 SYRINGE (ML) INJECTION PRN
Status: DISCONTINUED | OUTPATIENT
Start: 2023-01-31 | End: 2023-02-01 | Stop reason: HOSPADM

## 2023-01-31 RX ORDER — FUROSEMIDE 10 MG/ML
40 INJECTION INTRAMUSCULAR; INTRAVENOUS ONCE
Status: COMPLETED | OUTPATIENT
Start: 2023-01-31 | End: 2023-01-31

## 2023-01-31 RX ADMIN — FUROSEMIDE 40 MG: 10 INJECTION, SOLUTION INTRAMUSCULAR; INTRAVENOUS at 10:18

## 2023-01-31 RX ADMIN — SODIUM CHLORIDE, PRESERVATIVE FREE 10 ML: 5 INJECTION INTRAVENOUS at 10:18

## 2023-01-31 NOTE — PROGRESS NOTES
Congestive Heart Failure [address] [place] [address]   1945          Referring Provider: Emma Urrutia( CHF Navigator )  Primary Care Physician: Dr Tanisha Donohue  Cardiologist: Dr Mary Nicholson  Nephrologist: N/A        History of Present Illness:     Elizabeth Eason is a 68 y.o. female with a history of HFrEF, most recent EF 25% on 12-17-22. Patient Story:    She does  have dyspnea with exertion, shortness of breath, or decline in overall functional capacity. She does have orthopnea, PND, nocturnal cough or hemoptysis. She does not have abdominal distention or bloating, early satiety, anorexia/change in appetite. She does has a good urinary response to  oral diuretic. She DOES  have  lower extremity edema. She admits to occasional lightheadedness, dizziness. She denies palpitations, syncope or near syncope. She does not complain of chest pain, pressure, discomfort.          Allergies   Allergen Reactions    Bentyl [Dicyclomine] Shortness Of Breath    Adhesive Tape Itching     develops rash and itching with EKG electrodes    Atacand [Candesartan] Hives and Itching    Cefdinir Hives, Itching and Nausea Only    Demerol      3/9/19 Pt states she gets a severe migraine    Digoxin Itching     developed itching and rash    Imdur [Isosorbide Mononitrate]       migraines    Losartan Potassium Itching    Sulfa Antibiotics Diarrhea and Other (See Comments)     Also causes migraines  caused migraines and diarrhea    Xarelto [Rivaroxaban] Itching and Rash      severe itch, headache, rash    Lovenox [Enoxaparin Sodium] Itching     States  her pulmonary doctor-DR Georges Yap took her off  lovenox (rash-itching)and she is starting on pradaxa today 1/26/2023    Acetaminophen Hives and Itching    Apap-Caff-Dihydrocodeine Hives and Itching    Coreg [Carvedilol] Itching     Can take extended release Coreg    Cozaar [Losartan] Itching    Diovan [Valsartan] Itching    Effexor [Venlafaxine Hydrochloride] Nausea Only    Eliquis [Apixaban] Hives, Itching and Rash     3/9/19 Pt states that she gets hives, itchy and a rash    Labetalol Itching    Lisinopril Itching    Ramipril Itching         No outpatient medications have been marked as taking for the 1/31/23 encounter [facility] Encounter) with [facility] ROOM 1. Current Outpatient Medications on File Prior to Encounter   Medication Sig Dispense Refill    dabigatran (PRADAXA) 150 MG capsule Take 150 mg by mouth 2 times daily      amiodarone (CORDARONE) 200 MG tablet Take 1 tablet by mouth daily 90 tablet 1    hydrOXYzine HCl (ATARAX) 10 MG tablet Take 1 tablet by mouth every 8 hours as needed for Itching or Anxiety 90 tablet 1    spironolactone (ALDACTONE) 25 MG tablet Take 1 tablet by mouth daily 90 tablet 3    cetirizine (ZYRTEC ALLERGY) 10 MG tablet Take 0.5 tablets by mouth daily (Patient not taking: Reported on 1/31/2023) 30 tablet 0    fluticasone (FLONASE) 50 MCG/ACT nasal spray 1-2 sprays, each nostril daily as needed for nasal congestion.  16 g 0    furosemide (LASIX) 20 MG tablet Take 1 tablet by mouth daily (Patient taking differently: Take 40 mg by mouth daily) 90 tablet 3    metoprolol succinate (TOPROL XL) 25 MG extended release tablet Take 1 tablet by mouth daily 90 tablet 2    aspirin 81 MG EC tablet Take 81 mg by mouth daily      baclofen (LIORESAL) 10 MG tablet Take 1 tablet by mouth nightly (Patient not taking: Reported on 1/27/2023) 90 tablet 1    hydrALAZINE (APRESOLINE) 10 MG tablet Take 1 tablet by mouth 3 times daily 270 tablet 3    Handicap Placard MISC Cannot walk more than 200 feet  Expiration: 7/12/2026 1 each 0    omeprazole (PRILOSEC) 40 MG delayed release capsule Take 40 mg by mouth daily      triamcinolone (KENALOG) 0.1 % cream Apply topically 3 times daily as needed (rash)   1    loperamide (IMODIUM) 2 MG capsule Take 2 mg by mouth 4 times daily as needed for Diarrhea      diphenhydrAMINE (BENADRYL) 25 MG tablet Take 25 mg by mouth every 6 hours as needed for Itching      vitamin D (CHOLECALCIFEROL) 1000 UNIT TABS tablet Take 1,000 Units by mouth daily       No current facility-administered medications on file prior to encounter. Guideline directed medical:  ARNI/ACE I/ARB: No  Beta blocker:   Yes  Aldosterone antagonist:  Yes  SGLT2i:  No        Physical Examination:     /60   Pulse 95   Resp 20   Wt 162 lb (73.5 kg)   SpO2 97%   BMI 25.37 kg/m²     Assessment  Charting Type: Shift assessment    Neurological  Level of Consciousness: Alert (0)         HEENT (Head, Ears, Eyes, Nose, & Throat)  HEENT (WDL): Exceptions to WDL  Right Eye: Glasses  Left Eye: Glasses    Respiratory  Respiratory Pattern: Regular  Respiratory Depth: Normal  Respiratory Quality/Effort: Dyspnea with exertion  L Breath Sounds: Clear, Diminished  R Breath Sounds: Clear, Diminished              Cardiac  Cardiac Regularity: Irregular  Cardiac Rhythm: Atrial fib    Rhythm Interpretation  Heart Rate: 95         Gastrointestinal  Abdominal (WDL): Within Defined Limits               Peripheral Vascular  Peripheral Vascular (WDL): Exceptions to WDL  Edema: Right lower extremity, Left lower extremity  RLE Edema: +1, Pitting  LLE Edema: +1, Pitting                   Genitourinary  Genitourinary (WDL): Within Defined Limits                             Heart Rate: 95                     LAB DATA:    Last 3 BMP      Sodium (mmol/L)   Date Value   01/27/2023 130 (L)   01/19/2023 129 (L)   01/18/2023 135     Potassium (mmol/L)   Date Value   01/27/2023 3.6   01/19/2023 3.6   01/18/2023 3.9     Potassium reflex Magnesium (mmol/L)   Date Value   11/11/2021 4.7   10/16/2020 4.2   10/15/2020 4.1     Chloride (mmol/L)   Date Value   01/27/2023 97 (L)   01/19/2023 95 (L)   01/18/2023 101     CO2 (mmol/L)   Date Value   01/27/2023 16 (L)   01/19/2023 17 (L)   01/18/2023 18 (L)     BUN (mg/dL)   Date Value   01/27/2023 32 (H)   01/19/2023 23   01/18/2023 20 Glucose (mg/dL)   Date Value   01/27/2023 178 (H)   01/19/2023 129 (H)   01/18/2023 134 (H)   10/12/2011 120 (H)   08/16/2011 132 (H)     Calcium (mg/dL)   Date Value   01/27/2023 9.5   01/19/2023 9.9   01/18/2023 10.0       Last 3 BNP       Pro-BNP (pg/mL)   Date Value   01/27/2023 10,308 (H)   01/18/2023 7,902 (H)   01/13/2023 7,266 (H)          CBC: No results for input(s): WBC, HGB, PLT in the last 72 hours. BMP:  No results for input(s): NA, K, CL, CO2, BUN, CREATININE, GLUCOSE in the last 72 hours. Hepatic: No results for input(s): AST, ALT, ALB, BILITOT, ALKPHOS in the last 72 hours. Troponin: No results for input(s): TROPONINI in the last 72 hours. BNP: No results for input(s): BNP in the last 72 hours. Lipids: No results for input(s): CHOL, HDL in the last 72 hours. Invalid input(s): LDLCALCU  INR: No results for input(s): INR in the last 72 hours. WEIGHTS:    Wt Readings from Last 3 Encounters:   01/31/23 162 lb (73.5 kg)   01/27/23 167 lb (75.8 kg)   01/26/23 166 lb (75.3 kg)         TELEMETRY:  Cardiac Regularity: Irregular  Cardiac Rhythm/Interpretation: A Fib        ASSESSMENT:  Jonah Charles's weight is down 5 lbs from last week. Lasix had been increased on 1-27-23 to 40 mg QD from 20 mg QD. Continues to c/o SOB and continues with BLE (Feet/ankle ) edema   States she's scheduled for a Cardioversion on 2-13-23.     Interventions completed this visit:  IV diuretics given YES  LASIX 40 MG IVP  Lab work obtained yes, BMP/BNP   Reviewed currently prescribed medications with patient, educated on importance of compliance and answered any questions regarding their medication  Educated on signs and symptoms of HF  Educated on low sodium diet    PLAN:  Scheduled to follow up in CHF clinic on   Future Appointments   Date Time Provider Duke Santana   2/7/2023  9:30 AM Erick Diane MD [address] [place] [address]   2/8/2023 12:30 PM [facility]   2/13/2023  1:30 PM [facility] CVL SPECIAL PROCEDURES LAB [facility] CATH . Luis E    3/16/2023  1:15 PM [facility] MED ONC FAST TRACK 2 [facility] Med Onc [facility]   3/16/2023  1:30 PM Salvador Blanchard MD MED ONC [facility]   4/6/2023 10:00 AM MD Nancy Ca [facility]     Given clinic phone number and aware of signs and symptoms to call with any HF change in symptoms.     Violetta Smart

## 2023-01-31 NOTE — PROGRESS NOTES
Patient notified to increase lasix to 40 mg BID, and to return to clinic on Thursday. Demonstrates understanding.

## 2023-01-31 NOTE — TELEPHONE ENCOUNTER
----- Message from Adriana Jain MD sent at 1/31/2023  1:18 PM EST -----  Sure. Please arrange, Anisha [place]. Thanks.  ----- Message -----  From: UYEN Rock - CNP  Sent: 1/31/2023   1:10 PM EST  To: Adri[facility] Jain MD[facility]    Good afternoon,   We have been seeing patient in the CHF clinic weekly, she has worsening hypervolemia with increased pro-bnp and cardiorenal despite adjustment of oral diuretic and intermittent IV Lasix from the clinic. Is there any possibility to move CV up to a sooner date? Thank you!

## 2023-01-31 NOTE — RESULT ENCOUNTER NOTE
Labs and CHF clinic note reviewed  Message sent to EP to see if we can move CV to sooner date (not scheduled for another 2 weeks)  Despite increasing oral lasix and intermittent IV lasix she continue to have worsening hypervolemia, rise in pro-bnp and cardiorenal   We will see her back in clinic this week for close follow up    Thank you

## 2023-01-31 NOTE — PLAN OF CARE
Problem: Chronic Conditions and Co-morbidities  Goal: Patient's chronic conditions and co-morbidity symptoms are monitored and maintained or improved  Flowsheets (Taken 1/31/2023 4861)  Care Plan - Patient's Chronic Conditions and Co-Morbidity Symptoms are Monitored and Maintained or Improved: Monitor and assess patient's chronic conditions and comorbid symptoms for stability, deterioration, or improvement

## 2023-02-02 ENCOUNTER — HOSPITAL ENCOUNTER (OUTPATIENT)
Dept: OTHER | Age: 78
Setting detail: THERAPIES SERIES
Discharge: HOME | End: 2023-02-02
Payer: MEDICARE

## 2023-02-02 VITALS
BODY MASS INDEX: 25.22 KG/M2 | HEART RATE: 98 BPM | OXYGEN SATURATION: 97 % | DIASTOLIC BLOOD PRESSURE: 69 MMHG | WEIGHT: 161 LBS | RESPIRATION RATE: 20 BRPM | SYSTOLIC BLOOD PRESSURE: 99 MMHG

## 2023-02-02 LAB
ANION GAP SERPL CALCULATED.3IONS-SCNC: 15 MMOL/L (ref 7–16)
BUN BLDV-MCNC: 47 MG/DL (ref 6–23)
CALCIUM SERPL-MCNC: 10.3 MG/DL (ref 8.6–10.2)
CHLORIDE BLD-SCNC: 97 MMOL/L (ref 98–107)
CO2: 24 MMOL/L (ref 22–29)
CREAT SERPL-MCNC: 2.2 MG/DL (ref 0.5–1)
GFR SERPL CREATININE-BSD FRML MDRD: 22 ML/MIN/1.73
GLUCOSE BLD-MCNC: 126 MG/DL (ref 74–99)
POTASSIUM SERPL-SCNC: 4.2 MMOL/L (ref 3.5–5)
PRO-BNP: ABNORMAL PG/ML (ref 0–450)
SODIUM BLD-SCNC: 136 MMOL/L (ref 132–146)

## 2023-02-02 PROCEDURE — 6360000002 HC RX W HCPCS: Performed by: NURSE PRACTITIONER

## 2023-02-02 PROCEDURE — 2580000003 HC RX 258: Performed by: NURSE PRACTITIONER

## 2023-02-02 PROCEDURE — 80048 BASIC METABOLIC PNL TOTAL CA: CPT

## 2023-02-02 PROCEDURE — 96365 THER/PROPH/DIAG IV INF INIT: CPT

## 2023-02-02 PROCEDURE — 99214 OFFICE O/P EST MOD 30 MIN: CPT

## 2023-02-02 PROCEDURE — 36415 COLL VENOUS BLD VENIPUNCTURE: CPT

## 2023-02-02 PROCEDURE — 83880 ASSAY OF NATRIURETIC PEPTIDE: CPT

## 2023-02-02 RX ORDER — FUROSEMIDE 10 MG/ML
40 INJECTION INTRAMUSCULAR; INTRAVENOUS ONCE
Status: COMPLETED | OUTPATIENT
Start: 2023-02-02 | End: 2023-02-02

## 2023-02-02 RX ORDER — SODIUM CHLORIDE 0.9 % (FLUSH) 0.9 %
10 SYRINGE (ML) INJECTION 2 TIMES DAILY
Status: DISCONTINUED | OUTPATIENT
Start: 2023-02-02 | End: 2023-02-03 | Stop reason: HOSPADM

## 2023-02-02 RX ADMIN — FUROSEMIDE 40 MG: 10 INJECTION, SOLUTION INTRAMUSCULAR; INTRAVENOUS at 13:23

## 2023-02-02 RX ADMIN — SODIUM CHLORIDE, PRESERVATIVE FREE 10 ML: 5 INJECTION INTRAVENOUS at 13:23

## 2023-02-02 NOTE — PROGRESS NOTES
Congestive Heart Failure [address] [place] [address]   1945          Referring Provider: Lauro Alvarez( CHF Navigator )  Primary Care Physician: Dr Tejinder Tejada  Cardiologist: Dr Maciej Bazan  Nephrologist: N/A        History of Present Illness:     Bijna Baker is a 68 y.o. female with a history of HFrEF, most recent EF 25% on 12-17-22. Patient Story:    She does  have dyspnea with exertion, shortness of breath, or decline in overall functional capacity. She does have orthopnea, PND, nocturnal cough or hemoptysis. She does not have abdominal distention or bloating, early satiety, anorexia/change in appetite. She does has a good urinary response to  oral diuretic. She DOES  have  lower extremity edema. She admits to occasional lightheadedness, dizziness. She denies palpitations, syncope or near syncope. She does not complain of chest pain, pressure, discomfort.          Allergies   Allergen Reactions    Bentyl [Dicyclomine] Shortness Of Breath    Adhesive Tape Itching     develops rash and itching with EKG electrodes    Atacand [Candesartan] Hives and Itching    Cefdinir Hives, Itching and Nausea Only    Demerol      3/9/19 Pt states she gets a severe migraine    Digoxin Itching     developed itching and rash    Imdur [Isosorbide Mononitrate]       migraines    Losartan Potassium Itching    Sulfa Antibiotics Diarrhea and Other (See Comments)     Also causes migraines  caused migraines and diarrhea    Xarelto [Rivaroxaban] Itching and Rash      severe itch, headache, rash    Lovenox [Enoxaparin Sodium] Itching     States  her pulmonary doctor-DR Alan Duran took her off  lovenox (rash-itching)and she is starting on pradaxa today 1/26/2023    Acetaminophen Hives and Itching    Apap-Caff-Dihydrocodeine Hives and Itching    Coreg [Carvedilol] Itching     Can take extended release Coreg    Cozaar [Losartan] Itching    Diovan [Valsartan] Itching    Effexor [Venlafaxine Hydrochloride] Nausea Only    Eliquis [Apixaban] Hives, Itching and Rash     3/9/19 Pt states that she gets hives, itchy and a rash    Labetalol Itching    Lisinopril Itching    Ramipril Itching         No outpatient medications have been marked as taking for the 2/2/23 encounter [facility] Encounter) with [facility] ROOM 1. Current Outpatient Medications on File Prior to Encounter   Medication Sig Dispense Refill    dabigatran (PRADAXA) 150 MG capsule Take 150 mg by mouth 2 times daily      amiodarone (CORDARONE) 200 MG tablet Take 1 tablet by mouth daily 90 tablet 1    hydrOXYzine HCl (ATARAX) 10 MG tablet Take 1 tablet by mouth every 8 hours as needed for Itching or Anxiety 90 tablet 1    spironolactone (ALDACTONE) 25 MG tablet Take 1 tablet by mouth daily 90 tablet 3    cetirizine (ZYRTEC ALLERGY) 10 MG tablet Take 0.5 tablets by mouth daily 30 tablet 0    fluticasone (FLONASE) 50 MCG/ACT nasal spray 1-2 sprays, each nostril daily as needed for nasal congestion.  16 g 0    furosemide (LASIX) 20 MG tablet Take 1 tablet by mouth daily (Patient taking differently: Take 40 mg by mouth daily) 90 tablet 3    metoprolol succinate (TOPROL XL) 25 MG extended release tablet Take 1 tablet by mouth daily 90 tablet 2    aspirin 81 MG EC tablet Take 81 mg by mouth daily (Patient not taking: Reported on 2/2/2023)      baclofen (LIORESAL) 10 MG tablet Take 1 tablet by mouth nightly 90 tablet 1    hydrALAZINE (APRESOLINE) 10 MG tablet Take 1 tablet by mouth 3 times daily 270 tablet 3    Handicap Placard MISC Cannot walk more than 200 feet  Expiration: 7/12/2026 1 each 0    omeprazole (PRILOSEC) 40 MG delayed release capsule Take 40 mg by mouth daily (Patient not taking: Reported on 2/2/2023)      triamcinolone (KENALOG) 0.1 % cream Apply topically 3 times daily as needed (rash)   1    loperamide (IMODIUM) 2 MG capsule Take 2 mg by mouth 4 times daily as needed for Diarrhea      diphenhydrAMINE (BENADRYL) 25 MG tablet Take 25 mg by mouth every 6 hours as needed for Itching      vitamin D (CHOLECALCIFEROL) 1000 UNIT TABS tablet Take 1,000 Units by mouth daily       No current facility-administered medications on file prior to encounter. Guideline directed medical:  ARNI/ACE I/ARB: No  Beta blocker:   Yes  Aldosterone antagonist:  Yes  SGLT2i:  No        Physical Examination:     BP 99/69   Pulse 98   Resp 20   Wt 161 lb (73 kg)   SpO2 97%   BMI 25.22 kg/m²     Assessment  Charting Type: Shift assessment    Neurological  Level of Consciousness: Alert (0)         HEENT (Head, Ears, Eyes, Nose, & Throat)  HEENT (WDL): Exceptions to WDL  Right Eye: Glasses  Left Eye: Glasses    Respiratory  Respiratory Pattern: Regular  Respiratory Depth: Normal  Respiratory Quality/Effort: Dyspnea with exertion  L Breath Sounds: Clear, Diminished  R Breath Sounds: Clear, Diminished              Cardiac  Cardiac Regularity: Irregular  Cardiac Rhythm: Atrial fib    Rhythm Interpretation  Heart Rate: 98         Gastrointestinal  Abdominal (WDL): Within Defined Limits               Peripheral Vascular  Peripheral Vascular (WDL): Exceptions to WDL  Edema: Right lower extremity, Left lower extremity  RLE Edema: +1, Pitting  LLE Edema: +1, Pitting                   Genitourinary  Genitourinary (WDL): Within Defined Limits                             Heart Rate: 98                     LAB DATA:    Last 3 BMP      Sodium (mmol/L)   Date Value   01/31/2023 134   01/27/2023 130 (L)   01/19/2023 129 (L)     Potassium (mmol/L)   Date Value   01/31/2023 3.9   01/27/2023 3.6   01/19/2023 3.6     Potassium reflex Magnesium (mmol/L)   Date Value   11/11/2021 4.7   10/16/2020 4.2   10/15/2020 4.1     Chloride (mmol/L)   Date Value   01/31/2023 96 (L)   01/27/2023 97 (L)   01/19/2023 95 (L)     CO2 (mmol/L)   Date Value   01/31/2023 21 (L)   01/27/2023 16 (L)   01/19/2023 17 (L)     BUN (mg/dL)   Date Value   01/31/2023 40 (H)   01/27/2023 32 (H)   01/19/2023 23 Glucose (mg/dL)   Date Value   01/31/2023 164 (H)   01/27/2023 178 (H)   01/19/2023 129 (H)   10/12/2011 120 (H)   08/16/2011 132 (H)     Calcium (mg/dL)   Date Value   01/31/2023 10.0   01/27/2023 9.5   01/19/2023 9.9       Last 3 BNP       Pro-BNP (pg/mL)   Date Value   01/31/2023 13,290 (H)   01/27/2023 10,308 (H)   01/18/2023 7,843 (H)          CBC: No results for input(s): WBC, HGB, PLT in the last 72 hours. BMP:    Recent Labs     01/31/23  1030      K 3.9   CL 96*   CO2 21*   BUN 40*   CREATININE 2.2*   GLUCOSE 164*     Hepatic: No results for input(s): AST, ALT, ALB, BILITOT, ALKPHOS in the last 72 hours. Troponin: No results for input(s): TROPONINI in the last 72 hours. BNP: No results for input(s): BNP in the last 72 hours. Lipids: No results for input(s): CHOL, HDL in the last 72 hours. Invalid input(s): LDLCALCU  INR: No results for input(s): INR in the last 72 hours. WEIGHTS:    Wt Readings from Last 3 Encounters:   02/02/23 161 lb (73 kg)   01/31/23 162 lb (73.5 kg)   01/27/23 167 lb (75.8 kg)         TELEMETRY:  Cardiac Regularity: Irregular  Cardiac Rhythm/Interpretation: A Fib        ASSESSMENT:  Levy Charles's weight is dOWN 1 LB. PT CONTINUES TO C/O SHORTNESS OF BREATH AND SHE ADMITS ITS DUE TO HER AFIB.   PT IS BEING CARDIOVERTED TOMORROW 2/3/23    Interventions completed this visit:  IV diuretics given YES  LASIX 40 MG IVP  Lab work obtained yes, BMP/BNP   Reviewed currently prescribed medications with patient, educated on importance of compliance and answered any questions regarding their medication  Educated on signs and symptoms of HF  Educated on low sodium diet    PLAN:  Scheduled to follow up in CHF clinic on   Future Appointments   Date Time Provider Duke Santana   2/3/2023  8:30 AM [facility] SPECIAL PROCEDURES LAB [address] [facility]   2/7/2023  9:30 AM Osman Daly MD [facility] CARDIO [facility]   2/8/2023 12:30 PM [facility] CHF ROOM 1  CHF Chester Lose 3/16/2023  1:15 PM [facility] FAST TRACK 2 [facility]   3/16/2023  1:30 PM Evangeline Goldmann, MD MED ONC [facility]   4/6/2023 10:00 AM MD Jacki Sood [facility]     Given clinic phone number and aware of signs and symptoms to call with any HF change in symptoms.     Angelina Okeefe

## 2023-02-02 NOTE — PLAN OF CARE
Problem: Chronic Conditions and Co-morbidities  Goal: Patient's chronic conditions and co-morbidity symptoms are monitored and maintained or improved  Flowsheets (Taken 2/2/2023 2890)  Care Plan - Patient's Chronic Conditions and Co-Morbidity Symptoms are Monitored and Maintained or Improved: Monitor and assess patient's chronic conditions and comorbid symptoms for stability, deterioration, or improvement

## 2023-02-03 ENCOUNTER — HOSPITAL ENCOUNTER (OUTPATIENT)
Dept: CARDIAC CATH/INVASIVE PROCEDURES | Age: 78
Discharge: HOME | End: 2023-02-03
Payer: MEDICARE

## 2023-02-03 ENCOUNTER — ANESTHESIA EVENT (OUTPATIENT)
Dept: CARDIAC CATH/INVASIVE PROCEDURES | Age: 78
End: 2023-02-03

## 2023-02-03 ENCOUNTER — ANESTHESIA (OUTPATIENT)
Dept: CARDIAC CATH/INVASIVE PROCEDURES | Age: 78
End: 2023-02-03

## 2023-02-03 ENCOUNTER — TELEPHONE (OUTPATIENT)
Dept: NON INVASIVE DIAGNOSTICS | Age: 78
End: 2023-02-03

## 2023-02-03 VITALS
TEMPERATURE: 98.2 F | RESPIRATION RATE: 20 BRPM | SYSTOLIC BLOOD PRESSURE: 113 MMHG | DIASTOLIC BLOOD PRESSURE: 77 MMHG | OXYGEN SATURATION: 95 % | HEART RATE: 99 BPM

## 2023-02-03 DIAGNOSIS — I42.8 NONISCHEMIC CARDIOMYOPATHY (HCC): Primary | ICD-10-CM

## 2023-02-03 LAB
ANION GAP SERPL CALCULATED.3IONS-SCNC: 23 MMOL/L (ref 7–16)
BASOPHILS ABSOLUTE: 0.03 E9/L (ref 0–0.2)
BASOPHILS RELATIVE PERCENT: 0.4 % (ref 0–2)
BUN BLDV-MCNC: 51 MG/DL (ref 6–23)
CALCIUM SERPL-MCNC: 10.1 MG/DL (ref 8.6–10.2)
CHLORIDE BLD-SCNC: 97 MMOL/L (ref 98–107)
CO2: 17 MMOL/L (ref 22–29)
CREAT SERPL-MCNC: 2.4 MG/DL (ref 0.5–1)
EKG ATRIAL RATE: 111 BPM
EKG ATRIAL RATE: 62 BPM
EKG P AXIS: 53 DEGREES
EKG P-R INTERVAL: 208 MS
EKG Q-T INTERVAL: 372 MS
EKG Q-T INTERVAL: 520 MS
EKG QRS DURATION: 162 MS
EKG QRS DURATION: 176 MS
EKG QTC CALCULATION (BAZETT): 474 MS
EKG QTC CALCULATION (BAZETT): 527 MS
EKG R AXIS: -77 DEGREES
EKG R AXIS: -87 DEGREES
EKG T AXIS: 109 DEGREES
EKG T AXIS: 99 DEGREES
EKG VENTRICULAR RATE: 62 BPM
EKG VENTRICULAR RATE: 98 BPM
EOSINOPHILS ABSOLUTE: 0.02 E9/L (ref 0.05–0.5)
EOSINOPHILS RELATIVE PERCENT: 0.3 % (ref 0–6)
GFR SERPL CREATININE-BSD FRML MDRD: 20 ML/MIN/1.73
GLUCOSE BLD-MCNC: 243 MG/DL (ref 74–99)
HCT VFR BLD CALC: 39 % (ref 34–48)
HEMOGLOBIN: 13.7 G/DL (ref 11.5–15.5)
IMMATURE GRANULOCYTES #: 0.03 E9/L
IMMATURE GRANULOCYTES %: 0.4 % (ref 0–5)
LYMPHOCYTES ABSOLUTE: 0.97 E9/L (ref 1.5–4)
LYMPHOCYTES RELATIVE PERCENT: 12.2 % (ref 20–42)
MAGNESIUM: 2.2 MG/DL (ref 1.6–2.6)
MCH RBC QN AUTO: 28.5 PG (ref 26–35)
MCHC RBC AUTO-ENTMCNC: 35.1 % (ref 32–34.5)
MCV RBC AUTO: 81.1 FL (ref 80–99.9)
MONOCYTES ABSOLUTE: 0.4 E9/L (ref 0.1–0.95)
MONOCYTES RELATIVE PERCENT: 5 % (ref 2–12)
NEUTROPHILS ABSOLUTE: 6.51 E9/L (ref 1.8–7.3)
NEUTROPHILS RELATIVE PERCENT: 81.7 % (ref 43–80)
PDW BLD-RTO: 15.5 FL (ref 11.5–15)
PLATELET # BLD: 370 E9/L (ref 130–450)
PMV BLD AUTO: 10.6 FL (ref 7–12)
POTASSIUM SERPL-SCNC: 4.1 MMOL/L (ref 3.5–5)
RBC # BLD: 4.81 E12/L (ref 3.5–5.5)
SODIUM BLD-SCNC: 137 MMOL/L (ref 132–146)
WBC # BLD: 8 E9/L (ref 4.5–11.5)

## 2023-02-03 PROCEDURE — 36415 COLL VENOUS BLD VENIPUNCTURE: CPT

## 2023-02-03 PROCEDURE — 3700000001 HC ADD 15 MINUTES (ANESTHESIA)

## 2023-02-03 PROCEDURE — 6370000000 HC RX 637 (ALT 250 FOR IP): Performed by: INTERNAL MEDICINE

## 2023-02-03 PROCEDURE — 85025 COMPLETE CBC W/AUTO DIFF WBC: CPT

## 2023-02-03 PROCEDURE — 5A2204Z RESTORATION OF CARDIAC RHYTHM, SINGLE: ICD-10-PCS | Performed by: INTERNAL MEDICINE

## 2023-02-03 PROCEDURE — 83735 ASSAY OF MAGNESIUM: CPT

## 2023-02-03 PROCEDURE — 2709999900 HC NON-CHARGEABLE SUPPLY

## 2023-02-03 PROCEDURE — 80048 BASIC METABOLIC PNL TOTAL CA: CPT

## 2023-02-03 PROCEDURE — 3700000000 HC ANESTHESIA ATTENDED CARE

## 2023-02-03 PROCEDURE — 93005 ELECTROCARDIOGRAM TRACING: CPT | Performed by: INTERNAL MEDICINE

## 2023-02-03 PROCEDURE — 92960 CARDIOVERSION ELECTRIC EXT: CPT

## 2023-02-03 RX ORDER — ETOMIDATE 2 MG/ML
INJECTION INTRAVENOUS PRN
Status: DISCONTINUED | OUTPATIENT
Start: 2023-02-03 | End: 2023-02-03 | Stop reason: SDUPTHER

## 2023-02-03 RX ORDER — SODIUM CHLORIDE 9 MG/ML
INJECTION, SOLUTION INTRAVENOUS PRN
Status: DISCONTINUED | OUTPATIENT
Start: 2023-02-03 | End: 2023-02-04 | Stop reason: HOSPADM

## 2023-02-03 RX ORDER — SODIUM CHLORIDE 0.9 % (FLUSH) 0.9 %
5-40 SYRINGE (ML) INJECTION PRN
Status: DISCONTINUED | OUTPATIENT
Start: 2023-02-03 | End: 2023-02-04 | Stop reason: HOSPADM

## 2023-02-03 RX ORDER — SODIUM CHLORIDE 0.9 % (FLUSH) 0.9 %
5-40 SYRINGE (ML) INJECTION EVERY 12 HOURS SCHEDULED
Status: DISCONTINUED | OUTPATIENT
Start: 2023-02-03 | End: 2023-02-04 | Stop reason: HOSPADM

## 2023-02-03 RX ORDER — AMIODARONE HYDROCHLORIDE 200 MG/1
200 TABLET ORAL ONCE
Status: COMPLETED | OUTPATIENT
Start: 2023-02-03 | End: 2023-02-03

## 2023-02-03 RX ORDER — DABIGATRAN ETEXILATE 75 MG/1
150 CAPSULE ORAL ONCE
Status: COMPLETED | OUTPATIENT
Start: 2023-02-03 | End: 2023-02-03

## 2023-02-03 RX ORDER — SODIUM CHLORIDE 9 MG/ML
INJECTION, SOLUTION INTRAVENOUS CONTINUOUS PRN
Status: DISCONTINUED | OUTPATIENT
Start: 2023-02-03 | End: 2023-02-03 | Stop reason: SDUPTHER

## 2023-02-03 RX ADMIN — AMIODARONE HYDROCHLORIDE 200 MG: 200 TABLET ORAL at 07:48

## 2023-02-03 RX ADMIN — DABIGATRAN ETEXILATE MESYLATE 150 MG: 75 CAPSULE ORAL at 07:52

## 2023-02-03 RX ADMIN — ETOMIDATE 10 MG: 2 INJECTION INTRAVENOUS at 09:01

## 2023-02-03 RX ADMIN — SODIUM CHLORIDE: 9 INJECTION, SOLUTION INTRAVENOUS at 08:56

## 2023-02-03 ASSESSMENT — ENCOUNTER SYMPTOMS: SHORTNESS OF BREATH: 1

## 2023-02-03 NOTE — H&P
[address]  [address] Electrophys[facility]  History and Physical Examination  Ronen Gong  1945  Date of Service: 2/3/2023  PCP: Tamica Pantoja DO  Cardiologist: Sherlyn Roy MD  Electrophysiologist: Jennifer Vincent MD        Subjective: Ronen Gong is seen for follow-up and management of persistent atrial fibrillation. Ronen Gong previously followed with Dr. Dre Felton and last saw Osker Phalen NP in December 2019. Since that time she had a Watchman LAAO procedure on 10/14/20, left partial nephrectomy on 11/16/21 and her Flor Michelle was reduced from 250 mcg BID to 125 mcg BID due to reduced CrCl on 3/7/22. She reports feeling overall well. She denies any recurrent AF episode. The patient denies any chest pain, dyspnea, palpitations, dizziness, syncope, orthopnea or paroxysmal nocturnal dyspnea. She presents today in SR. She remains on Tikosyn for rhythm control and Toprol XL for rate control. 2/3/23: The patient is seen in the hospital for elective DC-cardioversion. Her Tikosyn was stopped on 12/19/22 due to elevated serum creatinine. She was noted to have recurrent AF on 1/13/23 and Amiodarone on 1/20/23. She is currently using Lovenox due to PE and currently switched to Pradaxa. She has been cleared by Pulmonary for DC-cardioversion. I explained the risks & benefits of a CV to the patient. These include but are not limited to sedation, allergy, aspiration, respiratory distress/arrest, burning of skin, bradycardia required pacemaker, stroke and death. The patient verbalizes understanding and agrees to proceed with the procedure.      Patient Active Problem List   Diagnosis    Anxiety    Nonischemic cardiomyopathy ([facility] [address]    Severe mitral regurgitation    Lumbar radiculopathy    Cervical radiculopathy    HTN (hypertension), benign    Left atrial thrombus    Paroxysmal atrial fibrillation (HCC)    Chronic HFrEF (heart failure with reduced ejection fraction) [facility])    Visit for monitoring Tikosyn therapy    Persistent atrial fibrillation ([facility] [address]    Encounter for medication refill    Itching    Chronic anticoagulation    Presence of Watchman left atrial appendage closure device    Renal mass    Renal cell carcinoma of left kidney (HCC)    Chronic renal disease, stage III (HCC) [977787]    Multiple subsegmental pulmonary emboli without acute cor pulmonale (HCC)    Acute on chronic congestive heart failure (HCC)       Current Outpatient Medications   Medication Sig Dispense Refill    dabigatran (PRADAXA) 150 MG capsule Take 150 mg by mouth 2 times daily      amiodarone (CORDARONE) 200 MG tablet Take 1 tablet by mouth daily 90 tablet 1    hydrOXYzine HCl (ATARAX) 10 MG tablet Take 1 tablet by mouth every 8 hours as needed for Itching or Anxiety 90 tablet 1    spironolactone (ALDACTONE) 25 MG tablet Take 1 tablet by mouth daily 90 tablet 3    cetirizine (ZYRTEC ALLERGY) 10 MG tablet Take 0.5 tablets by mouth daily 30 tablet 0    fluticasone (FLONASE) 50 MCG/ACT nasal spray 1-2 sprays, each nostril daily as needed for nasal congestion.  16 g 0    furosemide (LASIX) 20 MG tablet Take 1 tablet by mouth daily (Patient taking differently: Take 40 mg by mouth daily) 90 tablet 3    metoprolol succinate (TOPROL XL) 25 MG extended release tablet Take 1 tablet by mouth daily 90 tablet 2    aspirin 81 MG EC tablet Take 81 mg by mouth daily (Patient not taking: Reported on 2/2/2023)      baclofen (LIORESAL) 10 MG tablet Take 1 tablet by mouth nightly 90 tablet 1    hydrALAZINE (APRESOLINE) 10 MG tablet Take 1 tablet by mouth 3 times daily 270 tablet 3    Handicap Placard MISC Cannot walk more than 200 feet  Expiration: 7/12/2026 1 each 0    omeprazole (PRILOSEC) 40 MG delayed release capsule Take 40 mg by mouth daily (Patient not taking: No sig reported)      triamcinolone (KENALOG) 0.1 % cream Apply topically 3 times daily as needed (rash)   1    loperamide (IMODIUM) 2 MG capsule Take 2 mg by mouth 4 times daily as needed for Diarrhea      diphenhydrAMINE (BENADRYL) 25 MG tablet Take 25 mg by mouth every 6 hours as needed for Itching      vitamin D (CHOLECALCIFEROL) 1000 UNIT TABS tablet Take 1,000 Units by mouth daily       Current Facility-Administered Medications   Medication Dose Route Frequency Provider Last Rate Last Admin    dabigatran (PRADAXA) capsule 150 mg  150 mg Oral Once Ivett Mittal MD        amiodarone (CORDARONE) tablet 200 mg  200 mg Oral Once Madeline Flowers MD            Allergies   Allergen Reactions    Bentyl [Dicyclomine] Shortness Of Breath    Adhesive Tape Itching     develops rash and itching with EKG electrodes    Atacand [Candesartan] Hives and Itching    Cefdinir Hives, Itching and Nausea Only    Demerol      3/9/19 Pt states she gets a severe migraine    Digoxin Itching     developed itching and rash    Imdur [Isosorbide Mononitrate]       migraines    Losartan Potassium Itching    Sulfa Antibiotics Diarrhea and Other (See Comments)     Also causes migraines  caused migraines and diarrhea    Xarelto [Rivaroxaban] Itching and Rash      severe itch, headache, rash    Lovenox [Enoxaparin Sodium] Itching     States  her pulmonary doctor-DR Edna Mandujano took her off  lovenox (rash-itching)and she is starting on pradaxa today 1/26/2023    Acetaminophen Hives and Itching    Apap-Caff-Dihydrocodeine Hives and Itching    Coreg [Carvedilol] Itching     Can take extended release Coreg    Cozaar [Losartan] Itching    Diovan [Valsartan] Itching    Effexor [Venlafaxine Hydrochloride] Nausea Only    Eliquis [Apixaban] Hives, Itching and Rash     3/9/19 Pt states that she gets hives, itchy and a rash    Labetalol Itching    Lisinopril Itching    Ramipril Itching     ROS:   Constitutional: Negative for fever, activity change and appetite change. HENT: Negative for epistaxis. Eyes: Negative for diploplia, blurred vision.    Respiratory: Negative for cough, chest tightness, shortness of breath and wheezing. Cardiovascular: pertinent positives in HPI  Gastrointestinal: Negative for abdominal pain and blood in stool. All other review of systems are negative     PHYSICAL EXAM:      There were no vitals filed for this visit. Constitutional: well-developed, no acute distress  Eyes: conjunctivae normal, no xanthelasma   Ears, Nose, Throat: oral mucosa moist, no cyanosis   CV: no JVD. Irregular rate and rhythm. No murmurs, rubs, or gallops. PMI is nondisplaced  Lungs: clear to auscultation bilaterally, normal respiratory effort without used of accessory muscles  Abdomen: soft, non-tender, bowel sounds present, no masses or hepatomegaly   Musculoskeletal: no digital clubbing, no edema   Skin: warm, no rashes     Data:    No results for input(s): WBC, HGB, HCT, PLT in the last 72 hours. Recent Labs     01/31/23  1030 02/02/23  1320    136   K 3.9 4.2   CL 96* 97*   CO2 21* 24   BUN 40* 47*   CREATININE 2.2* 2.2*   CALCIUM 10.0 10.3*     Lab Results   Component Value Date/Time    MG 2.2 12/29/2022 12:07 PM     No results for input(s): TSH in the last 72 hours. No results for input(s): INR in the last 72 hours. EKG 9/1/22: SR w/ PVC, LBBB, rate: 75bpm, , QRS: 162, QTc: 484  Please see scan in Cardiology. JAIME 1/26/23:   Findings      Left Ventricle   Dilated left ventricle. Severely reduced left ventricular systolic function. Right Ventricle   Reduced right ventricular function. Left Atrium   Dilated left ventricle. No evidence of interatrial shunting on bubble study. History of WATCHMAN device (24 mm). No significant color flow jet around   the device. No device related thrombus visualized. Right Atrium   Dilated right atrium. Mitral Valve   Severe mitral regurgitation. No evidence of hemodynamically mitral stenosis. Tricuspid Valve   Mild tricuspid regurgitation. RV-RA gradient is estimated at 27 mmHg.       Aortic Valve Aortic valve opens well. The aortic valve is trileaflet. No evidence of hemodynamically significant aortic stenosis. Physiologic and/or trace aortic regurgitation. Pulmonic Valve   No evidence of hemodynamically significant pulmonic regurgitation. Pericardial Effusion   No evidence of a hemodynamically significant pericardial effusion. Aorta   Aortic root dimension within normal limits. Conclusions      Summary   Severely reduced left ventricular systolic function. Reduced right ventricular function. Bi-atrial enlargement. No evidence of interatrial shunting on bubble study. History of WATCHMAN device (24 mm). No significant color flow jet around   the device. No device related thrombus visualized. Severe mitral regurgitation. Mild tricuspid regurgitation. RV-RA gradient is estimated at 27 mmHg. Signature      ----------------------------------------------------------------   Electronically signed by Katherin Chang MD(Interpreting   physician) on 01/26/2023 11:45 AM   ----------------------------------------------------------------    JAIME 10/18/2021:    Findings      Left Ventricle   Left ventricle size is normal.   Mildly reduced left ventricular systolic function. Ejection fraction is visually estimated at 45-50%. Right Ventricle   Normal right ventricular size and function. Left Atrium   No evidence of interatrial shunting on bubble study. History of WATCHMAN device (24 mm). No significant color flow jet around   the device. No device related thrombus visualized. Right Atrium   Dilated right atrium. Mitral Valve   Moderate mitral regurgitation. No evidence of hemodynamically mitral stenosis. Tricuspid Valve   Moderate tricuspid regurgitation. RV-RA gradient is estimated at 82 mmHg. Aortic Valve   Aortic valve opens well. The aortic valve is trileaflet. No evidence of hemodynamically significant aortic stenosis.    Mild aortic regurgitation. Pulmonic Valve   No evidence of hemodynamically significant pulmonic regurgitation. Pericardial Effusion   No evidence of a hemodynamically significant pericardial effusion. Aorta   Aortic root dimension within normal limits. Conclusions      Summary   Ejection fraction is visually estimated at 45-50%. Normal right ventricular size and function. No evidence of interatrial shunting on bubble study. History of WATCHMAN device (24 mm). No significant color flow jet around   the device. No device related thrombus visualized. Moderate mitral regurgitation. Mild aortic regurgitation. Moderate tricuspid regurgitation. RV-RA gradient is estimated at 82 mmHg. Signature      ----------------------------------------------------------------   Electronically signed by Titus Caldwell MD(Interpreting   physician) on 10/18/2021 05:20 PM   ----------------------------------------------------------------     Doppler Measurements & Calculations        TR Velocity:4.54 m/s     TR Gradient:82.56 mmHg    Echocardiogram 12/16/22: Findings      Left Ventricle   Ejection fraction is visually estimated at 25%. Overall ejection fraction   severely decreased. Mild left ventricular concentric hypertrophy noted. Left ventricle size is normal. Indeterminate diastolic function. Right Ventricle   Dilated right ventricle with reduced function. Left Atrium   Left atrial volume index of 50 ml per meters squared BSA. Right Atrium   Mildly enlarged right atrium size. Mitral Valve   Mild thickening of the mitral valve leaflets. No evidence of mitral valve   stenosis. Moderate mitral regurgitation is present. Tricuspid Valve   The tricuspid valve appears structurally normal.   Moderate tricuspid regurgitation. Pulmonary hypertension is severe. RVSP is 70 mmHg. Aortic Valve   The aortic valve is trileaflet. No hemodynamically significant aortic   stenosis is present. Moderate aortic regurgitation is noted. Pulmonic Valve   Pulmonic valve is structurally normal. Physiologic and/or trace pulmonic   regurgitation present. Pericardial Effusion   No evidence for hemodynamically significant pericardial effusion. Aorta   Normal aortic root and ascending aorta. Miscellaneous   The inferior vena cava diameter is normal with normal respiratory   variation. Conclusions      Summary   Ejection fraction is visually estimated at 25%. Overall ejection fraction severely decreased. Mild left ventricular concentric hypertrophy noted. Dilated right ventricle with reduced function. Left atrial volume index of 50 ml per meters squared BSA. Moderate aortic regurgitation is noted. Moderate mitral regurgitation is present. Moderate tricuspid regurgitation. Pulmonary hypertension is severe. RVSP is 70 mmHg. No evidence for hemodynamically significant pericardial effusion. Signature      ----------------------------------------------------------------   Electronically signed by Alan Benton DO(Interpreting   physician) on 12/17/2022 06:57 PM   ----------------------------------------------------------------    Echocardiogram 10/14/20:    Findings      Left Ventricle   Normal left ventricle size. Estimated left ventricle ejection fraction 50 %. Right Ventricle   Normal right ventricular size and function. Left Atrium   Enlarged left atrium. Right Atrium   Normal right atrium size. Pericardial Effusion   Resolved pericardial effusion adjacent to LV. Very trivial effusion   adjacent to the RV mostly in systole. Overall the pericardial effusion is   decreased compared to 10/14/2020 study. Conclusions      Summary   Resolved pericardial effusion adjacent to LV. Very trivial effusion adjacent to the RV mostly in systole. Overall the pericardial effusion is decreased compared to 10/14/2020   study.       Signature ----------------------------------------------------------------   Electronically signed by Leora Miller MD(Interpreting physician)   on 10/16/2020 10:37 AM      I have independently reviewed all of the ECGs and rhythm strips per above     Assessment and plan:    1. Persistent atrial fibrillation  - [id number]: 6  - History of CHERYL thrombus  - History of  JAIME and DC-CV on 5/6/19 ad 11/4/19.  - Tikosyn 250 mcg BID: initiation 11/4/19 and in March 2022 was reduced to 125 mcg every 12 hours due decreased CrCl. - Tikosyn stopped on 12/19/22 due to elevated serum creatinine.   - Status post  Watchman #24 on 10/14/2020 with Dr Jori Ortez. - Noted to have recurrent AF on 1/13/23 and started on Amiodarone on 1/20/23.   - Currently using Pradaxa. She has been cleared by Pulmonary for DC-cardioversion. I explained the risks & benefits of a CV to the patient. These include but are not limited to sedation, allergy, aspiration, respiratory distress/arrest, burning of skin, bradycardia required pacemaker, stroke and death. The patient verbalizes understanding and agrees to proceed with the procedure. - Re-education on importance of well controlled HTN (goal BP < 130/80), adequate weight control (goal BMI of < 27), physical activity consisting of moderate cardiopulmonary exercise up to a goal of 250 min/wk, daily compliance with CPAP in treating sleep apnea, smoking cessation and limited ETOH intake. 2. Nonischemic cardiomyopathy and chronic HFrEF  - Diagnosed originally in 2013  - TTE: 12/2016: EF 38%  - JAIME: 3/2019: EF 25-30%  - JAIME: 5/2019: EF 25-30%  - TTE: 10/2020: EF 50%  - JAIME: 11/30/2020: EF 45-50%  - JAIME: 7/16/2021: EF 45-50%  - JAIME: 10/18/2021: EF 45-50%  - TTE: 12/17/22: EF 25%. - GDMT: Toprol XL, Lasix, Apresoline and Aldactone   - Allergic to ACE-I/ARB and Nitrates  - NYHA III, ACC/AHA stage C  - Compensated and euvolemic.      3. Hypertension  - On Toprol XL, Lasix, Apresoline and Aldactone   - Managed by  Jonathan     4. Obesity  - There is no height or weight on file to calculate BMI. 5. Lumbar and cervical radiculopathy     6. History of anxiety     7. History of TIA's     8. History of Meige syndrome    9. Status post LEFT partial nephrectomy   - 11/2021 - Dr Lyndsey Covington    Recommendations:    1. Will proceed with DC-cardioversion. 2. Continue Amiodarone 200 mg daily. 3. Continue Toprol XL 25 mg daily. 4. Continue Pradaxa 150 mg BID. 5. Follow up after the procedure. Encouraged the patient to call the office for any questions or concerns. Thank you for allowing me to participate in your patient's care. Please call me if there are any questions or concerns.       Major Phoenix, MD  Cardiac Electrophysiology  [facility]) Physicians  [facility] [place]: [place] [facility]  9/1/22   7:30 AM

## 2023-02-03 NOTE — DISCHARGE INSTRUCTIONS
[facility]  Post DC-Cardioversion Patient Discharge Instructions    1. No driving the day of the procedure. 2. Please resume your medications as instructed. 3. Please call the office at [phone] to schedule a follow-up appointment in 1 week for EKG and in 1 month with provider.

## 2023-02-03 NOTE — PROCEDURES
[facility]) Physicians- The Heart and Vascular [address] [place] [address] Electrophysiology  Procedure Report  PATIENT: Patricia Handy  MEDICAL RECORD NUMBER: [id number]  DATE OF PROCEDURE:  2/3/2023  ATTENDING ELECTROPHYSIOLOGIST:  Pola Hook MD  REFERRING PHYSICIAN: Dr. Romana Butte:    1. Direct Current Electrical Cardioversion    INDICATION: Persistent atrial fibrillation    PROCEDURE PERFORMED By: Pola Hook MD    PROCEDURE TIME: 30 minutes    COMPICATIONS: None immediately apparent    ANESTHESIA: LMAC    DESCRIPTION OF PROCEDURE: The risks, benefits, and alternatives to the procedure were discussed with the patient, and informed consent was obtained. The patient was brought to the cardiovascular lab and sedated under the guidance of anesthesia. Once anesthesia was deemed adequate, a 200 J biphasic synchronous shock was applied and successfully restored normal sinus rhythm. The patient was then allowed to wake in the usual fashion. Comment: The patient was therapeutically anticoagulated for a minimum of 3 consecutive weeks prior to cardioversion. SUMMARY:  1. Successful cardioversion of persistent atrial fibrillation to sinus rhythm. RECOMMENDATIONS:  1. Discharge to home when fully awake and alert, if clinically stable. 2. Resume all pre-procedure medications, including anticoagulation. 3. Follow-up in the office in 1 week for EKG and in 1 month with provider.     Pola Hook MD  Cardiac Electrophysiology  [address]  [facility] V[facility] [employer]: [place] [facility]  7:33 AM  2/3/2023

## 2023-02-03 NOTE — ANESTHESIA POSTPROCEDURE EVALUATION
Department of Anesthesiology  Postprocedure Note    Patient: Brian Cavanaugh  MRN: [id number]  YOB: 1945  Date of evaluation: 2/3/2023      Procedure Summary     Date: 02/03/23 Room / Location: [facility] CATH LAB    Anesthesia Start: 1463 Anesthesia Stop: 5544    Procedure: CARDIOVERSION WITH ANESTHESIA Diagnosis: Paroxysmal atrial fibrillation    Scheduled Providers:  Responsible Provider: Nataliia Frye MD    Anesthesia Type: MAC ASA Status: 4          Anesthesia Type: No value filed.     Joseph Phase I:      Joseph Phase II:        Anesthesia Post Evaluation    Patient location during evaluation: PACU  Patient participation: complete - patient participated  Level of consciousness: awake and alert  Airway patency: patent  Nausea & Vomiting: no nausea and no vomiting  Complications: no  Cardiovascular status: blood pressure returned to baseline and hemodynamically stable  Respiratory status: acceptable and spontaneous ventilation  Hydration status: euvolemic  Multimodal analgesia pain management approach

## 2023-02-03 NOTE — ANESTHESIA PRE PROCEDURE
Department of Anesthesiology  Preprocedure Note       Name:  Lio Watkins   Age:  68 y.o.  :  1945                                          MRN:  [id number]         Date:  2/3/2023      Surgeon: * Surgery not found *    Procedure:     Medications prior to admission:   Prior to Admission medications    Medication Sig Start Date End Date Taking? Authorizing Provider   dabigatran (PRADAXA) 150 MG capsule Take 150 mg by mouth 2 times daily    Historical Provider, MD   amiodarone (CORDARONE) 200 MG tablet Take 1 tablet by mouth daily 23   Heath Corcoran MD   hydrOXYzine HCl (ATARAX) 10 MG tablet Take 1 tablet by mouth every 8 hours as needed for Itching or Anxiety 23   Trisha Cardona DO   spironolactone (ALDACTONE) 25 MG tablet Take 1 tablet by mouth daily 23   Leilani Moss MD   cetirizine (ZYRTEC ALLERGY) 10 MG tablet Take 0.5 tablets by mouth daily 10/19/22   Elena Larsen, APRN - CNP   fluticasone (FLONASE) 50 MCG/ACT nasal spray 1-2 sprays, each nostril daily as needed for nasal congestion.  10/19/22   Elena Larsen, APRN - CNP   furosemide (LASIX) 20 MG tablet Take 1 tablet by mouth daily  Patient taking differently: Take 40 mg by mouth daily 22   Leilani Moss MD   metoprolol succinate (TOPROL XL) 25 MG extended release tablet Take 1 tablet by mouth daily 22   Leilani Moss MD   aspirin 81 MG EC tablet Take 81 mg by mouth daily  Patient not taking: Reported on 2023    Historical Provider, MD   baclofen (LIORESAL) 10 MG tablet Take 1 tablet by mouth nightly 22   Trisha Cardona DO   hydrALAZINE (APRESOLINE) 10 MG tablet Take 1 tablet by mouth 3 times daily 21   Hilaria Carey MD   Handicap Placard [facility] Cannot walk more than 200 feet  Expiration: 2026   Trisha Cardona DO   omeprazole (PRILOSEC) 40 MG delayed release capsule Take 40 mg by mouth daily  Patient not taking: No sig reported 20   Historical Provider, MD triamcinolone (KENALOG) 0.1 % cream Apply topically 3 times daily as needed (rash)  4/5/19   Historical Provider, MD   loperamide (IMODIUM) 2 MG capsule Take 2 mg by mouth 4 times daily as needed for Diarrhea    Historical Provider, MD   diphenhydrAMINE (BENADRYL) 25 MG tablet Take 25 mg by mouth every 6 hours as needed for Itching    Historical Provider, MD   vitamin D (CHOLECALCIFEROL) 1000 UNIT TABS tablet Take 1,000 Units by mouth daily    Historical Provider, MD       Current medications:    Current Outpatient Medications   Medication Sig Dispense Refill    dabigatran (PRADAXA) 150 MG capsule Take 150 mg by mouth 2 times daily      amiodarone (CORDARONE) 200 MG tablet Take 1 tablet by mouth daily 90 tablet 1    hydrOXYzine HCl (ATARAX) 10 MG tablet Take 1 tablet by mouth every 8 hours as needed for Itching or Anxiety 90 tablet 1    spironolactone (ALDACTONE) 25 MG tablet Take 1 tablet by mouth daily 90 tablet 3    cetirizine (ZYRTEC ALLERGY) 10 MG tablet Take 0.5 tablets by mouth daily 30 tablet 0    fluticasone (FLONASE) 50 MCG/ACT nasal spray 1-2 sprays, each nostril daily as needed for nasal congestion.  16 g 0    furosemide (LASIX) 20 MG tablet Take 1 tablet by mouth daily (Patient taking differently: Take 40 mg by mouth daily) 90 tablet 3    metoprolol succinate (TOPROL XL) 25 MG extended release tablet Take 1 tablet by mouth daily 90 tablet 2    aspirin 81 MG EC tablet Take 81 mg by mouth daily (Patient not taking: Reported on 2/2/2023)      baclofen (LIORESAL) 10 MG tablet Take 1 tablet by mouth nightly 90 tablet 1    hydrALAZINE (APRESOLINE) 10 MG tablet Take 1 tablet by mouth 3 times daily 270 tablet 3    Handicap Placard MISC Cannot walk more than 200 feet  Expiration: 7/12/2026 1 each 0    omeprazole (PRILOSEC) 40 MG delayed release capsule Take 40 mg by mouth daily (Patient not taking: No sig reported)      triamcinolone (KENALOG) 0.1 % cream Apply topically 3 times daily as needed (rash)   1    loperamide (IMODIUM) 2 MG capsule Take 2 mg by mouth 4 times daily as needed for Diarrhea      diphenhydrAMINE (BENADRYL) 25 MG tablet Take 25 mg by mouth every 6 hours as needed for Itching      vitamin D (CHOLECALCIFEROL) 1000 UNIT TABS tablet Take 1,000 Units by mouth daily       No current facility-administered medications for this encounter. Allergies:     Allergies   Allergen Reactions    Bentyl [Dicyclomine] Shortness Of Breath    Adhesive Tape Itching     develops rash and itching with EKG electrodes    Atacand [Candesartan] Hives and Itching    Cefdinir Hives, Itching and Nausea Only    Demerol      3/9/19 Pt states she gets a severe migraine    Digoxin Itching     developed itching and rash    Imdur [Isosorbide Mononitrate]       migraines    Losartan Potassium Itching    Sulfa Antibiotics Diarrhea and Other (See Comments)     Also causes migraines  caused migraines and diarrhea    Xarelto [Rivaroxaban] Itching and Rash      severe itch, headache, rash    Lovenox [Enoxaparin Sodium] Itching     States  her pulmonary doctor-DR Garrett Nava took her off  lovenox (rash-itching)and she is starting on pradaxa today 1/26/2023    Acetaminophen Hives and Itching    Apap-Caff-Dihydrocodeine Hives and Itching    Coreg [Carvedilol] Itching     Can take extended release Coreg    Cozaar [Losartan] Itching    Diovan [Valsartan] Itching    Effexor [Venlafaxine Hydrochloride] Nausea Only    Eliquis [Apixaban] Hives, Itching and Rash     3/9/19 Pt states that she gets hives, itchy and a rash    Labetalol Itching    Lisinopril Itching    Ramipril Itching       Problem List:    Patient Active Problem List   Diagnosis Code    Anxiety F41.9    Nonischemic cardiomyopathy ([facility] [address] I42.8    Severe mitral regurgitation I34.0    Lumbar radiculopathy M54.16    Cervical radiculopathy M54.12    HTN (hypertension), benign I10    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    Paroxysmal atrial fibrillation ([facility] [address] I48.0    Chronic HFrEF (heart failure with reduced ejection fraction) ([facility] [address] I50.22    Visit for monitoring Tikosyn therapy Z51.81, Z79.899    Persistent atrial fibrillation (HCC) I48.19    Encounter for medication refill Z76.0    Itching L29.9    Chronic anticoagulation Z79.01    Presence of Watchman left atrial appendage closure device Z95.818    Renal mass N28.89    Renal cell carcinoma of left kidney (HCC) C64.2    Chronic renal disease, stage III (HCC) [510805] N18.30    Multiple subsegmental pulmonary emboli without acute cor pulmonale (HCC) I26.94    Acute on chronic congestive heart failure (HCC) I50.9       Past Medical History:        Diagnosis Date    Afib (HCC)     WATCHMAN    Anxiety     Arthritis     Cervical radiculopathy     CHF (congestive heart failure) (HCC)     Chronic sinusitis     Ejection fraction < 50%     25%.  Wearing life vest    Hilar adenopathy     Hx of blood clots     Hypertension     Left ventricular systolic dysfunction     Lesion of left native kidney     Lumbar radiculopathy     Lung nodules     Meige syndrome     partial/ PCP    Microscopic colitis     Mitral regurgitation     Mild to moderate    Nonischemic cardiomyopathy (HCC)     f/u with Dr. Gilbert Castro PE (pulmonary thromboembolism) ([facility] [address]     [facility] 02/01/2022    Vertebrobasilar artery syndrome     f/u PCP       Past Surgical History:        Procedure Laterality Date    BLADDER REPAIR      BRONCHOSCOPY N/A 10/05/2021    BRONCHOSCOPY W/EBUS FNA performed by Mesha Churchill MD at [facility] 10/05/2021    BRONCHOSCOPY DIAGNOSTIC OR CELL [address] ONLY performed by Mesha Churchill MD at [address] TEST  11/25/2013    Rt & LT cath    CARDIOVERSION  11/04/2019    Successful CV from AF to NSR   (Dr. Shola Adan)    COLONOSCOPY  07/2020    DIAGNOSTIC CARDIAC CATH LAB PROCEDURE  02/20/2010    Dr Libertad Macdonald CATH LAB PROCEDURE  08/24/2011    Right heart cath @ [facility].  DOPPLER ECHOCARDIOGRAPHY  11/25/2013    HYSTERECTOMY (CERVIX STATUS UNKNOWN)  1991    total    OTHER SURGICAL HISTORY  10/14/2020    Dr. Krystian Garza- [place] Left 11/16/2021    ROBOT ASSISTED LAPAROSCOPIC COMPLEX LEFT PARTIAL NEPHRECTOMY performed by Prasanna Mckeon MD at [facility] TRANSESOPHAGEAL ECHOCARDIOGRAM  11/21/2018    Dr. Butch Navarro TRANSESOPHAGEAL ECHOCARDIOGRAM  03/18/2019    TRANSESOPHAGEAL ECHOCARDIOGRAM  01/26/2023    Dr Tiffany Winston EXTRACTION         Social History:    Social History     Tobacco Use    Smoking status: Never    Smokeless tobacco: Never   Substance Use Topics    Alcohol use:  Yes     Alcohol/week: 0.0 standard drinks     Comment: occasional wine/mixed drink every few months                                Counseling given: Not Answered      Vital Signs (Current):   Vitals:    02/03/23 0731 02/03/23 0748   BP: 106/80 113/77   Pulse: 97 99   Resp: 20    Temp: 36.8 °C (98.2 °F)    SpO2: 95%                                               BP Readings from Last 3 Encounters:   02/03/23 113/77   02/02/23 99/69   01/31/23 100/60       NPO Status:  Greater 8 hours                                                                                BMI:   Wt Readings from Last 3 Encounters:   02/02/23 161 lb (73 kg)   01/31/23 162 lb (73.5 kg)   01/27/23 167 lb (75.8 kg)     There is no height or weight on file to calculate BMI.    CBC:   Lab Results   Component Value Date/Time    WBC 8.0 02/03/2023 07:15 AM    RBC 4.81 02/03/2023 07:15 AM    HGB 13.7 02/03/2023 07:15 AM    HCT 39.0 02/03/2023 07:15 AM    MCV 81.1 02/03/2023 07:15 AM    RDW 15.5 02/03/2023 07:15 AM     02/03/2023 07:15 AM       CMP:   Lab Results   Component Value Date/Time     02/03/2023 07:15 AM    K 4.1 02/03/2023 07:15 AM    K 4.7 11/11/2021 11:45 AM    CL 97 02/03/2023 07:15 AM    CO2 17 02/03/2023 07:15 AM    BUN 51 02/03/2023 07:15 AM    CREATININE 2.4 02/03/2023 07:15 AM    GFRAA 53 09/07/2022 01:12 PM    LABGLOM 20 02/03/2023 07:15 AM    GLUCOSE 243 02/03/2023 07:15 AM    GLUCOSE 120 10/12/2011 08:10 PM    PROT 7.3 01/19/2023 10:35 AM    CALCIUM 10.1 02/03/2023 07:15 AM    BILITOT 0.8 01/19/2023 10:35 AM    ALKPHOS 108 01/19/2023 10:35 AM    AST 26 01/19/2023 10:35 AM    ALT 21 01/19/2023 10:35 AM       POC Tests: No results for input(s): POCGLU, POCNA, POCK, POCCL, POCBUN, POCHEMO, POCHCT in the last 72 hours.     Coags:   Lab Results   Component Value Date/Time    PROTIME 31.9 01/03/2023 10:34 AM    INR 2.7 01/03/2023 10:34 AM    INR 2.0 12/29/2022 12:07 PM    APTT 47.3 12/29/2022 12:07 PM       HCG (If Applicable): No results found for: PREGTESTUR, PREGSERUM, HCG, HCGQUANT     ABGs: No results found for: PHART, PO2ART, ORX9XZM, PAA7PJK, BEART, L1FHGWYG     Type & Screen (If Applicable):  No results found for: LABABO, LABRH    Drug/Infectious Status (If Applicable):  No results found for: HIV, HEPCAB    COVID-19 Screening (If Applicable):   Lab Results   Component Value Date/Time    COVID19 NOT DETECTED 12/17/2022 12:14 AM    COVID19 Not Detected 04/09/2021 10:15 AM           Anesthesia Evaluation  Patient summary reviewed and Nursing notes reviewed no history of anesthetic complications:   Airway: Mallampati: II  TM distance: >3 FB   Neck ROM: full  Mouth opening: > = 3 FB   Dental:          Pulmonary:normal exam  breath sounds clear to auscultation  (+) shortness of breath:                             Cardiovascular:  Exercise tolerance: poor (<4 METS),   (+) hypertension:, valvular problems/murmurs: MR, CHF: systolic, CAIN:,       ECG reviewed  Rhythm: irregular  Rate: normal  Echocardiogram reviewed         Beta Blocker:  Dose within 24 Hrs      ROS comment: EF 25%     Neuro/Psych:   (+) neuromuscular disease:, GI/Hepatic/Renal:   (+) GERD: well controlled, renal disease:,           Endo/Other:    (+) blood dyscrasia (per pt has not missed any doses ): anticoagulation therapy:., .                 Abdominal:             Vascular: Other Findings:           Anesthesia Plan      MAC     ASA 4       Induction: intravenous. Anesthetic plan and risks discussed with patient. Plan discussed with attending.                     UYEN Beranrd - CRNA   2/3/2023

## 2023-02-03 NOTE — TELEPHONE ENCOUNTER
----- Message from Pierce Mcadams MD sent at 2/3/2023  9:23 AM EST -----  Please check limited echo in 1 month. Thanks.

## 2023-02-04 ENCOUNTER — APPOINTMENT (OUTPATIENT)
Dept: CT IMAGING | Age: 78
DRG: 872 | End: 2023-02-04
Payer: MEDICARE

## 2023-02-04 ENCOUNTER — APPOINTMENT (OUTPATIENT)
Dept: GENERAL RADIOLOGY | Age: 78
DRG: 872 | End: 2023-02-04
Payer: MEDICARE

## 2023-02-04 ENCOUNTER — HOSPITAL ENCOUNTER (INPATIENT)
Age: 78
LOS: 3 days | Discharge: HOME | DRG: 872 | End: 2023-02-07
Attending: EMERGENCY MEDICINE | Admitting: INTERNAL MEDICINE
Payer: MEDICARE

## 2023-02-04 DIAGNOSIS — I42.8 NONISCHEMIC CARDIOMYOPATHY (HCC): ICD-10-CM

## 2023-02-04 DIAGNOSIS — R42 VERTIGO: Primary | ICD-10-CM

## 2023-02-04 DIAGNOSIS — R11.0 NAUSEA: ICD-10-CM

## 2023-02-04 LAB
ALBUMIN SERPL-MCNC: 4.2 G/DL (ref 3.5–5.2)
ALP BLD-CCNC: 110 U/L (ref 35–104)
ALT SERPL-CCNC: 54 U/L (ref 0–32)
AMMONIA: 15 UMOL/L (ref 11–51)
ANION GAP SERPL CALCULATED.3IONS-SCNC: 18 MMOL/L (ref 7–16)
AST SERPL-CCNC: 46 U/L (ref 0–31)
BASOPHILS ABSOLUTE: 0.03 E9/L (ref 0–0.2)
BASOPHILS RELATIVE PERCENT: 0.3 % (ref 0–2)
BILIRUB SERPL-MCNC: 1.1 MG/DL (ref 0–1.2)
BUN BLDV-MCNC: 55 MG/DL (ref 6–23)
CALCIUM SERPL-MCNC: 9.9 MG/DL (ref 8.6–10.2)
CHLORIDE BLD-SCNC: 97 MMOL/L (ref 98–107)
CO2: 22 MMOL/L (ref 22–29)
CREAT SERPL-MCNC: 2.1 MG/DL (ref 0.5–1)
EOSINOPHILS ABSOLUTE: 0.02 E9/L (ref 0.05–0.5)
EOSINOPHILS RELATIVE PERCENT: 0.2 % (ref 0–6)
GFR SERPL CREATININE-BSD FRML MDRD: 24 ML/MIN/1.73
GLUCOSE BLD-MCNC: 200 MG/DL (ref 74–99)
HCT VFR BLD CALC: 44.4 % (ref 34–48)
HEMOGLOBIN: 14.6 G/DL (ref 11.5–15.5)
IMMATURE GRANULOCYTES #: 0.04 E9/L
IMMATURE GRANULOCYTES %: 0.4 % (ref 0–5)
LACTIC ACID: 1.9 MMOL/L (ref 0.5–2.2)
LACTIC ACID: 4.7 MMOL/L (ref 0.5–2.2)
LIPASE: 30 U/L (ref 13–60)
LYMPHOCYTES ABSOLUTE: 0.67 E9/L (ref 1.5–4)
LYMPHOCYTES RELATIVE PERCENT: 7 % (ref 20–42)
MAGNESIUM: 2.4 MG/DL (ref 1.6–2.6)
MCH RBC QN AUTO: 28.5 PG (ref 26–35)
MCHC RBC AUTO-ENTMCNC: 32.9 % (ref 32–34.5)
MCV RBC AUTO: 86.7 FL (ref 80–99.9)
MONOCYTES ABSOLUTE: 0.4 E9/L (ref 0.1–0.95)
MONOCYTES RELATIVE PERCENT: 4.1 % (ref 2–12)
NEUTROPHILS ABSOLUTE: 8.48 E9/L (ref 1.8–7.3)
NEUTROPHILS RELATIVE PERCENT: 88 % (ref 43–80)
PDW BLD-RTO: 15.5 FL (ref 11.5–15)
PLATELET # BLD: 288 E9/L (ref 130–450)
PMV BLD AUTO: 10.6 FL (ref 7–12)
POTASSIUM SERPL-SCNC: 4.3 MMOL/L (ref 3.5–5)
PRO-BNP: ABNORMAL PG/ML (ref 0–450)
RBC # BLD: 5.12 E12/L (ref 3.5–5.5)
SODIUM BLD-SCNC: 137 MMOL/L (ref 132–146)
TOTAL PROTEIN: 7.8 G/DL (ref 6.4–8.3)
TROPONIN, HIGH SENSITIVITY: 45 NG/L (ref 0–9)
TROPONIN, HIGH SENSITIVITY: 48 NG/L (ref 0–9)
TSH SERPL DL<=0.05 MIU/L-ACNC: 0.67 UIU/ML (ref 0.27–4.2)
WBC # BLD: 9.6 E9/L (ref 4.5–11.5)

## 2023-02-04 PROCEDURE — 84443 ASSAY THYROID STIM HORMONE: CPT

## 2023-02-04 PROCEDURE — 85025 COMPLETE CBC W/AUTO DIFF WBC: CPT

## 2023-02-04 PROCEDURE — 6370000000 HC RX 637 (ALT 250 FOR IP): Performed by: INTERNAL MEDICINE

## 2023-02-04 PROCEDURE — 6360000002 HC RX W HCPCS: Performed by: EMERGENCY MEDICINE

## 2023-02-04 PROCEDURE — 96374 THER/PROPH/DIAG INJ IV PUSH: CPT

## 2023-02-04 PROCEDURE — 36415 COLL VENOUS BLD VENIPUNCTURE: CPT

## 2023-02-04 PROCEDURE — 96361 HYDRATE IV INFUSION ADD-ON: CPT

## 2023-02-04 PROCEDURE — 82607 VITAMIN B-12: CPT

## 2023-02-04 PROCEDURE — 2580000003 HC RX 258: Performed by: INTERNAL MEDICINE

## 2023-02-04 PROCEDURE — 83880 ASSAY OF NATRIURETIC PEPTIDE: CPT

## 2023-02-04 PROCEDURE — 84484 ASSAY OF TROPONIN QUANT: CPT

## 2023-02-04 PROCEDURE — 70450 CT HEAD/BRAIN W/O DYE: CPT

## 2023-02-04 PROCEDURE — 93005 ELECTROCARDIOGRAM TRACING: CPT | Performed by: EMERGENCY MEDICINE

## 2023-02-04 PROCEDURE — 83690 ASSAY OF LIPASE: CPT

## 2023-02-04 PROCEDURE — 2580000003 HC RX 258: Performed by: EMERGENCY MEDICINE

## 2023-02-04 PROCEDURE — 93005 ELECTROCARDIOGRAM TRACING: CPT | Performed by: INTERNAL MEDICINE

## 2023-02-04 PROCEDURE — 2140000000 HC CCU INTERMEDIATE R&B

## 2023-02-04 PROCEDURE — 83605 ASSAY OF LACTIC ACID: CPT

## 2023-02-04 PROCEDURE — 99285 EMERGENCY DEPT VISIT HI MDM: CPT

## 2023-02-04 PROCEDURE — 82140 ASSAY OF AMMONIA: CPT

## 2023-02-04 PROCEDURE — 80053 COMPREHEN METABOLIC PANEL: CPT

## 2023-02-04 PROCEDURE — 71045 X-RAY EXAM CHEST 1 VIEW: CPT

## 2023-02-04 PROCEDURE — 83735 ASSAY OF MAGNESIUM: CPT

## 2023-02-04 RX ORDER — SODIUM CHLORIDE 0.9 % (FLUSH) 0.9 %
10 SYRINGE (ML) INJECTION PRN
Status: DISCONTINUED | OUTPATIENT
Start: 2023-02-04 | End: 2023-02-07 | Stop reason: HOSPADM

## 2023-02-04 RX ORDER — ONDANSETRON 2 MG/ML
4 INJECTION INTRAMUSCULAR; INTRAVENOUS EVERY 6 HOURS PRN
Status: DISCONTINUED | OUTPATIENT
Start: 2023-02-04 | End: 2023-02-04

## 2023-02-04 RX ORDER — FLUTICASONE PROPIONATE 50 MCG
1 SPRAY, SUSPENSION (ML) NASAL DAILY
Status: DISCONTINUED | OUTPATIENT
Start: 2023-02-05 | End: 2023-02-07 | Stop reason: HOSPADM

## 2023-02-04 RX ORDER — SPIRONOLACTONE 25 MG/1
25 TABLET ORAL DAILY
Status: DISCONTINUED | OUTPATIENT
Start: 2023-02-05 | End: 2023-02-07 | Stop reason: HOSPADM

## 2023-02-04 RX ORDER — FUROSEMIDE 20 MG/1
20 TABLET ORAL DAILY
Status: DISCONTINUED | OUTPATIENT
Start: 2023-02-05 | End: 2023-02-07 | Stop reason: HOSPADM

## 2023-02-04 RX ORDER — AMIODARONE HYDROCHLORIDE 200 MG/1
200 TABLET ORAL DAILY
Status: DISCONTINUED | OUTPATIENT
Start: 2023-02-05 | End: 2023-02-07 | Stop reason: HOSPADM

## 2023-02-04 RX ORDER — DIPHENHYDRAMINE HCL 25 MG
25 TABLET ORAL EVERY 6 HOURS PRN
Status: DISCONTINUED | OUTPATIENT
Start: 2023-02-04 | End: 2023-02-07 | Stop reason: HOSPADM

## 2023-02-04 RX ORDER — PANTOPRAZOLE SODIUM 40 MG/1
40 TABLET, DELAYED RELEASE ORAL
Status: DISCONTINUED | OUTPATIENT
Start: 2023-02-05 | End: 2023-02-07 | Stop reason: HOSPADM

## 2023-02-04 RX ORDER — ACETAMINOPHEN 325 MG/1
650 TABLET ORAL EVERY 6 HOURS PRN
Status: DISCONTINUED | OUTPATIENT
Start: 2023-02-04 | End: 2023-02-07 | Stop reason: HOSPADM

## 2023-02-04 RX ORDER — METOPROLOL SUCCINATE 25 MG/1
25 TABLET, EXTENDED RELEASE ORAL DAILY
Status: DISCONTINUED | OUTPATIENT
Start: 2023-02-05 | End: 2023-02-07 | Stop reason: HOSPADM

## 2023-02-04 RX ORDER — HYDROXYZINE HYDROCHLORIDE 10 MG/1
10 TABLET, FILM COATED ORAL EVERY 8 HOURS PRN
Status: DISCONTINUED | OUTPATIENT
Start: 2023-02-04 | End: 2023-02-07 | Stop reason: HOSPADM

## 2023-02-04 RX ORDER — BACLOFEN 10 MG/1
10 TABLET ORAL NIGHTLY
Status: DISCONTINUED | OUTPATIENT
Start: 2023-02-04 | End: 2023-02-07 | Stop reason: HOSPADM

## 2023-02-04 RX ORDER — 0.9 % SODIUM CHLORIDE 0.9 %
500 INTRAVENOUS SOLUTION INTRAVENOUS ONCE
Status: COMPLETED | OUTPATIENT
Start: 2023-02-04 | End: 2023-02-04

## 2023-02-04 RX ORDER — SODIUM CHLORIDE 9 MG/ML
INJECTION, SOLUTION INTRAVENOUS PRN
Status: DISCONTINUED | OUTPATIENT
Start: 2023-02-04 | End: 2023-02-07 | Stop reason: HOSPADM

## 2023-02-04 RX ORDER — ONDANSETRON 2 MG/ML
4 INJECTION INTRAMUSCULAR; INTRAVENOUS ONCE
Status: COMPLETED | OUTPATIENT
Start: 2023-02-04 | End: 2023-02-04

## 2023-02-04 RX ORDER — SODIUM CHLORIDE 0.9 % (FLUSH) 0.9 %
10 SYRINGE (ML) INJECTION EVERY 12 HOURS SCHEDULED
Status: DISCONTINUED | OUTPATIENT
Start: 2023-02-04 | End: 2023-02-07 | Stop reason: HOSPADM

## 2023-02-04 RX ORDER — HYDRALAZINE HYDROCHLORIDE 10 MG/1
10 TABLET, FILM COATED ORAL 3 TIMES DAILY
Status: DISCONTINUED | OUTPATIENT
Start: 2023-02-04 | End: 2023-02-07 | Stop reason: HOSPADM

## 2023-02-04 RX ORDER — ACETAMINOPHEN 650 MG/1
650 SUPPOSITORY RECTAL EVERY 6 HOURS PRN
Status: DISCONTINUED | OUTPATIENT
Start: 2023-02-04 | End: 2023-02-07 | Stop reason: HOSPADM

## 2023-02-04 RX ORDER — DABIGATRAN ETEXILATE 75 MG/1
75 CAPSULE ORAL 2 TIMES DAILY
Status: DISCONTINUED | OUTPATIENT
Start: 2023-02-04 | End: 2023-02-07 | Stop reason: HOSPADM

## 2023-02-04 RX ORDER — ONDANSETRON 4 MG/1
4 TABLET, ORALLY DISINTEGRATING ORAL EVERY 8 HOURS PRN
Status: DISCONTINUED | OUTPATIENT
Start: 2023-02-04 | End: 2023-02-04

## 2023-02-04 RX ADMIN — ONDANSETRON 4 MG: 2 INJECTION INTRAMUSCULAR; INTRAVENOUS at 13:09

## 2023-02-04 RX ADMIN — SODIUM CHLORIDE 500 ML: 9 INJECTION, SOLUTION INTRAVENOUS at 13:09

## 2023-02-04 RX ADMIN — DABIGATRAN ETEXILATE MESYLATE 75 MG: 75 CAPSULE ORAL at 22:52

## 2023-02-04 RX ADMIN — HYDRALAZINE HYDROCHLORIDE 10 MG: 10 TABLET, FILM COATED ORAL at 22:52

## 2023-02-04 RX ADMIN — Medication 10 ML: at 22:54

## 2023-02-04 ASSESSMENT — PAIN - FUNCTIONAL ASSESSMENT
PAIN_FUNCTIONAL_ASSESSMENT: NONE - DENIES PAIN
PAIN_FUNCTIONAL_ASSESSMENT: NONE - DENIES PAIN

## 2023-02-04 ASSESSMENT — LIFESTYLE VARIABLES
HOW MANY STANDARD DRINKS CONTAINING ALCOHOL DO YOU HAVE ON A TYPICAL DAY: PATIENT DOES NOT DRINK
HOW OFTEN DO YOU HAVE A DRINK CONTAINING ALCOHOL: NEVER

## 2023-02-04 NOTE — Clinical Note
Discharge Plan[de-identified] Other/[facility])   Telemetry/Cardiac Monitoring Required?: Yes

## 2023-02-04 NOTE — ED PROVIDER NOTES
ED PROVIDER NOTE    Chief Complaint   Patient presents with    Dizziness    Nausea     Since yesterday. Patient has a cardioversion yesterday for Afib and these symptoms have gotten worse. Patient has a lifevest on but it has not gone off. HPI:  2/4/23,   Time: 12:41 PM MORGAN Berkowitz is a 68 y.o. female presenting to the ED for dizziness and nausea. Ongoing for the last 3 days, last normal 3 days ago. Patient had a cardioversion yesterday for A. fib by Dr. Shelbie Gilbert, has had worsening of the dizziness and nausea since then. Patient is unable to characterize dizziness as lightheadedness versus vertigo. No prior history of vertigo. Patient takes amiodarone for her A. fib. She is also on Pradaxa. No vomiting or abdominal pain. No fevers, chills, chest pain. Does have shortness of breath with exertion. Leg swelling stable from baseline. Decreased p.o. intake. Normal urine output. Chart review: hx of NICM/HFrEF s/p life vest, HTN, VTE, afib s/p watchman, renal cell carcinoma    Reviewed Dr. Brittaney Chowdhury procedure note from yesterday:  \"Successful cardioversion of persistent atrial fibrillation to sinus rhythm. \"    Lab Results   Component Value Date    LVEF 32 12/20/2022    LVEFMODE Echo 11/21/2018         Review of Systems:     Review of Systems  Pertinent positives and negatives as stated in HPI     --------------------------------------------- PAST HISTORY ---------------------------------------------  Past Medical History:   Past Medical History:   Diagnosis Date    Afib ([address]     WATCHMAN    Anxiety     Arthritis     Cervical radiculopathy     CHF (congestive heart failure) (HCC)     Chronic sinusitis     Ejection fraction < 50%     25%.  Wearing life vest    Hilar adenopathy     Hx of blood clots     Hypertension     Left ventricular systolic dysfunction     Lesion of left native kidney     Lumbar radiculopathy     Lung nodules     Meige syndrome     partial/ PCP    Microscopic colitis Mitral regurgitation     Mild to moderate    Nonischemic cardiomyopathy (HCC)     f/u with Dr. Celio Chan    Osteopenia     PE (pulmonary thromboembolism) [facility])     Renal cell carcinoma of left kidney ([address] 02/01/2022    Vertebrobasilar artery syndrome     f/u PCP       Past Surgical History:   Past Surgical History:   Procedure Laterality Date    BLADDER REPAIR      BRONCHOSCOPY N/A 10/05/2021    BRONCHOSCOPY W/EBUS FNA performed by Alexei Rollins MD at [facility] N/A 10/05/2021    BRONCHOSCOPY DIAGNOSTIC OR CELL [address] ONLY performed by Alexei Rollins MD at [facility] TEST  11/25/2013    Rt & LT cath    CARDIOVERSION  11/04/2019    Successful CV from AF to NSR   (Dr. Maira Hanson)    CARDIOVERSION  02/03/2023    Dr Maira Hanson- 1 shock 200 joules- successful    COLONOSCOPY  07/2020    DIAGNOSTIC CARDIAC CATH LAB PROCEDURE  02/20/2010    Dr Madison Mcneill CATH LAB PROCEDURE  08/24/2011    Right heart cath @ [facility]. DOPPLER ECHOCARDIOGRAPHY  11/25/2013    HYSTERECTOMY (CERVIX STATUS UNKNOWN)  1991    total    OTHER SURGICAL HISTORY  10/14/2020    Dr. Celio Chan- 24mm Watchman device    PARTIAL NEPHRECTOMY Left 11/16/2021    ROBOT ASSISTED LAPAROSCOPIC COMPLEX LEFT PARTIAL NEPHRECTOMY performed by Gurpreet Dolan MD at [address]    TRANSESOPHAGEAL ECHOCARDIOGRAM  11/21/2018    Dr. Lilliana Kelsey    TRANSESOPHAGEAL ECHOCARDIOGRAM  03/18/2019    TRANSESOPHAGEAL ECHOCARDIOGRAM  01/26/2023    Dr Trent Campbell History:   Social History     Socioeconomic History    Marital status:      Spouse name: None    Number of children: None    Years of education: None    Highest education level: None   Tobacco Use    Smoking status: Never    Smokeless tobacco: Never   Vaping Use    Vaping Use: Never used   Substance and Sexual Activity    Alcohol use:  Yes     Alcohol/week: 0.0 standard drinks     Comment: occasional wine/mixed drink every few months    Drug use: Never   Social History Narrative    Drinks 1-2 cups of coffee daily. Family History:   Family History   Problem Relation Age of Onset    Heart Attack Mother     Stroke Mother     Heart Attack Father     Heart Failure Father     Heart Disease Father     Anxiety Disorder Sister     Depression Sister     Crohn's Disease Son        The patients home medications have been reviewed. Allergies:    Allergies   Allergen Reactions    Bentyl [Dicyclomine] Shortness Of Breath    Adhesive Tape Itching     develops rash and itching with EKG electrodes    Atacand [Candesartan] Hives and Itching    Cefdinir Hives, Itching and Nausea Only    Demerol      3/9/19 Pt states she gets a severe migraine    Digoxin Itching     developed itching and rash    Imdur [Isosorbide Mononitrate]       migraines    Losartan Potassium Itching    Sulfa Antibiotics Diarrhea and Other (See Comments)     Also causes migraines  caused migraines and diarrhea    Xarelto [Rivaroxaban] Itching and Rash      severe itch, headache, rash    Lovenox [Enoxaparin Sodium] Itching     States  her pulmonary doctor-DR Michell Dewitt took her off  lovenox (rash-itching)and she is starting on pradaxa today 1/26/2023    Acetaminophen Hives and Itching    Apap-Caff-Dihydrocodeine Hives and Itching    Coreg [Carvedilol] Itching     Can take extended release Coreg    Cozaar [Losartan] Itching    Diovan [Valsartan] Itching    Effexor [Venlafaxine Hydrochloride] Nausea Only    Eliquis [Apixaban] Hives, Itching and Rash     3/9/19 Pt states that she gets hives, itchy and a rash    Labetalol Itching    Lisinopril Itching    Ramipril Itching           ---------------------------------------------------PHYSICAL EXAM--------------------------------------    BP (!) 95/56   Pulse 71   Temp 96.8 °F (36 °C) (Temporal)   Resp 18   Ht 5' 7\" (1.702 m)   Wt 161 lb (73 kg)   SpO2 99%   BMI 25.22 kg/m²     Physical Exam  Vitals and nursing note reviewed. Constitutional:       General: She is not in acute distress. Appearance: She is not toxic-appearing. HENT:      Mouth/Throat:      Mouth: Mucous membranes are dry. Eyes:      General: No scleral icterus. Extraocular Movements: Extraocular movements intact. Pupils: Pupils are equal, round, and reactive to light. Cardiovascular:      Rate and Rhythm: Normal rate and regular rhythm. Pulses: Normal pulses. Heart sounds: Normal heart sounds. No murmur heard. Pulmonary:      Effort: Pulmonary effort is normal. No respiratory distress. Breath sounds: Normal breath sounds. No wheezing or rales. Abdominal:      General: There is no distension. Palpations: Abdomen is soft. Tenderness: There is no abdominal tenderness. Musculoskeletal:         General: No swelling or tenderness. Normal range of motion. Cervical back: Normal range of motion and neck supple. Comments: Radial, DP, and PT pulses 1+ bilaterally. Skin:     General: Skin is warm and dry. Neurological:      Mental Status: She is alert and oriented to person, place, and time. Comments: Strength 5/5 and sensation grossly intact to light touch and equal bilaterally throughout all extremities          -------------------------------------------------- RESULTS -------------------------------------------------  I have personally reviewed all laboratory and imaging results for this patient. Results are listed below.      LABS:  Labs Reviewed   CBC WITH AUTO DIFFERENTIAL - Abnormal; Notable for the following components:       Result Value    RDW 15.5 (*)     Neutrophils % 88.0 (*)     Lymphocytes % 7.0 (*)     Neutrophils Absolute 8.48 (*)     Lymphocytes Absolute 0.67 (*)     Eosinophils Absolute 0.02 (*)     All other components within normal limits   COMPREHENSIVE METABOLIC PANEL - Abnormal; Notable for the following components:    Chloride 97 (*)     Anion Gap 18 (*)     Glucose 200 (*)     BUN 55 (*)     Creatinine 2.1 (*)     Alkaline Phosphatase 110 (*)     ALT 54 (*)     AST 46 (*)     All other components within normal limits   LACTIC ACID - Abnormal; Notable for the following components:    Lactic Acid 4.7 (*)     All other components within normal limits    Narrative:     CALL  [facility]  H34 tel. ,  Chemistry results called to and read back by dr Mars Salmon, 02/04/2023 13:40, by  Korin Simons   TROPONIN - Abnormal; Notable for the following components:    Troponin, High Sensitivity 48 (*)     All other components within normal limits   BRAIN NATRIURETIC PEPTIDE - Abnormal; Notable for the following components:    Pro-BNP 13,243 (*)     All other components within normal limits   TROPONIN - Abnormal; Notable for the following components:    Troponin, High Sensitivity 45 (*)     All other components within normal limits   MAGNESIUM   LIPASE   LACTIC ACID   URINALYSIS WITH MICROSCOPIC       RADIOLOGY:  Interpreted personally and by Radiologist.  CT Head W/O Contrast   Final Result   1. There is no acute intracranial abnormality. Specifically, there is no   intracranial hemorrhage. 2. Atrophy and periventricular leukomalacia,   . EKG:  This EKG is signed and interpreted by the EP. Sinus rhythm with first-degree AV block with PVCs, ventricular rate 66 bpm, left axis deviation, LBBB, no acute injury pattern, no clinically significant change compared w/ prior EKG       ------------------------- NURSING NOTES AND VITALS REVIEWED ---------------------------   The nursing notes within the ED encounter and vital signs as below have been reviewed by myself. BP (!) 95/56   Pulse 71   Temp 96.8 °F (36 °C) (Temporal)   Resp 18   Ht 5' 7\" (1.702 m)   Wt 161 lb (73 kg)   SpO2 99%   BMI 25.22 kg/m²   Oxygen Saturation Interpretation: Normal    The patients available past medical records and past encounters were reviewed.         ------------------------------ ED COURSE/MEDICAL DECISION MAKING----------------------  Medications   ondansetron (ZOFRAN) injection 4 mg (4 mg IntraVENous Given 23 1309)   0.9 % sodium chloride bolus (0 mLs IntraVENous Stopped 23 1510)     Additional history obtained from:  Daughter at bedside--later in ED course daughter presented to the bedside and states that patient appears to be more confused than she was earlier today. Independent interpretation of tests:  High-sensitivity troponin x2 downtrending and similar to prior baseline values, not suggestive of ACS  proBNP elevated similar to prior baseline values  CKD stable from prior baseline values. Counseling: The emergency provider has spoken with the patient and family and discussed todays results, in addition to providing specific details for the plan of care and counseling regarding the diagnosis and prognosis. Questions are answered at this time and they are agreeable with the plan. ED Course/Medical Decision Makin y.o. female here with dizziness and nausea. Symptoms ongoing for the last 3 days. Last known well greater than 48 hours ago. On arrival patient is hemodynamically stable, afebrile, and in no acute distress. She is breathing comfortably on room air without respiratory distress. Neurovascularly intact throughout. No limb ataxia, extremity drift, facial droop, or other focal neurologic deficits. Treated with IV Zofran and IV normal saline 500 cc bolus. On reevaluation patient reports persistent nausea and dizziness. She is unable to characterize if this is room spinning in nature like vertigo. Patient has many cardiovascular risk factors. CTH was negative for acute process. UA and chest x-ray are pending.   Given patient's persistent symptoms and on reevaluation daughters report that she seems to be slightly confused, will admit to medicine service for further management.       --------------------------------- IMPRESSION AND DISPOSITION ---------------------------------    IMPRESSION  1. Vertigo    2. Nausea        DISPOSITION  Disposition: Admit to telemetry  Patient condition is stable    NOTE: This report was transcribed using voice recognition software.  Every effort was made to ensure accuracy; however, inadvertent computerized transcription errors may be present    Keyonna Craven MD  Attending Emergency Physician        Keyonna Craven MD  02/04/23 2030

## 2023-02-05 LAB
ALBUMIN SERPL-MCNC: 3.4 G/DL (ref 3.5–5.2)
ALP BLD-CCNC: 94 U/L (ref 35–104)
ALT SERPL-CCNC: 44 U/L (ref 0–32)
ANION GAP SERPL CALCULATED.3IONS-SCNC: 13 MMOL/L (ref 7–16)
AST SERPL-CCNC: 37 U/L (ref 0–31)
BACTERIA: ABNORMAL /HPF
BASOPHILS ABSOLUTE: 0.03 E9/L (ref 0–0.2)
BASOPHILS RELATIVE PERCENT: 0.5 % (ref 0–2)
BILIRUB SERPL-MCNC: 1.2 MG/DL (ref 0–1.2)
BILIRUBIN URINE: NEGATIVE
BLOOD, URINE: ABNORMAL
BUN BLDV-MCNC: 46 MG/DL (ref 6–23)
CALCIUM SERPL-MCNC: 9.6 MG/DL (ref 8.6–10.2)
CHLORIDE BLD-SCNC: 103 MMOL/L (ref 98–107)
CLARITY: ABNORMAL
CO2: 24 MMOL/L (ref 22–29)
COLOR: YELLOW
CREAT SERPL-MCNC: 1.9 MG/DL (ref 0.5–1)
EOSINOPHILS ABSOLUTE: 0.06 E9/L (ref 0.05–0.5)
EOSINOPHILS RELATIVE PERCENT: 1 % (ref 0–6)
EPITHELIAL CELLS, UA: ABNORMAL /HPF
GFR SERPL CREATININE-BSD FRML MDRD: 27 ML/MIN/1.73
GLUCOSE BLD-MCNC: 105 MG/DL (ref 74–99)
GLUCOSE URINE: NEGATIVE MG/DL
HCT VFR BLD CALC: 42.7 % (ref 34–48)
HEMOGLOBIN: 13.8 G/DL (ref 11.5–15.5)
IMMATURE GRANULOCYTES #: 0.04 E9/L
IMMATURE GRANULOCYTES %: 0.7 % (ref 0–5)
KETONES, URINE: NEGATIVE MG/DL
LACTIC ACID: 1.9 MMOL/L (ref 0.5–2.2)
LACTIC ACID: 2.5 MMOL/L (ref 0.5–2.2)
LEUKOCYTE ESTERASE, URINE: ABNORMAL
LYMPHOCYTES ABSOLUTE: 0.7 E9/L (ref 1.5–4)
LYMPHOCYTES RELATIVE PERCENT: 11.5 % (ref 20–42)
MCH RBC QN AUTO: 28.3 PG (ref 26–35)
MCHC RBC AUTO-ENTMCNC: 32.3 % (ref 32–34.5)
MCV RBC AUTO: 87.7 FL (ref 80–99.9)
MONOCYTES ABSOLUTE: 0.48 E9/L (ref 0.1–0.95)
MONOCYTES RELATIVE PERCENT: 7.9 % (ref 2–12)
NEUTROPHILS ABSOLUTE: 4.79 E9/L (ref 1.8–7.3)
NEUTROPHILS RELATIVE PERCENT: 78.4 % (ref 43–80)
NITRITE, URINE: POSITIVE
PDW BLD-RTO: 15.5 FL (ref 11.5–15)
PH UA: 5.5 (ref 5–9)
PLATELET # BLD: 240 E9/L (ref 130–450)
PMV BLD AUTO: 10.5 FL (ref 7–12)
POTASSIUM REFLEX MAGNESIUM: 3.7 MMOL/L (ref 3.5–5)
PROTEIN UA: ABNORMAL MG/DL
RBC # BLD: 4.87 E12/L (ref 3.5–5.5)
RBC UA: ABNORMAL /HPF (ref 0–2)
SODIUM BLD-SCNC: 140 MMOL/L (ref 132–146)
SPECIFIC GRAVITY UA: 1.02 (ref 1–1.03)
TOTAL PROTEIN: 6.5 G/DL (ref 6.4–8.3)
TROPONIN, HIGH SENSITIVITY: 47 NG/L (ref 0–9)
TROPONIN, HIGH SENSITIVITY: 50 NG/L (ref 0–9)
UROBILINOGEN, URINE: 0.2 E.U./DL
VITAMIN B-12: 1304 PG/ML (ref 211–946)
WBC # BLD: 6.1 E9/L (ref 4.5–11.5)
WBC UA: ABNORMAL /HPF (ref 0–5)

## 2023-02-05 PROCEDURE — 85025 COMPLETE CBC W/AUTO DIFF WBC: CPT

## 2023-02-05 PROCEDURE — 2580000003 HC RX 258: Performed by: INTERNAL MEDICINE

## 2023-02-05 PROCEDURE — 87088 URINE BACTERIA CULTURE: CPT

## 2023-02-05 PROCEDURE — 81001 URINALYSIS AUTO W/SCOPE: CPT

## 2023-02-05 PROCEDURE — 36415 COLL VENOUS BLD VENIPUNCTURE: CPT

## 2023-02-05 PROCEDURE — 2140000000 HC CCU INTERMEDIATE R&B

## 2023-02-05 PROCEDURE — 84484 ASSAY OF TROPONIN QUANT: CPT

## 2023-02-05 PROCEDURE — 6370000000 HC RX 637 (ALT 250 FOR IP): Performed by: INTERNAL MEDICINE

## 2023-02-05 PROCEDURE — 6360000002 HC RX W HCPCS: Performed by: FAMILY MEDICINE

## 2023-02-05 PROCEDURE — 83605 ASSAY OF LACTIC ACID: CPT

## 2023-02-05 PROCEDURE — 87040 BLOOD CULTURE FOR BACTERIA: CPT

## 2023-02-05 PROCEDURE — 6360000002 HC RX W HCPCS: Performed by: INTERNAL MEDICINE

## 2023-02-05 PROCEDURE — 2580000003 HC RX 258: Performed by: FAMILY MEDICINE

## 2023-02-05 PROCEDURE — 93005 ELECTROCARDIOGRAM TRACING: CPT | Performed by: INTERNAL MEDICINE

## 2023-02-05 PROCEDURE — 80053 COMPREHEN METABOLIC PANEL: CPT

## 2023-02-05 RX ORDER — SODIUM CHLORIDE 9 MG/ML
INJECTION, SOLUTION INTRAVENOUS CONTINUOUS
Status: DISCONTINUED | OUTPATIENT
Start: 2023-02-05 | End: 2023-02-05

## 2023-02-05 RX ORDER — 0.9 % SODIUM CHLORIDE 0.9 %
2000 INTRAVENOUS SOLUTION INTRAVENOUS ONCE
Status: COMPLETED | OUTPATIENT
Start: 2023-02-05 | End: 2023-02-05

## 2023-02-05 RX ADMIN — SODIUM CHLORIDE 2000 ML: 9 INJECTION, SOLUTION INTRAVENOUS at 15:59

## 2023-02-05 RX ADMIN — METOPROLOL SUCCINATE 25 MG: 25 TABLET, EXTENDED RELEASE ORAL at 08:23

## 2023-02-05 RX ADMIN — SPIRONOLACTONE 25 MG: 25 TABLET ORAL at 08:24

## 2023-02-05 RX ADMIN — FUROSEMIDE 20 MG: 20 TABLET ORAL at 08:24

## 2023-02-05 RX ADMIN — PIPERACILLIN AND TAZOBACTAM 3375 MG: 3; .375 INJECTION, POWDER, FOR SOLUTION INTRAVENOUS at 11:27

## 2023-02-05 RX ADMIN — Medication 10 ML: at 08:27

## 2023-02-05 RX ADMIN — Medication 10 ML: at 20:43

## 2023-02-05 RX ADMIN — FLUTICASONE PROPIONATE 1 SPRAY: 50 SPRAY, METERED NASAL at 08:24

## 2023-02-05 RX ADMIN — BACLOFEN 10 MG: 10 TABLET ORAL at 20:43

## 2023-02-05 RX ADMIN — DIPHENHYDRAMINE HYDROCHLORIDE 25 MG: 25 TABLET ORAL at 01:27

## 2023-02-05 RX ADMIN — PIPERACILLIN AND TAZOBACTAM 3375 MG: 3; .375 INJECTION, POWDER, FOR SOLUTION INTRAVENOUS at 23:22

## 2023-02-05 RX ADMIN — DABIGATRAN ETEXILATE MESYLATE 75 MG: 75 CAPSULE ORAL at 08:24

## 2023-02-05 RX ADMIN — SODIUM CHLORIDE: 9 INJECTION, SOLUTION INTRAVENOUS at 11:24

## 2023-02-05 RX ADMIN — PANTOPRAZOLE SODIUM 40 MG: 40 TABLET, DELAYED RELEASE ORAL at 06:37

## 2023-02-05 RX ADMIN — HYDRALAZINE HYDROCHLORIDE 10 MG: 10 TABLET, FILM COATED ORAL at 08:24

## 2023-02-05 RX ADMIN — AMIODARONE HYDROCHLORIDE 200 MG: 200 TABLET ORAL at 08:23

## 2023-02-05 RX ADMIN — TRIMETHOBENZAMIDE HYDROCHLORIDE 200 MG: 100 INJECTION INTRAMUSCULAR at 22:55

## 2023-02-05 RX ADMIN — DIPHENHYDRAMINE HYDROCHLORIDE 25 MG: 25 TABLET ORAL at 23:44

## 2023-02-05 RX ADMIN — DABIGATRAN ETEXILATE MESYLATE 75 MG: 75 CAPSULE ORAL at 20:43

## 2023-02-05 ASSESSMENT — PAIN SCALES - GENERAL
PAINLEVEL_OUTOF10: 6
PAINLEVEL_OUTOF10: 0

## 2023-02-05 ASSESSMENT — PAIN DESCRIPTION - DESCRIPTORS: DESCRIPTORS: ACHING;DISCOMFORT;SORE

## 2023-02-05 ASSESSMENT — PAIN DESCRIPTION - LOCATION: LOCATION: NECK

## 2023-02-05 ASSESSMENT — PAIN SCALES - WONG BAKER: WONGBAKER_NUMERICALRESPONSE: 0

## 2023-02-05 ASSESSMENT — PAIN - FUNCTIONAL ASSESSMENT: PAIN_FUNCTIONAL_ASSESSMENT: ACTIVITIES ARE NOT PREVENTED

## 2023-02-05 ASSESSMENT — PAIN DESCRIPTION - ORIENTATION: ORIENTATION: RIGHT

## 2023-02-05 NOTE — PROGRESS NOTES
Dr. Jacob Jesus called regarding cardiology consult to be cancelled and new consult placed to EP due to post cardioversion dizziness. Consult to cardiology cancelled and new consult to ep placed. Attending notified via perfect serve.

## 2023-02-05 NOTE — PROGRESS NOTES
Hospitalist Progress Note      SYNOPSIS: Patient admitted on 2023 for Vertigo      SUBJECTIVE:    Patient seen and examined. She reports dizziness and nausea when she tries to get up. She denies vertigo. She reports some trouble with choking on her liquid and food sometimes. She just throws up what she tries to drink or eat. Discussed speech consult. Records reviewed. 57-year-old lady past medical history of atrial fibrillation on amiodarone and Pradaxa, HFrEF EF 25%, CKD, history of blood clots, nonischemic cardiomyopathy, pulmonary embolism, renal cell carcinoma of the left kidney presented due to dizziness and nausea going on for last 3 days. She is status post cardioversion yesterday for A-fib by EP and has been nauseous since then. CT head was negative for any acute findings. She was found to have urinary tract infection lactic acidosis 4.7, hypotensive on presentation concerning for sepsis blood pressure 95/56. Stable overnight. No other overnight issues reported. Temp (24hrs), Av.2 °F (36.2 °C), Min:96.8 °F (36 °C), Max:97.7 °F (36.5 °C)    DIET: ADULT DIET; Regular; Low Fat/Low Chol/High Fiber/GISELLE; Low Sodium (2 gm)  CODE: Full Code  No intake or output data in the 24 hours ending 23 1055    OBJECTIVE:    /66   Pulse 94   Temp 97.7 °F (36.5 °C) (Temporal)   Resp 18   Ht 5' 7\" (1.702 m)   Wt 161 lb 3.2 oz (73.1 kg)   SpO2 94%   BMI 25.25 kg/m²     General appearance: No apparent distress, appears stated age and cooperative. HEENT:  Conjunctivae/corneas clear. Neck: Supple. No jugular venous distention. Respiratory: Clear to auscultation bilaterally, normal respiratory effort  Cardiovascular: Regular rate rhythm, normal S1-S2  Abdomen: Soft, nontender, nondistended  Musculoskeletal: No clubbing, cyanosis, no bilateral lower extremity edema. Brisk capillary refill.    Skin:  No rashes  on visible skin  Neurologic: awake, alert and following commands ASSESSMENT:  Sepsis  Urinary tract infection  Intractable dizziness  Lactic acidosis  Atrial fibrillation on Pradaxa and amiodarone  Chronic HFrEF  History of PE       PLAN:  Dizziness likely from dehydration. 2L NS bolus ordered as BP later  was 86/66, give 1L now and recheck BP before second liter, discussed with bedside nurse. Check orthostatic vital sign. Appears to have allergies at this time cefdinir with nausea and itching we will put on Zosyn for now given her history of multiple UTIs. ID consultation. Gentle IV hydration hold Lasix and aldactone given soft blood pressure. Hold BP meds. Blood cultures ordered. PT OT. May benefit from physical therapy.       DISPOSITION:     Medications:  REVIEWED DAILY    Infusion Medications    sodium chloride      sodium chloride       Scheduled Medications    piperacillin-tazobactam  3,375 mg IntraVENous Q8H    amiodarone  200 mg Oral Daily    baclofen  10 mg Oral Nightly    dabigatran  75 mg Oral BID    fluticasone  1 spray Each Nostril Daily    hydrALAZINE  10 mg Oral TID    [Held by provider] furosemide  20 mg Oral Daily    pantoprazole  40 mg Oral QAM AC    metoprolol succinate  25 mg Oral Daily    spironolactone  25 mg Oral Daily    sodium chloride flush  10 mL IntraVENous 2 times per day     PRN Meds: perflutren lipid microspheres, diphenhydrAMINE, hydrOXYzine HCl, sodium chloride flush, sodium chloride, magnesium hydroxide, acetaminophen **OR** acetaminophen    Labs:     Recent Labs     02/03/23  0715 02/04/23  1311 02/05/23  0604   WBC 8.0 9.6 6.1   HGB 13.7 14.6 13.8   HCT 39.0 44.4 42.7    288 240       Recent Labs     02/03/23  0715 02/04/23  1311 02/05/23  0604    137 140   K 4.1 4.3 3.7   CL 97* 97* 103   CO2 17* 22 24   BUN 51* 55* 46*   CREATININE 2.4* 2.1* 1.9*   CALCIUM 10.1 9.9 9.6       Recent Labs     02/04/23  1311 02/05/23  0604   PROT 7.8 6.5   ALKPHOS 110* 94   ALT 54* 44*   AST 46* 37*   BILITOT 1.1 1.2   LIPASE 30  -- No results for input(s): INR in the last 72 hours. No results for input(s): Tania Oms in the last 72 hours. Chronic labs:    Lab Results   Component Value Date    CHOL 151 12/18/2022    TRIG 68 12/18/2022    HDL 55 12/18/2022    LDLCALC 82 12/18/2022    TSH 0.666 02/04/2023    INR 2.7 01/03/2023    LABA1C 5.7 (H) 02/01/2022       Radiology: REVIEWED DAILY    +++++++++++++++++++++++++++++++++++++++++++++++++  Kailey Torres MD  [facility] Physician - [address] [place] [address]  +++++++++++++++++++++++++++++++++++++++++++++++++  NOTE: This report was transcribed using voice recognition software. Every effort was made to ensure accuracy; however, inadvertent computerized transcription errors may be present.

## 2023-02-05 NOTE — H&P
Hospitalist History & Physical      PATIENT NAME:  Rasta Roberts    MRN:  [id number]  SERVICE DATE:  02/04/23    Primary Care Physician: Delphine Boggs DO       SUBJECTIVE  CHIEF COMPLAINT:  had concerns including Dizziness and Nausea (Since yesterday. Patient has a cardioversion yesterday for Afib and these symptoms have gotten worse. Patient has a lifevest on but it has not gone off.). HPI:  Ms. Rasta Roberts, a 68y.o. year old female  who  has a past medical history of Afib ([address] Anxiety, Arthritis, Cervical radiculopathy, CHF (congestive heart failure) (HCC), Chronic sinusitis, Ejection fraction < 50%, Hilar adenopathy, Hx of blood clots, Hypertension, Left ventricular systolic dysfunction, Lesion of left native kidney, Lumbar radiculopathy, Lung nodules, Meige syndrome, Microscopic colitis, Mitral regurgitation, Nonischemic cardiomyopathy ([address] Osteopenia, PE (pulmonary thromboembolism) ([address] Renal cell carcinoma of left kidney ([address] and Vertebrobasilar artery syndrome. presents with dizziness and nausea, no vomiting, no headache or vision changes, denies chect pain fever or chills no palpitation. Dizziness for past 3 days, had DCD yesterday. PAST MEDICAL HISTORY:    Past Medical History:   Diagnosis Date    Afib ([address]     WATCHMAN    Anxiety     Arthritis     Cervical radiculopathy     CHF (congestive heart failure) (HCC)     Chronic sinusitis     Ejection fraction < 50%     25%.  Wearing life vest    Hilar adenopathy     Hx of blood clots     Hypertension     Left ventricular systolic dysfunction     Lesion of left native kidney     Lumbar radiculopathy     Lung nodules     Meige syndrome     partial/ PCP    Microscopic colitis     Mitral regurgitation     Mild to moderate    Nonischemic cardiomyopathy ([address]     f/u with Dr. Anel Kate    Osteopenia     PE (pulmonary thromboembolism) [facility])     Renal cell carcinoma of left kidney ([address] 02/01/2022    Vertebrobasilar artery syndrome     f/u PCP PAST SURGICAL HISTORY:    Past Surgical History:   Procedure Laterality Date    BLADDER REPAIR      BRONCHOSCOPY N/A 10/05/2021    BRONCHOSCOPY W/EBUS FNA performed by Jaswinder Mittal MD at [facility] N/A 10/05/2021    BRONCHOSCOPY DIAGNOSTIC OR CELL [address] ONLY performed by Jaswinder Mittal MD at [facility] TEST  11/25/2013    Rt & LT cath    CARDIOVERSION  11/04/2019    Successful CV from AF to NSR   (Dr. Pascual Romeo)    CARDIOVERSION  02/03/2023    Dr Pascual Romeo- 1 shock 200 joules- successful    COLONOSCOPY  07/2020    DIAGNOSTIC CARDIAC CATH LAB PROCEDURE  02/20/2010    Dr Maxx Judd CATH LAB PROCEDURE  08/24/2011    Right heart cath @ [facility]. DOPPLER ECHOCARDIOGRAPHY  11/25/2013    HYSTERECTOMY (CERVIX STATUS UNKNOWN)  1991    total    OTHER SURGICAL HISTORY  10/14/2020    Dr. Castillo Rm- 24mm Watchman device    PARTIAL NEPHRECTOMY Left 11/16/2021    ROBOT ASSISTED LAPAROSCOPIC COMPLEX LEFT PARTIAL NEPHRECTOMY performed by Iftikhar Nichols MD at [address]    TRANSESOPHAGEAL ECHOCARDIOGRAM  11/21/2018    Dr. Callejas Miracle    TRANSESOPHAGEAL ECHOCARDIOGRAM  03/18/2019    TRANSESOPHAGEAL ECHOCARDIOGRAM  01/26/2023    Dr Travon Bee HISTORY:    Family History   Problem Relation Age of Onset    Heart Attack Mother     Stroke Mother     Heart Attack Father     Heart Failure Father     Heart Disease Father     Anxiety Disorder Sister     Depression Sister     Crohn's Disease Son      SOCIAL HISTORY:    Social History     Socioeconomic History    Marital status:      Spouse name: Not on file    Number of children: Not on file    Years of education: Not on file    Highest education level: Not on file   Occupational History    Not on file   Tobacco Use    Smoking status: Never    Smokeless tobacco: Never   Vaping Use    Vaping Use: Never used   Substance and Sexual Activity    Alcohol use:  Yes Alcohol/week: 0.0 standard drinks     Comment: occasional wine/mixed drink every few months    Drug use: Never    Sexual activity: Not on file     Comment:    Other Topics Concern    Not on file   Social History Narrative    Drinks 1-2 cups of coffee daily. Social Determinants of Health     Financial Resource Strain: Not on file   Food Insecurity: Not on file   Transportation Needs: Not on file   Physical Activity: Not on file   Stress: Not on file   Social Connections: Not on file   Intimate Partner Violence: Not on file   Housing Stability: Not on file    TOBACCO:   reports that she has never smoked. She has never used smokeless tobacco.  ETOH:   reports current alcohol use. MEDICATIONS:   Prior to Admission medications    Medication Sig Start Date End Date Taking? Authorizing Provider   dabigatran (PRADAXA) 150 MG capsule Take 150 mg by mouth 2 times daily    Historical Provider, MD   amiodarone (CORDARONE) 200 MG tablet Take 1 tablet by mouth daily 1/20/23   Radhika Curtis MD   hydrOXYzine HCl (ATARAX) 10 MG tablet Take 1 tablet by mouth every 8 hours as needed for Itching or Anxiety 1/19/23   Trisha Cardona DO   spironolactone (ALDACTONE) 25 MG tablet Take 1 tablet by mouth daily 1/19/23   Tiesha Hubbard MD   cetirizine (ZYRTEC ALLERGY) 10 MG tablet Take 0.5 tablets by mouth daily 10/19/22   Alireza Valle, APRN - CNP   fluticasone (FLONASE) 50 MCG/ACT nasal spray 1-2 sprays, each nostril daily as needed for nasal congestion.  10/19/22   Alireza Valle, APRN - CNP   furosemide (LASIX) 20 MG tablet Take 1 tablet by mouth daily 5/6/22   Tiesha Hubbard MD   metoprolol succinate (TOPROL XL) 25 MG extended release tablet Take 1 tablet by mouth daily 5/6/22   Tiesha Hubbard MD   aspirin 81 MG EC tablet Take 81 mg by mouth daily  Patient not taking: No sig reported    Historical Provider, MD   baclofen (LIORESAL) 10 MG tablet Take 1 tablet by mouth nightly 2/1/22   Trisha DO Dulce   hydrALAZINE (APRESOLINE) 10 MG tablet Take 1 tablet by mouth 3 times daily 12/28/21   Sakina Salazar MD   omeprazole (PRILOSEC) 40 MG delayed release capsule Take 40 mg by mouth daily 7/2/20   Historical Provider, MD   triamcinolone (KENALOG) 0.1 % cream Apply topically 3 times daily as needed (rash)  4/5/19   Historical Provider, MD   loperamide (IMODIUM) 2 MG capsule Take 2 mg by mouth 4 times daily as needed for Diarrhea    Historical Provider, MD   diphenhydrAMINE (BENADRYL) 25 MG tablet Take 25 mg by mouth every 6 hours as needed for Itching    Historical Provider, MD   vitamin D (CHOLECALCIFEROL) 1000 UNIT TABS tablet Take 1,000 Units by mouth daily    Historical Provider, MD        ALLERGIES: Bentyl [dicyclomine], Adhesive tape, Atacand [candesartan], Cefdinir, Demerol, Digoxin, Imdur [isosorbide mononitrate], Losartan potassium, Sulfa antibiotics, Xarelto [rivaroxaban], Lovenox [enoxaparin sodium], Acetaminophen, Apap-caff-dihydrocodeine, Coreg [carvedilol], Cozaar [losartan], Diovan [valsartan], Effexor [venlafaxine hydrochloride], Eliquis [apixaban], Labetalol, Lisinopril, and Ramipril    REVIEW OF SYSTEM:   ROS as noted in HPI, 12 point ROS reviewed and otherwise negative.     OBJECTIVE  PHYSICAL EXAM:   Vitals:    02/04/23 1220 02/04/23 1307 02/04/23 1431 02/04/23 1810   BP: (!) 95/56 106/65 (!) 108/55 112/66   Pulse:  65 77 70   Resp: 18 17 19 17   Temp:       TempSrc:       SpO2:  95% 97% 95%   Weight: 161 lb (73 kg)      Height: 5' 7\" (1.702 m)          General appearance: alert, appears stated age and cooperative  CONSTITUTIONAL:  no apparent distress  ENT:  normocephalic, without obvious abnormality, atraumatic  NECK:  supple, symmetrical, trachea midline  Heart: regular rate and rhythm, S1, S2 normal   Lungs: clear to auscultation bilaterally  Abdomen: soft lax, not tender, not distended, positive bowel sounds  Extremities: extremities normal, atraumatic, no cyanosis, edema  Skin: Normal skin color. No rashes or lesions. Neurologic:  Neurovascularly intact without any focal sensory/motor deficits. Cranial nerves: II-XII intact, grossly non-focal.  Psychiatric: Alert and oriented, thought content appropriate, normal insight, flat affect      DATA:     Diagnostic tests reviewed for today's visit:    Most recent labs and imaging results reviewed.    Labs:   Recent Results (from the past 72 hour(s))   Brain Natriuretic Peptide    Collection Time: 02/02/23  1:20 PM   Result Value Ref Range    Pro-BNP 11,583 (H) 0 - 450 pg/mL   Basic Metabolic Panel    Collection Time: 02/02/23  1:20 PM   Result Value Ref Range    Sodium 136 132 - 146 mmol/L    Potassium 4.2 3.5 - 5.0 mmol/L    Chloride 97 (L) 98 - 107 mmol/L    CO2 24 22 - 29 mmol/L    Anion Gap 15 7 - 16 mmol/L    Glucose 126 (H) 74 - 99 mg/dL    BUN 47 (H) 6 - 23 mg/dL    Creatinine 2.2 (H) 0.5 - 1.0 mg/dL    Est, Glom Filt Rate 22 >=60 mL/min/1.73    Calcium 10.3 (H) 8.6 - 10.2 mg/dL   CBC with Auto Differential    Collection Time: 02/03/23  7:15 AM   Result Value Ref Range    WBC 8.0 4.5 - 11.5 E9/L    RBC 4.81 3.50 - 5.50 E12/L    Hemoglobin 13.7 11.5 - 15.5 g/dL    Hematocrit 39.0 34.0 - 48.0 %    MCV 81.1 80.0 - 99.9 fL    MCH 28.5 26.0 - 35.0 pg    MCHC 35.1 (H) 32.0 - 34.5 %    RDW 15.5 (H) 11.5 - 15.0 fL    Platelets 060 723 - 846 E9/L    MPV 10.6 7.0 - 12.0 fL    Neutrophils % 81.7 (H) 43.0 - 80.0 %    Immature Granulocytes % 0.4 0.0 - 5.0 %    Lymphocytes % 12.2 (L) 20.0 - 42.0 %    Monocytes % 5.0 2.0 - 12.0 %    Eosinophils % 0.3 0.0 - 6.0 %    Basophils % 0.4 0.0 - 2.0 %    Neutrophils Absolute 6.51 1.80 - 7.30 E9/L    Immature Granulocytes # 0.03 E9/L    Lymphocytes Absolute 0.97 (L) 1.50 - 4.00 E9/L    Monocytes Absolute 0.40 0.10 - 0.95 E9/L    Eosinophils Absolute 0.02 (L) 0.05 - 0.50 E9/L    Basophils Absolute 0.03 0.00 - 0.20 D9/L   Basic Metabolic Panel    Collection Time: 02/03/23  7:15 AM   Result Value Ref Range    Sodium 137 132 - 146 mmol/L    Potassium 4.1 3.5 - 5.0 mmol/L    Chloride 97 (L) 98 - 107 mmol/L    CO2 17 (L) 22 - 29 mmol/L    Anion Gap 23 (H) 7 - 16 mmol/L    Glucose 243 (H) 74 - 99 mg/dL    BUN 51 (H) 6 - 23 mg/dL    Creatinine 2.4 (H) 0.5 - 1.0 mg/dL    Est, Glom Filt Rate 20 >=60 mL/min/1.73    Calcium 10.1 8.6 - 10.2 mg/dL   Magnesium    Collection Time: 02/03/23  7:15 AM   Result Value Ref Range    Magnesium 2.2 1.6 - 2.6 mg/dL   EKG 12 Lead    Collection Time: 02/03/23  7:19 AM   Result Value Ref Range    Ventricular Rate 98 BPM    Atrial Rate 111 BPM    QRS Duration 162 ms    Q-T Interval 372 ms    QTc Calculation (Bazett) 474 ms    R Axis -87 degrees    T Axis 99 degrees   EKG 12 lead    Collection Time: 02/03/23  9:08 AM   Result Value Ref Range    Ventricular Rate 62 BPM    Atrial Rate 62 BPM    P-R Interval 208 ms    QRS Duration 176 ms    Q-T Interval 520 ms    QTc Calculation (Bazett) 527 ms    P Axis 53 degrees    R Axis -77 degrees    T Axis 109 degrees   EKG 12 Lead    Collection Time: 02/04/23 12:53 PM   Result Value Ref Range    Ventricular Rate 66 BPM    Atrial Rate 66 BPM    P-R Interval 216 ms    QRS Duration 154 ms    Q-T Interval 494 ms    QTc Calculation (Bazett) 517 ms    P Axis 56 degrees    R Axis -77 degrees    T Axis 113 degrees   CBC with Auto Differential    Collection Time: 02/04/23  1:11 PM   Result Value Ref Range    WBC 9.6 4.5 - 11.5 E9/L    RBC 5.12 3.50 - 5.50 E12/L    Hemoglobin 14.6 11.5 - 15.5 g/dL    Hematocrit 44.4 34.0 - 48.0 %    MCV 86.7 80.0 - 99.9 fL    MCH 28.5 26.0 - 35.0 pg    MCHC 32.9 32.0 - 34.5 %    RDW 15.5 (H) 11.5 - 15.0 fL    Platelets 681 788 - 393 E9/L    MPV 10.6 7.0 - 12.0 fL    Neutrophils % 88.0 (H) 43.0 - 80.0 %    Immature Granulocytes % 0.4 0.0 - 5.0 %    Lymphocytes % 7.0 (L) 20.0 - 42.0 %    Monocytes % 4.1 2.0 - 12.0 %    Eosinophils % 0.2 0.0 - 6.0 %    Basophils % 0.3 0.0 - 2.0 %    Neutrophils Absolute 8.48 (H) 1.80 - 7.30 E9/L    Immature Granulocytes # 0.04 E9/L    Lymphocytes Absolute 0.67 (L) 1.50 - 4.00 E9/L    Monocytes Absolute 0.40 0.10 - 0.95 E9/L    Eosinophils Absolute 0.02 (L) 0.05 - 0.50 E9/L    Basophils Absolute 0.03 0.00 - 0.20 E9/L   CMP    Collection Time: 02/04/23  1:11 PM   Result Value Ref Range    Sodium 137 132 - 146 mmol/L    Potassium 4.3 3.5 - 5.0 mmol/L    Chloride 97 (L) 98 - 107 mmol/L    CO2 22 22 - 29 mmol/L    Anion Gap 18 (H) 7 - 16 mmol/L    Glucose 200 (H) 74 - 99 mg/dL    BUN 55 (H) 6 - 23 mg/dL    Creatinine 2.1 (H) 0.5 - 1.0 mg/dL    Est, Glom Filt Rate 24 >=60 mL/min/1.73    Calcium 9.9 8.6 - 10.2 mg/dL    Total Protein 7.8 6.4 - 8.3 g/dL    Albumin 4.2 3.5 - 5.2 g/dL    Total Bilirubin 1.1 0.0 - 1.2 mg/dL    Alkaline Phosphatase 110 (H) 35 - 104 U/L    ALT 54 (H) 0 - 32 U/L    AST 46 (H) 0 - 31 U/L   Magnesium    Collection Time: 02/04/23  1:11 PM   Result Value Ref Range    Magnesium 2.4 1.6 - 2.6 mg/dL   Lipase    Collection Time: 02/04/23  1:11 PM   Result Value Ref Range    Lipase 30 13 - 60 U/L   Lactic Acid    Collection Time: 02/04/23  1:11 PM   Result Value Ref Range    Lactic Acid 4.7 (HH) 0.5 - 2.2 mmol/L   Troponin    Collection Time: 02/04/23  1:11 PM   Result Value Ref Range    Troponin, High Sensitivity 48 (H) 0 - 9 ng/L   Brain Natriuretic Peptide    Collection Time: 02/04/23  1:11 PM   Result Value Ref Range    Pro-BNP 13,243 (H) 0 - 450 pg/mL   Troponin    Collection Time: 02/04/23  2:32 PM   Result Value Ref Range    Troponin, High Sensitivity 45 (H) 0 - 9 ng/L   Lactic Acid    Collection Time: 02/04/23  6:01 PM   Result Value Ref Range    Lactic Acid 1.9 0.5 - 2.2 mmol/L     Oupatient labs:  Lab Results   Component Value Date    CHOL 151 12/18/2022    TRIG 68 12/18/2022    HDL 55 12/18/2022    LDLCALC 82 12/18/2022    TSH 1.100 01/18/2023    INR 2.7 01/03/2023    LABA1C 5.7 (H) 02/01/2022       Urinalysis:    Lab Results   Component Value Date/Time    NITRU Negative 12/19/2022 02:00 PM WBCUA 1-3 12/19/2022 02:00 PM    BACTERIA NONE SEEN 12/19/2022 02:00 PM    RBCUA NONE 12/19/2022 02:00 PM    RBCUA NONE 11/24/2013 08:45 PM    BLOODU Negative 12/19/2022 02:00 PM    SPECGRAV <=1.005 12/19/2022 02:00 PM    GLUCOSEU Negative 12/19/2022 02:00 PM       Imaging:  CT Head W/O Contrast    Result Date: 2/4/2023  EXAMINATION: CT OF THE HEAD WITHOUT CONTRAST  2/4/2023 2:53 pm TECHNIQUE: CT of the head was performed without the administration of intravenous contrast. Automated exposure control, iterative reconstruction, and/or weight based adjustment of the mA/kV was utilized to reduce the radiation dose to as low as reasonably achievable. COMPARISON: None. HISTORY: ORDERING SYSTEM PROVIDED HISTORY: vertigo, nausea TECHNOLOGIST PROVIDED HISTORY: Reason for exam:->vertigo, nausea Has a \"code stroke\" or \"stroke alert\" been called? ->No Decision Support Exception - unselect if not a suspected or confirmed emergency medical condition->Emergency Medical Condition (MA) What reading provider will be dictating this exam?->CRC FINDINGS: BRAIN/VENTRICLES: There is no acute intracranial hemorrhage, mass effect or midline shift. No abnormal extra-axial fluid collection. The gray-white differentiation is maintained without evidence of an acute infarct. There is no evidence of hydrocephalus. The ventricles, cisterns and sulci are prominent consistent with atrophy. There is decreased attenuation within the periventricular white matter consistent with periventricular leukomalacia. ORBITS: The visualized portion of the orbits demonstrate no acute abnormality. SINUSES: The visualized paranasal sinuses and mastoid air cells demonstrate no acute abnormality. SOFT TISSUES/SKULL:  No acute abnormality of the visualized skull or soft tissues. 1.  There is no acute intracranial abnormality. Specifically, there is no intracranial hemorrhage.  2. Atrophy and periventricular leukomalacia, .     CT Head W/O Contrast   Final Result 1.  There is no acute intracranial abnormality. Specifically, there is no   intracranial hemorrhage. 2. Atrophy and periventricular leukomalacia,   . XR CHEST PORTABLE    (Results Pending)         ASSESSMENT AND PLAN  Principal Problem:    Vertigo  Resolved Problems:    * No resolved hospital problems. *    Dizziness   Elevated lactic acid, resolved   Cardiomyopathy  Paroxysmal atrial fibrillation s/p DCD yesterday   Pulmonary embolism  Valvular heart disease  Acute kidney injury  CKD  Hypertension  Dyslipidemia    Plan:  - admit to tele monitoring   - serial troponin and EKG  - Consult Cardiology for further recommendations   - fall precautions   - PT/OT  - Neuro checks   - Neurology consult for further recommendations   - monitor I/o, IV fluids, avoid nephrotoxics  - trend lactic acid  - check ammonia, TSH, B12       VTE Prophylaxis: pradaxa   DVT Prophylaxis: []Lovenox []Heparin []PCD [] [address] []Encouraged ambulation    Diet: No diet orders on file  Code Status: Prior  Surrogate decision maker confirmed with patient:  Primary Emergency Contact: Mitchell Charles, Home Phone: [phone]      Disposition: []Med/Surg [x] Intermediate [] ICU/CCU   Admit status: [] Observation [x] Inpatient       Additional work up or/and treatment plan may be added today or thereafter based on clinical progression. I am managing a portion of pt care. Some medical issues are handled by other specialists. Additional work up and treatment should be done by my colleague hospitalist and at out pt setting by pt PCP and other out pt providers.      DARRICKChyvon Leslie MD  DATE: February 4, 2023

## 2023-02-05 NOTE — PROGRESS NOTES
Spoke to Dr Ramy Carter regarding pt current BP after 1L bolus. Was instructed to hold on 2nd Liter.      Electronically signed by Delio Rosario RN on 2/5/2023 at 5:44 PM

## 2023-02-05 NOTE — ED NOTES
Handoff report given to ED RN taking over care of patient.      Luis Felipe Vaughn RN  02/04/23 1943

## 2023-02-05 NOTE — PATIENT CARE CONFERENCE
P Quality Flow/Interdisciplinary Rounds Progress Note        Quality Flow Rounds held on February 5, 2023    Disciplines Attending:  Bedside Nurse, , , and Nursing Unit Leadership    Ira Quiñonez was admitted on 2/4/2023 12:25 PM    Anticipated Discharge Date:       Disposition:    Momo Score:  Momo Scale Score: (P) 19    Readmission Risk              Risk of Unplanned Readmission:  21           Discussed patient goal for the day, patient clinical progression, and barriers to discharge.   The following Goal(s) of the Day/Commitment(s) have been identified:   consults to see       Stephanie Moses RN  February 5, 2023

## 2023-02-05 NOTE — ED NOTES
Patient walked to the bathroom for urine sample. Patient accidentally spilled sample back in the toilet, unable to obtain sample at this time.      Lukasz Diaz RN  02/04/23 2059

## 2023-02-05 NOTE — PROGRESS NOTES
Comprehensive Nutrition Assessment    Type and Reason for Visit:  Initial, Positive Nutrition Screen    Nutrition Recommendations/Plan:     Continue Current Diet, Start Oral Nutrition Supplement       Malnutrition Assessment:  Malnutrition Status: At risk for malnutrition (02/05/23 1315)    Context:  Acute Illness     Findings of the 6 clinical characteristics of malnutrition:  Energy Intake:  Mild decrease in energy intake (Comment)  Weight Loss:  No significant weight loss (8.5% x 9 mon does not meet sig criteria)     Body Fat Loss:  No significant body fat loss     Muscle Mass Loss:  No significant muscle mass loss    Fluid Accumulation:  No significant fluid accumulation     Strength:  Not Performed    Nutrition Assessment:    Pt admit 2/2 post cardioversion dizziness pending further work up. PMHx CAD/CHF, CKD, & Renal CA. PO intake ~50%. Noted nausea PTA. Will add Ensure BID & monitor. Nutrition Related Findings:    Pt alert, fluid bal WNL, +2 pitting edema, active BS, nausea     Wound Type: Skin Tears       Current Nutrition Intake & Therapies:    Average Meal Intake: 26-50%     ADULT DIET; Regular; Low Fat/Low Chol/High Fiber/GISELLE; Low Sodium (2 gm)    Anthropometric Measures:  Height: 5' 7\" (170.2 cm)  Ideal Body Weight (IBW): 135 lbs (61 kg)    Admission Body Weight: 161 lb 3.2 oz (73.1 kg) (5/6 first measured)  Current Body Weight: 161 lb 3.2 oz (73.1 kg) (5/6 measured), 119.4 % IBW. Current BMI (kg/m2): 25.2  Usual Body Weight: 176 lb 3 oz (79.9 kg) (5/6/22 EMR measured)  % Weight Change (Calculated): -8.5                    BMI Categories: Overweight (BMI 25.0-29. 9)    Estimated Daily Nutrient Needs:  Energy Requirements Based On: Formula  Weight Used for Energy Requirements: Current  Energy (kcal/day): MSJ 1249 x 1.1 SF= 5803-3670  Weight Used for Protein Requirements: Ideal  Protein (g/day): 1.2-1.4 g/kg IBW; 75-85  Method Used for Fluid Requirements: 1 ml/kcal  Fluid (ml/day): 6280-8265    Nutrition Diagnosis:   Inadequate oral intake related to cardiac dysfunction as evidenced by intake 26-50%    Nutrition Interventions:   Nutrition Education/Counseling: Education not indicated  Coordination of Nutrition Care: Continue to monitor while inpatient       Goals:     Goals: PO intake 75% or greater, by next RD assessment       Nutrition Monitoring and Evaluation:      Food/Nutrient Intake Outcomes: Food and Nutrient Intake, Supplement Intake  Physical Signs/Symptoms Outcomes: Biochemical Data, Nutrition Focused Physical Findings, Skin, Weight, GI Status, Fluid Status or Edema, Nausea or Vomiting    Discharge Planning:     Too soon to determine     Precilla DEMETRIUS Diaz, LD  Contact: Ext 2017

## 2023-02-05 NOTE — PROGRESS NOTES
This patient is on medication that requires renal, weight, and/or indication dose adjustment. Date Body Weight IBW  Adjusted BW SCr  CrCl Dialysis status   2/4/2023 162 lb 6.4 oz (73.7 kg) Ideal body weight: 61.6 kg (135 lb 12.9 oz)  Adjusted ideal body weight: 66.4 kg (146 lb 7.1 oz) Serum creatinine: 2.1 mg/dL (H) 02/04/23 1311  Estimated creatinine clearance: 22 mL/min (A) N/a       Pharmacy has dose-adjusted the following medication(s):    Date Previous Order Adjusted Order   2/4/2023 Praxada 150 mg bid Pradaxa 75 mg bid       These changes were made per protocol according to the [facility] Clinical Guidance for Pharmacists. *Please note this dose may need readjusted if patient's condition changes. Please contact pharmacy with any questions regarding these changes.     MARY Nunn [facility]  2/4/2023  9:42 PM

## 2023-02-05 NOTE — PROGRESS NOTES
[facility] served Dr. Pat Souza to verify orders placed, ok to proceed with orders.     Electronically signed by Valentina Boss RN on 2/5/2023 at 4:02 PM

## 2023-02-05 NOTE — CONSULTS
Department of Internal Medicine  Infectious Diseases   Consult Note      Reason for Consult: UTI       Requesting Physician:  Dr Jeffry Orozco:                The patient is a 68 y.o. female with hx of A fib, CHF, HTN, PE, Left kidney renal cell carcinoma, Microscopic colitis - presented with lightheaded, dizziness . She denied headache, fever , or chills. She reported nausea, denied vomiting, abdomen pain   She reported shortness of breath, denied chest pain, sputum expectoration   WBC was 6.1 K, pro BNP 13,242,  Lactic acid 4.7- down to 1.9   UA  with bacteriuria, pyuria   Pt was started on zosyn     Past Medical History:      Past Medical History:   Diagnosis Date    Afib (HCC)     WATCHMAN    Anxiety     Arthritis     Cervical radiculopathy     CHF (congestive heart failure) (HCC)     Chronic sinusitis     Ejection fraction < 50%     25%.  Wearing life vest    Hilar adenopathy     Hx of blood clots     Hypertension     Left ventricular systolic dysfunction     Lesion of left native kidney     Lumbar radiculopathy     Lung nodules     Meige syndrome     partial/ PCP    Microscopic colitis     Mitral regurgitation     Mild to moderate    Nonischemic cardiomyopathy ([address]     f/u with Dr. Bar Guallpa    Osteopenia     PE (pulmonary thromboembolism) [facility])     Renal cell carcinoma of left kidney ([facility] [address] 02/01/2022    Vertebrobasilar artery syndrome     f/u PCP       Past Surgical History:      Past Surgical History:   Procedure Laterality Date    BLADDER REPAIR      BRONCHOSCOPY N/A 10/05/2021    BRONCHOSCOPY W/EBUS FNA performed by Marie Hernandez MD at [facility] N/A 10/05/2021    BRONCHOSCOPY DIAGNOSTIC OR CELL [address] ONLY performed by Marie Hernandez MD at [facility] TEST  11/25/2013    Rt & LT cath    CARDIOVERSION  11/04/2019    Successful CV from AF to NSR   (Dr. Taco Ricardo)    CARDIOVERSION  02/03/2023    Dr Taco Ricardo- 1 Notified by NAOMY Chacko that patient is discharging today with home palliative care. Confirmed with DEMI Lester that patient will discharge with reyna cath that requires 10cc flushes every 8 hrs and bilateral nephrostomy tubes that require dressing changes PRN when soiled with split sponge or gauze dressing and foam tape. Patient's wife was taught all cares and patient was sent with supplies for care needs to cover next few days.    Called [place] retail pharmacy who confirmed that have a 60 count box of 10cc saline flushes. Requested physician send script for those for wife to  later. Met with patient and wife the other day to review plans, entered AVS note with [facility] contact info.   shock 200 joules- successful    COLONOSCOPY  07/2020    DIAGNOSTIC CARDIAC CATH LAB PROCEDURE  02/20/2010    Dr Angelica Seay CATH LAB PROCEDURE  08/24/2011    Right heart cath @ [facility].     DOPPLER ECHOCARDIOGRAPHY  11/25/2013    HYSTERECTOMY (CERVIX STATUS UNKNOWN)  1991    total    OTHER SURGICAL HISTORY  10/14/2020    Dr. Matthew Gonzalez- 24mm Watchman device    PARTIAL NEPHRECTOMY Left 11/16/2021    ROBOT ASSISTED LAPAROSCOPIC COMPLEX LEFT PARTIAL NEPHRECTOMY performed by Tito Zavala MD at [address]    TRANSESOPHAGEAL ECHOCARDIOGRAM  11/21/2018    Dr. Jordon Zhang    TRANSESOPHAGEAL ECHOCARDIOGRAM  03/18/2019    TRANSESOPHAGEAL ECHOCARDIOGRAM  01/26/2023    Dr Holt Fearing EXTRACTION           Current Medications:      Current Facility-Administered Medications   Medication Dose Route Frequency Provider Last Rate Last Admin    perflutren lipid microspheres (DEFINITY) injection 1.5 mL  1.5 mL IntraVENous ONCE PRN Dominique Antoine MD        piperacillin-tazobactam (ZOSYN) 3,375 mg in sodium chloride 0.9 % 50 mL IVPB ([id number])  3,375 mg IntraVENous Q8H Dc Michelle MD   Stopped at 02/05/23 1538    0.9 % sodium chloride bolus  2,000 mL IntraVENous Once Dc Michelle .7 mL/hr at 02/05/23 1559 2,000 mL at 02/05/23 1559    trimethobenzamide (TIGAN) injection 200 mg  200 mg IntraMUSCular Q6H PRN Dominique Antoine MD        amiodarone (CORDARONE) tablet 200 mg  200 mg Oral Daily Samer Roxana Veronica MD   200 mg at 02/05/23 0823    baclofen (LIORESAL) tablet 10 mg  10 mg Oral Nightly Samer Roxana Veronica MD        dabigatran (PRADAXA) capsule 75 mg  75 mg Oral BID Pee Douglas MD   75 mg at 02/05/23 0824    diphenhydrAMINE (BENADRYL) tablet 25 mg  25 mg Oral Q6H PRN Junaidr Roxana Veronica MD   25 mg at 02/05/23 0127    fluticasone (FLONASE) 50 MCG/ACT nasal spray 1 spray  1 spray Each Nostril Daily Junaidr Roxana Veronica MD   1 spray at 02/05/23 0824    hydrOXYzine HCl (ATARAX) tablet 10 mg 10 mg Oral Q8H PRN Maria G Zendejas MD        [Held by provider] hydrALAZINE (APRESOLINE) tablet 10 mg  10 mg Oral TID Maria G Zendejas MD   10 mg at 02/05/23 0824    [Held by provider] furosemide (LASIX) tablet 20 mg  20 mg Oral Daily Samer Saul Bryan MD   20 mg at 02/05/23 0824    pantoprazole (PROTONIX) tablet 40 mg  40 mg Oral QAM AC Samer Saul Bryan MD   40 mg at 02/05/23 8667    [Held by provider] metoprolol succinate (TOPROL XL) extended release tablet 25 mg  25 mg Oral Daily Samer Saul Bryan MD   25 mg at 02/05/23 0056    [Held by provider] spironolactone (ALDACTONE) tablet 25 mg  25 mg Oral Daily Samer Saul Bryan MD   25 mg at 02/05/23 0440    sodium chloride flush 0.9 % injection 10 mL  10 mL IntraVENous 2 times per day Maria G Zendejas MD   10 mL at 02/05/23 0827    sodium chloride flush 0.9 % injection 10 mL  10 mL IntraVENous PRN Junaidr Saul Bryan MD        0.9 % sodium chloride infusion   IntraVENous PRN Junaidr Saul Bryan MD        magnesium hydroxide (MILK OF MAGNESIA) 400 MG/5ML suspension 30 mL  30 mL Oral Daily PRN Junaidr Saul Bryan MD        acetaminophen (TYLENOL) tablet 650 mg  650 mg Oral Q6H PRN Junaidr Saul Bryan MD        Or    acetaminophen (TYLENOL) suppository 650 mg  650 mg Rectal Q6H PRN Junaidr Saul Bryan MD           Allergies:  Bentyl [dicyclomine], Adhesive tape, Atacand [candesartan], Cefdinir, Demerol, Digoxin, Imdur [isosorbide mononitrate], Losartan potassium, Sulfa antibiotics, Xarelto [rivaroxaban], Lovenox [enoxaparin sodium], Acetaminophen, Apap-caff-dihydrocodeine, Coreg [carvedilol], Cozaar [losartan], Diovan [valsartan], Effexor [venlafaxine hydrochloride], Eliquis [apixaban], Labetalol, Lisinopril, and Ramipril    Social History:      Social History     Socioeconomic History    Marital status:       Spouse name: Not on file    Number of children: Not on file    Years of education: Not on file    Highest education level: Not on file   Occupational History    Not on file Tobacco Use    Smoking status: Never    Smokeless tobacco: Never   Vaping Use    Vaping Use: Never used   Substance and Sexual Activity    Alcohol use: Yes     Alcohol/week: 0.0 standard drinks     Comment: occasional wine/mixed drink every few months    Drug use: Never    Sexual activity: Not on file     Comment:    Other Topics Concern    Not on file   Social History Narrative    Drinks 1-2 cups of coffee daily. Social Determinants of Health     Financial Resource Strain: Not on file   Food Insecurity: Not on file   Transportation Needs: Not on file   Physical Activity: Not on file   Stress: Not on file   Social Connections: Not on file   Intimate Partner Violence: Not on file   Housing Stability: Not on file         Family History:     Family History   Problem Relation Age of Onset    Heart Attack Mother     Stroke Mother     Heart Attack Father     Heart Failure Father     Heart Disease Father     Anxiety Disorder Sister     Depression Sister     Crohn's Disease Son        REVIEW OF SYSTEMS:    CONSTITUTIONAL:  Denies fever, chill or rigors. HEENT: denies blurring of vision or double vision, denies hearing problem  RESPIRATORY: SOB   CARDIOVASCULAR:  Denies palpitation or chest pain   GASTROINTESTINAL:  Denies abdomen pain, diarrhea or constipation,, nausea or vomiting. GENITOURINARY:  Denies burning urination or frequency of urination  INTEGUMENT: denies wound , rash  HEMATOLOGIC/LYMPHATIC:  Denies lymph node swelling, gum bleeding or easy bruising. MUSCULOSKELETAL:  leg swelling   NEUROLOGICAL:  light headed or dizziness     PHYSICAL EXAM:      Vitals:   BP 86/66   Pulse (!) 108   Temp 97.3 °F (36.3 °C)   Resp 16   Ht 5' 7\" (1.702 m)   Wt 161 lb 3.2 oz (73.1 kg)   SpO2 98%   BMI 25.25 kg/m²     General Appearance:    Awake, alert , no acute distress. Head:    Normocephalic, atraumatic   Eyes:    +  pallor, no icterus,no nystagmus    Ears:    No obvious deformity or drainage.    Nose: No nasal drainage   Throat:   Mucosa moist, no oral thrush   Neck:   Supple, no lymphadenopathy   Back:     no CVA tenderness   Lungs:     Clear to auscultation bilaterally, no wheeze    Heart:    Irregular    Abdomen:     Soft, non-tender, bowel sounds present    Extremities:    + edema    Pulses:   Dorsalis pedis palpable    Skin:   no rashes      CBC with Differential:      Lab Results   Component Value Date/Time    WBC 6.1 02/05/2023 06:04 AM    RBC 4.87 02/05/2023 06:04 AM    HGB 13.8 02/05/2023 06:04 AM    HCT 42.7 02/05/2023 06:04 AM     02/05/2023 06:04 AM    MCV 87.7 02/05/2023 06:04 AM    MCH 28.3 02/05/2023 06:04 AM    MCHC 32.3 02/05/2023 06:04 AM    RDW 15.5 02/05/2023 06:04 AM    SEGSPCT 85 12/13/2013 12:00 PM    LYMPHOPCT 11.5 02/05/2023 06:04 AM    MONOPCT 7.9 02/05/2023 06:04 AM    BASOPCT 0.5 02/05/2023 06:04 AM    MONOSABS 0.48 02/05/2023 06:04 AM    LYMPHSABS 0.70 02/05/2023 06:04 AM    EOSABS 0.06 02/05/2023 06:04 AM    BASOSABS 0.03 02/05/2023 06:04 AM       CMP     Lab Results   Component Value Date/Time     02/05/2023 06:04 AM    K 3.7 02/05/2023 06:04 AM     02/05/2023 06:04 AM    CO2 24 02/05/2023 06:04 AM    BUN 46 02/05/2023 06:04 AM    CREATININE 1.9 02/05/2023 06:04 AM    GFRAA 53 09/07/2022 01:12 PM    LABGLOM 27 02/05/2023 06:04 AM    GLUCOSE 105 02/05/2023 06:04 AM    GLUCOSE 120 10/12/2011 08:10 PM    PROT 6.5 02/05/2023 06:04 AM    LABALBU 3.4 02/05/2023 06:04 AM    CALCIUM 9.6 02/05/2023 06:04 AM    BILITOT 1.2 02/05/2023 06:04 AM    ALKPHOS 94 02/05/2023 06:04 AM    AST 37 02/05/2023 06:04 AM    ALT 44 02/05/2023 06:04 AM         Hepatic Function Panel:    Lab Results   Component Value Date/Time    ALKPHOS 94 02/05/2023 06:04 AM    ALT 44 02/05/2023 06:04 AM    AST 37 02/05/2023 06:04 AM    PROT 6.5 02/05/2023 06:04 AM    BILITOT 1.2 02/05/2023 06:04 AM    BILIDIR 0.2 01/18/2023 12:48 PM    IBILI 0.5 01/18/2023 12:48 PM    LABALBU 3.4 02/05/2023 06:04 AM PT/INR:    Lab Results   Component Value Date/Time    PROTIME 31.9 01/03/2023 10:34 AM    INR 2.7 01/03/2023 10:34 AM    INR 2.0 12/29/2022 12:07 PM       TSH:    Lab Results   Component Value Date/Time    TSH 0.666 02/04/2023 09:47 PM       U/A:    Lab Results   Component Value Date/Time    NITRITE positive 07/08/2022 02:22 PM    COLORU Yellow 02/05/2023 03:58 AM    PHUR 5.5 02/05/2023 03:58 AM    WBCUA 5-10 02/05/2023 03:58 AM    RBCUA 1-3 02/05/2023 03:58 AM    RBCUA NONE 11/24/2013 08:45 PM    BACTERIA MANY 02/05/2023 03:58 AM    CLARITYU TURBID 02/05/2023 03:58 AM    SPECGRAV 1.025 02/05/2023 03:58 AM    LEUKOCYTESUR LARGE 02/05/2023 03:58 AM    UROBILINOGEN 0.2 02/05/2023 03:58 AM    BILIRUBINUR Negative 02/05/2023 03:58 AM    BILIRUBINUR negative 07/08/2022 02:22 PM    BLOODU TRACE-INTACT 02/05/2023 03:58 AM    GLUCOSEU Negative 02/05/2023 03:58 AM       ABG:  No results found for: [id number], BEART, [id number], PHART, THGBART, LMN6LDH, PO2ART, HGN4PNQ    MICROBIOLOGY:    Blood culture - neg to date     Urine Culture - pending     Radiology :    Chest X ray : no infiltrates     IMPRESSION:   Lactic acidosis - improved  UTI         RECOMMENDATIONS:     Zosyn 3.375 grams IV q 12 hrs   Urine cx     Thank you Dr Sachin Dawson for the consult

## 2023-02-05 NOTE — CONSULTS
NEUROLOGY CONSULT NOTE      Requesting Physician:  Apurva Monroe MD    Reason for Consult:  Evaluate for dizziness, vertigo    History of Present Illness:  Omid John is a 68 y.o. female  with h/o A-fib on Pradaxa s/p Watchman, HTN, CHF, nonischemic cardiomyopathy with valvular heart disease, history of PE, renal cell carcinoma of the left kidney, and vertebrobasilar artery syndrome who was admitted to [facility] on 2/4/2023 with presentation of dizziness with vertigo and nausea. Onset of symptoms was approximately 3 days prior to presentation. She denies any precipitating event or factors. No prior history of similar symptoms. Recent onset vertigo that is described as a room spinning sensation whenever she has her dizziness. States not much dizziness now. Persistent dizziness reported with no aggravating or relieving factors at the time no report of dizziness with head movement. Patient denies any recent illness or infections. States the last time here was in  December with a UTI. Suspected UTI with this admission and was started on Antibiotics. She does also have a history of A-fib and had undergone cardioversion today before presentation which worsened her dizziness and nausea. Patient did note some shortness of breath but there was no chest pain, abdominal pain, cough, fever, chills, vomiting, constipation, diarrhea, headache, speech change, vision change, balance/gait disturbance, or alteration in level of consciousness. Past Medical History:        Diagnosis Date    Afib (HCC)     WATCHMAN    Anxiety     Arthritis     Cervical radiculopathy     CHF (congestive heart failure) ([facility])     Chronic sinusitis     Ejection fraction < 50%     25%.  Wearing life vest    Hilar adenopathy     Hx of blood clots     Hypertension     Left ventricular systolic dysfunction     Lesion of left native kidney     Lumbar radiculopathy     Lung nodules     Meige syndrome     partial/ PCP    Microscopic colitis     Mitral regurgitation     Mild to moderate    Nonischemic cardiomyopathy (HCC)     f/u with Dr. Saira Mcmullen    Osteopenia     PE (pulmonary thromboembolism) [facility])     Renal cell carcinoma of left kidney ([facility] [address] 02/01/2022    Vertebrobasilar artery syndrome     f/u PCP           Procedure Laterality Date    BLADDER REPAIR      BRONCHOSCOPY N/A 10/05/2021    BRONCHOSCOPY W/EBUS FNA performed by Jovita Keita MD at [facility] N/A 10/05/2021    BRONCHOSCOPY DIAGNOSTIC OR CELL [address] ONLY performed by Jovita Keita MD at [address] [facility] TEST  11/25/2013    Rt & LT cath    CARDIOVERSION  11/04/2019    Successful CV from AF to NSR   (Dr. Shawn Petit)    CARDIOVERSION  02/03/2023    Dr Shawn Petit- 1 shock 200 joules- successful    COLONOSCOPY  07/2020    DIAGNOSTIC CARDIAC CATH LAB PROCEDURE  02/20/2010    Dr Jessica Grewal CATH LAB PROCEDURE  08/24/2011    Right heart cath @ [facility].     DOPPLER ECHOCARDIOGRAPHY  11/25/2013    HYSTERECTOMY (CERVIX STATUS UNKNOWN)  1991    total    OTHER SURGICAL HISTORY  10/14/2020    Dr. Saira Mcmullen- 24mm Watchman device    PARTIAL NEPHRECTOMY Left 11/16/2021    ROBOT ASSISTED LAPAROSCOPIC COMPLEX LEFT PARTIAL NEPHRECTOMY performed by Doc Meza MD at [address]    TRANSESOPHAGEAL ECHOCARDIOGRAM  11/21/2018    Dr. Magalie Gross    TRANSESOPHAGEAL ECHOCARDIOGRAM  03/18/2019    TRANSESOPHAGEAL ECHOCARDIOGRAM  01/26/2023    Dr Gustafson [place] History:  Social History     Tobacco Use   Smoking Status Never   Smokeless Tobacco Never     Social History     Substance and Sexual Activity   Alcohol Use Yes    Alcohol/week: 0.0 standard drinks    Comment: occasional wine/mixed drink every few months     Social History     Substance and Sexual Activity   Drug Use Never         Family History:       Problem Relation Age of Onset    Heart Attack Mother     Stroke Mother     Heart Attack Father     Heart Failure Father     Heart Disease Father     Anxiety Disorder Sister     Depression Sister     Crohn's Disease Son        Review of Systems:  All systems reviewed are negative except what is mentioned in history of present illness. Allergies:     Allergies   Allergen Reactions    Bentyl [Dicyclomine] Shortness Of Breath    Adhesive Tape Itching     develops rash and itching with EKG electrodes    Atacand [Candesartan] Hives and Itching    Cefdinir Hives, Itching and Nausea Only    Demerol      3/9/19 Pt states she gets a severe migraine    Digoxin Itching     developed itching and rash    Imdur [Isosorbide Mononitrate]       migraines    Losartan Potassium Itching    Sulfa Antibiotics Diarrhea and Other (See Comments)     Also causes migraines  caused migraines and diarrhea    Xarelto [Rivaroxaban] Itching and Rash      severe itch, headache, rash    Lovenox [Enoxaparin Sodium] Itching     States  her pulmonary doctor-DR Goldie Akers took her off  lovenox (rash-itching)and she is starting on pradaxa today 1/26/2023    Acetaminophen Hives and Itching    Apap-Caff-Dihydrocodeine Hives and Itching    Coreg [Carvedilol] Itching     Can take extended release Coreg    Cozaar [Losartan] Itching    Diovan [Valsartan] Itching    Effexor [Venlafaxine Hydrochloride] Nausea Only    Eliquis [Apixaban] Hives, Itching and Rash     3/9/19 Pt states that she gets hives, itchy and a rash    Labetalol Itching    Lisinopril Itching    Ramipril Itching        Current Medications:   perflutren lipid microspheres (DEFINITY) injection 1.5 mL, ONCE PRN  trimethobenzamide (TIGAN) injection 200 mg, Q6H PRN  piperacillin-tazobactam (ZOSYN) 3,375 mg in sodium chloride 0.9 % 50 mL IVPB ([id number]), Q12H  amiodarone (CORDARONE) tablet 200 mg, Daily  baclofen (LIORESAL) tablet 10 mg, Nightly  dabigatran (PRADAXA) capsule 75 mg, BID  diphenhydrAMINE (BENADRYL) tablet 25 mg, Q6H PRN  fluticasone (FLONASE) 50 MCG/ACT nasal spray 1 spray, Daily  hydrOXYzine HCl (ATARAX) tablet 10 mg, Q8H PRN  [Held by provider] hydrALAZINE (APRESOLINE) tablet 10 mg, TID  [Held by provider] furosemide (LASIX) tablet 20 mg, Daily  pantoprazole (PROTONIX) tablet 40 mg, QAM AC  [Held by provider] metoprolol succinate (TOPROL XL) extended release tablet 25 mg, Daily  [Held by provider] spironolactone (ALDACTONE) tablet 25 mg, Daily  sodium chloride flush 0.9 % injection 10 mL, 2 times per day  sodium chloride flush 0.9 % injection 10 mL, PRN  0.9 % sodium chloride infusion, PRN  magnesium hydroxide (MILK OF MAGNESIA) 400 MG/5ML suspension 30 mL, Daily PRN  acetaminophen (TYLENOL) tablet 650 mg, Q6H PRN   Or  acetaminophen (TYLENOL) suppository 650 mg, Q6H PRN       Physical Exam:  /66   Pulse 91   Temp 97.1 °F (36.2 °C) (Temporal)   Resp 18   Ht 5' 7\" (1.702 m)   Wt 161 lb 3.2 oz (73.1 kg)   SpO2 98%   BMI 25.25 kg/m²  I Body mass index is 25.25 kg/m². I   Wt Readings from Last 1 Encounters:   02/05/23 161 lb 3.2 oz (73.1 kg)          HEENT: Normocephalic, atraumatic, no lesions or abnormalities noted. Neck:  supple with full ROM; no masses, nodes or bruits; no cervical tenderness on palpation. Lungs:  clear to auscultation  bilaterally     CV: RRR without gallops or murmurs     Extremities: no c/c/e; obesity bilateral arms and legs with small torso and decreased shoulder size; petechial rash greater in the right arm with some involvement in her legs as well but this was asymmetric with an atypical pattern. Neurologic Exam   Mental Status:  Patient was alert, responsive, oriented, appropriate, answering questions, and following commands. Speech was fluent with normal sensorium and cognition. Cranial Nerves: Pupils were equal round and reactive to light ;    Visual fields were full on confrontation; Extraocular movements were intact; no nystagmus; Intact facial sensation to temp, pinprick, and light touch;     Symmetric facial movements with good lip and eye closure bilaterally; Hearing was intact; So was midline;    Normal palatal elevation with midline uvula  Trapezius strength of 5/5. Tongue was midline with no atrophy or fasciculations  Motor Exam:  Normal strength in bilateral upper extremity. There was some difficulty lifting her legs in keeping with with no distal weakness appreciated in the lower extremities. Suspect a waste of her legs may be a factor with deconditioning causing inability to keep her legs up. Strength was grossly 4/5 bilaterally. Patient indicates she uses a walker at home for mobility. Sensory Exam:  Normal sensation to vibration, light touch, pin-prick, and temperature; No sensory extinction. Cerebella Exam:  Intact finger-nose-finger, rapidly alternating movements, fine motor movements, and heel-down-shin maneuvers; No cerebellar rebound or drift; No tremors or abnormal movements. Gait:  Gait was not tested. Reflexes: 1+ left patella: Absent bilateral Achilles and right patella: 1+ bilateral upper extremities: Babinski was negative. Labs:    CBC:   Recent Labs     02/03/23  0715 02/04/23  1311 02/05/23  0604   WBC 8.0 9.6 6.1   HGB 13.7 14.6 13.8    288 240   MCV 81.1 86.7 87.7   MCH 28.5 28.5 28.3   MCHC 35.1* 32.9 32.3   RDW 15.5* 15.5* 15.5*     CMP:  Recent Labs     02/03/23  0715 02/04/23  1311 02/05/23  0604    137 140   K 4.1 4.3 3.7   CL 97* 97* 103   CO2 17* 22 24   BUN 51* 55* 46*   CREATININE 2.4* 2.1* 1.9*   LABGLOM 20 24 27   GLUCOSE 243* 200* 105*   CALCIUM 10.1 9.9 9.6     Liver:   Recent Labs     02/05/23  0604   AST 37*   ALT 44*   ALKPHOS 94   PROT 6.5   LABALBU 3.4*   BILITOT 1.2     INR: No results for input(s): PROTIME, INR in the last 72 hours. ToxicologyNo results for input(s): PHENYTOIN, CARBTOT, PHENOBARB, VALPROATE, LAMOTRIG in the last 72 hours.     Invalid input(s):  KEPPRA  No results for input(s): AMPMETHURSCR, ANISHA, DOLORES, CANNABQUANT, COCMETQTU, OPIAU, PCPQUANT in the last 72 hours. Radiology:  CT Head W/O Contrast    Result Date: 2/4/2023  EXAMINATION: CT OF THE HEAD WITHOUT CONTRAST  2/4/2023 2:53 pm TECHNIQUE: CT of the head was performed without the administration of intravenous contrast. Automated exposure control, iterative reconstruction, and/or weight based adjustment of the mA/kV was utilized to reduce the radiation dose to as low as reasonably achievable. COMPARISON: None. HISTORY: ORDERING SYSTEM PROVIDED HISTORY: vertigo, nausea TECHNOLOGIST PROVIDED HISTORY: Reason for exam:->vertigo, nausea Has a \"code stroke\" or \"stroke alert\" been called? ->No Decision Support Exception - unselect if not a suspected or confirmed emergency medical condition->Emergency Medical Condition (MA) What reading provider will be dictating this exam?->CRC FINDINGS: BRAIN/VENTRICLES: There is no acute intracranial hemorrhage, mass effect or midline shift. No abnormal extra-axial fluid collection. The gray-white differentiation is maintained without evidence of an acute infarct. There is no evidence of hydrocephalus. The ventricles, cisterns and sulci are prominent consistent with atrophy. There is decreased attenuation within the periventricular white matter consistent with periventricular leukomalacia. ORBITS: The visualized portion of the orbits demonstrate no acute abnormality. SINUSES: The visualized paranasal sinuses and mastoid air cells demonstrate no acute abnormality. SOFT TISSUES/SKULL:  No acute abnormality of the visualized skull or soft tissues. 1.  There is no acute intracranial abnormality. Specifically, there is no intracranial hemorrhage. 2. Atrophy and periventricular leukomalacia, .      XR CHEST PORTABLE    Result Date: 2/4/2023  EXAMINATION: ONE XRAY VIEW OF THE CHEST 2/4/2023 7:51 pm COMPARISON: 12/29/2022 HISTORY: ORDERING SYSTEM PROVIDED HISTORY: altered mental status TECHNOLOGIST PROVIDED HISTORY: Reason for exam:->altered mental status What reading provider will be dictating this exam?->CRC FINDINGS: Examination is limited by overlying heart monitor device. However, the lungs appear clear. Pleural spaces are clear. Stable cardiomegaly is noted. No acute osseous abnormality seen. No acute process identified, but evaluation limited. The patient's records from referring provider and available information in the EHR was reviewed. Impression:  Acute Vertigo  Cervicalgia  Suspicion of cervical DJD/DDD  Stroke risk factors: A. fib, vertebrobasilar disease, HTN, HLD, age. Patient presenting with acute onset of vertigo undetermined etiology. There has been a positional component with report of constant, vertigo prescribed to be improving. She did have some nausea but has no vomiting with unsteadiness noted with falling. No precipitating event or factors no clear pattern BPPV and that did not suspect for many years at this time. This may be physiologic. We will proceed with MRI and look at brainstem and posterior circulation territories given her history of vertebrobasilar disease with further pending results. Dizziness Transportation Department currently and may try meclizine as needed if needed for worsening of dizziness. Patient does have prominent neck pain with crepitus and stiffness suggesting cervical DJD and likely spondylopathy. However, this was not associated with her dizziness. Principal Problem:    Vertigo  Active Problems:    Dizziness  Resolved Problems:    * No resolved hospital problems. *      Recommendations:                                            MRI of brain and brainstem for evaluation of posterior circulation stroke in patient with history of vertebral basilar disease and persistent vertigo.   PT/OT evaluation and therapy  Meclizine 25 mg p.o. every 6 hours as needed vertigo  CT of cervical spine for evaluation of cervical spine with suspicion of cervical DJD/DDD which may be impacting functional mobility. Continue on Pradaxa as before pending further results. Further on follow-up. It was my pleasure to evaluate Drema Floras today. Please call with questions.       Electronically signed by Jose Gerardo MD on 2/5/2023 at 5:47 PM

## 2023-02-05 NOTE — CONSULTS
[address] [facility],[address] and [facility] Electrophysiology  Consultation Report  PATIENT: [address] RECORD NUMBER: [id number]  DATE OF SERVICE:  2/5/2023  ATTENDING ELECTROPHYSIOLOGIST: Rosalba Bass MD  PRIMARY ELECTROPHYSIOLOGIST: Rosalba Bass MD  REFERRING PHYSICIAN: No ref. provider found and Trisha Cardona DO  CHIEF COMPLAINT: Nausea and lighededness    HPI: This is a 68 y.o. female with a history of   Patient Active Problem List   Diagnosis    Anxiety    Nonischemic cardiomyopathy ([facility] [address]    Severe mitral regurgitation    Lumbar radiculopathy    Cervical radiculopathy    HTN (hypertension), benign    Left atrial thrombus    Paroxysmal atrial fibrillation (HCC)    Chronic HFrEF (heart failure with reduced ejection fraction) ([facility] [address]    Visit for monitoring Tikosyn therapy    Persistent atrial fibrillation ([facility]    Encounter for medication refill    Itching    Chronic anticoagulation    Presence of Watchman left atrial appendage closure device    Renal mass    Renal cell carcinoma of left kidney (HCC)    Chronic renal disease, stage III ([facility] [address] [[id number]]    Multiple subsegmental pulmonary emboli without acute cor pulmonale (HCC)    Acute on chronic congestive heart failure (HCC)    Vertigo    Dizziness   who presents to the hospital complaining of nausea and lightheadedness since started on anticoagulation after she was diagnosed with PE on 12/17/22. The patient has history of persistent atrial fibrillation, nonischemic cardiomyopathy and chronic HFrEF, valvular heart disease with severe MR, hypertension, severe pulmonary hypertension, pulmonary embolism, obesity, lumbar and cervical radiculopathy, anxiety, TIA, Meige syndrome and CKD status post left partial nephrectomy. She was diagnosed with persistent atrial fibrillation and had at least 3 previous cardioversion's. She has Watchman device placement on 10/14/20 and was not on [facility] since.  She was seen on 12/19/22 when she was admitted to the hospital with acute on chronic CHF and PE. She was found to have NIKA on CKD and her Tikosyn was discontinued on 12/19/22. She was noted to have recurrent AF on 1/13/23 and Amiodarone was started on 1/20/23. She was scheduled for DC-CV in late February but the procedure was moved to 2/3/23 due to request from CHF clinic. She was initially prescribed Lovenox and was switched to Pradaxa 150 mg BID. Since she was started on Lovenox she reports nausea and occasional dizziness. She underwent successful DC-cardioversion on 2/3/23. She presented to ED on 2/4/23 due to nausea and LHD. Initial EKG on 2/4/23 showed normal sinus rhythm. EKG later on 2/4/23 showed AF with CVR. EKG today showed AF with CVR. Cardiac electrophysiology service is consulted for post CV and dizziness.      Patient Active Problem List    Diagnosis Date Noted    Vertigo 02/04/2023     Priority: Medium    Dizziness 02/04/2023     Priority: Medium    Multiple subsegmental pulmonary emboli without acute cor pulmonale ([address] 12/17/2022     Priority: Medium    Acute on chronic congestive heart failure ([address] 12/17/2022     Priority: Medium    Chronic renal disease, stage III [facility]) [092464] 04/25/2022     Priority: Medium    Renal cell carcinoma of left kidney ([address] 02/01/2022    Renal mass 11/16/2021    Presence of Watchman left atrial appendage closure device 10/14/2020     Overview Note:     24-mm Watchman 2.5 (Dr. Dayron Franks 10/14/2020)      Chronic anticoagulation 08/28/2020    Encounter for medication refill 07/20/2020    Itching 07/20/2020    Visit for monitoring Tikosyn therapy 11/04/2019    Persistent atrial fibrillation ([address] 11/04/2019    Paroxysmal atrial fibrillation ([address] 03/10/2019    Chronic HFrEF (heart failure with reduced ejection fraction) ([address] 03/10/2019    Left atrial thrombus 01/02/2019    HTN (hypertension), benign 11/21/2018    Lumbar radiculopathy 10/11/2013    Cervical radiculopathy 10/11/2013    Nonischemic cardiomyopathy ([facility]      Overview Note:     Mild nonischemic cardiomyopathy. (Ejection fraction 45-50%)  Cardiac catheterization (9/32/08): PA systolic 58, wedge 11 indicating slightly elevated pulmonary pressures not secondary to left heart failure. Cardiac output 5.47, cardiac index 2.9, no coronary artery disease   Moderately elevated pulmonary systolic pressure. Severe mitral regurgitation      Overview Note:     Mild to moderate      Anxiety 04/14/2011       Past Medical History:   Diagnosis Date    Afib (HCC)     WATCHMAN    Anxiety     Arthritis     Cervical radiculopathy     CHF (congestive heart failure) ([facility])     Chronic sinusitis     Ejection fraction < 50%     25%. Wearing life vest    Hilar adenopathy     Hx of blood clots     Hypertension     Left ventricular systolic dysfunction     Lesion of left native kidney     Lumbar radiculopathy     Lung nodules     Meige syndrome     partial/ PCP    Microscopic colitis     Mitral regurgitation     Mild to moderate    Nonischemic cardiomyopathy ([facility] [address]     f/u with Dr. Gerald Jaime    Osteopenia     PE (pulmonary thromboembolism) ([facility] [address]     Renal cell carcinoma of left kidney ([facility] 02/01/2022    Vertebrobasilar artery syndrome     f/u PCP       Family History   Problem Relation Age of Onset    Heart Attack Mother     Stroke Mother     Heart Attack Father     Heart Failure Father     Heart Disease Father     Anxiety Disorder Sister     Depression Sister     Crohn's Disease Son        Social History     Tobacco Use    Smoking status: Never    Smokeless tobacco: Never   Substance Use Topics    Alcohol use:  Yes     Alcohol/week: 0.0 standard drinks     Comment: occasional wine/mixed drink every few months       Current Facility-Administered Medications   Medication Dose Route Frequency Provider Last Rate Last Admin    amiodarone (CORDARONE) tablet 200 mg  200 mg Oral Daily Junaidr Pillo Díaz MD   200 mg at 02/05/23 0823    baclofen (LIORESAL) tablet 10 mg  10 mg Oral Nightly Fernando Bryan MD        dabigatran (PRADAXA) capsule 75 mg  75 mg Oral BID Maria G Zendejas MD   75 mg at 02/05/23 0798    diphenhydrAMINE (BENADRYL) tablet 25 mg  25 mg Oral Q6H PRN Fernando Bryan MD   25 mg at 02/05/23 0127    fluticasone (FLONASE) 50 MCG/ACT nasal spray 1 spray  1 spray Each Nostril Daily Fernando Bryan MD   1 spray at 02/05/23 3870    hydrOXYzine HCl (ATARAX) tablet 10 mg  10 mg Oral Q8H PRN Maria G Zendejas MD        hydrALAZINE (APRESOLINE) tablet 10 mg  10 mg Oral TID Maria G Zendejas MD   10 mg at 02/05/23 3520    furosemide (LASIX) tablet 20 mg  20 mg Oral Daily Fernando Bryan MD   20 mg at 02/05/23 3462    pantoprazole (PROTONIX) tablet 40 mg  40 mg Oral QAM AC Fernando Bryan MD   40 mg at 02/05/23 4561    metoprolol succinate (TOPROL XL) extended release tablet 25 mg  25 mg Oral Daily Fernando Bryan MD   25 mg at 02/05/23 4134    spironolactone (ALDACTONE) tablet 25 mg  25 mg Oral Daily Fernando Bryan MD   25 mg at 02/05/23 7201    sodium chloride flush 0.9 % injection 10 mL  10 mL IntraVENous 2 times per day Maria G Zendejas MD   10 mL at 02/05/23 0827    sodium chloride flush 0.9 % injection 10 mL  10 mL IntraVENous PRN Fernando Bryan MD        0.9 % sodium chloride infusion   IntraVENous PRN Fernando Bryan MD        magnesium hydroxide (MILK OF MAGNESIA) 400 MG/5ML suspension 30 mL  30 mL Oral Daily PRN Maria G Zendejas MD        acetaminophen (TYLENOL) tablet 650 mg  650 mg Oral Q6H PRN Fernando Bryan MD        Or    acetaminophen (TYLENOL) suppository 650 mg  650 mg Rectal Q6H PRN Fernando Bryan MD            Allergies   Allergen Reactions    Bentyl [Dicyclomine] Shortness Of Breath    Adhesive Tape Itching     develops rash and itching with EKG electrodes    Atacand [Candesartan] Hives and Itching    Cefdinir Hives, Itching and Nausea Only    Demerol      3/9/19 Pt states she gets a severe migraine    Digoxin Itching     developed itching and rash Imdur [Isosorbide Mononitrate]       migraines    Losartan Potassium Itching    Sulfa Antibiotics Diarrhea and Other (See Comments)     Also causes migraines  caused migraines and diarrhea    Xarelto [Rivaroxaban] Itching and Rash      severe itch, headache, rash    Lovenox [Enoxaparin Sodium] Itching     States  her pulmonary doctor-DR Winnie Cordero took her off  lovenox (rash-itching)and she is starting on pradaxa today 1/26/2023    Acetaminophen Hives and Itching    Apap-Caff-Dihydrocodeine Hives and Itching    Coreg [Carvedilol] Itching     Can take extended release Coreg    Cozaar [Losartan] Itching    Diovan [Valsartan] Itching    Effexor [Venlafaxine Hydrochloride] Nausea Only    Eliquis [Apixaban] Hives, Itching and Rash     3/9/19 Pt states that she gets hives, itchy and a rash    Labetalol Itching    Lisinopril Itching    Ramipril Itching     ROS:   Constitutional: Negative for fever. Positive for activity change and appetite change. HENT: Negative for epistaxis. Eyes: Negative for diploplia, blurred vision. Respiratory: Negative for cough, chest tightness, shortness of breath and wheezing. Cardiovascular: pertinent positives in HPI  Gastrointestinal: Negative for abdominal pain and blood in stool. All other review of systems are negative     PHYSICAL EXAM:   Vitals:    02/04/23 2300 02/05/23 0330 02/05/23 0605 02/05/23 0818   BP: (!) 112/59 107/70  108/66   Pulse: (!) 105 95  94   Resp: 18 16  18   Temp: 97.5 °F (36.4 °C) 97 °F (36.1 °C)  97.7 °F (36.5 °C)   TempSrc: Temporal Temporal  Temporal   SpO2: 94% 100%  94%   Weight:   161 lb 3.2 oz (73.1 kg)    Height:          Constitutional: Well-developed, no acute distress  Eyes: conjunctivae normal, no xanthelasma   Ears, Nose, Throat: oral mucosa moist, no cyanosis   CV: no JVD. Irregular rate and rhythm. HSM 3/6 at apex. No rubs, or gallops.  PMI is nondisplaced  Lungs: clear to auscultation bilaterally, normal respiratory effort without used of accessory muscles  Abdomen: soft, non-tender, bowel sounds present, no masses or hepatomegaly   Musculoskeletal: no digital clubbing, trace edema of LEs   Skin: warm, no rashes     I have personally reviewed the laboratory, cardiac diagnostic and radiographic testing as outlined below:    Data:    Recent Labs     02/03/23  0715 02/04/23  1311 02/05/23  0604   WBC 8.0 9.6 6.1   HGB 13.7 14.6 13.8   HCT 39.0 44.4 42.7    288 240     Recent Labs     02/03/23  0715 02/04/23  1311 02/05/23  0604    137 140   K 4.1 4.3 3.7   CL 97* 97* 103   CO2 17* 22 24   BUN 51* 55* 46*   CREATININE 2.4* 2.1* 1.9*   CALCIUM 10.1 9.9 9.6      Lab Results   Component Value Date/Time    MG 2.4 02/04/2023 01:11 PM     Recent Labs     02/04/23  2147   TSH 0.666     No results for input(s): INR in the last 72 hours. CXR 2/4/23:   FINDINGS:   Examination is limited by overlying heart monitor device. However, the lungs   appear clear. Pleural spaces are clear. Stable cardiomegaly is noted. No   acute osseous abnormality seen. Impression   No acute process identified, but evaluation limited. Telemetry 02/05/23 : AF  bpm. No significant pause. EKG 2/5/23:  bpm, RBBB, Qtc 562 ms. Please see scan in Cardiology. JAIME 1/26/23:  Findings      Left Ventricle   Dilated left ventricle. Severely reduced left ventricular systolic function. Right Ventricle   Reduced right ventricular function. Left Atrium   Dilated left ventricle. No evidence of interatrial shunting on bubble study. History of WATCHMAN device (24 mm). No significant color flow jet around   the device. No device related thrombus visualized. Right Atrium   Dilated right atrium. Mitral Valve   Severe mitral regurgitation. No evidence of hemodynamically mitral stenosis. Tricuspid Valve   Mild tricuspid regurgitation. RV-RA gradient is estimated at 27 mmHg.       Aortic Valve   Aortic valve opens well.   The aortic valve is trileaflet. No evidence of hemodynamically significant aortic stenosis. Physiologic and/or trace aortic regurgitation. Pulmonic Valve   No evidence of hemodynamically significant pulmonic regurgitation. Pericardial Effusion   No evidence of a hemodynamically significant pericardial effusion. Aorta   Aortic root dimension within normal limits. Conclusions      Summary   Severely reduced left ventricular systolic function. Reduced right ventricular function. Bi-atrial enlargement. No evidence of interatrial shunting on bubble study. History of WATCHMAN device (24 mm). No significant color flow jet around   the device. No device related thrombus visualized. Severe mitral regurgitation. Mild tricuspid regurgitation. RV-RA gradient is estimated at 27 mmHg. Signature      ----------------------------------------------------------------   Electronically signed by Rukhsana Suazo MD(Interpreting   physician) on 01/26/2023 11:45 AM   ----------------------------------------------------------------    Echocardiogram 12/17/22: Findings      Left Ventricle   Ejection fraction is visually estimated at 25%. Overall ejection fraction   severely decreased. Mild left ventricular concentric hypertrophy noted. Left ventricle size is normal. Indeterminate diastolic function. Right Ventricle   Dilated right ventricle with reduced function. Left Atrium   Left atrial volume index of 50 ml per meters squared BSA. Right Atrium   Mildly enlarged right atrium size. Mitral Valve   Mild thickening of the mitral valve leaflets. No evidence of mitral valve   stenosis. Moderate mitral regurgitation is present. Tricuspid Valve   The tricuspid valve appears structurally normal.   Moderate tricuspid regurgitation. Pulmonary hypertension is severe. RVSP is 70 mmHg. Aortic Valve   The aortic valve is trileaflet.  No hemodynamically significant aortic   stenosis is present. Moderate aortic regurgitation is noted. Pulmonic Valve   Pulmonic valve is structurally normal. Physiologic and/or trace pulmonic   regurgitation present. Pericardial Effusion   No evidence for hemodynamically significant pericardial effusion. Aorta   Normal aortic root and ascending aorta. Miscellaneous   The inferior vena cava diameter is normal with normal respiratory   variation. Conclusions      Summary   Ejection fraction is visually estimated at 25%. Overall ejection fraction severely decreased. Mild left ventricular concentric hypertrophy noted. Dilated right ventricle with reduced function. Left atrial volume index of 50 ml per meters squared BSA. Moderate aortic regurgitation is noted. Moderate mitral regurgitation is present. Moderate tricuspid regurgitation. Pulmonary hypertension is severe. RVSP is 70 mmHg. No evidence for hemodynamically significant pericardial effusion. Signature      ----------------------------------------------------------------   Electronically signed by Shamika Miranda DO(Interpreting   physician) on 12/17/2022 06:57 PM   ----------------------------------------------------------------    Stress Test 12/20/22:   1: No definite evidence of  ischemia or scar except soft tissue   attenuation. The left ventricle is visually dilated both rest and   stress without evidence of TID     2  Dilated left ventricle with Moderate to severe global left   ventricular hypokinesis with estimated left ventricular ejection   fraction of 32%. 3:  Please see separate report for EKG and hemodynamic aspect of the   stress test.     4:  Low dose CT attenuation correction protocol was utilized which   revealed minimal to mild coronary artery ossifications. 5: RISK SCAN:  High risk scan due to dilated left ventricle with   severe global hypokinesis and EF of 32%.        I have independently reviewed all of the ECGs and rhythm strips per above     Assessment/Plan: This is a 68 y.o. female with a history of   Patient Active Problem List   Diagnosis    Anxiety    Nonischemic cardiomyopathy ([facility] [address]    Severe mitral regurgitation    Lumbar radiculopathy    Cervical radiculopathy    HTN (hypertension), benign    Left atrial thrombus    Paroxysmal atrial fibrillation (HCC)    Chronic HFrEF (heart failure with reduced ejection fraction) ([address]    Visit for monitoring Tikosyn therapy    Persistent atrial fibrillation ([facility] [address]    Encounter for medication refill    Itching    Chronic anticoagulation    Presence of Watchman left atrial appendage closure device    Renal mass    Renal cell carcinoma of left kidney (HCC)    Chronic renal disease, stage III ([facility] [address] [[id number]]    Multiple subsegmental pulmonary emboli without acute cor pulmonale (HCC)    Acute on chronic congestive heart failure (HCC)    Vertigo    Dizziness    who presents with nausea and dizziness. 1. Persistent atrial fibrillation  - [id number]: 7  - History of CHERYL thrombus  - History of  JAIME and DC-CV on 5/6/19, 11/4/19 and 2/3/23.  - Tikosyn 250 mcg BID: initiation 11/4/19 and in March 2022 was reduced to 125 mcg every 12 hours due decreased CrCl. Tikosyn stopped on 12/19/22 due to elevated serum creatinine.   - Status post  Watchman #24 on 10/14/2020 with Dr Neena Hernandez. - Noted to have recurrent AF on 1/13/23 and started on Amiodarone on 1/20/23. Underwent DC-CV 2/3/23.   - Presented in NSR on 2/4/23 but noted to converted back to AF with CVR on 2/4/23.   - Remains in AF with CVR. - Re-education on importance of well controlled HTN (goal BP < 130/80), adequate weight control (goal BMI of < 27), physical activity consisting of moderate cardiopulmonary exercise up to a goal of 250 min/wk, daily compliance with CPAP in treating sleep apnea, smoking cessation and limited ETOH intake    2.  Nonischemic cardiomyopathy and chronic HFrEF  - Diagnosed originally in 2013  - TTE: 12/2016: EF 38%  - JAIME: 3/2019: EF 25-30%  - JAIME: 5/2019: EF 25-30%  - TTE: 10/2020: EF 50%  - JAIME: 11/30/2020: EF 45-50%  - JAIME: 7/16/2021: EF 45-50%  - JAIME: 10/18/2021: EF 45-50%  - TTE: 12/17/22: EF 25%. - JAIME: 1/26/23: severe LV dysfunction.  - GDMT: Toprol XL, Lasix, Apresoline and Aldactone at home. - Allergic to ACE-I/ARB and Nitrates  - NYHA III, ACC/AHA stage C  - Fairly compensated and euvolemic.  - Using WCD LifeVest.    3. Valvular heart disease   - Severe MR on JAIME 1/26/23.  - Consider valve clinic referral.    4. LBBB  - Chronic. 5. Prolonged QTc    6. Hypertension  - On Toprol XL, Lasix, Apresoline and Aldactone at home. 7. Severe pulmonary hypertension  - Noted on TTE 12/17/22    8. Pulmonary embolism  - Diagnosed 12/17/22. - Previously on Lovenox and currently on Pradaxa. 9. Obesity  Body mass index is 25.25 kg/m². 10. Lumbar and cervical radiculopathy    11. Anxiety    12. History of TIA    13. Meige syndrome     14. CKD   Estimated Creatinine Clearance: 24 mL/min (A) (based on SCr of 1.9 mg/dL (H)). 15. Renal cancer status post left partial nephrectomy. 16. Nausea and dizziness  - Patient states that it started since she is started on Lovenox and Pradaxa.  - Dizziness could be related to hypotension    Recommendations:  Continue Amiodarone 200 mg daily. Resume Toprol XL only if BP allows. Agree with decrease Pradaxa to 75 mg BID. Cardiology consult for management of severe MR and CHF. Valve clinic referal for severe MR. Plan for repeat DC-cardioversion in 2 to 3 weeks. Doubt that she would stay in NSR with underlying severe MR. Avoid QT prolongation medications. Continue WCD lifeVest.    I have spent a total of 80 minutes with the patient and the family reviewing the above stated recommendations.   And a total of >50% of that time involved face-to-face time providing counseling and or coordination of care with the other providers, reviewing records/tests, counseling/education of the patient, ordering medications/tests/procedures, coordinating care, and documenting clinical information in the EHR. Thank you for allowing me to participate in your patient's care. Please call me if there are any questions or concerns.       Shireen Deutsch MD  Cardiac Electrophysiology  [facility]  [facility] [place] [place] [facility]  8:45 AM  2/5/2023

## 2023-02-06 ENCOUNTER — APPOINTMENT (OUTPATIENT)
Dept: MRI IMAGING | Age: 78
DRG: 872 | End: 2023-02-06
Payer: MEDICARE

## 2023-02-06 LAB
BASOPHILS ABSOLUTE: 0.03 E9/L (ref 0–0.2)
BASOPHILS RELATIVE PERCENT: 0.4 % (ref 0–2)
EKG ATRIAL RATE: 66 BPM
EKG ATRIAL RATE: 77 BPM
EKG ATRIAL RATE: 97 BPM
EKG P AXIS: 56 DEGREES
EKG P-R INTERVAL: 216 MS
EKG Q-T INTERVAL: 436 MS
EKG Q-T INTERVAL: 446 MS
EKG Q-T INTERVAL: 494 MS
EKG QRS DURATION: 154 MS
EKG QRS DURATION: 158 MS
EKG QRS DURATION: 160 MS
EKG QTC CALCULATION (BAZETT): 517 MS
EKG QTC CALCULATION (BAZETT): 545 MS
EKG QTC CALCULATION (BAZETT): 562 MS
EKG R AXIS: -77 DEGREES
EKG R AXIS: -80 DEGREES
EKG R AXIS: -92 DEGREES
EKG T AXIS: 102 DEGREES
EKG T AXIS: 113 DEGREES
EKG T AXIS: 90 DEGREES
EKG VENTRICULAR RATE: 100 BPM
EKG VENTRICULAR RATE: 66 BPM
EKG VENTRICULAR RATE: 90 BPM
EOSINOPHILS ABSOLUTE: 0.04 E9/L (ref 0.05–0.5)
EOSINOPHILS RELATIVE PERCENT: 0.6 % (ref 0–6)
HCT VFR BLD CALC: 42.4 % (ref 34–48)
HEMOGLOBIN: 13.7 G/DL (ref 11.5–15.5)
IMMATURE GRANULOCYTES #: 0.02 E9/L
IMMATURE GRANULOCYTES %: 0.3 % (ref 0–5)
LYMPHOCYTES ABSOLUTE: 0.8 E9/L (ref 1.5–4)
LYMPHOCYTES RELATIVE PERCENT: 11.9 % (ref 20–42)
MCH RBC QN AUTO: 27.6 PG (ref 26–35)
MCHC RBC AUTO-ENTMCNC: 32.3 % (ref 32–34.5)
MCV RBC AUTO: 85.3 FL (ref 80–99.9)
MONOCYTES ABSOLUTE: 0.36 E9/L (ref 0.1–0.95)
MONOCYTES RELATIVE PERCENT: 5.3 % (ref 2–12)
NEUTROPHILS ABSOLUTE: 5.48 E9/L (ref 1.8–7.3)
NEUTROPHILS RELATIVE PERCENT: 81.5 % (ref 43–80)
PDW BLD-RTO: 16.1 FL (ref 11.5–15)
PLATELET # BLD: 281 E9/L (ref 130–450)
PMV BLD AUTO: 10.4 FL (ref 7–12)
RBC # BLD: 4.97 E12/L (ref 3.5–5.5)
URINE CULTURE, ROUTINE: NORMAL
WBC # BLD: 6.7 E9/L (ref 4.5–11.5)

## 2023-02-06 PROCEDURE — 92610 EVALUATE SWALLOWING FUNCTION: CPT

## 2023-02-06 PROCEDURE — 36415 COLL VENOUS BLD VENIPUNCTURE: CPT

## 2023-02-06 PROCEDURE — 93010 ELECTROCARDIOGRAM REPORT: CPT | Performed by: INTERNAL MEDICINE

## 2023-02-06 PROCEDURE — 97530 THERAPEUTIC ACTIVITIES: CPT

## 2023-02-06 PROCEDURE — 6360000002 HC RX W HCPCS

## 2023-02-06 PROCEDURE — 2580000003 HC RX 258: Performed by: INTERNAL MEDICINE

## 2023-02-06 PROCEDURE — 97165 OT EVAL LOW COMPLEX 30 MIN: CPT

## 2023-02-06 PROCEDURE — 6360000002 HC RX W HCPCS: Performed by: INTERNAL MEDICINE

## 2023-02-06 PROCEDURE — 97535 SELF CARE MNGMENT TRAINING: CPT

## 2023-02-06 PROCEDURE — 70551 MRI BRAIN STEM W/O DYE: CPT

## 2023-02-06 PROCEDURE — 2140000000 HC CCU INTERMEDIATE R&B

## 2023-02-06 PROCEDURE — 97161 PT EVAL LOW COMPLEX 20 MIN: CPT

## 2023-02-06 PROCEDURE — 99232 SBSQ HOSP IP/OBS MODERATE 35: CPT | Performed by: INTERNAL MEDICINE

## 2023-02-06 PROCEDURE — 85025 COMPLETE CBC W/AUTO DIFF WBC: CPT

## 2023-02-06 PROCEDURE — 99232 SBSQ HOSP IP/OBS MODERATE 35: CPT

## 2023-02-06 PROCEDURE — 6370000000 HC RX 637 (ALT 250 FOR IP): Performed by: INTERNAL MEDICINE

## 2023-02-06 RX ORDER — DIPHENHYDRAMINE HYDROCHLORIDE 50 MG/ML
12.5 INJECTION INTRAMUSCULAR; INTRAVENOUS
Status: COMPLETED | OUTPATIENT
Start: 2023-02-06 | End: 2023-02-06

## 2023-02-06 RX ADMIN — Medication 10 ML: at 20:20

## 2023-02-06 RX ADMIN — AMIODARONE HYDROCHLORIDE 200 MG: 200 TABLET ORAL at 09:09

## 2023-02-06 RX ADMIN — PANTOPRAZOLE SODIUM 40 MG: 40 TABLET, DELAYED RELEASE ORAL at 05:17

## 2023-02-06 RX ADMIN — PIPERACILLIN AND TAZOBACTAM 3375 MG: 3; .375 INJECTION, POWDER, FOR SOLUTION INTRAVENOUS at 22:31

## 2023-02-06 RX ADMIN — DIPHENHYDRAMINE HYDROCHLORIDE 12.5 MG: 50 INJECTION, SOLUTION INTRAMUSCULAR; INTRAVENOUS at 12:08

## 2023-02-06 RX ADMIN — DIPHENHYDRAMINE HYDROCHLORIDE 25 MG: 25 TABLET ORAL at 22:26

## 2023-02-06 RX ADMIN — DABIGATRAN ETEXILATE MESYLATE 75 MG: 75 CAPSULE ORAL at 20:20

## 2023-02-06 RX ADMIN — PIPERACILLIN AND TAZOBACTAM 3375 MG: 3; .375 INJECTION, POWDER, FOR SOLUTION INTRAVENOUS at 12:09

## 2023-02-06 RX ADMIN — BACLOFEN 10 MG: 10 TABLET ORAL at 20:20

## 2023-02-06 RX ADMIN — DABIGATRAN ETEXILATE MESYLATE 75 MG: 75 CAPSULE ORAL at 09:31

## 2023-02-06 RX ADMIN — Medication 10 ML: at 09:08

## 2023-02-06 RX ADMIN — FLUTICASONE PROPIONATE 1 SPRAY: 50 SPRAY, METERED NASAL at 09:10

## 2023-02-06 ASSESSMENT — PAIN SCALES - GENERAL
PAINLEVEL_OUTOF10: 0
PAINLEVEL_OUTOF10: 0

## 2023-02-06 NOTE — CARE COORDINATION
Spoke with Pt about Transition plan of Care. Pt lives alone but Heath Guerra is staying with her since sick. Pt uses a cane or walker PRN. PCP: Dr. Erica Carreon. Pharmacy:  [facility]. Son to provide transport at discharge. Pt has  [employer]. On IV Zosyn for UTI. Discharge Plan is to return home with no need at the present time. Pt was given a [address] list in case IV antibiotics are needed at discharge. /CM to follow for discharge needs. PARESH Meza  [phone]     Case Management Assessment  Initial Evaluation    Date/Time of Evaluation: 2/6/2023 12:00 PM  Assessment Completed by: ELMO Meza    If patient is discharged prior to next notation, then this note serves as note for discharge by case management. Patient Name: Pamela Leong                   YOB: 1945  Diagnosis: Dizziness [R42]  Nausea [R11.0]  Vertigo [R42]                   Date / Time: 2/4/2023 12:25 PM    Patient Admission Status: Inpatient   Readmission Risk (Low < 19, Mod (19-27), High > 27): Readmission Risk Score: 19.1    Current PCP: Gelene Sever, DO  PCP verified by CM? Yes    Chart Reviewed: Yes      History Provided by: Patient  Patient Orientation: Alert and Oriented    Patient Cognition: Alert    Hospitalization in the last 30 days (Readmission):  Yes    If yes, Readmission Assessment in  Navigator will be completed. Advance Directives:      Code Status: Full Code   Patient's Primary Decision Maker is:        Discharge Planning:    Patient lives with: Children Type of Home: House  Primary Care Giver: Self  Patient Support Systems include: Children   Current Financial resources:    Current community resources:    Current services prior to admission: None            Current DME:              Type of Home Care services:  None    ADLS  Prior functional level: Independent in ADLs/IADLs  Current functional level:  Independent in ADLs/IADLs    PT AM-PAC:   /24  OT AM-PAC:   /24    Family can provide assistance at DC: Yes  Would you like Case Management to discuss the discharge plan with any other family members/significant others, and if so, who? Yes (Son Siria Hitchcock)  Plans to Return to Present Housing: Yes  Other Identified Issues/Barriers to RETURNING to current housing:   Potential Assistance needed at discharge: N/A            Potential DME:    Patient expects to discharge to: [address] [place] [address] for transportation at discharge:      Financial    Payor: Lori Franco / Plan: MEDICARE PART A AND B / Product Type: *No Product type* /     Does insurance require precert for SNF: No    Potential assistance Purchasing Medications:    Meds-to-Beds request: Yes      [address], Keith Bryson  [address] [facility]  [address] [facility] [address]  Phone: [phone] Fax: [phone] [address], [address] [place] [address] [phone] - F [phone]  [address] [facility] [address]  Phone: [phone] Fax: [phone]    [facility] [address] [facility] [address] [place] [address]  Phone: [phone] Fax: [address]68, [address] [address]  Phone: [phone] Fax: [phone]      Notes:    Factors facilitating achievement of predicted outcomes: Family support, Motivated, Cooperative, Pleasant, and Good insight into deficits    Barriers to discharge: Additional Case Management Notes: The Plan for Transition of Care is related to the following treatment goals of Dizziness [R42]  Nausea [R11.0]  Vertigo [H07]    IF APPLICABLE: The Patient and/or patient representative Mirlande Godinez and her family were provided with a choice of provider and agrees with the discharge plan.  Freedom of choice list with basic dialogue that supports the patient's individualized plan of care/goals and shares the quality data associated with the providers was provided to:     Patient Representative Name:       The Patient and/or Patient Representative Agree with the Discharge Plan?       Dennis Ridley  Case Management Department  Ph: [phone] Fax: [phone]

## 2023-02-06 NOTE — PROGRESS NOTES
Department of Internal Medicine  Infectious Diseases   Progress  Note      C/C :  UTI     Pt is awake and alert  Feels better   Afebrile       Current Facility-Administered Medications   Medication Dose Route Frequency Provider Last Rate Last Admin    perflutren lipid microspheres (DEFINITY) injection 1.5 mL  1.5 mL IntraVENous ONCE PRN Madeline Flowers MD        perflutren lipid microspheres (DEFINITY) injection 1.5 mL  1.5 mL IntraVENous ONCE PRN Madeline Flowers MD        trimethobenzamide (TIGAN) injection 200 mg  200 mg IntraMUSCular Q6H PRN Sofia Solo MD   200 mg at 02/05/23 2255    piperacillin-tazobactam (ZOSYN) 3,375 mg in sodium chloride 0.9 % 50 mL IVPB ([id number])  3,375 mg IntraVENous Q12H Jamilleigha Vegas MD 12.5 mL/hr at 02/06/23 1209 3,375 mg at 02/06/23 1209    amiodarone (CORDARONE) tablet 200 mg  200 mg Oral Daily Samer Sandi Mccartney MD   200 mg at 02/06/23 0909    baclofen (LIORESAL) tablet 10 mg  10 mg Oral Nightly Junaidr Sandi Mccartney MD   10 mg at 02/05/23 2043    dabigatran (PRADAXA) capsule 75 mg  75 mg Oral BID Fernando Mccartney MD   75 mg at 02/06/23 0931    diphenhydrAMINE (BENADRYL) tablet 25 mg  25 mg Oral Q6H PRN Fernando Mccartney MD   25 mg at 02/05/23 2344    fluticasone (FLONASE) 50 MCG/ACT nasal spray 1 spray  1 spray Each Nostril Daily Fernando Mccartney MD   1 spray at 02/06/23 0910    hydrOXYzine HCl (ATARAX) tablet 10 mg  10 mg Oral Q8H PRN Fernando Mccartney MD        [Held by provider] hydrALAZINE (APRESOLINE) tablet 10 mg  10 mg Oral TID Vi Henson MD   10 mg at 02/05/23 0824    [Held by provider] furosemide (LASIX) tablet 20 mg  20 mg Oral Daily Junaidr Sandi Mccartney MD   20 mg at 02/05/23 0824    pantoprazole (PROTONIX) tablet 40 mg  40 mg Oral QAM AC Samer Sandi Coon, MD   40 mg at 02/06/23 0517    metoprolol succinate (TOPROL XL) extended release tablet 25 mg  25 mg Oral Daily UYEN Andersen CNP   25 mg at 02/05/23 5238    [Held by provider] spironolactone (ALDACTONE) tablet 25 mg  25 mg Oral Daily Samer Sal Okeefe MD   25 mg at 02/05/23 4565    sodium chloride flush 0.9 % injection 10 mL  10 mL IntraVENous 2 times per day Margaret Almendarez MD   10 mL at 02/06/23 0908    sodium chloride flush 0.9 % injection 10 mL  10 mL IntraVENous PRN Junaidr Sal Okeefe MD        0.9 % sodium chloride infusion   IntraVENous PRN Junaidr Sal Okeefe MD        magnesium hydroxide (MILK OF MAGNESIA) 400 MG/5ML suspension 30 mL  30 mL Oral Daily PRN Margaret Almendarez MD        acetaminophen (TYLENOL) tablet 650 mg  650 mg Oral Q6H PRN Junaidr Sal Okeefe MD        Or    acetaminophen (TYLENOL) suppository 650 mg  650 mg Rectal Q6H PRN Junaidr Sal Okeefe MD             REVIEW OF SYSTEMS:    CONSTITUTIONAL:  Denies fever, chill or rigors. HEENT: denies blurring of vision or double vision, denies hearing problem  RESPIRATORY: SOB   CARDIOVASCULAR:  Denies palpitation or chest pain   GASTROINTESTINAL:  Denies abdomen pain, diarrhea or constipation,, nausea or vomiting. GENITOURINARY:  Denies burning urination or frequency of urination  INTEGUMENT: denies wound , rash  HEMATOLOGIC/LYMPHATIC:  Denies lymph node swelling, gum bleeding or easy bruising. MUSCULOSKELETAL:  leg swelling   NEUROLOGICAL:  light headed or dizziness     PHYSICAL EXAM:      Vitals:   /64   Pulse 91   Temp (!) 96.6 °F (35.9 °C) (Temporal)   Resp 18   Ht 5' 7\" (1.702 m)   Wt 165 lb 3.2 oz (74.9 kg)   SpO2 98%   BMI 25.87 kg/m²     General Appearance:    Awake, alert , no acute distress. Head:    Normocephalic, atraumatic   Eyes:    +  pallor, no icterus,no nystagmus    Ears:    No obvious deformity or drainage.    Nose:   No nasal drainage   Throat:   Mucosa moist, no oral thrush   Neck:   Supple, no lymphadenopathy   Back:     no CVA tenderness   Lungs:     Clear to auscultation bilaterally    Heart:    Irregular    Abdomen:     Soft, non-tender, bowel sounds present    Extremities:    + edema    Pulses:   Dorsalis pedis palpable    Skin:   no rashes      CBC with Differential:      Lab Results   Component Value Date/Time    WBC 6.7 02/06/2023 08:49 AM    RBC 4.97 02/06/2023 08:49 AM    HGB 13.7 02/06/2023 08:49 AM    HCT 42.4 02/06/2023 08:49 AM     02/06/2023 08:49 AM    MCV 85.3 02/06/2023 08:49 AM    MCH 27.6 02/06/2023 08:49 AM    MCHC 32.3 02/06/2023 08:49 AM    RDW 16.1 02/06/2023 08:49 AM    SEGSPCT 85 12/13/2013 12:00 PM    LYMPHOPCT 11.9 02/06/2023 08:49 AM    MONOPCT 5.3 02/06/2023 08:49 AM    BASOPCT 0.4 02/06/2023 08:49 AM    MONOSABS 0.36 02/06/2023 08:49 AM    LYMPHSABS 0.80 02/06/2023 08:49 AM    EOSABS 0.04 02/06/2023 08:49 AM    BASOSABS 0.03 02/06/2023 08:49 AM       CMP     Lab Results   Component Value Date/Time     02/05/2023 06:04 AM    K 3.7 02/05/2023 06:04 AM     02/05/2023 06:04 AM    CO2 24 02/05/2023 06:04 AM    BUN 46 02/05/2023 06:04 AM    CREATININE 1.9 02/05/2023 06:04 AM    GFRAA 53 09/07/2022 01:12 PM    LABGLOM 27 02/05/2023 06:04 AM    GLUCOSE 105 02/05/2023 06:04 AM    GLUCOSE 120 10/12/2011 08:10 PM    PROT 6.5 02/05/2023 06:04 AM    LABALBU 3.4 02/05/2023 06:04 AM    CALCIUM 9.6 02/05/2023 06:04 AM    BILITOT 1.2 02/05/2023 06:04 AM    ALKPHOS 94 02/05/2023 06:04 AM    AST 37 02/05/2023 06:04 AM    ALT 44 02/05/2023 06:04 AM         Hepatic Function Panel:    Lab Results   Component Value Date/Time    ALKPHOS 94 02/05/2023 06:04 AM    ALT 44 02/05/2023 06:04 AM    AST 37 02/05/2023 06:04 AM    PROT 6.5 02/05/2023 06:04 AM    BILITOT 1.2 02/05/2023 06:04 AM    BILIDIR 0.2 01/18/2023 12:48 PM    IBILI 0.5 01/18/2023 12:48 PM    LABALBU 3.4 02/05/2023 06:04 AM       PT/INR:    Lab Results   Component Value Date/Time    PROTIME 31.9 01/03/2023 10:34 AM    INR 2.7 01/03/2023 10:34 AM    INR 2.0 12/29/2022 12:07 PM       TSH:    Lab Results   Component Value Date/Time    TSH 0.666 02/04/2023 09:47 PM       U/A:    Lab Results   Component Value Date/Time    NITRITE positive 07/08/2022 02:22 PM    COLORU Yellow 02/05/2023 03:58 AM    PHUR 5.5 02/05/2023 03:58 AM    WBCUA 5-10 02/05/2023 03:58 AM    RBCUA 1-3 02/05/2023 03:58 AM    RBCUA NONE 11/24/2013 08:45 PM    BACTERIA MANY 02/05/2023 03:58 AM    CLARITYU TURBID 02/05/2023 03:58 AM    SPECGRAV 1.025 02/05/2023 03:58 AM    LEUKOCYTESUR LARGE 02/05/2023 03:58 AM    UROBILINOGEN 0.2 02/05/2023 03:58 AM    BILIRUBINUR Negative 02/05/2023 03:58 AM    BILIRUBINUR negative 07/08/2022 02:22 PM    BLOODU TRACE-INTACT 02/05/2023 03:58 AM    GLUCOSEU Negative 02/05/2023 03:58 AM       ABG:  No results found for: QRX7ZSQ, BEART, W9GPITSU, PHART, THGBART, JBB6GVD, PO2ART, IYS8KLY    MICROBIOLOGY:    Blood culture - neg to date     Urine Culture - pending     Radiology :    Chest X ray : no infiltrates     IMPRESSION:   Lactic acidosis - improved  UTI         RECOMMENDATIONS:     Zosyn 3.375 grams IV q 12 hrs   Urine cx pending

## 2023-02-06 NOTE — PATIENT CARE CONFERENCE
P Quality Flow/Interdisciplinary Rounds Progress Note        Quality Flow Rounds held on February 6, 2023    Disciplines Attending:  Bedside Nurse, , , and Nursing Unit Leadership    Rasta Roberts was admitted on 2/4/2023 12:25 PM    Anticipated Discharge Date:       Disposition:    Momo Score:  Momo Scale Score: 19    Readmission Risk              Risk of Unplanned Readmission:  22           Discussed patient goal for the day, patient clinical progression, and barriers to discharge.   The following Goal(s) of the Day/Commitment(s) have been identified:  report labs/diagnostics       Ebony Hernández RN  February 6, 2023

## 2023-02-06 NOTE — PLAN OF CARE
Problem: Chronic Conditions and Co-morbidities  Goal: Patient's chronic conditions and co-morbidity symptoms are monitored and maintained or improved  2/5/2023 2208 by Khushbu Vallejo RN  Outcome: Progressing     Problem: Discharge Planning  Goal: Discharge to home or other facility with appropriate resources  Outcome: Progressing     Problem: Safety - Adult  Goal: Free from fall injury  Outcome: Progressing     Problem: ABCDS Injury Assessment  Goal: Absence of physical injury  Outcome: Progressing     Problem: Nutrition Deficit:  Goal: Optimize nutritional status  Outcome: Progressing

## 2023-02-06 NOTE — ACP (ADVANCE CARE PLANNING)
Advance Care Planning   Healthcare Decision Maker:    Primary Decision Maker: Brianna Oneill - [phone]    Click here to complete Healthcare Decision Makers including selection of the Healthcare Decision Maker Relationship (ie \"Primary\"). Today we documented Decision Maker(s) consistent with ACP documents on file.        TERRENCE Worrell.S.W.  [phone]

## 2023-02-06 NOTE — DISCHARGE INSTRUCTIONS
HEART FAILURE  / CONGESTIVE HEART FAILURE  DISCHARGE INSTRUCTIONS:  GUIDELINES TO FOLLOW AT HOME    Self- Managed Care:     MEDICATIONS:  Take your medication as directed. If you are experiencing any side effects, inform your doctor, Do not stop taking any of your medications without letting your doctor know. Check with your doctor before taking any over-the-counter medications / herbal / or dietary supplements. They may interfere with your other medications. Do not take ibuprofen (Advil or Motrin) and naproxen (Aleve) without talking to your doctor first. They could make your heart failure worse. WEIGHT MONITORING:   Weigh yourself everyday (with the same scale) around the same time of the day and write it down. (you can chart them on a calendar or keep track of them on paper. Notify your doctor of a weight gain of 3 pounds or more in 1 day   OR a total of 5 pounds or more in 1 week    Take your weight record to your doctor visits  Also, the same goes if you loose more than 3# in one day, let your heart doctor know. DIET:   Cardiac heart healthy diet- Low saturated / low trans fat, no added salt, caffeine restricted, Low sodium diet-   No more than 2,000mg (2 grams) of salt / sodium per day (which equals to a little less than  a teaspoon of salt)  If your doctor wants you on a fluid restriction. ..it is usually recommended a fluid limit of 2,000cc -  Fluid restriction- 2,000 ml (milliliters) = 64 ounces = you can have 8 glasses of fluid per day (each glass 8 ounces)    Follow a low salt diet - avoid using salt at the table, avoid / limit use of canned soups, processed / packaged foods, salted snacks, olives and pickles. Do not use a salt substitute without checking with your doctor, they may contain a high amount of potassioum. (Mrs. Dejesus  is safe to use).     Limit the use of alcohol       CALL YOUR DOCTOR THE FIRST DAY YOU NOTICE ANY OF THESE   SYMPTOMS:  You have a weight gain of 3 pounds or more in 1 day         OR 5 pounds or more in one week  More shortness of breath  More swelling of your stomach, legs, ankles or feet  Feeling more tired, No energy  Dry hacky cough  Dizziness  More chest pain / discomfort       (CALL 911 IF ANY OF THE FOLLOWING OCCURS  Chest pain (not relieved with nitroglycerine, if you have been prescribed this medication)  Severe shortness of breath  Faint / Pass out  Confusion / cannot think clearly  If symptoms get worse           SMOKING - TOBACCO USE:  * IF YOU SMOKE - STOP! Kick the habit. [address] Program is offered at [facility] and [address] [facility]. Call [phone] extension [phone] for more information. ACTIVITY:   (Ask your doctor when you will be able to return to work and before starting any exercise program.  Do not drive unless unless your doctor has given you permission to do so). Start light exercise. Even if you can only do a small amount, exercise will help you get stronger, have more energy, help manage your weight and decrease  stress. Walking is an easy way to get exercise. Start out slowly and  increase the amount you walk as tolerated  If you become short of breath, dizzy or have chest pain; stop, sit down, and rest.  If you feel \"wiped out\" the day after you exercise, walk at a slower pace or for a shorter distance. You can gradually increase the pace or amount of time. (Do not exercise right after a meal or in extreme temperatures, such as above 85 degrees, if the air is really humid, or wind chill is less than 20 degrees)                                             ADDITIONAL INFORMATION:  Avoid getting sick from colds and the flu. Stay away from friends or family that you know may have a contagious illness  Get plenty of rest   Get a flu shot each year. Get a pneumococcal vaccine shot.  If you have had one before, ask your doctor whether you need another dose. My Goal for Self-management of Heart Failure Includes 5 steps :    1. Notice a change in symptoms ( weight gain, short of breath, leg swelling, decreased activity level, bloating. ...)    2. Evaluate the change: (use the Heart Failure Zones )     3. Decide to take action: decide what your options are, such as: (call your doctor for an extra visit, take a prescribed medication, such as your water pill if your doctor has given you directions to do so, [address])    4. Come up with a strategy:  (now you call the doctor for advice / appointment. This is where you take action!!! Do not wait, catch the symptom early and treat it before it worsens. 5. Evaluate the response: The next day, check your Heart Failure Zones: are you in the GREEN ZONE (safe zone)? Worsening symptoms of YELLOW ZONE? Or have you moved to the RED ZONE and need to call 911 or go to the Emergency Room for evaluation? Call your doctor's office to update them on your symptoms of heart failure.

## 2023-02-06 NOTE — CONSULTS
Gets creaking sound in her neck with head movement followed by spasms in her neck. This has been occurring all the time. Occasional headache now with h/o migraines that was different feom her current headache. Recent onset vertigo that is new. She describes a room spinning sensation with her dizziness. States not much dizziness now. Persistent dizziness reported with no aggravating or relieving factors at the time no report of dizziness with head movement. Patient denies any recent illness or infections. States the last time here was in December with a UTI. Suspected UTI with this admission and was started on Antibiotics. Physical exam as per template with note of obesity bilateral arms and legs with small torso and decreased shoulder size; petechial rash greater in the right arm with some involvement in her legs as well but this was asymmetric with an atypical pattern. Examination was benign. Mental status: She was awake and responsive answering questions and following commands without difficulty. Patient was oriented and appropriate. No difficulty with speech, cognition, or sensorium. Cranial nerve exam was normal.  Motor exam showed grossly normal strength in bilateral upper extremity. There was some difficulty lifting her legs in keeping with with no distal weakness appreciated in the lower extremities. Suspect a waste of her legs may be a factor with deconditioning causing inability to keep her legs up. Strength was grossly 4/5 bilaterally. Patient indicates she uses a walker at home for mobility. Sensory exam: Intact to all modalities  Cerebellar exam normal with normal cerebellar test except  Treatment record. Tested. There is no cerebellar. Rebound, tremors, or abnormal movements noted. Gait: Not tested  Reflexes: 1+ left patella: Absent bilateral Achilles and right patella: 1+ bilateral upper extremities: Babinski was negative.      Impression:   Patient presenting with acute onset of vertigo undetermined etiology. There has been a positional component with report of constant, vertigo prescribed to be improving. She did have some nausea but has no vomiting with unsteadiness noted with falling. No precipitating event or factors no clear pattern BPPV and that did not suspect for many years at this time. This may be physiologic. We will proceed with MRI and look at brainstem and posterior circulation territories given her history of vertebrobasilar disease with further pending results. Dizziness Transportation Department currently and may try meclizine as needed if needed for worsening of dizziness. Patient does have prominent neck pain with crepitus and stiffness suggesting cervical DJD and likely spondylopathy. However, this was not associated with her dizziness. Diagnosis:   1. Acute Vertigo  2. Cervicalgia  3. Suspicion of cervical DJD/DDD  4. Stroke risk factors: A. fib, vertebrobasilar disease, HTN, HLD, age. Recommendations:  1. MRI of brain and brainstem for evaluation of posterior circulation stroke in patient with history of vertebral basilar disease and persistent vertigo. 2. PT/OT evaluation and therapy  3. Meclizine 25 mg p.o. every 6 hours as needed vertigo  4. CT of cervical spine for evaluation of cervical spine with suspicion of cervical DJD/DDD which may be impacting functional mobility. 5. Continue on Pradaxa as before pending further results. 6. Further on follow-up.

## 2023-02-06 NOTE — PLAN OF CARE
Patient's chart updated to reflect:      . - HF care plan, HF education points and HF discharge instructions.  -Orders: 2 gram sodium diet, daily weights, I/O.  -PCP and cardiology follow up appointments to be scheduled within 7 days of hospital discharge. -CHF education session will be provided to the patient prior to hospital discharge.     Cha Yu RN RN, BSN  Heart Failure Navigator

## 2023-02-06 NOTE — PROGRESS NOTES
Occupational Therapy  OCCUPATIONAL THERAPY INITIAL EVALUATION    [facility] [employer] [address] [address]      Date:2023                                                Patient Name: Yudi Lemus  MRN: [id number]  : 1945  Room: [address] [place] [address]    Evaluating OT: Evonne Rocha OTR/L #[id number]     Referring Provider: Paula Cortes MD  Specific Provider Orders/Date: OT eval and treat 23    Diagnosis: Dizziness [R42]  Nausea [R11.0]  Vertigo [R42]   Pt admitted to hospital with dizziness, nausea and vomiting      Pertinent Medical History:  has a past medical history of Afib ([address] Anxiety, Arthritis, Cervical radiculopathy, CHF (congestive heart failure) ([address] Chronic sinusitis, Ejection fraction < 50%, Hilar adenopathy, Hx of blood clots, Hypertension, Left ventricular systolic dysfunction, Lesion of left native kidney, Lumbar radiculopathy, Lung nodules, Meige syndrome, Microscopic colitis, Mitral regurgitation, Nonischemic cardiomyopathy ([address] Osteopenia, PE (pulmonary thromboembolism) ([address] Renal cell carcinoma of left kidney ([address] and Vertebrobasilar artery syndrome.        Precautions:  Fall Risk, [place], bed alarm    Assessment of current deficits    [x] Functional mobility  [x]ADLs  [x] Strength               [x]Cognition    [x] Functional transfers   [x] IADLs         [x] Safety Awareness   [x]Endurance    [] Fine Coordination              [x] Balance      [] Vision/perception   []Sensation     []Gross Motor Coordination  [] ROM  [] Delirium                   [] Motor Control     OT PLAN OF CARE   OT POC based on physician orders, patient diagnosis and results of clinical assessment    Frequency/Duration 1-3 days/wk for 2 weeks PRN   Specific OT Treatment Interventions to include:   * Instruction/training on adapted ADL techniques and AE recommendations to increase functional independence within precautions       * Training on energy conservation strategies, correct breathing pattern and techniques to improve independence/tolerance for self-care routine  * Functional transfer/mobility training/DME recommendations for increased independence, safety, and fall prevention  * Patient/Family education to increase follow through with safety techniques and functional independence  * Recommendation of environmental modifications for increased safety with functional transfers/mobility and ADLs  * Therapeutic exercise to improve motor endurance, ROM, and functional strength for ADLs/functional transfers  * Therapeutic activities to facilitate/challenge dynamic balance, stand tolerance for increased safety and independence with ADLs  * Therapeutic activities to facilitate gross/fine motor skills for increased independence with ADLs  * Neuro-muscular re-education: facilitation of righting/equilibrium reactions, midline orientation, scapular stability/mobility, normalization of muscle tone, and facilitation of volitional active controled movement    Recommended Adaptive Equipment:  TBD     Home Living: Pt lives alone in a 1 story home with 1 [place]    Bathroom setup: tub/shower combo    Equipment owned: w/w,     Prior Level of Function: mod I with ADLs , mod I with IADLs; ambulated without AD in house; w/w vs cane in community    Driving: no   Occupation: na    Pain Level: Pt denies pain this session    Cognition: A&O: x3;  Follows 1-2 step directions   Memory:  fair   Sequencing: fair   Problem solving:  fair-   Judgement/safety:  fair-     Functional Assessment:  AM-PAC Daily Activity Raw Score: 16/24   Initial Eval Status  Date: 2/6/23 Treatment Status  Date: STGs = LTGs  Time frame: 10-14 days   Feeding Independent      Grooming Minimal Assist   Modified [place]    UB Dressing Minimal Assist   Modified [place]    LB Dressing Moderate Assist   Modified [place]    Bathing Moderate Assist  Modified [place]    Toileting Moderate Assist Modified [place]    Bed Mobility  Supine to sit: Stand by Assist   Sit to supine: Stand by Assist   Supine to sit: Modified [place]   Sit to supine: Modified [place]    Functional Transfers Minimal Assist   Modified [place]    Functional Mobility Minimal Assist     Ambulated in room with w/w; cuing on posture, w/w management and safety  Modified [place]    Balance Sitting:     Static:  SBA    Dynamic:SBA  Standing: min A     Activity Tolerance F-  F+   Visual/  Perceptual Glasses: yes  wfl                    Hand Dominance right   Strength ROM Additional Info:    RUE   3+/5 wfl good  and wfl FMC/dexterity noted during ADL tasks     LUE 3+/5 wfl good  and wfl FMC/dexterity noted during ADL tasks     Hearing: wfl  Sensation:wfl  Tone: wfl  Edema:none noted     Comments: Upon arrival patient supine in bed and agreeable to OT Session. Therapist educated pt on role of OT. At end of session, patient semi-supine in bed (bed alarm) with call light and phone within reach, all lines and tubes intact. Overall patient demonstrated decreased independence and safety during completion of ADL/functional transfer/mobility tasks. Pt would benefit from continued skilled OT to increase safety and independence with completion of ADL/IADL tasks for functional independence and quality of life.     Treatment: OT treatment provided this date includes: Facilitation of bed mobility, sitting balance at EOB (impacting ADLs; addressing posture, weight shifting, dynamic reaching), functional transfers (various surfaces: bed, toilet), standing tolerance tasks (addressing posture, balance and activity tolerance while incorporating light functional reaching; impacting ADLs and functional activity) and functional ambulation tasks with w/w (including to/ from bathroom and in preparation for item retrieval tasks; cuing on posture, w/w management and safety) - skilled cuing on sequencing, hand placement, posture, body mechanics, energy conservation techniques and safety. Therapist facilitated self-care retraining: UB/LB self-care tasks (gown, socks), simulated toileting task and standing grooming tasks while educating pt on sequencing, modified techniques, posture, safety and energy conservation techniques. Skilled monitoring of HR, O2 sats and pts response to treatment. Rehab Potential: Good for established goals     Patient / Family Goal: return home      Patient and/or family were instructed on functional diagnosis, prognosis/goals and OT plan of care. Demonstrated fair understanding. Eval Complexity: Low    Time In: 1330  Time Out: 1355  Total Treatment Time: 10 minutes    Min Units   OT Eval Low 97165  x  1   OT Eval Medium 98191      OT Eval High 63829      OT Re-Eval [id number]       Therapeutic Ex 61979       Therapeutic Activities 83606  2     ADL/Self Care 17159  8  1   Orthotic Management 59920       Manual [id number]     Neuro Re-Ed 79659       Non-Billable Time          Evaluation Time additionally includes thorough review of current medical information, gathering information on past medical history/social history and prior level of function, interpretation of standardized testing/informal observation of tasks, assessment of data and development of plan of care and goals.           Christian Juarez OTR/L #[id number]

## 2023-02-06 NOTE — PROGRESS NOTES
Hospitalist Progress Note      Synopsis:   3, patient with PMH significant for A-fib on amiodarone and Pradaxa, HFrEF EF 25%, CKD, blood clot, nonischemic cardiomyopathy, PE, renal cell carcinoma of left kidney presented to ED with dizziness and nausea. Patient had cardioversion on 2023 and reports being nauseous ever since. CT of head was negative for acute findings Hospital course significant for UTI with elevated lactic acid of 4.7 and hypotensive with blood pressure 95/56 with concern for sepsis. Infectious disease was consulted and patient was started on Zosyn. MRI brain and brainstem was ordered for evaluation of posterior circulation stroke due to patient's history of vertebrobasilar disease and persistent vertigo. Neurology was consulted. Hospital day 2     Subjective:  Seen and examined at bedside with son present all questions answered at this time. Stable overnight. No issues reported. Patient seen and examined  Records reviewed. Temp (24hrs), Av.2 °F (36.2 °C), Min:96.5 °F (35.8 °C), Max:98.3 °F (36.8 °C)    DIET: ADULT ORAL NUTRITION SUPPLEMENT; Breakfast, Dinner; Standard High Calorie/High Protein Oral Supplement  ADULT DIET; Dysphagia - Soft and Bite Sized; Low Fat/Low Chol/High Fiber/GISELLE; Low Sodium (2 gm)  CODE: Full Code    Intake/Output Summary (Last 24 hours) at 2023 1501  Last data filed at 2023 1458  Gross per 24 hour   Intake 540 ml   Output 400 ml   Net 140 ml         Objective:    /64   Pulse 91   Temp (!) 96.6 °F (35.9 °C) (Temporal)   Resp 18   Ht 5' 7\" (1.702 m)   Wt 165 lb 3.2 oz (74.9 kg)   SpO2 98%   BMI 25.87 kg/m²     General appearance: No apparent distress, appears stated age and cooperative. HEENT: Conjunctivae/corneas clear. Mucous membranes moist.  Neck: Supple. No JVD. Respiratory:  Clear to auscultation bilaterally. Normal respiratory effort. Cardiovascular:  RRR. S1, S2 without MRG. PV: Pulses palpable. No edema. Abdomen: Soft, non-tender, non-distended. +BS  Musculoskeletal: No obvious deformities. Skin: Normal skin color. No rashes or lesions. Good turgor. Neurologic:  Grossly non-focal. Awake, alert, following commands. Psychiatric: Alert and oriented, thought content appropriate, normal insight and judgement    Medications:  REVIEWED DAILY    Infusion Medications    sodium chloride       Scheduled Medications    piperacillin-tazobactam  3,375 mg IntraVENous Q12H    amiodarone  200 mg Oral Daily    baclofen  10 mg Oral Nightly    dabigatran  75 mg Oral BID    fluticasone  1 spray Each Nostril Daily    [Held by provider] hydrALAZINE  10 mg Oral TID    [Held by provider] furosemide  20 mg Oral Daily    pantoprazole  40 mg Oral QAM AC    metoprolol succinate  25 mg Oral Daily    [Held by provider] spironolactone  25 mg Oral Daily    sodium chloride flush  10 mL IntraVENous 2 times per day     PRN Meds: perflutren lipid microspheres, perflutren lipid microspheres, trimethobenzamide, diphenhydrAMINE, hydrOXYzine HCl, sodium chloride flush, sodium chloride, magnesium hydroxide, acetaminophen **OR** acetaminophen    Labs:     Recent Labs     02/04/23  1311 02/05/23  0604 02/06/23  0849   WBC 9.6 6.1 6.7   HGB 14.6 13.8 13.7   HCT 44.4 42.7 42.4    240 281       Recent Labs     02/04/23  1311 02/05/23  0604    140   K 4.3 3.7   CL 97* 103   CO2 22 24   BUN 55* 46*   CREATININE 2.1* 1.9*   CALCIUM 9.9 9.6       Recent Labs     02/04/23  1311 02/05/23  0604   PROT 7.8 6.5   ALKPHOS 110* 94   ALT 54* 44*   AST 46* 37*   BILITOT 1.1 1.2   LIPASE 30  --        No results for input(s): INR in the last 72 hours. No results for input(s): Catrina Parker in the last 72 hours.     Chronic labs:    Lab Results   Component Value Date    CHOL 151 12/18/2022    TRIG 68 12/18/2022    HDL 55 12/18/2022    LDLCALC 82 12/18/2022    TSH 0.666 02/04/2023    INR 2.7 01/03/2023    LABA1C 5.7 (H) 02/01/2022       Radiology: REVIEWED DAILY    Assessment:  Sepsis likely secondary to urinary tract infection  Intractable dizziness possibly secondary to atrial fibrillation and hypotension  Atrial fibrillation with RVR  Acidosis  Chronic HFrEF 25%  History of PE  Plan:  Patient was volume resuscitated with 2 L normal saline, tight blood pressure negative  Resume Toprol hold for systolic blood pressure less than 100  Reviewed urinalysis, in light of UTI agree with consultation, currently recommended Zosyn agree with plan. Reviewed electrophysiology note, currently recommending continuing amiodarone and resuming Toprol, considering cardioversion outpatient in 2 to 3 weeks and valve clinic referral for severe MR  Continue with WCD LifeVest  Continue holding Lasix and Aldactone given hypotension  Continue amiodarone  Neurology's note reviewed, currently recommending MRI brain, PT OT, meclizine, vestibular rehab. Agree with plan. Hold rest of blood pressure medications in light of hypotension  Blood cultures ordered we will follow            DVT Prophylaxis [] Lovenox  []  Heparin [x] DOAC [] PCDs [] Ambulation    GI Prophylaxis [] PPI  [] H2 Blocker   [] Carafate  [x] Diet/Tube Feeds   Level of care [] Med/Surg  [x] Intermediate  []  ICU   Diet ADULT ORAL NUTRITION SUPPLEMENT; Breakfast, Dinner; Standard High Calorie/High Protein Oral Supplement  ADULT DIET; Dysphagia - Soft and Bite Sized; Low Fat/Low Chol/High Fiber/GISELLE; Low Sodium (2 gm)    Family contact []  N/A    [x] At bedside  [] Phone call   Disposition Patient requires continued admission further evaluation from EP, neurology, ID and completion of MRI   MDM [] Low    [x] Moderate  []   High       Discharge Plan:  To home or appropriate facility pending clinical improvement    +++++++++++++++++++++++++++++++++++++++++++++++++  Tess Blevins 69, [place]  +++++++++++++++++++++++++++++++++++++++++++++++++  NOTE: This report was transcribed using voice recognition software. Every effort was made to ensure accuracy; however, inadvertent computerized transcription errors may be present.

## 2023-02-06 NOTE — PROGRESS NOTES
SPEECH/LANGUAGE PATHOLOGY  CLINICAL ASSESSMENT OF SWALLOWING FUNCTION   and PLAN OF CARE  PATIENT NAME:  Rufino Lui  (female)     MRN:  [id number]    :  1945  (68 y.o.)  STATUS:  Inpatient: Room 6501/6501-B    TODAY'S DATE:  2023  REFERRING PROVIDER:  Indiana Jacob MD  SPECIFIC PROVIDER ORDER: SLP swallowing-dysphagia evaluation and treatment Date of order:  22  REASON FOR REFERRAL: dysphagai   EVALUATING THERAPIST: SELENE Lopez                 ASSESSMENT:    DYSPHAGIA DIAGNOSIS:   Clinical indicators of functional swallow for age/premorbid functioning      DIET RECOMMENDATIONS:  Regular consistency solids (IDDSI level 7) with  thin liquids (IDDSI level 0)     FEEDING RECOMMENDATIONS:     Assistance level:  No assistance needed      Compensatory strategies recommended: No strategies are recommended at this time      Discussed recommendations with nursing?: No secondary to no diet/liquid change recommended     SPEECH THERAPY  PLAN OF CARE   The dysphagia POC is established based on physician order, dysphagia diagnosis and results of clinical assessment     Dysphagia therapy is not recommended     Conditions Requiring Skilled Therapeutic Intervention for dysphagia:    not applicable    Specific dysphagia interventions to include:     Not applicable    Specific instructions for next treatment:  not applicable   Patient Treatment Goals:    Short Term Goals:  Not applicable no therapy warranted     Long Term Goals:   Not applicable no therapy warranted      Patient/family Goal:    not applicable                    ADMITTING DIAGNOSIS: Dizziness [R42]  Nausea [R11.0]  Vertigo [R42]    VISIT DIAGNOSIS:   Visit Diagnoses         Codes    Nausea     R11.0             PATIENT REPORT/COMPLAINT: denies difficulty swallowing  nursing not available at time of evaluation chart reviewed and patient is not NPO for any procedures per current documentation.      PRIOR LEVEL OF SWALLOW FUNCTION:    PAST HISTORY OF DYSPHAGIA?: none reported    Home diet: Regular consistency solids (IDDSI level 7) with  thin liquids (IDDSI level 0)    Current Diet Order: ADULT ORAL NUTRITION SUPPLEMENT; Breakfast, Dinner; Standard High Calorie/High Protein Oral Supplement  ADULT DIET; Dysphagia - Soft and Bite Sized; Low Fat/Low Chol/High Fiber/GISELLE; Low Sodium (2 gm)    PROCEDURE:  Consistencies Administered During the Evaluation   Liquids: thin liquid   Solids:  pureed foods and soft solid foods      Method of Intake:   cup, straw, spoon  Self fed      Position:   Seated, upright    CLINICAL ASSESSMENT  Oral Stage: The oral stage of swallowing was within functional limits      Pharyngeal Stage:    No signs of aspiration were noted during this evaluation however, silent aspiration cannot be ruled out at bedside. If silent aspiration is suspected, a Videofluoroscopic Study of Swallowing (MBS) is recommended and requires a physician order. Cognition:   Within functional limits for this exam    Oral Peripheral Examination   Adequate lingual/labial strength     Current Respiratory Status    room air     Parameters of Speech Production  Respiration:  Adequate for speech production  Quality:   Within functional limits  Intensity: Within functional limits    Volitional Swallow: Present    Volitional Cough:    Present    Pain: No pain reported. EDUCATION:   The Speech Language Pathologist (SLP) completed education regarding results of evaluation and that intervention is not warranted at this time. Learner: Patient  Education:  Reviewed results and recommendations of this evaluation and Reviewed diet and strategies  Evaluation of Education: Mary Higuera understanding    This plan will be re-evaluated and revised in 1 week  if warranted. CPT code:  53054  bedside swallow eval      [x]The admitting diagnosis and active problem list, have been reviewed prior to initiation of this evaluation.         ACTIVE PROBLEM LIST: Patient Active Problem List   Diagnosis    Anxiety    Nonischemic cardiomyopathy ([facility]    Severe mitral regurgitation    Lumbar radiculopathy    Cervical radiculopathy    HTN (hypertension), benign    Left atrial thrombus    Paroxysmal atrial fibrillation (HCC)    Chronic HFrEF (heart failure with reduced ejection fraction) ([facility]    Visit for monitoring Tikosyn therapy    Persistent atrial fibrillation ([facility]    Encounter for medication refill    Itching    Chronic anticoagulation    Presence of Watchman left atrial appendage closure device    Renal mass    Renal cell carcinoma of left kidney (HCC)    Chronic renal disease, stage III ([facility] [[id number]]    Multiple subsegmental pulmonary emboli without acute cor pulmonale (HCC)    Acute on chronic congestive heart failure (HCC)    Vertigo    Dizziness

## 2023-02-06 NOTE — PROGRESS NOTES
[address] [facility] [facility] [place]- Electrophysiology  Inpatient progress note   PATIENT: [address] RECORD NUMBER: [id number]  DATE OF SERVICE:  2/6/2023  ATTENDING ELECTROPHYSIOLOGIST:Dr Abhay Lubin   PRIMARY ELECTROPHYSIOLOGIST: Melvi Soria MD  REFERRING PHYSICIAN: No ref. provider found and Trisha Cardona DO  CHIEF COMPLAINT: Nausea and lighededness    HPI: This is a 68 y.o. female with a history of atrial fibrillation, status post Watchman in 2020, nonischemic cardiomyopathy, hypertension, CKD, renal cell carcinoma with with partial left nephrectomy, Meige syndrome, TIA. She presented to the hospital complaining of nausea and lightheadedness since starting on anticoagulation with Pradaxa since she was diagnosed with a PE on 12/17/2022. She was diagnosed with persistent atrial fibrillation and had at least 3 previous cardioversion's. She has Watchman device placement on 10/14/20 and was not on [address] [place] [address] since due to multiple allergies/side effects on Eliquis and Xarelto in the past.    She was seen on 12/19/22 when she was admitted to the hospital with acute on chronic CHF and PE. She was found to have NIKA on CKD and her Tikosyn was discontinued on 12/19/22. She was also found to have EF of 25% and LifeVest was ordered for patient. She was noted to have recurrent AF on 1/13/23 and Amiodarone was started on 1/20/23. She was scheduled for DCCV in late February but was moved to 2/3/23 due to request from CHF clinic. She was initially prescribed Lovenox and was switched to Pradaxa 150 mg BID. Since she was started on Lovenox she reports nausea and occasional dizziness. She underwent successful DCCV on 2/3/23. She presented to ED on 2/4/23 due to nausea and lightheadedness. Initial EKG on 2/4/23 showed normal sinus rhythm. EKG later on 2/4/23 showed AF with CVR. EKG today showed AF with CVR. Cardiac electrophysiology service is consulted for post CV and dizziness.    Neurology and infectious disease also consulted. 2/6/2023: Patient continues in atrial fibrillation on the monitor with heart rates 80s to 100 bpm.  Since admission, she has been on Pradaxa 75 mg twice daily (decreased from 150 mg twice daily) she does complain of some itching and slight rash which she also feels is due to Pradaxa. Since admission her lightheadedness has improved slightly and only has occurred with standing and ambulating. She is scheduled for MRI of the brain today. Patient Active Problem List    Diagnosis Date Noted    Vertigo 02/04/2023     Priority: Medium    Dizziness 02/04/2023     Priority: Medium    Multiple subsegmental pulmonary emboli without acute cor pulmonale ([facility] 12/17/2022     Priority: Medium    Acute on chronic congestive heart failure ([facility] 12/17/2022     Priority: Medium    Chronic renal disease, stage III [facility]) [[id number]] 04/25/2022     Priority: Medium    Renal cell carcinoma of left kidney ([facility] 02/01/2022    Renal mass 11/16/2021    Presence of Watchman left atrial appendage closure device 10/14/2020     Overview Note:     24-mm Watchman 2.5 (Dr. 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 (Ejection fraction 45-50%)  Cardiac catheterization (7/12/77): PA systolic 58, wedge 11 indicating slightly elevated pulmonary pressures not secondary to left heart failure.  Cardiac output 5.47, cardiac index 2.9, no coronary artery disease   Moderately elevated pulmonary systolic pressure. Severe mitral regurgitation      Overview Note:     Mild to moderate      Anxiety 04/14/2011       Past Medical History:   Diagnosis Date    Afib (HCC)     WATCHMAN    Anxiety     Arthritis     Cervical radiculopathy     CHF (congestive heart failure) (HCC)     Chronic sinusitis     Ejection fraction < 50%     25%. Wearing life vest    Hilar adenopathy     Hx of blood clots     Hypertension     Left ventricular systolic dysfunction     Lesion of left native kidney     Lumbar radiculopathy     Lung nodules     Meige syndrome     partial/ PCP    Microscopic colitis     Mitral regurgitation     Mild to moderate    Nonischemic cardiomyopathy ([address]     f/u with Dr. Anthony Hartman    Osteopenia     PE (pulmonary thromboembolism) ([facility]     Renal cell carcinoma of left kidney ([facility] 02/01/2022    Vertebrobasilar artery syndrome     f/u PCP       Family History   Problem Relation Age of Onset    Heart Attack Mother     Stroke Mother     Heart Attack Father     Heart Failure Father     Heart Disease Father     Anxiety Disorder Sister     Depression Sister     Crohn's Disease Son        Social History     Tobacco Use    Smoking status: Never    Smokeless tobacco: Never   Substance Use Topics    Alcohol use:  Yes     Alcohol/week: 0.0 standard drinks     Comment: occasional wine/mixed drink every few months       Current Facility-Administered Medications   Medication Dose Route Frequency Provider Last Rate Last Admin    perflutren lipid microspheres (DEFINITY) injection 1.5 mL  1.5 mL IntraVENous ONCE PRN Madeline Flowers MD        trimethobenzamide (TIGAN) injection 200 mg  200 mg IntraMUSCular Q6H PRN Vick Car MD   200 mg at 02/05/23 2255    piperacillin-tazobactam (ZOSYN) 3,375 mg in sodium chloride 0.9 % 50 mL IVPB ([id number])  3,375 mg IntraVENous Q12H Jasmyne Forrester MD   Stopped at 02/06/23 0344    amiodarone (CORDARONE) tablet 200 mg  200 mg Oral Daily Isabelle Alcaraz MD   200 mg at 02/06/23 8026    baclofen (LIORESAL) tablet 10 mg  10 mg Oral Nightly Fernando Trujillo MD   10 mg at 02/05/23 2043    dabigatran (PRADAXA) capsule 75 mg  75 mg Oral BID Isabelle Alcaraz MD   75 mg at 02/06/23 0931    diphenhydrAMINE (BENADRYL) tablet 25 mg  25 mg Oral Q6H PRN Junaidr Rito Trujillo MD   25 mg at 02/05/23 2344    fluticasone (FLONASE) 50 MCG/ACT nasal spray 1 spray  1 spray Each Nostril Daily Fernando Trujillo MD   1 spray at 02/06/23 0910    hydrOXYzine HCl (ATARAX) tablet 10 mg  10 mg Oral Q8H PRN Isabelle Alcaraz MD        [Held by provider] hydrALAZINE (APRESOLINE) tablet 10 mg  10 mg Oral TID Isabelle Alcaraz MD   10 mg at 02/05/23 6175    [Held by provider] furosemide (LASIX) tablet 20 mg  20 mg Oral Daily Samer Rito Trujillo MD   20 mg at 02/05/23 0824    pantoprazole (PROTONIX) tablet 40 mg  40 mg Oral QAM AC Samer Rito Trujillo MD   40 mg at 02/06/23 0517    [Held by provider] metoprolol succinate (TOPROL XL) extended release tablet 25 mg  25 mg Oral Daily Samer Rito Trujillo MD   25 mg at 02/05/23 8155    [Held by provider] spironolactone (ALDACTONE) tablet 25 mg  25 mg Oral Daily Sameaparna Turjillo MD   25 mg at 02/05/23 2645    sodium chloride flush 0.9 % injection 10 mL  10 mL IntraVENous 2 times per day Isabelle Alcaraz MD   10 mL at 02/06/23 0908    sodium chloride flush 0.9 % injection 10 mL  10 mL IntraVENous PRN Fernando Trujillo MD        0.9 % sodium chloride infusion   IntraVENous PRN Junaidr Rito Trujillo MD        magnesium hydroxide (MILK OF MAGNESIA) 400 MG/5ML suspension 30 mL  30 mL Oral Daily PRN Isabelle Alcaraz MD        acetaminophen (TYLENOL) tablet 650 mg  650 mg Oral Q6H PRN Fernando Trujillo MD        Or    acetaminophen (TYLENOL) suppository 650 mg  650 mg Rectal Q6H PRN Samer Rito Trujillo MD            Allergies   Allergen Reactions    Bentyl [Dicyclomine] Shortness Of Breath    Adhesive Tape Itching     develops rash and itching with EKG electrodes Atacand [Candesartan] Hives and Itching    Cefdinir Hives, Itching and Nausea Only    Demerol      3/9/19 Pt states she gets a severe migraine    Digoxin Itching     developed itching and rash    Imdur [Isosorbide Mononitrate]       migraines    Losartan Potassium Itching    Sulfa Antibiotics Diarrhea and Other (See Comments)     Also causes migraines  caused migraines and diarrhea    Xarelto [Rivaroxaban] Itching and Rash      severe itch, headache, rash    Lovenox [Enoxaparin Sodium] Itching     States  her pulmonary doctor-DR Ruth Lagos took her off  lovenox (rash-itching)and she is starting on pradaxa today 1/26/2023    Acetaminophen Hives and Itching    Apap-Caff-Dihydrocodeine Hives and Itching    Coreg [Carvedilol] Itching     Can take extended release Coreg    Cozaar [Losartan] Itching    Diovan [Valsartan] Itching    Effexor [Venlafaxine Hydrochloride] Nausea Only    Eliquis [Apixaban] Hives, Itching and Rash     3/9/19 Pt states that she gets hives, itchy and a rash    Labetalol Itching    Lisinopril Itching    Ramipril Itching     ROS:   Constitutional: Negative for fever. Positive for activity change and appetite change. HENT: Negative for epistaxis. Eyes: Negative for diploplia, blurred vision. Respiratory: Negative for cough, chest tightness, shortness of breath and wheezing. Cardiovascular: pertinent positives in HPI  Gastrointestinal: Negative for abdominal pain and blood in stool.    All other review of systems are negative     PHYSICAL EXAM:   Vitals:    02/05/23 2254 02/06/23 0300 02/06/23 0518 02/06/23 0725   BP: 95/66 100/65  104/64   Pulse: 100 85  91   Resp: 18 16  18   Temp: 97.3 °F (36.3 °C) 97.2 °F (36.2 °C)  98.3 °F (36.8 °C)   TempSrc: Temporal Temporal  Temporal   SpO2: 96% 95%  98%   Weight:   165 lb 3.2 oz (74.9 kg)    Height:          Constitutional: Well-developed, no acute distress  Eyes: conjunctivae normal, no xanthelasma   Ears, Nose, Throat: oral mucosa moist, no cyanosis   CV: no JVD. Irregular rate and rhythm. HSM 3/6 at apex. No rubs, or gallops. PMI is nondisplaced  Lungs: clear to auscultation bilaterally, normal respiratory effort without used of accessory muscles  Abdomen: soft, non-tender, bowel sounds present, no masses or hepatomegaly   Musculoskeletal: no digital clubbing, trace edema of LEs   Skin: warm, no rashes    I have personally reviewed the laboratory, cardiac diagnostic and radiographic testing as outlined below:    Data:    Recent Labs     02/04/23  1311 02/05/23  0604 02/06/23  0849   WBC 9.6 6.1 6.7   HGB 14.6 13.8 13.7   HCT 44.4 42.7 42.4    240 281     Recent Labs     02/04/23  1311 02/05/23  0604    140   K 4.3 3.7   CL 97* 103   CO2 22 24   BUN 55* 46*   CREATININE 2.1* 1.9*   CALCIUM 9.9 9.6      Lab Results   Component Value Date/Time    MG 2.4 02/04/2023 01:11 PM     Recent Labs     02/04/23  2147   TSH 0.666     No results for input(s): INR in the last 72 hours. CXR 2/4/23:   FINDINGS:   Examination is limited by overlying heart monitor device. However, the lungs   appear clear. Pleural spaces are clear. Stable cardiomegaly is noted. No   acute osseous abnormality seen. Impression   No acute process identified, but evaluation limited. Telemetry 02/06/23 : AF 's bpm. No significant pause. EKG 2/5/23:  bpm, RBBB, Qtc 562 ms. Please see scan in Cardiology. JAIME 1/26/23:   Summary   Severely reduced left ventricular systolic function. Reduced right ventricular function. Bi-atrial enlargement. No evidence of interatrial shunting on bubble study. History of WATCHMAN device (24 mm). No significant color flow jet around   the device. No device related thrombus visualized. Severe mitral regurgitation. Mild tricuspid regurgitation. RV-RA gradient is estimated at 27 mmHg.       Signature      ----------------------------------------------------------------   Electronically signed by Isabel Leary Nataliia ARENAS(Interpreting   physician) on 01/26/2023 11:45 AM   ----------------------------------------------------------------    Echocardiogram 12/17/22:    Summary   Ejection fraction is visually estimated at 25%. Overall ejection fraction severely decreased. Mild left ventricular concentric hypertrophy noted. Dilated right ventricle with reduced function. Left atrial volume index of 50 ml per meters squared BSA. Moderate aortic regurgitation is noted. Moderate mitral regurgitation is present. Moderate tricuspid regurgitation. Pulmonary hypertension is severe. RVSP is 70 mmHg. No evidence for hemodynamically significant pericardial effusion. Signature      ----------------------------------------------------------------   Electronically signed by Lupillo Gomez DO(Interpreting   physician) on 12/17/2022 06:57 PM   ----------------------------------------------------------------    Stress Test 12/20/22:   1: No definite evidence of  ischemia or scar except soft tissue   attenuation. The left ventricle is visually dilated both rest and   stress without evidence of TID     2  Dilated left ventricle with Moderate to severe global left   ventricular hypokinesis with estimated left ventricular ejection   fraction of 32%. 3:  Please see separate report for EKG and hemodynamic aspect of the   stress test.     4:  Low dose CT attenuation correction protocol was utilized which   revealed minimal to mild coronary artery ossifications. 5: RISK SCAN:  High risk scan due to dilated left ventricle with   severe global hypokinesis and EF of 32%. I have independently reviewed all of the ECGs and rhythm strips per above     Assessment/Plan:    1. Persistent atrial fibrillation  - [id number]: 7; Status post  Watchman #24 on 10/14/2020 with Dr Celio hCan.   - History of CHERYL thrombus  - History of  JAIME and DC-CV on 5/6/19, 11/4/19 and 2/3/23.  - Tikosyn 250 mcg BID: 11/4/19 >> March 2022 was reduced to 125 mcg BID and stopped on 12/19/22 due to elevated serum creatinine.   - Noted to have recurrent AF on 1/13/23 and started on Amiodarone on 1/20/23. Underwent DC-CV 2/3/23.   - Presented in NSR on 2/4/23 >>> back to AF with CVR on 2/4/23.   - Remains in AF with CVR. 2. Nonischemic cardiomyopathy and chronic HFrEF  - Diagnosed originally in 2013  - TTE: 12/2016: EF 38%  - JAIME: 3/2019: EF 25-30%  - JAIME: 5/2019: EF 25-30%  - TTE: 10/2020: EF 50%  - JAIME: 11/30/2020: EF 45-50%  - JAIME: 7/16/2021: EF 45-50%  - JAIME: 10/18/2021: EF 45-50%  - TTE: 12/17/22: EF 25%>>> given Lifevest   - JAIME: 1/26/23: severe LV dysfunction.  - GDMT: Toprol XL, Lasix, Apresoline and Aldactone at home. - Allergic to ACE-I/ARB and Nitrates  - NYHA III, ACC/AHA stage C  - Fairly compensated and euvolemic. 3. Valvular heart disease   - Severe MR on JAIME 1/26/23.  -Valve clinic referral.    4. LBBB  - Chronic. 5. Prolonged QTc    6. Hypertension  - On Toprol XL, Lasix, Apresoline and Aldactone at home. 7. Severe pulmonary hypertension  - Noted on TTE 12/17/22    8. Pulmonary embolism  - Diagnosed 12/17/22. - Previously on Lovenox and currently on Pradaxa. 9. Obesity  Body mass index is 25.87 kg/m². 10. Lumbar and cervical radiculopathy    11. Anxiety    12. History of TIA    13. Meige syndrome     14. CKD   Estimated Creatinine Clearance: 26 mL/min (A) (based on SCr of 1.9 mg/dL (H)). 15. Renal cancer status post left partial nephrectomy. 16. Nausea and dizziness  - Patient states that it started since she is started on Lovenox and Pradaxa.  - Dizziness could be related to hypotension/ orthostatic changes, states while inpatient these symptoms occur with standing and walking     17. Multiple allergies/side effects to medications     Recommendations:  Continue Amiodarone 200 mg daily. Resume Toprol XL only if BP allows- had on 2/5/23.  But is also on multiple other antihypertensives   For PE- Continue Pradaxa to 75 mg BID Valve clinic referal for severe MR. Possible plan for repeat DC-cardioversion in 2 to 3 weeks. Doubt that she would stay in NSR with underlying severe MR. Avoid QT prolongation medications. Continue WCD lifeVest.  8. Orthostatic BP check and NATALYA hose recommended     Thank you for allowing me to participate in your patient's care. Please call me if there are any questions or concerns. Joaquin Vallejo, APRN - CNP  Cardiac Electrophysiology  [facility]) Physicians  [facility] [place]: [facility]  10:24 AM  2/6/2023    PHYSICIAN ADDENDUM:  I independently contributed to, made amendments as needed, and finalized the above documentation. I contributed >50% to the overall encounter time including review of testing, formulating a plan with the APC and communication with the other care team members and family. All of the above was discussed with the patient ,>50% of the time involved face-to-face time providing counseling and or coordination of care with the other providers,  reviewing records/tests, counseling/education of the patient, ordering medications/tests/procedures, coordinating care, and documenting clinical information in the EHR.      Carmen Arce  Cardiac electrophysiology  [facility]) physicians

## 2023-02-06 NOTE — CONSULTS
CHF NURSE NAVIGATOR CONSULT NOTE:      Patient currently admitted with diagnosis of Systolic heart failure. Patient was alert and oriented during the consultation with son Gavin Shepard present at the bedside. Patient will discharge with her lifevest, reviewed parameters of wearing life vest and what to do if it discharges a shock. She was engaged and asked appropriate questions throughout the education session. She is agreeable to self monitoring, outpatient follow up, following a low sodium diet, and medication compliance. Patient would like to follow with the CHF clinic and cardiology until she see new PCP in April. Scheduling with the CHF clinic and cardiology APRN Yes. Discussed options with son regarding assistance with caregiving while he is at work. Future Appointments   Date Time Provider Duke Santana   2/10/2023  9:40 AM SCHEDULE, EP LAB/MA ELECTRO PHYS [facility]   2/15/2023 12:30 PM [facility] CHF ROOM 1 SE[facility]F [facility]   2/23/2023 10:00 AM UYEN Meade CNP YZ CHF [facility]   3/1/2023 12:00 PM [facility] CHF ROOM 1 [facility] CHF [facility]   3/16/2023  1:15 PM SEYZ MED ONC FAST TRACK 2 SEYZ Med Onc [facility]   3/16/2023  1:30 PM Maral Cortez MD MED ONC [facility]   3/23/2023 10:00 AM UYEN Raymond CNP ELECTRO PHYS [facility]   4/6/2023 10:00 AM MD Vincent William [facility]       Barriers to Care:  Contributing risk factors for Heart Failure are identified as none. The patient is ordered:  Diet: ADULT ORAL NUTRITION SUPPLEMENT; Breakfast, Dinner; Standard High Calorie/High Protein Oral Supplement  ADULT DIET; Dysphagia - Soft and Bite Sized;  Low Fat/Low Chol/High Fiber/GISELLE; Low Sodium (2 gm)   Sodium controlled diet Yes  Fluid restriction daily ordered (fluid restriction recommended if patient is hyponatremic and/or diuretic is initiated or increased) No  FR:   Daily Weights: Patient Vitals for the past 96 hrs (Last 3 readings):   Weight   02/06/23 0518 165 lb 3.2 oz (74.9 kg)   02/05/23 0605 161 lb 3.2 oz (73.1 kg)   02/04/23 2137 162 lb 6.4 oz (73.7 kg)     I/O:   Intake/Output Summary (Last 24 hours) at 2/6/2023 1502  Last data filed at 2/6/2023 1458  Gross per 24 hour   Intake 540 ml   Output 400 ml   Net 140 ml              We reviewed the introduction to Heart Failure, the HF zones, signs and symptoms to report on day 1 of onset, medications, medication compliance, the importance of obtaining daily weights, following a low sodium diet, reading food labels for the sodium content, keeping physician appointments, and smoking cessation. We discussed writing / tracking daily weights on a calendar / log because a 5 pound gain in 1 week can sneak up if you are not tracking it. I advised patient they can reduce the risk for Heart Failure exacerbations by modifying / controlling the risk factors. We discussed self-managed care which includes the following:  to take medications as prescribed, report any intolerable side effects of medications to the cardiologist / doctor, do not just stop taking the medication; follow a cardiac heart healthy / low sodium diet; weigh yourself daily, exercise regularly- per doctor recommendation and not to smoke or use an excess amount of alcohol. We discussed calling the cardiologist / doctor with a weight gain of 3 pounds in one day or a total of 5 pounds or more in one week. Also, if you should have a significant weight loss of 3# or more in one day to call the doctor, they may need to decrease or hold the diuretic dose. On days you feels nauseated and not eating / drinking, having emesis or diarrhea,  informed to call the cardiologist  / doctor, they may need to decrease or hold diuretic to avoid dehydration. I stressed the importance of informing their cardiologist the first day of onset of any of the signs and symptoms in the \"Yellow Zone\" of the HF Zones. Patient verbalizes understanding.   Greater than 30 minutes was spent educating patient. The Heart Failure Booklet given to the patient with additional patient education addressing:  What is Heart Failure? Things You Can Do to Live Well with HF  How to Take Your Medications  How to Eat Less Salt  O'Brien its Salt? Exercising Well with Heart Failure  Signs and symptoms of HF to report  Weight Yourself Each Day  Home Self Management- activity, weight tracking, taking medications as prescribed, meals /diet planning (sodium and fluid restriction), how to read food labels, keeping physician follow ups, smoking cessation, follow the Heart Failure Zones  The Heart Failure zones  Every Dose Every Day      Instructed  to call 911 if you have any of the following symptoms:       Struggling to breathe unrelieved with rest,     Having chest pain     Have confusion or cant think clearly          Mehdi Ladd, RN BSN, RN  Heart Failure [address] [place] [address],[address] (CHF) AHA GUIDELINES  (Must be completed for Primary Diagnosis CHF or History of CHF)    Discharge Plan:  I placed the Heart Failure Home Instructions in patient's discharge instructions. Per Heart Failure GWTG, the patient should have a follow-up appointment made within 7 days of discharge.     New Diagnosis No    ECHO Results most recent:  Lab Results   Component Value Date    LVEF 32 12/20/2022    LVEFMODE Echo 11/21/2018                                        Social History     Tobacco Use   Smoking Status Never   Smokeless Tobacco Never        Immunization History   Administered Date(s) Administered    COVID-19, PFIZER Bivalent BOOSTER, DO NOT Dilute, (age 12y+), IM, 30 mcg/0.3 mL 11/08/2022    COVID-19, PFIZER GRAY top, DO NOT Dilute, (age 15 y+), IM, 30 mcg/0.3 mL 05/09/2022    COVID-19, PFIZER PURPLE top, DILUTE for use, (age 15 y+), 30mcg/0.3mL 02/08/2021, 03/02/2021, 10/30/2021    Influenza 10/18/2011, 10/17/2012    Influenza Vaccine, unspecified formulation 11/26/2014    Influenza Virus Vaccine 10/15/2013, 09/23/2015    Influenza, High Dose (Fluzone 65 yrs and older) 09/14/2016, 11/29/2018    Pneumococcal Conjugate 13-valent ([id number]) 09/23/2015    Pneumococcal Polysaccharide ([id number]) 01/01/2010, 09/14/2016    Tdap (Boostrix, Adacel) 03/18/2015          Angiotensin-Converting-Enzyme (ACE) inhibitor ordered:  [] Yes  [x] No (specify contraindication): Allergy    [] Contraindicated  [] Hypotensive patient who was at immediate risk of cardiogenic shock  [] Hospitalized patient who experienced marked azotemia  [] Other Contraindications  [] Not Eligible  [] Not Tolerant  [] Patient Reason  [] System Reason  [] Other Reason    Angiotensin II receptor blockers (ARB) ordered:  [] Yes  [x] No (specify contraindication): Allergy    [] Contraindicated  [] Hypotensive patient who was at immediate risk of cardiogenic shock  [] Hospitalized patient who experienced marked azotemia  [] Other Contraindications    ARNI - Angiotensin Receptor Neprilysin Inhibitor ordered:  [] Yes  [x] No (specify contraindication):  Allergy    [] ACE inhibitor use within the prior 36 hours  [] Allergy  [] Hyperkalemia  [] Hypotension  [] Renal dysfunction defined as creatinine > 2.5 mg/dL in men or > 2.0 mg/dL in women  [] Other Contraindications  [] Not Eligible  [] Not Tolerant  [] Patient Reason  []System Reason  []Other Reason      Beta Blocker ordered:    [x] Yes Toprol XL  [] No (specify contraindication):    [] Contraindicated  [] Asthma  [] Fluid Overload  [] Low Blood Pressure  [] Patient recently treated with an intravenous positive inotropic agent  [] Other Contraindications  [] Not Eligible  [] Not Tolerant  [] Patient Reason  [] System Reason    SGLT2 Inhibitor ordered:  [] Yes  [x] No (specify contraindication):    [] Contraindicated  [] Patient currently on dialysis  [] Ketoacidosis  [] Known hypersensitivity to the medication  [] Type I diabetes (not approved for use in patients with Type I diabetes due to increased risk of ketoacidosis)  [] Other Contraindications  [] Not Eligible  [] Not Tolerant  [] Patient Reason  [] System Reason  [] Other Reason    Aldosterone Antagonist ordered:  [x] Yes  [] No (specify contraindication):    [] Contraindicated  [] Allergy due to aldosterone receptor antagonist  [] Hyperkalemia  [] Renal dysfunction defined as creatinine >2.5 mg/dL in men or >2.0 mg/dL in women.   [] Other contraindications  [] Not Eligible  [] Not Tolerant  [] Patient Reason  [] System Reason  [] Other Reason

## 2023-02-06 NOTE — PROGRESS NOTES
Kaiser Quiñonez is a 68 y.o. right handed female     Neurology following for dizziness vertigo    PMH of A-fib on Pradaxa s/p Watchman, HTN, CHF, nonischemic cardiomyopathy with valvular heart disease, history of PE, renal cell carcinoma of the left kidney, and vertebrobasilar artery syndrome     Assessment:     Acute vertigo  Presented with dizziness, vertigo, and nausea for approximately 5 days. Room spinning sensation when she is ambulating  Upon exam she is currently not experiencing vertigo. She says she experiences this when she stands up or tries to ambulate. Questionable posterior circulation stroke  Acute onset of vertigo, dizziness, and nausea   History of A-fib  MRI brain to be done today    Plan:     MRI brain  PT/OT  Meclizine 25 mg every 6 hours as needed for vertigo  Vestibular rehab upon discharge  Neurology will follow    Subjective:     Patient presented to the ER on 2/4/2023 after experiencing nausea, dizziness, and vertigo for 3 days. She had a cardioversion the day before presentation for A-fib and has had worsening dizziness ever since. She describes her dizziness as the room is spinning around her. CT head was negative for acute abnormalities, her CT C-spine in December was unremarkable. Her echo was unremarkable. She is sitting up in bed awake, alert, and oriented x4. She is not complaining of any further dizziness today. She says her dizziness occurs when she is standing or trying to ambulate. She is to have her MRI of her brain today.   There is no family at the bedside      No chest pain or palpitations  No coughing or wheezing    No vertigo, lightheadedness or loss of consciousness  No falls, tripping or stumbling  No incontinence of bowels or bladder  No itching or bruising appreciated  No numbness, tingling or focal arm/leg weakness    ROS otherwise negative      Objective:       /64   Pulse 91   Temp 98.3 °F (36.8 °C) (Temporal)   Resp 18   Ht 5' 7\" (1.702 m) Wt 165 lb 3.2 oz (74.9 kg)   SpO2 98%   BMI 25.87 kg/m²       General appearance: alert, appears stated age, cooperative and no distress  Head: normocephalic, without obvious abnormality, atraumatic  Eyes: conjunctivae/corneas clear  Neck: no adenopathy, supple, symmetrical, trachea midline and thyroid not enlarged, symmetric, no tenderness/mass/nodules  Lungs: Regular respirations on room air  Heart: No chest pain or palpitations  Abdomen: soft, non-tender; bowel sounds normal; no masses,  no organomegaly  Extremities:  normal, atraumatic, no cyanosis, bilateral lower extremity edema  Pulses: 2+ and symmetric  Skin: color, texture, turgor normal--scattered bruising      Mental Status: Alert, oriented, thought content appropriate, alertness: alert, orientation: time, date, person, place, affect: normal     Appropriate attention/concentration  Intact fundus of knowledge  Repetition intact  Intact memories      Speech: Clear  Language: No aphasias    Cranial Nerves:  I: smell NA   II: visual acuity  NA   II: visual fields Full to confrontation   II: pupils LINDSAY   III,VII: ptosis None   III,IV,VI: extraocular muscles  Full ROM   V: mastication Normal   V: facial light touch sensation  Normal   V,VII: corneal reflex  Present   VII: facial muscle function - upper  Normal   VII: facial muscle function - lower Normal   VIII: hearing Normal   IX: soft palate elevation  Normal   IX,X: gag reflex Present   XI: trapezius strength  5/5   XI: sternocleidomastoid strength 5/5   XI: neck extension strength  5/5   XII: tongue strength  Normal     Motor:  5/5 throughout  Normal bulk and tone  No drift   No abnormal movements    Sensory:  LT and PP normal  Vibration normal    Coordination:   FN, FFM and COLLEEN normal  HS normal    Gait:  Deferred for patient's safety/fall consideration    DTR:   Right Brachioradialis reflex 2+  Left Brachioradialis reflex 2+  Right Biceps reflex 2+  Left Biceps reflex 2+  Right Triceps reflex 2+  Left Triceps reflex 2+  Right Quadriceps reflex 1+  Left Quadriceps reflex 1+  Right Achilles reflex 1+  Left Achilles reflex 1+    No Babinskis  No Laughlin's    No other pathological reflexes    Laboratory/Radiology:     CBC with Differential:    Lab Results   Component Value Date/Time    WBC 6.7 02/06/2023 08:49 AM    RBC 4.97 02/06/2023 08:49 AM    HGB 13.7 02/06/2023 08:49 AM    HCT 42.4 02/06/2023 08:49 AM     02/06/2023 08:49 AM    MCV 85.3 02/06/2023 08:49 AM    MCH 27.6 02/06/2023 08:49 AM    MCHC 32.3 02/06/2023 08:49 AM    RDW 16.1 02/06/2023 08:49 AM    SEGSPCT 85 12/13/2013 12:00 PM    LYMPHOPCT 11.9 02/06/2023 08:49 AM    MONOPCT 5.3 02/06/2023 08:49 AM    BASOPCT 0.4 02/06/2023 08:49 AM    MONOSABS 0.36 02/06/2023 08:49 AM    LYMPHSABS 0.80 02/06/2023 08:49 AM    EOSABS 0.04 02/06/2023 08:49 AM    BASOSABS 0.03 02/06/2023 08:49 AM     CMP:    Lab Results   Component Value Date/Time     02/05/2023 06:04 AM    K 3.7 02/05/2023 06:04 AM     02/05/2023 06:04 AM    CO2 24 02/05/2023 06:04 AM    BUN 46 02/05/2023 06:04 AM    CREATININE 1.9 02/05/2023 06:04 AM    GFRAA 53 09/07/2022 01:12 PM    LABGLOM 27 02/05/2023 06:04 AM    GLUCOSE 105 02/05/2023 06:04 AM    GLUCOSE 120 10/12/2011 08:10 PM    PROT 6.5 02/05/2023 06:04 AM    LABALBU 3.4 02/05/2023 06:04 AM    CALCIUM 9.6 02/05/2023 06:04 AM    BILITOT 1.2 02/05/2023 06:04 AM    ALKPHOS 94 02/05/2023 06:04 AM    AST 37 02/05/2023 06:04 AM    ALT 44 02/05/2023 06:04 AM     HgBA1c:    Lab Results   Component Value Date/Time    LABA1C 5.7 02/01/2022 12:00 PM     FLP:    Lab Results   Component Value Date/Time    TRIG 68 12/18/2022 11:02 AM    HDL 55 12/18/2022 11:02 AM    LDLCALC 82 12/18/2022 11:02 AM    LABVLDL 14 12/18/2022 11:02 AM     CT head  Negative for acute abnormalities    CT C spine  1. No acute abnormality involving the cervical spine. 2. Redemonstration of heterogeneous appearance of the thyroid gland.   Ultrasound could be helpful for further evaluation. Echo  Summary   Severely reduced left ventricular systolic function. Reduced right ventricular function. Bi-atrial enlargement. No evidence of interatrial shunting on bubble study. History of WATCHMAN device (24 mm). No significant color flow jet around   the device. No device related thrombus visualized. Severe mitral regurgitation. Mild tricuspid regurgitation. RV-RA gradient is estimated at 27 mmHg.       All labs and imaging studies reviewed independently today         UYEN Prescott CNP  9:36 AM  2/6/2023

## 2023-02-06 NOTE — CARE COORDINATION
Charge RN informed that ID will not be having IV Antibiotics at discharge.    Jose Jordan, L.S.W.  [phone]

## 2023-02-06 NOTE — PROGRESS NOTES
Physical Therapy      Initial Assessment     Name: Yudi Lemus  : 1945  MRN: [id number]      Date of Service: 2023    Evaluating PT: Alvin Remy PT, DPT [id number]      Room #:  6501/6501-B  Diagnosis:  Dizziness [R42]  Nausea [R11.0]  Vertigo [R42]  PMHx/PSHx:   has a past medical history of Afib ([address] Anxiety, Arthritis, Cervical radiculopathy, CHF (congestive heart failure) (HCC), Chronic sinusitis, Ejection fraction < 50%, Hilar adenopathy, Hx of blood clots, Hypertension, Left ventricular systolic dysfunction, Lesion of left native kidney, Lumbar radiculopathy, Lung nodules, Meige syndrome, Microscopic colitis, Mitral regurgitation, Nonischemic cardiomyopathy ([address] Osteopenia, PE (pulmonary thromboembolism) ([address] Renal cell carcinoma of left kidney ([facility] [address] and Vertebrobasilar artery syndrome. Precautions:  Fall Risk, [place], Alarm    SUBJECTIVE:    Pt lives alone in a single story house with 1 stair(s) and no rail(s) to enter. Pt ambulated without AD within home prior to admission. Pt used cane or walker when outside. OBJECTIVE:   Initial Evaluation  Date: 23 Treatment Date: Short Term/ Long Term   Goals   AM-PAC 6 Clicks      Was pt agreeable to Eval/treatment? Yes     Does pt have pain? Neck spasms     Bed Mobility  Rolling: NT  Supine to sit: SBA  Sit to supine: SBA  Scooting: SBA to EOB  Rolling: Independent   Supine to sit:  Independent   Sit to supine: Independent   Scooting: Independent    Transfers Sit to stand: Min A  Stand to sit: Min A  Stand pivot: Min A with [employer]  Sit to stand: Supervision  Stand to sit: Supervision  Stand pivot: Supervision with [employer]   Ambulation   12 feet x2 with [employer] with Min A  >200 feet with [employer] with Supervision   Stair negotiation: ascended and descended NT  >1 step(s) with 1 rail(s) with Supervision    ROM BUE: Refer to OT note  BLE: WFL     Strength BUE: Refer to OT note  BLE: WFL     Balance Sitting EOB: Supervision  Dynamic Standing: SBA with [employer] Sitting EOB: Independent   Dynamic Standing: Supervision with [address]     Pt is A & O x: 3 to person, place, and month/year. Sensation: Pt denies numbness and tingling of extremities. Edema: Unremarkable. Patient education  Pt educated on hand placement during transfers. Patient response to education:   Pt verbalized understanding Pt demonstrated skill Pt requires further education in this area   Yes With cues Yes     ASSESSMENT:    Conditions Requiring Skilled Therapeutic Intervention:    [x]Decreased strength     []Decreased ROM  [x]Decreased functional mobility  [x]Decreased balance   [x]Decreased endurance   []Decreased posture  []Decreased sensation  []Decreased coordination   []Decreased vision  [x]Decreased safety awareness   []Increased pain       Comments:    Pt was in bed upon room entry; agreeable to PT evaluation. Son present. Pt is delayed. Pt ambulated to/from bathroom with WW. Gait was slow and unsteady. Pt reported dizziness with positional changes. Pt completed transfers from commode then ambulated back to bed. Pt was assisted back to bed and positioned comfortably. All questions and concerns were addressed. Pt was left in bed with all needs met at conclusion of session. Treatment:  Patient practiced and was instructed in the following treatment:    Therapeutic activities:  Bed mobility: Pt was cued for technique during bed mobility transfers. Transfers: Pt was cued for hand placement during sit <> stand transfers. Pt completed multiple transfers from various surfaces (EOB x2, commode x1). Ambulation: Pt ambulated x2 reps with [address]. Pt was cued for [address] technique and safety. Vitals and symptoms were closely monitored throughout session. Skillful positioning in bed to protect skin/joint integrity. Pt's/family goals:  1. To return home. Prognosis is Fair for reaching above PT goals. Patient and or family understand(s) diagnosis, prognosis, and plan of care. Yes.     PHYSICAL THERAPY PLAN OF CARE:    PT POC is established based on physician order and patient diagnosis     Referring provider/PT Order:    Start   Ordering Provider    02/04/23 2130  PT eval and treat  Start:  02/04/23 2130,   End:  02/04/23 2130,   ONE TIME,   Standing Count:  1 Occurrences,   Etelvina Zavala MD      Diagnosis:  Dizziness [R42]  Nausea [R11.0]  Vertigo [R42]  Specific instructions for next treatment:  Progress activity. Current Treatment Recommendations:     [x] Strengthening to improve independence with functional mobility   [] ROM to improve independence with functional mobility   [x] Balance Training to improve static/dynamic balance and to reduce fall risk  [x] Endurance Training to improve activity tolerance during functional mobility   [x] Transfer Training to improve safety and independence with all functional transfers   [x] Gait Training to improve gait mechanics, endurance and assess need for appropriate assistive device  [x] Stair Training in preparation for safe discharge home and/or into the community   [] Positioning to prevent skin breakdown and contractures  [x] Safety and Education Training   [x] Patient/Caregiver Education   [] HEP  [] Other     PT long term treatment goals are located in above grid    Frequency of treatments: 2-5x/week x 1-2 weeks. Time in  1345  Time out  1405    Total Treatment Time  10 minutes     Evaluation Time includes thorough review of current medical information, gathering information on past medical history/social history and prior level of function, completion of standardized testing/informal observation of tasks, assessment of data and education on plan of care and goals.     CPT codes:  [x] Low Complexity PT evaluation 12242  [] Moderate Complexity PT evaluation 17786  [] High Complexity PT evaluation 53390  [] PT Re-evaluation 89409  [] Gait training 09512 0 minutes  [] Manual therapy 68763 0 minutes  [x] Therapeutic activities 55945 10 minutes  [] Therapeutic exercises 16787 0 minutes  [] Neuromuscular reeducation 18689 0 minutes     Efrem Piedra, PT, DPT  [id number]

## 2023-02-07 VITALS
OXYGEN SATURATION: 95 % | HEART RATE: 93 BPM | BODY MASS INDEX: 26.12 KG/M2 | WEIGHT: 166.4 LBS | RESPIRATION RATE: 18 BRPM | SYSTOLIC BLOOD PRESSURE: 100 MMHG | HEIGHT: 67 IN | TEMPERATURE: 98.6 F | DIASTOLIC BLOOD PRESSURE: 73 MMHG

## 2023-02-07 LAB
ALBUMIN SERPL-MCNC: 3.3 G/DL (ref 3.5–5.2)
ALBUMIN SERPL-MCNC: 3.7 G/DL (ref 3.5–5.2)
ALP BLD-CCNC: 110 U/L (ref 35–104)
ALP BLD-CCNC: 127 U/L (ref 35–104)
ALT SERPL-CCNC: 74 U/L (ref 0–32)
ALT SERPL-CCNC: 74 U/L (ref 0–32)
ANION GAP SERPL CALCULATED.3IONS-SCNC: 14 MMOL/L (ref 7–16)
ANION GAP SERPL CALCULATED.3IONS-SCNC: 16 MMOL/L (ref 7–16)
AST SERPL-CCNC: 61 U/L (ref 0–31)
AST SERPL-CCNC: 82 U/L (ref 0–31)
BASOPHILS ABSOLUTE: 0.04 E9/L (ref 0–0.2)
BASOPHILS RELATIVE PERCENT: 0.5 % (ref 0–2)
BILIRUB SERPL-MCNC: 0.9 MG/DL (ref 0–1.2)
BILIRUB SERPL-MCNC: 0.9 MG/DL (ref 0–1.2)
BUN BLDV-MCNC: 45 MG/DL (ref 6–23)
BUN BLDV-MCNC: 46 MG/DL (ref 6–23)
CALCIUM SERPL-MCNC: 9.4 MG/DL (ref 8.6–10.2)
CALCIUM SERPL-MCNC: 9.9 MG/DL (ref 8.6–10.2)
CHLORIDE BLD-SCNC: 100 MMOL/L (ref 98–107)
CHLORIDE BLD-SCNC: 102 MMOL/L (ref 98–107)
CO2: 16 MMOL/L (ref 22–29)
CO2: 20 MMOL/L (ref 22–29)
CREAT SERPL-MCNC: 1.7 MG/DL (ref 0.5–1)
CREAT SERPL-MCNC: 1.7 MG/DL (ref 0.5–1)
EOSINOPHILS ABSOLUTE: 0.03 E9/L (ref 0.05–0.5)
EOSINOPHILS RELATIVE PERCENT: 0.4 % (ref 0–6)
GFR SERPL CREATININE-BSD FRML MDRD: 31 ML/MIN/1.73
GFR SERPL CREATININE-BSD FRML MDRD: 31 ML/MIN/1.73
GLUCOSE BLD-MCNC: 130 MG/DL (ref 74–99)
GLUCOSE BLD-MCNC: 165 MG/DL (ref 74–99)
HCT VFR BLD CALC: 44.7 % (ref 34–48)
HEMOGLOBIN: 14.6 G/DL (ref 11.5–15.5)
IMMATURE GRANULOCYTES #: 0.03 E9/L
IMMATURE GRANULOCYTES %: 0.4 % (ref 0–5)
LYMPHOCYTES ABSOLUTE: 1.06 E9/L (ref 1.5–4)
LYMPHOCYTES RELATIVE PERCENT: 14.1 % (ref 20–42)
MAGNESIUM: 2.4 MG/DL (ref 1.6–2.6)
MCH RBC QN AUTO: 27.8 PG (ref 26–35)
MCHC RBC AUTO-ENTMCNC: 32.7 % (ref 32–34.5)
MCV RBC AUTO: 85.1 FL (ref 80–99.9)
MONOCYTES ABSOLUTE: 0.5 E9/L (ref 0.1–0.95)
MONOCYTES RELATIVE PERCENT: 6.6 % (ref 2–12)
NEUTROPHILS ABSOLUTE: 5.88 E9/L (ref 1.8–7.3)
NEUTROPHILS RELATIVE PERCENT: 78 % (ref 43–80)
PDW BLD-RTO: 16.1 FL (ref 11.5–15)
PLATELET # BLD: 289 E9/L (ref 130–450)
PMV BLD AUTO: 10.7 FL (ref 7–12)
POTASSIUM SERPL-SCNC: 4.1 MMOL/L (ref 3.5–5)
POTASSIUM SERPL-SCNC: 5.4 MMOL/L (ref 3.5–5)
RBC # BLD: 5.25 E12/L (ref 3.5–5.5)
SODIUM BLD-SCNC: 132 MMOL/L (ref 132–146)
SODIUM BLD-SCNC: 136 MMOL/L (ref 132–146)
TOTAL PROTEIN: 7 G/DL (ref 6.4–8.3)
TOTAL PROTEIN: 7.2 G/DL (ref 6.4–8.3)
WBC # BLD: 7.5 E9/L (ref 4.5–11.5)

## 2023-02-07 PROCEDURE — 97535 SELF CARE MNGMENT TRAINING: CPT

## 2023-02-07 PROCEDURE — 80053 COMPREHEN METABOLIC PANEL: CPT

## 2023-02-07 PROCEDURE — 83735 ASSAY OF MAGNESIUM: CPT

## 2023-02-07 PROCEDURE — 6360000002 HC RX W HCPCS: Performed by: INTERNAL MEDICINE

## 2023-02-07 PROCEDURE — 2580000003 HC RX 258: Performed by: INTERNAL MEDICINE

## 2023-02-07 PROCEDURE — 36415 COLL VENOUS BLD VENIPUNCTURE: CPT

## 2023-02-07 PROCEDURE — 99232 SBSQ HOSP IP/OBS MODERATE 35: CPT

## 2023-02-07 PROCEDURE — 6370000000 HC RX 637 (ALT 250 FOR IP): Performed by: INTERNAL MEDICINE

## 2023-02-07 PROCEDURE — 99232 SBSQ HOSP IP/OBS MODERATE 35: CPT | Performed by: INTERNAL MEDICINE

## 2023-02-07 PROCEDURE — 6370000000 HC RX 637 (ALT 250 FOR IP)

## 2023-02-07 PROCEDURE — 85025 COMPLETE CBC W/AUTO DIFF WBC: CPT

## 2023-02-07 RX ORDER — MECLIZINE HCL 12.5 MG/1
25 TABLET ORAL EVERY 6 HOURS PRN
Status: DISCONTINUED | OUTPATIENT
Start: 2023-02-07 | End: 2023-02-07 | Stop reason: HOSPADM

## 2023-02-07 RX ORDER — FUROSEMIDE 20 MG/1
20 TABLET ORAL DAILY
Qty: 60 TABLET | Refills: 3 | Status: ON HOLD | OUTPATIENT
Start: 2023-02-07

## 2023-02-07 RX ORDER — DABIGATRAN ETEXILATE 75 MG/1
75 CAPSULE ORAL 2 TIMES DAILY
Qty: 60 CAPSULE | Refills: 0 | Status: ON HOLD | OUTPATIENT
Start: 2023-02-07

## 2023-02-07 RX ORDER — METOPROLOL SUCCINATE 25 MG/1
25 TABLET, EXTENDED RELEASE ORAL DAILY
Qty: 30 TABLET | Refills: 3 | Status: ON HOLD | OUTPATIENT
Start: 2023-02-08

## 2023-02-07 RX ORDER — MECLIZINE HYDROCHLORIDE 25 MG/1
25 TABLET ORAL EVERY 6 HOURS PRN
Qty: 30 TABLET | Refills: 0 | Status: ON HOLD | OUTPATIENT
Start: 2023-02-07 | End: 2023-02-17

## 2023-02-07 RX ORDER — SPIRONOLACTONE 25 MG/1
25 TABLET ORAL DAILY
Qty: 30 TABLET | Refills: 3 | Status: ON HOLD | OUTPATIENT
Start: 2023-02-07

## 2023-02-07 RX ORDER — MECLIZINE HCL 12.5 MG/1
12.5 TABLET ORAL EVERY 6 HOURS PRN
Status: DISCONTINUED | OUTPATIENT
Start: 2023-02-07 | End: 2023-02-07

## 2023-02-07 RX ADMIN — HYDROXYZINE HYDROCHLORIDE 10 MG: 10 TABLET ORAL at 14:15

## 2023-02-07 RX ADMIN — PIPERACILLIN AND TAZOBACTAM 3375 MG: 3; .375 INJECTION, POWDER, FOR SOLUTION INTRAVENOUS at 10:44

## 2023-02-07 RX ADMIN — DABIGATRAN ETEXILATE MESYLATE 75 MG: 75 CAPSULE ORAL at 10:36

## 2023-02-07 RX ADMIN — DIPHENHYDRAMINE HYDROCHLORIDE 25 MG: 25 TABLET ORAL at 11:01

## 2023-02-07 RX ADMIN — PANTOPRAZOLE SODIUM 40 MG: 40 TABLET, DELAYED RELEASE ORAL at 06:14

## 2023-02-07 RX ADMIN — FLUTICASONE PROPIONATE 1 SPRAY: 50 SPRAY, METERED NASAL at 10:45

## 2023-02-07 RX ADMIN — Medication 10 ML: at 11:06

## 2023-02-07 RX ADMIN — HYDROXYZINE HYDROCHLORIDE 10 MG: 10 TABLET ORAL at 00:25

## 2023-02-07 RX ADMIN — AMIODARONE HYDROCHLORIDE 200 MG: 200 TABLET ORAL at 10:36

## 2023-02-07 RX ADMIN — METOPROLOL SUCCINATE 25 MG: 25 TABLET, EXTENDED RELEASE ORAL at 10:36

## 2023-02-07 ASSESSMENT — PAIN SCALES - GENERAL
PAINLEVEL_OUTOF10: 0
PAINLEVEL_OUTOF10: 0

## 2023-02-07 NOTE — PROGRESS NOTES
Robert Dao is a 68 y.o. right handed female     Neurology following for dizziness vertigo    PMH of A-fib on Pradaxa s/p Watchman, HTN, CHF, nonischemic cardiomyopathy with valvular heart disease, history of PE, renal cell carcinoma of the left kidney, and vertebrobasilar artery syndrome     Assessment:     Acute vertigo  Presented with dizziness, vertigo, and nausea for approximately 5 days. Room spinning sensation when she is ambulating  Ortho BPs are negative  Vestibular rehab ordered upon discharge  Upon exam she is currently not experiencing vertigo. She says she experiences this when she stands up or tries to ambulate. Meclizine is ordered as needed    Plan:     PT/OT  Continue meclizine 25 mg every 6 hours as needed for vertigo  Vestibular rehab upon discharge  Neurosurgery consult for disc bulge C5-C6 (Dr. Jalen Garcia)  Neurology will sign off call if needed    Subjective:     Patient presented to the ER on 2/4/2023 after experiencing nausea, dizziness, and vertigo for 3 days. She had a cardioversion the day before presentation for A-fib and has had worsening dizziness ever since. She describes her dizziness as the room is spinning around her. CT head was negative for acute abnormalities, her CT C-spine in December was unremarkable. Her echo was unremarkable. MRI negative for acute stroke    She is sitting up in bed awake, alert, and oriented x4. She is not complaining of any further dizziness today. She says her dizziness occurs when she is standing or trying to ambulate. She is complaining of muscle spasms in her neck. She says this has been occurring for 1 year and the pain is really bothering her. She had a MRI of her cervical spine in December. This does show a disc bulge C5-C6. She wants this to be looked at before she is discharged and she wants a neurosurgery consult for their opinion.     No chest pain or palpitations  No coughing or wheezing    No vertigo, lightheadedness or loss of consciousness  No falls, tripping or stumbling  No incontinence of bowels or bladder  No numbness, tingling or focal arm/leg weakness    ROS otherwise negative      Objective:       /80   Pulse (!) 102   Temp 98.2 °F (36.8 °C) (Temporal)   Resp 18   Ht 5' 7\" (1.702 m)   Wt 166 lb 6.4 oz (75.5 kg)   SpO2 95%   BMI 26.06 kg/m²       General appearance: alert, appears stated age, cooperative and no distress  Head: normocephalic, without obvious abnormality, atraumatic  Eyes: conjunctivae/corneas clear  Neck: no adenopathy, supple, symmetrical, trachea midline and thyroid not enlarged, symmetric, no tenderness/mass/nodules  Lungs: Regular respirations on room air  Heart: No chest pain or palpitations  Abdomen: soft, non-tender; bowel sounds normal; no masses,  no organomegaly  Extremities:  normal, atraumatic, no cyanosis, bilateral lower extremity edema  Pulses: 2+ and symmetric  Skin: color, texture, turgor normal--scattered bruising      Mental Status: Alert, oriented, thought content appropriate, alertness: alert, orientation: time, date, person, place, affect: normal     Appropriate attention/concentration  Intact fundus of knowledge  Repetition intact  Intact memories      Speech: Clear  Language: No aphasias    Cranial Nerves:  I: smell NA   II: visual acuity  NA   II: visual fields Full to confrontation   II: pupils LINDSAY   III,VII: ptosis None   III,IV,VI: extraocular muscles  Full ROM   V: mastication Normal   V: facial light touch sensation  Normal   V,VII: corneal reflex  Present   VII: facial muscle function - upper  Normal   VII: facial muscle function - lower Normal   VIII: hearing Normal   IX: soft palate elevation  Normal   IX,X: gag reflex Present   XI: trapezius strength  5/5   XI: sternocleidomastoid strength 5/5   XI: neck extension strength  5/5   XII: tongue strength  Normal     Motor:  5/5 throughout  Normal bulk and tone  No drift   No abnormal movements    Sensory:  LT and PP normal  Vibration normal    Coordination:   FN, FFM and COLLEEN normal  HS normal    Gait:  Deferred for patient's safety/fall consideration    DTR:   Right Brachioradialis reflex 2+  Left Brachioradialis reflex 2+  Right Biceps reflex 2+  Left Biceps reflex 2+  Right Triceps reflex 2+  Left Triceps reflex 2+  Right Quadriceps reflex 1+  Left Quadriceps reflex 1+  Right Achilles reflex 1+  Left Achilles reflex 1+    No Babinskis  No Laughlin's    No other pathological reflexes    Laboratory/Radiology:     CBC with Differential:    Lab Results   Component Value Date/Time    WBC 7.5 02/07/2023 06:03 AM    RBC 5.25 02/07/2023 06:03 AM    HGB 14.6 02/07/2023 06:03 AM    HCT 44.7 02/07/2023 06:03 AM     02/07/2023 06:03 AM    MCV 85.1 02/07/2023 06:03 AM    MCH 27.8 02/07/2023 06:03 AM    MCHC 32.7 02/07/2023 06:03 AM    RDW 16.1 02/07/2023 06:03 AM    SEGSPCT 85 12/13/2013 12:00 PM    LYMPHOPCT 14.1 02/07/2023 06:03 AM    MONOPCT 6.6 02/07/2023 06:03 AM    BASOPCT 0.5 02/07/2023 06:03 AM    MONOSABS 0.50 02/07/2023 06:03 AM    LYMPHSABS 1.06 02/07/2023 06:03 AM    EOSABS 0.03 02/07/2023 06:03 AM    BASOSABS 0.04 02/07/2023 06:03 AM     CMP:    Lab Results   Component Value Date/Time     02/07/2023 08:14 AM    K 5.4 02/07/2023 08:14 AM    K 3.7 02/05/2023 06:04 AM     02/07/2023 08:14 AM    CO2 16 02/07/2023 08:14 AM    BUN 46 02/07/2023 08:14 AM    CREATININE 1.7 02/07/2023 08:14 AM    GFRAA 53 09/07/2022 01:12 PM    LABGLOM 31 02/07/2023 08:14 AM    GLUCOSE 165 02/07/2023 08:14 AM    GLUCOSE 120 10/12/2011 08:10 PM    PROT 7.2 02/07/2023 08:14 AM    LABALBU 3.3 02/07/2023 08:14 AM    CALCIUM 9.4 02/07/2023 08:14 AM    BILITOT 0.9 02/07/2023 08:14 AM    ALKPHOS 110 02/07/2023 08:14 AM    AST 82 02/07/2023 08:14 AM    ALT 74 02/07/2023 08:14 AM     HgBA1c:    Lab Results   Component Value Date/Time    LABA1C 5.7 02/01/2022 12:00 PM     FLP:    Lab Results   Component Value Date/Time    TRIG 68 12/18/2022 11:02 AM    HDL 55 12/18/2022 11:02 AM    LDLCALC 82 12/18/2022 11:02 AM    LABVLDL 14 12/18/2022 11:02 AM     CT head 2//23  Negative for acute abnormalities    CT C spine 12/16/2022  1. No acute abnormality involving the cervical spine. 2. Redemonstration of heterogeneous appearance of the thyroid gland. Ultrasound could be helpful for further evaluation. MRI C spine 12/22  Mild spinal canal stenosis and right neural foraminal narrowing at C5-6  secondary to a disc bulge and posterior osteophyte formation. Additional mild multilevel degenerative changes, as described above    MRI brain  Negative for acute abnormalities    Echo  Summary   Severely reduced left ventricular systolic function. Reduced right ventricular function. Bi-atrial enlargement. No evidence of interatrial shunting on bubble study. History of WATCHMAN device (24 mm). No significant color flow jet around   the device. No device related thrombus visualized. Severe mitral regurgitation. Mild tricuspid regurgitation. RV-RA gradient is estimated at 27 mmHg.       All labs and imaging studies reviewed independently today         UYEN Jones CNP  9:29 AM  2/7/2023

## 2023-02-07 NOTE — PROGRESS NOTES
Hospitalist Discharge Summary    Patient ID: Malka Nassar   Patient : 1945  Patient's PCP: Patric Dinh DO    Admit Date: 2023   Admitting Physician: Maria G Zendejas MD    Discharge Date:  2023   Discharge Physician: UYEN Méndez CNP   Discharge Condition: Stable  Discharge Disposition: [facility] course in brief:  (Please refer to daily progress notes for a comprehensive review of the hospitalization by requesting medical records)  Briefy, patient with PMH significant for A-fib on amiodarone and Pradaxa, HFrEF EF 25%, CKD, blood clot, nonischemic cardiomyopathy, PE, renal cell carcinoma of left kidney presented to ED with dizziness and nausea. Patient had cardioversion on 2023 and reports being nauseous ever since. CT of head was negative for acute findings Hospital course significant for UTI with elevated lactic acid of 4.7 and hypotensive with blood pressure 95/56 with concern for sepsis. Infectious disease was consulted and patient was started on Zosyn. MRI brain and brainstem was ordered for evaluation of posterior circulation stroke due to patient's history of vertebrobasilar disease and persistent vertigo. Neurology was consulted. Electrophysiology saw patient and recommended continuing amiodarone 200 mg daily, resuming Toprol if BP allows. Continue Pradaxa. Discussed possible MR work-up at tertiary facility such as [facility]. Patient was instructed to continue with LifeVest upon discharge. Patient is to follow-up with heart failure clinic regarding whether she should resume diuretics. Neurology signed off. Patient stable from medical perspective for discharge.          Consults:   IP CONSULT TO INTERNAL MEDICINE  IP CONSULT TO NEUROLOGY  IP CONSULT TO ELECTROPHYSIOLOGY  IP CONSULT TO INFECTIOUS DISEASES  IP CONSULT TO HEART FAILURE NURSE/COORDINATOR  IP CONSULT TO NEUROSURGERY    Discharge Diagnoses:  Sepsis likely secondary to urinary tract infection  Intractable dizziness possibly secondary to atrial fibrillation and hypotension  Atrial fibrillation with RVR  Acidosis  Chronic HFrEF 25%  History of PE      Discharge Instructions / Follow up:    Future Appointments   Date Time Provider Duke Santana   2/15/2023 12:30 PM [facility] CHF ROOM 1 [facility]   2/23/2023 10:00 AM UYEN Vergara CNP [facility]   3/1/2023 12:00 PM [facility] CHF ROOM 1 [facility]   3/16/2023  1:15 PM [facility] MED ONC FAST TRACK 2 [facility] Med Onc [facility]   3/16/2023  1:30 PM William Watson MD MED ONC [facility]   3/23/2023 10:00 AM UYEN Couch CNP ELECTRO PHYS [facility]   4/6/2023 10:00 AM MD Jay Lyon [facility]       The patient's condition is stable. At this time the patient is without objective evidence of an acute process requiring continuing hospitalization or inpatient management. They are stable for discharge with outpatient follow-up. I have spoken with the patient and discussed the results of the current hospitalization, in addition to providing specific details for the plan of care and counseling regarding the diagnosis and prognosis. The plan has been discussed in detail and they are aware of the specific conditions for emergent return, as well as the importance of follow-up. Their questions are answered at this time and they are agreeable with the plan for discharge to home. Continued appropriate risk factor modification of blood pressure, diabetes and serum lipids will remain essential to reducing risk of future atherosclerotic development    Activity: activity as tolerated    Physical exam:  General appearance: No apparent distress, appears stated age and cooperative. HEENT: Normal cephalic, atraumatic without obvious deformity. Pupils equal, round, and reactive to light. Extra ocular muscles intact. Conjunctivae/corneas clear. Neck: Supple, with full range of motion.  No jugular venous distention. Trachea midline. Respiratory:  Clear to auscultation bilaterally. No apparent distress. Cardiovascular:  Regular rate and rhythm. S1, S2 without murmurs, rubs, or gallops. PV: Brisk capillary refill. +2 pedal and radial pulses bilaterally. No clubbing, cyanosis, edema of bilateral lower extremities. Abdomen: Soft, non-tender, non-distended. +BS  Musculoskeletal: No obvious deformities or erythematous or edematous joints. Skin: Normal skin color. No rashes or lesions. Neurologic:  Neurovascularly intact without any focal sensory/motor deficits. Cranial nerves: II-XII intact, grossly non-focal.  Psychiatric: Alert and oriented, thought content appropriate, normal insight    Significant labs:  CBC:   Recent Labs     02/05/23  0604 02/06/23  0849 02/07/23  0603   WBC 6.1 6.7 7.5   RBC 4.87 4.97 5.25   HGB 13.8 13.7 14.6   HCT 42.7 42.4 44.7   MCV 87.7 85.3 85.1   RDW 15.5* 16.1* 16.1*    281 289     BMP:   Recent Labs     02/04/23  1311 02/05/23  0604 02/07/23  0603 02/07/23  0814    140 136 132   K 4.3 3.7 4.1 5.4*   CL 97* 103 100 102   CO2 22 24 20* 16*   BUN 55* 46* 45* 46*   CREATININE 2.1* 1.9* 1.7* 1.7*   MG 2.4  --   --  2.4     LFT:  Recent Labs     02/04/23  1311 02/05/23  0604 02/07/23  0603 02/07/23  0814   PROT 7.8 6.5 7.0 7.2   ALKPHOS 110* 94 127* 110*   ALT 54* 44* 74* 74*   AST 46* 37* 61* 82*   BILITOT 1.1 1.2 0.9 0.9   LIPASE 30  --   --   --      PT/INR: No results for input(s): INR, APTT in the last 72 hours. BNP: No results for input(s): BNP in the last 72 hours.   Hgb A1C:   Lab Results   Component Value Date    LABA1C 5.7 (H) 02/01/2022     Folate and B12:   Lab Results   Component Value Date    JERLIYFP35 1226 (H) 02/04/2023   ,   Lab Results   Component Value Date    FOLATE 14.9 12/02/2019     Thyroid Studies:   Lab Results   Component Value Date    TSH 0.666 02/04/2023    S5UJPQH 7.4 01/18/2023       Urinalysis:    Lab Results   Component Value Date/Time NITRU POSITIVE 02/05/2023 03:58 AM    WBCUA 5-10 02/05/2023 03:58 AM    BACTERIA MANY 02/05/2023 03:58 AM    RBCUA 1-3 02/05/2023 03:58 AM    RBCUA NONE 11/24/2013 08:45 PM    BLOODU TRACE-INTACT 02/05/2023 03:58 AM    SPECGRAV 1.025 02/05/2023 03:58 AM    GLUCOSEU Negative 02/05/2023 03:58 AM       Imaging:  CT Head W/O Contrast    Result Date: 2/4/2023  EXAMINATION: CT OF THE HEAD WITHOUT CONTRAST  2/4/2023 2:53 pm TECHNIQUE: CT of the head was performed without the administration of intravenous contrast. Automated exposure control, iterative reconstruction, and/or weight based adjustment of the mA/kV was utilized to reduce the radiation dose to as low as reasonably achievable. COMPARISON: None. HISTORY: ORDERING SYSTEM PROVIDED HISTORY: vertigo, nausea TECHNOLOGIST PROVIDED HISTORY: Reason for exam:->vertigo, nausea Has a \"code stroke\" or \"stroke alert\" been called? ->No Decision Support Exception - unselect if not a suspected or confirmed emergency medical condition->Emergency Medical Condition (MA) What reading provider will be dictating this exam?->CRC FINDINGS: BRAIN/VENTRICLES: There is no acute intracranial hemorrhage, mass effect or midline shift. No abnormal extra-axial fluid collection. The gray-white differentiation is maintained without evidence of an acute infarct. There is no evidence of hydrocephalus. The ventricles, cisterns and sulci are prominent consistent with atrophy. There is decreased attenuation within the periventricular white matter consistent with periventricular leukomalacia. ORBITS: The visualized portion of the orbits demonstrate no acute abnormality. SINUSES: The visualized paranasal sinuses and mastoid air cells demonstrate no acute abnormality. SOFT TISSUES/SKULL:  No acute abnormality of the visualized skull or soft tissues. 1.  There is no acute intracranial abnormality. Specifically, there is no intracranial hemorrhage. 2. Atrophy and periventricular leukomalacia, . XR CHEST PORTABLE    Result Date: 2/4/2023  EXAMINATION: ONE XRAY VIEW OF THE CHEST 2/4/2023 7:51 pm COMPARISON: 12/29/2022 HISTORY: ORDERING SYSTEM PROVIDED HISTORY: altered mental status TECHNOLOGIST PROVIDED HISTORY: Reason for exam:->altered mental status What reading provider will be dictating this exam?->CRC FINDINGS: Examination is limited by overlying heart monitor device. However, the lungs appear clear. Pleural spaces are clear. Stable cardiomegaly is noted. No acute osseous abnormality seen. No acute process identified, but evaluation limited. MRI BRAIN WO CONTRAST    Result Date: 2/6/2023  EXAMINATION: MRI OF THE BRAIN WITHOUT CONTRAST  2/6/2023 3:32 pm TECHNIQUE: Multiplanar multisequence MRI of the brain was performed without the administration of intravenous contrast. COMPARISON: CT brain 02/04/2023 HISTORY: ORDERING SYSTEM PROVIDED HISTORY: vertigo, r/o post circulation stroke TECHNOLOGIST PROVIDED HISTORY: Reason for exam:->vertigo, r/o post circulation stroke What reading provider will be dictating this exam?->CRC FINDINGS: Ischemia: No restricted diffusion is seen to suggest ischemia. Small focus of gliosis in the subcortical white matter of the left supramarginal gyrus, likely a focus of old ischemia. Parenchyma: No acute intracranial hemorrhage or mass effect. Old microhemorrhage in the left cerebellum. Ventricles: No evidence of hydrocephalus. Flow voids: Flow voids are present in the proximal major intracranial arteries. No evidence of infarct, intracranial hemorrhage, mass effect, or hydrocephalus. Nonemergent incidental findings as above.        Discharge Medications:      Medication List        START taking these medications      aspirin 81 MG EC tablet     meclizine 25 MG tablet  Commonly known as: ANTIVERT  Take 1 tablet by mouth every 6 hours as needed for Dizziness            CHANGE how you take these medications      dabigatran 75 MG capsule  Commonly known as: PRADAXA  Take 1 capsule by mouth 2 times daily  What changed:   medication strength  how much to take            CONTINUE taking these medications      amiodarone 200 MG tablet  Commonly known as: CORDARONE  Take 1 tablet by mouth daily     baclofen 10 MG tablet  Commonly known as: LIORESAL  Take 1 tablet by mouth nightly     cetirizine 10 MG tablet  Commonly known as: ZyrTEC Allergy  Take 0.5 tablets by mouth daily     diphenhydrAMINE 25 MG tablet  Commonly known as: BENADRYL     fluticasone 50 MCG/ACT nasal spray  Commonly known as: Flonase  1-2 sprays, each nostril daily as needed for nasal congestion.      furosemide 20 MG tablet  Commonly known as: Lasix  Take 1 tablet by mouth daily     hydrOXYzine HCl 10 MG tablet  Commonly known as: ATARAX  Take 1 tablet by mouth every 8 hours as needed for Itching or Anxiety     loperamide 2 MG capsule  Commonly known as: IMODIUM     metoprolol succinate 25 MG extended release tablet  Commonly known as: TOPROL XL  Take 1 tablet by mouth daily  Start taking on: February 8, 2023     omeprazole 40 MG delayed release capsule  Commonly known as: PRILOSEC     spironolactone 25 MG tablet  Commonly known as: ALDACTONE  Take 1 tablet by mouth daily     triamcinolone 0.1 % cream  Commonly known as: KENALOG     vitamin D 1000 UNIT Tabs tablet  Commonly known as: CHOLECALCIFEROL            STOP taking these medications      hydrALAZINE 10 MG tablet  Commonly known as: APRESOLINE               Where to Get Your Medications        These medications were sent to [facility] [address] [address]      Phone: [phone]   dabigatran 75 MG capsule  furosemide 20 MG tablet  meclizine 25 MG tablet  metoprolol succinate 25 MG extended release tablet  spironolactone 25 MG tablet         Time Spent on discharge is more than 45 minutes in the examination, evaluation, counseling and review of medications and discharge plan.    +++++++++++++++++++++++++++++++++++++++++++++++++  Nany Pedraza, APRN - CNP  [employer] - [address] [place] [address]  +++++++++++++++++++++++++++++++++++++++++++++++++  NOTE: This report was transcribed using voice recognition software. Every effort was made to ensure accuracy; however, inadvertent computerized transcription errors may be present.

## 2023-02-07 NOTE — PROGRESS NOTES
Occupational Therapy  OT BEDSIDE TREATMENT NOTE   [facility] [address] [facility]  [address] [place] [address]      Date:2023  Patient Name: Pamela Leong  MRN: [id number]  : 1945  Room: [address] [place] [address]     Per OT eval  Evaluating OT: Vickie Azevedo OTR/L #[id number]      Referring Provider: Johanna Jara MD  Specific Provider Orders/Date: OT eval and treat 23     Diagnosis: Dizziness [R42]  Nausea [R11.0]  Vertigo [R42]   Pt admitted to hospital with dizziness, nausea and vomiting       Pertinent Medical History:  has a past medical history of Afib ([address] Anxiety, Arthritis, Cervical radiculopathy, CHF (congestive heart failure) ([address] Chronic sinusitis, Ejection fraction < 50%, Hilar adenopathy, Hx of blood clots, Hypertension, Left ventricular systolic dysfunction, Lesion of left native kidney, Lumbar radiculopathy, Lung nodules, Meige syndrome, Microscopic colitis, Mitral regurgitation, Nonischemic cardiomyopathy ([address] Osteopenia, PE (pulmonary thromboembolism) ([address] Renal cell carcinoma of left kidney ([address] and Vertebrobasilar artery syndrome.          Precautions:  Fall Risk, [place], bed alarm     Assessment of current deficits    [x] Functional mobility          [x]ADLs           [x] Strength                  [x]Cognition    [x] Functional transfers        [x] IADLs         [x] Safety Awareness   [x]Endurance    [] Fine Coordination                        [x] Balance      [] Vision/perception   []Sensation      []Gross Motor Coordination            [] ROM           [] Delirium                   [] Motor Control      OT PLAN OF CARE   OT POC based on physician orders, patient diagnosis and results of clinical assessment     Frequency/Duration 1-3 days/wk for 2 weeks PRN   Specific OT Treatment Interventions to include:   * Instruction/training on adapted ADL techniques and AE recommendations to increase functional independence within precautions       * Training on energy conservation strategies, correct breathing pattern and techniques to improve independence/tolerance for self-care routine  * Functional transfer/mobility training/DME recommendations for increased independence, safety, and fall prevention  * Patient/Family education to increase follow through with safety techniques and functional independence  * Recommendation of environmental modifications for increased safety with functional transfers/mobility and ADLs  * Therapeutic exercise to improve motor endurance, ROM, and functional strength for ADLs/functional transfers  * Therapeutic activities to facilitate/challenge dynamic balance, stand tolerance for increased safety and independence with ADLs  * Therapeutic activities to facilitate gross/fine motor skills for increased independence with ADLs  * Neuro-muscular re-education: facilitation of righting/equilibrium reactions, midline orientation, scapular stability/mobility, normalization of muscle tone, and facilitation of volitional active controled movement     Recommended Adaptive Equipment:  TBD      Home Living: Pt lives alone in a 1 story home with 1 [address]    Bathroom setup: tub/shower combo    Equipment owned: w/w,      Prior Level of Function: mod I with ADLs , mod I with IADLs; ambulated without AD in house; w/w vs cane in community    Driving: no   Occupation: na     Pain Level: Pt denies pain this session     Cognition: A&O: x3;  Follows 1-2 step directions             Memory:  fair             Sequencing: fair             Problem solving:  fair-             Judgement/safety:  fair-               Functional Assessment:  AM-PAC Daily Activity Raw Score: 18/24    Initial Eval Status  Date: 2/6/23 Treatment Status  Date:2/7/23 STGs = LTGs  Time frame: 10-14 days   Feeding Independent  independent      Grooming Minimal Assist  SBA  Sitting EOB  Pt declined ambulating to bathroom  Modified [place]    UB Dressing Minimal Assist   SBA  To dof/don gown sitting EOB Modified [place]    LB Dressing Moderate Assist  Mod A  To dof/don pants and socks sitting EOB,  difficulty with cross leg tech, education provided on use of AE. Pt reports she will have assistance at home Modified [place]    Bathing Moderate Assist Min A  For sponge bathing tasks, assist for bilat feet, pt educated on use of LHS, pt reports she owns one Modified [place]    Toileting Moderate Assist  Min A  simulated   Using ww for UE support for stability Modified [place]    Bed Mobility  Supine to sit: Stand by Assist   Sit to supine: Stand by Assist  Supine to sit: Stand by Assist   Sit to supine: Stand by Assist   Supine to sit: Modified [place]   Sit to supine: Modified [place]    Functional Transfers Minimal Assist  Min A  Modified [place]    Functional Mobility Minimal Assist      Ambulated in room with w/w; cuing on posture, w/w management and safety NT  Pt declined  Modified [place]    Balance Sitting:     Static:  SBA    Dynamic:SBA  Standing: min A Sitting:     Static:  SBA    Dynamic:SBA  Standing: min A      Activity Tolerance F- F-   Pt quick to fatigue and c/o nausea sitting EOB F+   Visual/  Perceptual Glasses: yes  wfl                             Hand Dominance right    Strength ROM Additional Info:    RUE   3+/5 wfl good  and wfl FMC/dexterity noted during ADL tasks      LUE 3+/5 wfl good  and wfl FMC/dexterity noted during ADL tasks      Hearing: wfl  Sensation:wfl  Tone: wfl  Edema:none noted       Comments: Per RN, pt OK for treatment. Upon arrival pt supine in bed. ADL retraining to increase safety and indep in dressing and bathing tasks, balance and trf training to increase participation in functional mobility and standing aspects of ADLs with increased safety. Pt educated on techniques to increase independence and safety during ADLs, bed mobility, and functional transfers.  Pt would benefit from continued skilled OT to increase safety and independence with completion of ADL/IADL tasks for functional independence and quality of life. At end of session pt returned to supine, call light within reach. Pt has made fair progress towards set goals.      Continue with current plan of care    Treatment Time [id number]            Treatment Time Out: 6234             Treatment Charges: Mins Units   Ther Ex  31326     Manual Therapy [id number]     Thera Activities [id number]     ADL/Home Mgt [id number] 24 2   Neuro Re-ed 00620     Group Therapy      Orthotic manage/training  26255     Non-Billable Time     Total Timed Treatment 24 2     [address] ALLEN/L [id number]

## 2023-02-07 NOTE — PROGRESS NOTES
Dr. Morena Garrett notified via perfect serve of patient having 10 beats of vtach. Vitals stable and patient asymptomatic.

## 2023-02-07 NOTE — CARE COORDINATION
Son and Pt inquired about in-home services. Gave list of Private Duty and ERS. Will make referral to [facility] for [address] Discharge Order is in. Son will provide transport at discharge. Will need [facility] order for PT and SN. Referral to [facility] by secure VM. Discharge Plan is to return home with Julian NGUYEN/BECKY to follow for discharge needs.    Debbie Aguayo, L.S.W.  [phone]

## 2023-02-07 NOTE — PROGRESS NOTES
Department of Internal Medicine  Infectious Diseases   Progress  Note      C/C :  UTI     Pt is awake and alert  Feels better   Afebrile       Current Facility-Administered Medications   Medication Dose Route Frequency Provider Last Rate Last Admin    meclizine (ANTIVERT) tablet 25 mg  25 mg Oral Q6H PRN UYEN Calzada - CNP        perflutren lipid microspheres (DEFINITY) injection 1.5 mL  1.5 mL IntraVENous ONCE PRN Madeline Flowers MD        perflutren lipid microspheres (DEFINITY) injection 1.5 mL  1.5 mL IntraVENous ONCE PRN Vick Car MD        trimethobenzamide (TIGAN) injection 200 mg  200 mg IntraMUSCular Q6H PRN Vick Car MD   200 mg at 02/05/23 2255    piperacillin-tazobactam (ZOSYN) 3,375 mg in sodium chloride 0.9 % 50 mL IVPB (Cvac2Tzb)  3,375 mg IntraVENous Q12H Jamilleigha Vegas MD 12.5 mL/hr at 02/07/23 1044 3,375 mg at 02/07/23 1044    amiodarone (CORDARONE) tablet 200 mg  200 mg Oral Daily Samer Garth Juares MD   200 mg at 02/07/23 1036    baclofen (LIORESAL) tablet 10 mg  10 mg Oral Nightly Fernando Juares MD   10 mg at 02/06/23 2020    dabigatran (PRADAXA) capsule 75 mg  75 mg Oral BID Fernando Juares MD   75 mg at 02/07/23 1036    diphenhydrAMINE (BENADRYL) tablet 25 mg  25 mg Oral Q6H PRN Fernando Juares MD   25 mg at 02/07/23 1101    fluticasone (FLONASE) 50 MCG/ACT nasal spray 1 spray  1 spray Each Nostril Daily Fernando Juares MD   1 spray at 02/07/23 1045    hydrOXYzine HCl (ATARAX) tablet 10 mg  10 mg Oral Q8H PRN Fernando Juares MD   10 mg at 02/07/23 0025    [Held by provider] hydrALAZINE (APRESOLINE) tablet 10 mg  10 mg Oral TID Paula Cortes MD   10 mg at 02/05/23 0824    [Held by provider] furosemide (LASIX) tablet 20 mg  20 mg Oral Daily Junaidr Garth Juares MD   20 mg at 02/05/23 0824    pantoprazole (PROTONIX) tablet 40 mg  40 mg Oral [facility] Samer aGrth Juares MD   40 mg at 02/07/23 0614    metoprolol succinate (TOPROL XL) extended release tablet 25 mg 25 mg Oral Daily UEYN Domingo - CNP   25 mg at 02/07/23 1036    [Held by provider] spironolactone (ALDACTONE) tablet 25 mg  25 mg Oral Daily Junaidr Eva Astudillo MD   25 mg at 02/05/23 0471    sodium chloride flush 0.9 % injection 10 mL  10 mL IntraVENous 2 times per day Bambi Covarrubias MD   10 mL at 02/07/23 1106    sodium chloride flush 0.9 % injection 10 mL  10 mL IntraVENous PRN Bambi Covarrubias MD        0.9 % sodium chloride infusion   IntraVENous PRN Fernando Astudillo MD        magnesium hydroxide (MILK OF MAGNESIA) 400 MG/5ML suspension 30 mL  30 mL Oral Daily PRN Bambi Covarrubias MD        acetaminophen (TYLENOL) tablet 650 mg  650 mg Oral Q6H PRN Fernando Astudillo MD        Or    acetaminophen (TYLENOL) suppository 650 mg  650 mg Rectal Q6H PRN Fernando Astudillo MD             REVIEW OF SYSTEMS:    CONSTITUTIONAL:  Denies fever, chill or rigors. HEENT: denies blurring of vision or double vision, denies hearing problem  RESPIRATORY: SOB   CARDIOVASCULAR:  Denies palpitation or chest pain   GASTROINTESTINAL:  Denies abdomen pain, diarrhea or constipation,, nausea or vomiting. GENITOURINARY:  Denies burning urination or frequency of urination  INTEGUMENT: denies wound , rash  HEMATOLOGIC/LYMPHATIC:  Denies lymph node swelling, gum bleeding or easy bruising. MUSCULOSKELETAL:  leg swelling   NEUROLOGICAL:  light headed or dizziness     PHYSICAL EXAM:      Vitals:   /73   Pulse 97   Temp 98.2 °F (36.8 °C) (Temporal)   Resp 18   Ht 5' 7\" (1.702 m)   Wt 166 lb 6.4 oz (75.5 kg)   SpO2 94%   BMI 26.06 kg/m²     General Appearance:    Awake, alert , no acute distress. Head:    Normocephalic, atraumatic   Eyes:    +  pallor, no icterus,no nystagmus    Ears:    No obvious deformity or drainage.    Nose:   No nasal drainage   Throat:   Mucosa moist, no oral thrush   Neck:   Supple, no lymphadenopathy   Back:     no CVA tenderness   Lungs:     Clear to auscultation bilaterally    Heart:    Irregular Abdomen:     Soft, non-tender, bowel sounds present    Extremities:    + edema    Pulses:   Dorsalis pedis palpable    Skin:   no rashes      CBC with Differential:      Lab Results   Component Value Date/Time    WBC 7.5 02/07/2023 06:03 AM    RBC 5.25 02/07/2023 06:03 AM    HGB 14.6 02/07/2023 06:03 AM    HCT 44.7 02/07/2023 06:03 AM     02/07/2023 06:03 AM    MCV 85.1 02/07/2023 06:03 AM    MCH 27.8 02/07/2023 06:03 AM    MCHC 32.7 02/07/2023 06:03 AM    RDW 16.1 02/07/2023 06:03 AM    SEGSPCT 85 12/13/2013 12:00 PM    LYMPHOPCT 14.1 02/07/2023 06:03 AM    MONOPCT 6.6 02/07/2023 06:03 AM    BASOPCT 0.5 02/07/2023 06:03 AM    MONOSABS 0.50 02/07/2023 06:03 AM    LYMPHSABS 1.06 02/07/2023 06:03 AM    EOSABS 0.03 02/07/2023 06:03 AM    BASOSABS 0.04 02/07/2023 06:03 AM       CMP     Lab Results   Component Value Date/Time     02/07/2023 08:14 AM    K 5.4 02/07/2023 08:14 AM    K 3.7 02/05/2023 06:04 AM     02/07/2023 08:14 AM    CO2 16 02/07/2023 08:14 AM    BUN 46 02/07/2023 08:14 AM    CREATININE 1.7 02/07/2023 08:14 AM    GFRAA 53 09/07/2022 01:12 PM    LABGLOM 31 02/07/2023 08:14 AM    GLUCOSE 165 02/07/2023 08:14 AM    GLUCOSE 120 10/12/2011 08:10 PM    PROT 7.2 02/07/2023 08:14 AM    LABALBU 3.3 02/07/2023 08:14 AM    CALCIUM 9.4 02/07/2023 08:14 AM    BILITOT 0.9 02/07/2023 08:14 AM    ALKPHOS 110 02/07/2023 08:14 AM    AST 82 02/07/2023 08:14 AM    ALT 74 02/07/2023 08:14 AM         Hepatic Function Panel:    Lab Results   Component Value Date/Time    ALKPHOS 110 02/07/2023 08:14 AM    ALT 74 02/07/2023 08:14 AM    AST 82 02/07/2023 08:14 AM    PROT 7.2 02/07/2023 08:14 AM    BILITOT 0.9 02/07/2023 08:14 AM    BILIDIR 0.2 01/18/2023 12:48 PM    IBILI 0.5 01/18/2023 12:48 PM    LABALBU 3.3 02/07/2023 08:14 AM       PT/INR:    Lab Results   Component Value Date/Time    PROTIME 31.9 01/03/2023 10:34 AM    INR 2.7 01/03/2023 10:34 AM    INR 2.0 12/29/2022 12:07 PM       TSH:    Lab Results Component Value Date/Time    TSH 0.666 02/04/2023 09:47 PM       U/A:    Lab Results   Component Value Date/Time    NITRITE positive 07/08/2022 02:22 PM    COLORU Yellow 02/05/2023 03:58 AM    PHUR 5.5 02/05/2023 03:58 AM    WBCUA 5-10 02/05/2023 03:58 AM    RBCUA 1-3 02/05/2023 03:58 AM    RBCUA NONE 11/24/2013 08:45 PM    BACTERIA MANY 02/05/2023 03:58 AM    CLARITYU TURBID 02/05/2023 03:58 AM    SPECGRAV 1.025 02/05/2023 03:58 AM    LEUKOCYTESUR LARGE 02/05/2023 03:58 AM    UROBILINOGEN 0.2 02/05/2023 03:58 AM    BILIRUBINUR Negative 02/05/2023 03:58 AM    BILIRUBINUR negative 07/08/2022 02:22 PM    BLOODU TRACE-INTACT 02/05/2023 03:58 AM    GLUCOSEU Negative 02/05/2023 03:58 AM       ABG:  No results found for: JGD1CVJ, BEART, P0PEINMU, PHART, THGBART, FRO1YCB, PO2ART, VSN0TTA    MICROBIOLOGY:    Blood culture - neg to date     Urine Culture - pending     Radiology :    Chest X ray : no infiltrates     IMPRESSION:   Lactic acidosis - improved  UTI         RECOMMENDATIONS:     Stop zosyn

## 2023-02-07 NOTE — PROGRESS NOTES
[facility],2Nd Floor and Vascular [place]- Electrophysiology  Inpatient progress note   PATIENT: [address] RECORD NUMBER: [id number]  DATE OF SERVICE:  2/7/2023  ATTENDING ELECTROPHYSIOLOGIST:Dr Rhett Yang   PRIMARY ELECTROPHYSIOLOGIST: Dolores Palma MD  REFERRING PHYSICIAN: No ref. provider found and Trisha Cardona DO  CHIEF COMPLAINT: Nausea and lighededness    HPI: This is a 68 y.o. female with a history of atrial fibrillation, status post Watchman in 2020, nonischemic cardiomyopathy, hypertension, CKD, renal cell carcinoma with with partial left nephrectomy, Meige syndrome, TIA. She presented to the hospital complaining of nausea and lightheadedness since starting on anticoagulation with Pradaxa since she was diagnosed with a PE on 12/17/2022. She was diagnosed with persistent atrial fibrillation and had at least 3 previous cardioversion's. She has Watchman device placement on 10/14/20 and was not on [address] [place] [address] since due to multiple allergies/side effects on Eliquis and Xarelto in the past.    She was seen on 12/19/22 when she was admitted to the hospital with acute on chronic CHF and PE. She was found to have NIKA on CKD and her Tikosyn was discontinued on 12/19/22. She was also found to have EF of 25% and LifeVest was ordered for patient. She was noted to have recurrent AF on 1/13/23 and Amiodarone was started on 1/20/23. She was scheduled for DCCV in late February but was moved to 2/3/23 due to request from CHF clinic. She was initially prescribed Lovenox and was switched to Pradaxa 150 mg BID. Since she was started on Lovenox she reports nausea and occasional dizziness. She underwent successful DCCV on 2/3/23. She presented to ED on 2/4/23 due to nausea and lightheadedness. Initial EKG on 2/4/23 showed normal sinus rhythm. EKG later on 2/4/23 showed AF with CVR. EKG today showed AF with CVR. Cardiac electrophysiology service is consulted for post CV and dizziness.    Neurology and infectious disease also consulted. 2/6/2023: Patient continues in atrial fibrillation on the monitor with heart rates 80s to 100 bpm.  Since admission, she has been on Pradaxa 75 mg twice daily (decreased from 150 mg twice daily) she does complain of some itching and slight rash which she also feels is due to Pradaxa. Since admission her lightheadedness has improved slightly and only has occurred with standing and ambulating. She is scheduled for MRI of the brain today. 2/7/23: patient sitting up in bed. Feels increased lightheadedness when gets OOB and ambulates. She continues Af with CVR on monitor. Patient Active Problem List    Diagnosis Date Noted    Vertigo 02/04/2023     Priority: Medium    Dizziness 02/04/2023     Priority: Medium    Multiple subsegmental pulmonary emboli without acute cor pulmonale ([address] 12/17/2022     Priority: Medium    Acute on chronic congestive heart failure ([address] 12/17/2022     Priority: Medium    Chronic renal disease, stage III [facility]) [[id number]] 04/25/2022     Priority: Medium    Renal cell carcinoma of left kidney ([address] 02/01/2022    Renal mass 11/16/2021    Presence of Watchman left atrial appendage closure device 10/14/2020     Overview Note:     24-mm Watchman 2.5 (Dr. 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  (Ejection fraction 45-50%)  Cardiac catheterization (0/02/40): PA systolic 58, wedge 11 indicating slightly elevated pulmonary pressures not secondary to left heart failure. Cardiac output 5.47, cardiac index 2.9, no coronary artery disease   Moderately elevated pulmonary systolic pressure. Severe mitral regurgitation      Overview Note:     Mild to moderate      Anxiety 04/14/2011       Past Medical History:   Diagnosis Date    Afib (HCC)     WATCHMAN    Anxiety     Arthritis     Cervical radiculopathy     CHF (congestive heart failure) (HCC)     Chronic sinusitis     Ejection fraction < 50%     25%. Wearing life vest    Hilar adenopathy     Hx of blood clots     Hypertension     Left ventricular systolic dysfunction     Lesion of left native kidney     Lumbar radiculopathy     Lung nodules     Meige syndrome     partial/ PCP    Microscopic colitis     Mitral regurgitation     Mild to moderate    Nonischemic cardiomyopathy ([address]     f/u with Dr. Michael Estrada    Osteopenia     PE (pulmonary thromboembolism) [facility]     Renal cell carcinoma of left kidney ([facility] 02/01/2022    Vertebrobasilar artery syndrome     f/u PCP       Family History   Problem Relation Age of Onset    Heart Attack Mother     Stroke Mother     Heart Attack Father     Heart Failure Father     Heart Disease Father     Anxiety Disorder Sister     Depression Sister     Crohn's Disease Son        Social History     Tobacco Use    Smoking status: Never    Smokeless tobacco: Never   Substance Use Topics    Alcohol use:  Yes     Alcohol/week: 0.0 standard drinks     Comment: occasional wine/mixed drink every few months       Current Facility-Administered Medications   Medication Dose Route Frequency Provider Last Rate Last Admin    meclizine (ANTIVERT) tablet 25 mg  25 mg Oral Q6H PRN UYEN Chavez - CNP        perflutren lipid microspheres (DEFINITY) injection 1.5 mL  1.5 mL IntraVENous ONCE PRN Madeline Flowers MD        perflutren lipid microspheres (DEFINITY) injection 1.5 mL  1.5 mL IntraVENous ONCE PRN Madeline Flowers MD        trimethobenzamide (TIGAN) injection 200 mg  200 mg IntraMUSCular Q6H PRN Claudeen Gist, MD   200 mg at 02/05/23 2255    piperacillin-tazobactam (ZOSYN) 3,375 mg in sodium chloride 0.9 % 50 mL IVPB ([id number])  3,375 mg IntraVENous Q12H John Nair MD   Stopped at 02/07/23 0245    amiodarone (CORDARONE) tablet 200 mg  200 mg Oral Daily Fernando Molina MD   200 mg at 02/06/23 0909    baclofen (LIORESAL) tablet 10 mg  10 mg Oral Nightly Sameaparna Molina MD   10 mg at 02/06/23 2020    dabigatran (PRADAXA) capsule 75 mg  75 mg Oral BID Palmira Gutierrez MD   75 mg at 02/06/23 2020    diphenhydrAMINE (BENADRYL) tablet 25 mg  25 mg Oral Q6H PRN Fernando Molina MD   25 mg at 02/06/23 2226    fluticasone (FLONASE) 50 MCG/ACT nasal spray 1 spray  1 spray Each Nostril Daily Sameaparna Molina MD   1 spray at 02/06/23 0910    hydrOXYzine HCl (ATARAX) tablet 10 mg  10 mg Oral Q8H PRN Fernando Molina MD   10 mg at 02/07/23 0025    [Held by provider] hydrALAZINE (APRESOLINE) tablet 10 mg  10 mg Oral TID Palmira Gutierrez MD   10 mg at 02/05/23 0824    [Held by provider] furosemide (LASIX) tablet 20 mg  20 mg Oral Daily Sameaparna Molina MD   20 mg at 02/05/23 0824    pantoprazole (PROTONIX) tablet 40 mg  40 mg Oral QAM AC Samer Cherie Molina MD   40 mg at 02/07/23 4137    metoprolol succinate (TOPROL XL) extended release tablet 25 mg  25 mg Oral Daily UYEN Estevez CNP   25 mg at 02/05/23 4564    [Held by provider] spironolactone (ALDACTONE) tablet 25 mg  25 mg Oral Daily Fernando Molina MD   25 mg at 02/05/23 5485    sodium chloride flush 0.9 % injection 10 mL  10 mL IntraVENous 2 times per day Palmira Gutierrez MD   10 mL at 02/06/23 2020    sodium chloride flush 0.9 % injection 10 mL  10 mL IntraVENous PRN Fernando Molina MD        0.9 % sodium chloride infusion   IntraVENous PRN Fernando Molina MD        magnesium hydroxide (MILK OF MAGNESIA) 400 MG/5ML suspension 30 mL  30 mL Oral Daily PRN Fernando DUMONT Andry Viramontes MD        acetaminophen (TYLENOL) tablet 650 mg  650 mg Oral Q6H PRN Samer Myles Avila MD        Or    acetaminophen (TYLENOL) suppository 650 mg  650 mg Rectal Q6H PRN Samer Myles Avila MD            Allergies   Allergen Reactions    Bentyl [Dicyclomine] Shortness Of Breath    Adhesive Tape Itching     develops rash and itching with EKG electrodes    Atacand [Candesartan] Hives and Itching    Cefdinir Hives, Itching and Nausea Only    Demerol      3/9/19 Pt states she gets a severe migraine    Digoxin Itching     developed itching and rash    Imdur [Isosorbide Mononitrate]       migraines    Losartan Potassium Itching    Sulfa Antibiotics Diarrhea and Other (See Comments)     Also causes migraines  caused migraines and diarrhea    Xarelto [Rivaroxaban] Itching and Rash      severe itch, headache, rash    Lovenox [Enoxaparin Sodium] Itching     States  her pulmonary doctor-DR Chava Souza took her off  lovenox (rash-itching)and she is starting on pradaxa today 1/26/2023    Acetaminophen Hives and Itching    Apap-Caff-Dihydrocodeine Hives and Itching    Coreg [Carvedilol] Itching     Can take extended release Coreg    Cozaar [Losartan] Itching    Diovan [Valsartan] Itching    Effexor [Venlafaxine Hydrochloride] Nausea Only    Eliquis [Apixaban] Hives, Itching and Rash     3/9/19 Pt states that she gets hives, itchy and a rash    Labetalol Itching    Lisinopril Itching    Ramipril Itching     ROS:   Constitutional: Negative for fever. Positive for activity change and appetite change. HENT: Negative for epistaxis. Eyes: Negative for diploplia, blurred vision. Respiratory: Negative for cough, chest tightness, shortness of breath and wheezing. Cardiovascular: pertinent positives in HPI  Gastrointestinal: Negative for abdominal pain and blood in stool.      PHYSICAL EXAM:   Vitals:    02/07/23 0309 02/07/23 0458 02/07/23 0707 02/07/23 0736   BP: 109/69  106/66 113/80   Pulse: 94  94 (!) 102   Resp: 20 18   Temp: 98.1 °F (36.7 °C)   98.2 °F (36.8 °C)   TempSrc: Temporal   Temporal   SpO2: 94%   95%   Weight:  166 lb 6.4 oz (75.5 kg)     Height:          Constitutional: Well-developed, no acute distress  Eyes: conjunctivae normal, no xanthelasma   Ears, Nose, Throat: oral mucosa moist, no cyanosis   CV: no JVD. Irregular rate and rhythm. HSM 3/6 at apex. No rubs, or gallops. PMI is nondisplaced  Lungs: clear to auscultation bilaterally, normal respiratory effort without used of accessory muscles  Abdomen: soft, non-tender, bowel sounds present, no masses or hepatomegaly   Musculoskeletal: no digital clubbing, +1 edema of BLEs   Skin: warm, no rashes    I have personally reviewed the laboratory, cardiac diagnostic and radiographic testing as outlined below:    Data:    Recent Labs     02/05/23  0604 02/06/23  0849 02/07/23  0603   WBC 6.1 6.7 7.5   HGB 13.8 13.7 14.6   HCT 42.7 42.4 44.7    281 289     Recent Labs     02/05/23  0604 02/07/23  0603 02/07/23  0814    136 132   K 3.7 4.1 5.4*    100 102   CO2 24 20* 16*   BUN 46* 45* 46*   CREATININE 1.9* 1.7* 1.7*   CALCIUM 9.6 9.9 9.4      Lab Results   Component Value Date/Time    MG 2.4 02/07/2023 08:14 AM     Recent Labs     02/04/23  2147   TSH 0.666     No results for input(s): INR in the last 72 hours. CXR 2/4/23:   FINDINGS:   Examination is limited by overlying heart monitor device. However, the lungs   appear clear. Pleural spaces are clear. Stable cardiomegaly is noted. No   acute osseous abnormality seen. Impression   No acute process identified, but evaluation limited. Telemetry 02/07/23 : AF 's bpm. No significant pause. EKG 2/5/23:  bpm, RBBB, Qtc 562 ms. Please see scan in Cardiology. JAIME 1/26/23:   Summary   Severely reduced left ventricular systolic function. Reduced right ventricular function. Bi-atrial enlargement. No evidence of interatrial shunting on bubble study.    History of WATCHMAN device (24 mm). No significant color flow jet around   the device. No device related thrombus visualized. Severe mitral regurgitation. Mild tricuspid regurgitation. RV-RA gradient is estimated at 27 mmHg. Signature      ----------------------------------------------------------------   Electronically signed by Yary Valencia MD(Interpreting   physician) on 01/26/2023 11:45 AM   ----------------------------------------------------------------    Echocardiogram 12/17/22:    Summary   Ejection fraction is visually estimated at 25%. Overall ejection fraction severely decreased. Mild left ventricular concentric hypertrophy noted. Dilated right ventricle with reduced function. Left atrial volume index of 50 ml per meters squared BSA. Moderate aortic regurgitation is noted. Moderate mitral regurgitation is present. Moderate tricuspid regurgitation. Pulmonary hypertension is severe. RVSP is 70 mmHg. No evidence for hemodynamically significant pericardial effusion. Signature      ----------------------------------------------------------------   Electronically signed by Angeles Garrett DO(Interpreting   physician) on 12/17/2022 06:57 PM   ----------------------------------------------------------------    Stress Test 12/20/22:   1: No definite evidence of  ischemia or scar except soft tissue   attenuation. The left ventricle is visually dilated both rest and   stress without evidence of TID     2  Dilated left ventricle with Moderate to severe global left   ventricular hypokinesis with estimated left ventricular ejection   fraction of 32%. 3:  Please see separate report for EKG and hemodynamic aspect of the   stress test.     4:  Low dose CT attenuation correction protocol was utilized which   revealed minimal to mild coronary artery ossifications. 5: RISK SCAN:  High risk scan due to dilated left ventricle with   severe global hypokinesis and EF of 32%.        I have independently reviewed all of the ECGs and rhythm strips per above     Assessment/Plan:    1. Persistent atrial fibrillation  - [id number]: 7; Status post  Watchman #24 on 10/14/2020 with Dr Sonja Moreira. - History of CHERYL thrombus  - History of  JAIME and DC-CV on 5/6/19, 11/4/19 and 2/3/23. Presented in NSR on 2/4/23 >>> back to AF with CVR on 2/4/23.   - Tikosyn 250 mcg BID: 11/4/19 >> March 2022 was reduced to 125 mcg BID and stopped on 12/19/22 due to elevated serum creatinine.   - Noted to have recurrent AF on 1/13/23 and started on Amiodarone on 1/20/23. Underwent DC-CV 2/3/23--ERAF 2/4/23 .   - Remains in AF with CVR. 2. Nonischemic cardiomyopathy and chronic HFrEF  - Diagnosed originally in 2013  - TTE: 12/2016: EF 38%  - JAIME: 3/2019: EF 25-30%  - JAIME: 5/2019: EF 25-30%  - TTE: 10/2020: EF 50%  - JAIME: 11/30/2020: EF 45-50%  - JAIME: 7/16/2021: EF 45-50%  - JAIME: 10/18/2021: EF 45-50%  - TTE: 12/17/22: EF 25%>>> given Lifevest   - JAIME: 1/26/23: severe LV dysfunction.  - GDMT: Toprol XL, Lasix, Apresoline( held due to low BP at times) and Aldactone at home. - Allergic to ACE-I/ARB and Nitrates  - NYHA III, ACC/AHA stage C  - Fairly compensated and euvolemic. 3. Valvular heart disease   - Severe MR on JAIME 1/26/23.  -Valve clinic referral; consider referral to [facility] or [facility] for eval     4. LBBB  - Chronic. 5. Prolonged QTc    6. Hypertension  - On Toprol XL, Lasix, Apresoline and Aldactone at home. 7. Severe pulmonary hypertension  - Noted on TTE 12/17/22    8. Pulmonary embolism  - Diagnosed 12/17/22. - Previously on Lovenox and currently on Pradaxa. 9. Lumbar and cervical radiculopathy    10. Anxiety    11. History of TIA    12. Meige syndrome     13. CKD with hx of renal cancer and left partial nephrectomy   Estimated Creatinine Clearance: 29 mL/min (A) (based on SCr of 1.7 mg/dL (H)).     14. Nausea and dizziness  - Patient states that it started since she is started on Lovenox and Pradaxa.  - Dizziness could be related to hypotension/ orthostatic changes, states while inpatient these symptoms occur with standing and walking     15. Multiple allergies/side effects to medications     Recommendations:  Continue Amiodarone 200 mg daily. Resume Toprol XL only if BP allows- had on 2/5/23. But is also on multiple other antihypertensives- lasix and hydralazine have been held for 2 days   For PE- Continue Pradaxa to 75 mg BID   Discussed possible MR work up at [facility] such as CC, patient deferred referral at this time. Possible CTS referral vs valve clinic in future   Possible plan for repeat DC-cardioversion in 2 to 3 weeks. Doubt that she would stay in NSR with underlying severe MR. Consider AVJ ablation with CRT-D implant   Avoid QT prolongation medications. Continue WCD lifeVest.  9.  NATALYA hose recommended   10. Discussed follow up and recommendation with primary team. [id number] Desiree Acosta for discharge from EP standpoint with outpatient follow up     Thank you for allowing me to participate in your patient's care. Please call me if there are any questions or concerns. UYEN Plata - CNP  Cardiac Electrophysiology  [facility] ([facility]) Physicians  The [facility] and Vascular Mobile: [place] [facility]  10:15 AM  2/7/2023    PHYSICIAN ADDENDUM:  I independently contributed to, made amendments as needed, and finalized the above documentation. I contributed >50% to the overall encounter time including review of testing, formulating a plan with the APC and communication with the other care team members and family.      Abdoul Brooke  Cardiac electrophysiology  [facility]) physicians

## 2023-02-07 NOTE — PROGRESS NOTES
CLINICAL PHARMACY NOTE: MEDS TO BEDS    Total # of Prescriptions Filled: 4   The following medications were delivered to the patient:  Furosemide 20 mg  Metoprolol ER 25 mg  Meclizine 25 mg  Spironolactone 25 mg    Additional Documentation:     Delivered meds to patient @4:45

## 2023-02-07 NOTE — PATIENT CARE CONFERENCE
P Quality Flow/Interdisciplinary Rounds Progress Note        Quality Flow Rounds held on February 7, 2023    Disciplines Attending:  Bedside Nurse, , , and Nursing Unit Leadership    Amberly Mcclure was admitted on 2/4/2023 12:25 PM    Anticipated Discharge Date:       Disposition:    Momo Score:  Momo Scale Score: 19    Readmission Risk              Risk of Unplanned Readmission:  22           Discussed patient goal for the day, patient clinical progression, and barriers to discharge.   The following Goal(s) of the Day/Commitment(s) have been identified:  Diagnostics - Report Results      Lizzy Arnold RN  February 7, 2023

## 2023-02-07 NOTE — PROGRESS NOTES
Notified  that son would like to speak to her.   And notified  Meghan Landis NP concerns regarding discharge

## 2023-02-09 ENCOUNTER — TELEPHONE (OUTPATIENT)
Dept: OTHER | Facility: CLINIC | Age: 78
End: 2023-02-09

## 2023-02-09 ENCOUNTER — APPOINTMENT (OUTPATIENT)
Dept: CT IMAGING | Age: 78
DRG: 291 | End: 2023-02-09
Payer: MEDICARE

## 2023-02-09 ENCOUNTER — TELEPHONE (OUTPATIENT)
Dept: FAMILY MEDICINE CLINIC | Age: 78
End: 2023-02-09

## 2023-02-09 ENCOUNTER — HOSPITAL ENCOUNTER (INPATIENT)
Age: 78
LOS: 9 days | Discharge: HOME | DRG: 291 | End: 2023-02-18
Attending: EMERGENCY MEDICINE | Admitting: FAMILY MEDICINE
Payer: MEDICARE

## 2023-02-09 ENCOUNTER — APPOINTMENT (OUTPATIENT)
Dept: GENERAL RADIOLOGY | Age: 78
DRG: 291 | End: 2023-02-09
Payer: MEDICARE

## 2023-02-09 DIAGNOSIS — I50.9 ACUTE ON CHRONIC CONGESTIVE HEART FAILURE, UNSPECIFIED HEART FAILURE TYPE (HCC): Primary | ICD-10-CM

## 2023-02-09 DIAGNOSIS — E87.3 RESPIRATORY ALKALOSIS: ICD-10-CM

## 2023-02-09 DIAGNOSIS — R06.00 DYSPNEA AND RESPIRATORY ABNORMALITIES: ICD-10-CM

## 2023-02-09 DIAGNOSIS — R06.89 DYSPNEA AND RESPIRATORY ABNORMALITIES: ICD-10-CM

## 2023-02-09 DIAGNOSIS — N17.9 AKI (ACUTE KIDNEY INJURY) (HCC): ICD-10-CM

## 2023-02-09 LAB
ALBUMIN SERPL-MCNC: 3.6 G/DL (ref 3.5–5.2)
ALP BLD-CCNC: 201 U/L (ref 35–104)
ALT SERPL-CCNC: 169 U/L (ref 0–32)
ANION GAP SERPL CALCULATED.3IONS-SCNC: 17 MMOL/L (ref 7–16)
APTT: 88.5 SEC (ref 24.5–35.1)
AST SERPL-CCNC: 141 U/L (ref 0–31)
B.E.: -4 MMOL/L (ref -3–3)
BASOPHILS ABSOLUTE: 0.02 E9/L (ref 0–0.2)
BASOPHILS RELATIVE PERCENT: 0.2 % (ref 0–2)
BILIRUB SERPL-MCNC: 1.4 MG/DL (ref 0–1.2)
BUN BLDV-MCNC: 57 MG/DL (ref 6–23)
CALCIUM SERPL-MCNC: 9.7 MG/DL (ref 8.6–10.2)
CHLORIDE BLD-SCNC: 99 MMOL/L (ref 98–107)
CO2: 16 MMOL/L (ref 22–29)
COHB: 0.9 % (ref 0–1.5)
CREAT SERPL-MCNC: 2.2 MG/DL (ref 0.5–1)
CRITICAL: ABNORMAL
DATE ANALYZED: ABNORMAL
DATE OF COLLECTION: ABNORMAL
EOSINOPHILS ABSOLUTE: 0.02 E9/L (ref 0.05–0.5)
EOSINOPHILS RELATIVE PERCENT: 0.2 % (ref 0–6)
GFR SERPL CREATININE-BSD FRML MDRD: 22 ML/MIN/1.73
GLUCOSE BLD-MCNC: 143 MG/DL (ref 74–99)
HCO3: 17.8 MMOL/L (ref 22–26)
HCT VFR BLD CALC: 46.7 % (ref 34–48)
HEMOGLOBIN: 14.9 G/DL (ref 11.5–15.5)
HHB: 2.8 % (ref 0–5)
IMMATURE GRANULOCYTES #: 0.03 E9/L
IMMATURE GRANULOCYTES %: 0.4 % (ref 0–5)
INR BLD: 2.2
LAB: ABNORMAL
LACTIC ACID: 2.7 MMOL/L (ref 0.5–2.2)
LYMPHOCYTES ABSOLUTE: 1.02 E9/L (ref 1.5–4)
LYMPHOCYTES RELATIVE PERCENT: 12.2 % (ref 20–42)
Lab: ABNORMAL
MAGNESIUM: 2.5 MG/DL (ref 1.6–2.6)
MCH RBC QN AUTO: 28.5 PG (ref 26–35)
MCHC RBC AUTO-ENTMCNC: 31.9 % (ref 32–34.5)
MCV RBC AUTO: 89.5 FL (ref 80–99.9)
METHB: 0.3 % (ref 0–1.5)
MODE: ABNORMAL
MONOCYTES ABSOLUTE: 0.39 E9/L (ref 0.1–0.95)
MONOCYTES RELATIVE PERCENT: 4.7 % (ref 2–12)
NEUTROPHILS ABSOLUTE: 6.85 E9/L (ref 1.8–7.3)
NEUTROPHILS RELATIVE PERCENT: 82.3 % (ref 43–80)
O2 CONTENT: 20.6 ML/DL
O2 SATURATION: 97.2 % (ref 92–98.5)
O2HB: 96 % (ref 94–97)
OPERATOR ID: 5100
PATIENT TEMP: 37 C
PCO2: 25.3 MMHG (ref 35–45)
PDW BLD-RTO: 15.7 FL (ref 11.5–15)
PH BLOOD GAS: 7.47 (ref 7.35–7.45)
PLATELET # BLD: 262 E9/L (ref 130–450)
PMV BLD AUTO: 10.3 FL (ref 7–12)
PO2: 96.8 MMHG (ref 75–100)
POTASSIUM SERPL-SCNC: 4.5 MMOL/L (ref 3.5–5)
PRO-BNP: ABNORMAL PG/ML (ref 0–450)
PROTHROMBIN TIME: 23.8 SEC (ref 9.3–12.4)
RBC # BLD: 5.22 E12/L (ref 3.5–5.5)
SODIUM BLD-SCNC: 132 MMOL/L (ref 132–146)
SOURCE, BLOOD GAS: ABNORMAL
THB: 15.2 G/DL (ref 11.5–16.5)
TIME ANALYZED: 1452
TOTAL PROTEIN: 7.3 G/DL (ref 6.4–8.3)
TROPONIN, HIGH SENSITIVITY: 10 NG/L (ref 0–9)
TROPONIN, HIGH SENSITIVITY: 49 NG/L (ref 0–9)
TROPONIN, HIGH SENSITIVITY: 55 NG/L (ref 0–9)
WBC # BLD: 8.3 E9/L (ref 4.5–11.5)

## 2023-02-09 PROCEDURE — 6360000002 HC RX W HCPCS: Performed by: FAMILY MEDICINE

## 2023-02-09 PROCEDURE — 6370000000 HC RX 637 (ALT 250 FOR IP): Performed by: FAMILY MEDICINE

## 2023-02-09 PROCEDURE — 36415 COLL VENOUS BLD VENIPUNCTURE: CPT

## 2023-02-09 PROCEDURE — 93005 ELECTROCARDIOGRAM TRACING: CPT | Performed by: PHYSICIAN ASSISTANT

## 2023-02-09 PROCEDURE — 93005 ELECTROCARDIOGRAM TRACING: CPT

## 2023-02-09 PROCEDURE — 80053 COMPREHEN METABOLIC PANEL: CPT

## 2023-02-09 PROCEDURE — 85730 THROMBOPLASTIN TIME PARTIAL: CPT

## 2023-02-09 PROCEDURE — 83880 ASSAY OF NATRIURETIC PEPTIDE: CPT

## 2023-02-09 PROCEDURE — 96374 THER/PROPH/DIAG INJ IV PUSH: CPT

## 2023-02-09 PROCEDURE — 71250 CT THORAX DX C-: CPT

## 2023-02-09 PROCEDURE — 83605 ASSAY OF LACTIC ACID: CPT

## 2023-02-09 PROCEDURE — 6360000002 HC RX W HCPCS

## 2023-02-09 PROCEDURE — 84484 ASSAY OF TROPONIN QUANT: CPT

## 2023-02-09 PROCEDURE — 74176 CT ABD & PELVIS W/O CONTRAST: CPT

## 2023-02-09 PROCEDURE — 83735 ASSAY OF MAGNESIUM: CPT

## 2023-02-09 PROCEDURE — 2140000000 HC CCU INTERMEDIATE R&B

## 2023-02-09 PROCEDURE — 85610 PROTHROMBIN TIME: CPT

## 2023-02-09 PROCEDURE — 71046 X-RAY EXAM CHEST 2 VIEWS: CPT

## 2023-02-09 PROCEDURE — 85025 COMPLETE CBC W/AUTO DIFF WBC: CPT

## 2023-02-09 PROCEDURE — 82805 BLOOD GASES W/O2 SATURATION: CPT

## 2023-02-09 PROCEDURE — 99285 EMERGENCY DEPT VISIT HI MDM: CPT

## 2023-02-09 RX ORDER — ONDANSETRON 2 MG/ML
4 INJECTION INTRAMUSCULAR; INTRAVENOUS EVERY 6 HOURS PRN
Status: DISCONTINUED | OUTPATIENT
Start: 2023-02-09 | End: 2023-02-19 | Stop reason: HOSPADM

## 2023-02-09 RX ORDER — BACLOFEN 10 MG/1
10 TABLET ORAL NIGHTLY
Status: DISCONTINUED | OUTPATIENT
Start: 2023-02-09 | End: 2023-02-10

## 2023-02-09 RX ORDER — FUROSEMIDE 10 MG/ML
20 INJECTION INTRAMUSCULAR; INTRAVENOUS ONCE
Status: COMPLETED | OUTPATIENT
Start: 2023-02-09 | End: 2023-02-09

## 2023-02-09 RX ORDER — ONDANSETRON 4 MG/1
4 TABLET, ORALLY DISINTEGRATING ORAL EVERY 8 HOURS PRN
Status: DISCONTINUED | OUTPATIENT
Start: 2023-02-09 | End: 2023-02-19 | Stop reason: HOSPADM

## 2023-02-09 RX ADMIN — ONDANSETRON 4 MG: 2 INJECTION INTRAMUSCULAR; INTRAVENOUS at 22:14

## 2023-02-09 RX ADMIN — BACLOFEN 10 MG: 10 TABLET ORAL at 22:14

## 2023-02-09 RX ADMIN — FUROSEMIDE 20 MG: 10 INJECTION, SOLUTION INTRAMUSCULAR; INTRAVENOUS at 20:23

## 2023-02-09 ASSESSMENT — PAIN - FUNCTIONAL ASSESSMENT: PAIN_FUNCTIONAL_ASSESSMENT: NONE - DENIES PAIN

## 2023-02-09 NOTE — TELEPHONE ENCOUNTER
Writer contacted ED provider to inform of 30 day readmission risk. ED provider informed writer of readmission.     Call Back: If you need to call back to inform of disposition you can contact me at [phone]

## 2023-02-09 NOTE — ED NOTES
Repeat ekg obtained and shown to physician. Anai collected and sent.      Bhavana Burns RN  02/09/23 0891

## 2023-02-09 NOTE — ED NOTES
Department of Emergency Medicine  FIRST PROVIDER TRIAGE NOTE             Independent MLP           2/9/23  11:42 AM EST    Date of Encounter: 2/9/23   MRN: [id number]      HPI: Aidee Charles is a 77 y.o. female who presents to the ED for Shortness of Breath (Hypotensive @ PCP, SOB from a-fib, denies CP, dizzy )     Patient is a 77-year-old presenting with dizziness lightheadedness over the last few days.  Patient had her home health nurse take her blood pressure and it was 80/60.  Patient is wearing a LifeVest.  Patient sees Dr. Castillo.  Patient also has a history of congestive heart failure.  Patient also is always in A-fib.  Patient states recently she was cardioverted and went right back.  Patient is on blood thinners    ROS: Negative for cough, vomiting, diarrhea, urinary complaints, or rash.    PE: Gen Appearance/Constitutional: alert  HEENT: NC/NT. PERRLA,  Airway patent.  Neck: supple     Initial Plan of Care: All treatment areas with department are currently occupied. Plan to order/Initiate the following while awaiting opening in ED: labs, EKG, and imaging studies.  Initiate Treatment-Testing, Proceed toTreatment Area When Bed Available for ED Attending/MLP to Continue Care    Electronically signed by Chichi Ace PA-C   DD: 2/9/23       Chichi Ace PA-C  02/09/23 1141

## 2023-02-09 NOTE — LETTER
[place] Department Medicaid  CERTIFICATION OF NECESSITY  FOR NON-EMERGENCY TRANSPORTATION   BY GROUND AMBULANCE      Individual Information   1. Name: Armen Greco 2. [place] Medicaid Billing Number:    3. Address[place] [address]      Transportation Provider Information   4. Provider Name:    5. [place] Medicaid Provider Number:  National Provider Identifier (NPI):      Certification  7. Criteria:  During transport, this individual requires:  [] Medical treatment or continuous     supervision by an EMT. [] The administration or regulation of oxygen by another person. [] Supervised protective restraint. 8. Period Beginning Date:    5. Length  [] Not more than 1 day(s)  [] One Year     Additional Information Relevant to Certification   10. Comments or Explanations, If Necessary or Appropriate     CHF & transfer for cardiology input     Certifying Practitioner Information   11. Name of Practitioner: Coral Valentin   12. [place] Medicaid Provider Number, If Applicable:  [address] Provider Identifier (NPI):      Signature Information   14. Date of Signature:  13. Name of Person Signin. Signature and Professional Designation:      [facility] [id number]  Rev. 2015            [address] [place] [address] Encounter Date/Time: 2023 [address] Account: [de-identified]    MRN: [id number]    Patient: Armen Greco    Contact Serial #: [id number]      ENCOUNTER          Patient Class: I Private Enc? No [address]   Hospital Service: INM   Encounter DX: Heart failure ([facility] [M71*   ADM Provider: Yelena Cuevas DO   Procedure:     ATT Provider: Hector Davila MD   REF Provider:        Admission DX: Heart failure ([facility] and DX codes: I50.9      PATIENT                 Name: Armen Greco : 1945 (68 yrs)   Address: [address] [place] [address] Sex: Female   [place] [facility] [address]         Marital Status:     Employer: RETIRED         Rastafarian: Jewish   Primary Care Provider: Truong Elizondo DO         Primary Phone: [address] Name Legal Guardian? Relationship to Patient Home Phone Work Phone   1. Mitchell Charles  2. Soren Escobedo      Child  Brother/Sister [phone]              GUARANTOR            Guarantor: Irwin Lee     : 1945   [place] [address] Sex: Female     Hakeem Panda [facility]     Relation to Patient: Self       Home Phone: [phone]   Guarantor ID: [id number]       Work Phone:     Guarantor Employer: RETIRED         Status: RETIRED      COVERAGE        PRIMARY INSURANCE   Payor: MEDICARE Plan: MEDICARE PART A AND B   Payor Address: [facility] [address],  [facility], [facility]       Group Number:   Insurance Type: INDEMNITY   Subscriber Name: Elena Du : 1945   Subscriber ID: [id number] Pat. Rel. to Sub: Self   SECONDARY INSURANCE   Payor: [employer] Plan: [employer] [address]   Payor Address:  [place] [address]          Group Number: [id number] Insurance Type: INDEMNITY   Subscriber Name: Elena Du : 1945   Subscriber ID: [id number] Pat.  Rel. to Sub: SELF      CSN: [id number]

## 2023-02-09 NOTE — ED NOTES
Lactate drawn and sent to lab. Pt denies current needs. Call light in reach.      Mercedes Downs, RN  02/09/23 5365

## 2023-02-09 NOTE — ED PROVIDER NOTES
Alcon Singh is a 68 y.o. female    HPI   Alcon Singh is a 68 y.o. female presenting to the ED for Shortness of Breath (Hypotensive @ PCP, SOB from a-fib, denies CP, dizzy )    History comes primarily from the patient and Family. Presents to the emergency department sent in by her PCP due to being hypotensive in the office, shortness of breath which she says is from A-fib, lightheadedness and significant swelling in her lower extremities. This has been going on for quite some time and does not seem to be significantly improving. Patient is said her shortness of breath has seemed to worsen over the last couple days and finally brought her in today. Patient does appear to be on 20 mg Lasix, 25 mg of spironolactone, both daily. Patient does have a history of atrial fibrillation and for anticoagulation she is on dabigatran due to adverse reactions to other anticoagulants. Patient appears to have last been cardioverted 6 days ago. He is currently in A-fib though her heart rate is normal.  She does have a Watchman. Echocardiogram on 1/26/2023 only notes severely reduced left ventricular systolic function. Reduced right ventricular function, bilateral atrial enlargement, severe mitral regurgitation, mild tricuspid regurgitation and RV/RA gradient of 27 mmHg. Review of Systems   Full review of systems completed. Pertinent positives and negatives per the HPI, unless otherwise stated ROS is negative. Physical Exam  Vitals and nursing note reviewed. Constitutional:       General: She is not in acute distress. Appearance: Normal appearance. She is ill-appearing. HENT:      Head: Normocephalic and atraumatic. Right Ear: External ear normal.      Left Ear: External ear normal.      Nose: Nose normal. No rhinorrhea. Mouth/Throat:      Mouth: Mucous membranes are moist.      Pharynx: Oropharynx is clear. Eyes:      Extraocular Movements: Extraocular movements intact. Conjunctiva/sclera: Conjunctivae normal.      Pupils: Pupils are equal, round, and reactive to light. Cardiovascular:      Rate and Rhythm: Normal rate and regular rhythm. Pulses: Normal pulses. Heart sounds: Normal heart sounds. No murmur heard. Pulmonary:      Effort: Pulmonary effort is normal. Tachypnea present. No respiratory distress. Breath sounds: Decreased breath sounds and rhonchi present. No wheezing. Abdominal:      General: Bowel sounds are normal. There is no distension. Palpations: Abdomen is soft. Tenderness: There is no abdominal tenderness. Musculoskeletal:         General: No swelling or deformity. Normal range of motion. Cervical back: Normal range of motion and neck supple. No rigidity. Right lower leg: Edema present. Left lower leg: Edema present. Skin:     General: Skin is warm and dry. Coloration: Skin is not jaundiced or pale. Neurological:      General: No focal deficit present. Mental Status: She is alert and oriented to person, place, and time. Mental status is at baseline. Motor: No weakness. Psychiatric:         Mood and Affect: Mood normal.         Behavior: Behavior normal.          Past medical history/chonic conditions affecting care   has a past medical history of Afib ([facility] [address] Anxiety, Arthritis, Cervical radiculopathy, CHF (congestive heart failure) (HCC), Chronic sinusitis, Ejection fraction < 50%, Hilar adenopathy, blood clots, Hypertension, Left ventricular systolic dysfunction, Lesion of left native kidney, Lumbar radiculopathy, Lung nodules, Meige syndrome, Microscopic colitis, Mitral regurgitation, Nonischemic cardiomyopathy ([address] Osteopenia, PE (pulmonary thromboembolism) ([address] Renal cell carcinoma of left kidney ([facility] [address] (02/01/2022), and Vertebrobasilar artery syndrome.              Medical Decision Making/Differential Diagnosis:    CC/HPI Summary, social determinants of health, records reviewed, DDX, testing done/not done, ED course, reassessment, disposition considerations/shared decision making with patient, consults, disposition:    Medical Decision Making  Amount and/or Complexity of Data Reviewed  Independent Historian:      Details: Patient's family member provided an independent history as well. Labs: ordered. Decision-making details documented in ED Course. Radiology: ordered and independent interpretation performed. Decision-making details documented in ED Course. ECG/medicine tests: ordered and independent interpretation performed. Decision-making details documented in ED Course. Risk  Prescription drug management. Decision regarding hospitalization. EKG is ordered to have documentation of patient's current rhythm, and to rule out any obvious acute cardiac illnesses such as ACS. Additionally, QT interval may be of use in decision making regarding any medications administered here in the ED. Patient is placed on cardiac monitor and continuous pulse ox for monitoring. CBC is ordered to evaluate for any signs of infection or inflammation by obtaining a WBC count, or any signs of acute anemia by interpreting hemoglobin. CMP was ordered to evaluate for any electrolyte imbalances, kidney function, or any elevations in anion gap. Troponin ordered to evaluate for possible cardiac etiology of symptoms including but not limited to STEMI or NSTEMI. Lactic acid levels were ordered to evaluate for signs of ischemia or decrease perfusion to organ systems. Protime-INR ordered in case hemorrhages are found on imaging that requires anticoagulation reversal or surgical intervention. BNP ordered to evaluate for possible cardiac failure or worsening cardiac function. Chest x-ray is ordered to evaluate for any possible signs of pneumonia, pleural effusions, cardiomegaly, pneumothorax, atelectasis, rib or sternal abnormalities including fractures.  A CT abdomen with IV contrast was ordered to evaluate for, but without limitation, constipation, small bowel obstruction, bowel ischemia, pneumoperitoneum, diverticulitis, cholecystitis, appendicitis, perforation. CTA chest with contrast ordered to evaluate for, but without limitation to, pulmonary embolism, effusions, pneumonia, dissection or acute bony fractures. Patient's labs are independently interpreted by me. CBC is unremarkable. Chemistries shows a serum bicarb of 16, nonfasting glucose of 143, creatinine of 2.2 which is worse than her baseline. Patient also has mild transaminitis. Lactic acid is 2.7, BNP elevated at 22,000. Troponin was initially 10 with a jump to 55 and a third repeat at 49. ABG was collected as well showing a slightly alkalemia pH to what appears to be a respiratory cause as her PCO2 is 25 and her bicarb is 18 with normal oxygenation. Based on the patient's labs, exam and history, the patient may be having difficulties with heart failure and is given a dose of Lasix here in the emergency department. She appears volume overloaded which is likely can attributing to her pulmonary vascular congestion and as well her NIKA. The patient is somewhat tachypneic which is likely why her PCO2 is decreased. And while her troponin did increase notably, further repeat was decreasing and I think it likely related to her volume overloaded state with NIKA rather than intrinsic NSTEMI; it is likely demand ischemia. Throughout the patient's stay, she remained nonhypoxic without oxygen required and her vitals were stable. CT of the chest was concerned possibly for developing pneumonia in the right lower lobe and she had some other nodules that have been seen previously and were stable. CT of the abdomen showed a nonobstructive 2 mm right renal calculi with no other abnormalities. Based on the patient's home work-up I think the primary concern is congestive heart failure with resultant NIKA.   There may be a developing pneumonia, however the white count is not elevated and while the lactic acid is, heart failure and NIKA could be contributing or causing. Additionally, the patient was not worked up for pulmonary embolism as she is already on aspirin and Pradaxa, the Pradaxa being treatment for previous pulmonary embolism as well as afib. Not to mention her renal function was too poor for evaluation of PE with a contrast study. Patient was admitted to the hospitalist for further management if the patient's condition continues to deteriorate, a perfusion study may be considered. As interpreted by me, the below displayed labs are abnormal. All other labs obtained during this visit were within normal range or not returned as of this dictation.   Labs Reviewed   CBC WITH AUTO DIFFERENTIAL - Abnormal; Notable for the following components:       Result Value    MCHC 31.9 (*)     RDW 15.7 (*)     Neutrophils % 82.3 (*)     Lymphocytes % 12.2 (*)     Lymphocytes Absolute 1.02 (*)     Eosinophils Absolute 0.02 (*)     All other components within normal limits   COMPREHENSIVE METABOLIC PANEL - Abnormal; Notable for the following components:    CO2 16 (*)     Anion Gap 17 (*)     Glucose 143 (*)     BUN 57 (*)     Creatinine 2.2 (*)     Total Bilirubin 1.4 (*)     Alkaline Phosphatase 201 (*)      (*)      (*)     All other components within normal limits   TROPONIN - Abnormal; Notable for the following components:    Troponin, High Sensitivity 10 (*)     All other components within normal limits   PROTIME-INR - Abnormal; Notable for the following components:    Protime 23.8 (*)     All other components within normal limits   APTT - Abnormal; Notable for the following components:    aPTT 88.5 (*)     All other components within normal limits   TROPONIN - Abnormal; Notable for the following components:    Troponin, High Sensitivity 55 (*)     All other components within normal limits   BRAIN NATRIURETIC PEPTIDE - Abnormal; Notable for the following components:    Pro-BNP 21,949 (*)     All other components within normal limits   LACTIC ACID - Abnormal; Notable for the following components:    Lactic Acid 2.7 (*)     All other components within normal limits   BLOOD GAS, ARTERIAL - Abnormal; Notable for the following components:    pH, Blood Gas 7.466 (*)     PCO2 25.3 (*)     HCO3 17.8 (*)     B.E. -4.0 (*)     All other components within normal limits   TROPONIN - Abnormal; Notable for the following components:    Troponin, High Sensitivity 49 (*)     All other components within normal limits   BASIC METABOLIC PANEL - Abnormal; Notable for the following components:    CO2 18 (*)     Anion Gap 20 (*)     Glucose 104 (*)     BUN 69 (*)     Creatinine 2.6 (*)     All other components within normal limits    Narrative:     Collection has been rescheduled by [employer] at 02/10/2023 05:29 Reason:   Failed attempt at venipuncture   CBC WITH AUTO DIFFERENTIAL - Abnormal; Notable for the following components:    RDW 15.8 (*)     Neutrophils % 84.4 (*)     Lymphocytes % 7.8 (*)     Neutrophils Absolute 8.26 (*)     Lymphocytes Absolute 0.76 (*)     Eosinophils Absolute 0.00 (*)     All other components within normal limits    Narrative:     Collection has been rescheduled by [employer] at 02/10/2023 05:29 Reason:   Failed attempt at venipuncture   UREA NITROGEN, URINE - Abnormal; Notable for the following components:    Urea Nitrogen, Ur 673 (*)     All other components within normal limits   MICROALBUMIN / CREATININE URINE RATIO - Abnormal; Notable for the following components:    Microalbumin, Random Urine 137.0 (*)     Microalbumin Creatinine Ratio 87.3 (*)     All other components within normal limits   URINALYSIS - Abnormal; Notable for the following components:    Leukocyte Esterase, Urine TRACE (*)     All other components within normal limits   MAGNESIUM   MAGNESIUM    Narrative:     Collection has been rescheduled by [employer] at 02/10/2023 05:29 Reason:   Failed attempt at venipuncture   URINE ELECTROLYTES   CREATININE, RANDOM URINE   MICROSCOPIC URINALYSIS   CBC WITH AUTO DIFFERENTIAL   PHOSPHORUS   PTH, INTACT   VITAMIN D 25 HYDROXY   MAGNESIUM   COMPREHENSIVE METABOLIC PANEL W/ REFLEX TO MG FOR LOW K       EKG interpretation: This EKG is signed and interpreted by me. Rate: 85  Rhythm: Irregularly irregular  Axis: left  Interpretation: Patient is in A-fib, QRS is widened, QTc is 542  Comparison: stable as compared to patient's most recent EKG    EKG2:  This EKG is signed and interpreted by me. Rate: 85  Rhythm: afib  Axis: left  Interpretation: no acute changes  Comparison: stable as compared to patient's most recent EKG      RADIOLOGY:   Non-plain film images such as CT, Ultrasound and MRI are read by the radiologist. Plain radiographic images are visualized and preliminarily interpreted by myself with the below findings:  Chest x-ray is interpreted by me showed possible vascular congestion but no other focal consolidations or pneumothorax. Interpretation per the Radiologist below, if available at the time of this note:  CT ABDOMEN PELVIS WO CONTRAST Additional Contrast? None   Final Result   1. No acute process in abdomen or pelvis. 2. Nonobstructing 2 mm right renal calculus. CT CHEST WO CONTRAST   Final Result   1. Subtle ground-glass opacities located in right lower lobe which appear new   compared to prior and could indicate bronchiolitis or developing viral   pneumonia. 2. Stable ground-glass nodule located in left upper lobe measures 7 mm. 3. Stable nodule located in right upper lobe measures 10 mm. 4. Stable moderate cardiomegaly. RECOMMENDATIONS:   Fleischner Society guidelines for follow-up and management of incidentally   detected pulmonary nodules:      Single Solid Nodule:      Nodule size less than 6 mm   In a low-risk patient, no routine follow-up. In a high-risk patient, optional CT at 12 months.       Nodule size equals 6-8 mm In a low-risk patient, CT at 6-12 months, then consider CT at 18-24 months. In a high-risk patient, CT at 6-12 months, then CT at 18-24 months. Nodule size greater than 8 mm         In a low-risk patient, consider CT at 3 months, PET/CT, or tissue sampling. In a high-risk patient, consider CT at 3 months, PET/CT, or tissue sampling. Multiple Solid Nodules:      Nodule size less than 6 mm   In a low-risk patient, no routine follow-up. In a high-risk patient, optional CT at 12 months. Nodule size equals 6-8 mm   In a low-risk patient, CT at 3-6 months, then consider CT at 18-24 months. In a high-risk patient, CT at 3-6 months, then CT at 18-24 months. Nodule size greater than 8 mm   In a low-risk patient, CT at 3-6 months, then consider CT at 18-24 months. In a high-risk patient, CT at 3-6 months, then CT at 18-24 months. - Low risk patients include individuals with minimal or absent history of   smoking and other known risk factors. - High risk patients include individuals with a history or smoking or known   risk factors. Radiology 2017 http://pubs. rsna.org/doi/full/10.1148/radiol. 7419501881         XR CHEST (2 VW)   Final Result   Suspect pulmonary vascular congestion. XR CHEST (2 VW)    Result Date: 2/9/2023  EXAMINATION: TWO XRAY VIEWS OF THE CHEST 2/9/2023 12:29 pm COMPARISON: None. HISTORY: ORDERING SYSTEM PROVIDED HISTORY: lifevest, a fib, sob all the time, now hypotension 80/60 TECHNOLOGIST PROVIDED HISTORY: Reason for exam:->lifevest, a fib, sob all the time, now hypotension 80/60 What reading provider will be dictating this exam?->CRC FINDINGS: The heart is enlarged. Pulmonary vessels are congested. No airspace consolidation. No pneumothorax. There is mild blunting of the left costophrenic angle. Suspect pulmonary vascular congestion.      CT Head W/O Contrast    Result Date: 2/4/2023  EXAMINATION: CT OF THE HEAD WITHOUT CONTRAST  2/4/2023 2:53 pm TECHNIQUE: CT of the head was performed without the administration of intravenous contrast. Automated exposure control, iterative reconstruction, and/or weight based adjustment of the mA/kV was utilized to reduce the radiation dose to as low as reasonably achievable. COMPARISON: None. HISTORY: ORDERING SYSTEM PROVIDED HISTORY: vertigo, nausea TECHNOLOGIST PROVIDED HISTORY: Reason for exam:->vertigo, nausea Has a \"code stroke\" or \"stroke alert\" been called? ->No Decision Support Exception - unselect if not a suspected or confirmed emergency medical condition->Emergency Medical Condition (MA) What reading provider will be dictating this exam?->CRC FINDINGS: BRAIN/VENTRICLES: There is no acute intracranial hemorrhage, mass effect or midline shift. No abnormal extra-axial fluid collection. The gray-white differentiation is maintained without evidence of an acute infarct. There is no evidence of hydrocephalus. The ventricles, cisterns and sulci are prominent consistent with atrophy. There is decreased attenuation within the periventricular white matter consistent with periventricular leukomalacia. ORBITS: The visualized portion of the orbits demonstrate no acute abnormality. SINUSES: The visualized paranasal sinuses and mastoid air cells demonstrate no acute abnormality. SOFT TISSUES/SKULL:  No acute abnormality of the visualized skull or soft tissues. 1.  There is no acute intracranial abnormality. Specifically, there is no intracranial hemorrhage. 2. Atrophy and periventricular leukomalacia, . XR CHEST PORTABLE    Result Date: 2/4/2023  EXAMINATION: ONE XRAY VIEW OF THE CHEST 2/4/2023 7:51 pm COMPARISON: 12/29/2022 HISTORY: ORDERING SYSTEM PROVIDED HISTORY: altered mental status TECHNOLOGIST PROVIDED HISTORY: Reason for exam:->altered mental status What reading provider will be dictating this exam?->CRC FINDINGS: Examination is limited by overlying heart monitor device.   However, the lungs appear clear.  Pleural spaces are clear. Stable cardiomegaly is noted. No acute osseous abnormality seen. No acute process identified, but evaluation limited. MRI BRAIN WO CONTRAST    Result Date: 2/6/2023  EXAMINATION: MRI OF THE BRAIN WITHOUT CONTRAST  2/6/2023 3:32 pm TECHNIQUE: Multiplanar multisequence MRI of the brain was performed without the administration of intravenous contrast. COMPARISON: CT brain 02/04/2023 HISTORY: ORDERING SYSTEM PROVIDED HISTORY: vertigo, r/o post circulation stroke TECHNOLOGIST PROVIDED HISTORY: Reason for exam:->vertigo, r/o post circulation stroke What reading provider will be dictating this exam?->CRC FINDINGS: Ischemia: No restricted diffusion is seen to suggest ischemia. Small focus of gliosis in the subcortical white matter of the left supramarginal gyrus, likely a focus of old ischemia. Parenchyma: No acute intracranial hemorrhage or mass effect. Old microhemorrhage in the left cerebellum. Ventricles: No evidence of hydrocephalus. Flow voids: Flow voids are present in the proximal major intracranial arteries. No evidence of infarct, intracranial hemorrhage, mass effect, or hydrocephalus. Nonemergent incidental findings as above. No results found.       Procedures       Critical care time      Emergency Department Course  Vitals:    Vitals:    02/10/23 1658 02/10/23 1756 02/10/23 2000 02/11/23 0027   BP:  117/70 106/68 (!) 149/120   Pulse:   (!) 110 100   Resp:  22 20 20   Temp:   97 °F (36.1 °C) 97.5 °F (36.4 °C)   TempSrc:   Tympanic    SpO2:   97% 96%   Weight:       Height: 5' 7\" (1.702 m)          Patient was given the following medications:  Medications   amiodarone (CORDARONE) tablet 200 mg (200 mg Oral Given 2/10/23 0845)   cetirizine (ZYRTEC) tablet 5 mg (5 mg Oral Given 2/10/23 0845)   dabigatran (PRADAXA) capsule 75 mg (75 mg Oral Given 2/10/23 2005)   metoprolol succinate (TOPROL XL) extended release tablet 25 mg (25 mg Oral Not Given 2/10/23 0850) pantoprazole (PROTONIX) tablet 40 mg (40 mg Oral Given 2/10/23 0844)   spironolactone (ALDACTONE) tablet 25 mg (25 mg Oral Not Given 2/10/23 0850)   vitamin D (CHOLECALCIFEROL) tablet 1,000 Units (1,000 Units Oral Given 2/10/23 0850)   sodium chloride flush 0.9 % injection 5-40 mL (5 mLs IntraVENous Not Given 2/10/23 2006)   sodium chloride flush 0.9 % injection 5-40 mL (has no administration in time range)   0.9 % sodium chloride infusion (has no administration in time range)   ondansetron (ZOFRAN-ODT) disintegrating tablet 4 mg ( Oral See Alternative 2/10/23 1240)     Or   ondansetron (ZOFRAN) injection 4 mg (4 mg IntraVENous Given 2/10/23 1240)   polyethylene glycol (GLYCOLAX) packet 17 g (has no administration in time range)   furosemide (LASIX) injection 40 mg (40 mg IntraVENous Given 2/10/23 1903)   diphenhydrAMINE (BENADRYL) tablet 25 mg (has no administration in time range)   DOBUTamine (DOBUTREX) 1000 mg in dextrose 5 % 250 mL infusion (3 mcg/kg/min × 75.4 kg IntraVENous New Bag 2/10/23 1443)   sodium bicarbonate tablet 650 mg (650 mg Oral Given 2/10/23 2005)   baclofen (LIORESAL) tablet 5 mg (5 mg Oral Given 2/10/23 1903)   furosemide (LASIX) injection 20 mg (20 mg IntraVENous Given 2/9/23 2023)                 CONSULTS: (Who and What was discussed)  IP CONSULT TO HOSPITALIST  IP CONSULT TO HEART FAILURE NURSE/COORDINATOR  IP CONSULT TO DIETITIAN  IP CONSULT TO CARDIOLOGY  IP CONSULT TO NEPHROLOGY  IP CONSULT TO ELECTROPHYSIOLOGY        I am the Primary Clinician of Record. Final impression  1. Acute on chronic congestive heart failure, unspecified heart failure type ([facility] [address]    2. NIKA (acute kidney injury) ([facility] [address]    3. Respiratory alkalosis    4.  Dyspnea and respiratory abnormalities          Disposition/plan  DISPOSITION Admitted 02/09/2023 08:37:55 PM    PATIENT REFERRED TO:  Walter Rodgers [id number]  [phone]  Go on 2/15/2023  Appointment scheduled on 2/15/2023 @ 12:30 PM and 3/1 @ 12:00 PM, if you need to reschedule please call [address] [place] [address], APRN - [address]. Nick Taylor  [phone]    Go on 2/23/2023  Appointment scheduled on 2/23/2023 @ 10:00 AM, if you need to reschedule please call [address] [place] [address], Radha Hunt .   [facility] [id number] [phone]    Go on 4/6/2023  Appointment scheduled on 4/6/2023 @ 10:00 AM, if you need to reschedule please call [phone]:  Current Discharge Medication List          DISCONTINUED MEDICATIONS:  Current Discharge Medication List                 (Please note that portions of this note were completed with a voice recognition program.  Efforts were made to edit the dictations but occasionally words are mis-transcribed.)         Sharan Soria MD  Resident  02/11/23 [id number]

## 2023-02-09 NOTE — ED NOTES
Iv established and additional labs collected and sent along with ABG. Pt denies current needs. Call light in reach.      Reginia Schwab, RN  02/09/23 [id number]

## 2023-02-09 NOTE — TELEPHONE ENCOUNTER
Wendy Gerardo from [facility] called in stating she was with pt today and pt is not feeling well. Wendy Gerardo stated pt BP was 82/60, she was SOB at rest, and she was dizzy and light headed. Wendy Gerardo was wondering if  would want pt to go to ED and I advised Wendy Gerardo to have pt taken to ED with her symptoms and low BP. Wendy Gerardo was agreeable and is sending pt to ED.  Please advise

## 2023-02-10 PROBLEM — I44.7 LEFT BUNDLE BRANCH BLOCK: Status: ACTIVE | Noted: 2023-02-10

## 2023-02-10 PROBLEM — N18.4 STAGE 4 CHRONIC KIDNEY DISEASE (HCC): Status: ACTIVE | Noted: 2023-02-10

## 2023-02-10 PROBLEM — I50.20 HFREF (HEART FAILURE WITH REDUCED EJECTION FRACTION) (HCC): Status: ACTIVE | Noted: 2023-02-09

## 2023-02-10 LAB
ANION GAP SERPL CALCULATED.3IONS-SCNC: 20 MMOL/L (ref 7–16)
BACTERIA: NORMAL /HPF
BASOPHILS ABSOLUTE: 0.02 E9/L (ref 0–0.2)
BASOPHILS RELATIVE PERCENT: 0.2 % (ref 0–2)
BILIRUBIN URINE: NEGATIVE
BLOOD CULTURE, ROUTINE: NORMAL
BLOOD, URINE: NEGATIVE
BUN BLDV-MCNC: 69 MG/DL (ref 6–23)
CALCIUM SERPL-MCNC: 9.8 MG/DL (ref 8.6–10.2)
CHLORIDE BLD-SCNC: 98 MMOL/L (ref 98–107)
CHLORIDE URINE RANDOM: <20 MMOL/L
CLARITY: CLEAR
CO2: 18 MMOL/L (ref 22–29)
COLOR: YELLOW
CREAT SERPL-MCNC: 2.6 MG/DL (ref 0.5–1)
CREATININE URINE: 157 MG/DL (ref 29–226)
CREATININE URINE: 158 MG/DL (ref 29–226)
CULTURE, BLOOD 2: NORMAL
EKG ATRIAL RATE: 84 BPM
EKG ATRIAL RATE: 88 BPM
EKG Q-T INTERVAL: 456 MS
EKG Q-T INTERVAL: 456 MS
EKG QRS DURATION: 168 MS
EKG QRS DURATION: 172 MS
EKG QTC CALCULATION (BAZETT): 542 MS
EKG QTC CALCULATION (BAZETT): 542 MS
EKG R AXIS: -86 DEGREES
EKG R AXIS: -88 DEGREES
EKG T AXIS: 96 DEGREES
EKG T AXIS: 97 DEGREES
EKG VENTRICULAR RATE: 85 BPM
EKG VENTRICULAR RATE: 85 BPM
EOSINOPHILS ABSOLUTE: 0 E9/L (ref 0.05–0.5)
EOSINOPHILS RELATIVE PERCENT: 0 % (ref 0–6)
GFR SERPL CREATININE-BSD FRML MDRD: 18 ML/MIN/1.73
GLUCOSE BLD-MCNC: 104 MG/DL (ref 74–99)
GLUCOSE URINE: NEGATIVE MG/DL
HCT VFR BLD CALC: 44.8 % (ref 34–48)
HEMOGLOBIN: 14.9 G/DL (ref 11.5–15.5)
IMMATURE GRANULOCYTES #: 0.08 E9/L
IMMATURE GRANULOCYTES %: 0.8 % (ref 0–5)
KETONES, URINE: NEGATIVE MG/DL
LEUKOCYTE ESTERASE, URINE: ABNORMAL
LYMPHOCYTES ABSOLUTE: 0.76 E9/L (ref 1.5–4)
LYMPHOCYTES RELATIVE PERCENT: 7.8 % (ref 20–42)
MAGNESIUM: 2.5 MG/DL (ref 1.6–2.6)
MCH RBC QN AUTO: 27.8 PG (ref 26–35)
MCHC RBC AUTO-ENTMCNC: 33.3 % (ref 32–34.5)
MCV RBC AUTO: 83.6 FL (ref 80–99.9)
MICROALBUMIN UR-MCNC: 137 MG/L
MICROALBUMIN/CREAT UR-RTO: 87.3 (ref 0–30)
MONOCYTES ABSOLUTE: 0.67 E9/L (ref 0.1–0.95)
MONOCYTES RELATIVE PERCENT: 6.8 % (ref 2–12)
NEUTROPHILS ABSOLUTE: 8.26 E9/L (ref 1.8–7.3)
NEUTROPHILS RELATIVE PERCENT: 84.4 % (ref 43–80)
NITRITE, URINE: NEGATIVE
PDW BLD-RTO: 15.8 FL (ref 11.5–15)
PH UA: 5.5 (ref 5–9)
PLATELET # BLD: 290 E9/L (ref 130–450)
PMV BLD AUTO: 10.8 FL (ref 7–12)
POTASSIUM SERPL-SCNC: 4.5 MMOL/L (ref 3.5–5)
POTASSIUM, UR: 54.5 MMOL/L
PROTEIN UA: ABNORMAL MG/DL
RBC # BLD: 5.36 E12/L (ref 3.5–5.5)
RBC UA: NORMAL /HPF (ref 0–2)
RENAL EPITHELIAL, UA: NORMAL /HPF
SODIUM BLD-SCNC: 136 MMOL/L (ref 132–146)
SODIUM URINE: <20 MMOL/L
SPECIFIC GRAVITY UA: >=1.03 (ref 1–1.03)
UREA NITROGEN, UR: 673 MG/DL (ref 800–1666)
UROBILINOGEN, URINE: 0.2 E.U./DL
WBC # BLD: 9.8 E9/L (ref 4.5–11.5)
WBC UA: NORMAL /HPF (ref 0–5)

## 2023-02-10 PROCEDURE — 6360000002 HC RX W HCPCS: Performed by: FAMILY MEDICINE

## 2023-02-10 PROCEDURE — 83735 ASSAY OF MAGNESIUM: CPT

## 2023-02-10 PROCEDURE — 82570 ASSAY OF URINE CREATININE: CPT

## 2023-02-10 PROCEDURE — 2140000000 HC CCU INTERMEDIATE R&B

## 2023-02-10 PROCEDURE — 2580000003 HC RX 258: Performed by: FAMILY MEDICINE

## 2023-02-10 PROCEDURE — 51798 US URINE CAPACITY MEASURE: CPT

## 2023-02-10 PROCEDURE — 6370000000 HC RX 637 (ALT 250 FOR IP)

## 2023-02-10 PROCEDURE — 36415 COLL VENOUS BLD VENIPUNCTURE: CPT

## 2023-02-10 PROCEDURE — 85025 COMPLETE CBC W/AUTO DIFF WBC: CPT

## 2023-02-10 PROCEDURE — 93010 ELECTROCARDIOGRAM REPORT: CPT | Performed by: INTERNAL MEDICINE

## 2023-02-10 PROCEDURE — 84133 ASSAY OF URINE POTASSIUM: CPT

## 2023-02-10 PROCEDURE — 84540 ASSAY OF URINE/UREA-N: CPT

## 2023-02-10 PROCEDURE — 81001 URINALYSIS AUTO W/SCOPE: CPT

## 2023-02-10 PROCEDURE — 99223 1ST HOSP IP/OBS HIGH 75: CPT | Performed by: INTERNAL MEDICINE

## 2023-02-10 PROCEDURE — 6370000000 HC RX 637 (ALT 250 FOR IP): Performed by: INTERNAL MEDICINE

## 2023-02-10 PROCEDURE — 84300 ASSAY OF URINE SODIUM: CPT

## 2023-02-10 PROCEDURE — 6360000002 HC RX W HCPCS: Performed by: INTERNAL MEDICINE

## 2023-02-10 PROCEDURE — 80048 BASIC METABOLIC PNL TOTAL CA: CPT

## 2023-02-10 PROCEDURE — 6370000000 HC RX 637 (ALT 250 FOR IP): Performed by: FAMILY MEDICINE

## 2023-02-10 PROCEDURE — 82044 UR ALBUMIN SEMIQUANTITATIVE: CPT

## 2023-02-10 PROCEDURE — 82436 ASSAY OF URINE CHLORIDE: CPT

## 2023-02-10 RX ORDER — SODIUM CHLORIDE 9 MG/ML
INJECTION, SOLUTION INTRAVENOUS PRN
Status: DISCONTINUED | OUTPATIENT
Start: 2023-02-10 | End: 2023-02-19 | Stop reason: HOSPADM

## 2023-02-10 RX ORDER — POLYETHYLENE GLYCOL 3350 17 G/17G
17 POWDER, FOR SOLUTION ORAL DAILY PRN
Status: DISCONTINUED | OUTPATIENT
Start: 2023-02-10 | End: 2023-02-18

## 2023-02-10 RX ORDER — SODIUM CHLORIDE 0.9 % (FLUSH) 0.9 %
5-40 SYRINGE (ML) INJECTION PRN
Status: DISCONTINUED | OUTPATIENT
Start: 2023-02-10 | End: 2023-02-19 | Stop reason: HOSPADM

## 2023-02-10 RX ORDER — DABIGATRAN ETEXILATE 75 MG/1
75 CAPSULE ORAL 2 TIMES DAILY
Status: DISCONTINUED | OUTPATIENT
Start: 2023-02-10 | End: 2023-02-19 | Stop reason: HOSPADM

## 2023-02-10 RX ORDER — CETIRIZINE HYDROCHLORIDE 5 MG/1
5 TABLET ORAL DAILY
Status: DISCONTINUED | OUTPATIENT
Start: 2023-02-10 | End: 2023-02-19 | Stop reason: HOSPADM

## 2023-02-10 RX ORDER — PANTOPRAZOLE SODIUM 40 MG/1
40 TABLET, DELAYED RELEASE ORAL
Status: DISCONTINUED | OUTPATIENT
Start: 2023-02-10 | End: 2023-02-19 | Stop reason: HOSPADM

## 2023-02-10 RX ORDER — FUROSEMIDE 10 MG/ML
40 INJECTION INTRAMUSCULAR; INTRAVENOUS 2 TIMES DAILY
Status: DISCONTINUED | OUTPATIENT
Start: 2023-02-10 | End: 2023-02-11

## 2023-02-10 RX ORDER — AMIODARONE HYDROCHLORIDE 200 MG/1
200 TABLET ORAL DAILY
Status: DISCONTINUED | OUTPATIENT
Start: 2023-02-10 | End: 2023-02-19 | Stop reason: HOSPADM

## 2023-02-10 RX ORDER — DOBUTAMINE HYDROCHLORIDE 400 MG/100ML
5 INJECTION INTRAVENOUS CONTINUOUS
Status: DISCONTINUED | OUTPATIENT
Start: 2023-02-10 | End: 2023-02-13

## 2023-02-10 RX ORDER — VITAMIN B COMPLEX
1000 TABLET ORAL DAILY
Status: DISCONTINUED | OUTPATIENT
Start: 2023-02-10 | End: 2023-02-19 | Stop reason: HOSPADM

## 2023-02-10 RX ORDER — SODIUM BICARBONATE 650 MG/1
650 TABLET ORAL 2 TIMES DAILY
Status: DISCONTINUED | OUTPATIENT
Start: 2023-02-10 | End: 2023-02-13

## 2023-02-10 RX ORDER — BACLOFEN 10 MG/1
5 TABLET ORAL 3 TIMES DAILY PRN
Status: DISCONTINUED | OUTPATIENT
Start: 2023-02-10 | End: 2023-02-19 | Stop reason: HOSPADM

## 2023-02-10 RX ORDER — METOPROLOL SUCCINATE 25 MG/1
25 TABLET, EXTENDED RELEASE ORAL DAILY
Status: DISCONTINUED | OUTPATIENT
Start: 2023-02-10 | End: 2023-02-19 | Stop reason: HOSPADM

## 2023-02-10 RX ORDER — SPIRONOLACTONE 25 MG/1
25 TABLET ORAL DAILY
Status: DISCONTINUED | OUTPATIENT
Start: 2023-02-10 | End: 2023-02-19 | Stop reason: HOSPADM

## 2023-02-10 RX ORDER — DIPHENHYDRAMINE HCL 25 MG
25 TABLET ORAL EVERY 6 HOURS PRN
Status: DISCONTINUED | OUTPATIENT
Start: 2023-02-10 | End: 2023-02-19 | Stop reason: HOSPADM

## 2023-02-10 RX ORDER — SODIUM CHLORIDE 0.9 % (FLUSH) 0.9 %
5-40 SYRINGE (ML) INJECTION EVERY 12 HOURS SCHEDULED
Status: DISCONTINUED | OUTPATIENT
Start: 2023-02-10 | End: 2023-02-19 | Stop reason: HOSPADM

## 2023-02-10 RX ADMIN — BACLOFEN 5 MG: 10 TABLET ORAL at 14:35

## 2023-02-10 RX ADMIN — Medication 10 ML: at 08:51

## 2023-02-10 RX ADMIN — DOBUTAMINE HYDROCHLORIDE 3 MCG/KG/MIN: 400 INJECTION INTRAVENOUS at 14:43

## 2023-02-10 RX ADMIN — Medication 1000 UNITS: at 08:50

## 2023-02-10 RX ADMIN — CETIRIZINE HYDROCHLORIDE 5 MG: 10 TABLET, FILM COATED ORAL at 08:45

## 2023-02-10 RX ADMIN — SODIUM BICARBONATE 650 MG: 650 TABLET ORAL at 20:05

## 2023-02-10 RX ADMIN — DABIGATRAN ETEXILATE MESYLATE 75 MG: 75 CAPSULE ORAL at 20:05

## 2023-02-10 RX ADMIN — AMIODARONE HYDROCHLORIDE 200 MG: 200 TABLET ORAL at 08:45

## 2023-02-10 RX ADMIN — PANTOPRAZOLE SODIUM 40 MG: 40 TABLET, DELAYED RELEASE ORAL at 08:44

## 2023-02-10 RX ADMIN — SODIUM BICARBONATE 650 MG: 650 TABLET ORAL at 12:40

## 2023-02-10 RX ADMIN — BACLOFEN 5 MG: 10 TABLET ORAL at 19:03

## 2023-02-10 RX ADMIN — DABIGATRAN ETEXILATE MESYLATE 75 MG: 75 CAPSULE ORAL at 08:45

## 2023-02-10 RX ADMIN — FUROSEMIDE 40 MG: 10 INJECTION, SOLUTION INTRAMUSCULAR; INTRAVENOUS at 19:03

## 2023-02-10 RX ADMIN — ONDANSETRON 4 MG: 2 INJECTION INTRAMUSCULAR; INTRAVENOUS at 12:40

## 2023-02-10 ASSESSMENT — PAIN DESCRIPTION - ORIENTATION
ORIENTATION: LOWER
ORIENTATION: UPPER

## 2023-02-10 ASSESSMENT — PAIN DESCRIPTION - LOCATION
LOCATION: NECK
LOCATION: NECK

## 2023-02-10 ASSESSMENT — PAIN SCALES - GENERAL
PAINLEVEL_OUTOF10: 5
PAINLEVEL_OUTOF10: 0
PAINLEVEL_OUTOF10: 4
PAINLEVEL_OUTOF10: 8

## 2023-02-10 ASSESSMENT — PAIN DESCRIPTION - DESCRIPTORS
DESCRIPTORS: ACHING;DISCOMFORT;HEAVINESS
DESCRIPTORS: ACHING;DISCOMFORT;HEAVINESS

## 2023-02-10 ASSESSMENT — PAIN - FUNCTIONAL ASSESSMENT: PAIN_FUNCTIONAL_ASSESSMENT: PREVENTS OR INTERFERES SOME ACTIVE ACTIVITIES AND ADLS

## 2023-02-10 ASSESSMENT — PAIN SCALES - WONG BAKER: WONGBAKER_NUMERICALRESPONSE: 0

## 2023-02-10 NOTE — DISCHARGE INSTRUCTIONS
HEART FAILURE  / CONGESTIVE HEART FAILURE  DISCHARGE INSTRUCTIONS:  GUIDELINES TO FOLLOW AT HOME    Self- Managed Care:     MEDICATIONS:  Take your medication as directed. If you are experiencing any side effects, inform your doctor, Do not stop taking any of your medications without letting your doctor know. Check with your doctor before taking any over-the-counter medications / herbal / or dietary supplements. They may interfere with your other medications. Do not take ibuprofen (Advil or Motrin) and naproxen (Aleve) without talking to your doctor first. They could make your heart failure worse. WEIGHT MONITORING:   Weigh yourself everyday (with the same scale) around the same time of the day and write it down. (you can chart them on a calendar or keep track of them on paper. Notify your doctor of a weight gain of 3 pounds or more in 1 day   OR a total of 5 pounds or more in 1 week    Take your weight record to your doctor visits  Also, the same goes if you loose more than 3# in one day, let your heart doctor know. DIET:   Cardiac heart healthy diet- Low saturated / low trans fat, no added salt, caffeine restricted, Low sodium diet-   No more than 2,000mg (2 grams) of salt / sodium per day (which equals to a little less than  a teaspoon of salt)  If your doctor wants you on a fluid restriction. ..it is usually recommended a fluid limit of 2,000cc -  Fluid restriction- 2,000 ml (milliliters) = 64 ounces = you can have 8 glasses of fluid per day (each glass 8 ounces)    Follow a low salt diet - avoid using salt at the table, avoid / limit use of canned soups, processed / packaged foods, salted snacks, olives and pickles. Do not use a salt substitute without checking with your doctor, they may contain a high amount of potassioum. (Mrs. Margarita Raygoza is safe to use).     Limit the use of alcohol       CALL YOUR DOCTOR THE FIRST DAY YOU NOTICE ANY OF THESE   SYMPTOMS:  You have a weight gain of 3 pounds or more in 1 day         OR 5 pounds or more in one week  More shortness of breath  More swelling of your stomach, legs, ankles or feet  Feeling more tired, No energy  Dry hacky cough  Dizziness  More chest pain / discomfort       (CALL 911 IF ANY OF THE FOLLOWING OCCURS  Chest pain (not relieved with nitroglycerine, if you have been prescribed this medication)  Severe shortness of breath  Faint / Pass out  Confusion / cannot think clearly  If symptoms get worse           SMOKING - TOBACCO USE:  * IF YOU SMOKE - STOP! Kick the habit. [address] Program is offered at [facility] and [address] [facility]. Call [phone] extension [phone] for more information. ACTIVITY:   (Ask your doctor when you will be able to return to work and before starting any exercise program.  Do not drive unless unless your doctor has given you permission to do so). Start light exercise. Even if you can only do a small amount, exercise will help you get stronger, have more energy, help manage your weight and decrease  stress. Walking is an easy way to get exercise. Start out slowly and  increase the amount you walk as tolerated  If you become short of breath, dizzy or have chest pain; stop, sit down, and rest.  If you feel \"wiped out\" the day after you exercise, walk at a slower pace or for a shorter distance. You can gradually increase the pace or amount of time. (Do not exercise right after a meal or in extreme temperatures, such as above 85 degrees, if the air is really humid, or wind chill is less than 20 degrees)                                             ADDITIONAL INFORMATION:  Avoid getting sick from colds and the flu. Stay away from friends or family that you know may have a contagious illness  Get plenty of rest   Get a flu shot each year. Get a pneumococcal vaccine shot.  If you have had one before, ask your doctor whether you need another dose. My Goal for Self-management of Heart Failure Includes 5 steps :    1. Notice a change in symptoms ( weight gain, short of breath, leg swelling, decreased activity level, bloating. ...)    2. Evaluate the change: (use the Heart Failure Zones )     3. Decide to take action: decide what your options are, such as: (call your doctor for an extra visit, take a prescribed medication, such as your water pill if your doctor has given you directions to do so, [address])    4. Come up with a strategy:  (now you call the doctor for advice / appointment. This is where you take action!!! Do not wait, catch the symptom early and treat it before it worsens. 5. Evaluate the response: The next day, check your Heart Failure Zones: are you in the GREEN ZONE (safe zone)? Worsening symptoms of YELLOW ZONE? Or have you moved to the RED ZONE and need to call 911 or go to the Emergency Room for evaluation? Call your doctor's office to update them on your symptoms of heart failure.

## 2023-02-10 NOTE — ED NOTES
Pt medicated with Baclofen for her chronic neck and back pain.  Denies current needs.  Call light in reach.     Ketty Gonsales RN  02/09/23 [id number]

## 2023-02-10 NOTE — PROGRESS NOTES
Hospitalist Progress Note      SYNOPSIS: Patient admitted on 2023 for Heart failure [facility])      SUBJECTIVE:    Better, still has some shortness of breath, no chest pain. Remains afebrile. No nausea or vomiting. Temp (24hrs), Av.2 °F (36.2 °C), Min:96.7 °F (35.9 °C), Max:97.8 °F (36.6 °C)    DIET: ADULT DIET; Regular; No Added Salt (3-4 gm)  CODE: Full Code  No intake or output data in the 24 hours ending 02/10/23 1008    OBJECTIVE:    BP (!) 95/47   Pulse 94   Temp (!) 96.7 °F (35.9 °C) (Oral)   Resp 18   Ht 5' 7\" (1.702 m)   Wt 166 lb 3.2 oz (75.4 kg)   SpO2 97%   BMI 26.03 kg/m²     General appearance: No apparent distress, appears stated age and cooperative. HEENT:  Conjunctivae/corneas clear. Neck: Supple. No jugular venous distention. Respiratory: Clear to auscultation bilaterally, normal respiratory effort  Cardiovascular: Regular rate rhythm, normal S1-S2  Abdomen: Soft, nontender, nondistended  Musculoskeletal: No clubbing, cyanosis,  2+ bilateral lower extremity edema. Skin:  No rashes  on visible skin  Neurologic: awake, alert and following commands       Assessment and plan:    -Acute on chronic congestive heart failure, systolic ejection fraction 50%: Continue IV diuretics, appreciate input from cardiology. Continue Aldactone and IV Lasix. Watchman device.  -Acute on chronic renal failure stage IV: Baseline serum creatinine 1.9-2.2, continue supportive care, continue to maintain euvolemia. Nephrology consultation.  -Elevated troponin likely demand ischemia: Appreciate input from cardiology  -Lactic acidosis  -Atrial fibrillation, chronic.   On Pradaxa (patient refusing to take today), on metoprolol and amiodarone  -Hypertension, essential: Continue outpatient medications  -Anxiety/depression: Supportive care        Disposition: Home in the next 2 to 3 days pending volume status improvement    Medications:  REVIEWED DAILY    Infusion Medications    sodium chloride Scheduled Medications    amiodarone  200 mg Oral Daily    cetirizine  5 mg Oral Daily    dabigatran  75 mg Oral BID    metoprolol succinate  25 mg Oral Daily    pantoprazole  40 mg Oral QAM AC    spironolactone  25 mg Oral Daily    Vitamin D  1,000 Units Oral Daily    sodium chloride flush  5-40 mL IntraVENous 2 times per day    furosemide  40 mg IntraVENous BID    baclofen  10 mg Oral Nightly     PRN Meds: sodium chloride flush, sodium chloride, polyethylene glycol, diphenhydrAMINE, ondansetron **OR** ondansetron    Labs:     Recent Labs     02/09/23  1207 02/10/23  0840   WBC 8.3 9.8   HGB 14.9 14.9   HCT 46.7 44.8    290       Recent Labs     02/09/23  1207      K 4.5   CL 99   CO2 16*   BUN 57*   CREATININE 2.2*   CALCIUM 9.7       Recent Labs     02/09/23  1207   PROT 7.3   ALKPHOS 201*   *   *   BILITOT 1.4*       Recent Labs     02/09/23  1207   INR 2.2       No results for input(s): CKTOTAL, TROPONINI in the last 72 hours. Chronic labs:    Lab Results   Component Value Date    CHOL 151 12/18/2022    TRIG 68 12/18/2022    HDL 55 12/18/2022    LDLCALC 82 12/18/2022    TSH 0.666 02/04/2023    INR 2.2 02/09/2023    LABA1C 5.7 (H) 02/01/2022       Radiology: REVIEWED DAILY    +++++++++++++++++++++++++++++++++++++++++++++++++  Geremias Nicholas MD  [facility] [place] Physician - [address] [place], [place]  +++++++++++++++++++++++++++++++++++++++++++++++++  NOTE: This report was transcribed using voice recognition software. Every effort was made to ensure accuracy; however, inadvertent computerized transcription errors may be present.

## 2023-02-10 NOTE — CONSULTS
[facility]  Nephrology Consult Note    Patient's Name: Lien Lewis    Reason for Consult: NIKA/CKD    Chief Complaint: Shortness of breath, hypotension  History Obtained From:  patient, past medical records, and EMR    History of Present Illness:    Lien Lewis is a 68 y.o. female with a past medical history of atrial fibrillation, CHF, mitral regurgitation, and hypertension. She presented to the ED on 2/9 with complaints of shortness of breath. Vital signs on 2/9 includes temperature 97.8, respirations 26, pulse 88, BP 99/62, and she was 95% on room air. Lab data on 2/9 include CO2 16, BUN 57, creatinine 2.2, anion gap 17, proBNP 21,949, and troponin 55. She had a chest x-ray on 2/9 which showed suspect pulmonary vascular congestion. Her CT of the chest 2/9 showed subtle groundglass opacities in the right lower lobe, stable groundglass nodule left upper lobe, stable nodule located in right upper lobe. CT abdomen pelvis 2/9 showed no acute process, nonobstructing 2 mm right renal calculus, partial left nephrectomy. She has a history of JAIME on 1/26 and direct-current electrical cardioversion on 2/3. She also has a history of left renal mass and is status post left partial nephrectomy for left renal mass with Dr. Arnold Amaya on 11/16/2021. She also had a recent hospital stay from 2/4 - 2/7 for nausea. We were consulted to see the patient for NIKA/CKD. Patient appears to have a recent baseline creatinine of 1.3-1.7. At present, patient was seen and examined. She reports a history of A-fib and that she came in due to low blood pressures and shortness of breath. She reports that prior to admission she had a decreased appetite. She also reports issues with dizziness prior to admission. She currently reports nausea. She denies any chest pain. She denies any dysuria. She denies any fevers or chills. She denies any abdominal pain or vomiting. She denies the use of NSAIDs.     PMH:    Past Medical History:   Diagnosis Date    Afib (HCC)     WATCHMAN    Anxiety     Arthritis     Cervical radiculopathy     CHF (congestive heart failure) (HCC)     Chronic sinusitis     Ejection fraction < 50%     25%. Wearing life vest    Hilar adenopathy     Hx of blood clots     Hypertension     Left ventricular systolic dysfunction     Lesion of left native kidney     Lumbar radiculopathy     Lung nodules     Meige syndrome     partial/ PCP    Microscopic colitis     Mitral regurgitation     Mild to moderate    Nonischemic cardiomyopathy ([address]     f/u with Dr. Trae Velasco    Osteopenia     PE (pulmonary thromboembolism) [facility])     Renal cell carcinoma of left kidney ([facility] 02/01/2022    Vertebrobasilar artery syndrome     f/u PCP       Patient Active Problem List   Diagnosis    Anxiety    Nonischemic cardiomyopathy ([facility]    Severe mitral regurgitation    Lumbar radiculopathy    Cervical radiculopathy    HTN (hypertension), benign    Left atrial thrombus    Paroxysmal atrial fibrillation (HCC)    Chronic HFrEF (heart failure with reduced ejection fraction) ([facility]    Encounter for long-term (current) use of high-risk medication    Persistent atrial fibrillation ([facility]    Encounter for medication refill    Itching    Chronic anticoagulation    Presence of Watchman left atrial appendage closure device    Renal mass    Renal cell carcinoma of left kidney (HCC)    Chronic renal disease, stage III ([address] [[id number]]    Multiple subsegmental pulmonary emboli without acute cor pulmonale (HCC)    Acute on chronic congestive heart failure ([address]    Vertigo    Dizziness    Heart failure ([address]       Diet:    ADULT DIET; Regular;  No Added Salt (3-4 gm)    Meds:     amiodarone  200 mg Oral Daily    cetirizine  5 mg Oral Daily    dabigatran  75 mg Oral BID    metoprolol succinate  25 mg Oral Daily    pantoprazole  40 mg Oral QAM AC    spironolactone  25 mg Oral Daily    Vitamin D  1,000 Units Oral Daily    sodium chloride flush  5-40 mL IntraVENous 2 times per day    furosemide  40 mg IntraVENous BID    baclofen  10 mg Oral Nightly        sodium chloride      DOBUTamine         Meds prn:     sodium chloride flush, sodium chloride, polyethylene glycol, diphenhydrAMINE, ondansetron **OR** ondansetron    Meds prior to admission:     No current facility-administered medications on file prior to encounter. Current Outpatient Medications on File Prior to Encounter   Medication Sig Dispense Refill    dabigatran (PRADAXA) 75 MG capsule Take 1 capsule by mouth 2 times daily 60 capsule 0    meclizine (ANTIVERT) 25 MG tablet Take 1 tablet by mouth every 6 hours as needed for Dizziness 30 tablet 0    metoprolol succinate (TOPROL XL) 25 MG extended release tablet Take 1 tablet by mouth daily 30 tablet 3    furosemide (LASIX) 20 MG tablet Take 1 tablet by mouth daily 60 tablet 3    spironolactone (ALDACTONE) 25 MG tablet Take 1 tablet by mouth daily 30 tablet 3    amiodarone (CORDARONE) 200 MG tablet Take 1 tablet by mouth daily 90 tablet 1    hydrOXYzine HCl (ATARAX) 10 MG tablet Take 1 tablet by mouth every 8 hours as needed for Itching or Anxiety (Patient not taking: Reported on 2/10/2023) 90 tablet 1    cetirizine (ZYRTEC ALLERGY) 10 MG tablet Take 0.5 tablets by mouth daily 30 tablet 0    fluticasone (FLONASE) 50 MCG/ACT nasal spray 1-2 sprays, each nostril daily as needed for nasal congestion.  16 g 0    aspirin 81 MG EC tablet Take 81 mg by mouth daily      baclofen (LIORESAL) 10 MG tablet Take 1 tablet by mouth nightly 90 tablet 1    omeprazole (PRILOSEC) 40 MG delayed release capsule Take 40 mg by mouth daily (Patient not taking: Reported on 2/10/2023)      triamcinolone (KENALOG) 0.1 % cream Apply topically 3 times daily as needed (rash)   1    loperamide (IMODIUM) 2 MG capsule Take 2 mg by mouth 4 times daily as needed for Diarrhea      diphenhydrAMINE (BENADRYL) 25 MG tablet Take 25 mg by mouth every 6 hours as needed for Itching      vitamin D (CHOLECALCIFEROL) 1000 UNIT TABS tablet Take 1,000 Units by mouth daily         Allergies:    Bentyl [dicyclomine], Adhesive tape, Atacand [candesartan], Cefdinir, Demerol, Digoxin, Imdur [isosorbide mononitrate], Losartan potassium, Sulfa antibiotics, Xarelto [rivaroxaban], Lovenox [enoxaparin sodium], Acetaminophen, Apap-caff-dihydrocodeine, Coreg [carvedilol], Cozaar [losartan], Diovan [valsartan], Effexor [venlafaxine hydrochloride], Eliquis [apixaban], Labetalol, Lisinopril, and Ramipril    Social History:     reports that she has never smoked. She has never used smokeless tobacco. She reports current alcohol use. She reports that she does not use drugs. Family History:         Problem Relation Age of Onset    Heart Attack Mother     Stroke Mother     Heart Attack Father     Heart Failure Father     Heart Disease Father     Anxiety Disorder Sister     Depression Sister     Crohn's Disease Son        Review of Systems:   Pertinent items are noted in HPI. Physical Exam:    Patient Vitals for the past 24 hrs:   BP Temp Temp src Pulse Resp SpO2 Weight   02/10/23 0830 (!) 95/47 -- -- -- -- -- --   02/10/23 0809 (!) 110/58 (!) 96.7 °F (35.9 °C) Oral 94 18 97 % --   02/10/23 0110 -- -- -- -- -- -- 166 lb 3.2 oz (75.4 kg)   02/10/23 0000 103/69 97.1 °F (36.2 °C) Temporal 92 20 95 % --   02/09/23 2315 110/73 -- -- 68 16 97 % --   02/09/23 2015 112/64 -- -- 71 16 98 % --   02/09/23 1915 108/76 -- -- 81 16 99 % --   02/09/23 1815 106/74 -- -- 74 18 98 % --   02/09/23 1715 112/76 -- -- 68 22 97 % --         Intake/Output Summary (Last 24 hours) at 2/10/2023 1145  Last data filed at 2/10/2023 1051  Gross per 24 hour   Intake 0 ml   Output --   Net 0 ml     General: Awake, alert, no acute distress  Neck: No JVD noted  Lungs: Clear bilaterally upper, diminished to the bases bilaterally. Unlabored  CV: Irregular. No rub  Abd: Soft, nontender, nondistended. Active bowel sounds  Skin: Warm and dry.   No rash on exposed extremities  Ext: 2+ BLE edema   Neuro: Awake, answers questions appropriately    Data:    Recent Labs     02/09/23  1207 02/10/23  0840   WBC 8.3 9.8   HGB 14.9 14.9   HCT 46.7 44.8   MCV 89.5 83.6    290       Recent Labs     02/09/23  1207 02/10/23  0840    136   K 4.5 4.5   CL 99 98   CO2 16* 18*   CREATININE 2.2* 2.6*   BUN 57* 69*   LABGLOM 22 18   GLUCOSE 143* 104*   CALCIUM 9.7 9.8   MG 2.5 2.5       Vit D, 25-Hydroxy   Date Value Ref Range Status   12/02/2019 23 (L) 30 - 100 ng/mL Final     Comment:     <20 ng/mL. ........... Irais Faes Deficient  20-30 ng/mL. ......... Irais Faes Insufficient   ng/mL. ........ Irais Faes Sufficient  >100 ng/mL. .......... Irais Faes Toxic         No results found for: PTH    Recent Labs     02/09/23  1207   *   *   ALKPHOS 201*   BILITOT 1.4*       Recent Labs     02/09/23  1207   LABALBU 3.6       No results found for: FERRITIN, IRON, TIBC    Vitamin B-12   Date Value Ref Range Status   02/04/2023 1304 (H) 211 - 946 pg/mL Final       Folate   Date Value Ref Range Status   12/02/2019 14.9 4.8 - 24.2 ng/mL Final       Lab Results   Component Value Date/Time    COLORU Yellow 02/05/2023 03:58 AM    NITRU POSITIVE 02/05/2023 03:58 AM    GLUCOSEU Negative 02/05/2023 03:58 AM    KETUA Negative 02/05/2023 03:58 AM    UROBILINOGEN 0.2 02/05/2023 03:58 AM    BILIRUBINUR Negative 02/05/2023 03:58 AM    BILIRUBINUR negative 07/08/2022 02:22 PM       Lab Results   Component Value Date/Time    OSMOU 206 (L) 12/19/2022 02:00 PM       No components found for: URIC    No results found for: LIPIDPAN    Assessment and Plans:    NIKA stage II on CKD 3B  Likely decreased effective renal perfusion in the setting of decreased oral intake, CHF, atrial fibrillation; exacerbated by diuretics  history of left partial nephrectomy for left renal mass with Dr. Jacki Carmen on 11/16/2021  CT abd pelvis 2/9 nonobstructing 2 mm right renal calculus, partial left nephrectomy  Baseline creatinine 1.3-1.7  Creatinine peaked at 2.6 on 2/10  Check urine studies  Avoid nephrotoxins/NSAIDs  Strict I&O, daily weights  On spironolactone   On Lasix 40 mg IV twice daily  Monitor labs closely with diuresis    2. Cardiomyopathy/CHF  Echo 12/2022 EF 25%, mod aortic regurg, mod mitral regurg, mod tricuspid regurg, pulm hypertension severe  proBNP 21,949  Strict I&O, daily weights  On spironolactone   On Lasix 40 mg IV twice daily  On dobutamine drip   Monitor volume status  Cardiology following    3. High anion gap metabolic acidosis  Likely in the setting of NKIA/CKD  Anion gap 20 and CO2 18 on 2/10  Sodium bicarbonate 650 mg oral twice daily  Monitor labs    4. Atrial fibrillation  S/p cardioversion 2/3  EP consulted    5. Shortness of breath   CXR 2/9 pulmonary vascular congestion  CT chest 2/9 subtle groundglass opacities in the right lower lobe, stable groundglass nodule left upper lobe, stable nodule located in right upper lobe  Defer management to primary    6. History of left renal mass  S/p left partial nephrectomy for left renal mass with Dr. Carisa Sheets on 11/16/2021    7. Hypotension  Monitor BPs     [email or web address], APRN - CNP    Patient seen and examined all key components of the physical performed independently , case discussed with NP, all pertinent labs and radiologic tests personally reviewed agree with above.     NIKA on CKD 3B: Due to cardiorenal syndrome  -IV diuresis with close monitoring of blood pressure and kidney function  -Dobutamine started, hopefully it helps improve renal perfusion  D/W Dr Eyal Jewell MD

## 2023-02-10 NOTE — CARE COORDINATION
2/10:  Transition of care:  Pt presented to the ER for SOB-CHF/COPD. Pt is on room air at 95%, Iv Dobutrex gtt & Iv Lasix. Cm met bedside with pt to discuss CM role & dc planning. Pt's PCP Dr Mark Kern uses the [facility] or [facility] on [address]. Pt normally lives alone but her son has been lives with her recently with 1 step to enter. The bed/bathroom are on the 1st floor. PTA independent with a cane. There is talk for the pt to be transferred to [facility]. [facility] & has a lifevest via [facility]. Pt is active with [facility] with [address] Pt's dc plan is hopefully home & family can transport. Sw/BECKY will continue to follow for dc planning.  Electronically signed by Archana Sherwood RN on 2/10/2023 at 2:42 PM

## 2023-02-10 NOTE — CONSULTS
[address] [facility] and [facility] Electrophysiology  Consultation Report  PATIENT: Tracy Kamara  MEDICAL RECORD NUMBER: [id number]  DATE OF SERVICE:  2/10/2023  ATTENDING ELECTROPHYSIOLOGIST: Dr Michael Lopez   PRIMARY ELECTROPHYSIOLOGIST: Dr Cande Ash: No ref. provider found and Trisha Cardona DO  CHIEF COMPLAINT: Shortness of breath  Admitting diagnosis: Shortness of breath, heart failure    HPI: This is a 68 y.o. female with a history of atrial fibrillation, status post Watchman in 2020, nonischemic cardiomyopathy, hypertension, CKD, renal cell carcinoma with with partial left nephrectomy, Meige syndrome, TIA. She was recently discharged from the hospital on 2/7/2023 after being treated for A-fib RVR, heart failure and lightheadedness. She states she has had complaints of nausea and lightheadedness since starting on anticoagulation with Pradaxa since she was diagnosed with a PE on 12/17/2022. She was diagnosed with persistent atrial fibrillation and had at least 3 previous cardioversions. She underwent Watchman device placement on 10/14/20 and was not on [address] [place] [address] since due to multiple allergies/side effects on Eliquis and Xarelto in the past.    She was seen on 12/19/22 when she was admitted to the hospital with acute on chronic CHF and PE. She was found to have NIKA on CKD and her Tikosyn was discontinued on 12/19/22. She was also found to have EF of 25% and LifeVest was ordered for patient. She was noted to have recurrent AF on 1/13/23 and Amiodarone was started on 1/20/23. She was scheduled for DCCV in late February but was moved to 2/3/23 due to request from CHF clinic. She was initially prescribed Lovenox and was switched to Pradaxa 150 mg BID. Since she was started on Lovenox she reports nausea and occasional dizziness. She underwent successful DCCV on 2/3/23, then went back into atrial fibrillation and admitted on 2/4/23 due to nausea and lightheadedness. Initial EKG on 2/4/23 showed normal sinus rhythm. EKG later on 2/4/23 showed AF with CVR. EKG today showed AF with CVR. She was worked up for lightheadedness. She did have overall low blood pressure throughout stay and worsening lightheadedness upon ambulation and standing. She continued on amiodarone and plan was for cardioversion in 2 to 3 weeks with referral to valve clinic for her mitral valve regurgitation. On 2/10/2023 she presented back to the emergency room for complaints of shortness of breath and low blood pressure found by her home health aide. She states that she has not been able to have much nutrition due to nausea and \"gagging. \"  She has had increased swelling in her bilateral lower extremities. Cardiac electrophysiology service is consulted for atrial fibrillation with RVR. Sheri Crawford     Patient Active Problem List    Diagnosis Date Noted    Heart failure ([facility] 02/09/2023     Priority: Medium    Vertigo 02/04/2023     Priority: Medium    Dizziness 02/04/2023     Priority: Medium    Multiple subsegmental pulmonary emboli without acute cor pulmonale ([facility] 12/17/2022     Priority: Medium    Acute on chronic congestive heart failure ([facility] 12/17/2022     Priority: Medium    Chronic renal disease, stage III [facility]) [709242] 04/25/2022     Priority: Medium    Renal cell carcinoma of left kidney ([facility] 02/01/2022    Renal mass 11/16/2021    Presence of Watchman left atrial appendage closure device 10/14/2020     Overview Note:     24-mm Watchman 2.5 (Dr. Jose Harper 10/14/2020)      Chronic anticoagulation 08/28/2020    Encounter for medication refill 07/20/2020    Itching 07/20/2020    Encounter for long-term (current) use of high-risk medication 11/04/2019    Persistent atrial fibrillation ([facility] 11/04/2019    Paroxysmal atrial fibrillation ([facility] 03/10/2019    Chronic HFrEF (heart failure with reduced ejection fraction) (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 (Ejection fraction 45-50%)  Cardiac catheterization (1/85/94): PA systolic 58, wedge 11 indicating slightly elevated pulmonary pressures not secondary to left heart failure. Cardiac output 5.47, cardiac index 2.9, no coronary artery disease   Moderately elevated pulmonary systolic pressure. Severe mitral regurgitation      Overview Note:     Mild to moderate      Anxiety 04/14/2011       Past Medical History:   Diagnosis Date    Afib (HCC)     WATCHMAN    Anxiety     Arthritis     Cervical radiculopathy     CHF (congestive heart failure) ([facility])     Chronic sinusitis     Ejection fraction < 50%     25%. Wearing life vest    Hilar adenopathy     Hx of blood clots     Hypertension     Left ventricular systolic dysfunction     Lesion of left native kidney     Lumbar radiculopathy     Lung nodules     Meige syndrome     partial/ PCP    Microscopic colitis     Mitral regurgitation     Mild to moderate    Nonischemic cardiomyopathy ([facility]     f/u with Dr. Marvin Mancia    Osteopenia     PE (pulmonary thromboembolism) ([facility]     Renal cell carcinoma of left kidney ([facility] 02/01/2022    Vertebrobasilar artery syndrome     f/u PCP       Family History   Problem Relation Age of Onset    Heart Attack Mother     Stroke Mother     Heart Attack Father     Heart Failure Father     Heart Disease Father     Anxiety Disorder Sister     Depression Sister     Crohn's Disease Son        Social History     Tobacco Use    Smoking status: Never    Smokeless tobacco: Never   Substance Use Topics    Alcohol use:  Yes     Alcohol/week: 0.0 standard drinks     Comment: occasional wine/mixed drink every few months       Current Facility-Administered Medications   Medication Dose Route Frequency Provider Last Rate Last Admin    amiodarone (CORDARONE) tablet 200 mg  200 mg Oral Daily Vincent Vazquez DO 200 mg at 02/10/23 0845    cetirizine (ZYRTEC) tablet 5 mg  5 mg Oral Daily Becky Buerger, DO   5 mg at 02/10/23 0845    dabigatran (PRADAXA) capsule 75 mg  75 mg Oral BID Becky Buerger, DO   75 mg at 02/10/23 0845    metoprolol succinate (TOPROL XL) extended release tablet 25 mg  25 mg Oral Daily Becky Buerger, DO        pantoprazole (PROTONIX) tablet 40 mg  40 mg Oral QAM AC Becky Buerger, DO   40 mg at 02/10/23 0057    spironolactone (ALDACTONE) tablet 25 mg  25 mg Oral Daily Becky Buerger, DO        vitamin D (CHOLECALCIFEROL) tablet 1,000 Units  1,000 Units Oral Daily Becky Buerger, DO   1,000 Units at 02/10/23 0850    sodium chloride flush 0.9 % injection 5-40 mL  5-40 mL IntraVENous 2 times per day Becky Buerger, DO   10 mL at 02/10/23 0851    sodium chloride flush 0.9 % injection 5-40 mL  5-40 mL IntraVENous PRN Becky Buerger, DO        0.9 % sodium chloride infusion   IntraVENous PRN Becky Buerger, DO        polyethylene glycol (GLYCOLAX) packet 17 g  17 g Oral Daily PRN Becky Buerger, DO        furosemide (LASIX) injection 40 mg  40 mg IntraVENous BID Becky Buerger, DO        diphenhydrAMINE (BENADRYL) tablet 25 mg  25 mg Oral Q6H PRN Blanco Selby MD        DOBUTamine (DOBUTREX) 1000 mg in dextrose 5 % 250 mL infusion  2.5-10 mcg/kg/min IntraVENous Continuous Wayne Mccann DO        baclofen (LIORESAL) tablet 10 mg  10 mg Oral Nightly Becky Buerger, DO   10 mg at 02/09/23 2214    ondansetron (ZOFRAN-ODT) disintegrating tablet 4 mg  4 mg Oral Q8H PRN Becky Buerger, DO        Or    ondansetron [facility]) injection 4 mg  4 mg IntraVENous Q6H PRN Becky Buerger, DO   4 mg at 02/09/23 2214        Allergies   Allergen Reactions    Bentyl [Dicyclomine] Shortness Of Breath    Adhesive Tape Itching     develops rash and itching with EKG electrodes    Atacand [Candesartan] Hives and Itching    Cefdinir Hives, Itching and Nausea Only    Demerol      3/9/19 Pt states she gets a severe migraine    Digoxin Itching developed itching and rash    Imdur [Isosorbide Mononitrate]       migraines    Losartan Potassium Itching    Sulfa Antibiotics Diarrhea and Other (See Comments)     Also causes migraines  caused migraines and diarrhea    Xarelto [Rivaroxaban] Itching and Rash      severe itch, headache, rash    Lovenox [Enoxaparin Sodium] Itching     States  her pulmonary doctor-DR Keyla Fontana took her off  lovenox (rash-itching)and she is starting on pradaxa today 1/26/2023    Acetaminophen Hives and Itching    Apap-Caff-Dihydrocodeine Hives and Itching    Coreg [Carvedilol] Itching     Can take extended release Coreg    Cozaar [Losartan] Itching    Diovan [Valsartan] Itching    Effexor [Venlafaxine Hydrochloride] Nausea Only    Eliquis [Apixaban] Hives, Itching and Rash     3/9/19 Pt states that she gets hives, itchy and a rash    Labetalol Itching    Lisinopril Itching    Ramipril Itching       ROS:   Constitutional: Negative for fever, + activity change and appetite change. HENT: Negative for epistaxis or JVD  Eyes: Negative for diploplia, blurred vision. Respiratory: Negative for cough, chest tightness, + shortness of breath and - wheezing. Cardiovascular: pertinent positives in HPI  Gastrointestinal: Negative for abdominal pain and blood in stool. PHYSICAL EXAM:   Vitals:    02/10/23 0000 02/10/23 0110 02/10/23 0809 02/10/23 0830   BP: 103/69  (!) 110/58 (!) 95/47   Pulse: 92  94    Resp: 20  18    Temp: 97.1 °F (36.2 °C)  (!) 96.7 °F (35.9 °C)    TempSrc: Temporal  Oral    SpO2: 95%  97%    Weight:  166 lb 3.2 oz (75.4 kg)     Height:          Constitutional: Well-developed, no acute distress  Eyes: conjunctivae normal, no xanthelasma   Ears, Nose, Throat: oral mucosa moist, no cyanosis   CV: no JVD. irregular rate and rhythm.  Normal S1S2 and no S3.   Lungs: clear to auscultation bilaterally, normal respiratory effort without used of accessory muscles  Abdomen: soft, non-tender, bowel sounds present, no masses or hepatomegaly   Musculoskeletal: no digital clubbing, +2-3  edema   Skin: warm, no rashes     I have personally reviewed the laboratory, cardiac diagnostic and radiographic testing as outlined below:    Data:    Recent Labs     23  1207 02/10/23  0840   WBC 8.3 9.8   HGB 14.9 14.9   HCT 46.7 44.8    290     Recent Labs     23  1207 02/10/23  0840    136   K 4.5 4.5   CL 99 98   CO2 16* 18*   BUN 57* 69*   CREATININE 2.2* 2.6*   CALCIUM 9.7 9.8      Lab Results   Component Value Date/Time    MG 2.5 02/10/2023 08:40 AM     No results for input(s): TSH in the last 72 hours. Recent Labs     23  1207   INR 2.2         CT chest without contrast 2023  Impression   1. Subtle ground-glass opacities located in right lower lobe which appear new   compared to prior and could indicate bronchiolitis or developing viral   pneumonia. 2. Stable ground-glass nodule located in left upper lobe measures 7 mm. 3. Stable nodule located in right upper lobe measures 10 mm. 4. Stable moderate cardiomegaly. Telemetry: A-fib rates of 80s to low 100s on the monitor    EK2023: A-fib with PVCs, LBBB rate of 85 bpm   please see scan in Cardiology. JAIME 23:   Summary   Severely reduced left ventricular systolic function. Reduced right ventricular function. Bi-atrial enlargement. No evidence of interatrial shunting on bubble study. History of WATCHMAN device (24 mm). No significant color flow jet around   the device. No device related thrombus visualized. Severe mitral regurgitation. Mild tricuspid regurgitation. RV-RA gradient is estimated at 27 mmHg.       Signature      ----------------------------------------------------------------   Electronically signed by Otto Zurita MD(Interpreting   physician) on 2023 11:45 AM   ----------------------------------------------------------------     Echocardiogram 22:    Summary   Ejection fraction is visually estimated at 25%. Overall ejection fraction severely decreased. Mild left ventricular concentric hypertrophy noted. Dilated right ventricle with reduced function. Left atrial volume index of 50 ml per meters squared BSA. Moderate aortic regurgitation is noted. Moderate mitral regurgitation is present. Moderate tricuspid regurgitation. Pulmonary hypertension is severe. RVSP is 70 mmHg. No evidence for hemodynamically significant pericardial effusion. Signature      ----------------------------------------------------------------   Electronically signed by Richar Das DO(Interpreting   physician) on 12/17/2022 06:57 PM   ----------------------------------------------------------------     Stress Test 12/20/22:   1: No definite evidence of  ischemia or scar except soft tissue   attenuation. The left ventricle is visually dilated both rest and   stress without evidence of TID     2  Dilated left ventricle with Moderate to severe global left   ventricular hypokinesis with estimated left ventricular ejection   fraction of 32%. 3:  Please see separate report for EKG and hemodynamic aspect of the   stress test.     4:  Low dose CT attenuation correction protocol was utilized which   revealed minimal to mild coronary artery ossifications. 5: RISK SCAN:  High risk scan due to dilated left ventricle with   severe global hypokinesis and EF of 32%. Assessment/Plan:     1. 1. Persistent atrial fibrillation  - [id number]: 6; Status post  Watchman #24 on 10/14/2020 with Dr Gavin Ramirez. - History of CHERYL thrombus  - History of  JAIME and DC-CV on 5/6/19, 11/4/19 and 2/3/23.  - Tikosyn 250 mcg BID: 11/4/19 >> March 2022 was reduced to 125 mcg BID and stopped on 12/19/22 due to elevated serum creatinine.   - Noted to have recurrent AF on 1/13/23 and started on Amiodarone on 1/20/23.  Underwent DC-CV 2/3/23.   - Presented in NSR on 2/4/23 >>> back to AF with CVR on 2/4/23.   - Remains in AF with mostly CVR  - Currently using Pradaxa for PE. 2. Nonischemic cardiomyopathy and chronic HFrEF  - Diagnosed originally in 2013  - TTE: 12/2016: EF 38%  - JAIME: 3/2019: EF 25-30%  - JAIME: 5/2019: EF 25-30%  - TTE: 10/2020: EF 50%  - JAIME: 11/30/2020: EF 45-50%  - JAIME: 7/16/2021: EF 45-50%  - JAIME: 10/18/2021: EF 45-50%  - TTE: 12/17/22: EF 25%. - GDMT: Toprol XL, Lasix, Apresoline and Aldactone- but held due to low BP    - Allergic to ACE-I/ARB and Nitrates  - NYHA III, ACC/AHA stage C  -Clinically patient is on room air, lungs without noted crackles and chest x-ray stable. 3. Valvular heart disease   - Severe MR on JAIME 1/26/23.  -Discussed possible transfer to [facility] for MitraClip evaluation     4. Hypotension with history of hypertension  - On Toprol XL, Lasix, Apresoline and Aldactone-now on hold due to low BP  - Managed by Dr. Memo Anderson     5. Chronic LBBB    6.  CKD; Renal cancer status post left partial nephrectomy. Estimated Creatinine Clearance: 19 mL/min (A) (based on SCr of 2.6 mg/dL (H)). Status post LEFT partial nephrectomy   - 11/2021 - Dr Peace Sainz      7. History of TIA's    8. History of Meige syndrome     9. Nausea and dizziness  - Patient states that it started since she is started on Lovenox and Pradaxa.  - Dizziness could be related to hypotension/ orthostatic changes, states while inpatient these symptoms occur with standing and walking      10. Multiple allergies/side effects to medications     Recommendations:    1. Agree with dobutamine for congestive heart failure as initiated by Dr. Paris Delarosa  2. Out of bed for all meals, encourage nutrition  3. Possible transfer to [facility] for severe MR and possible MitraClip, discussed with cardiology  4. Will consider cardioversion next week    Thank you for allowing me to participate in your patient's care. Please call me if there are any questions or concerns.       UYEN Beavers - CNP  Cardiac Electrophysiology  [facility]) Physicians  [facility] [place]: [facility] Electrophysiology  11:31 AM  2/10/2023    PHYSICIAN ADDENDUM:  I independently contributed to, made amendments as needed, and finalized the above documentation. I contributed >50% to the overall encounter time including review of testing, formulating a plan with the APC and communication with the other care team members and family. Agree with history and physical as noted above    Assessment    68year-old patient with severe LV systolic dysfunction and severe mitral regurgitation complicated by persistent atrial fibrillation. Patient is hospitalized now with symptoms and laboratory work-up suggestive of worsening heart failure. Worsening renal and hepatic indices indicate the same    Recommendation    Agree with the use of IV Dobutrex to improve symptoms/signs of heart failure  Initiate IV diuresis as needed thereafter  Cardioversion to restore sinus rhythm next week  CRT-D implant versus MitraClip thereafter    All of the above was discussed with the patient and her daughter>50% of the time involved face-to-face time providing counseling and or coordination of care with the other providers,  reviewing records/tests, counseling/education of the patient, ordering medications/tests/procedures, coordinating care, and documenting clinical information in the EHR.        Dona Vargas  Cardiac electrophysiology  [facility]) physicians

## 2023-02-10 NOTE — PLAN OF CARE
Problem: Chronic Conditions and Co-morbidities  Goal: Patient's chronic conditions and co-morbidity symptoms are monitored and maintained or improved  2/10/2023 1304 by Laurel De Guzman RN  Outcome: Progressing  2/10/2023 0055 by Marek Bradshaw RN  Outcome: Progressing     Problem: Discharge Planning  Goal: Discharge to home or other facility with appropriate resources  2/10/2023 1304 by Laurel De Guzman RN  Outcome: Progressing  2/10/2023 0055 by Marek Bradshaw RN  Outcome: Progressing     Problem: Safety - Adult  Goal: Free from fall injury  Outcome: Progressing     Problem: ABCDS Injury Assessment  Goal: Absence of physical injury  Outcome: Progressing     Problem: Cardiovascular - Adult  Goal: Maintains optimal cardiac output and hemodynamic stability  Outcome: Progressing     Problem: Metabolic/Fluid and Electrolytes - Adult  Goal: Hemodynamic stability and optimal renal function maintained  Outcome: Progressing

## 2023-02-10 NOTE — PROCEDURES
Cardiology    I was called and asked to talk to another different daughter at the bedside again. I discussed everything in great detail and at great length with her other daughter, Brandon Dennis and explained everything. She has multiple issues. Her blood pressure is low, she has advanced renal deficiency, she has a severe LV systolic dysfunction, she has severe mitral regurgitation, a.m. she continues to revert back into atrial fibrillation which she cannot tolerate given her above underlying comorbidities. I discussed our plan for now to try to diurese with the dobutamine while preserving renal function and maintaining blood pressure. Restoring sinus rhythm would also help diuresis. It is possible but I doubt that this will shrink the heart enough if it works to resolve the mitral regurgitation. She states that her mother does agree now to be transferred to [facility]. I think that this is a good idea as it appears that she is going to need a MitraClip which is not offered here. There are also electrophysiology reasons that electrophysiology wishes for her to be transferred to [facility]. She then states that electrophysiology is actually here and she is going to discuss with them as well.

## 2023-02-10 NOTE — CONSULTS
CHF NURSE NAVIGATOR CONSULT NOTE:      Patient currently admitted with diagnosis of Systolic heart failure. Patient was alert and oriented during the consultation. Unfortunately she is a readmission, she was recently admitted on 2/4 paroxysmal atrial fibrillation decompensates her HF. Scheduling with the CHF clinic, cardiology APRN and PCPYes. Future Appointments   Date Time Provider [place]   2/15/2023 12:30 PM [facility] CHF ROOM 1 [facility]   2/16/2023  9:45 AM DO [facility]   [facility]/21/2023 11:[facility]   2/23/2023 10:00 AM UYEN Nunez CNP [facility]F [facility]   3/1/2023 12:00 PM [facility] CHF ROOM 1 [facility]F [facility]   3/16/2023  1:15 PM SEY[facility] MED ONC FAST TRACK 2 [address]   3/16/2023  1:30 PM Iggy Casas MD MED ONC [facility]   3/23/2023 10:00 AM UYEN Sultana CNP [facility]LECT[facility]   4/6/2023 10:00 AM MD Moe Sternshpepe Russell [facility]       Barriers to Care:  Contributing risk factors for Heart Failure are identified as multiple chronic medical conditions that coincide with each other. The patient is ordered:  Diet: ADULT DIET; Regular;  No Added Salt (3-4 gm)   Sodium controlled diet Yes  Fluid restriction daily ordered (fluid restriction recommended if patient is hyponatremic and/or diuretic is initiated or increased) No  FR:   Daily Weights: Patient Vitals for the past 96 hrs (Last 3 readings):   Weight   02/10/23 0110 166 lb 3.2 oz (75.4 kg)   02/09/23 1141 166 lb (75.3 kg)     I/O:   Intake/Output Summary (Last 24 hours) at 2/10/2023 1231  Last data filed at 2/10/2023 1051  Gross per 24 hour   Intake 0 ml   Output --   Net 0 ml              We reviewed the introduction to Heart Failure, the HF zones, signs and symptoms to report on day 1 of onset, medications, medication compliance, the importance of obtaining daily weights, following a low sodium diet, reading food labels for the sodium content, keeping physician appointments, and smoking cessation. We discussed writing / tracking daily weights on a calendar / log because a 5 pound gain in 1 week can sneak up if you are not tracking it. I advised patient they can reduce the risk for Heart Failure exacerbations by modifying / controlling the risk factors. We discussed self-managed care which includes the following:  to take medications as prescribed, report any intolerable side effects of medications to the cardiologist / doctor, do not just stop taking the medication; follow a cardiac heart healthy / low sodium diet; weigh yourself daily, exercise regularly- per doctor recommendation and not to smoke or use an excess amount of alcohol. We discussed calling the cardiologist / doctor with a weight gain of 3 pounds in one day or a total of 5 pounds or more in one week. Also, if you should have a significant weight loss of 3# or more in one day to call the doctor, they may need to decrease or hold the diuretic dose. On days you feels nauseated and not eating / drinking, having emesis or diarrhea,  informed to call the cardiologist  / doctor, they may need to decrease or hold diuretic to avoid dehydration. I stressed the importance of informing their cardiologist the first day of onset of any of the signs and symptoms in the \"Yellow Zone\" of the HF Zones. Patient verbalizes understanding. Greater than 30 minutes was spent educating patient. The Heart Failure Booklet given to the patient with additional patient education addressing:  What is Heart Failure? Things You Can Do to Live Well with HF  How to Take Your Medications  How to Eat Less Salt  Central its Salt?   Exercising Well with Heart Failure  Signs and symptoms of HF to report  Weight Yourself Each Day  Home Self Management- activity, weight tracking, taking medications as prescribed, meals /diet planning (sodium and fluid restriction), how to read food labels, keeping physician follow ups, smoking cessation, follow the Heart Failure Zones  The Heart Failure zones  Every Dose Every Day      Instructed  to call 911 if you have any of the following symptoms:       Struggling to breathe unrelieved with rest,     Having chest pain     Have confusion or cant think clearly          Jaren Veloz, RN BSN, RN  Heart Failure [address] [facility] (CHF) AHA GUIDELINES  (Must be completed for Primary Diagnosis CHF or History of CHF)    Discharge Plan:  I placed the Heart Failure Home Instructions in patient's discharge instructions. Per Heart Failure [facility], the patient should have a follow-up appointment made within 7 days of discharge. New Diagnosis No    ECHO Results most recent:  Lab Results   Component Value Date    LVEF 32 12/20/2022    LVEFMODE Echo 11/21/2018                                        Social History     Tobacco Use   Smoking Status Never   Smokeless Tobacco Never        Immunization History   Administered Date(s) Administered    COVID-19, PFIZER Bivalent BOOSTER, DO NOT Dilute, (age 12y+), IM, 30 mcg/0.3 mL 11/08/2022    COVID-19, PFIZER GRAY top, DO NOT Dilute, (age 15 y+), IM, 30 mcg/0.3 mL 05/09/2022    COVID-19, PFIZER PURPLE top, DILUTE for use, (age 15 y+), 30mcg/0.3mL 02/08/2021, 03/02/2021, 10/30/2021    Influenza 10/18/2011, 10/17/2012    Influenza Vaccine, unspecified formulation 11/26/2014    Influenza Virus Vaccine 10/15/2013, 09/23/2015    Influenza, High Dose (Fluzone 65 yrs and older) 09/14/2016, 11/29/2018    Pneumococcal Conjugate 13-valent ([id number]) 09/23/2015    Pneumococcal Polysaccharide ([id number]) 01/01/2010, 09/14/2016    Tdap (Boostrix, Adacel) 03/18/2015          Angiotensin-Converting-Enzyme (ACE) inhibitor ordered:  [] Yes  [x] No (specify contraindication):  Allergy    [] Contraindicated  [] Hypotensive patient who was at immediate risk of cardiogenic shock  [] Hospitalized patient who experienced marked azotemia  [] Other Contraindications  [] Not Eligible  [] Not Tolerant  [] Patient Reason  [] System Reason  [] Other Reason    Angiotensin II receptor blockers (ARB) ordered:  [] Yes  [x] No (specify contraindication): Allergy    [] Contraindicated  [] Hypotensive patient who was at immediate risk of cardiogenic shock  [] Hospitalized patient who experienced marked azotemia  [] Other Contraindications    ARNI - Angiotensin Receptor Neprilysin Inhibitor ordered:  [] Yes  [] No (specify contraindication):    [] ACE inhibitor use within the prior 36 hours  [] Allergy  [] Hyperkalemia  [] Hypotension  [] Renal dysfunction defined as creatinine > 2.5 mg/dL in men or > 2.0 mg/dL in women  [] Other Contraindications  [] Not Eligible  [] Not Tolerant  [] Patient Reason  []System Reason  []Other Reason      Beta Blocker ordered:    [x] Yes Toprol XL  [] No (specify contraindication):    [] Contraindicated  [] Asthma  [] Fluid Overload  [] Low Blood Pressure  [] Patient recently treated with an intravenous positive inotropic agent  [] Other Contraindications  [] Not Eligible  [] Not Tolerant  [] Patient Reason  [] System Reason    SGLT2 Inhibitor ordered:  [] Yes  [x] No (specify contraindication):    [] Contraindicated  [] Patient currently on dialysis  [] Ketoacidosis  [] Known hypersensitivity to the medication  [] Type I diabetes (not approved for use in patients with Type I diabetes due to increased risk of ketoacidosis)  [] Other Contraindications  [] Not Eligible  [] Not Tolerant  [] Patient Reason  [] System Reason  [] Other Reason    Aldosterone Antagonist ordered:  [x] Yes  [] No (specify contraindication):    [] Contraindicated  [] Allergy due to aldosterone receptor antagonist  [] Hyperkalemia  [] Renal dysfunction defined as creatinine >2.5 mg/dL in men or >2.0 mg/dL in women.   [] Other contraindications  [] Not Eligible  [] Not Tolerant  [] Patient Reason  [] System Reason  [] Other Reason

## 2023-02-10 NOTE — CONSULTS
Inpatient Cardiology Consultation      Reason for Consult:  CHF    Consulting Physician: Dr. Naty Thompson    Requesting Physician:  Dr. Sarah Hernandez    Date of Consultation: 2/10/2023    HISTORY OF PRESENT ILLNESS:     This 68year old female is known to [facility] cardiology and is followed by Dr. Choco Rodriguez and by EP. She was last evaluated at our office on December 6 of last year by LEONOR Davis. She has a history of paroxysmal atrial fibrillation and a CHERYL thrombus and is status post Watchman with a nonischemic cardiomyopathy with HFrEF and severe mitral regurgitation and pulmonary hypertension, chronic left bundle branch block and prolonged QTc, wearing a LifeVest..  At this office visit she was maintaining sinus rhythm and was compensated. She underwent a JAIME to assess her Watchman device. The JAIME showed severe mitral regurgitation but the Watchman device is functioning well. On February 3 she underwent cardioversion and it was successful. She presented to the emergency room on December 4 with dizziness and nausea. She has been evaluated by neurology for the symptoms. Initially she was in sinus rhythm but later she developed atrial fibrillation with controlled ventricular response and EP was consulted for post cardioversion dizziness. Per neurology. She was treated for acute vertigo and underwent MRI of the brain for evaluation of posterior circulation stroke and patient with a history of vertebrobasilar disease and persistent vertigo. She was also treated for a urinary tract infection. She is wearing a LifeVest as her most recent 2D echocardiogram in December showed an EF of 25% having dropped from 45 to 50% a year ago. At valve clinic referral was considered and she had a prolonged QTc. Amiodarone was continued and Toprol was considered if blood pressure allows and Pradaxa was continued. Cardiology was consulted for valvular heart disease and CHF and a second cardioversion was planned.   [facility]ul[facility]. An AVJ ablation with CRT-D implant was considered. She presented to the emergency room yesterday with dyspnea and hypotension after being sent by visiting nurse for hypotension. The patient cannot remember what the blood pressure was. But she thinks it was in the 80s. She has been dizzy since starting on the blood thinner and her dyspnea has been present since she went back into [facility] she actually denies orthopnea and weight gain. She does have swelling of the lower extremities. She has had no chest pain. She does have a cough but no fever and the cough is dry. She does admit to some orthopnea. Blood pressure 99/62 on admission and she was afebrile with no hypoxia on room air. Chest x-ray showed suspected pulmonary vascular congestion and CT of the chest without contrast showed subtle groundglass opacities in the right lower lobe that were new and could indicate bronchiolitis or viral pneumonia and stable nodules. CAT scan of the abdomen and pelvis showed a renal calculus. Potassium was 4.5 and BUN and creatinine 57 and 2.2 (prior creatinines 1.7-2.4), BNP 21,949 (prior proBNP's were 7843 -13,243)  and high-sensitivity troponin 10-55-49, elevated hepatic transaminases at 169 ALT and 141 AST and bilirubin 1.4 and a few days previously these enzymes were ALT 74 and AST 82 with normal bilirubin, WBCs 8.3 and H&H 14.9 and 46.7, INR 2.2, ABGs PO2 96 PCO2 25 and pH 7.46 on room air    She was treated with Lasix 20 mg IV and is being diuresed    Past Medical History:    PAF  status post DC cardioversion 11/2019  Maintained on dofetilide  History of left atrial appendage thrombus in November 2018 and March 2019. Resolved on rivaroxaban. Allergic to rivaroxaban with significant skin reaction that she manages with hydroxyzine. Also allergic to dabigatran and apixaban.   All the above documented additionally [facility]  9/9/2020 JAIME: Mildly reduced LV systolic function with an EF 45%. Normal RV size and systolic function. Moderate MR. Mild AR. Mild TR. Moderate pulmonary hypertension. No CHERYL thrombus. CHERYL anatomy amenable to watchman. 10/14/2020 Dr Jose Harper: successful left atrial appendage closure using Watchman with 24 mm device. The procedure was complicated by a small pericardial effusion, not hemodynamically significant and did not require intervention. 11/30/2020 JAIME: follow-up on her watchman device which was in place with no significant color flow jet around the device and a negative bubble study  11/30/2020 ASA and Xarelto stopped. Started  mg and Plavix 75  mg QD--> developed red eyes and bleeding ASA was stopped by Dr Jose Harper. 6 month JAIME with DRT; no inge-device leak. Stopped APT and restarted Xarelto  7/2021 JAIME Dr Cornelio Mcclain: no DRT (device related thrombus). D/c rivaroxaban and start ASA EC 81 mg QD and clopidogrel 75 mg QD.  10/18/2021 JAIME Dr Denita Barrera: EF estimated at 45-50%. Normal right ventricular size and function. No evidence of interatrial shunting on bubble study. History of WATCHMAN device (24 mm). No significant color flow jet around the device. No device related thrombus visualized. Moderate MR. Mild AI. Moderate TR. RV-RA gradient is estimated at 82 mmHg. 10/11/2021 per Dr Barney-->Check 12-month JAIME. If no issues then discontinue clopidogrel and maintain aspirin 81 mg daily indefinitely  3/7/2022 Tikosyn was reduced from 250 mcg BID to 125 mcg BID due to reduced CrCl and it was discontinued on December 19, 2022, amiodarone started January 20, 2023 for recurrent atrial fibrillation  Recurrent atrial fibrillation January 13, 2023, started on Lovenox and then Pradaxa  January 26, 2023 JAIME and EF not mentioned, for cardioversion  Severely reduced left ventricular systolic function. Reduced right ventricular function. Bi-atrial enlargement. No evidence of interatrial shunting on bubble study.    History of WATCHMAN device (24 mm). No significant color flow jet around   the device. No device related thrombus visualized. Severe mitral regurgitation. Mild tricuspid regurgitation. RV-RA gradient is estimated at 27 mmHg. February 4, 2023 recurrent atrial fibrillation with controlled ventricular response    HFrEF    NICMP/HFrEF (coronary angiogram without significant CAD in 2010). 11/2013 Lexiscan MPS: Nonischemic EF 40%  2016 EF 38%  2016 Lexiscan MPS: Non ischemic stress. EF 35-40  3/2019 EF 25-30%  5/2019 TTE: EF 25-30%  7/2019 TTE: EF 35 to 40% with moderate MR moderate PH  10/16/2020 Limited TTE Dr Andressa Garcia: EF 50%. Resolved pericardial effusion adjacent to LV. Very trivial effusion adjacent to the RV mostly in systole. Overall the pericardial effusion is decreased compared to 10/14/2020 study. December 17, 2022, echocardiogram LifeVest    Ejection fraction is visually estimated at 25%. Overall ejection fraction severely decreased. Mild left ventricular concentric hypertrophy noted. Dilated right ventricle with reduced function. Left atrial volume index of 50 ml per meters squared BSA. Moderate aortic regurgitation is noted. Moderate mitral regurgitation is present. Moderate tricuspid regurgitation. Pulmonary hypertension is severe. RVSP is 70 mmHg. No evidence for hemodynamically significant pericardial effusion. Lexiscan stress test December 19, 2022, no EKG changes of stress-induced ischemia, EF 32%    PHTN by [facility] in 2010 followed@  they recommended long acting nitrates but patient was unable to tolerate. cLBBB  HTN  Microscopic colitis  Numerous medication allergies including ACE inhibitors and ARB's  Hypercholesterolemia  Anxiety  Lifelong non smoker  Lumbar radiculopathy  Vertebrobasilar syndrome  Hx TIA's  Meige syndrome  Migraines  IBS  Hx bladder repair.  Oophorectomy, and hysterectomy  9/10/2021 Thyroid biopsy negative for malignant cells-->compatible with colloid nodule/nodular goiter  9/13/2021 CT Abdomen/Pelvis: with a mass exophytic from lower pole of left kidney measures 3.7 x 3.1 x 3.2 cm. This abuts the left psoas muscle without definite invasion of left psoas muscle.  10/5/2021  Flexible Fiberoptic Bronchoscopy. EBUS Bronchoscopy (R hilar lymphadenopathy)-->Negative for malignant cells. Benign and degenerated bronchial cells, scant lymphocytes and blood present  Chronic kidney disease /renal cell carcinoma L kidney  11/16/2021 Robot assisted laparoscopic complex L partial nephrectomy--> Clear-cell renal cell carcinoma   2/2022 CT Abdomen/Pelvis: Surgical removal of the lower pole of the left kidney. There is no evidence of local recurrence or metastatic disease at this time.  8/19/2022 CT abdomen/pelvis: No evidence of local recurrence or abdominal metastasis.  Pulmonary embolism December 17, 2022, Lovenox to Pradaxa  MRI of the brain December 6, 2023 for chronic dizziness with no evidence of infarct hemorrhage mass effect or hydrocephalus  Chronically elevated troponin        Medications Prior to admit:  Prior to Admission medications    Medication Sig Start Date End Date Taking? Authorizing Provider   dabigatran (PRADAXA) 75 MG capsule Take 1 capsule by mouth 2 times daily 2/7/23   UYEN Oleary - CNP   meclizine (ANTIVERT) 25 MG tablet Take 1 tablet by mouth every 6 hours as needed for Dizziness 2/7/23 2/17/23  UYEN Oleary - CNP   metoprolol succinate (TOPROL XL) 25 MG extended release tablet Take 1 tablet by mouth daily 2/8/23   UYEN Oleary - CNP   furosemide (LASIX) 20 MG tablet Take 1 tablet by mouth daily 2/7/23   UYEN Oleary - CNP   spironolactone (ALDACTONE) 25 MG tablet Take 1 tablet by mouth daily 2/7/23   UYEN Oleary - CNP   amiodarone (CORDARONE) 200 MG tablet Take 1 tablet by mouth daily 1/20/23   Madeline Flowers MD   hydrOXYzine HCl (ATARAX) 10 MG tablet Take 1 tablet by mouth every 8 hours as  needed for Itching or Anxiety  Patient not taking: Reported on 2/10/2023 1/19/23   Trisha Cardona DO   cetirizine (ZYRTEC ALLERGY) 10 MG tablet Take 0.5 tablets by mouth daily 10/19/22   Lasha Sutton Jr., APRN - CNP   fluticasone (FLONASE) 50 MCG/ACT nasal spray 1-2 sprays, each nostril daily as needed for nasal congestion. 10/19/22   Lasha Sutton Jr., APRN - CNP   aspirin 81 MG EC tablet Take 81 mg by mouth daily    Historical Provider, MD   baclofen (LIORESAL) 10 MG tablet Take 1 tablet by mouth nightly 2/1/22   Trisha Cardona DO   omeprazole (PRILOSEC) 40 MG delayed release capsule Take 40 mg by mouth daily  Patient not taking: Reported on 2/10/2023 7/2/20   Historical Provider, MD   triamcinolone (KENALOG) 0.1 % cream Apply topically 3 times daily as needed (rash)  4/5/19   Historical Provider, MD   loperamide (IMODIUM) 2 MG capsule Take 2 mg by mouth 4 times daily as needed for Diarrhea    Historical Provider, MD   diphenhydrAMINE (BENADRYL) 25 MG tablet Take 25 mg by mouth every 6 hours as needed for Itching    Historical Provider, MD   vitamin D (CHOLECALCIFEROL) 1000 UNIT TABS tablet Take 1,000 Units by mouth daily    Historical Provider, MD       Current Medications:    Current Facility-Administered Medications: amiodarone (CORDARONE) tablet 200 mg, 200 mg, Oral, Daily  cetirizine (ZYRTEC) tablet 5 mg, 5 mg, Oral, Daily  dabigatran (PRADAXA) capsule 75 mg, 75 mg, Oral, BID  metoprolol succinate (TOPROL XL) extended release tablet 25 mg, 25 mg, Oral, Daily  pantoprazole (PROTONIX) tablet 40 mg, 40 mg, Oral, QAM AC  spironolactone (ALDACTONE) tablet 25 mg, 25 mg, Oral, Daily  vitamin D (CHOLECALCIFEROL) tablet 1,000 Units, 1,000 Units, Oral, Daily  sodium chloride flush 0.9 % injection 5-40 mL, 5-40 mL, IntraVENous, 2 times per day  sodium chloride flush 0.9 % injection 5-40 mL, 5-40 mL, IntraVENous, PRN  0.9 % sodium chloride infusion, , IntraVENous, PRN  polyethylene glycol (GLYCOLAX) packet 17 g, 17  g, Oral, Daily PRN  furosemide (LASIX) injection 40 mg, 40 mg, IntraVENous, BID  baclofen (LIORESAL) tablet 10 mg, 10 mg, Oral, Nightly  ondansetron (ZOFRAN-ODT) disintegrating tablet 4 mg, 4 mg, Oral, Q8H PRN **OR** ondansetron (ZOFRAN) injection 4 mg, 4 mg, IntraVENous, Q6H PRN    Allergies:  Bentyl [dicyclomine], Adhesive tape, Atacand [candesartan], Cefdinir, Demerol, Digoxin, Imdur [isosorbide mononitrate], Losartan potassium, Sulfa antibiotics, Xarelto [rivaroxaban], Lovenox [enoxaparin sodium], Acetaminophen, Apap-caff-dihydrocodeine, Coreg [carvedilol], Cozaar [losartan], Diovan [valsartan], Effexor [venlafaxine hydrochloride], Eliquis [apixaban], Labetalol, Lisinopril, and Ramipril    Social History:    Lifelong non smoker  ETOH rarely  Denies Illicit Drugs  Activity: Live alone in ranch with basement. Able to climb steps slowly when walking up. + Drives. Able to walk up and down the aisles in grocery store with no CP or SOB. No assistive devices. Code Status: full Code         Family History:   Family History   Problem Relation Age of Onset    Heart Attack Mother     Stroke Mother     Heart Attack Father     Heart Failure Father     Heart Disease Father     Anxiety Disorder Sister     Depression Sister     Crohn's Disease Son        REVIEW OF SYSTEMS:     Constitutional: Denies fatigue, fevers, chills or night sweats  Eyes: Denies visual changes or drainage  ENT: Denies headaches or hearing loss. No mouth sores or sore throat. No epistaxis   Cardiovascular: Denies chest pain, pressure or palpitations. No lower extremity swelling. Respiratory: Denies CAIN, cough, orthopnea or PND. No hemoptysis   Gastrointestinal: Denies hematemesis or anorexia. No hematochezia or melena    Genitourinary: Denies urgency, dysuria or hematuria. Musculoskeletal: Denies gait disturbance, weakness or joint complaints  Integumentary: Denies rash, hives or pruritis   Neurological: Denies dizziness, headaches or seizures.  No numbness or tingling  Psychiatric: Denies anxiety or depression. Endocrine: Denies temperature intolerance. No recent weight change. .  Hematologic/Lymphatic: Denies abnormal bruising or bleeding. No swollen lymph nodes    PHYSICAL EXAM:   /69   Pulse 92   Temp 97.1 °F (36.2 °C) (Temporal)   Resp 20   Ht 5' 7\" (1.702 m)   Wt 166 lb 3.2 oz (75.4 kg)   SpO2 95%   BMI 26.03 kg/m²   CONST:  Well developed, well nourished who appears of stated age. Awake, alert and cooperative. No apparent distress. HEENT:   Head- Normocephalic, atraumatic   Eyes- Conjunctivae pink, anicteric  Throat- Oral mucosa pink and moist  Neck-  No stridor, trachea midline, no jugular venous distention. No carotid bruit. CHEST: Chest symmetrical and non-tender to palpation. No accessory muscle use or intercostal retractions  RESPIRATORY: Lung sounds - clear throughout fields   CARDIOVASCULAR:     Heart Inspection- shows no noted pulsations  Heart Palpation- no heaves or thrills; PMI is non-displaced   Heart Ausculation-irregularly irregular rate and rhythm, no murmur. No s3, s4 or rub   PV: 3+ lower extremity edema. No varicosities. Pedal pulses palpable, no clubbing or cyanosis   ABDOMEN: Soft, non-tender to light palpation. Bowel sounds present. No palpable masses no organomegaly; no abdominal bruit  MS: Good muscle strength and tone. No atrophy or abnormal movements. : Deferred  SKIN: Warm and dry no statis dermatitis or ulcers   NEURO / PSYCH: Oriented to person, place and time. Speech clear and appropriate. Follows all commands.  Pleasant affect     DATA:    ECG / Tele strips: Atrial fibrillation  Diagnostic:    No intake or output data in the 24 hours ending 02/10/23 0708    Labs:   CBC:   Recent Labs     02/09/23  1207   WBC 8.3   HGB 14.9   HCT 46.7        BMP:   Recent Labs     02/07/23  0814 02/09/23  1207    132   K 5.4* 4.5   CO2 16* 16*   BUN 46* 57*   CREATININE 1.7* 2.2*   LABGLOM 31 22   CALCIUM 9.4 9.7     Mag:   Recent Labs     02/07/23  0814 02/09/23  1207   MG 2.4 2.5     Phos: No results for input(s): PHOS in the last 72 hours.  TFT:   Lab Results   Component Value Date    TSH 0.666 02/04/2023    V3RWRPQ 7.4 01/18/2023    FT3 3.6 01/18/2023    T4FREE 1.89 (H) 01/13/2023      HgA1c:   Lab Results   Component Value Date/Time    LABA1C 5.7 02/01/2022 12:00 PM     No results found for: EAG  proBNP:   Lab Results   Component Value Date/Time    PROBNP 21,949 02/09/2023 12:07 PM    PROBNP 13,243 02/04/2023 01:11 PM    PROBNP 11,583 02/02/2023 01:20 PM    PROBNP 13,290 01/31/2023 10:30 AM    PROBNP 10,308 01/27/2023 12:45 PM     PT/INR:   Recent Labs     02/09/23  1207   PROTIME 23.8*   INR 2.2     APTT:  Recent Labs     02/09/23  1207   APTT 88.5*     TROPONIN:  Lab Results   Component Value Date/Time    TROPHS 49 02/09/2023 04:54 PM    TROPHS 55 02/09/2023 02:42 PM    TROPHS 10 02/09/2023 12:07 PM    TROPHS 50 02/05/2023 09:13 AM    TROPHS 47 02/05/2023 06:04 AM     CK:  Lab Results   Component Value Date/Time    CKTOTAL 88 12/29/2022 12:07 PM    CKTOTAL 109 06/03/2021 11:11 AM    CKTOTAL 83 11/25/2013 06:10 AM     FASTING LIPID PANEL:  Lab Results   Component Value Date/Time    CHOL 151 12/18/2022 11:02 AM    HDL 55 12/18/2022 11:02 AM    LDLCALC 82 12/18/2022 11:02 AM    TRIG 68 12/18/2022 11:02 AM     LIVER PROFILE:  Recent Labs     02/07/23  0814 02/09/23  1207   AST 82* 141*   ALT 74* 169*   LABALBU 3.3* 3.6     COVID19: No results for input(s): COVID19 in the last 72 hours.    Electronically signed by UYEN Dotson CNP on 2/10/2023 at 7:08 AM      Impression and plan by Dr. Woodward    I have personally spent 44 minutes in addition to the above and more than 50% of the total time involved in performing this consultation.   I have personally and separately seen and evaluated the patient.  I personally and separately obtained the history and performed the physical exam.  I personally reviewed all  of the above labs, imaging, history, review of systems, and data. All of the assessments and recommendations are personally from me. All of the above cardiac medical decisions are personally from me. Please see my additional contributions to the history, physical exam, assessment, and recommendations below. History of chief complaint:  She had a cardioversion 2-3-2023. She was back in the hospital the next day with dizziness, shortness of breath, and back in atrial fibrillation. She was treated with heart rate control. She was recommended by electrophysiology to go to [facility] clinic for further options. She now presents today with persistence of the same symptoms. She has had increasing lower extremity edema since she went back into atrial fibrillation. Review of systems:     Heart: as above   Lungs: as above   Eyes: denies changes in vision or discharge. Ears: denies changes in hearing or pain. Nose: denies epistaxis or masses   Throat: denies sore throat or trouble swallowing. Neuro: denies numbness, tingling, tremors. Skin: denies rashes or itching. : denies hematuria, dysuria   GI: denies vomiting, diarrhea   Psych: denies mood changed, anxiety, depression. Physical exam:  BP (!) 95/47   Pulse 94   Temp (!) 96.7 °F (35.9 °C) (Oral)   Resp 18   Ht 5' 7\" (1.702 m)   Wt 166 lb 3.2 oz (75.4 kg)   SpO2 97%   BMI 26.03 kg/m²   Constitutional: A&O x3, communicates well, no acute distress. Eyes: extraocular muscles intact, PERRL. Normal lids & conjunctiva. No icterus. ENT: clear, no bleeding. No external masses. Lips normal formation. Neck: supple, full ROM, no JVD, no bruits, no lymphadenopathy. No masses. trachea midline. Heart: Distant, Irregular rate & rhythm, normal S1 & S2, no abnormal murmurs. No heave. Lungs: Poor effort, decreased air movement. CTA. No accessory muscles. Abd: soft, non-tender. Normal bowel sounds. Neuro:  Full ROM X 4, EOMI, no tremors. EXT: Severe bilateral lower extremity edema  Skin: warm, dry, intact. Good turgor. Psych: A&O x 3, normal behavior, not anxious. Patient seen and examined. Chart, labs & data reviewed. A:  Severe nonischemic cardiomyopathy. Ejection fraction by echo 12/17/2022 was 25%. Severe mitral regurgitation. Recurring paroxysmal atrial fibrillation. She decompensates each time she goes back in atrial fibrillation with her underlying cardiomyopathy and mitral regurgitation. Advanced chronic renal sufficiency. Also contributing to fluid accumulation. Hypotension. Left bundle branch block. Renal cell carcinoma. Status post nephrectomy. Chronically elevated troponins. PE 12-22      Rec:  Not much room with her renal function and with her hypotension for further diuresis. Adding dobutamine will worsen her atrial fibrillation and because RVR but this may be what we need to do. Milrinone may be difficult to used with her renal insufficiency. Consult electrophysiology. She probably will need to go to [facility] for a MitraClip and A-fib ablation, etc.    Electronically signed by Wayne Mccann DO on 2/10/2023 at 10:45 AM    Note: This report was completed using computerized voice recognition software. Every effort has been made to ensure accuracy, however; and invert and computerized transcription errors may be present.

## 2023-02-10 NOTE — PLAN OF CARE
Problem: Chronic Conditions and Co-morbidities  Goal: Patient's chronic conditions and co-morbidity symptoms are monitored and maintained or improved  2/10/2023 1824 by Kaci Zavala RN  Outcome: Progressing  2/10/2023 1304 by Kaci Zavala RN  Outcome: Progressing     Problem: Discharge Planning  Goal: Discharge to home or other facility with appropriate resources  2/10/2023 1824 by Kaci Zavala RN  Outcome: Progressing  2/10/2023 1304 by Kaci Zavala RN  Outcome: Progressing     Problem: Safety - Adult  Goal: Free from fall injury  2/10/2023 1824 by Kaci Zavala RN  Outcome: Progressing  2/10/2023 1304 by Kaci Zavala RN  Outcome: Progressing     Problem: ABCDS Injury Assessment  Goal: Absence of physical injury  2/10/2023 1824 by Kaci Zavala RN  Outcome: Progressing  2/10/2023 1304 by Kaci Zavala RN  Outcome: Progressing     Problem: Cardiovascular - Adult  Goal: Maintains optimal cardiac output and hemodynamic stability  2/10/2023 1824 by Kaci Zavala RN  Outcome: Progressing  2/10/2023 1304 by Kaci Zavala RN  Outcome: Progressing     Problem: Metabolic/Fluid and Electrolytes - Adult  Goal: Hemodynamic stability and optimal renal function maintained  2/10/2023 1824 by Kaci Zavala RN  Outcome: Progressing  2/10/2023 1304 by Kaci Zavala RN  Outcome: Progressing     Problem: Nutrition Deficit:  Goal: Optimize nutritional status  Outcome: Progressing

## 2023-02-10 NOTE — ED NOTES
Report called with no questions at this time. Pt remains alert and oriented x3, resp even and unlabored. No s/s of acute distress noted.      Bhavana Burns RN  02/09/23 [id number]

## 2023-02-10 NOTE — H&P
Hospitalist History & Physical      PCP: Gail Veloz DO    Date of Service: Pt seen/examined on 2/9/2023     Chief Complaint:  had concerns including Shortness of Breath (Hypotensive @ PCP, SOB from a-fib, denies CP, dizzy ). History Of Present Illness:    Ms. Marina Guerrero, a 68y.o. year old female  who  has a past medical history of Afib ([facility] [address] Anxiety, Arthritis, Cervical radiculopathy, CHF (congestive heart failure) (HCC), Chronic sinusitis, Ejection fraction < 50%, Hilar adenopathy, Hx of blood clots, Hypertension, Left ventricular systolic dysfunction, Lesion of left native kidney, Lumbar radiculopathy, Lung nodules, Meige syndrome, Microscopic colitis, Mitral regurgitation, Nonischemic cardiomyopathy ([address] Osteopenia, PE (pulmonary thromboembolism) ([address] Renal cell carcinoma of left kidney (HCC), and Vertebrobasilar artery syndrome. Patient presented to the emergency department from her PCPs office with shortness of breath and hypotension. Has a history of atrial fibrillation and congestive heart failure. Also reporting some lightheadedness and swelling of her lower extremities. Patient has a LifeVest.  On arrival to the emergency department vital signs are within normal limits and stable. The patient is afebrile. SPO2 98% on room air. Laboratory studies demonstrate BUN 57, creatinine 2.2, anion gap 17, lactic acid 2.7, glucose 143, proBNP 21,949, troponin 10 with repeat of 55 and 49. Imaging reveals pulmonary vascular congestion. Patient was given a dose of Lasix in the emergency department and medicine was consulted for admission. Past Medical History:   Diagnosis Date    Afib (HCC)     WATCHMAN    Anxiety     Arthritis     Cervical radiculopathy     CHF (congestive heart failure) (HCC)     Chronic sinusitis     Ejection fraction < 50%     25%.  Wearing life vest    Hilar adenopathy     Hx of blood clots     Hypertension     Left ventricular systolic dysfunction     Lesion of left native kidney     Lumbar radiculopathy     Lung nodules     Meige syndrome     partial/ PCP    Microscopic colitis     Mitral regurgitation     Mild to moderate    Nonischemic cardiomyopathy ([facility] [address]     f/u with Dr. Antonio Abbasi    Osteopenia     PE (pulmonary thromboembolism) [facility])     Renal cell carcinoma of left kidney ([facility] [address] 02/01/2022    Vertebrobasilar artery syndrome     f/u PCP       Past Surgical History:   Procedure Laterality Date    BLADDER REPAIR      BRONCHOSCOPY N/A 10/05/2021    BRONCHOSCOPY W/EBUS FNA performed by Ellie Couch MD at [facility] N/A 10/05/2021    BRONCHOSCOPY DIAGNOSTIC OR CELL [address] ONLY performed by Ellie Couch MD at [address] [facility] TEST  11/25/2013    Rt & LT cath    CARDIOVERSION  11/04/2019    Successful CV from AF to NSR   (Dr. Queenie Perez)    CARDIOVERSION  02/03/2023    Dr Queenie Perez- 1 shock 200 joules- successful    COLONOSCOPY  07/2020    DIAGNOSTIC CARDIAC CATH LAB PROCEDURE  02/20/2010    Dr Rolf Pan CATH LAB PROCEDURE  08/24/2011    Right heart cath @ [facility]. DOPPLER ECHOCARDIOGRAPHY  11/25/2013    HYSTERECTOMY (CERVIX STATUS UNKNOWN)  1991    total    OTHER SURGICAL HISTORY  10/14/2020    Dr. Antonio Abbasi- 24mm Watchman device    PARTIAL NEPHRECTOMY Left 11/16/2021    ROBOT ASSISTED LAPAROSCOPIC COMPLEX LEFT PARTIAL NEPHRECTOMY performed by Gerardo Farley MD at [facility] OR    TRANSESOPHAGEAL ECHOCARDIOGRAM  11/21/2018    Dr. Yony Cooper    TRANSESOPHAGEAL ECHOCARDIOGRAM  03/18/2019    TRANSESOPHAGEAL ECHOCARDIOGRAM  01/26/2023    Dr Tita Eid         Prior to Admission medications    Medication Sig Start Date End Date Taking?  Authorizing Provider   dabigatran (PRADAXA) 75 MG capsule Take 1 capsule by mouth 2 times daily 2/7/23   UYEN Kurtz - CNP   meclizine (ANTIVERT) 25 MG tablet Take 1 tablet by mouth every 6 hours as needed for Dizziness 2/7/23 2/17/23 Seth Hall APRN - CNP   metoprolol succinate (TOPROL XL) 25 MG extended release tablet Take 1 tablet by mouth daily 2/8/23   UYEN Cordero - CNP   furosemide (LASIX) 20 MG tablet Take 1 tablet by mouth daily 2/7/23   Seth Hall APRN - CNP   spironolactone (ALDACTONE) 25 MG tablet Take 1 tablet by mouth daily 2/7/23   Seth Hall APRN - CNP   amiodarone (CORDARONE) 200 MG tablet Take 1 tablet by mouth daily 1/20/23   Charmayne Guadalajara, MD   hydrOXYzine HCl (ATARAX) 10 MG tablet Take 1 tablet by mouth every 8 hours as needed for Itching or Anxiety 1/19/23   Trisha Cardona DO   cetirizine (ZYRTEC ALLERGY) 10 MG tablet Take 0.5 tablets by mouth daily 10/19/22   Annie Perez., APRN - CNP   fluticasone (FLONASE) 50 MCG/ACT nasal spray 1-2 sprays, each nostril daily as needed for nasal congestion.  10/19/22   Rosamaria Dunlap., APRN - CNP   aspirin 81 MG EC tablet Take 81 mg by mouth daily    Historical Provider, MD   baclofen (LIORESAL) 10 MG tablet Take 1 tablet by mouth nightly 2/1/22   Trisha Cardona DO   omeprazole (PRILOSEC) 40 MG delayed release capsule Take 40 mg by mouth daily 7/2/20   Historical Provider, MD   triamcinolone (KENALOG) 0.1 % cream Apply topically 3 times daily as needed (rash)  4/5/19   Historical Provider, MD   loperamide (IMODIUM) 2 MG capsule Take 2 mg by mouth 4 times daily as needed for Diarrhea    Historical Provider, MD   diphenhydrAMINE (BENADRYL) 25 MG tablet Take 25 mg by mouth every 6 hours as needed for Itching    Historical Provider, MD   vitamin D (CHOLECALCIFEROL) 1000 UNIT TABS tablet Take 1,000 Units by mouth daily    Historical Provider, MD         Allergies:  Bentyl [dicyclomine], Adhesive tape, Atacand [candesartan], Cefdinir, Demerol, Digoxin, Imdur [isosorbide mononitrate], Losartan potassium, Sulfa antibiotics, Xarelto [rivaroxaban], Lovenox [enoxaparin sodium], Acetaminophen, Apap-caff-dihydrocodeine, Coreg [carvedilol], Cozaar [losartan], Diovan [valsartan], Effexor [venlafaxine hydrochloride], Eliquis [apixaban], Labetalol, Lisinopril, and Ramipril    Social History:    TOBACCO:   reports that she has never smoked. She has never used smokeless tobacco.  ETOH:   reports current alcohol use. Family History:    Reviewed in detail and negative for DM, CAD, Cancer, CVA. Positive as follows\"      Problem Relation Age of Onset    Heart Attack Mother     Stroke Mother     Heart Attack Father     Heart Failure Father     Heart Disease Father     Anxiety Disorder Sister     Depression Sister     Crohn's Disease Son        REVIEW OF SYSTEMS:   Pertinent positives as noted in the HPI. All other systems reviewed and negative. PHYSICAL EXAM:  /64   Pulse 71   Temp 97.8 °F (36.6 °C) (Temporal)   Resp 16   Ht 5' 7\" (1.702 m)   Wt 166 lb (75.3 kg)   SpO2 98%   BMI 26.00 kg/m²   General appearance: No apparent distress, appears stated age and cooperative. HEENT: Normal cephalic, atraumatic without obvious deformity. Pupils equal, round, and reactive to light. Extra ocular muscles intact. Conjunctivae/corneas clear. Neck: Supple, with full range of motion. No jugular venous distention. Trachea midline. Respiratory: Diminished  Cardiovascular: Irregularly irregular  Abdomen: Soft, nontender, nondistended  Musculoskeletal: Bilateral lower extremity edema  Skin: Normal skin color. No rashes or lesions. Neurologic:  Neurovascularly intact without any focal sensory/motor deficits.  Cranial nerves: II-XII intact, grossly non-focal.  Psychiatric: Alert and oriented, thought content appropriate, normal insight    Reviewed EKG and CXR personally      CBC:   Recent Labs     02/07/23  0603 02/09/23  1207   WBC 7.5 8.3   RBC 5.25 5.22   HGB 14.6 14.9   HCT 44.7 46.7   MCV 85.1 89.5   RDW 16.1* 15.7*    262     BMP:   Recent Labs     02/07/23  0603 02/07/23  0814 02/09/23  1207    132 132   K 4.1 5.4* 4.5    102 99   CO2 20* 16* 16*   BUN 45* 46* 57*   CREATININE 1.7* 1.7* 2.2*   MG  --  2.4 2.5     LFT:  Recent Labs     02/07/23  0603 02/07/23  0814 02/09/23  1207   PROT 7.0 7.2 7.3   ALKPHOS 127* 110* 201*   ALT 74* 74* 169*   AST 61* 82* 141*   BILITOT 0.9 0.9 1.4*     CE:  No results for input(s): CKTOTAL, TROPONINI in the last 72 hours. PT/INR:   Recent Labs     02/09/23  1207   INR 2.2   APTT 88.5*     BNP: No results for input(s): BNP in the last 72 hours. ESR:   Lab Results   Component Value Date    SEDRATE 25 (H) 01/22/2016     CRP: No results found for: CRP  D Dimer:   Lab Results   Component Value Date    DDIMER 248 11/24/2013      Folate and B12:   Lab Results   Component Value Date    XBKTHSSA42 1304 (H) 02/04/2023   ,   Lab Results   Component Value Date    FOLATE 14.9 12/02/2019     Lactic Acid:   Lab Results   Component Value Date    LACTA 2.7 (H) 02/09/2023     Thyroid Studies:   Lab Results   Component Value Date    TSH 0.666 02/04/2023    C0BKAQU 7.4 01/18/2023       Oupatient labs:  Lab Results   Component Value Date    CHOL 151 12/18/2022    TRIG 68 12/18/2022    HDL 55 12/18/2022    LDLCALC 82 12/18/2022    TSH 0.666 02/04/2023    INR 2.2 02/09/2023    LABA1C 5.7 (H) 02/01/2022       Urinalysis:    Lab Results   Component Value Date/Time    NITRU POSITIVE 02/05/2023 03:58 AM    WBCUA 5-10 02/05/2023 03:58 AM    BACTERIA MANY 02/05/2023 03:58 AM    RBCUA 1-3 02/05/2023 03:58 AM    RBCUA NONE 11/24/2013 08:45 PM    BLOODU TRACE-INTACT 02/05/2023 03:58 AM    SPECGRAV 1.025 02/05/2023 03:58 AM    GLUCOSEU Negative 02/05/2023 03:58 AM       Imaging:  CT ABDOMEN PELVIS WO CONTRAST Additional Contrast? None    Result Date: 2/9/2023  EXAMINATION: CT OF THE ABDOMEN AND PELVIS WITHOUT CONTRAST 2/9/2023 7:25 pm TECHNIQUE: CT of the abdomen and pelvis was performed without the administration of intravenous contrast. Multiplanar reformatted images are provided for review.  Automated exposure control, iterative reconstruction, and/or weight based adjustment of the mA/kV was utilized to reduce the radiation dose to as low as reasonably achievable. COMPARISON: August 19, 2022 HISTORY: ORDERING SYSTEM PROVIDED HISTORY: abdominal pain TECHNOLOGIST PROVIDED HISTORY: Reason for exam:->abdominal pain Additional Contrast?->None Decision Support Exception - unselect if not a suspected or confirmed emergency medical condition->Emergency Medical Condition (MA) What reading provider will be dictating this exam?->CRC FINDINGS: No bowel obstruction, free air, or free fluid. The appendix is not definitively visualized, however no focal inflammatory changes in its expected location. Liver is unremarkable. Gallbladder appears unremarkable. Pancreas is unremarkable. Spleen is unremarkable. Nonobstructing 2 mm calculus associated with right kidney. Postsurgical changes associated with left kidney related to partial left nephrectomy. No retroperitoneal lymphadenopathy. Urinary bladder is unremarkable. Additional findings located in the chest as described in report from CT chest performed on the same date. 1. No acute process in abdomen or pelvis. 2. Nonobstructing 2 mm right renal calculus. XR CHEST (2 VW)    Result Date: 2/9/2023  EXAMINATION: TWO XRAY VIEWS OF THE CHEST 2/9/2023 12:29 pm COMPARISON: None. HISTORY: ORDERING SYSTEM PROVIDED HISTORY: lifevest, a fib, sob all the time, now hypotension 80/60 TECHNOLOGIST PROVIDED HISTORY: Reason for exam:->lifevest, a fib, sob all the time, now hypotension 80/60 What reading provider will be dictating this exam?->CRC FINDINGS: The heart is enlarged. Pulmonary vessels are congested. No airspace consolidation. No pneumothorax. There is mild blunting of the left costophrenic angle. Suspect pulmonary vascular congestion.      CT Head W/O Contrast    Result Date: 2/4/2023  EXAMINATION: CT OF THE HEAD WITHOUT CONTRAST  2/4/2023 2:53 pm TECHNIQUE: CT of the head was performed without the administration of intravenous contrast. Automated exposure control, iterative reconstruction, and/or weight based adjustment of the mA/kV was utilized to reduce the radiation dose to as low as reasonably achievable. COMPARISON: None. HISTORY: ORDERING SYSTEM PROVIDED HISTORY: vertigo, nausea TECHNOLOGIST PROVIDED HISTORY: Reason for exam:->vertigo, nausea Has a \"code stroke\" or \"stroke alert\" been called? ->No Decision Support Exception - unselect if not a suspected or confirmed emergency medical condition->Emergency Medical Condition (MA) What reading provider will be dictating this exam?->CRC FINDINGS: BRAIN/VENTRICLES: There is no acute intracranial hemorrhage, mass effect or midline shift. No abnormal extra-axial fluid collection. The gray-white differentiation is maintained without evidence of an acute infarct. There is no evidence of hydrocephalus. The ventricles, cisterns and sulci are prominent consistent with atrophy. There is decreased attenuation within the periventricular white matter consistent with periventricular leukomalacia. ORBITS: The visualized portion of the orbits demonstrate no acute abnormality. SINUSES: The visualized paranasal sinuses and mastoid air cells demonstrate no acute abnormality. SOFT TISSUES/SKULL:  No acute abnormality of the visualized skull or soft tissues. 1.  There is no acute intracranial abnormality. Specifically, there is no intracranial hemorrhage. 2. Atrophy and periventricular leukomalacia, .     CT CHEST WO CONTRAST    Result Date: 2/9/2023  EXAMINATION: CT OF THE CHEST WITHOUT CONTRAST 2/9/2023 7:25 pm TECHNIQUE: CT of the chest was performed without the administration of intravenous contrast. Multiplanar reformatted images are provided for review. Automated exposure control, iterative reconstruction, and/or weight based adjustment of the mA/kV was utilized to reduce the radiation dose to as low as reasonably achievable.  COMPARISON: December 17, 2022 HISTORY: ORDERING SYSTEM PROVIDED HISTORY: shortness of breath TECHNOLOGIST PROVIDED HISTORY: Reason for exam:->shortness of breath Decision Support Exception - unselect if not a suspected or confirmed emergency medical condition->Emergency Medical Condition (MA) What reading provider will be dictating this exam?->CRC FINDINGS: Moderate cardiomegaly. No pericardial effusion. Postsurgical changes are seen at level of left atrial appendage. No mediastinal lymphadenopathy. Subtle ground-glass opacities are present in right lower lobe and stable subtle ground-glass nodule located in left upper lobe measures approximately 7 mm. Stable nodule located in right upper lobe measures 10 mm. No pleural effusion. No pneumothorax. Postsurgical changes associated with left kidney. 1. Subtle ground-glass opacities located in right lower lobe which appear new compared to prior and could indicate bronchiolitis or developing viral pneumonia. 2. Stable ground-glass nodule located in left upper lobe measures 7 mm. 3. Stable nodule located in right upper lobe measures 10 mm. 4. Stable moderate cardiomegaly. RECOMMENDATIONS: Fleischner Society guidelines for follow-up and management of incidentally detected pulmonary nodules: Single Solid Nodule: Nodule size less than 6 mm In a low-risk patient, no routine follow-up. In a high-risk patient, optional CT at 12 months. Nodule size equals 6-8 mm In a low-risk patient, CT at 6-12 months, then consider CT at 18-24 months. In a high-risk patient, CT at 6-12 months, then CT at 18-24 months. Nodule size greater than 8 mm In a low-risk patient, consider CT at 3 months, PET/CT, or tissue sampling. In a high-risk patient, consider CT at 3 months, PET/CT, or tissue sampling. Multiple Solid Nodules: Nodule size less than 6 mm In a low-risk patient, no routine follow-up. In a high-risk patient, optional CT at 12 months.  Nodule size equals 6-8 mm In a low-risk patient, CT at 3-6 months, then consider CT at 18-24 months. In a high-risk patient, CT at 3-6 months, then CT at 18-24 months. Nodule size greater than 8 mm In a low-risk patient, CT at 3-6 months, then consider CT at 18-24 months. In a high-risk patient, CT at 3-6 months, then CT at 18-24 months. - Low risk patients include individuals with minimal or absent history of smoking and other known risk factors. - High risk patients include individuals with a history or smoking or known risk factors. Radiology 2017 http://pubs. rsna.org/doi/full/10.1148/radiol. 7096947220     XR CHEST PORTABLE    Result Date: 2/4/2023  EXAMINATION: ONE XRAY VIEW OF THE CHEST 2/4/2023 7:51 pm COMPARISON: 12/29/2022 HISTORY: ORDERING SYSTEM PROVIDED HISTORY: altered mental status TECHNOLOGIST PROVIDED HISTORY: Reason for exam:->altered mental status What reading provider will be dictating this exam?->CRC FINDINGS: Examination is limited by overlying heart monitor device. However, the lungs appear clear. Pleural spaces are clear. Stable cardiomegaly is noted. No acute osseous abnormality seen. No acute process identified, but evaluation limited. MRI BRAIN WO CONTRAST    Result Date: 2/6/2023  EXAMINATION: MRI OF THE BRAIN WITHOUT CONTRAST  2/6/2023 3:32 pm TECHNIQUE: Multiplanar multisequence MRI of the brain was performed without the administration of intravenous contrast. COMPARISON: CT brain 02/04/2023 HISTORY: ORDERING SYSTEM PROVIDED HISTORY: vertigo, r/o post circulation stroke TECHNOLOGIST PROVIDED HISTORY: Reason for exam:->vertigo, r/o post circulation stroke What reading provider will be dictating this exam?->CRC FINDINGS: Ischemia: No restricted diffusion is seen to suggest ischemia. Small focus of gliosis in the subcortical white matter of the left supramarginal gyrus, likely a focus of old ischemia. Parenchyma: No acute intracranial hemorrhage or mass effect. Old microhemorrhage in the left cerebellum. Ventricles: No evidence of hydrocephalus.  Flow voids: Flow voids are present in the proximal major intracranial arteries. No evidence of infarct, intracranial hemorrhage, mass effect, or hydrocephalus. Nonemergent incidental findings as above. ASSESSMENT:  -Acute on chronic congestive heart failure  -Acute on chronic renal failure  -Elevated troponin  -Lactic acidosis  -Atrial fibrillation  -Hypertension  -Anxiety/depression      PLAN:  -Admit to medicine  -Consult cardiology  -Lasix 40 mg IV twice daily  -Daily weights  -I's and O's  -Monitor renal function avoid nephrotoxic medications  -Recheck lactic acid level  -Cycle serial troponins  -Continue home medications        Diet: No diet orders on file  Code Status: Prior  Surrogate decision maker confirmed with patient:   Extended Emergency Contact Information  Primary Emergency Contact: Mitchell Charles  Address: Vidya Newman [address] [place] [address] Phone: [phone]  Mobile Phone: [phone]  Relation: Child  Secondary Emergency Contact: [address] Phone: [phone]  Mobile Phone: [phone]  Relation: Brother/Sister    DVT Prophylaxis: []Lovenox []Heparin []PCD [] [address] []Encouraged ambulation  Disposition: []Med/Surg [] Intermediate [] ICU/CCU  Admit status: [] Observation [] Inpatient     +++++++++++++++++++++++++++++++++++++++++++++++++  Frank Class, DO  +++++++++++++++++++++++++++++++++++++++++++++++++  NOTE: This report was transcribed using voice recognition software. Every effort was made to ensure accuracy; however, inadvertent computerized transcription errors may be present.

## 2023-02-10 NOTE — PLAN OF CARE
Patient's chart updated to reflect:      . - HF care plan, HF education points and HF discharge instructions.  -Orders: 2 gram sodium diet, daily weights, I/O.  -PCP and cardiology follow up appointments to be scheduled within 7 days of hospital discharge. -CHF education session will be provided to the patient prior to hospital discharge.     Babita Sheehan RN RN, BSN  Heart Failure Navigator

## 2023-02-10 NOTE — ED NOTES
Pt to bathroom in a . She tolerated well and placed back in bed. Pt denies current needs. Call light in reach.      Franchesca Oconnor RN  02/09/23 2024

## 2023-02-10 NOTE — PROGRESS NOTES
Comprehensive Nutrition Assessment    Type and Reason for Visit:  Initial, Consult (HF guidelines)    Nutrition Recommendations/Plan:   Recommend to begin Standard high calorie/high protein ONS BID; Boost Pudding once/d  Continue current diet  Attempt diet education when daughter present (pt previously educated on Heart Failure diet 12/2022)     Malnutrition Assessment:  Malnutrition Status: Moderate malnutrition (02/10/23 1704)    Context:  Acute Illness     Findings of the 6 clinical characteristics of malnutrition:  Energy Intake:  50% or less of estimated energy requirements for 5 or more days  Weight Loss:  No significant weight loss (-5.6% wt loss since 05/22 (does not meet criteria))     Body Fat Loss:  Mild body fat loss Orbital, Triceps, Buccal region   Muscle Mass Loss:  Mild muscle mass loss Temples (temporalis), Clavicles (pectoralis & deltoids)  Fluid Accumulation:  Unable to assess     Strength:  Not Performed    Nutrition Assessment:    Pt admitted with heart failure; NIKA on CKD. PMHx CAD/CHF, CKD, & Renal CA; Poor oral intake per daughter at bedside ; consulted for diet education , previous diet education 12/22;  pt declined education, wants to wait until daughter present again. Discussed ONS as bedside; will begin and monitor. Nutrition Related Findings:    A/O x 4; I/Os WDL; Abd soft, active BS; + nausea; +loss appetite; +2 non pitting edema; +mild fat/muscle wasting Wound Type: None       Current Nutrition Intake & Therapies:    Average Meal Intake: 1-25%  Average Supplements Intake: None Ordered  ADULT DIET; Regular; No Added Salt (3-4 gm)    Anthropometric Measures:  Height: 5' 7\" (170.2 cm)  Ideal Body Weight (IBW): 135 lbs (61 kg)       Current Body Weight: 166 lb 3.6 oz (75.4 kg) (2/10), 123.1 % IBW.  Weight Source: Bed Scale  Current BMI (kg/m2): 26  Usual Body Weight: 176 lb 2.4 oz (79.9 kg) (176 lb 3 oz (79.9 kg) (5/6/22 EMR measured))  % Weight Change (Calculated): -5.6  Weight Adjustment For: No Adjustment                 BMI Categories: Overweight (BMI 25.0-29. 9)    Estimated Daily Nutrient Needs:  Energy Requirements Based On: Formula     Energy (kcal/day): 1790-0639 kcal/d (MSJ 1272 x SF1.1)  Weight Used for Protein Requirements: Ideal  Protein (g/day): 75-85 gm pro/d (1.2-1.4 gm pro/kg IBW)  Method Used for Fluid Requirements: 1 ml/kcal  Fluid (ml/day):  ml    Nutrition Diagnosis:   Inadequate oral intake related to cardiac dysfunction as evidenced by intake 0-25%, poor intake prior to admission    Nutrition Interventions:   Food and/or Nutrient Delivery: Continue Current Diet, Start Oral Nutrition Supplement (Boost pudding once/d; Ensure BID (no chocolate))  Nutrition Education/Counseling: Education needed (Diet education completed 12/22; new consult -follow up when daughter available)  Coordination of Nutrition Care: Continue to monitor while inpatient       Goals:     Goals: PO intake 50% or greater, by next RD assessment       Nutrition Monitoring and Evaluation:   Behavioral-Environmental Outcomes: None Identified  Food/Nutrient Intake Outcomes: Food and Nutrient Intake, Supplement Intake  Physical Signs/Symptoms Outcomes: Biochemical Data, GI Status, Fluid Status or Edema, Nutrition Focused Physical Findings, Skin, Weight    Discharge Planning:     Too soon to determine     WCLEMENTE Watters, RD  Contact: [phone]

## 2023-02-10 NOTE — ED NOTES
Pt sleeping at current time. Resp even and unlabored. No s/s of acute distress noted. Daughter remains at bedside.      Bhavana Burns RN  02/09/23 [id number]

## 2023-02-11 PROBLEM — F06.31 DEPRESSIVE DISORDER DUE TO ANOTHER MEDICAL CONDITION WITH DEPRESSIVE FEATURES: Status: ACTIVE | Noted: 2023-01-01

## 2023-02-11 LAB
ALBUMIN SERPL-MCNC: 3.8 G/DL (ref 3.5–5.2)
ALP BLD-CCNC: 221 U/L (ref 35–104)
ALT SERPL-CCNC: 492 U/L (ref 0–32)
ANION GAP SERPL CALCULATED.3IONS-SCNC: 19 MMOL/L (ref 7–16)
ANISOCYTOSIS: ABNORMAL
AST SERPL-CCNC: 294 U/L (ref 0–31)
BASOPHILS ABSOLUTE: 0.01 E9/L (ref 0–0.2)
BASOPHILS RELATIVE PERCENT: 0.1 % (ref 0–2)
BILIRUB SERPL-MCNC: 1.6 MG/DL (ref 0–1.2)
BUN BLDV-MCNC: 70 MG/DL (ref 6–23)
BURR CELLS: ABNORMAL
CALCIUM SERPL-MCNC: 9.7 MG/DL (ref 8.6–10.2)
CHLORIDE BLD-SCNC: 96 MMOL/L (ref 98–107)
CO2: 22 MMOL/L (ref 22–29)
CREAT SERPL-MCNC: 2.6 MG/DL (ref 0.5–1)
EOSINOPHILS ABSOLUTE: 0 E9/L (ref 0.05–0.5)
EOSINOPHILS RELATIVE PERCENT: 0 % (ref 0–6)
GFR SERPL CREATININE-BSD FRML MDRD: 18 ML/MIN/1.73
GLUCOSE BLD-MCNC: 105 MG/DL (ref 74–99)
HCT VFR BLD CALC: 43.6 % (ref 34–48)
HEMOGLOBIN: 14.4 G/DL (ref 11.5–15.5)
IMMATURE GRANULOCYTES #: 0.09 E9/L
IMMATURE GRANULOCYTES %: 0.7 % (ref 0–5)
LYMPHOCYTES ABSOLUTE: 0.45 E9/L (ref 1.5–4)
LYMPHOCYTES RELATIVE PERCENT: 3.6 % (ref 20–42)
MAGNESIUM: 2.5 MG/DL (ref 1.6–2.6)
MCH RBC QN AUTO: 27.4 PG (ref 26–35)
MCHC RBC AUTO-ENTMCNC: 33 % (ref 32–34.5)
MCV RBC AUTO: 83 FL (ref 80–99.9)
MONOCYTES ABSOLUTE: 0.61 E9/L (ref 0.1–0.95)
MONOCYTES RELATIVE PERCENT: 4.9 % (ref 2–12)
NEUTROPHILS ABSOLUTE: 11.17 E9/L (ref 1.8–7.3)
NEUTROPHILS RELATIVE PERCENT: 90.7 % (ref 43–80)
PDW BLD-RTO: 15.7 FL (ref 11.5–15)
PHOSPHORUS: 4.9 MG/DL (ref 2.5–4.5)
PLATELET # BLD: 299 E9/L (ref 130–450)
PMV BLD AUTO: 10.7 FL (ref 7–12)
POIKILOCYTES: ABNORMAL
POLYCHROMASIA: ABNORMAL
POTASSIUM REFLEX MAGNESIUM: 3.8 MMOL/L (ref 3.5–5)
RBC # BLD: 5.25 E12/L (ref 3.5–5.5)
REASON FOR REJECTION: NORMAL
REJECTED TEST: NORMAL
SODIUM BLD-SCNC: 137 MMOL/L (ref 132–146)
TOTAL PROTEIN: 7.3 G/DL (ref 6.4–8.3)
VITAMIN D 25-HYDROXY: 30 NG/ML (ref 30–100)
WBC # BLD: 12.3 E9/L (ref 4.5–11.5)

## 2023-02-11 PROCEDURE — 6370000000 HC RX 637 (ALT 250 FOR IP)

## 2023-02-11 PROCEDURE — 6370000000 HC RX 637 (ALT 250 FOR IP): Performed by: PSYCHIATRY & NEUROLOGY

## 2023-02-11 PROCEDURE — 2580000003 HC RX 258: Performed by: FAMILY MEDICINE

## 2023-02-11 PROCEDURE — 99232 SBSQ HOSP IP/OBS MODERATE 35: CPT | Performed by: INTERNAL MEDICINE

## 2023-02-11 PROCEDURE — 6360000002 HC RX W HCPCS: Performed by: INTERNAL MEDICINE

## 2023-02-11 PROCEDURE — 83970 ASSAY OF PARATHORMONE: CPT

## 2023-02-11 PROCEDURE — 80053 COMPREHEN METABOLIC PANEL: CPT

## 2023-02-11 PROCEDURE — 85025 COMPLETE CBC W/AUTO DIFF WBC: CPT

## 2023-02-11 PROCEDURE — 84100 ASSAY OF PHOSPHORUS: CPT

## 2023-02-11 PROCEDURE — 83735 ASSAY OF MAGNESIUM: CPT

## 2023-02-11 PROCEDURE — 6360000002 HC RX W HCPCS: Performed by: FAMILY MEDICINE

## 2023-02-11 PROCEDURE — 6370000000 HC RX 637 (ALT 250 FOR IP): Performed by: FAMILY MEDICINE

## 2023-02-11 PROCEDURE — 36415 COLL VENOUS BLD VENIPUNCTURE: CPT

## 2023-02-11 PROCEDURE — 2140000000 HC CCU INTERMEDIATE R&B

## 2023-02-11 PROCEDURE — 82306 VITAMIN D 25 HYDROXY: CPT

## 2023-02-11 PROCEDURE — 6370000000 HC RX 637 (ALT 250 FOR IP): Performed by: INTERNAL MEDICINE

## 2023-02-11 RX ORDER — ALPRAZOLAM 0.25 MG/1
0.25 TABLET ORAL NIGHTLY PRN
Status: DISCONTINUED | OUTPATIENT
Start: 2023-02-11 | End: 2023-02-19 | Stop reason: HOSPADM

## 2023-02-11 RX ORDER — FUROSEMIDE 10 MG/ML
60 INJECTION INTRAMUSCULAR; INTRAVENOUS 2 TIMES DAILY
Status: DISCONTINUED | OUTPATIENT
Start: 2023-02-11 | End: 2023-02-19 | Stop reason: HOSPADM

## 2023-02-11 RX ORDER — LANOLIN ALCOHOL/MO/W.PET/CERES
3 CREAM (GRAM) TOPICAL NIGHTLY PRN
Status: DISCONTINUED | OUTPATIENT
Start: 2023-02-11 | End: 2023-02-18

## 2023-02-11 RX ORDER — POTASSIUM CHLORIDE 750 MG/1
10 TABLET, EXTENDED RELEASE ORAL ONCE
Status: COMPLETED | OUTPATIENT
Start: 2023-02-11 | End: 2023-02-11

## 2023-02-11 RX ADMIN — AMIODARONE HYDROCHLORIDE 200 MG: 200 TABLET ORAL at 09:35

## 2023-02-11 RX ADMIN — POTASSIUM CHLORIDE 10 MEQ: 750 TABLET, EXTENDED RELEASE ORAL at 12:58

## 2023-02-11 RX ADMIN — Medication 10 ML: at 09:35

## 2023-02-11 RX ADMIN — BACLOFEN 5 MG: 10 TABLET ORAL at 17:58

## 2023-02-11 RX ADMIN — METOPROLOL SUCCINATE 25 MG: 25 TABLET, EXTENDED RELEASE ORAL at 09:35

## 2023-02-11 RX ADMIN — Medication 1000 UNITS: at 09:34

## 2023-02-11 RX ADMIN — SERTRALINE HYDROCHLORIDE 25 MG: 50 TABLET ORAL at 12:58

## 2023-02-11 RX ADMIN — SODIUM BICARBONATE 650 MG: 650 TABLET ORAL at 20:47

## 2023-02-11 RX ADMIN — SODIUM BICARBONATE 650 MG: 650 TABLET ORAL at 09:34

## 2023-02-11 RX ADMIN — DIPHENHYDRAMINE HYDROCHLORIDE 25 MG: 25 TABLET ORAL at 03:48

## 2023-02-11 RX ADMIN — ONDANSETRON 4 MG: 2 INJECTION INTRAMUSCULAR; INTRAVENOUS at 11:17

## 2023-02-11 RX ADMIN — FUROSEMIDE 60 MG: 10 INJECTION, SOLUTION INTRAMUSCULAR; INTRAVENOUS at 17:58

## 2023-02-11 RX ADMIN — DABIGATRAN ETEXILATE MESYLATE 75 MG: 75 CAPSULE ORAL at 09:34

## 2023-02-11 RX ADMIN — CETIRIZINE HYDROCHLORIDE 5 MG: 10 TABLET, FILM COATED ORAL at 09:34

## 2023-02-11 RX ADMIN — PANTOPRAZOLE SODIUM 40 MG: 40 TABLET, DELAYED RELEASE ORAL at 06:12

## 2023-02-11 RX ADMIN — DABIGATRAN ETEXILATE MESYLATE 75 MG: 75 CAPSULE ORAL at 20:47

## 2023-02-11 RX ADMIN — ONDANSETRON 4 MG: 2 INJECTION INTRAMUSCULAR; INTRAVENOUS at 17:58

## 2023-02-11 RX ADMIN — SPIRONOLACTONE 25 MG: 25 TABLET ORAL at 09:34

## 2023-02-11 RX ADMIN — FUROSEMIDE 40 MG: 10 INJECTION, SOLUTION INTRAMUSCULAR; INTRAVENOUS at 11:16

## 2023-02-11 ASSESSMENT — PAIN SCALES - GENERAL: PAINLEVEL_OUTOF10: 0

## 2023-02-11 ASSESSMENT — PAIN SCALES - WONG BAKER: WONGBAKER_NUMERICALRESPONSE: 0

## 2023-02-11 NOTE — CONSULTS
For consult: Anxiety, depression. 68 y.o. female with no prior psych history,  history of atrial fibrillation with 3 previous cardioversions, cardiomyopathy, hypertension, CKD, renal cell carcinoma with with partial left nephrectomy, Meige syndrome, TIA. She was recently discharged from the hospital on 2/7/2023 after being treated for A-fib RVR, heart failure and lightheadedness. Psychiatry was consulted for anxiety and depression. The patient seen today with her son at bedside who reported that the plan may be for her to be transferred to [place] for some procedures that are not offered here. Son is concerned because patient expressing depression and saying that she does not want these procedures done. Per the patient, she expresses being tired of her health conditions and going through all these procedures and being in the hospital.  Says that she feels hopeless and her quality of life is poor and she does not know if she wants to get all these procedures done. Says that she would be fine if she could just die peacfully. However ,patient denies any suicidal ideations intents or plans. She adamantly denies any thoughts to actually hurt herself says that she is a [place] and she would never do such a thing. She has never had any suicide attempts in the past and she is not on any psychotropic medications. Patient is alert however she is not oriented to time. Patient says that it is September 2034. The second time patient says that it is 2024. She knows her location she is not oriented to her current situation. She is unable to state what the risks are of not getting the procedures done. The patient is somewhat labile on exam and tearful . She denies any ideations, auditory or visual hallucinations or paranoia. Son reports that her confusion comes and goes throughout the day. Says that he is her power of  and he is planning to bring in the paperwork.   Patient and son are agreeable for antidepressants because they feel that patient is depressed and this would help the patient. Past psychiatric history: No prior psychiatric hospitalizations no prior history of depression or anxiety. No prior psychotropic medications. No prior suicide attempts or self-injurious behaviors    Family history: No Family history of mental illness or suicides    Legal history: denies     Substance abuse history: denies     Social history: Patient currently lives with her son for the last month. Patient is . Unemployed. No access to guns      Mental Status exam  Appearance: discheveled, age appropriate  Behavior: calm, cooperative, fair eye contact  Mood: depressed  Affect: Sad, tearful   Thought process: linear  Thougth content: Denies suicidal ideation, homicidal ideations, paranoia  Perceptual distrubance: Denies Auditory, visual hallucinations  Insight: poor  Judgment: poor  Cognition: alert, not oriented to time and situation    A/P: Depression due to medical illness    Patient adamantly denies any suicidal ideations intents or plans. She denied homicidal ideations, auditory hallucinations or paranoia.   She does not meet criteria for inpatient psychiatric hospitalizations  However the patient does report depression regarding her current situation and regarding declining health conditions and she is unsure if she wants to go through with any further procedures  Currently however, patient is not fully oriented to time and situation and therefore she does not have full medical decision-making capacity  as she is not able to fully weigh the risks and benefits of her treatment options logically  She is agreeable to start antidepressants, will start low-dose Zoloft 25 mg daily  We will also recommend ethics consult as patient's family requesting education regarding legal status as well as their will     -We will sign off at this time please let us know if we can assist further in this case

## 2023-02-11 NOTE — PLAN OF CARE
Problem: Chronic Conditions and Co-morbidities  Goal: Patient's chronic conditions and co-morbidity symptoms are monitored and maintained or improved  2/10/2023 2149 by Desirae Collado  Outcome: Progressing  2/10/2023 1824 by Charanjit Dave RN  Outcome: Progressing  2/10/2023 1304 by Charanjit Dave RN  Outcome: Progressing     Problem: Discharge Planning  Goal: Discharge to home or other facility with appropriate resources  2/10/2023 2149 by Desirae Collado  Outcome: Progressing  2/10/2023 1824 by Charanjit Dave RN  Outcome: Progressing  2/10/2023 1304 by Charanjit Dave RN  Outcome: Progressing     Problem: Safety - Adult  Goal: Free from fall injury  2/10/2023 2149 by Desirae Collado  Outcome: Progressing  2/10/2023 1824 by Charanjit Dave RN  Outcome: Progressing  2/10/2023 1304 by Charanjit Dave RN  Outcome: Progressing     Problem: ABCDS Injury Assessment  Goal: Absence of physical injury  2/10/2023 2149 by Desirae Collado  Outcome: Progressing  2/10/2023 1824 by Charanjit Dave RN  Outcome: Progressing  2/10/2023 1304 by Charanjit Dave RN  Outcome: Progressing     Problem: Cardiovascular - Adult  Goal: Maintains optimal cardiac output and hemodynamic stability  2/10/2023 2149 by Desirae Collado  Outcome: Progressing  2/10/2023 1824 by Charanjit Dave RN  Outcome: Progressing  2/10/2023 1304 by Charanjit Dave RN  Outcome: Progressing     Problem: Metabolic/Fluid and Electrolytes - Adult  Goal: Hemodynamic stability and optimal renal function maintained  2/10/2023 2149 by Desirae Collado  Outcome: Progressing  2/10/2023 1824 by Charanjit Dave RN  Outcome: Progressing  2/10/2023 1304 by Charanjit Dave RN  Outcome: Progressing     Problem: Nutrition Deficit:  Goal: Optimize nutritional status  2/10/2023 2149 by Desirae Collado  Outcome: Progressing  2/10/2023 1824 by Charanjit Dave RN  Outcome: Progressing

## 2023-02-11 NOTE — PROGRESS NOTES
NEPHROLOGY Attending   Progress Note  2/11/2023 8:38 AM  Subjective:   Admit Date: 2/9/2023  PCP: Becca Stoddard DO    Interval History:     2/11/23: No acute issues overnight - currently sitting up in bed visiting w/ sister - nausea earlier - stable vitals - no CP or SOB at rest      Diet: ADULT DIET; Regular; No Added Salt (3-4 gm)  ADULT ORAL NUTRITION SUPPLEMENT; Breakfast, Dinner; Standard High Calorie/High Protein Oral Supplement  ADULT ORAL NUTRITION SUPPLEMENT; Lunch; Fortified Pudding Oral Supplement    Data:   Scheduled Meds:   amiodarone  200 mg Oral Daily    cetirizine  5 mg Oral Daily    dabigatran  75 mg Oral BID    metoprolol succinate  25 mg Oral Daily    pantoprazole  40 mg Oral QAM AC    spironolactone  25 mg Oral Daily    Vitamin D  1,000 Units Oral Daily    sodium chloride flush  5-40 mL IntraVENous 2 times per day    furosemide  40 mg IntraVENous BID    sodium bicarbonate  650 mg Oral BID     Continuous Infusions:   sodium chloride      DOBUTamine 3 mcg/kg/min (02/10/23 1443)     PRN Meds:sodium chloride flush, sodium chloride, polyethylene glycol, diphenhydrAMINE, baclofen, ondansetron **OR** ondansetron    Intake/Output Summary (Last 24 hours) at 2/11/2023 0838  Last data filed at 2/11/2023 0710  Gross per 24 hour   Intake 240 ml   Output 450 ml   Net -210 ml     CBC:   Recent Labs     02/09/23  1207 02/10/23  0840 02/11/23  0542   WBC 8.3 9.8 12.3*   HGB 14.9 14.9 14.4    290 299     BMP:    Recent Labs     02/09/23  1207 02/10/23  0840 02/11/23  0542    136 137   K 4.5 4.5 3.8   CL 99 98 96*   CO2 16* 18* 22   BUN 57* 69* 70*   CREATININE 2.2* 2.6* 2.6*   GLUCOSE 143* 104* 105*     Hepatic:   Recent Labs     02/09/23  1207 02/11/23  0542   * 294*   * 492*   BILITOT 1.4* 1.6*   ALKPHOS 201* 221*     Troponin: No results for input(s): TROPONINI in the last 72 hours. BNP: No results for input(s): BNP in the last 72 hours.   Lipids: No results for input(s): CHOL, HDL in the last 72 hours. Invalid input(s): LDLCALCU  ABGs: No results found for: PHART, PO2ART, VHB8RHF  INR:   Recent Labs     02/09/23  1207   INR 2.2     -----------------------------------------------------------------  RAD: CT ABDOMEN PELVIS WO CONTRAST Additional Contrast? None    Result Date: 2/9/2023  EXAMINATION: CT OF THE ABDOMEN AND PELVIS WITHOUT CONTRAST 2/9/2023 7:25 pm TECHNIQUE: CT of the abdomen and pelvis was performed without the administration of intravenous contrast. Multiplanar reformatted images are provided for review. Automated exposure control, iterative reconstruction, and/or weight based adjustment of the mA/kV was utilized to reduce the radiation dose to as low as reasonably achievable. COMPARISON: August 19, 2022 HISTORY: ORDERING SYSTEM PROVIDED HISTORY: abdominal pain TECHNOLOGIST PROVIDED HISTORY: Reason for exam:->abdominal pain Additional Contrast?->None Decision Support Exception - unselect if not a suspected or confirmed emergency medical condition->Emergency Medical Condition (MA) What reading provider will be dictating this exam?->CRC FINDINGS: No bowel obstruction, free air, or free fluid. The appendix is not definitively visualized, however no focal inflammatory changes in its expected location. Liver is unremarkable. Gallbladder appears unremarkable. Pancreas is unremarkable. Spleen is unremarkable. Nonobstructing 2 mm calculus associated with right kidney. Postsurgical changes associated with left kidney related to partial left nephrectomy. No retroperitoneal lymphadenopathy. Urinary bladder is unremarkable. Additional findings located in the chest as described in report from CT chest performed on the same date. 1. No acute process in abdomen or pelvis. 2. Nonobstructing 2 mm right renal calculus. XR CHEST (2 VW)    Result Date: 2/9/2023  EXAMINATION: TWO XRAY VIEWS OF THE CHEST 2/9/2023 12:29 pm COMPARISON: None.  HISTORY: ORDERING SYSTEM PROVIDED HISTORY: lifevest, a fib, sob all the time, now hypotension 80/60 TECHNOLOGIST PROVIDED HISTORY: Reason for exam:->lifevest, a fib, sob all the time, now hypotension 80/60 What reading provider will be dictating this exam?->CRC FINDINGS: The heart is enlarged.  Pulmonary vessels are congested.  No airspace consolidation.  No pneumothorax.  There is mild blunting of the left costophrenic angle.     Suspect pulmonary vascular congestion.     CT CHEST WO CONTRAST    Result Date: 2/9/2023  EXAMINATION: CT OF THE CHEST WITHOUT CONTRAST 2/9/2023 7:25 pm TECHNIQUE: CT of the chest was performed without the administration of intravenous contrast. Multiplanar reformatted images are provided for review. Automated exposure control, iterative reconstruction, and/or weight based adjustment of the mA/kV was utilized to reduce the radiation dose to as low as reasonably achievable. COMPARISON: December 17, 2022 HISTORY: ORDERING SYSTEM PROVIDED HISTORY: shortness of breath TECHNOLOGIST PROVIDED HISTORY: Reason for exam:->shortness of breath Decision Support Exception - unselect if not a suspected or confirmed emergency medical condition->Emergency Medical Condition (MA) What reading provider will be dictating this exam?->CRC FINDINGS: Moderate cardiomegaly.  No pericardial effusion.  Postsurgical changes are seen at level of left atrial appendage.  No mediastinal lymphadenopathy. Subtle ground-glass opacities are present in right lower lobe and stable subtle ground-glass nodule located in left upper lobe measures approximately 7 mm.  Stable nodule located in right upper lobe measures 10 mm.  No pleural effusion.  No pneumothorax.  Postsurgical changes associated with left kidney.     1. Subtle ground-glass opacities located in right lower lobe which appear new compared to prior and could indicate bronchiolitis or developing viral pneumonia. 2. Stable ground-glass nodule located in left upper lobe measures 7 mm. 3. Stable nodule located in  right upper lobe measures 10 mm. 4. Stable moderate cardiomegaly. RECOMMENDATIONS: Fleischner Society guidelines for follow-up and management of incidentally detected pulmonary nodules: Single Solid Nodule: Nodule size less than 6 mm In a low-risk patient, no routine follow-up. In a high-risk patient, optional CT at 12 months. Nodule size equals 6-8 mm In a low-risk patient, CT at 6-12 months, then consider CT at 18-24 months. In a high-risk patient, CT at 6-12 months, then CT at 18-24 months. Nodule size greater than 8 mm In a low-risk patient, consider CT at 3 months, PET/CT, or tissue sampling. In a high-risk patient, consider CT at 3 months, PET/CT, or tissue sampling. Multiple Solid Nodules: Nodule size less than 6 mm In a low-risk patient, no routine follow-up. In a high-risk patient, optional CT at 12 months. Nodule size equals 6-8 mm In a low-risk patient, CT at 3-6 months, then consider CT at 18-24 months. In a high-risk patient, CT at 3-6 months, then CT at 18-24 months. Nodule size greater than 8 mm In a low-risk patient, CT at 3-6 months, then consider CT at 18-24 months. In a high-risk patient, CT at 3-6 months, then CT at 18-24 months. - Low risk patients include individuals with minimal or absent history of smoking and other known risk factors. - High risk patients include individuals with a history or smoking or known risk factors. Radiology 2017 http://pubs. rsna.org/doi/full/10.1148/radiol. 9721036872         Objective:   Vitals:   Vitals:    02/11/23 0345   BP: 107/78   Pulse:    Resp:    Temp:    SpO2:      Patient Vitals for the past 24 hrs:   BP Temp Temp src Pulse Resp SpO2 Height Weight   02/11/23 0345 107/78 -- -- -- -- -- -- --   02/11/23 0257 -- -- -- -- -- -- -- 160 lb (72.6 kg)   02/11/23 0027 (!) 149/120 97.5 °F (36.4 °C) -- 100 20 96 % -- --   02/10/23 2000 106/68 97 °F (36.1 °C) Tympanic (!) 110 20 97 % -- --   02/10/23 1756 117/70 -- -- -- 22 -- -- --   02/10/23 1658 -- -- -- -- -- -- 5' 7\" (1.702 m) --   02/10/23 1430 101/69 97.7 °F (36.5 °C) Temporal 90 18 99 % -- --         General: Awake, alert, no acute distress  Neck: No JVD noted  Lungs: Clear bilaterally upper, diminished to the bases bilaterally. Unlabored  CV: Irregular. No rub  Abd: Soft, nontender, nondistended. Active bowel sounds  Skin: Warm and dry. No rash on exposed extremities  Ext: 2+ BLE edema   Neuro: Awake, answers questions appropriately      Assessment/Plan:        NIKA stage II on CKD 3B  Likely decreased effective renal perfusion in the setting of decreased oral intake, CHF, atrial fibrillation; exacerbated by diuretics  history of left partial nephrectomy for left renal mass with Dr. Maria A Zuluaga on 11/16/2021  CT abd pelvis 2/9 nonobstructing 2 mm right renal calculus, partial left nephrectomy; CT with left hydronephrosis  Baseline creatinine 1.3-1.7  Creatinine peaked at 2.6 on 2/10  Avoid nephrotoxins/NSAIDs  Strict I&O, daily weights  Follow closely on on Lasix 40 mg IV twice daily  Monitor labs closely with diuresis     2. Cardiomyopathy/CHF  Echo 12/2022 EF 25%, mod aortic regurg, mod mitral regurg, mod tricuspid regurg, pulm hypertension severe  proBNP 21,949  On dobutamine drip in addition to twice daily IV diuretics  Monitor volume status  Cardiology following     3. High anion gap metabolic acidosis  Likely in the setting of NIKA/CKD  Anion gap 19 and CO2 22 on 2/11  Sodium bicarbonate 650 mg oral twice daily  Monitor labs     4. Atrial fibrillation  S/p cardioversion 2/3  EP consulted     5. Shortness of breath   CXR 2/9 pulmonary vascular congestion  CT chest 2/9 subtle groundglass opacities in the right lower lobe, stable groundglass nodule left upper lobe, stable nodule located in right upper lobe  Defer management to primary     6. History of left renal mass  S/p left partial nephrectomy for left renal mass with Dr. Maria A Zuluaga on 11/16/2021     7.   Hypotension  Follow BP closely on current diuretics UYEN Giron - CNP    Patient seen and examined all key components of the physical performed independently , discussed with NP, all pertinent labs and radiologic tests personally reviewed agree with above.     NIKA on CKD 3B: Due to cardiorenal syndrome  -Continue IV diuresis with close monitoring of blood pressure and kidney function  -Dobutamine started, hopefully it helps improve renal perfusion  Urine output 800 cc recorded for only 1 shift    Archana Velez MD

## 2023-02-11 NOTE — PLAN OF CARE
Problem: Chronic Conditions and Co-morbidities  Goal: Patient's chronic conditions and co-morbidity symptoms are monitored and maintained or improved  Outcome: Progressing     Problem: Discharge Planning  Goal: Discharge to home or other facility with appropriate resources  Outcome: Progressing     Problem: Safety - Adult  Goal: Free from fall injury  Outcome: Progressing     Problem: ABCDS Injury Assessment  Goal: Absence of physical injury  Outcome: Progressing     Problem: Cardiovascular - Adult  Goal: Maintains optimal cardiac output and hemodynamic stability  Outcome: Progressing     Problem: Metabolic/Fluid and Electrolytes - Adult  Goal: Hemodynamic stability and optimal renal function maintained  Outcome: Progressing     Problem: Nutrition Deficit:  Goal: Optimize nutritional status  Outcome: Progressing

## 2023-02-11 NOTE — PROGRESS NOTES
PROGRESS NOTE     CARDIOLOGY    Chief complaint: Seen today for follow up, management & recommendations for cardiomyopathy, mitral regurgitation, hypervolemia. She just had nausea and vomiting this morning prior to my arrival.  She was reclining in bed. She was comfortable and in no distress. She states that her breathing and her lower extremities have not really made any changes. Wt Readings from Last 3 Encounters:   02/11/23 160 lb (72.6 kg)   02/07/23 166 lb 6.4 oz (75.5 kg)   02/06/23 165 lb 2 oz (74.9 kg)     Temp Readings from Last 3 Encounters:   02/11/23 97.6 °F (36.4 °C) (Axillary)   02/07/23 98.6 °F (37 °C) (Temporal)   02/03/23 98.2 °F (36.8 °C)     BP Readings from Last 3 Encounters:   02/11/23 111/64   02/07/23 100/73   02/03/23 113/77     Pulse Readings from Last 3 Encounters:   02/11/23 (!) 112   02/07/23 93   02/03/23 99         Intake/Output Summary (Last 24 hours) at 2/11/2023 1116  Last data filed at 2/11/2023 0710  Gross per 24 hour   Intake 240 ml   Output 450 ml   Net -210 ml       Recent Labs     02/09/23  1207 02/10/23  0840 02/11/23  0542   WBC 8.3 9.8 12.3*   HGB 14.9 14.9 14.4   HCT 46.7 44.8 43.6   MCV 89.5 83.6 83.0    290 299     Recent Labs     02/09/23  1207 02/10/23  0840 02/11/23  0542    136 137   K 4.5 4.5 3.8   CL 99 98 96*   CO2 16* 18* 22   PHOS  --   --  4.9*   BUN 57* 69* 70*   CREATININE 2.2* 2.6* 2.6*   MG 2.5 2.5 2.5     Recent Labs     02/09/23  1207   PROTIME 23.8*   INR 2.2     No results for input(s): CKTOTAL, CKMB, CKMBINDEX, TROPONINI in the last 72 hours. No results for input(s): BNP in the last 72 hours. No results for input(s): CHOL, HDL, TRIG in the last 72 hours.     Invalid input(s): CHOLHDLR, LDLCALCU  Recent Labs     02/09/23  1207 02/09/23  1442 02/09/23  1654   TROPHS 10* 55* 49*         amiodarone (CORDARONE) tablet 200 mg, Daily  cetirizine (ZYRTEC) tablet 5 mg, Daily  dabigatran (PRADAXA) capsule 75 mg, BID  metoprolol succinate (TOPROL XL) extended release tablet 25 mg, Daily  pantoprazole (PROTONIX) tablet 40 mg, QAM AC  spironolactone (ALDACTONE) tablet 25 mg, Daily  vitamin D (CHOLECALCIFEROL) tablet 1,000 Units, Daily  sodium chloride flush 0.9 % injection 5-40 mL, 2 times per day  sodium chloride flush 0.9 % injection 5-40 mL, PRN  0.9 % sodium chloride infusion, PRN  polyethylene glycol (GLYCOLAX) packet 17 g, Daily PRN  furosemide (LASIX) injection 40 mg, BID  diphenhydrAMINE (BENADRYL) tablet 25 mg, Q6H PRN  DOBUTamine (DOBUTREX) 1000 mg in dextrose 5 % 250 mL infusion, Continuous  sodium bicarbonate tablet 650 mg, BID  baclofen (LIORESAL) tablet 5 mg, TID PRN  ondansetron (ZOFRAN-ODT) disintegrating tablet 4 mg, Q8H PRN   Or  ondansetron (ZOFRAN) injection 4 mg, Q6H PRN        Review of systems:     Heart: as above   Lungs: as above   Eyes: denies changes in vision or discharge. Ears: denies changes in hearing or pain. Nose: denies epistaxis or masses   Throat: denies sore throat or trouble swallowing. Neuro: denies numbness, tingling, tremors. Skin: denies rashes or itching. : denies hematuria, dysuria   GI: denies vomiting, diarrhea   Psych: denies mood changed, anxiety, depression. Physical exam:    Constitutional: A&O x3, communicates well, no acute distress. Eyes: extraocular muscles intact, PERRL. Normal lids & conjunctiva. No icterus. ENT: clear, no bleeding. No external masses. Lips normal formation. Neck: supple, full ROM, + JVD, no bruits, no lymphadenopathy. No masses. trachea midline. Heart: regular rate & rhythm, normal S1 & S2, no abnormal murmurs. No heave. Lungs: No accessory muscles. Poor effort. Bibasilar rales. Abd: soft, non-tender. Normal bowel sounds. Neuro: Full ROM X 4, EOMI, no tremors. EXT: Severe bilateral lower extremity edema  Skin: warm, dry, intact. Good turgor. Psych: A&O x 3, normal behavior, not anxious.       Assessment/Recommendations  Nonischemic cardiomyopathy. Now admitted with acute on chronic HFrEF. Severe mitral regurgitation. Advanced acute on chronic renal insufficiency. Persistent atrial fibrillation. Failed cardioversion 2/3/2023. Hypervolemia due to all the above. Nausea and vomiting this morning. Hypotension on admission. Improving today. Left bundle branch block. May benefit from resynchronization. Renal cell carcinoma. Status post nephrectomy. Chronically elevated troponins. Recent pulmonary embolus. Continue dobutamine. We will try to increase Lasix. I discussed with her nurse. I's and O's have not been performed. I asked them to try to obtain these. Note: This report was completed using computerized voice recognition software. Every effort has been made to ensure accuracy, however; and invert and computerized transcription errors may be present.

## 2023-02-11 NOTE — PROGRESS NOTES
[facility],[address] and [facility]  Consultation Report  PATIENT: Mele Askew  MEDICAL RECORD NUMBER: [id number]  DATE OF SERVICE:  2/11/2023  ATTENDING ELECTROPHYSIOLOGIST: Dr Ivy Rich   PRIMARY ELECTROPHYSIOLOGIST: Dr Tete Washburn: No ref. provider found and Trisha Cardona DO  CHIEF COMPLAINT: Shortness of breath  Admitting diagnosis: Shortness of breath, heart failure    HPI: This is a 68 y.o. female with a history of atrial fibrillation, status post Watchman in 2020, nonischemic cardiomyopathy, hypertension, CKD, renal cell carcinoma with with partial left nephrectomy, Meige syndrome, TIA. She was recently discharged from the hospital on 2/7/2023 after being treated for A-fib RVR, heart failure and lightheadedness. She states she has had complaints of nausea and lightheadedness since starting on anticoagulation with Pradaxa since she was diagnosed with a PE on 12/17/2022. She was diagnosed with persistent atrial fibrillation and had at least 3 previous cardioversions. She underwent Watchman device placement on 10/14/20 and was not on [facility] since due to multiple allergies/side effects on Eliquis and Xarelto in the past.    She was seen on 12/19/22 when she was admitted to the hospital with acute on chronic CHF and PE. She was found to have NIKA on CKD and her Tikosyn was discontinued on 12/19/22. She was also found to have EF of 25% and LifeVest was ordered for patient. She was noted to have recurrent AF on 1/13/23 and Amiodarone was started on 1/20/23. She was scheduled for DCCV in late February but was moved to 2/3/23 due to request from CHF clinic. She was initially prescribed Lovenox and was switched to Pradaxa 150 mg BID. Since she was started on Lovenox she reports nausea and occasional dizziness. She underwent successful DCCV on 2/3/23, then went back into atrial fibrillation and admitted on 2/4/23 due to nausea and lightheadedness. Initial EKG on 2/4/23 showed normal sinus rhythm. EKG later on 2/4/23 showed AF with CVR. EKG today showed AF with CVR. She was worked up for lightheadedness. She did have overall low blood pressure throughout stay and worsening lightheadedness upon ambulation and standing. She continued on amiodarone and plan was for cardioversion in 2 to 3 weeks with referral to valve clinic for her mitral valve regurgitation. On 2/10/2023 she presented back to the emergency room for complaints of shortness of breath and low blood pressure found by her home health aide. She states that she has not been able to have much nutrition due to nausea and \"gagging. \"  She has had increased swelling in her bilateral lower extremities. Cardiac electrophysiology service is consulted for atrial fibrillation with RVR. .    2/11/23: Patient continues to feel poorly. She is lying in bed and is not \"gagging\" or \"spitting\" today still complains of dyspnea. Urine output has improved but she is not able to eat or sleep well.     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 02/11/2023     Priority: Medium    Stage 4 chronic kidney disease ([facility] [address] 02/10/2023     Priority: Medium    Left bundle branch block 02/10/2023     Priority: Medium    HFrEF (heart failure with reduced ejection fraction) ([address] 02/09/2023     Priority: Medium    Vertigo 02/04/2023     Priority: Medium    Dizziness 02/04/2023     Priority: Medium    Multiple subsegmental pulmonary emboli without acute cor pulmonale ([address] 12/17/2022     Priority: Medium    Acute on chronic congestive heart failure ([address] 12/17/2022     Priority: Medium    Chronic renal disease, stage III [facility]) [[id number]] 04/25/2022     Priority: Medium    Renal cell carcinoma of left kidney ([address] 02/01/2022    Renal mass 11/16/2021    Presence of Watchman left atrial appendage closure device 10/14/2020     Overview Note: 24-mm Watchman 2.5 (Dr. Jose Harper 10/14/2020)      Chronic anticoagulation 08/28/2020    Encounter for medication refill 07/20/2020    Itching 07/20/2020    Encounter for long-term (current) use of high-risk medication 11/04/2019    Persistent atrial fibrillation [facility] 11/04/2019    Paroxysmal atrial fibrillation ([facility] 03/10/2019    Chronic HFrEF (heart failure with reduced ejection fraction) (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 (Ejection fraction 45-50%)  Cardiac catheterization (5/61/40): PA systolic 58, wedge 11 indicating slightly elevated pulmonary pressures not secondary to left heart failure. Cardiac output 5.47, cardiac index 2.9, no coronary artery disease   Moderately elevated pulmonary systolic pressure. Severe mitral regurgitation      Overview Note:     Mild to moderate      Anxiety 04/14/2011       Past Medical History:   Diagnosis Date    Afib (HCC)     WATCHMAN    Anxiety     Arthritis     Cervical radiculopathy     CHF (congestive heart failure) ([facility])     Chronic sinusitis     Ejection fraction < 50%     25%.  Wearing life vest    Hilar adenopathy     Hx of blood clots     Hypertension     Left ventricular systolic dysfunction     Lesion of left native kidney     Lumbar radiculopathy     Lung nodules     Meige syndrome     partial/ PCP    Microscopic colitis     Mitral regurgitation     Mild to moderate    Nonischemic cardiomyopathy ([facility] [address]     f/u with Dr. Jose Harper    Osteopenia     PE (pulmonary thromboembolism) [facility])     Renal cell carcinoma of left kidney ([facility] [address] 02/01/2022    Vertebrobasilar artery syndrome     f/u PCP       Family History   Problem Relation Age of Onset    Heart Attack Mother     Stroke Mother     Heart Attack Father     Heart Failure Father     Heart Disease Father Anxiety Disorder Sister     Depression Sister     Crohn's Disease Son        Social History     Tobacco Use    Smoking status: Never    Smokeless tobacco: Never   Substance Use Topics    Alcohol use:  Yes     Alcohol/week: 0.0 standard drinks     Comment: occasional wine/mixed drink every few months       Current Facility-Administered Medications   Medication Dose Route Frequency Provider Last Rate Last Admin    furosemide (LASIX) injection 60 mg  60 mg IntraVENous BID Veronica Sotelo DO        sertraline (ZOLOFT) tablet 25 mg  25 mg Oral Daily Elkin Rey MD   25 mg at 02/11/23 1258    melatonin tablet 3 mg  3 mg Oral Nightly PRN Breanne Allred MD        ALPRAZolam Michael Zeke) tablet 0.25 mg  0.25 mg Oral Nightly PRN Ashley Black MD        amiodarone (CORDARONE) tablet 200 mg  200 mg Oral Daily Veronica Montano, DO   200 mg at 02/11/23 0935    cetirizine (ZYRTEC) tablet 5 mg  5 mg Oral Daily Veronica Montano, DO   5 mg at 02/11/23 0934    dabigatran (PRADAXA) capsule 75 mg  75 mg Oral BID Veronica Montano, DO   75 mg at 02/11/23 0934    metoprolol succinate (TOPROL XL) extended release tablet 25 mg  25 mg Oral Daily Veronica Montano, DO   25 mg at 02/11/23 0935    pantoprazole (PROTONIX) tablet 40 mg  40 mg Oral QAM AC Veronica Montano, DO   40 mg at 02/11/23 8358    spironolactone (ALDACTONE) tablet 25 mg  25 mg Oral Daily Veronica Montano, DO   25 mg at 02/11/23 0669    vitamin D (CHOLECALCIFEROL) tablet 1,000 Units  1,000 Units Oral Daily Veronica Montano, DO   1,000 Units at 02/11/23 0934    sodium chloride flush 0.9 % injection 5-40 mL  5-40 mL IntraVENous 2 times per day Veronica Montano, DO   10 mL at 02/11/23 0935    sodium chloride flush 0.9 % injection 5-40 mL  5-40 mL IntraVENous PRN Veronica Montano DO        0.9 % sodium chloride infusion   IntraVENous PRN Veronica Montano DO        polyethylene glycol (GLYCOLAX) packet 17 g  17 g Oral Daily PRN Veronica Montano DO        diphenhydrAMINE (BENADRYL) tablet 25 mg  25 mg Oral Q6H PRN Jose L William MD   25 mg at 02/11/23 0348    DOBUTamine (DOBUTREX) 1000 mg in dextrose 5 % 250 mL infusion  5 mcg/kg/min IntraVENous Continuous Eleonora Izaguirre MD 3.4 mL/hr at 02/10/23 1443 3 mcg/kg/min at 02/10/23 1443    sodium bicarbonate tablet 650 mg  650 mg Oral BID Sherlyn Ledesma, APRN - CNP   650 mg at 02/11/23 0934    baclofen (LIORESAL) tablet 5 mg  5 mg Oral TID PRN Jose L William MD   5 mg at 02/10/23 1903    ondansetron (ZOFRAN-ODT) disintegrating tablet 4 mg  4 mg Oral Q8H PRN Mao Ferrell DO        Or    ondansetron (ZOFRAN) injection 4 mg  4 mg IntraVENous Q6H PRN Mao Ferrell DO   4 mg at 02/11/23 1117        Allergies   Allergen Reactions    Bentyl [Dicyclomine] Shortness Of Breath    Adhesive Tape Itching     develops rash and itching with EKG electrodes    Atacand [Candesartan] Hives and Itching    Cefdinir Hives, Itching and Nausea Only    Demerol      3/9/19 Pt states she gets a severe migraine    Digoxin Itching     developed itching and rash    Imdur [Isosorbide Mononitrate]       migraines    Losartan Potassium Itching    Sulfa Antibiotics Diarrhea and Other (See Comments)     Also causes migraines  caused migraines and diarrhea    Xarelto [Rivaroxaban] Itching and Rash      severe itch, headache, rash    Lovenox [Enoxaparin Sodium] Itching     States  her pulmonary doctor-DR Nelsy Feldman took her off  lovenox (rash-itching)and she is starting on pradaxa today 1/26/2023    Acetaminophen Hives and Itching    Apap-Caff-Dihydrocodeine Hives and Itching    Coreg [Carvedilol] Itching     Can take extended release Coreg    Cozaar [Losartan] Itching    Diovan [Valsartan] Itching    Effexor [Venlafaxine Hydrochloride] Nausea Only    Eliquis [Apixaban] Hives, Itching and Rash     3/9/19 Pt states that she gets hives, itchy and a rash    Labetalol Itching    Lisinopril Itching    Ramipril Itching       ROS:   Constitutional: Negative for fever, + activity change and appetite change.  HENT: Negative for epistaxis or JVD  Eyes: Negative for diploplia, blurred vision. Respiratory: Negative for cough, chest tightness, + shortness of breath and - wheezing. Cardiovascular: pertinent positives in HPI  Gastrointestinal: Negative for abdominal pain and blood in stool. PHYSICAL EXAM:   Vitals:    02/11/23 0345 02/11/23 0847 02/11/23 0934 02/11/23 1457   BP: 107/78 111/65 111/64 90/65   Pulse:  (!) 112 (!) 112 (!) 109   Resp:  20  20   Temp:  97.6 °F (36.4 °C)  97.7 °F (36.5 °C)   TempSrc:  Axillary  Axillary   SpO2:  96%  97%   Weight:       Height:          Constitutional: Well-developed, no acute distress  Eyes: conjunctivae normal, no xanthelasma   Ears, Nose, Throat: oral mucosa moist, no cyanosis   CV: no JVD. irregular rate and rhythm. Normal S1S2 and no S3.   Lungs: clear to auscultation bilaterally, normal respiratory effort without used of accessory muscles  Abdomen: soft, non-tender, bowel sounds present, no masses or hepatomegaly   Musculoskeletal: no digital clubbing, +2-3  edema   Skin: warm, no rashes     I have personally reviewed the laboratory, cardiac diagnostic and radiographic testing as outlined below:    Data:    Recent Labs     02/09/23  1207 02/10/23  0840 02/11/23  0542   WBC 8.3 9.8 12.3*   HGB 14.9 14.9 14.4   HCT 46.7 44.8 43.6    290 299     Recent Labs     02/09/23  1207 02/10/23  0840 02/11/23  0542    136 137   K 4.5 4.5 3.8   CL 99 98 96*   CO2 16* 18* 22   BUN 57* 69* 70*   CREATININE 2.2* 2.6* 2.6*   CALCIUM 9.7 9.8 9.7      Lab Results   Component Value Date/Time    MG 2.5 02/11/2023 05:42 AM     No results for input(s): TSH in the last 72 hours. Recent Labs     02/09/23  1207   INR 2.2         CT chest without contrast 2/9/2023  Impression   1. Subtle ground-glass opacities located in right lower lobe which appear new   compared to prior and could indicate bronchiolitis or developing viral   pneumonia.    2. Stable ground-glass nodule located in left upper lobe measures 7 mm. 3. Stable nodule located in right upper lobe measures 10 mm. 4. Stable moderate cardiomegaly. Telemetry: A-fib rates of 80s to low 100s on the monitor    EK2023: A-fib with PVCs, LBBB rate of 85 bpm   please see scan in Cardiology. JAIME 23:   Summary   Severely reduced left ventricular systolic function. Reduced right ventricular function. Bi-atrial enlargement. No evidence of interatrial shunting on bubble study. History of WATCHMAN device (24 mm). No significant color flow jet around   the device. No device related thrombus visualized. Severe mitral regurgitation. Mild tricuspid regurgitation. RV-RA gradient is estimated at 27 mmHg. Signature      ----------------------------------------------------------------   Electronically signed by Federico Rodriguez MD(Interpreting   physician) on 2023 11:45 AM   ----------------------------------------------------------------     Echocardiogram 22:    Summary   Ejection fraction is visually estimated at 25%. Overall ejection fraction severely decreased. Mild left ventricular concentric hypertrophy noted. Dilated right ventricle with reduced function. Left atrial volume index of 50 ml per meters squared BSA. Moderate aortic regurgitation is noted. Moderate mitral regurgitation is present. Moderate tricuspid regurgitation. Pulmonary hypertension is severe. RVSP is 70 mmHg. No evidence for hemodynamically significant pericardial effusion. Signature      ----------------------------------------------------------------   Electronically signed by Anitha Perea DO(Interpreting   physician) on 2022 06:57 PM   ----------------------------------------------------------------     Stress Test 22:   1: No definite evidence of  ischemia or scar except soft tissue   attenuation.  The left ventricle is visually dilated both rest and   stress without evidence of TID     2 Dilated left ventricle with Moderate to severe global left   ventricular hypokinesis with estimated left ventricular ejection   fraction of 32%. 3:  Please see separate report for EKG and hemodynamic aspect of the   stress test.     4:  Low dose CT attenuation correction protocol was utilized which   revealed minimal to mild coronary artery ossifications. 5: RISK SCAN:  High risk scan due to dilated left ventricle with   severe global hypokinesis and EF of 32%. Assessment/Plan:     1. 1. Persistent atrial fibrillation  - [id number]: 6; Status post  Watchman #24 on 10/14/2020 with Dr Donaldson Ing. - History of CHERYL thrombus  - History of  JAIME and DC-CV on 5/6/19, 11/4/19 and 2/3/23.  - Tikosyn 250 mcg BID: 11/4/19 >> March 2022 was reduced to 125 mcg BID and stopped on 12/19/22 due to elevated serum creatinine.   - Noted to have recurrent AF on 1/13/23 and started on Amiodarone on 1/20/23. Underwent DC-CV 2/3/23.   - Presented in NSR on 2/4/23 >>> back to AF with CVR on 2/4/23.   - Remains in AF with mostly CVR  - Currently using Pradaxa for PE. 2. Nonischemic cardiomyopathy and chronic HFrEF  - Diagnosed originally in 2013  - TTE: 12/2016: EF 38%  - JAIME: 3/2019: EF 25-30%  - JAIME: 5/2019: EF 25-30%  - TTE: 10/2020: EF 50%  - JAIME: 11/30/2020: EF 45-50%  - JAIME: 7/16/2021: EF 45-50%  - JAIME: 10/18/2021: EF 45-50%  - TTE: 12/17/22: EF 25%. - GDMT: Toprol XL, Lasix, Apresoline and Aldactone- but held due to low BP    - Allergic to ACE-I/ARB and Nitrates  - NYHA III, ACC/AHA stage C  Currently on IV Dobutrex and IV Lasix     3. Valvular heart disease   - Severe MR on JAIME 1/26/23.  -Discussed possible transfer to [facility] for MitraClip evaluation     4. Hypotension with history of hypertension  - On Toprol XL, Lasix, Apresoline and Aldactone-now on hold due to low BP  - Managed by Dr. Arnie Paulino     5. Chronic LBBB    6.  CKD; Renal cancer status post left partial nephrectomy.    Estimated Creatinine Clearance: 18 mL/min (A) (based on SCr of 2.6 mg/dL (H)). Status post LEFT partial nephrectomy   - 11/2021 - Dr Cleve Jasso      7. History of TIA's    8. History of Meige syndrome     9. Nausea and dizziness  -   10. Multiple allergies/side effects to medications     Recommendations:    1. Will increase Dobutrex infusion to 5 mcg/kg/min  2. Out of bed for all meals, encourage nutrition  3. Possible transfer to [facility] for severe MR and possible MitraClip, discussed with cardiology  4. Will consider cardioversion next week    All of the above was discussed with the patient and her daughter>50% of the time involved face-to-face time providing counseling and or coordination of care with the other providers,  reviewing records/tests, counseling/education of the patient, ordering medications/tests/procedures, coordinating care, and documenting clinical information in the EHR. Thank you for allowing me to participate in your patient's care. Please call me if there are any questions or concerns.       Caitlin Pinon MD  Cardiac Electrophysiology  [address] [place] [address]  [facility]  5:46 PM  2/11/2023

## 2023-02-11 NOTE — PROGRESS NOTES
Hospitalist Progress Note      SYNOPSIS: Patient admitted on 2023 for HFrEF (heart failure with reduced ejection fraction) ([facility])      SUBJECTIVE:    Is better, no chest pain no abdominal pain no nausea no vomiting no dizziness  Continues on dobutamine drip    Temp (24hrs), Av.5 °F (36.4 °C), Min:97 °F (36.1 °C), Max:97.7 °F (36.5 °C)    DIET: ADULT DIET; Regular; No Added Salt (3-4 gm)  ADULT ORAL NUTRITION SUPPLEMENT; Breakfast, Dinner; Standard High Calorie/High Protein Oral Supplement  ADULT ORAL NUTRITION SUPPLEMENT; Lunch; Fortified Pudding Oral Supplement  CODE: Full Code    Intake/Output Summary (Last 24 hours) at 2023 1014  Last data filed at 2023 0710  Gross per 24 hour   Intake 240 ml   Output 450 ml   Net -210 ml       OBJECTIVE:    /64   Pulse (!) 112   Temp 97.6 °F (36.4 °C) (Axillary)   Resp 20   Ht 5' 7\" (1.702 m)   Wt 160 lb (72.6 kg)   SpO2 96%   BMI 25.06 kg/m²     General appearance: No apparent distress, appears stated age and cooperative. HEENT:  Conjunctivae/corneas clear. Neck: Supple. No jugular venous distention. Respiratory: Clear to auscultation bilaterally, normal respiratory effort  Cardiovascular: Regular rate rhythm, normal S1-S2  Abdomen: Soft, nontender, nondistended  Musculoskeletal: No clubbing, cyanosis,  2+ bilateral lower extremity edema. Skin:  No rashes  on visible skin  Neurologic: awake, alert and following commands       Assessment and plan:    -Acute on chronic congestive heart failure, systolic ejection fraction 50%: Continue IV diuretics, appreciate input from cardiology. Continue Aldactone and IV Lasix. Watchman device. Pending [facility].  -Acute on chronic renal failure stage IV: Baseline serum creatinine 1.9-2.2, continue supportive care, continue to maintain euvolemia. Nephrology consultation.   Creatinine stable at 2.6  -Elevated troponin likely demand ischemia: Appreciate input from cardiology  -Lactic acidosis  -Atrial fibrillation, chronic. On Pradaxa , on metoprolol and amiodarone  -Hypertension, essential: Continue outpatient medications  -Anxiety/depression: Supportive care, patient and family requesting psychology evaluation. Disposition: Pending transfer to [facility] clinic  Medications:  REVIEWED DAILY    Infusion Medications    sodium chloride      DOBUTamine 3 mcg/kg/min (02/10/23 1443)     Scheduled Medications    amiodarone  200 mg Oral Daily    cetirizine  5 mg Oral Daily    dabigatran  75 mg Oral BID    metoprolol succinate  25 mg Oral Daily    pantoprazole  40 mg Oral QAM AC    spironolactone  25 mg Oral Daily    Vitamin D  1,000 Units Oral Daily    sodium chloride flush  5-40 mL IntraVENous 2 times per day    furosemide  40 mg IntraVENous BID    sodium bicarbonate  650 mg Oral BID     PRN Meds: sodium chloride flush, sodium chloride, polyethylene glycol, diphenhydrAMINE, baclofen, ondansetron **OR** ondansetron    Labs:     Recent Labs     02/09/23  1207 02/10/23  0840 02/11/23  0542   WBC 8.3 9.8 12.3*   HGB 14.9 14.9 14.4   HCT 46.7 44.8 43.6    290 299       Recent Labs     02/09/23  1207 02/10/23  0840 02/11/23  0542    136 137   K 4.5 4.5 3.8   CL 99 98 96*   CO2 16* 18* 22   BUN 57* 69* 70*   CREATININE 2.2* 2.6* 2.6*   CALCIUM 9.7 9.8 9.7   PHOS  --   --  4.9*       Recent Labs     02/09/23  1207 02/11/23  0542   PROT 7.3 7.3   ALKPHOS 201* 221*   * 492*   * 294*   BILITOT 1.4* 1.6*       Recent Labs     02/09/23  1207   INR 2.2       No results for input(s): CKTOTAL, TROPONINI in the last 72 hours.     Chronic labs:    Lab Results   Component Value Date    CHOL 151 12/18/2022    TRIG 68 12/18/2022    HDL 55 12/18/2022    LDLCALC 82 12/18/2022    TSH 0.666 02/04/2023    INR 2.2 02/09/2023    LABA1C 5.7 (H) 02/01/2022       Radiology: REVIEWED DAILY    +++++++++++++++++++++++++++++++++++++++++++++++++  Cyn Mandujano MD  [employer] - [address], [place]  +++++++++++++++++++++++++++++++++++++++++++++++++  NOTE: This report was transcribed using voice recognition software. Every effort was made to ensure accuracy; however, inadvertent computerized transcription errors may be present.

## 2023-02-11 NOTE — PROGRESS NOTES
Spoke to on call  to address what we could do for patient. Medical POA paperwork can be provided for patient during hospitalization. Family will need to consult with outside legal source for information regarding their will.

## 2023-02-12 LAB
ALBUMIN SERPL-MCNC: 3.6 G/DL (ref 3.5–5.2)
ALP BLD-CCNC: 192 U/L (ref 35–104)
ALT SERPL-CCNC: 383 U/L (ref 0–32)
ANION GAP SERPL CALCULATED.3IONS-SCNC: 17 MMOL/L (ref 7–16)
ANISOCYTOSIS: ABNORMAL
AST SERPL-CCNC: 168 U/L (ref 0–31)
BASOPHILS ABSOLUTE: 0 E9/L (ref 0–0.2)
BASOPHILS RELATIVE PERCENT: 0.1 % (ref 0–2)
BILIRUB SERPL-MCNC: 1.6 MG/DL (ref 0–1.2)
BUN BLDV-MCNC: 65 MG/DL (ref 6–23)
BURR CELLS: ABNORMAL
CALCIUM SERPL-MCNC: 9.6 MG/DL (ref 8.6–10.2)
CHLORIDE BLD-SCNC: 101 MMOL/L (ref 98–107)
CO2: 24 MMOL/L (ref 22–29)
CREAT SERPL-MCNC: 2.3 MG/DL (ref 0.5–1)
EOSINOPHILS ABSOLUTE: 0 E9/L (ref 0.05–0.5)
EOSINOPHILS RELATIVE PERCENT: 0 % (ref 0–6)
GFR SERPL CREATININE-BSD FRML MDRD: 21 ML/MIN/1.73
GLUCOSE BLD-MCNC: 124 MG/DL (ref 74–99)
HCT VFR BLD CALC: 43.6 % (ref 34–48)
HEMOGLOBIN: 14.4 G/DL (ref 11.5–15.5)
LYMPHOCYTES ABSOLUTE: 0.19 E9/L (ref 1.5–4)
LYMPHOCYTES RELATIVE PERCENT: 1.7 % (ref 20–42)
MAGNESIUM: 2.5 MG/DL (ref 1.6–2.6)
MCH RBC QN AUTO: 27.4 PG (ref 26–35)
MCHC RBC AUTO-ENTMCNC: 33 % (ref 32–34.5)
MCV RBC AUTO: 82.9 FL (ref 80–99.9)
MONOCYTES ABSOLUTE: 0.38 E9/L (ref 0.1–0.95)
MONOCYTES RELATIVE PERCENT: 3.5 % (ref 2–12)
NEUTROPHILS ABSOLUTE: 9.03 E9/L (ref 1.8–7.3)
NEUTROPHILS RELATIVE PERCENT: 94.8 % (ref 43–80)
NUCLEATED RED BLOOD CELLS: 0.9 /100 WBC
OVALOCYTES: ABNORMAL
PARATHYROID HORMONE INTACT: 120 PG/ML (ref 15–65)
PDW BLD-RTO: 15.7 FL (ref 11.5–15)
PHOSPHORUS: 4.5 MG/DL (ref 2.5–4.5)
PLATELET # BLD: 266 E9/L (ref 130–450)
PMV BLD AUTO: 10.5 FL (ref 7–12)
POIKILOCYTES: ABNORMAL
POLYCHROMASIA: ABNORMAL
POTASSIUM REFLEX MAGNESIUM: 3.8 MMOL/L (ref 3.5–5)
PRO-BNP: ABNORMAL PG/ML (ref 0–450)
RBC # BLD: 5.26 E12/L (ref 3.5–5.5)
SODIUM BLD-SCNC: 142 MMOL/L (ref 132–146)
TOTAL PROTEIN: 6.9 G/DL (ref 6.4–8.3)
WBC # BLD: 9.5 E9/L (ref 4.5–11.5)

## 2023-02-12 PROCEDURE — 36415 COLL VENOUS BLD VENIPUNCTURE: CPT

## 2023-02-12 PROCEDURE — 6370000000 HC RX 637 (ALT 250 FOR IP): Performed by: FAMILY MEDICINE

## 2023-02-12 PROCEDURE — 6370000000 HC RX 637 (ALT 250 FOR IP): Performed by: INTERNAL MEDICINE

## 2023-02-12 PROCEDURE — 2580000003 HC RX 258: Performed by: FAMILY MEDICINE

## 2023-02-12 PROCEDURE — 99232 SBSQ HOSP IP/OBS MODERATE 35: CPT | Performed by: INTERNAL MEDICINE

## 2023-02-12 PROCEDURE — 83735 ASSAY OF MAGNESIUM: CPT

## 2023-02-12 PROCEDURE — 6360000002 HC RX W HCPCS: Performed by: INTERNAL MEDICINE

## 2023-02-12 PROCEDURE — 80053 COMPREHEN METABOLIC PANEL: CPT

## 2023-02-12 PROCEDURE — 6360000002 HC RX W HCPCS: Performed by: FAMILY MEDICINE

## 2023-02-12 PROCEDURE — 2140000000 HC CCU INTERMEDIATE R&B

## 2023-02-12 PROCEDURE — 6370000000 HC RX 637 (ALT 250 FOR IP)

## 2023-02-12 PROCEDURE — 84100 ASSAY OF PHOSPHORUS: CPT

## 2023-02-12 PROCEDURE — 85025 COMPLETE CBC W/AUTO DIFF WBC: CPT

## 2023-02-12 PROCEDURE — 6370000000 HC RX 637 (ALT 250 FOR IP): Performed by: PSYCHIATRY & NEUROLOGY

## 2023-02-12 PROCEDURE — 83880 ASSAY OF NATRIURETIC PEPTIDE: CPT

## 2023-02-12 RX ORDER — POTASSIUM CHLORIDE 750 MG/1
10 TABLET, EXTENDED RELEASE ORAL ONCE
Status: COMPLETED | OUTPATIENT
Start: 2023-02-12 | End: 2023-02-12

## 2023-02-12 RX ADMIN — SERTRALINE HYDROCHLORIDE 25 MG: 50 TABLET ORAL at 09:58

## 2023-02-12 RX ADMIN — SPIRONOLACTONE 25 MG: 25 TABLET ORAL at 10:00

## 2023-02-12 RX ADMIN — Medication 10 ML: at 13:00

## 2023-02-12 RX ADMIN — CETIRIZINE HYDROCHLORIDE 5 MG: 10 TABLET, FILM COATED ORAL at 10:00

## 2023-02-12 RX ADMIN — DABIGATRAN ETEXILATE MESYLATE 75 MG: 75 CAPSULE ORAL at 09:58

## 2023-02-12 RX ADMIN — FUROSEMIDE 60 MG: 10 INJECTION, SOLUTION INTRAMUSCULAR; INTRAVENOUS at 10:56

## 2023-02-12 RX ADMIN — POTASSIUM CHLORIDE 10 MEQ: 750 TABLET, EXTENDED RELEASE ORAL at 13:00

## 2023-02-12 RX ADMIN — Medication 1000 UNITS: at 09:56

## 2023-02-12 RX ADMIN — ONDANSETRON 4 MG: 2 INJECTION INTRAMUSCULAR; INTRAVENOUS at 20:07

## 2023-02-12 RX ADMIN — AMIODARONE HYDROCHLORIDE 200 MG: 200 TABLET ORAL at 10:00

## 2023-02-12 RX ADMIN — ALPRAZOLAM 0.25 MG: 0.25 TABLET ORAL at 20:07

## 2023-02-12 RX ADMIN — DOBUTAMINE HYDROCHLORIDE 5.04 MCG/KG/MIN: 400 INJECTION INTRAVENOUS at 18:03

## 2023-02-12 RX ADMIN — METOPROLOL SUCCINATE 25 MG: 25 TABLET, EXTENDED RELEASE ORAL at 10:00

## 2023-02-12 RX ADMIN — FUROSEMIDE 60 MG: 10 INJECTION, SOLUTION INTRAMUSCULAR; INTRAVENOUS at 17:59

## 2023-02-12 RX ADMIN — SODIUM BICARBONATE 650 MG: 650 TABLET ORAL at 09:59

## 2023-02-12 RX ADMIN — PANTOPRAZOLE SODIUM 40 MG: 40 TABLET, DELAYED RELEASE ORAL at 06:22

## 2023-02-12 RX ADMIN — DABIGATRAN ETEXILATE MESYLATE 75 MG: 75 CAPSULE ORAL at 20:07

## 2023-02-12 RX ADMIN — BACLOFEN 5 MG: 10 TABLET ORAL at 00:59

## 2023-02-12 RX ADMIN — SODIUM BICARBONATE 650 MG: 650 TABLET ORAL at 20:07

## 2023-02-12 ASSESSMENT — PAIN DESCRIPTION - LOCATION: LOCATION: NECK

## 2023-02-12 ASSESSMENT — PAIN DESCRIPTION - ORIENTATION: ORIENTATION: POSTERIOR

## 2023-02-12 ASSESSMENT — PAIN SCALES - GENERAL
PAINLEVEL_OUTOF10: 9
PAINLEVEL_OUTOF10: 0

## 2023-02-12 ASSESSMENT — PAIN SCALES - WONG BAKER: WONGBAKER_NUMERICALRESPONSE: 0

## 2023-02-12 ASSESSMENT — PAIN DESCRIPTION - DESCRIPTORS: DESCRIPTORS: ACHING;CRAMPING;DISCOMFORT

## 2023-02-12 NOTE — PROGRESS NOTES
Hospitalist Progress Note      SYNOPSIS: Patient admitted on 2023 for HFrEF (heart failure with reduced ejection fraction) ([facility])      SUBJECTIVE:    Feels okay, no chest pain no abdominal pain no nausea no vomiting no dizziness  In bed, does not appear to be in distress. On Bumex drip    Temp (24hrs), Av.4 °F (36.3 °C), Min:97.1 °F (36.2 °C), Max:97.7 °F (36.5 °C)    DIET: ADULT DIET; Regular; No Added Salt (3-4 gm)  ADULT ORAL NUTRITION SUPPLEMENT; Breakfast, Dinner; Standard High Calorie/High Protein Oral Supplement  ADULT ORAL NUTRITION SUPPLEMENT; Lunch; Fortified Pudding Oral Supplement  CODE: Full Code    Intake/Output Summary (Last 24 hours) at 2023 0955  Last data filed at 2023 0853  Gross per 24 hour   Intake 0 ml   Output 1850 ml   Net -1850 ml       OBJECTIVE:    /72   Pulse (!) 117   Temp 97.5 °F (36.4 °C) (Axillary)   Resp 17   Ht 5' 7\" (1.702 m)   Wt 160 lb 3.2 oz (72.7 kg)   SpO2 98%   BMI 25.09 kg/m²     General appearance: No apparent distress, appears stated age and cooperative. HEENT:  Conjunctivae/corneas clear. Neck: Supple. No jugular venous distention. Respiratory: Clear to auscultation bilaterally, normal respiratory effort  Cardiovascular: Regular rate rhythm, normal S1-S2  Abdomen: Soft, nontender, nondistended  Musculoskeletal: No clubbing, cyanosis,  2+ bilateral lower extremity edema. Skin:  No rashes  on visible skin  Neurologic: awake, alert and following commands       Assessment and plan:    -Acute on chronic congestive heart failure, systolic ejection fraction 25%: Continue IV diuretics, appreciate input from cardiology. Continue Aldactone and IV Lasix. Watchman device. Pending transfer to [facility].    -Acute on chronic renal failure stage IV: Baseline serum creatinine 1.9-2.2, continue supportive care, continue to maintain euvolemia. Nephrology consultation, input appreciated.   Creatinine stable at 2.6 1 now improved to 2.3.    -Elevated troponin likely demand ischemia: Appreciate input from cardiology    -Lactic acidosis    -Persistent atrial fibrillation, chronic. On Pradaxa , on metoprolol and amiodarone  Electrophysiology following, possible transfer to [facility] for severe MR and possible MitraClip  Possible cardioversion while in the hospital    -Hypertension, essential: Continue outpatient medications  -Anxiety/depression: Supportive care, input from psychiatry appreciated, patient does not have full medical decision-making capacity, Zoloft was started.         Disposition: Pending transfer to [facility]  Medications:  REVIEWED DAILY    Infusion Medications    sodium chloride      DOBUTamine 5 mcg/kg/min (02/11/23 1807)     Scheduled Medications    potassium chloride  10 mEq Oral Once    furosemide  60 mg IntraVENous BID    sertraline  25 mg Oral Daily    amiodarone  200 mg Oral Daily    cetirizine  5 mg Oral Daily    dabigatran  75 mg Oral BID    metoprolol succinate  25 mg Oral Daily    pantoprazole  40 mg Oral QAM AC    spironolactone  25 mg Oral Daily    Vitamin D  1,000 Units Oral Daily    sodium chloride flush  5-40 mL IntraVENous 2 times per day    sodium bicarbonate  650 mg Oral BID     PRN Meds: melatonin, ALPRAZolam, sodium chloride flush, sodium chloride, polyethylene glycol, diphenhydrAMINE, baclofen, ondansetron **OR** ondansetron    Labs:     Recent Labs     02/10/23  0840 02/11/23  0542 02/12/23  0525   WBC 9.8 12.3* 9.5   HGB 14.9 14.4 14.4   HCT 44.8 43.6 43.6    299 266       Recent Labs     02/10/23  0840 02/11/23  0542 02/12/23  0525    137 142   K 4.5 3.8 3.8   CL 98 96* 101   CO2 18* 22 24   BUN 69* 70* 65*   CREATININE 2.6* 2.6* 2.3*   CALCIUM 9.8 9.7 9.6   PHOS  --  4.9* 4.5       Recent Labs     02/09/23  1207 02/11/23  0542 02/12/23  0525   PROT 7.3 7.3 6.9   ALKPHOS 201* 221* 192*   * 492* 383*   * 294* 168*   BILITOT 1.4* 1.6* 1.6*       Recent Labs 02/09/23  1207   INR 2.2       No results for input(s): CKTOTAL, TROPONINI in the last 72 hours. Chronic labs:    Lab Results   Component Value Date    CHOL 151 12/18/2022    TRIG 68 12/18/2022    HDL 55 12/18/2022    LDLCALC 82 12/18/2022    TSH 0.666 02/04/2023    INR 2.2 02/09/2023    LABA1C 5.7 (H) 02/01/2022       Radiology: REVIEWED DAILY    +++++++++++++++++++++++++++++++++++++++++++++++++  Hans Ricardo MD  [facility] Physician - [address] [place] [address]  +++++++++++++++++++++++++++++++++++++++++++++++++  NOTE: This report was transcribed using voice recognition software. Every effort was made to ensure accuracy; however, inadvertent computerized transcription errors may be present.

## 2023-02-12 NOTE — PROGRESS NOTES
NEPHROLOGY Attending   Progress Note  2/12/2023 8:27 AM  Subjective:   Admit Date: 2/9/2023  PCP: Vivian Trotter DO    Interval History:     2/12/23: No acute issues overnight - currently examined sitting up in bed in no acute distress - able to ambulate to bedside commode w/ assistance - overall feels slightly better today - son is at bedside       Diet: ADULT DIET; Regular; No Added Salt (3-4 gm)  ADULT ORAL NUTRITION SUPPLEMENT; Breakfast, Dinner; Standard High Calorie/High Protein Oral Supplement  ADULT ORAL NUTRITION SUPPLEMENT; Lunch;  Fortified Pudding Oral Supplement    Data:   Scheduled Meds:   furosemide  60 mg IntraVENous BID    sertraline  25 mg Oral Daily    amiodarone  200 mg Oral Daily    cetirizine  5 mg Oral Daily    dabigatran  75 mg Oral BID    metoprolol succinate  25 mg Oral Daily    pantoprazole  40 mg Oral QAM AC    spironolactone  25 mg Oral Daily    Vitamin D  1,000 Units Oral Daily    sodium chloride flush  5-40 mL IntraVENous 2 times per day    sodium bicarbonate  650 mg Oral BID     Continuous Infusions:   sodium chloride      DOBUTamine 5 mcg/kg/min (02/11/23 1807)     PRN Meds:melatonin, ALPRAZolam, sodium chloride flush, sodium chloride, polyethylene glycol, diphenhydrAMINE, baclofen, ondansetron **OR** ondansetron    Intake/Output Summary (Last 24 hours) at 2/12/2023 0827  Last data filed at 2/12/2023 4089  Gross per 24 hour   Intake 0 ml   Output 1800 ml   Net -1800 ml     CBC:   Recent Labs     02/10/23  0840 02/11/23  0542 02/12/23  0525   WBC 9.8 12.3* 9.5   HGB 14.9 14.4 14.4    299 266     BMP:    Recent Labs     02/10/23  0840 02/11/23  0542 02/12/23  0525    137 142   K 4.5 3.8 3.8   CL 98 96* 101   CO2 18* 22 24   BUN 69* 70* 65*   CREATININE 2.6* 2.6* 2.3*   GLUCOSE 104* 105* 124*     Hepatic:   Recent Labs     02/09/23  1207 02/11/23  0542 02/12/23  0525   * 294* 168*   * 492* 383*   BILITOT 1.4* 1.6* 1.6*   ALKPHOS 201* 221* 192*     Troponin: No results for input(s): TROPONINI in the last 72 hours.  BNP: No results for input(s): BNP in the last 72 hours.  Lipids: No results for input(s): CHOL, HDL in the last 72 hours.    Invalid input(s): LDLCALCU  ABGs: No results found for: PHART, PO2ART, GVB3OUA  INR:   Recent Labs     02/09/23  1207   INR 2.2     -----------------------------------------------------------------  RAD: CT ABDOMEN PELVIS WO CONTRAST Additional Contrast? None    Result Date: 2/9/2023  EXAMINATION: CT OF THE ABDOMEN AND PELVIS WITHOUT CONTRAST 2/9/2023 7:25 pm TECHNIQUE: CT of the abdomen and pelvis was performed without the administration of intravenous contrast. Multiplanar reformatted images are provided for review. Automated exposure control, iterative reconstruction, and/or weight based adjustment of the mA/kV was utilized to reduce the radiation dose to as low as reasonably achievable. COMPARISON: August 19, 2022 HISTORY: ORDERING SYSTEM PROVIDED HISTORY: abdominal pain TECHNOLOGIST PROVIDED HISTORY: Reason for exam:->abdominal pain Additional Contrast?->None Decision Support Exception - unselect if not a suspected or confirmed emergency medical condition->Emergency Medical Condition (MA) What reading provider will be dictating this exam?->CRC FINDINGS: No bowel obstruction, free air, or free fluid.  The appendix is not definitively visualized, however no focal inflammatory changes in its expected location.  Liver is unremarkable.  Gallbladder appears unremarkable. Pancreas is unremarkable.  Spleen is unremarkable.  Nonobstructing 2 mm calculus associated with right kidney.  Postsurgical changes associated with left kidney related to partial left nephrectomy.  No retroperitoneal lymphadenopathy.  Urinary bladder is unremarkable.  Additional findings located in the chest as described in report from CT chest performed on the same date.     1. No acute process in abdomen or pelvis. 2. Nonobstructing 2 mm right renal calculus.     XR  CHEST (2 VW)    Result Date: 2/9/2023  EXAMINATION: TWO XRAY VIEWS OF THE CHEST 2/9/2023 12:29 pm COMPARISON: None. HISTORY: ORDERING SYSTEM PROVIDED HISTORY: lifevest, a fib, sob all the time, now hypotension 80/60 TECHNOLOGIST PROVIDED HISTORY: Reason for exam:->lifevest, a fib, sob all the time, now hypotension 80/60 What reading provider will be dictating this exam?->CRC FINDINGS: The heart is enlarged. Pulmonary vessels are congested. No airspace consolidation. No pneumothorax. There is mild blunting of the left costophrenic angle. Suspect pulmonary vascular congestion. CT CHEST WO CONTRAST    Result Date: 2/9/2023  EXAMINATION: CT OF THE CHEST WITHOUT CONTRAST 2/9/2023 7:25 pm TECHNIQUE: CT of the chest was performed without the administration of intravenous contrast. Multiplanar reformatted images are provided for review. Automated exposure control, iterative reconstruction, and/or weight based adjustment of the mA/kV was utilized to reduce the radiation dose to as low as reasonably achievable. COMPARISON: December 17, 2022 HISTORY: ORDERING SYSTEM PROVIDED HISTORY: shortness of breath TECHNOLOGIST PROVIDED HISTORY: Reason for exam:->shortness of breath Decision Support Exception - unselect if not a suspected or confirmed emergency medical condition->Emergency Medical Condition (MA) What reading provider will be dictating this exam?->CRC FINDINGS: Moderate cardiomegaly. No pericardial effusion. Postsurgical changes are seen at level of left atrial appendage. No mediastinal lymphadenopathy. Subtle ground-glass opacities are present in right lower lobe and stable subtle ground-glass nodule located in left upper lobe measures approximately 7 mm. Stable nodule located in right upper lobe measures 10 mm. No pleural effusion. No pneumothorax. Postsurgical changes associated with left kidney.      1. Subtle ground-glass opacities located in right lower lobe which appear new compared to prior and could indicate bronchiolitis or developing viral pneumonia. 2. Stable ground-glass nodule located in left upper lobe measures 7 mm. 3. Stable nodule located in right upper lobe measures 10 mm. 4. Stable moderate cardiomegaly. RECOMMENDATIONS: Fleischner Society guidelines for follow-up and management of incidentally detected pulmonary nodules: Single Solid Nodule: Nodule size less than 6 mm In a low-risk patient, no routine follow-up. In a high-risk patient, optional CT at 12 months. Nodule size equals 6-8 mm In a low-risk patient, CT at 6-12 months, then consider CT at 18-24 months. In a high-risk patient, CT at 6-12 months, then CT at 18-24 months. Nodule size greater than 8 mm In a low-risk patient, consider CT at 3 months, PET/CT, or tissue sampling. In a high-risk patient, consider CT at 3 months, PET/CT, or tissue sampling. Multiple Solid Nodules: Nodule size less than 6 mm In a low-risk patient, no routine follow-up. In a high-risk patient, optional CT at 12 months. Nodule size equals 6-8 mm In a low-risk patient, CT at 3-6 months, then consider CT at 18-24 months. In a high-risk patient, CT at 3-6 months, then CT at 18-24 months. Nodule size greater than 8 mm In a low-risk patient, CT at 3-6 months, then consider CT at 18-24 months. In a high-risk patient, CT at 3-6 months, then CT at 18-24 months. - Low risk patients include individuals with minimal or absent history of smoking and other known risk factors. - High risk patients include individuals with a history or smoking or known risk factors. Radiology 2017 http://pubs. rsna.org/doi/full/10.1148/radiol. 6896179766         Objective:   Vitals:   Vitals:    02/11/23 2355   BP: 101/60   Pulse: (!) 108   Resp: 15   Temp: 97.1 °F (36.2 °C)   SpO2: 98%     Patient Vitals for the past 24 hrs:   BP Temp Temp src Pulse Resp SpO2 Weight   02/12/23 0009 -- -- -- -- -- -- 160 lb 3.2 oz (72.7 kg)   02/11/23 2355 101/60 97.1 °F (36.2 °C) Tympanic (!) 108 15 98 % -- 02/11/23 2045 113/67 -- -- (!) 113 22 97 % --   02/11/23 1745 107/75 -- -- (!) 114 -- -- --   02/11/23 1457 90/65 97.7 °F (36.5 °C) Axillary (!) 109 20 97 % --   02/11/23 0934 111/64 -- -- (!) 112 -- -- --   02/11/23 0847 111/65 97.6 °F (36.4 °C) Axillary (!) 112 20 96 % --         General: Awake, alert, no acute distress  Neck: No JVD noted  Lungs: Clear bilaterally upper, diminished to the bases bilaterally. Unlabored  CV: Irregular. No rub  Abd: Soft, nontender, nondistended. Active bowel sounds  Skin: Warm and dry. No rash on exposed extremities  Ext: 2+ BLE edema   Neuro: Awake, answers questions appropriately      Assessment/Plan:        NIKA stage II on CKD 3B  Likely decreased effective renal perfusion in the setting of decreased oral intake, CHF, atrial fibrillation; exacerbated by diuretics  history of left partial nephrectomy for left renal mass with Dr. Geovany Segovia on 11/16/2021  CT abd pelvis 2/9 nonobstructing 2 mm right renal calculus, partial left nephrectomy; CT with left hydronephrosis  Baseline creatinine 1.3-1.7  Creatinine peaked at 2.6 on 2/10 --> 2.3 today   Avoid nephrotoxins/NSAIDs  Strict I&O, daily weights  Follow closely on on Lasix 40 mg IV twice daily  Monitor labs closely with diuresis     2. Cardiomyopathy/CHF  Echo 12/2022 EF 25%, mod aortic regurg, mod mitral regurg, mod tricuspid regurg, pulm hypertension severe  proBNP 21,949  Reviewed EPS note from 2/11: Increase dobutamine drip to 5 mcgs  Per Cardiology: increased to 60 mg Lasix BID 2/12  Possible transfer to [facility] for severe MR  Monitor volume status (1.8L urinary output past 24 hrs)     3. High anion gap metabolic acidosis  Likely in the setting of NIKA/CKD  Anion gap 19 and CO2 22 on 2/11  Sodium bicarbonate 650 mg oral twice daily  Monitor labs     4. Atrial fibrillation  S/p cardioversion 2/3  EP consulted     5.   Shortness of breath   CXR 2/9 pulmonary vascular congestion  CT chest 2/9 subtle groundglass opacities in the right lower lobe, stable groundglass nodule left upper lobe, stable nodule located in right upper lobe  Defer management to primary     6. History of left renal mass  S/p left partial nephrectomy for left renal mass with Dr. Claudia Casper on 11/16/2021     7. Hypotension  Follow BP closely on current diuretics      UYEN Gottlieb - CNP    Patient seen and examined all key components of the physical performed independently , case discussed with NP, all pertinent labs and radiologic tests personally reviewed agree with above.         Daniel Grace MD

## 2023-02-12 NOTE — PROGRESS NOTES
PROGRESS NOTE     CARDIOLOGY    Chief complaint: Seen today for follow up, management & recommendations for cardiomyopathy, mitral regurgitation, hypervolemia. She was reclining in bed. She was comfortable and in no distress. She states that her breathing and her lower extremities still have not really made any changes. Wt Readings from Last 3 Encounters:   02/12/23 160 lb 3.2 oz (72.7 kg)   02/07/23 166 lb 6.4 oz (75.5 kg)   02/06/23 165 lb 2 oz (74.9 kg)     Temp Readings from Last 3 Encounters:   02/12/23 97.5 °F (36.4 °C) (Axillary)   02/07/23 98.6 °F (37 °C) (Temporal)   02/03/23 98.2 °F (36.8 °C)     BP Readings from Last 3 Encounters:   02/12/23 102/72   02/07/23 100/73   02/03/23 113/77     Pulse Readings from Last 3 Encounters:   02/12/23 (!) 117   02/07/23 93   02/03/23 99         Intake/Output Summary (Last 24 hours) at 2/12/2023 1104  Last data filed at 2/12/2023 1053  Gross per 24 hour   Intake 0 ml   Output 1850 ml   Net -1850 ml       Recent Labs     02/10/23  0840 02/11/23  0542 02/12/23  0525   WBC 9.8 12.3* 9.5   HGB 14.9 14.4 14.4   HCT 44.8 43.6 43.6   MCV 83.6 83.0 82.9    299 266     Recent Labs     02/10/23  0840 02/11/23  0542 02/12/23  0525    137 142   K 4.5 3.8 3.8   CL 98 96* 101   CO2 18* 22 24   PHOS  --  4.9* 4.5   BUN 69* 70* 65*   CREATININE 2.6* 2.6* 2.3*   MG 2.5 2.5 2.5     Recent Labs     02/09/23  1207   PROTIME 23.8*   INR 2.2     No results for input(s): CKTOTAL, CKMB, CKMBINDEX, TROPONINI in the last 72 hours. No results for input(s): BNP in the last 72 hours. No results for input(s): CHOL, HDL, TRIG in the last 72 hours.     Invalid input(s): CHOLHDLR, LDLCALCU  Recent Labs     02/09/23  1207 02/09/23  1442 02/09/23  1654   TROPHS 10* 55* 49*         potassium chloride (KLOR-CON M) extended release tablet 10 mEq, Once  furosemide (LASIX) injection 60 mg, BID  sertraline (ZOLOFT) tablet 25 mg, Daily  melatonin tablet 3 mg, Nightly PRN  ALPRAZolam [employer]) tablet 0.25 mg, Nightly PRN  amiodarone (CORDARONE) tablet 200 mg, Daily  cetirizine (ZYRTEC) tablet 5 mg, Daily  dabigatran (PRADAXA) capsule 75 mg, BID  metoprolol succinate (TOPROL XL) extended release tablet 25 mg, Daily  pantoprazole (PROTONIX) tablet 40 mg, QAM AC  spironolactone (ALDACTONE) tablet 25 mg, Daily  vitamin D (CHOLECALCIFEROL) tablet 1,000 Units, Daily  sodium chloride flush 0.9 % injection 5-40 mL, 2 times per day  sodium chloride flush 0.9 % injection 5-40 mL, PRN  0.9 % sodium chloride infusion, PRN  polyethylene glycol (GLYCOLAX) packet 17 g, Daily PRN  diphenhydrAMINE (BENADRYL) tablet 25 mg, Q6H PRN  DOBUTamine (DOBUTREX) 1000 mg in dextrose 5 % 250 mL infusion, Continuous  sodium bicarbonate tablet 650 mg, BID  baclofen (LIORESAL) tablet 5 mg, TID PRN  ondansetron (ZOFRAN-ODT) disintegrating tablet 4 mg, Q8H PRN   Or  ondansetron (ZOFRAN) injection 4 mg, Q6H PRN      Review of systems:     Heart: as above   Lungs: as above   Eyes: denies changes in vision or discharge. Ears: denies changes in hearing or pain. Nose: denies epistaxis or masses   Throat: denies sore throat or trouble swallowing. Neuro: denies numbness, tingling, tremors. Skin: denies rashes or itching. : denies hematuria, dysuria   GI: denies vomiting, diarrhea   Psych: denies mood changed, anxiety, depression. Physical exam:    Constitutional: A&O x3, communicates well, no acute distress. Eyes: extraocular muscles intact, PERRL. Normal lids & conjunctiva. No icterus. ENT: clear, no bleeding. No external masses. Lips normal formation. Neck: supple, full ROM, + JVD, no bruits, no lymphadenopathy. No masses. trachea midline. Heart: regular rate & rhythm, normal S1 & S2, no abnormal murmurs on exam.  No heave. Lungs: Bibasilar rales. No accessory muscles. Poor effort. Abd: soft, non-tender. Normal bowel sounds. Neuro: Full ROM X 4, EOMI, no tremors.   EXT: Severe bilateral lower extremity edema on exam  Skin: warm, dry, intact. Good turgor. Psych: A&O x 3, normal behavior, not anxious. Assessment/Recommendations  Nonischemic cardiomyopathy. Now admitted with acute on chronic HFrEF. I's and O's were difficult but -800. Severe mitral regurgitation. Will most likely need a MitraClip. Awaiting transfer to [facility]. I would reassess mitral regurgitation after her diuresis and optimization of her hemodynamics to see if the mitral regurgitation improves. Advanced acute on chronic renal insufficiency. Lasix increased to 60 mg yesterday. Creatinine remains stable. Persistent atrial fibrillation. Failed cardioversion 2/3/2023. [facility]. Hypervolemia due to all the above. Hypotension on admission. Systolic 871 this morning. Left bundle branch block. May benefit from resynchronization. Renal cell carcinoma. Status post nephrectomy. Chronically elevated troponins. Recent pulmonary embolus. Continue dobutamine. Creatinine tolerating the increase in Lasix to 60 mg.  I will replace the potassium today. Note: This report was completed using computerized voice recognition software. Every effort has been made to ensure accuracy, however; and invert and computerized transcription errors may be present.

## 2023-02-12 NOTE — PROGRESS NOTES
Pulse ox was 87% on room air at rest.  Ambulated patient on room air. Oxygen saturation was 67% on room air while ambulating. Oxygen applied. Recovery pulse ox was 90% on 8 liters of oxygen. When ambulating with 8 liters, pulse ox was 82%. Recovery pulse ox was 90% on 8 liters of oxygen.

## 2023-02-12 NOTE — PLAN OF CARE
Problem: Chronic Conditions and Co-morbidities  Goal: Patient's chronic conditions and co-morbidity symptoms are monitored and maintained or improved  Outcome: Progressing     Problem: Discharge Planning  Goal: Discharge to home or other facility with appropriate resources  2/12/2023 1119 by Viola Yeboah RN  Outcome: Progressing  2/12/2023 0114 by Candace Davis RN  Outcome: Progressing     Problem: Safety - Adult  Goal: Free from fall injury  2/12/2023 1119 by Viola Yeboah RN  Outcome: Progressing  2/12/2023 0114 by Candace Davis RN  Outcome: Progressing     Problem: ABCDS Injury Assessment  Goal: Absence of physical injury  Outcome: Progressing     Problem: Cardiovascular - Adult  Goal: Maintains optimal cardiac output and hemodynamic stability  2/12/2023 1119 by Viola Yeboah RN  Outcome: Progressing  2/12/2023 0114 by Candace Davis RN  Outcome: Progressing     Problem: Metabolic/Fluid and Electrolytes - Adult  Goal: Hemodynamic stability and optimal renal function maintained  2/12/2023 1119 by Viola Yeboah RN  Outcome: Progressing  2/12/2023 0114 by Candace Davis RN  Outcome: Progressing     Problem: Nutrition Deficit:  Goal: Optimize nutritional status  Outcome: Progressing

## 2023-02-12 NOTE — PROGRESS NOTES
[address] [facility],2[address] Floor and [address] [facility] Electrophysiology  Consultation Report  PATIENT: Alejandro Willoughby  MEDICAL RECORD NUMBER: [id number]  DATE OF SERVICE:  2/12/2023  ATTENDING ELECTROPHYSIOLOGIST: Dr Jena Duenas   PRIMARY ELECTROPHYSIOLOGIST: Dr Myriam Alvarezer: No ref. provider found and Trisha Cardona DO  CHIEF COMPLAINT: Shortness of breath  Admitting diagnosis: Shortness of breath, heart failure    HPI: This is a 68 y.o. female with a history of atrial fibrillation, status post Watchman in 2020, nonischemic cardiomyopathy, hypertension, CKD, renal cell carcinoma with with partial left nephrectomy, Meige syndrome, TIA. She was recently discharged from the hospital on 2/7/2023 after being treated for A-fib RVR, heart failure and lightheadedness. She states she has had complaints of nausea and lightheadedness since starting on anticoagulation with Pradaxa since she was diagnosed with a PE on 12/17/2022. She was diagnosed with persistent atrial fibrillation and had at least 3 previous cardioversions. She underwent Watchman device placement on 10/14/20 and was not on [address] [place] [address] since due to multiple allergies/side effects on Eliquis and Xarelto in the past.    She was seen on 12/19/22 when she was admitted to the hospital with acute on chronic CHF and PE. She was found to have NIKA on CKD and her Tikosyn was discontinued on 12/19/22. She was also found to have EF of 25% and LifeVest was ordered for patient. She was noted to have recurrent AF on 1/13/23 and Amiodarone was started on 1/20/23. She was scheduled for DCCV in late February but was moved to 2/3/23 due to request from CHF clinic. She was initially prescribed Lovenox and was switched to Pradaxa 150 mg BID. Since she was started on Lovenox she reports nausea and occasional dizziness. She underwent successful DCCV on 2/3/23, then went back into atrial fibrillation and admitted on 2/4/23 due to nausea and lightheadedness. Initial EKG on 2/4/23 showed normal sinus rhythm. EKG later on 2/4/23 showed AF with CVR. EKG today showed AF with CVR. She was worked up for lightheadedness. She did have overall low blood pressure throughout stay and worsening lightheadedness upon ambulation and standing. She continued on amiodarone and plan was for cardioversion in 2 to 3 weeks with referral to valve clinic for her mitral valve regurgitation. On 2/10/2023 she presented back to the emergency room for complaints of shortness of breath and low blood pressure found by her home health aide. She states that she has not been able to have much nutrition due to nausea and \"gagging. \"  She has had increased swelling in her bilateral lower extremities. Cardiac electrophysiology service is consulted for atrial fibrillation with RVR. .    2/11/23: Patient continues to feel poorly. She is lying in bed and is not \"gagging\" or \"spitting\" today still complains of dyspnea. Urine output has improved but she is not able to eat or sleep well. 2/12/23: Patient unable to sleep or eat. Still complains of gagging and dyspnea.   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 02/11/2023     Priority: Medium    Stage 4 chronic kidney disease ([facility] 02/10/2023     Priority: Medium    Left bundle branch block 02/10/2023     Priority: Medium    HFrEF (heart failure with reduced ejection fraction) ([address] 02/09/2023     Priority: Medium    Vertigo 02/04/2023     Priority: Medium    Dizziness 02/04/2023     Priority: Medium    Multiple subsegmental pulmonary emboli without acute cor pulmonale ([address] 12/17/2022     Priority: Medium    Acute on chronic congestive heart failure ([address] 12/17/2022     Priority: Medium    Chronic renal disease, stage III [facility]) [[id number]] 04/25/2022     Priority: Medium    Renal cell carcinoma of left kidney ([address] 02/01/2022    Renal mass 11/16/2021    Presence of Watchman left atrial appendage closure device 10/14/2020     Overview Note:     24-mm Watchman 2.5 (Dr. Luiz Medina 10/14/2020)      Chronic anticoagulation 08/28/2020    Encounter for medication refill 07/20/2020    Itching 07/20/2020    Encounter for long-term (current) use of high-risk medication 11/04/2019    Persistent atrial fibrillation ([facility] 11/04/2019    Paroxysmal atrial fibrillation ([facility] 03/10/2019    Chronic HFrEF (heart failure with reduced ejection fraction) ([facility] 03/10/2019    Left atrial thrombus 01/02/2019    HTN (hypertension), benign 11/21/2018    Lumbar radiculopathy 10/11/2013    Cervical radiculopathy 10/11/2013    NICM (nonischemic cardiomyopathy) ([facility]      Overview Note:     Mild nonischemic cardiomyopathy. (Ejection fraction 45-50%)  Cardiac catheterization (3/66/14): PA systolic 58, wedge 11 indicating slightly elevated pulmonary pressures not secondary to left heart failure. Cardiac output 5.47, cardiac index 2.9, no coronary artery disease   Moderately elevated pulmonary systolic pressure. Nonrheumatic mitral valve regurgitation      Overview Note:     Mild to moderate      Anxiety 04/14/2011       Past Medical History:   Diagnosis Date    Afib (HCC)     WATCHMAN    Anxiety     Arthritis     Cervical radiculopathy     CHF (congestive heart failure) ([facility])     Chronic sinusitis     Ejection fraction < 50%     25%.  Wearing life vest    Hilar adenopathy     Hx of blood clots     Hypertension     Left ventricular systolic dysfunction     Lesion of left native kidney     Lumbar radiculopathy     Lung nodules     Meige syndrome     partial/ PCP    Microscopic colitis     Mitral regurgitation     Mild to moderate    Nonischemic cardiomyopathy ([facility]     f/u with Dr. Luiz Medina    Osteopenia     PE (pulmonary thromboembolism) [facility])     Renal cell carcinoma of left kidney ([facility] 02/01/2022    Vertebrobasilar artery syndrome     f/u PCP       Family History   Problem Relation Age of Onset    Heart Attack Mother     Stroke Mother     Heart Attack Father     Heart Failure Father     Heart Disease Father     Anxiety Disorder Sister     Depression Sister     Crohn's Disease Son        Social History     Tobacco Use    Smoking status: Never    Smokeless tobacco: Never   Substance Use Topics    Alcohol use:  Yes     Alcohol/week: 0.0 standard drinks     Comment: occasional wine/mixed drink every few months       Current Facility-Administered Medications   Medication Dose Route Frequency Provider Last Rate Last Admin    furosemide (LASIX) injection 60 mg  60 mg IntraVENous BID Solo Muck, DO   60 mg at 02/12/23 1056    sertraline (ZOLOFT) tablet 25 mg  25 mg Oral Daily Francisco Alfaro MD   25 mg at 02/12/23 1903    melatonin tablet 3 mg  3 mg Oral Nightly PRN Darrius Borrego MD        ALPRAZolam [place]) tablet 0.25 mg  0.25 mg Oral Nightly PRN Lillie Noble MD        amiodarone (CORDARONE) tablet 200 mg  200 mg Oral Daily Leretha Niece, DO   200 mg at 02/12/23 1000    cetirizine (ZYRTEC) tablet 5 mg  5 mg Oral Daily Leretha Niece, DO   5 mg at 02/12/23 1000    dabigatran (PRADAXA) capsule 75 mg  75 mg Oral BID Leretha Niece, DO   75 mg at 02/12/23 3373    metoprolol succinate (TOPROL XL) extended release tablet 25 mg  25 mg Oral Daily Leretha Niece, DO   25 mg at 02/12/23 1000    pantoprazole (PROTONIX) tablet 40 mg  40 mg Oral QAM AC Leretha Niece, DO   40 mg at 02/12/23 1132    spironolactone (ALDACTONE) tablet 25 mg  25 mg Oral Daily Leretha Niece, DO   25 mg at 02/12/23 1000    vitamin D (CHOLECALCIFEROL) tablet 1,000 Units  1,000 Units Oral Daily Leretha Niece, DO   1,000 Units at 02/12/23 0956    sodium chloride flush 0.9 % injection 5-40 mL  5-40 mL IntraVENous 2 times per day Leretha Niece, DO   10 mL at 02/12/23 1300    sodium chloride flush 0.9 % injection 5-40 mL  5-40 mL IntraVENous PRN Leretha Niece, DO        0.9 % sodium chloride infusion   IntraVENous PRN Leretha Niece, DO polyethylene glycol (GLYCOLAX) packet 17 g  17 g Oral Daily PRN Chloé Sniff, DO        diphenhydrAMINE (BENADRYL) tablet 25 mg  25 mg Oral Q6H PRN Marcus Rodriguez MD   25 mg at 02/11/23 0348    DOBUTamine (DOBUTREX) 1000 mg in dextrose 5 % 250 mL infusion  5 mcg/kg/min IntraVENous Continuous Rose Officer, MD 5.7 mL/hr at 02/11/23 1807 5 mcg/kg/min at 02/11/23 1807    sodium bicarbonate tablet 650 mg  650 mg Oral BID Kajal Patricia, APRN - CNP   650 mg at 02/12/23 2155    baclofen (LIORESAL) tablet 5 mg  5 mg Oral TID PRN Marcus Rodriguez MD   5 mg at 02/12/23 0059    ondansetron (ZOFRAN-ODT) disintegrating tablet 4 mg  4 mg Oral Q8H PRN Chloé Sniff, DO        Or    ondansetron [facility]) injection 4 mg  4 mg IntraVENous Q6H PRN Chloé Sniff, DO   4 mg at 02/11/23 1758        Allergies   Allergen Reactions    Bentyl [Dicyclomine] Shortness Of Breath    Adhesive Tape Itching     develops rash and itching with EKG electrodes    Atacand [Candesartan] Hives and Itching    Cefdinir Hives, Itching and Nausea Only    Demerol      3/9/19 Pt states she gets a severe migraine    Digoxin Itching     developed itching and rash    Imdur [Isosorbide Mononitrate]       migraines    Losartan Potassium Itching    Sulfa Antibiotics Diarrhea and Other (See Comments)     Also causes migraines  caused migraines and diarrhea    Xarelto [Rivaroxaban] Itching and Rash      severe itch, headache, rash    Lovenox [Enoxaparin Sodium] Itching     States  her pulmonary doctor-DR Roopa De Dios took her off  lovenox (rash-itching)and she is starting on pradaxa today 1/26/2023    Acetaminophen Hives and Itching    Apap-Caff-Dihydrocodeine Hives and Itching    Coreg [Carvedilol] Itching     Can take extended release Coreg    Cozaar [Losartan] Itching    Diovan [Valsartan] Itching    Effexor [Venlafaxine Hydrochloride] Nausea Only    Eliquis [Apixaban] Hives, Itching and Rash     3/9/19 Pt states that she gets hives, itchy and a rash Labetalol Itching    Lisinopril Itching    Ramipril Itching       ROS:   Constitutional: Negative for fever, + activity change and appetite change. HENT: Negative for epistaxis or JVD  Eyes: Negative for diploplia, blurred vision. Respiratory: Negative for cough, chest tightness, + shortness of breath and - wheezing. Cardiovascular: pertinent positives in HPI  Gastrointestinal: Negative for abdominal pain and blood in stool. PHYSICAL EXAM:   Vitals:    02/12/23 0009 02/12/23 0854 02/12/23 1000 02/12/23 1516   BP:  102/72 102/72 95/73   Pulse:  (!) 117 (!) 117 73   Resp:  17  19   Temp:  97.5 °F (36.4 °C)  98.5 °F (36.9 °C)   TempSrc:  Axillary  Oral   SpO2:  98%  99%   Weight: 160 lb 3.2 oz (72.7 kg)      Height:          Constitutional: Well-developed, no acute distress  Eyes: conjunctivae normal, no xanthelasma   Ears, Nose, Throat: oral mucosa moist, no cyanosis   CV: no JVD. irregular rate and rhythm. Normal S1S2 and no S3.   Lungs: clear to auscultation bilaterally, normal respiratory effort without used of accessory muscles  Abdomen: soft, non-tender, bowel sounds present, no masses or hepatomegaly   Musculoskeletal: no digital clubbing, +2-3  edema   Skin: warm, no rashes     I have personally reviewed the laboratory, cardiac diagnostic and radiographic testing as outlined below:    Data:    Recent Labs     02/10/23  0840 02/11/23  0542 02/12/23  0525   WBC 9.8 12.3* 9.5   HGB 14.9 14.4 14.4   HCT 44.8 43.6 43.6    299 266     Recent Labs     02/10/23  0840 02/11/23  0542 02/12/23  0525    137 142   K 4.5 3.8 3.8   CL 98 96* 101   CO2 18* 22 24   BUN 69* 70* 65*   CREATININE 2.6* 2.6* 2.3*   CALCIUM 9.8 9.7 9.6      Lab Results   Component Value Date/Time    MG 2.5 02/12/2023 05:25 AM     No results for input(s): TSH in the last 72 hours. No results for input(s): INR in the last 72 hours. CT chest without contrast 2/9/2023  Impression   1.  Subtle ground-glass opacities located in right lower lobe which appear new   compared to prior and could indicate bronchiolitis or developing viral   pneumonia. 2. Stable ground-glass nodule located in left upper lobe measures 7 mm. 3. Stable nodule located in right upper lobe measures 10 mm. 4. Stable moderate cardiomegaly. Telemetry: A-fib rates of 80s to low 100s on the monitor    EK2023: A-fib with PVCs, LBBB rate of 85 bpm   please see scan in Cardiology. JAIME 23:   Summary   Severely reduced left ventricular systolic function. Reduced right ventricular function. Bi-atrial enlargement. No evidence of interatrial shunting on bubble study. History of WATCHMAN device (24 mm). No significant color flow jet around   the device. No device related thrombus visualized. Severe mitral regurgitation. Mild tricuspid regurgitation. RV-RA gradient is estimated at 27 mmHg. Signature      ----------------------------------------------------------------   Electronically signed by Jennifer Pfeiffer MD(Interpreting   physician) on 2023 11:45 AM   ----------------------------------------------------------------     Echocardiogram 22:    Summary   Ejection fraction is visually estimated at 25%. Overall ejection fraction severely decreased. Mild left ventricular concentric hypertrophy noted. Dilated right ventricle with reduced function. Left atrial volume index of 50 ml per meters squared BSA. Moderate aortic regurgitation is noted. Moderate mitral regurgitation is present. Moderate tricuspid regurgitation. Pulmonary hypertension is severe. RVSP is 70 mmHg. No evidence for hemodynamically significant pericardial effusion.       Signature      ----------------------------------------------------------------   Electronically signed by Maroi Ferrer DO(Interpreting   physician) on 2022 06:57 PM   ----------------------------------------------------------------     Stress Test 22:   1: No definite evidence of  ischemia or scar except soft tissue   attenuation. The left ventricle is visually dilated both rest and   stress without evidence of TID     2  Dilated left ventricle with Moderate to severe global left   ventricular hypokinesis with estimated left ventricular ejection   fraction of 32%. 3:  Please see separate report for EKG and hemodynamic aspect of the   stress test.     4:  Low dose CT attenuation correction protocol was utilized which   revealed minimal to mild coronary artery ossifications. 5: RISK SCAN:  High risk scan due to dilated left ventricle with   severe global hypokinesis and EF of 32%. Assessment/Plan:     1. 1. Persistent atrial fibrillation  - [id number]: 6; Status post  Watchman #24 on 10/14/2020 with Dr Andressa Garcia. - History of CHERYL thrombus  - History of  JAIME and DC-CV on 5/6/19, 11/4/19 and 2/3/23.  - Tikosyn 250 mcg BID: 11/4/19 >> March 2022 was reduced to 125 mcg BID and stopped on 12/19/22 due to elevated serum creatinine.   - Noted to have recurrent AF on 1/13/23 and started on Amiodarone on 1/20/23. Underwent DC-CV 2/3/23.   - Presented in NSR on 2/4/23 >>> back to AF with CVR on 2/4/23.   - Remains in AF with mostly CVR  - Currently using Pradaxa for PE. 2. Nonischemic cardiomyopathy and chronic HFrEF  - Diagnosed originally in 2013  - TTE: 12/2016: EF 38%  - JAIME: 3/2019: EF 25-30%  - JAIME: 5/2019: EF 25-30%  - TTE: 10/2020: EF 50%  - JAIME: 11/30/2020: EF 45-50%  - JAIME: 7/16/2021: EF 45-50%  - JAIME: 10/18/2021: EF 45-50%  - TTE: 12/17/22: EF 25%. - GDMT: Toprol XL, Lasix, Apresoline and Aldactone- but held due to low BP    - Allergic to ACE-I/ARB and Nitrates  - NYHA III, ACC/AHA stage C  Currently on IV Dobutrex and IV Lasix     3. Valvular heart disease   - Severe MR on JAIME 1/26/23.  -Discussed possible transfer to [facility] [facility] for MitraClip evaluation     4.  Hypotension with history of hypertension  - On Toprol XL, Lasix, Apresoline and Aldactone-now on hold due to low BP  - Managed by Dr. Shaheen Bhakta     5. Chronic LBBB    6.  CKD; Renal cancer status post left partial nephrectomy. Estimated Creatinine Clearance: 20 mL/min (A) (based on SCr of 2.3 mg/dL (H)). Status post LEFT partial nephrectomy   - 11/2021 - Dr Naseem Wilcox      7. History of TIA's    8. History of Meige syndrome     9. Nausea and dizziness  -   10. Multiple allergies/side effects to medications     Recommendations:    Agree with diuresis and IV Dobutrex for heart failure  Awaiting transfer to [facility] [employer]  No specific electrophysiology work-up or intervention needed at present. Will see as needed    All of the above was discussed with the patient and her daughter>50% of the time involved face-to-face time providing counseling and or coordination of care with the other providers,  reviewing records/tests, counseling/education of the patient, ordering medications/tests/procedures, coordinating care, and documenting clinical information in the EHR. Thank you for allowing me to participate in your patient's care. Please call me if there are any questions or concerns.       Deandre Velarde MD  Cardiac Electrophysiology  [facility]) [facility]  [facility]: [facility] Electrophysiology  3:34 PM  2/12/2023

## 2023-02-12 NOTE — PLAN OF CARE
Problem: Chronic Conditions and Co-morbidities  Goal: Patient's chronic conditions and co-morbidity symptoms are monitored and maintained or improved  2/11/2023 1519 by Monica Pickard RN  Outcome: Progressing     Problem: Discharge Planning  Goal: Discharge to home or other facility with appropriate resources  2/12/2023 0114 by Yvette Mitchell RN  Outcome: Progressing  2/11/2023 1519 by Monica Pickard RN  Outcome: Progressing     Problem: Safety - Adult  Goal: Free from fall injury  2/12/2023 0114 by Yvette Mitchell RN  Outcome: Progressing  2/11/2023 1519 by Monica Pickard RN  Outcome: Progressing     Problem: ABCDS Injury Assessment  Goal: Absence of physical injury  2/11/2023 1519 by Monica Pickard RN  Outcome: Progressing     Problem: Cardiovascular - Adult  Goal: Maintains optimal cardiac output and hemodynamic stability  2/12/2023 0114 by Yvette Mitchell RN  Outcome: Progressing  2/11/2023 1519 by Monica Pickard RN  Outcome: Progressing     Problem: Metabolic/Fluid and Electrolytes - Adult  Goal: Hemodynamic stability and optimal renal function maintained  2/12/2023 0114 by Yvette Mitchell RN  Outcome: Progressing  2/11/2023 1519 by Monica Pickard RN  Outcome: Progressing     Problem: Nutrition Deficit:  Goal: Optimize nutritional status  2/11/2023 1519 by Monica Pickard RN  Outcome: Progressing

## 2023-02-13 LAB
ALBUMIN SERPL-MCNC: 3.4 G/DL (ref 3.5–5.2)
ALP BLD-CCNC: 175 U/L (ref 35–104)
ALT SERPL-CCNC: 306 U/L (ref 0–32)
ANION GAP SERPL CALCULATED.3IONS-SCNC: 17 MMOL/L (ref 7–16)
ANISOCYTOSIS: ABNORMAL
AST SERPL-CCNC: 108 U/L (ref 0–31)
BASOPHILS ABSOLUTE: 0 E9/L (ref 0–0.2)
BASOPHILS RELATIVE PERCENT: 0.1 % (ref 0–2)
BILIRUB SERPL-MCNC: 1.5 MG/DL (ref 0–1.2)
BUN BLDV-MCNC: 62 MG/DL (ref 6–23)
BURR CELLS: ABNORMAL
CALCIUM SERPL-MCNC: 9.7 MG/DL (ref 8.6–10.2)
CHLORIDE BLD-SCNC: 103 MMOL/L (ref 98–107)
CO2: 26 MMOL/L (ref 22–29)
CREAT SERPL-MCNC: 2.2 MG/DL (ref 0.5–1)
EKG ATRIAL RATE: 102 BPM
EKG Q-T INTERVAL: 364 MS
EKG QRS DURATION: 154 MS
EKG QTC CALCULATION (BAZETT): 474 MS
EKG R AXIS: -80 DEGREES
EKG T AXIS: 109 DEGREES
EKG VENTRICULAR RATE: 102 BPM
EOSINOPHILS ABSOLUTE: 0 E9/L (ref 0.05–0.5)
EOSINOPHILS RELATIVE PERCENT: 0.2 % (ref 0–6)
GFR SERPL CREATININE-BSD FRML MDRD: 22 ML/MIN/1.73
GLUCOSE BLD-MCNC: 117 MG/DL (ref 74–99)
HCT VFR BLD CALC: 45 % (ref 34–48)
HEMOGLOBIN: 14.5 G/DL (ref 11.5–15.5)
LYMPHOCYTES ABSOLUTE: 0.3 E9/L (ref 1.5–4)
LYMPHOCYTES RELATIVE PERCENT: 2.6 % (ref 20–42)
MAGNESIUM: 2.3 MG/DL (ref 1.6–2.6)
MCH RBC QN AUTO: 27.8 PG (ref 26–35)
MCHC RBC AUTO-ENTMCNC: 32.2 % (ref 32–34.5)
MCV RBC AUTO: 86.2 FL (ref 80–99.9)
MONOCYTES ABSOLUTE: 0 E9/L (ref 0.1–0.95)
MONOCYTES RELATIVE PERCENT: 6.5 % (ref 2–12)
NEUTROPHILS ABSOLUTE: 9.7 E9/L (ref 1.8–7.3)
NEUTROPHILS RELATIVE PERCENT: 97.4 % (ref 43–80)
OVALOCYTES: ABNORMAL
PDW BLD-RTO: 15.8 FL (ref 11.5–15)
PHOSPHORUS: 4 MG/DL (ref 2.5–4.5)
PLATELET # BLD: 228 E9/L (ref 130–450)
PMV BLD AUTO: 10.6 FL (ref 7–12)
POIKILOCYTES: ABNORMAL
POLYCHROMASIA: ABNORMAL
POTASSIUM REFLEX MAGNESIUM: 3.5 MMOL/L (ref 3.5–5)
RBC # BLD: 5.22 E12/L (ref 3.5–5.5)
SODIUM BLD-SCNC: 146 MMOL/L (ref 132–146)
TARGET CELLS: ABNORMAL
TOTAL PROTEIN: 6.5 G/DL (ref 6.4–8.3)
WBC # BLD: 10 E9/L (ref 4.5–11.5)

## 2023-02-13 PROCEDURE — 83735 ASSAY OF MAGNESIUM: CPT

## 2023-02-13 PROCEDURE — 6370000000 HC RX 637 (ALT 250 FOR IP): Performed by: INTERNAL MEDICINE

## 2023-02-13 PROCEDURE — 2580000003 HC RX 258: Performed by: FAMILY MEDICINE

## 2023-02-13 PROCEDURE — 6370000000 HC RX 637 (ALT 250 FOR IP): Performed by: FAMILY MEDICINE

## 2023-02-13 PROCEDURE — 6360000002 HC RX W HCPCS: Performed by: INTERNAL MEDICINE

## 2023-02-13 PROCEDURE — 6370000000 HC RX 637 (ALT 250 FOR IP): Performed by: PSYCHIATRY & NEUROLOGY

## 2023-02-13 PROCEDURE — 93010 ELECTROCARDIOGRAM REPORT: CPT | Performed by: INTERNAL MEDICINE

## 2023-02-13 PROCEDURE — 84100 ASSAY OF PHOSPHORUS: CPT

## 2023-02-13 PROCEDURE — 6370000000 HC RX 637 (ALT 250 FOR IP)

## 2023-02-13 PROCEDURE — 85025 COMPLETE CBC W/AUTO DIFF WBC: CPT

## 2023-02-13 PROCEDURE — 2140000000 HC CCU INTERMEDIATE R&B

## 2023-02-13 PROCEDURE — 93005 ELECTROCARDIOGRAM TRACING: CPT | Performed by: INTERNAL MEDICINE

## 2023-02-13 PROCEDURE — 99232 SBSQ HOSP IP/OBS MODERATE 35: CPT | Performed by: INTERNAL MEDICINE

## 2023-02-13 PROCEDURE — 80053 COMPREHEN METABOLIC PANEL: CPT

## 2023-02-13 PROCEDURE — 36415 COLL VENOUS BLD VENIPUNCTURE: CPT

## 2023-02-13 RX ORDER — SODIUM BICARBONATE 650 MG/1
650 TABLET ORAL DAILY
Status: DISCONTINUED | OUTPATIENT
Start: 2023-02-14 | End: 2023-02-14

## 2023-02-13 RX ADMIN — PANTOPRAZOLE SODIUM 40 MG: 40 TABLET, DELAYED RELEASE ORAL at 06:04

## 2023-02-13 RX ADMIN — FUROSEMIDE 60 MG: 10 INJECTION, SOLUTION INTRAMUSCULAR; INTRAVENOUS at 10:46

## 2023-02-13 RX ADMIN — DABIGATRAN ETEXILATE MESYLATE 75 MG: 75 CAPSULE ORAL at 20:36

## 2023-02-13 RX ADMIN — AMIODARONE HYDROCHLORIDE 200 MG: 200 TABLET ORAL at 10:45

## 2023-02-13 RX ADMIN — METOPROLOL SUCCINATE 25 MG: 25 TABLET, EXTENDED RELEASE ORAL at 10:45

## 2023-02-13 RX ADMIN — CETIRIZINE HYDROCHLORIDE 5 MG: 10 TABLET, FILM COATED ORAL at 10:45

## 2023-02-13 RX ADMIN — SERTRALINE HYDROCHLORIDE 25 MG: 50 TABLET ORAL at 10:45

## 2023-02-13 RX ADMIN — FUROSEMIDE 60 MG: 10 INJECTION, SOLUTION INTRAMUSCULAR; INTRAVENOUS at 17:28

## 2023-02-13 RX ADMIN — Medication 10 ML: at 10:46

## 2023-02-13 RX ADMIN — SPIRONOLACTONE 25 MG: 25 TABLET ORAL at 10:45

## 2023-02-13 RX ADMIN — BACLOFEN 5 MG: 10 TABLET ORAL at 20:37

## 2023-02-13 RX ADMIN — DIPHENHYDRAMINE HYDROCHLORIDE 25 MG: 25 TABLET ORAL at 17:33

## 2023-02-13 RX ADMIN — ALPRAZOLAM 0.25 MG: 0.25 TABLET ORAL at 20:36

## 2023-02-13 RX ADMIN — Medication 1000 UNITS: at 10:46

## 2023-02-13 RX ADMIN — DABIGATRAN ETEXILATE MESYLATE 75 MG: 75 CAPSULE ORAL at 10:44

## 2023-02-13 RX ADMIN — BACLOFEN 5 MG: 10 TABLET ORAL at 11:02

## 2023-02-13 RX ADMIN — SODIUM BICARBONATE 650 MG: 650 TABLET ORAL at 10:45

## 2023-02-13 ASSESSMENT — PAIN SCALES - GENERAL: PAINLEVEL_OUTOF10: 0

## 2023-02-13 ASSESSMENT — PAIN SCALES - WONG BAKER: WONGBAKER_NUMERICALRESPONSE: 0

## 2023-02-13 NOTE — PROGRESS NOTES
Inpatient Cardiology Progress note     PATIENT IS BEING FOLLOWED FOR: CHF    Alcon Singh is a 68 y.o. female who is known to [facility] cardiology through Dr Shaji Singh. Patient is also followed by [facility] electrophysiology. SUBJECTIVE: Not sure if she is having palpitations, SOB or anxiety    Patient presented to hospital 2/10/2023 for shortness of breath and hypotension. Patient having increased swelling of bilateral lower legs. Cardiology was consulted for management of acute on chronic HFrEF. EP was consulted for A-fib with RVR. Patient at this time waiting for bed at [facility] mitral clip evaluation. Dobutamine drip discontinued this am ( 2/13/2023    Remains on IV Lasix    OBJECTIVE: No apparent acute distress    ROS:  Consist: Denies fevers, chills or night sweats  Heart: Denies chest pain, palpitations, lightheadedness, dizziness or syncope  Lungs: Denies cough, wheezing, orthopnea or PND  GI: Denies abdominal pain, vomiting or diarrhea    PHYSICAL EXAM:   BP 92/64   Pulse (!) 119   Temp 98 °F (36.7 °C) (Axillary)   Resp 19   Ht 5' 7\" (1.702 m)   Wt 160 lb 3.2 oz (72.7 kg)   SpO2 98%   BMI 25.09 kg/m²    B/P Range last 24 hours: Systolic (61YDO), OXY:822 , Min:92 , EJO:166    Diastolic (79DJY), ZEK:97, Min:50, Max:79    CONST: Well developed, well nourished who appears of stated age. Awake, alert and cooperative. No apparent distress  HEENT:   Head- Normocephalic, atraumatic   Eyes- Conjunctivae pink, anicteric  Throat- Oral mucosa pink and moist  Neck-  No stridor, trachea midline, no jugular venous distention. No carotid bruit  CHEST: Chest symmetrical and non-tender to palpation. No accessory muscle use or intercostal retractions  RESPIRATORY:  Lung sounds - bibasilar crackles   CARDIOVASCULAR:     Heart Inspection- shows no noted pulsations  Heart Palpation- no heaves or thrills; PMI is non-displaced   Heart Ausculation- Irregular rate and rhythm. No MR murmur heard.  No s3 or rub   PV: thick shins. 2+ lower extremity edema. No varicosities. Pedal pulses palpable, no clubbing or cyanosis   ABDOMEN: Soft, non-tender to light palpation. Bowel sounds present. No palpable masses no organomegaly; no abdominal bruit  MS: Good muscle strength and tone. No atrophy or abnormal movements. : Deferred  SKIN: Warm and dry no statis dermatitis or ulcers   NEURO / PSYCH: Oriented to person, place and time. Speech clear and appropriate. Follows all commands.  Flat affect       Intake/Output Summary (Last 24 hours) at 2/13/2023 1147  Last data filed at 2/13/2023 0648  Gross per 24 hour   Intake 5 ml   Output 1650 ml   Net -1645 ml       Weight:   Wt Readings from Last 3 Encounters:   02/13/23 160 lb 3.2 oz (72.7 kg)   02/07/23 166 lb 6.4 oz (75.5 kg)   02/06/23 165 lb 2 oz (74.9 kg)     Current Inpatient Medications:   [START ON 2/14/2023] sodium bicarbonate  650 mg Oral Daily    furosemide  60 mg IntraVENous BID    sertraline  25 mg Oral Daily    amiodarone  200 mg Oral Daily    cetirizine  5 mg Oral Daily    dabigatran  75 mg Oral BID    metoprolol succinate  25 mg Oral Daily    pantoprazole  40 mg Oral QAM AC    spironolactone  25 mg Oral Daily    Vitamin D  1,000 Units Oral Daily    sodium chloride flush  5-40 mL IntraVENous 2 times per day       IV Infusions (if any):   sodium chloride      DOBUTamine 5.0398 mcg/kg/min (02/12/23 1803)       DIAGNOSTIC/ LABORATORY DATA:  Labs:   CBC:   Recent Labs     02/12/23 0525 02/13/23  0515   WBC 9.5 10.0   HGB 14.4 14.5   HCT 43.6 45.0    228     BMP:   Recent Labs     02/12/23  0525 02/13/23  0515    146   K 3.8 3.5   CO2 24 26   BUN 65* 62*   CREATININE 2.3* 2.2*   LABGLOM 21 22   CALCIUM 9.6 9.7     Mag:   Recent Labs     02/12/23  0525 02/13/23  0515   MG 2.5 2.3     Phos:   Recent Labs     02/12/23 0525 02/13/23  0515   PHOS 4.5 4.0     TFT:   Lab Results   Component Value Date    TSH 0.666 02/04/2023    O5OUZDF 7.4 01/18/2023    FT3 3.6 01/18/2023    T4FREE 1.89 (H) 01/13/2023      HgA1c:   Lab Results   Component Value Date    LABA1C 5.7 (H) 02/01/2022     FASTING LIPID PANEL:  Lab Results   Component Value Date/Time    CHOL 151 12/18/2022 11:02 AM    HDL 55 12/18/2022 11:02 AM    LDLCALC 82 12/18/2022 11:02 AM    TRIG 68 12/18/2022 11:02 AM     LIVER PROFILE:  Recent Labs     02/12/23  0525 02/13/23  0515   * 108*   * 306*   LABALBU 3.6 3.4*      Latest Reference Range & Units 2/9/23 12:07 2/9/23 14:42 2/9/23 16:54   Pro-BNP 0 - 450 pg/mL 21,949 (H)     Troponin, High Sensitivity 0 - 9 ng/L 10 (H) 55 (H) 49 (H)   (H): Data is abnormally high     Latest Reference Range & Units 2/12/23 05:25   Pro-BNP 0 - 450 pg/mL 17,110 (H)   (H): Data is abnormally high      Lexiscan 12/20/2022:  Impression   1: No definite evidence of  ischemia or scar except soft tissue   attenuation. The left ventricle is visually dilated both rest and   stress without evidence of TID     2  Dilated left ventricle with Moderate to severe global left   ventricular hypokinesis with estimated left ventricular ejection   fraction of 32%. 3:  Please see separate report for EKG and hemodynamic aspect of the   stress test.     4:  Low dose CT attenuation correction protocol was utilized which   revealed minimal to mild coronary artery ossifications. 5: RISK SCAN:  High risk scan due to dilated left ventricle with   severe global hypokinesis and EF of 32%. EE 1/26/2023:  Summary   Severely reduced left ventricular systolic function. Reduced right ventricular function. Bi-atrial enlargement. No evidence of interatrial shunting on bubble study. History of WATCHMAN device (24 mm). No significant color flow jet around   the device. No device related thrombus visualized. Severe mitral regurgitation. Mild tricuspid regurgitation. RV-RA gradient is estimated at 27 mmHg. CT chest 2/9/2023:  Impression   1.  Subtle ground-glass opacities located in right lower lobe which appear new   compared to prior and could indicate bronchiolitis or developing viral   pneumonia. 2. Stable ground-glass nodule located in left upper lobe measures 7 mm. 3. Stable nodule located in right upper lobe measures 10 mm. 4. Stable moderate cardiomegaly. CT abdomen 2/9/2023:  Impression   1. No acute process in abdomen or pelvis. 2. Nonobstructing 2 mm right renal calculus. CXR 2/9/2023:   Impression   Suspect pulmonary vascular congestion. 12 lead EKG 2/13/2023: Atrial fibrillation with rapid ventricular response with premature ventricular or aberrantly conducted complexes. Left axis deviation. Left bundle branch block      Telemetry: Afib with controlled rate. BBB      ASSESSMENT:   Acute on chronic HFrEF  Hypotension  Chronically elevated troponins  Persistent atrial fibrillation with failed DCCV 2/3/2023--s/p Watchman 2020  cLBBB  Severe mitral regurgitation  Acute on chronic renal insufficiency  Renal cell carcinoma s/p nephrectomy  History of pulmonary embolus  Meige syndrome  TIA       PLAN:  Continue IV diuresis. Monitor BMP, BNP, I's and O's and daily weights  Rest of cardiac medications same. Not a candidate for ACE I therapy/ ARB / Entresto at present ( hypotension and NIKA on top of CKD ).  Cannot tolerate Isordil / hydralazine combination ( low BP )  Monitor BP  Awaiting transfer to F pending bed availability  Will continue to follow while in the hospital    Electronically signed by Miriam Payne MD on 2/13/2023 at 11:47 AM

## 2023-02-13 NOTE — CARE COORDINATION
2/13:  Update CM Note:  Pt presented to the ER for SOB-CHF/COPD. Pt is on room air at 958, Iv Dobutrex gtt & Iv Lasix. Cm met bedside with pt to discuss CM role & dc planning. Pt's PCP Dr Moshe Streeter uses the [facility] or [employer] on [facility]. Pt normally lives alone but her son has been lives with her recently with 1 step to enter. The bed/bathroom are on the 1st floor. PTA independent with a cane. There is talk for the pt to be transferred to [facility]. Pt is active with [facility] & has a lifevest via [employer]. Pt is active with [facility] with [email or web address]. Pt's dc plan is hopefully home & family can transport. Sw/BECKY will continue to follow for dc planning.   Electronically signed by Ruthie Desir RN on 2/13/2023 at 2:21 PM

## 2023-02-13 NOTE — PROGRESS NOTES
[facility]  Nephrology Progress Note    Patient's Name: Brian Cavanaugh    History of Present Illness from 2/10 consult note:    \"Aidee Campbell is a 68 y.o. female with a past medical history of atrial fibrillation, CHF, mitral regurgitation, and hypertension. She presented to the ED on 2/9 with complaints of shortness of breath. Vital signs on 2/9 includes temperature 97.8, respirations 26, pulse 88, BP 99/62, and she was 95% on room air. Lab data on 2/9 include CO2 16, BUN 57, creatinine 2.2, anion gap 17, proBNP 21,949, and troponin 55. She had a chest x-ray on 2/9 which showed suspect pulmonary vascular congestion. Her CT of the chest 2/9 showed subtle groundglass opacities in the right lower lobe, stable groundglass nodule left upper lobe, stable nodule located in right upper lobe. CT abdomen pelvis 2/9 showed no acute process, nonobstructing 2 mm right renal calculus, partial left nephrectomy. She has a history of JAIME on 1/26 and direct-current electrical cardioversion on 2/3. She also has a history of left renal mass and is status post left partial nephrectomy for left renal mass with Dr. Manuel Jesus on 11/16/2021. She also had a recent hospital stay from 2/4 - 2/7 for nausea. We were consulted to see the patient for NIKA/CKD. Patient appears to have a recent baseline creatinine of 1.3-1.7. At present, patient was seen and examined. She reports a history of A-fib and that she came in due to low blood pressures and shortness of breath. She reports that prior to admission she had a decreased appetite. She also reports issues with dizziness prior to admission. She currently reports nausea. She denies any chest pain. She denies any dysuria. She denies any fevers or chills. She denies any abdominal pain or vomiting. She denies the use of NSAIDs. \"    Subjective:    2/13: Patient was seen and examined. She reports that she feels okay. She denies any chest pain.   She denies any abdominal pain.    PMH:    Past Medical History:   Diagnosis Date    Afib (HCC)     WATCHMAN    Anxiety     Arthritis     Cervical radiculopathy     CHF (congestive heart failure) (HCC)     Chronic sinusitis     Ejection fraction < 50%     25%. Wearing life vest    Hilar adenopathy     Hx of blood clots     Hypertension     Left ventricular systolic dysfunction     Lesion of left native kidney     Lumbar radiculopathy     Lung nodules     Meige syndrome     partial/ PCP    Microscopic colitis     Mitral regurgitation     Mild to moderate    Nonischemic cardiomyopathy (HCC)     f/u with Dr. Libertad Farooq    Osteopenia     PE (pulmonary thromboembolism) [facility])     Renal cell carcinoma of left kidney ([facility] [address] 02/01/2022    Vertebrobasilar artery syndrome     f/u PCP       Patient Active Problem List   Diagnosis    Anxiety    NICM (nonischemic cardiomyopathy) ([facility] [address]    Nonrheumatic mitral valve regurgitation    Lumbar radiculopathy    Cervical radiculopathy    HTN (hypertension), benign    Left atrial thrombus    Paroxysmal atrial fibrillation (HCC)    Chronic HFrEF (heart failure with reduced ejection fraction) ([facility] [address]    Encounter for long-term (current) use of high-risk medication    Persistent atrial fibrillation ([facility] [address]    Encounter for medication refill    Itching    Chronic anticoagulation    Presence of Watchman left atrial appendage closure device    Renal mass    Renal cell carcinoma of left kidney (HCC)    Chronic renal disease, stage III ([address] [[id number]]    Multiple subsegmental pulmonary emboli without acute cor pulmonale (HCC)    Acute on chronic congestive heart failure (HCC)    Vertigo    Dizziness    HFrEF (heart failure with reduced ejection fraction) ([facility])    Stage 4 chronic kidney disease ([address]    Left bundle branch block    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       Diet:    ADULT DIET; Regular;  No Added Salt (3-4 gm)  ADULT ORAL NUTRITION SUPPLEMENT; Breakfast, Dinner; Standard High Calorie/High Protein Oral Supplement  ADULT ORAL NUTRITION SUPPLEMENT; Lunch; Fortified Pudding Oral Supplement    Meds:     furosemide  60 mg IntraVENous BID    sertraline  25 mg Oral Daily    amiodarone  200 mg Oral Daily    cetirizine  5 mg Oral Daily    dabigatran  75 mg Oral BID    metoprolol succinate  25 mg Oral Daily    pantoprazole  40 mg Oral QAM AC    spironolactone  25 mg Oral Daily    Vitamin D  1,000 Units Oral Daily    sodium chloride flush  5-40 mL IntraVENous 2 times per day    sodium bicarbonate  650 mg Oral BID        sodium chloride      DOBUTamine 5.0398 mcg/kg/min (02/12/23 1803)       Meds prn:     melatonin, ALPRAZolam, sodium chloride flush, sodium chloride, polyethylene glycol, diphenhydrAMINE, baclofen, ondansetron **OR** ondansetron    Meds prior to admission:     No current facility-administered medications on file prior to encounter. Current Outpatient Medications on File Prior to Encounter   Medication Sig Dispense Refill    dabigatran (PRADAXA) 75 MG capsule Take 1 capsule by mouth 2 times daily 60 capsule 0    meclizine (ANTIVERT) 25 MG tablet Take 1 tablet by mouth every 6 hours as needed for Dizziness 30 tablet 0    metoprolol succinate (TOPROL XL) 25 MG extended release tablet Take 1 tablet by mouth daily 30 tablet 3    furosemide (LASIX) 20 MG tablet Take 1 tablet by mouth daily 60 tablet 3    spironolactone (ALDACTONE) 25 MG tablet Take 1 tablet by mouth daily 30 tablet 3    amiodarone (CORDARONE) 200 MG tablet Take 1 tablet by mouth daily 90 tablet 1    hydrOXYzine HCl (ATARAX) 10 MG tablet Take 1 tablet by mouth every 8 hours as needed for Itching or Anxiety (Patient not taking: Reported on 2/10/2023) 90 tablet 1    cetirizine (ZYRTEC ALLERGY) 10 MG tablet Take 0.5 tablets by mouth daily 30 tablet 0    fluticasone (FLONASE) 50 MCG/ACT nasal spray 1-2 sprays, each nostril daily as needed for nasal congestion.  16 g 0    aspirin 81 MG EC tablet Take 81 mg by mouth daily baclofen (LIORESAL) 10 MG tablet Take 1 tablet by mouth nightly 90 tablet 1    omeprazole (PRILOSEC) 40 MG delayed release capsule Take 40 mg by mouth daily (Patient not taking: Reported on 2/10/2023)      triamcinolone (KENALOG) 0.1 % cream Apply topically 3 times daily as needed (rash)   1    loperamide (IMODIUM) 2 MG capsule Take 2 mg by mouth 4 times daily as needed for Diarrhea      diphenhydrAMINE (BENADRYL) 25 MG tablet Take 25 mg by mouth every 6 hours as needed for Itching      vitamin D (CHOLECALCIFEROL) 1000 UNIT TABS tablet Take 1,000 Units by mouth daily         Allergies:    Bentyl [dicyclomine], Adhesive tape, Atacand [candesartan], Cefdinir, Demerol, Digoxin, Imdur [isosorbide mononitrate], Losartan potassium, Sulfa antibiotics, Xarelto [rivaroxaban], Lovenox [enoxaparin sodium], Acetaminophen, Apap-caff-dihydrocodeine, Coreg [carvedilol], Cozaar [losartan], Diovan [valsartan], Effexor [venlafaxine hydrochloride], Eliquis [apixaban], Labetalol, Lisinopril, and Ramipril    Social History:     reports that she has never smoked. She has never used smokeless tobacco. She reports current alcohol use. She reports that she does not use drugs.    Family History:         Problem Relation Age of Onset    Heart Attack Mother     Stroke Mother     Heart Attack Father     Heart Failure Father     Heart Disease Father     Anxiety Disorder Sister     Depression Sister     Crohn's Disease Son        Physical Exam:    Patient Vitals for the past 24 hrs:   BP Temp Temp src Pulse Resp SpO2 Weight   02/13/23 1020 92/64 98 °F (36.7 °C) Axillary (!) 119 19 98 % --   02/13/23 0256 -- -- -- -- -- -- 160 lb 3.2 oz (72.7 kg)   02/13/23 0000 110/79 98.4 °F (36.9 °C) Tympanic (!) 116 15 98 % --   02/1945 (!) 104/59 96.9 °F (36.1 °C) Tympanic (!) 122 22 99 % --   02/12/23 1745 (!) 103/50 -- -- -- -- -- --   02/12/23 1516 95/73 98.5 °F (36.9 °C) Oral 73 19 99 % --           Intake/Output Summary (Last 24 hours) at 2/13/2023  Richard 35 filed at 2/13/2023 0648  Gross per 24 hour   Intake 5 ml   Output 1850 ml   Net -1845 ml       General: Awake, alert, no acute distress  Neck: No JVD noted  Lungs: Clear bilaterally upper, diminished to the bases bilaterally. Unlabored  CV: Irregular. No rub  Abd: Soft, nontender, nondistended. Active bowel sounds  Skin: Warm and dry. No rash on exposed extremities  Ext: 2+ BLE edema   Neuro: Awake, answers questions appropriately    Data:    Recent Labs     02/11/23 0542 02/12/23 0525 02/13/23 0515   WBC 12.3* 9.5 10.0   HGB 14.4 14.4 14.5   HCT 43.6 43.6 45.0   MCV 83.0 82.9 86.2    266 228         Recent Labs     02/11/23 0542 02/12/23 0525 02/13/23 0515    142 146   K 3.8 3.8 3.5   CL 96* 101 103   CO2 22 24 26   CREATININE 2.6* 2.3* 2.2*   BUN 70* 65* 62*   LABGLOM 18 21 22   GLUCOSE 105* 124* 117*   CALCIUM 9.7 9.6 9.7   PHOS 4.9* 4.5 4.0   MG 2.5 2.5 2.3         Vit D, 25-Hydroxy   Date Value Ref Range Status   02/11/2023 30 30 - 100 ng/mL Final     Comment:     <20 ng/mL. ........... [place] Deficient  20-30 ng/mL. ......... [place] Insufficient   ng/mL. ........ [place] Beech Sufficient  >100 ng/mL. .......... H[place] Beech Toxic         PTH   Date Value Ref Range Status   02/11/2023 120 (H) 15 - 65 pg/mL Final       Recent Labs     02/11/23 0542 02/12/23 0525 02/13/23 0515   * 383* 306*   * 168* 108*   ALKPHOS 221* 192* 175*   BILITOT 1.6* 1.6* 1.5*         Recent Labs     02/11/23 0542 02/12/23 0525 02/13/23 0515   LABALBU 3.8 3.6 3.4*         No results found for: FERRITIN, IRON, TIBC    Vitamin B-12   Date Value Ref Range Status   02/04/2023 1304 (H) 211 - 946 pg/mL Final       Folate   Date Value Ref Range Status   12/02/2019 14.9 4.8 - 24.2 ng/mL Final       Lab Results   Component Value Date/Time    COLORU Yellow 02/10/2023 02:00 PM    NITRU Negative 02/10/2023 02:00 PM    GLUCOSEU Negative 02/10/2023 02:00 PM    KETUA Negative 02/10/2023 02:00 PM    UROBILINOGEN 0.2 02/10/2023 02:00 PM    BILIRUBINUR Negative 02/10/2023 02:00 PM    BILIRUBINUR negative 07/08/2022 02:22 PM       Lab Results   Component Value Date/Time    OSMOU 206 (L) 12/19/2022 02:00 PM       No components found for: URIC    No results found for: LIPIDPAN    Assessment and Plans:    NIKA stage II on CKD 3B  Likely decreased effective renal perfusion in the setting of decreased oral intake, CHF/cardiorenal syndrome, atrial fibrillation; exacerbated by diuretics  history of left partial nephrectomy for left renal mass with Dr. Les Escoto on 11/16/2021  CT abd pelvis 2/9 nonobstructing 2 mm right renal calculus, partial left nephrectomy  Baseline creatinine 1.3-1.7  Creatinine peaked at 2.6 on 2/10--> 2.2 today  Avoid nephrotoxins/NSAIDs  Strict I&O, daily weights  On spironolactone   On Lasix 60 mg IV twice daily  Monitor labs closely with diuresis    2. Cardiomyopathy/CHF  Echo 12/2022 EF 25%, mod aortic regurg, mod mitral regurg, mod tricuspid regurg, pulm hypertension severe  proBNP 21,949  Strict I&O, daily weights  On spironolactone   On Lasix IV   On dobutamine drip   For possible transfer to [facility] for severe mitral regurg  Monitor volume status  Cardiology following    3. High anion gap metabolic acidosis  Likely in the setting of NIKA/CKD  Anion gap 20 and CO2 18 on 2/10--> CO2 26 today  Sodium bicarbonate 650 mg oral daily  Monitor labs    4. Atrial fibrillation  S/p cardioversion 2/3  EP following    5. Shortness of breath   CXR 2/9 pulmonary vascular congestion  CT chest 2/9 subtle groundglass opacities in the right lower lobe, stable groundglass nodule left upper lobe, stable nodule located in right upper lobe  Defer management to primary    6. History of left renal mass  S/p left partial nephrectomy for left renal mass with Dr. Les Escoto on 11/16/2021    7.   Hypotension  Monitor BPs     UYEN Maradiaga - CNP    Patient seen and examined all key components of the physical performed independently , case discussed with NP, all pertinent labs and radiologic tests personally reviewed agree with above.       Isabel Lopez MD

## 2023-02-13 NOTE — PROGRESS NOTES
Hospitalist Progress Note      PCP: Trisha Cardnoa DO    Date of Admission: 2/9/2023  Days in the hospital: 4    Hospital Course:   Patient is 77-year-old with history of atrial fibrillation status post Watchman procedure, nonischemic cardiomyopathy, hypertension, CKD, renal cell carcinoma status post left nephrectomy, mage syndrome, TIA allergic to Eliquis and Xarelto and on Pradaxa for anticoagulation, history of PE who comes into the hospital with increased shortness of breath and hypotension.  Patient was noted to have rapid A-fib.  She apparently has failed multiple cardioversions before.  Also noted to have severe mitral regurgitation and recommendation for MitraClip placement and plan is for [facility] transfer.  Patient also seen by nephrology for acute on chronic kidney injury.    Subjective  Patient seen and examined at bedside.  Awaiting transfer to [facility], denies any headache or dizziness.  Discussed with nephrology.  Remains on high-dose diuretics.    Exam:    BP 92/64   Pulse (!) 119   Temp 98 °F (36.7 °C) (Axillary)   Resp 19   Ht 5' 7\" (1.702 m)   Wt 160 lb 3.2 oz (72.7 kg)   SpO2 98%   BMI 25.09 kg/m²     HEENT: No pallor, no icterus.  Respiratory:  CTA, good air entry.  Cardiovascular: RRR, no murmur.  Musculoskeletal: +4 edema noted bilateral lower extremities  Neurologic: awake, alert and following commands       Assessment/Plan:  -Acute on chronic congestive heart failure, systolic ejection fraction 25%: Continue IV diuretics, appreciate input from cardiology.  Continue Aldactone and IV Lasix.  Watchman device.  Pending transfer to [facility].    -Acute on chronic renal failure stage IV: Baseline serum creatinine 1.9-2.2, continue supportive care, continue to maintain euvolemia.  Nephrology consultation, input appreciated.  Creatinine stable at 2.6 1 now improved to 2.3.    -Elevated troponin likely demand ischemia: Appreciate input from cardiology    -Lactic acidosis    -Persistent  atrial fibrillation, chronic. On Pradaxa , on metoprolol and amiodarone  Electrophysiology following, possible transfer to [facility] for severe MR and possible MitraClip    -Hypotension, started on dobutamine drip    -Anxiety/depression: Supportive care, input from psychiatry appreciated, patient does not have full medical decision-making capacity, Zoloft was started.    -History of renal cell carcinoma status post left nephrectomy    -Abnormal LFTs, monitor      Labs:   Recent Labs     02/11/23 0542 02/12/23 0525 02/13/23 0515   WBC 12.3* 9.5 10.0   HGB 14.4 14.4 14.5   HCT 43.6 43.6 45.0    266 228     Recent Labs     02/11/23 0542 02/12/23 0525 02/13/23 0515    142 146   K 3.8 3.8 3.5   CL 96* 101 103   CO2 22 24 26   BUN 70* 65* 62*   CREATININE 2.6* 2.3* 2.2*   CALCIUM 9.7 9.6 9.7   PHOS 4.9* 4.5 4.0     Recent Labs     02/11/23 0542 02/12/23 0525 02/13/23 0515   * 168* 108*   * 383* 306*   BILITOT 1.6* 1.6* 1.5*   ALKPHOS 221* 192* 175*     No results for input(s): INR in the last 72 hours. No results for input(s): Toro Nancy in the last 72 hours.     Medications:  Reviewed    Infusion Medications    sodium chloride      DOBUTamine 5.0398 mcg/kg/min (02/12/23 1803)     Scheduled Medications    [START ON 2/14/2023] sodium bicarbonate  650 mg Oral Daily    furosemide  60 mg IntraVENous BID    sertraline  25 mg Oral Daily    amiodarone  200 mg Oral Daily    cetirizine  5 mg Oral Daily    dabigatran  75 mg Oral BID    metoprolol succinate  25 mg Oral Daily    pantoprazole  40 mg Oral QAM AC    spironolactone  25 mg Oral Daily    Vitamin D  1,000 Units Oral Daily    sodium chloride flush  5-40 mL IntraVENous 2 times per day     PRN Meds: melatonin, ALPRAZolam, sodium chloride flush, sodium chloride, polyethylene glycol, diphenhydrAMINE, baclofen, ondansetron **OR** ondansetron      Intake/Output Summary (Last 24 hours) at 2/13/2023 6969  Last data filed at 2/13/2023 0648  Gross per 24 hour   Intake 0 ml   Output 1300 ml   Net -1300 ml     Body mass index is 25.09 kg/m². Diet  ADULT DIET; Regular; No Added Salt (3-4 gm)  ADULT ORAL NUTRITION SUPPLEMENT; Breakfast, Dinner; Standard High Calorie/High Protein Oral Supplement  ADULT ORAL NUTRITION SUPPLEMENT; Lunch; Fortified Pudding Oral Supplement    Code Status  Full Code       Electronically signed by Julian Yo MD on 2/13/2023 at [facility]   Please contact me through perfect serve    NOTE: This report was transcribed using voice recognition software. Every effort was made to ensure accuracy; however, inadvertent computerized transcription errors may be present.

## 2023-02-13 NOTE — CONSULTS
Nutrition Assessment     Type and Reason for Visit: Consult (poor intake / ONS)    Nutrition Assessment:  pt adm d/t heart failure; PMhx of CAD, CHF, CKD,renal CA s/p nephrectomy; pt w/ hx of diet ed completed in December and RD attempted diet on on 2/10 (pt declined per previous RD note) ; hx of confusion noted; NIKA on CKD noted; pt w/ AFIB s/p cardioversion 2/3; SOB noted; pt w/ suboptimal oral intake at meals- will modify ONS to promote adequate oral intake; if intake does not improve, may need to consider EN support for nutrition; will start boost once/day, ensure once/day, magic cup once/day. Will monitor; See full nutrition assessment on 2/10 via RD note; if EN support initiated, please consult for TF recs. Current Nutrition Therapies:    ADULT DIET; Regular;  No Added Salt (3-4 gm)  ADULT ORAL NUTRITION SUPPLEMENT; Lunch; Standard High Calorie/High Protein Oral Supplement  ADULT ORAL NUTRITION SUPPLEMENT; Breakfast; Fortified Pudding Oral Supplement  ADULT ORAL NUTRITION SUPPLEMENT; Dinner; Frozen Oral Trae 5, RD  Contact: 0515

## 2023-02-13 NOTE — PLAN OF CARE
Problem: Chronic Conditions and Co-morbidities  Goal: Patient's chronic conditions and co-morbidity symptoms are monitored and maintained or improved  Outcome: Progressing     Problem: Discharge Planning  Goal: Discharge to home or other facility with appropriate resources  2/13/2023 1312 by Matthew Cueto RN  Outcome: Progressing  2/13/2023 0231 by Orin Gutierrez RN  Outcome: Progressing     Problem: Safety - Adult  Goal: Free from fall injury  2/13/2023 1312 by Matthew Cueto RN  Outcome: Progressing  2/13/2023 0231 by Orin Gutierrez RN  Outcome: Progressing     Problem: ABCDS Injury Assessment  Goal: Absence of physical injury  Outcome: Progressing     Problem: Cardiovascular - Adult  Goal: Maintains optimal cardiac output and hemodynamic stability  2/13/2023 1312 by Matthew Cueto RN  Outcome: Progressing  2/13/2023 0231 by Orin Gutierrez RN  Outcome: Progressing     Problem: Metabolic/Fluid and Electrolytes - Adult  Goal: Hemodynamic stability and optimal renal function maintained  2/13/2023 1312 by Matthew Cueto RN  Outcome: Progressing  2/13/2023 0231 by Orin Gutierrez RN  Outcome: Progressing     Problem: Nutrition Deficit:  Goal: Optimize nutritional status  Outcome: Progressing     Problem: Skin/Tissue Integrity  Goal: Absence of new skin breakdown  Description: 1. Monitor for areas of redness and/or skin breakdown  2. Assess vascular access sites hourly  3. Every 4-6 hours minimum:  Change oxygen saturation probe site  4. Every 4-6 hours:  If on nasal continuous positive airway pressure, respiratory therapy assess nares and determine need for appliance change or resting period.   Outcome: Progressing

## 2023-02-14 LAB
ALBUMIN SERPL-MCNC: 3.5 G/DL (ref 3.5–5.2)
ALP BLD-CCNC: 170 U/L (ref 35–104)
ALT SERPL-CCNC: 251 U/L (ref 0–32)
ANION GAP SERPL CALCULATED.3IONS-SCNC: 11 MMOL/L (ref 7–16)
AST SERPL-CCNC: 85 U/L (ref 0–31)
BASOPHILS ABSOLUTE: 0.01 E9/L (ref 0–0.2)
BASOPHILS RELATIVE PERCENT: 0.1 % (ref 0–2)
BILIRUB SERPL-MCNC: 1.5 MG/DL (ref 0–1.2)
BUN BLDV-MCNC: 77 MG/DL (ref 6–23)
CALCIUM SERPL-MCNC: 9.8 MG/DL (ref 8.6–10.2)
CHLORIDE BLD-SCNC: 101 MMOL/L (ref 98–107)
CO2: 34 MMOL/L (ref 22–29)
CREAT SERPL-MCNC: 2.4 MG/DL (ref 0.5–1)
EOSINOPHILS ABSOLUTE: 0.01 E9/L (ref 0.05–0.5)
EOSINOPHILS RELATIVE PERCENT: 0.1 % (ref 0–6)
GFR SERPL CREATININE-BSD FRML MDRD: 20 ML/MIN/1.73
GLUCOSE BLD-MCNC: 129 MG/DL (ref 74–99)
HCT VFR BLD CALC: 50.1 % (ref 34–48)
HEMOGLOBIN: 16 G/DL (ref 11.5–15.5)
IMMATURE GRANULOCYTES #: 0.05 E9/L
IMMATURE GRANULOCYTES %: 0.5 % (ref 0–5)
LYMPHOCYTES ABSOLUTE: 0.71 E9/L (ref 1.5–4)
LYMPHOCYTES RELATIVE PERCENT: 7.3 % (ref 20–42)
MAGNESIUM: 2.5 MG/DL (ref 1.6–2.6)
MCH RBC QN AUTO: 28.2 PG (ref 26–35)
MCHC RBC AUTO-ENTMCNC: 31.9 % (ref 32–34.5)
MCV RBC AUTO: 88.4 FL (ref 80–99.9)
MONOCYTES ABSOLUTE: 0.72 E9/L (ref 0.1–0.95)
MONOCYTES RELATIVE PERCENT: 7.4 % (ref 2–12)
NEUTROPHILS ABSOLUTE: 8.28 E9/L (ref 1.8–7.3)
NEUTROPHILS RELATIVE PERCENT: 84.6 % (ref 43–80)
PDW BLD-RTO: 17.5 FL (ref 11.5–15)
PHOSPHORUS: 4.1 MG/DL (ref 2.5–4.5)
PLATELET # BLD: 271 E9/L (ref 130–450)
PMV BLD AUTO: 11.9 FL (ref 7–12)
POTASSIUM SERPL-SCNC: 2.9 MMOL/L (ref 3.5–5)
RBC # BLD: 5.67 E12/L (ref 3.5–5.5)
REASON FOR REJECTION: NORMAL
REJECTED TEST: NORMAL
SODIUM BLD-SCNC: 146 MMOL/L (ref 132–146)
TOTAL PROTEIN: 7.1 G/DL (ref 6.4–8.3)
WBC # BLD: 9.8 E9/L (ref 4.5–11.5)

## 2023-02-14 PROCEDURE — 2580000003 HC RX 258: Performed by: FAMILY MEDICINE

## 2023-02-14 PROCEDURE — 99231 SBSQ HOSP IP/OBS SF/LOW 25: CPT | Performed by: INTERNAL MEDICINE

## 2023-02-14 PROCEDURE — 6370000000 HC RX 637 (ALT 250 FOR IP)

## 2023-02-14 PROCEDURE — 6360000002 HC RX W HCPCS: Performed by: INTERNAL MEDICINE

## 2023-02-14 PROCEDURE — 6370000000 HC RX 637 (ALT 250 FOR IP): Performed by: FAMILY MEDICINE

## 2023-02-14 PROCEDURE — 83735 ASSAY OF MAGNESIUM: CPT

## 2023-02-14 PROCEDURE — 6370000000 HC RX 637 (ALT 250 FOR IP): Performed by: INTERNAL MEDICINE

## 2023-02-14 PROCEDURE — 6370000000 HC RX 637 (ALT 250 FOR IP): Performed by: PSYCHIATRY & NEUROLOGY

## 2023-02-14 PROCEDURE — 2140000000 HC CCU INTERMEDIATE R&B

## 2023-02-14 PROCEDURE — 36415 COLL VENOUS BLD VENIPUNCTURE: CPT

## 2023-02-14 PROCEDURE — 80053 COMPREHEN METABOLIC PANEL: CPT

## 2023-02-14 PROCEDURE — 84100 ASSAY OF PHOSPHORUS: CPT

## 2023-02-14 PROCEDURE — 85025 COMPLETE CBC W/AUTO DIFF WBC: CPT

## 2023-02-14 RX ORDER — POTASSIUM CHLORIDE 20 MEQ/1
40 TABLET, EXTENDED RELEASE ORAL ONCE
Status: COMPLETED | OUTPATIENT
Start: 2023-02-14 | End: 2023-02-14

## 2023-02-14 RX ORDER — POTASSIUM CHLORIDE 20 MEQ/1
20 TABLET, EXTENDED RELEASE ORAL ONCE
Status: COMPLETED | OUTPATIENT
Start: 2023-02-14 | End: 2023-02-14

## 2023-02-14 RX ADMIN — SERTRALINE HYDROCHLORIDE 25 MG: 50 TABLET ORAL at 10:43

## 2023-02-14 RX ADMIN — AMIODARONE HYDROCHLORIDE 200 MG: 200 TABLET ORAL at 10:42

## 2023-02-14 RX ADMIN — SPIRONOLACTONE 25 MG: 25 TABLET ORAL at 10:42

## 2023-02-14 RX ADMIN — POTASSIUM CHLORIDE 40 MEQ: 1500 TABLET, EXTENDED RELEASE ORAL at 12:10

## 2023-02-14 RX ADMIN — Medication 10 ML: at 20:55

## 2023-02-14 RX ADMIN — PANTOPRAZOLE SODIUM 40 MG: 40 TABLET, DELAYED RELEASE ORAL at 06:43

## 2023-02-14 RX ADMIN — BACLOFEN 5 MG: 10 TABLET ORAL at 10:41

## 2023-02-14 RX ADMIN — CETIRIZINE HYDROCHLORIDE 5 MG: 10 TABLET, FILM COATED ORAL at 10:42

## 2023-02-14 RX ADMIN — Medication 10 ML: at 10:44

## 2023-02-14 RX ADMIN — SODIUM BICARBONATE 650 MG: 650 TABLET ORAL at 10:41

## 2023-02-14 RX ADMIN — BACLOFEN 5 MG: 10 TABLET ORAL at 20:55

## 2023-02-14 RX ADMIN — METOPROLOL SUCCINATE 25 MG: 25 TABLET, EXTENDED RELEASE ORAL at 10:41

## 2023-02-14 RX ADMIN — FUROSEMIDE 60 MG: 10 INJECTION, SOLUTION INTRAMUSCULAR; INTRAVENOUS at 10:44

## 2023-02-14 RX ADMIN — DABIGATRAN ETEXILATE MESYLATE 75 MG: 75 CAPSULE ORAL at 20:55

## 2023-02-14 RX ADMIN — Medication 1000 UNITS: at 10:45

## 2023-02-14 RX ADMIN — POTASSIUM CHLORIDE 20 MEQ: 1500 TABLET, EXTENDED RELEASE ORAL at 18:53

## 2023-02-14 RX ADMIN — MELATONIN 3 MG ORAL TABLET 3 MG: 3 TABLET ORAL at 20:55

## 2023-02-14 RX ADMIN — ALPRAZOLAM 0.25 MG: 0.25 TABLET ORAL at 20:55

## 2023-02-14 RX ADMIN — DABIGATRAN ETEXILATE MESYLATE 75 MG: 75 CAPSULE ORAL at 10:43

## 2023-02-14 ASSESSMENT — PAIN SCALES - WONG BAKER: WONGBAKER_NUMERICALRESPONSE: 0

## 2023-02-14 ASSESSMENT — PAIN SCALES - GENERAL: PAINLEVEL_OUTOF10: 0

## 2023-02-14 NOTE — PROGRESS NOTES
Received call from [facility]. Requesting to get connected with Dr. Barb Gallo for [facility]. Secure message to Dr. Barb Gallo to call TOMODO.

## 2023-02-14 NOTE — PLAN OF CARE
Problem: Chronic Conditions and Co-morbidities  Goal: Patient's chronic conditions and co-morbidity symptoms are monitored and maintained or improved  Outcome: Progressing     Problem: Discharge Planning  Goal: Discharge to home or other facility with appropriate resources  Outcome: Progressing     Problem: Safety - Adult  Goal: Free from fall injury  Outcome: Progressing     Problem: ABCDS Injury Assessment  Goal: Absence of physical injury  Outcome: Progressing     Problem: Cardiovascular - Adult  Goal: Maintains optimal cardiac output and hemodynamic stability  Outcome: Progressing     Problem: Metabolic/Fluid and Electrolytes - Adult  Goal: Hemodynamic stability and optimal renal function maintained  Outcome: Progressing     Problem: Skin/Tissue Integrity  Goal: Absence of new skin breakdown  Description: 1. Monitor for areas of redness and/or skin breakdown  2. Assess vascular access sites hourly  3. Every 4-6 hours minimum:  Change oxygen saturation probe site  4. Every 4-6 hours:  If on nasal continuous positive airway pressure, respiratory therapy assess nares and determine need for appliance change or resting period.   Outcome: Progressing

## 2023-02-14 NOTE — PROGRESS NOTES
[facility]  Nephrology Progress Note    Patient's Name: Song Merritt    History of Present Illness from 2/10 consult note:    \"Aidee Pitts is a 68 y.o. female with a past medical history of atrial fibrillation, CHF, mitral regurgitation, and hypertension. She presented to the ED on 2/9 with complaints of shortness of breath. Vital signs on 2/9 includes temperature 97.8, respirations 26, pulse 88, BP 99/62, and she was 95% on room air. Lab data on 2/9 include CO2 16, BUN 57, creatinine 2.2, anion gap 17, proBNP 21,949, and troponin 55. She had a chest x-ray on 2/9 which showed suspect pulmonary vascular congestion. Her CT of the chest 2/9 showed subtle groundglass opacities in the right lower lobe, stable groundglass nodule left upper lobe, stable nodule located in right upper lobe. CT abdomen pelvis 2/9 showed no acute process, nonobstructing 2 mm right renal calculus, partial left nephrectomy. She has a history of JAIME on 1/26 and direct-current electrical cardioversion on 2/3. She also has a history of left renal mass and is status post left partial nephrectomy for left renal mass with Dr. Trista Galeana on 11/16/2021. She also had a recent hospital stay from 2/4 - 2/7 for nausea. We were consulted to see the patient for NIKA/CKD. Patient appears to have a recent baseline creatinine of 1.3-1.7. At present, patient was seen and examined. She reports a history of A-fib and that she came in due to low blood pressures and shortness of breath. She reports that prior to admission she had a decreased appetite. She also reports issues with dizziness prior to admission. She currently reports nausea. She denies any chest pain. She denies any dysuria. She denies any fevers or chills. She denies any abdominal pain or vomiting. She denies the use of NSAIDs. \"    Subjective:    2/14: Patient was seen and examined. She reports that she feels okay. She denies any chest pain or shortness of breath.   She denies any abdominal pain or nausea.     PMH:    Past Medical History:   Diagnosis Date    Afib (HCC)     WATCHMAN    Anxiety     Arthritis     Cervical radiculopathy     CHF (congestive heart failure) (HCC)     Chronic sinusitis     Ejection fraction < 50%     25%. Wearing life vest    Hilar adenopathy     Hx of blood clots     Hypertension     Left ventricular systolic dysfunction     Lesion of left native kidney     Lumbar radiculopathy     Lung nodules     Meige syndrome     partial/ PCP    Microscopic colitis     Mitral regurgitation     Mild to moderate    Nonischemic cardiomyopathy (HCC)     f/u with Dr. Savita Sauceda     PE (pulmonary thromboembolism) (HCC)     Renal cell carcinoma of left kidney (HCC) 02/01/2022    Vertebrobasilar artery syndrome     f/u PCP       Patient Active Problem List   Diagnosis    Anxiety    NICM (nonischemic cardiomyopathy) (HCC)    Nonrheumatic mitral valve regurgitation    Lumbar radiculopathy    Cervical radiculopathy    HTN (hypertension), benign    Left atrial thrombus    Paroxysmal atrial fibrillation (HCC)    Chronic HFrEF (heart failure with reduced ejection fraction) ([facility])    Encounter for long-term (current) use of high-risk medication    Persistent atrial fibrillation ([facility])    Encounter for medication refill    Itching    Chronic anticoagulation    Presence of Watchman left atrial appendage closure device    Renal mass    Renal cell carcinoma of left kidney (HCC)    Chronic renal disease, stage III (HCC) [040654]    Multiple subsegmental pulmonary emboli without acute cor pulmonale (HCC)    Acute on chronic congestive heart failure (HCC)    Vertigo    Dizziness    HFrEF (heart failure with reduced ejection fraction) ([facility])    Stage 4 chronic kidney disease (HCC)    Left bundle branch block    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       Diet:    ADULT DIET; Regular; No Added Salt (3-4 gm)  ADULT ORAL NUTRITION SUPPLEMENT;  Lunch; Standard High Calorie/High Protein Oral Supplement  ADULT ORAL NUTRITION SUPPLEMENT; Breakfast; Fortified Pudding Oral Supplement  ADULT ORAL NUTRITION SUPPLEMENT; Dinner; Frozen Oral Supplement    Meds:     sodium bicarbonate  650 mg Oral Daily    furosemide  60 mg IntraVENous BID    sertraline  25 mg Oral Daily    amiodarone  200 mg Oral Daily    cetirizine  5 mg Oral Daily    dabigatran  75 mg Oral BID    metoprolol succinate  25 mg Oral Daily    pantoprazole  40 mg Oral QAM AC    spironolactone  25 mg Oral Daily    Vitamin D  1,000 Units Oral Daily    sodium chloride flush  5-40 mL IntraVENous 2 times per day        sodium chloride         Meds prn:     melatonin, ALPRAZolam, sodium chloride flush, sodium chloride, polyethylene glycol, diphenhydrAMINE, baclofen, ondansetron **OR** ondansetron    Meds prior to admission:     No current facility-administered medications on file prior to encounter. Current Outpatient Medications on File Prior to Encounter   Medication Sig Dispense Refill    dabigatran (PRADAXA) 75 MG capsule Take 1 capsule by mouth 2 times daily 60 capsule 0    meclizine (ANTIVERT) 25 MG tablet Take 1 tablet by mouth every 6 hours as needed for Dizziness 30 tablet 0    metoprolol succinate (TOPROL XL) 25 MG extended release tablet Take 1 tablet by mouth daily 30 tablet 3    furosemide (LASIX) 20 MG tablet Take 1 tablet by mouth daily 60 tablet 3    spironolactone (ALDACTONE) 25 MG tablet Take 1 tablet by mouth daily 30 tablet 3    amiodarone (CORDARONE) 200 MG tablet Take 1 tablet by mouth daily 90 tablet 1    hydrOXYzine HCl (ATARAX) 10 MG tablet Take 1 tablet by mouth every 8 hours as needed for Itching or Anxiety (Patient not taking: Reported on 2/10/2023) 90 tablet 1    cetirizine (ZYRTEC ALLERGY) 10 MG tablet Take 0.5 tablets by mouth daily 30 tablet 0    fluticasone (FLONASE) 50 MCG/ACT nasal spray 1-2 sprays, each nostril daily as needed for nasal congestion.  16 g 0    aspirin 81 MG EC tablet Take 81 mg by mouth daily      baclofen (LIORESAL) 10 MG tablet Take 1 tablet by mouth nightly 90 tablet 1    omeprazole (PRILOSEC) 40 MG delayed release capsule Take 40 mg by mouth daily (Patient not taking: Reported on 2/10/2023)      triamcinolone (KENALOG) 0.1 % cream Apply topically 3 times daily as needed (rash)   1    loperamide (IMODIUM) 2 MG capsule Take 2 mg by mouth 4 times daily as needed for Diarrhea      diphenhydrAMINE (BENADRYL) 25 MG tablet Take 25 mg by mouth every 6 hours as needed for Itching      vitamin D (CHOLECALCIFEROL) 1000 UNIT TABS tablet Take 1,000 Units by mouth daily         Allergies:    Bentyl [dicyclomine], Adhesive tape, Atacand [candesartan], Cefdinir, Demerol, Digoxin, Imdur [isosorbide mononitrate], Losartan potassium, Sulfa antibiotics, Xarelto [rivaroxaban], Lovenox [enoxaparin sodium], Acetaminophen, Apap-caff-dihydrocodeine, Coreg [carvedilol], Cozaar [losartan], Diovan [valsartan], Effexor [venlafaxine hydrochloride], Eliquis [apixaban], Labetalol, Lisinopril, and Ramipril    Social History:     reports that she has never smoked. She has never used smokeless tobacco. She reports current alcohol use. She reports that she does not use drugs.    Family History:         Problem Relation Age of Onset    Heart Attack Mother     Stroke Mother     Heart Attack Father     Heart Failure Father     Heart Disease Father     Anxiety Disorder Sister     Depression Sister     Crohn's Disease Son        Physical Exam:    Patient Vitals for the past 24 hrs:   BP Temp Temp src Pulse Resp SpO2 Weight   02/14/23 0443 -- -- -- -- -- -- 160 lb 4.8 oz (72.7 kg)   02/13/23 2015 (!) 103/58 98.6 °F (37 °C) Oral 94 19 98 % --   02/13/23 1550 96/66 98.3 °F (36.8 °C) Axillary (!) 42 18 98 % --   02/13/23 1020 92/64 98 °F (36.7 °C) Axillary (!) 119 19 98 % --           Intake/Output Summary (Last 24 hours) at 2/14/2023 0831  Last data filed at 2/14/2023 0806  Gross per 24 hour   Intake 0 ml  Output 300 ml   Net -300 ml       General: Awake, alert, no acute distress  Neck: No JVD noted  Lungs: Clear bilaterally upper, diminished to the bases bilaterally. Unlabored  CV: Irregular. No rub  Abd: Soft, nontender, nondistended. Active bowel sounds  Skin: Warm and dry. No rash on exposed extremities  Ext: 2+ BLE edema   Neuro: Awake, answers questions appropriately    Data:    Recent Labs     02/12/23  0525 02/13/23  0515 02/14/23  0642   WBC 9.5 10.0 9.8   HGB 14.4 14.5 16.0*   HCT 43.6 45.0 50.1*   MCV 82.9 86.2 88.4    228 271         Recent Labs     02/12/23 0525 02/13/23  0515    146   K 3.8 3.5    103   CO2 24 26   CREATININE 2.3* 2.2*   BUN 65* 62*   LABGLOM 21 22   GLUCOSE 124* 117*   CALCIUM 9.6 9.7   PHOS 4.5 4.0   MG 2.5 2.3         Vit D, 25-Hydroxy   Date Value Ref Range Status   02/11/2023 30 30 - 100 ng/mL Final     Comment:     <20 ng/mL. ........... [facility] Deficient  20-30 ng/mL. ......... [facility] Insufficient   ng/mL. ........ [facility] Sufficient  >100 ng/mL. .......... [facility] Toxic         PTH   Date Value Ref Range Status   02/11/2023 120 (H) 15 - 65 pg/mL Final       Recent Labs     02/12/23  0525 02/13/23  0515   * 306*   * 108*   ALKPHOS 192* 175*   BILITOT 1.6* 1.5*         Recent Labs     02/12/23  0525 02/13/23  0515   LABALBU 3.6 3.4*         No results found for: FERRITIN, IRON, TIBC    Vitamin B-12   Date Value Ref Range Status   02/04/2023 1304 (H) 211 - 946 pg/mL Final       Folate   Date Value Ref Range Status   12/02/2019 14.9 4.8 - 24.2 ng/mL Final       Lab Results   Component Value Date/Time    COLORU Yellow 02/10/2023 02:00 PM    NITRU Negative 02/10/2023 02:00 PM    GLUCOSEU Negative 02/10/2023 02:00 PM    KETUA Negative 02/10/2023 02:00 PM    UROBILINOGEN 0.2 02/10/2023 02:00 PM    BILIRUBINUR Negative 02/10/2023 02:00 PM    BILIRUBINUR negative 07/08/2022 02:22 PM       Lab Results   Component Value Date/Time    OSMOU 206 (L) 12/19/2022 02:00 PM       No components found for: URIC    No results found for: LIPIDPAN    Assessment and Plans:    NIKA stage II on CKD 3B  Likely decreased effective renal perfusion in the setting of decreased oral intake, CHF/cardiorenal syndrome, atrial fibrillation; exacerbated by diuretics  history of left partial nephrectomy for left renal mass with Dr. Fuentes Barrera on 11/16/2021  CT abd pelvis 2/9 nonobstructing 2 mm right renal calculus, partial left nephrectomy  Baseline creatinine 1.3-1.7  Creatinine peaked at 2.6 on 2/10--> 2.4 today  Avoid nephrotoxins/NSAIDs  Strict I&O, daily weights  On spironolactone   On Lasix 60 mg IV twice daily  Monitor labs closely with diuresis    2. Cardiomyopathy/CHF  Echo 12/2022 EF 25%, mod aortic regurg, mod mitral regurg, mod tricuspid regurg, pulm hypertension severe  proBNP 21,949  Strict I&O, daily weights  On spironolactone   On Lasix IV   S/p dobutamine drip   For transfer to [facility] for severe mitral regurg  Monitor volume status  Cardiology following    3. High anion gap metabolic acidosis  Likely in the setting of NIKA/CKD  Anion gap 20 and CO2 18 on 2/10--> CO2 34 today  Stop sodium bicarbonate   Monitor labs    4. Atrial fibrillation  S/p cardioversion 2/3  EP following    5. Shortness of breath   CXR 2/9 pulmonary vascular congestion  CT chest 2/9 subtle groundglass opacities in the right lower lobe, stable groundglass nodule left upper lobe, stable nodule located in right upper lobe  Defer management to primary    6. History of left renal mass  S/p left partial nephrectomy for left renal mass with Dr. Fuentes Barrera on 11/16/2021    7. Hypotension  Monitor BPs     8. Hypokalemia   in the setting of diuresis  K+ 2.9 today  Supplement potassium today  Monitor labs    UYEN Garcia - CNP    Patient seen and examined all key components of the physical performed independently , case discussed with NP, all pertinent labs and radiologic tests personally reviewed agree with above.       Tanmay Barahona VICTOR MANUEL Piedra MD

## 2023-02-14 NOTE — PROGRESS NOTES
Inpatient Cardiology Progress note     PATIENT IS BEING FOLLOWED FOR: CHF    Mele Askew is a 68 y.o. female who is known to [facility] cardiology through Dr Choco Rodriguez. Patient is also followed by [facility] electrophysiology. Patient presented to hospital 2/10/2023 for shortness of breath and hypotension. Patient having increased swelling of bilateral lower legs. Cardiology was consulted for management of acute on chronic HFrEF. EP was consulted for A-fib with RVR. Patient at this time waiting for bed at [facility] clinic mitral clip evaluation. Dobutamine drip discontinued this am ( 2/13/2023    Remains on IV Lasix    SUBJECTIVE: Not sure if she is having palpitations, SOB or anxiety    OBJECTIVE: No apparent acute distress    ROS:  Consist: Denies fevers, chills or night sweats  Heart: Denies chest pain, palpitations, lightheadedness, dizziness or syncope  Lungs: Denies cough, wheezing, orthopnea or PND  GI: Denies abdominal pain, vomiting or diarrhea    PHYSICAL EXAM:   BP 86/64   Pulse 95   Temp 98.6 °F (37 °C) (Oral)   Resp 18   Ht 5' 7\" (1.702 m)   Wt 160 lb 4.8 oz (72.7 kg)   SpO2 98%   BMI 25.11 kg/m²    B/P Range last 24 hours: Systolic (11HDG), URW:99 , Min:86 , CBS:179    Diastolic (47DMK), XZE:56, Min:58, Max:68    CONST: Well developed, well nourished who appears of stated age. Awake, alert and cooperative. No apparent distress  HEENT:   Head- Normocephalic, atraumatic   Eyes- Conjunctivae pink, anicteric  Throat- Oral mucosa pink and moist  Neck-  No stridor, trachea midline, no jugular venous distention. No carotid bruit  CHEST: Chest symmetrical and non-tender to palpation. No accessory muscle use or intercostal retractions  RESPIRATORY:  Lung sounds - bibasilar crackles   CARDIOVASCULAR:     Heart Inspection- shows no noted pulsations  Heart Palpation- no heaves or thrills; PMI is non-displaced   Heart Ausculation- Irregular rate and rhythm. No MR murmur heard.  No s3 or rub   PV: thick shins. 1-2+ lower extremity edema. No varicosities. Pedal pulses palpable, no clubbing or cyanosis   ABDOMEN: Soft, non-tender to light palpation. Bowel sounds present. No palpable masses no organomegaly; no abdominal bruit  MS: Good muscle strength and tone. No atrophy or abnormal movements. : Deferred  SKIN: Warm and dry no statis dermatitis or ulcers   NEURO / PSYCH: Oriented to person, place and time. Speech clear and appropriate. Follows all commands.  Flat affect       Intake/Output Summary (Last 24 hours) at 2/14/2023 1518  Last data filed at 2/14/2023 1208  Gross per 24 hour   Intake 0 ml   Output 750 ml   Net -750 ml       Weight:   Wt Readings from Last 3 Encounters:   02/14/23 160 lb 4.8 oz (72.7 kg)   02/07/23 166 lb 6.4 oz (75.5 kg)   02/06/23 165 lb 2 oz (74.9 kg)     Current Inpatient Medications:   potassium chloride  20 mEq Oral Once    furosemide  60 mg IntraVENous BID    sertraline  25 mg Oral Daily    amiodarone  200 mg Oral Daily    cetirizine  5 mg Oral Daily    dabigatran  75 mg Oral BID    metoprolol succinate  25 mg Oral Daily    pantoprazole  40 mg Oral QAM AC    spironolactone  25 mg Oral Daily    Vitamin D  1,000 Units Oral Daily    sodium chloride flush  5-40 mL IntraVENous 2 times per day       IV Infusions (if any):   sodium chloride         DIAGNOSTIC/ LABORATORY DATA:  Labs:   CBC:   Recent Labs     02/13/23  0515 02/14/23  0642   WBC 10.0 9.8   HGB 14.5 16.0*   HCT 45.0 50.1*    271     BMP:   Recent Labs     02/13/23  0515 02/14/23  0902    146   K 3.5 2.9*   CO2 26 34*   BUN 62* 77*   CREATININE 2.2* 2.4*   LABGLOM 22 20   CALCIUM 9.7 9.8     Mag:   Recent Labs     02/13/23  0515 02/14/23  0902   MG 2.3 2.5     Phos:   Recent Labs     02/13/23  0515 02/14/23  0902   PHOS 4.0 4.1     TFT:   Lab Results   Component Value Date    TSH 0.666 02/04/2023    J4DFRKX 7.4 01/18/2023    FT3 3.6 01/18/2023    T4FREE 1.89 (H) 01/13/2023      HgA1c:   Lab Results Component Value Date    LABA1C 5.7 (H) 02/01/2022     FASTING LIPID PANEL:  Lab Results   Component Value Date/Time    CHOL 151 12/18/2022 11:02 AM    HDL 55 12/18/2022 11:02 AM    LDLCALC 82 12/18/2022 11:02 AM    TRIG 68 12/18/2022 11:02 AM     LIVER PROFILE:  Recent Labs     02/13/23  0515 02/14/23  0902   * 85*   * 251*   LABALBU 3.4* 3.5      Latest Reference Range & Units 2/9/23 12:07 2/9/23 14:42 2/9/23 16:54   Pro-BNP 0 - 450 pg/mL 21,949 (H)     Troponin, High Sensitivity 0 - 9 ng/L 10 (H) 55 (H) 49 (H)   (H): Data is abnormally high     Latest Reference Range & Units 2/12/23 05:25   Pro-BNP 0 - 450 pg/mL 17,110 (H)   (H): Data is abnormally high      Lexiscan 12/20/2022:  Impression   1: No definite evidence of  ischemia or scar except soft tissue   attenuation. The left ventricle is visually dilated both rest and   stress without evidence of TID     2  Dilated left ventricle with Moderate to severe global left   ventricular hypokinesis with estimated left ventricular ejection   fraction of 32%. 3:  Please see separate report for EKG and hemodynamic aspect of the   stress test.     4:  Low dose CT attenuation correction protocol was utilized which   revealed minimal to mild coronary artery ossifications. 5: RISK SCAN:  High risk scan due to dilated left ventricle with   severe global hypokinesis and EF of 32%. EE 1/26/2023:  Summary   Severely reduced left ventricular systolic function. Reduced right ventricular function. Bi-atrial enlargement. No evidence of interatrial shunting on bubble study. History of WATCHMAN device (24 mm). No significant color flow jet around   the device. No device related thrombus visualized. Severe mitral regurgitation. Mild tricuspid regurgitation. RV-RA gradient is estimated at 27 mmHg. CT chest 2/9/2023:  Impression   1.  Subtle ground-glass opacities located in right lower lobe which appear new   compared to prior and could indicate bronchiolitis or developing viral   pneumonia. 2. Stable ground-glass nodule located in left upper lobe measures 7 mm. 3. Stable nodule located in right upper lobe measures 10 mm. 4. Stable moderate cardiomegaly. CT abdomen 2/9/2023:  Impression   1. No acute process in abdomen or pelvis. 2. Nonobstructing 2 mm right renal calculus. CXR 2/9/2023:   Impression   Suspect pulmonary vascular congestion. 12 lead EKG 2/13/2023: Atrial fibrillation with rapid ventricular response with premature ventricular or aberrantly conducted complexes. Left axis deviation. Left bundle branch block      Telemetry: Afib with controlled rate. BBB      ASSESSMENT:   Acute on chronic HFrEF  Hypotension  Chronically elevated troponins  Persistent atrial fibrillation with failed DCCV 2/3/2023--s/p Watchman 2020  cLBBB  Severe mitral regurgitation  Acute on chronic renal insufficiency with worsening kidney function  Renal cell carcinoma s/p nephrectomy  History of pulmonary embolus  Meige syndrome  TIA       PLAN:  Hold IV diuresis. Monitor BMP, BNP, I's and O's and daily weights  Rest of cardiac medications same. Not a candidate for ACE I therapy/ ARB / Entresto at present ( hypotension and NIKA on top of CKD ).  Cannot tolerate Isordil / hydralazine combination ( low BP )  Monitor BP  Awaiting transfer to [facility] pending bed availability  Will continue to follow while in the hospital    Electronically signed by Vishnu Grayson MD on 2/14/2023 at 3:18 PM

## 2023-02-14 NOTE — CARE COORDINATION
Patient remains on [facility]. IV lasix continued. Cardiology recommendation is for  possible MitraClip placement  for severe MRand plan is for [facility] transfer. Await notification from access center.  CM/SW will continue kyung follow

## 2023-02-14 NOTE — PROGRESS NOTES
Hospitalist Progress Note      PCP: Andrews Walker DO    Date of Admission: 2/9/2023  Days in the hospital: [address] [facility] Course:   Patient is 66-year-old with history of atrial fibrillation status post Watchman procedure, nonischemic cardiomyopathy, hypertension, CKD, renal cell carcinoma status post left nephrectomy, mage syndrome, TIA allergic to Eliquis and Xarelto and on Pradaxa for anticoagulation, history of PE who comes into the hospital with increased shortness of breath and hypotension. Patient was noted to have rapid A-fib. She apparently has failed multiple cardioversions before. Also noted to have severe mitral regurgitation and recommendation for MitraClip placement and plan is for [facility] transfer. Patient also seen by nephrology for acute on chronic kidney injury. Subjective  Patient seen and examined at bedside. Still awaiting transfer to [facility], remains on high-dose Lasix, nephrology following, discussed with patient's son at bedside    Exam:    BP 86/64   Pulse 95   Temp 98.6 °F (37 °C) (Oral)   Resp 18   Ht 5' 7\" (1.702 m)   Wt 160 lb 4.8 oz (72.7 kg)   SpO2 98%   BMI 25.11 kg/m²     HEENT: No pallor, no icterus. Respiratory:  CTA, good air entry. Cardiovascular: RRR, no murmur. Musculoskeletal: +4 edema noted bilateral lower extremities  Neurologic: awake, alert and following commands       Assessment/Plan:  -Acute on chronic congestive heart failure, systolic ejection fraction 25%: Concern for cardiorenal syndrome. Noted to have moderate aortic regurgitation, moderate MR, moderate TR and severe pulmonary hypertension. Continue IV diuretics, appreciate input from cardiology. Continue Aldactone and IV Lasix. Watchman device. Pending transfer to [facility] [employer].    -Acute on chronic renal failure stage IV: Baseline serum creatinine 1.9-2.2, continue supportive care, continue to maintain euvolemia. Nephrology consultation, input appreciated.   Creatinine fairly stable but slightly elevated from baseline.    -Elevated troponin likely demand ischemia: Appreciate input from cardiology    -Lactic acidosis    -Persistent atrial fibrillation, chronic. On Pradaxa , on metoprolol and amiodarone  Electrophysiology following, possible [facility] for [facility] MR and possible MitraClip    -Hypotension, currently off dobutamine drip    -Anxiety/depression: Supportive care, input from psychiatry appreciated, patient does not have full medical decision-making capacity, Zoloft was started.    -History of renal cell carcinoma status post left nephrectomy    -Abnormal LFTs, monitor    -Hypokalemia, replace potassium    Disposition: Awaiting transfer to [facility]      Labs:   Recent Labs     02/12/23  0525 02/13/23  0515 02/14/23  0642   WBC 9.5 10.0 9.8   HGB 14.4 14.5 16.0*   HCT 43.6 45.0 50.1*    228 271     Recent Labs     02/12/23  0525 02/13/23  0515 02/14/23  0902    146 146   K 3.8 3.5 2.9*    103 101   CO2 24 26 34*   BUN 65* 62* 77*   CREATININE 2.3* 2.2* 2.4*   CALCIUM 9.6 9.7 9.8   PHOS 4.5 4.0 4.1     Recent Labs     02/12/23  0525 02/13/23  0515 02/14/23  0902   * 108* 85*   * 306* 251*   BILITOT 1.6* 1.5* 1.5*   ALKPHOS 192* 175* 170*     No results for input(s): INR in the last 72 hours. No results for input(s): Natalie Pena in the last 72 hours.     Medications:  Reviewed    Infusion Medications    sodium chloride       Scheduled Medications    potassium chloride  20 mEq Oral Once    sodium bicarbonate  650 mg Oral Daily    furosemide  60 mg IntraVENous BID    sertraline  25 mg Oral Daily    amiodarone  200 mg Oral Daily    cetirizine  5 mg Oral Daily    dabigatran  75 mg Oral BID    metoprolol succinate  25 mg Oral Daily    pantoprazole  40 mg Oral QAM AC    spironolactone  25 mg Oral Daily    Vitamin D  1,000 Units Oral Daily    sodium chloride flush  5-40 mL IntraVENous 2 times per day     PRN Meds: melatonin, ALPRAZolam, sodium chloride flush, sodium chloride, polyethylene glycol, diphenhydrAMINE, baclofen, ondansetron **OR** ondansetron      Intake/Output Summary (Last 24 hours) at 2/14/2023 1411  Last data filed at 2/14/2023 1208  Gross per 24 hour   Intake 0 ml   Output 750 ml   Net -750 ml     Body mass index is 25.11 kg/m². Diet  ADULT DIET; Regular; No Added Salt (3-4 gm)  ADULT ORAL NUTRITION SUPPLEMENT; Lunch; Standard High Calorie/High Protein Oral Supplement  ADULT ORAL NUTRITION SUPPLEMENT; Breakfast; Fortified Pudding Oral Supplement  ADULT ORAL NUTRITION SUPPLEMENT; Dinner; Frozen Oral Supplement    Code Status  Full Code       Electronically signed by Rhona Jorgensen MD on 2/14/2023 at 2:11 PM  [employer]   Please contact me through [employer]    NOTE: This report was transcribed using voice recognition software. Every effort was made to ensure accuracy; however, inadvertent computerized transcription errors may be present.

## 2023-02-15 ENCOUNTER — HOSPITAL ENCOUNTER (OUTPATIENT)
Dept: OTHER | Age: 78
Setting detail: THERAPIES SERIES
Discharge: HOME | End: 2023-02-15
Payer: MEDICARE

## 2023-02-15 LAB
ALBUMIN SERPL-MCNC: 3.5 G/DL (ref 3.5–5.2)
ALP BLD-CCNC: 159 U/L (ref 35–104)
ALT SERPL-CCNC: 199 U/L (ref 0–32)
ANION GAP SERPL CALCULATED.3IONS-SCNC: 17 MMOL/L (ref 7–16)
AST SERPL-CCNC: 56 U/L (ref 0–31)
BASOPHILS ABSOLUTE: 0.02 E9/L (ref 0–0.2)
BASOPHILS RELATIVE PERCENT: 0.2 % (ref 0–2)
BILIRUB SERPL-MCNC: 1.2 MG/DL (ref 0–1.2)
BUN BLDV-MCNC: 80 MG/DL (ref 6–23)
CALCIUM SERPL-MCNC: 9.9 MG/DL (ref 8.6–10.2)
CHLORIDE BLD-SCNC: 106 MMOL/L (ref 98–107)
CO2: 28 MMOL/L (ref 22–29)
CREAT SERPL-MCNC: 2.4 MG/DL (ref 0.5–1)
EOSINOPHILS ABSOLUTE: 0.11 E9/L (ref 0.05–0.5)
EOSINOPHILS RELATIVE PERCENT: 1 % (ref 0–6)
GFR SERPL CREATININE-BSD FRML MDRD: 20 ML/MIN/1.73
GLUCOSE BLD-MCNC: 132 MG/DL (ref 74–99)
HCT VFR BLD CALC: 50.8 % (ref 34–48)
HEMOGLOBIN: 15.8 G/DL (ref 11.5–15.5)
IMMATURE GRANULOCYTES #: 0.04 E9/L
IMMATURE GRANULOCYTES %: 0.4 % (ref 0–5)
LYMPHOCYTES ABSOLUTE: 1.02 E9/L (ref 1.5–4)
LYMPHOCYTES RELATIVE PERCENT: 9.2 % (ref 20–42)
MAGNESIUM: 2.7 MG/DL (ref 1.6–2.6)
MCH RBC QN AUTO: 27.8 PG (ref 26–35)
MCHC RBC AUTO-ENTMCNC: 31.1 % (ref 32–34.5)
MCV RBC AUTO: 89.3 FL (ref 80–99.9)
MONOCYTES ABSOLUTE: 0.76 E9/L (ref 0.1–0.95)
MONOCYTES RELATIVE PERCENT: 6.9 % (ref 2–12)
NEUTROPHILS ABSOLUTE: 9.08 E9/L (ref 1.8–7.3)
NEUTROPHILS RELATIVE PERCENT: 82.3 % (ref 43–80)
PDW BLD-RTO: 16.5 FL (ref 11.5–15)
PHOSPHORUS: 3.8 MG/DL (ref 2.5–4.5)
PLATELET # BLD: 238 E9/L (ref 130–450)
PMV BLD AUTO: 10.7 FL (ref 7–12)
POTASSIUM REFLEX MAGNESIUM: 3.8 MMOL/L (ref 3.5–5)
PRO-BNP: ABNORMAL PG/ML (ref 0–450)
RBC # BLD: 5.69 E12/L (ref 3.5–5.5)
SODIUM BLD-SCNC: 151 MMOL/L (ref 132–146)
TOTAL PROTEIN: 7 G/DL (ref 6.4–8.3)
WBC # BLD: 11 E9/L (ref 4.5–11.5)

## 2023-02-15 PROCEDURE — 2580000003 HC RX 258: Performed by: FAMILY MEDICINE

## 2023-02-15 PROCEDURE — 6370000000 HC RX 637 (ALT 250 FOR IP): Performed by: PSYCHIATRY & NEUROLOGY

## 2023-02-15 PROCEDURE — 6370000000 HC RX 637 (ALT 250 FOR IP): Performed by: FAMILY MEDICINE

## 2023-02-15 PROCEDURE — 83735 ASSAY OF MAGNESIUM: CPT

## 2023-02-15 PROCEDURE — 80053 COMPREHEN METABOLIC PANEL: CPT

## 2023-02-15 PROCEDURE — 51798 US URINE CAPACITY MEASURE: CPT

## 2023-02-15 PROCEDURE — 36415 COLL VENOUS BLD VENIPUNCTURE: CPT

## 2023-02-15 PROCEDURE — 2140000000 HC CCU INTERMEDIATE R&B

## 2023-02-15 PROCEDURE — 83880 ASSAY OF NATRIURETIC PEPTIDE: CPT

## 2023-02-15 PROCEDURE — 85025 COMPLETE CBC W/AUTO DIFF WBC: CPT

## 2023-02-15 PROCEDURE — 99232 SBSQ HOSP IP/OBS MODERATE 35: CPT | Performed by: INTERNAL MEDICINE

## 2023-02-15 PROCEDURE — 6370000000 HC RX 637 (ALT 250 FOR IP): Performed by: INTERNAL MEDICINE

## 2023-02-15 PROCEDURE — 84100 ASSAY OF PHOSPHORUS: CPT

## 2023-02-15 RX ADMIN — Medication 10 ML: at 22:33

## 2023-02-15 RX ADMIN — DABIGATRAN ETEXILATE MESYLATE 75 MG: 75 CAPSULE ORAL at 10:42

## 2023-02-15 RX ADMIN — METOPROLOL SUCCINATE 25 MG: 25 TABLET, EXTENDED RELEASE ORAL at 10:40

## 2023-02-15 RX ADMIN — SERTRALINE HYDROCHLORIDE 25 MG: 50 TABLET ORAL at 10:39

## 2023-02-15 RX ADMIN — MELATONIN 3 MG ORAL TABLET 3 MG: 3 TABLET ORAL at 22:31

## 2023-02-15 RX ADMIN — BACLOFEN 5 MG: 10 TABLET ORAL at 15:03

## 2023-02-15 RX ADMIN — Medication 10 ML: at 10:40

## 2023-02-15 RX ADMIN — DABIGATRAN ETEXILATE MESYLATE 75 MG: 75 CAPSULE ORAL at 22:32

## 2023-02-15 RX ADMIN — BACLOFEN 5 MG: 10 TABLET ORAL at 06:09

## 2023-02-15 RX ADMIN — AMIODARONE HYDROCHLORIDE 200 MG: 200 TABLET ORAL at 10:39

## 2023-02-15 RX ADMIN — CETIRIZINE HYDROCHLORIDE 5 MG: 10 TABLET, FILM COATED ORAL at 10:39

## 2023-02-15 RX ADMIN — BACLOFEN 5 MG: 10 TABLET ORAL at 22:31

## 2023-02-15 RX ADMIN — PANTOPRAZOLE SODIUM 40 MG: 40 TABLET, DELAYED RELEASE ORAL at 06:09

## 2023-02-15 RX ADMIN — SPIRONOLACTONE 25 MG: 25 TABLET ORAL at 10:39

## 2023-02-15 RX ADMIN — Medication 1000 UNITS: at 10:41

## 2023-02-15 ASSESSMENT — PAIN - FUNCTIONAL ASSESSMENT: PAIN_FUNCTIONAL_ASSESSMENT: PREVENTS OR INTERFERES SOME ACTIVE ACTIVITIES AND ADLS

## 2023-02-15 ASSESSMENT — PAIN SCALES - GENERAL
PAINLEVEL_OUTOF10: 0
PAINLEVEL_OUTOF10: 9

## 2023-02-15 ASSESSMENT — PAIN DESCRIPTION - ORIENTATION: ORIENTATION: MID

## 2023-02-15 ASSESSMENT — PAIN DESCRIPTION - LOCATION: LOCATION: NECK

## 2023-02-15 ASSESSMENT — PAIN SCALES - WONG BAKER
WONGBAKER_NUMERICALRESPONSE: 0
WONGBAKER_NUMERICALRESPONSE: 0

## 2023-02-15 ASSESSMENT — PAIN DESCRIPTION - DESCRIPTORS: DESCRIPTORS: ACHING

## 2023-02-15 NOTE — PROGRESS NOTES
Inpatient Cardiology Progress note     PATIENT IS BEING FOLLOWED FOR: CHF    Eva Ceballos is a 68 y.o. female who is known to [address] [place] [address] cardiology through Dr Bharti Almonte. Patient is also followed by [address] [facility] electrophysiology. Patient presented to hospital 2/10/2023 for shortness of breath and hypotension. Patient having increased swelling of bilateral lower legs. Cardiology was consulted for management of acute on chronic HFrEF. EP was consulted for A-fib with RVR. Patient at this time waiting for bed at [facility] clinic mitral clip evaluation. Dobutamine drip discontinued ( 2/13/2023    IV Lasix held 2/14/23 because of worsening renal function    SUBJECTIVE: No new complaints    OBJECTIVE: No apparent acute distress    ROS:  Consist: Denies fevers, chills or night sweats  Heart: Denies chest pain, palpitations, lightheadedness, dizziness or syncope  Lungs: Denies cough, wheezing, orthopnea or PND  GI: Denies abdominal pain, vomiting or diarrhea    PHYSICAL EXAM:   BP (!) 105/58   Pulse 73   Temp 98.6 °F (37 °C) (Oral)   Resp 14   Ht 5' 7\" (1.702 m)   Wt 151 lb 6.4 oz (68.7 kg)   SpO2 98%   BMI 23.71 kg/m²    B/P Range last 24 hours: Systolic (97ARR), COU:91 , Min:91 , MGO:976    Diastolic (22ISS), QEN:54, Min:53, Max:66    CONST: Well developed, well nourished who appears of stated age. Awake, alert and cooperative. No apparent distress  HEENT:   Head- Normocephalic, atraumatic   Eyes- Conjunctivae pink, anicteric  Throat- Oral mucosa pink and moist  Neck-  No stridor, trachea midline, no jugular venous distention. No carotid bruit  CHEST: Chest symmetrical and non-tender to palpation. No accessory muscle use or intercostal retractions  RESPIRATORY:  Lung sounds - bibasilar crackles   CARDIOVASCULAR:     Heart Inspection- shows no noted pulsations  Heart Palpation- no heaves or thrills; PMI is non-displaced   Heart Ausculation- Irregular rate and rhythm. No MR murmur heard.  No s3 or rub   PV: thick shins. 1-2+ lower extremity edema. No varicosities. Pedal pulses palpable, no clubbing or cyanosis   ABDOMEN: Soft, non-tender to light palpation. Bowel sounds present. No palpable masses no organomegaly; no abdominal bruit  MS: Good muscle strength and tone. No atrophy or abnormal movements. : Deferred  SKIN: Warm and dry no statis dermatitis or ulcers   NEURO / PSYCH: Oriented to person, place and time. Speech clear and appropriate. Follows all commands.  Flat affect       Intake/Output Summary (Last 24 hours) at 2/15/2023 1347  Last data filed at 2/15/2023 5078  Gross per 24 hour   Intake --   Output 700 ml   Net -700 ml       Weight:   Wt Readings from Last 3 Encounters:   02/15/23 151 lb 6.4 oz (68.7 kg)   02/07/23 166 lb 6.4 oz (75.5 kg)   02/06/23 165 lb 2 oz (74.9 kg)     Current Inpatient Medications:   [Held by provider] furosemide  60 mg IntraVENous BID    sertraline  25 mg Oral Daily    amiodarone  200 mg Oral Daily    cetirizine  5 mg Oral Daily    dabigatran  75 mg Oral BID    metoprolol succinate  25 mg Oral Daily    pantoprazole  40 mg Oral QAM AC    spironolactone  25 mg Oral Daily    Vitamin D  1,000 Units Oral Daily    sodium chloride flush  5-40 mL IntraVENous 2 times per day       IV Infusions (if any):   sodium chloride         DIAGNOSTIC/ LABORATORY DATA:  Labs:   CBC:   Recent Labs     02/14/23  0642 02/15/23  0643   WBC 9.8 11.0   HGB 16.0* 15.8*   HCT 50.1* 50.8*    238     BMP:   Recent Labs     02/14/23  0902 02/15/23  0643    151*   K 2.9* 3.8   CO2 34* 28   BUN 77* 80*   CREATININE 2.4* 2.4*   LABGLOM 20 20   CALCIUM 9.8 9.9     Mag:   Recent Labs     02/14/23  0902 02/15/23  0643   MG 2.5 2.7*     Phos:   Recent Labs     02/14/23  0902 02/15/23  0643   PHOS 4.1 3.8     TFT:   Lab Results   Component Value Date    TSH 0.666 02/04/2023    I1YYZWW 7.4 01/18/2023    FT3 3.6 01/18/2023    T4FREE 1.89 (H) 01/13/2023      HgA1c:   Lab Results   Component Value Date LABA1C 5.7 (H) 02/01/2022     FASTING LIPID PANEL:  Lab Results   Component Value Date/Time    CHOL 151 12/18/2022 11:02 AM    HDL 55 12/18/2022 11:02 AM    LDLCALC 82 12/18/2022 11:02 AM    TRIG 68 12/18/2022 11:02 AM     LIVER PROFILE:  Recent Labs     02/14/23  0902 02/15/23  0643   AST 85* 56*   * 199*   LABALBU 3.5 3.5      Latest Reference Range & Units 2/9/23 12:07 2/9/23 14:42 2/9/23 16:54   Pro-BNP 0 - 450 pg/mL 21,949 (H)     Troponin, High Sensitivity 0 - 9 ng/L 10 (H) 55 (H) 49 (H)   (H): Data is abnormally high     Latest Reference Range & Units 2/12/23 05:25   Pro-BNP 0 - 450 pg/mL 17,110 (H)   (H): Data is abnormally high      Lexiscan 12/20/2022:  Impression   1: No definite evidence of  ischemia or scar except soft tissue   attenuation. The left ventricle is visually dilated both rest and   stress without evidence of TID     2  Dilated left ventricle with Moderate to severe global left   ventricular hypokinesis with estimated left ventricular ejection   fraction of 32%. 3:  Please see separate report for EKG and hemodynamic aspect of the   stress test.     4:  Low dose CT attenuation correction protocol was utilized which   revealed minimal to mild coronary artery ossifications. 5: RISK SCAN:  High risk scan due to dilated left ventricle with   severe global hypokinesis and EF of 32%. EE 1/26/2023:  Summary   Severely reduced left ventricular systolic function. Reduced right ventricular function. Bi-atrial enlargement. No evidence of interatrial shunting on bubble study. History of WATCHMAN device (24 mm). No significant color flow jet around   the device. No device related thrombus visualized. Severe mitral regurgitation. Mild tricuspid regurgitation. RV-RA gradient is estimated at 27 mmHg. CT chest 2/9/2023:  Impression   1.  Subtle ground-glass opacities located in right lower lobe which appear new   compared to prior and could indicate bronchiolitis or developing viral   pneumonia. 2. Stable ground-glass nodule located in left upper lobe measures 7 mm. 3. Stable nodule located in right upper lobe measures 10 mm. 4. Stable moderate cardiomegaly. CT abdomen 2/9/2023:  Impression   1. No acute process in abdomen or pelvis. 2. Nonobstructing 2 mm right renal calculus. CXR 2/9/2023:   Impression   Suspect pulmonary vascular congestion. 12 lead EKG 2/13/2023: Atrial fibrillation with rapid ventricular response with premature ventricular or aberrantly conducted complexes. Left axis deviation. Left bundle branch block      Telemetry: Afib with controlled rate. BBB      ASSESSMENT:   Acute on chronic HFrEF  Hypotension. BP better today  Chronically elevated troponins  Persistent atrial fibrillation with failed DCCV 2/3/2023--s/p Watchman 2020  cLBBB  Severe mitral regurgitation  Acute on chronic renal insufficiency with worsening kidney function BUN / Cr ( 2/7/23 ) = 45/1.7. BUN/Cr on admission ( 2/9/23 ) = 57/2.2 ---> 80/2.4 today ( 2/25/23 )  Hypernatremia  Renal cell carcinoma s/p nephrectomy  History of pulmonary embolus  Meige syndrome  TIA       PLAN:  Continue to hold IV diuresis. Monitor BMP, BNP, I's and O's and daily weights  Rest of cardiac medications same. Not a candidate for ACE I therapy/ ARB / Entresto at present ( hypotension and NIKA on top of CKD ).  Cannot tolerate Isordil / hydralazine combination ( low BP )  Monitor BP  Awaiting transfer to [facility] pending bed availability  Will continue to follow while in the hospital    Electronically signed by Yuly Taveras MD on 2/15/2023 at 1:47 PM

## 2023-02-15 NOTE — PLAN OF CARE
Problem: Chronic Conditions and Co-morbidities  Goal: Patient's chronic conditions and co-morbidity symptoms are monitored and maintained or improved  Outcome: Progressing     Problem: Discharge Planning  Goal: Discharge to home or other facility with appropriate resources  Outcome: Progressing     Problem: Safety - Adult  Goal: Free from fall injury  Outcome: Progressing     Problem: ABCDS Injury Assessment  Goal: Absence of physical injury  Outcome: Progressing     Problem: Cardiovascular - Adult  Goal: Maintains optimal cardiac output and hemodynamic stability  Outcome: Progressing     Problem: Metabolic/Fluid and Electrolytes - Adult  Goal: Hemodynamic stability and optimal renal function maintained  Outcome: Progressing     Problem: Nutrition Deficit:  Goal: Optimize nutritional status  Outcome: Progressing     Problem: Skin/Tissue Integrity  Goal: Absence of new skin breakdown  Description: 1. Monitor for areas of redness and/or skin breakdown  2. Assess vascular access sites hourly  3. Every 4-6 hours minimum:  Change oxygen saturation probe site  4. Every 4-6 hours:  If on nasal continuous positive airway pressure, respiratory therapy assess nares and determine need for appliance change or resting period.   Outcome: Progressing

## 2023-02-15 NOTE — PROGRESS NOTES
[facility]  Nephrology Progress Note    Patient's Name: Eva Ceballos    History of Present Illness from 2/10 consult note:    \"Aidee Licea is a 68 y.o. female with a past medical history of atrial fibrillation, CHF, mitral regurgitation, and hypertension. She presented to the ED on 2/9 with complaints of shortness of breath. Vital signs on 2/9 includes temperature 97.8, respirations 26, pulse 88, BP 99/62, and she was 95% on room air. Lab data on 2/9 include CO2 16, BUN 57, creatinine 2.2, anion gap 17, proBNP 21,949, and troponin 55. She had a chest x-ray on 2/9 which showed suspect pulmonary vascular congestion. Her CT of the chest 2/9 showed subtle groundglass opacities in the right lower lobe, stable groundglass nodule left upper lobe, stable nodule located in right upper lobe. CT abdomen pelvis 2/9 showed no acute process, nonobstructing 2 mm right renal calculus, partial left nephrectomy. She has a history of JAIME on 1/26 and direct-current electrical cardioversion on 2/3. She also has a history of left renal mass and is status post left partial nephrectomy for left renal mass with Dr. Jacki Carmen on 11/16/2021. She also had a recent hospital stay from 2/4 - 2/7 for nausea. We were consulted to see the patient for NIKA/CKD. Patient appears to have a recent baseline creatinine of 1.3-1.7. At present, patient was seen and examined. She reports a history of A-fib and that she came in due to low blood pressures and shortness of breath. She reports that prior to admission she had a decreased appetite. She also reports issues with dizziness prior to admission. She currently reports nausea. She denies any chest pain. She denies any dysuria. She denies any fevers or chills. She denies any abdominal pain or vomiting. She denies the use of NSAIDs. \"    Subjective:    2/15: Patient was seen and examined. She reports that she feels better. She denies any chest pain or shortness of breath.   She denies any abdominal pain or nausea. She has a visitor at the bedside. PMH:    Past Medical History:   Diagnosis Date    Afib (HCC)     WATCHMAN    Anxiety     Arthritis     Cervical radiculopathy     CHF (congestive heart failure) (HCC)     Chronic sinusitis     Ejection fraction < 50%     25%. Wearing life vest    Hilar adenopathy     Hx of blood clots     Hypertension     Left ventricular systolic dysfunction     Lesion of left native kidney     Lumbar radiculopathy     Lung nodules     Meige syndrome     partial/ PCP    Microscopic colitis     Mitral regurgitation     Mild to moderate    Nonischemic cardiomyopathy (HCC)     f/u with Dr. Gertrude Hunt    Osteopenia     PE (pulmonary thromboembolism) [facility])     Renal cell carcinoma of left kidney ([facility] [address] 02/01/2022    Vertebrobasilar artery syndrome     f/u PCP       Patient Active Problem List   Diagnosis    Anxiety    NICM (nonischemic cardiomyopathy) ([facility] [address]    Nonrheumatic mitral valve regurgitation    Lumbar radiculopathy    Cervical radiculopathy    HTN (hypertension), benign    Left atrial thrombus    Paroxysmal atrial fibrillation (HCC)    Chronic HFrEF (heart failure with reduced ejection fraction) ([facility] [address]    Encounter for long-term (current) use of high-risk medication    Persistent atrial fibrillation ([facility] [address]    Encounter for medication refill    Itching    Chronic anticoagulation    Presence of Watchman left atrial appendage closure device    Renal mass    Renal cell carcinoma of left kidney (HCC)    Chronic renal disease, stage III ([address] [[id number]]    Multiple subsegmental pulmonary emboli without acute cor pulmonale (HCC)    Acute on chronic congestive heart failure (HCC)    Vertigo    Dizziness    HFrEF (heart failure with reduced ejection fraction) ([facility])    Stage 4 chronic kidney disease ([address]    Left bundle branch block    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       Diet:    ADULT DIET; Regular;  No Added Salt (3-4 gm)  ADULT ORAL NUTRITION SUPPLEMENT; Lunch; Standard High Calorie/High Protein Oral Supplement  ADULT ORAL NUTRITION SUPPLEMENT; Breakfast; Fortified Pudding Oral Supplement  ADULT ORAL NUTRITION SUPPLEMENT; Dinner; Frozen Oral Supplement    Meds:     [Held by provider] furosemide  60 mg IntraVENous BID    sertraline  25 mg Oral Daily    amiodarone  200 mg Oral Daily    cetirizine  5 mg Oral Daily    dabigatran  75 mg Oral BID    metoprolol succinate  25 mg Oral Daily    pantoprazole  40 mg Oral QAM AC    spironolactone  25 mg Oral Daily    Vitamin D  1,000 Units Oral Daily    sodium chloride flush  5-40 mL IntraVENous 2 times per day        sodium chloride         Meds prn:     melatonin, ALPRAZolam, sodium chloride flush, sodium chloride, polyethylene glycol, diphenhydrAMINE, baclofen, ondansetron **OR** ondansetron    Meds prior to admission:     No current facility-administered medications on file prior to encounter. Current Outpatient Medications on File Prior to Encounter   Medication Sig Dispense Refill    dabigatran (PRADAXA) 75 MG capsule Take 1 capsule by mouth 2 times daily 60 capsule 0    meclizine (ANTIVERT) 25 MG tablet Take 1 tablet by mouth every 6 hours as needed for Dizziness 30 tablet 0    metoprolol succinate (TOPROL XL) 25 MG extended release tablet Take 1 tablet by mouth daily 30 tablet 3    furosemide (LASIX) 20 MG tablet Take 1 tablet by mouth daily 60 tablet 3    spironolactone (ALDACTONE) 25 MG tablet Take 1 tablet by mouth daily 30 tablet 3    amiodarone (CORDARONE) 200 MG tablet Take 1 tablet by mouth daily 90 tablet 1    hydrOXYzine HCl (ATARAX) 10 MG tablet Take 1 tablet by mouth every 8 hours as needed for Itching or Anxiety (Patient not taking: Reported on 2/10/2023) 90 tablet 1    cetirizine (ZYRTEC ALLERGY) 10 MG tablet Take 0.5 tablets by mouth daily 30 tablet 0    fluticasone (FLONASE) 50 MCG/ACT nasal spray 1-2 sprays, each nostril daily as needed for nasal congestion.  16 g 0    aspirin 81 MG EC tablet Take 81 mg by mouth daily      baclofen (LIORESAL) 10 MG tablet Take 1 tablet by mouth nightly 90 tablet 1    omeprazole (PRILOSEC) 40 MG delayed release capsule Take 40 mg by mouth daily (Patient not taking: Reported on 2/10/2023)      triamcinolone (KENALOG) 0.1 % cream Apply topically 3 times daily as needed (rash)   1    loperamide (IMODIUM) 2 MG capsule Take 2 mg by mouth 4 times daily as needed for Diarrhea      diphenhydrAMINE (BENADRYL) 25 MG tablet Take 25 mg by mouth every 6 hours as needed for Itching      vitamin D (CHOLECALCIFEROL) 1000 UNIT TABS tablet Take 1,000 Units by mouth daily         Allergies:    Bentyl [dicyclomine], Adhesive tape, Atacand [candesartan], Cefdinir, Demerol, Digoxin, Imdur [isosorbide mononitrate], Losartan potassium, Sulfa antibiotics, Xarelto [rivaroxaban], Lovenox [enoxaparin sodium], Acetaminophen, Apap-caff-dihydrocodeine, Coreg [carvedilol], Cozaar [losartan], Diovan [valsartan], Effexor [venlafaxine hydrochloride], Eliquis [apixaban], Labetalol, Lisinopril, and Ramipril    Social History:     reports that she has never smoked. She has never used smokeless tobacco. She reports current alcohol use. She reports that she does not use drugs.     Family History:         Problem Relation Age of Onset    Heart Attack Mother     Stroke Mother     Heart Attack Father     Heart Failure Father     Heart Disease Father     Anxiety Disorder Sister     Depression Sister     Crohn's Disease Son        Physical Exam:    Patient Vitals for the past 24 hrs:   BP Temp Temp src Pulse Resp SpO2 Weight   02/15/23 1039 92/66 -- -- 85 -- -- --   02/15/23 0730 92/66 98.2 °F (36.8 °C) Oral 80 14 -- --   02/15/23 0600 -- -- -- -- -- -- 151 lb 6.4 oz (68.7 kg)   02/14/23 2015 (!) 102/53 98.6 °F (37 °C) Oral 88 15 98 % --   02/14/23 1651 91/60 99.7 °F (37.6 °C) Oral 85 22 98 % --   02/14/23 1200 86/64 98.6 °F (37 °C) Oral 95 18 98 % --           Intake/Output Summary (Last 24 hours) at 2/15/2023 1159  Last data filed at 2/15/2023 7599  Gross per 24 hour   Intake --   Output 1050 ml   Net -1050 ml       General: Awake, alert, no acute distress  Neck: No JVD noted  Lungs: Clear bilaterally upper, diminished to the bases bilaterally. Unlabored  CV: Irregular. No rub  Abd: Soft, nontender, nondistended. Active bowel sounds  Skin: Warm and dry. No rash on exposed extremities  Ext: 2+ BLE edema   Neuro: Awake, answers questions appropriately    Data:    Recent Labs     02/13/23 0515 02/14/23  0642 02/15/23  0643   WBC 10.0 9.8 11.0   HGB 14.5 16.0* 15.8*   HCT 45.0 50.1* 50.8*   MCV 86.2 88.4 89.3    271 238         Recent Labs     02/13/23 0515 02/14/23  0902 02/15/23  0643    146 151*   K 3.5 2.9* 3.8    101 106   CO2 26 34* 28   CREATININE 2.2* 2.4* 2.4*   BUN 62* 77* 80*   LABGLOM 22 20 20   GLUCOSE 117* 129* 132*   CALCIUM 9.7 9.8 9.9   PHOS 4.0 4.1 3.8   MG 2.3 2.5 2.7*         Vit D, 25-Hydroxy   Date Value Ref Range Status   02/11/2023 30 30 - 100 ng/mL Final     Comment:     <20 ng/mL. ........... Trejo Potters Deficient  20-30 ng/mL. ......... Trejo Potters Insufficient   ng/mL. ........ Trejo Potters Sufficient  >100 ng/mL. .......... Trejo Potters Toxic         PTH   Date Value Ref Range Status   02/11/2023 120 (H) 15 - 65 pg/mL Final       Recent Labs     02/13/23 0515 02/14/23  0902 02/15/23  0643   * 251* 199*   * 85* 56*   ALKPHOS 175* 170* 159*   BILITOT 1.5* 1.5* 1.2         Recent Labs     02/13/23 0515 02/14/23  0902 02/15/23  0643   LABALBU 3.4* 3.5 3.5         No results found for: FERRITIN, IRON, TIBC    Vitamin B-12   Date Value Ref Range Status   02/04/2023 1304 (H) 211 - 946 pg/mL Final       Folate   Date Value Ref Range Status   12/02/2019 14.9 4.8 - 24.2 ng/mL Final       Lab Results   Component Value Date/Time    COLORU Yellow 02/10/2023 02:00 PM    NITRU Negative 02/10/2023 02:00 PM    GLUCOSEU Negative 02/10/2023 02:00 PM    KETUA Negative 02/10/2023 02:00 PM UROBILINOGEN 0.2 02/10/2023 02:00 PM    BILIRUBINUR Negative 02/10/2023 02:00 PM    BILIRUBINUR negative 07/08/2022 02:22 PM       Lab Results   Component Value Date/Time    OSMOU 206 (L) 12/19/2022 02:00 PM       No components found for: URIC    No results found for: LIPIDPAN    Assessment and Plans:    NIKA stage II on CKD 3B  Likely decreased effective renal perfusion in the setting of decreased oral intake, CHF/cardiorenal syndrome, atrial fibrillation; exacerbated by diuretics  history of left partial nephrectomy for left renal mass with Dr. Nicole Libman on 11/16/2021  CT abd pelvis 2/9 nonobstructing 2 mm right renal calculus, partial left nephrectomy  Baseline creatinine 1.3-1.7  Creatinine peaked at 2.6 on 2/10--> 2.4 today  Avoid nephrotoxins/NSAIDs  Strict I&O, daily weights  On spironolactone   On Lasix 60 mg IV twice daily-currently on hold  Monitor labs closely with diuresis    2. Cardiomyopathy/CHF  Echo 12/2022 EF 25%, mod aortic regurg, mod mitral regurg, mod tricuspid regurg, pulm hypertension severe  proBNP 21,949  Strict I&O, daily weights  On spironolactone   S/p dobutamine drip   For transfer to [facility] for severe mitral regurg  Monitor volume status  Cardiology following    3. High anion gap metabolic acidosis  Likely in the setting of NIKA/CKD  Anion gap 20 and CO2 18 on 2/10--> CO2 28 today  S/p sodium bicarbonate   Monitor labs    4. Atrial fibrillation  S/p cardioversion 2/3  EP following    5. Shortness of breath   CXR 2/9 pulmonary vascular congestion  CT chest 2/9 subtle groundglass opacities in the right lower lobe, stable groundglass nodule left upper lobe, stable nodule located in right upper lobe  Defer management to primary    6. History of left renal mass  S/p left partial nephrectomy for left renal mass with Dr. Nicole Libman on 11/16/2021    7. Hypotension  Monitor BPs     8. Hypokalemia   in the setting of diuresis  K+ 3.8 today  Supplement potassium as needed  Monitor labs    9. Hypernatremia  Sodium 151 today  Lasix on hold  Monitor labs    John Tovar, APRN - CNP      . Patient seen and examined all key components of the physical performed independently , case discussed with NP, all pertinent labs and radiologic tests personally reviewed agree with above.       Addison Knott MD

## 2023-02-15 NOTE — PROGRESS NOTES
Hospitalist Progress Note      PCP: Cristian Moore DO    Date of Admission: 2/9/2023  Days in the hospital: [address] [facility] Course:   Patient is 66-year-old with history of atrial fibrillation status post Watchman procedure, nonischemic cardiomyopathy, hypertension, CKD, renal cell carcinoma status post left nephrectomy, mage syndrome, TIA allergic to Eliquis and Xarelto and on Pradaxa for anticoagulation, history of PE who comes into the hospital with increased shortness of breath and hypotension. Patient was noted to have rapid A-fib. She apparently has failed multiple cardioversions before. Also noted to have severe mitral regurgitation and recommendation for MitraClip placement and plan is for [facility] transfer. Patient also seen by nephrology for acute on chronic kidney injury. Subjective  Patient seen and examined at bedside. Awaiting transfer to [facility], chart reviewed, overnight events reviewed. Discussed with Dr. Linda Chaves. Patient denies any headache, dizziness, chest pain today. Chart reviewed, overnight events reviewed. Exam:    BP (!) 105/58   Pulse 73   Temp 98.6 °F (37 °C) (Oral)   Resp 14   Ht 5' 7\" (1.702 m)   Wt 151 lb 6.4 oz (68.7 kg)   SpO2 98%   BMI 23.71 kg/m²     HEENT: No pallor, no icterus. Respiratory:  CTA, good air entry. Cardiovascular: RRR, ESM noted. Musculoskeletal: +4 edema noted bilateral lower extremities  Neurologic: awake, alert and following commands       Assessment/Plan:  -Acute on chronic congestive heart failure, systolic ejection fraction 25%: Concern for cardiorenal syndrome. Noted to have moderate aortic regurgitation, moderate MR, moderate TR and severe pulmonary hypertension. Continue IV diuretics, appreciate input from cardiology. Continue Aldactone and IV Lasix. Watchman device.   Pending [facility] to [facility].    -Acute on chronic renal failure stage IV: Baseline serum creatinine 1.9-2.2, continue supportive care, continue to maintain euvolemia. Nephrology consultation, input appreciated. Creatinine fairly stable but slightly elevated from baseline.    -Hyponatremia, monitor, discussed with nephrology    -Elevated troponin likely demand ischemia: Appreciate input from cardiology    -Lactic acidosis    -Persistent atrial fibrillation, chronic. On Pradaxa , on metoprolol and amiodarone  Electrophysiology following, possible transfer to [facility] for severe MR and possible MitraClip    -Hypotension, currently off dobutamine drip    -Anxiety/depression: Supportive care, input from psychiatry appreciated, patient does not have full medical decision-making capacity, Zoloft was started.    -History of renal cell carcinoma status post left nephrectomy    -Abnormal LFTs, monitor    -Hypokalemia, replaced    Disposition: Awaiting transfer to [facility]      Labs:   Recent Labs     02/13/23  0515 02/14/23  0642 02/15/23  0643   WBC 10.0 9.8 11.0   HGB 14.5 16.0* 15.8*   HCT 45.0 50.1* 50.8*    271 238       Recent Labs     02/13/23  0515 02/14/23  0902 02/15/23  0643    146 151*   K 3.5 2.9* 3.8    101 106   CO2 26 34* 28   BUN 62* 77* 80*   CREATININE 2.2* 2.4* 2.4*   CALCIUM 9.7 9.8 9.9   PHOS 4.0 4.1 3.8       Recent Labs     02/13/23  0515 02/14/23  0902 02/15/23  0643   * 85* 56*   * 251* 199*   BILITOT 1.5* 1.5* 1.2   ALKPHOS 175* 170* 159*       No results for input(s): INR in the last 72 hours. No results for input(s): Normajean Rincon in the last 72 hours.     Medications:  Reviewed    Infusion Medications    sodium chloride       Scheduled Medications    [Held by provider] furosemide  60 mg IntraVENous BID    sertraline  25 mg Oral Daily    amiodarone  200 mg Oral Daily    cetirizine  5 mg Oral Daily    dabigatran  75 mg Oral BID    metoprolol succinate  25 mg Oral Daily    pantoprazole  40 mg Oral QAM AC    spironolactone  25 mg Oral Daily    Vitamin D  1,000 Units Oral Daily    sodium chloride flush  5-40 mL IntraVENous 2 times per day     PRN Meds: melatonin, ALPRAZolam, sodium chloride flush, sodium chloride, polyethylene glycol, diphenhydrAMINE, baclofen, ondansetron **OR** ondansetron      Intake/Output Summary (Last 24 hours) at 2/15/2023 1237  Last data filed at 2/15/2023 8598  Gross per 24 hour   Intake --   Output 700 ml   Net -700 ml       Body mass index is 23.71 kg/m². Diet  ADULT DIET; Regular; No Added Salt (3-4 gm)  ADULT ORAL NUTRITION SUPPLEMENT; Lunch; Standard High Calorie/High Protein Oral Supplement  ADULT ORAL NUTRITION SUPPLEMENT; Breakfast; Fortified Pudding Oral Supplement  ADULT ORAL NUTRITION SUPPLEMENT; Dinner; Frozen Oral Supplement    Code Status  Full Code       Electronically signed by Katarina Sky MD on 2/15/2023 at 12:37 PM  [employer]   Please contact me through perfect serve    NOTE: This report was transcribed using voice recognition software. Every effort was made to ensure accuracy; however, inadvertent computerized transcription errors may be present.

## 2023-02-15 NOTE — CARE COORDINATION
2/15:  Update CM Note:  Pt presented to the ER for SOB-CHF/COPD. Pt is on room air at 98% & Iv Lasix. Cm met bedside with pt to discuss CM role & dc planning. Pt's PCP Dr Laury Hassan uses the [facility] or [employer] on [facility]. Pt normally lives alone but her son has been lives with her recently with 1 step to enter. The bed/bathroom are on the 1st floor. PTA independent with a cane. There is talk for the pt to be transferred to [facility]. [facility] with [facility] & has a lifevest via [facility]. Pt is active with [facility] with [address]. Pt's dc plan is hopefully home & family can transport. Sw/BECKY will continue to follow for dc planning.   Electronically signed by Evie Ribeiro RN on 2/15/2023 at 1:13 PM

## 2023-02-16 LAB
ALBUMIN SERPL-MCNC: 3.1 G/DL (ref 3.5–5.2)
ALP BLD-CCNC: 132 U/L (ref 35–104)
ALT SERPL-CCNC: 137 U/L (ref 0–32)
ANION GAP SERPL CALCULATED.3IONS-SCNC: 12 MMOL/L (ref 7–16)
AST SERPL-CCNC: 38 U/L (ref 0–31)
BASOPHILS ABSOLUTE: 0.01 E9/L (ref 0–0.2)
BASOPHILS RELATIVE PERCENT: 0.1 % (ref 0–2)
BILIRUB SERPL-MCNC: 1 MG/DL (ref 0–1.2)
BUN BLDV-MCNC: 74 MG/DL (ref 6–23)
CALCIUM SERPL-MCNC: 9.4 MG/DL (ref 8.6–10.2)
CHLORIDE BLD-SCNC: 107 MMOL/L (ref 98–107)
CO2: 31 MMOL/L (ref 22–29)
CREAT SERPL-MCNC: 2.1 MG/DL (ref 0.5–1)
EOSINOPHILS ABSOLUTE: 0.13 E9/L (ref 0.05–0.5)
EOSINOPHILS RELATIVE PERCENT: 1.5 % (ref 0–6)
GFR SERPL CREATININE-BSD FRML MDRD: 24 ML/MIN/1.73
GLUCOSE BLD-MCNC: 113 MG/DL (ref 74–99)
HCT VFR BLD CALC: 47.2 % (ref 34–48)
HEMOGLOBIN: 14.7 G/DL (ref 11.5–15.5)
IMMATURE GRANULOCYTES #: 0.04 E9/L
IMMATURE GRANULOCYTES %: 0.5 % (ref 0–5)
LYMPHOCYTES ABSOLUTE: 0.66 E9/L (ref 1.5–4)
LYMPHOCYTES RELATIVE PERCENT: 7.5 % (ref 20–42)
MAGNESIUM: 2.6 MG/DL (ref 1.6–2.6)
MCH RBC QN AUTO: 27 PG (ref 26–35)
MCHC RBC AUTO-ENTMCNC: 31.1 % (ref 32–34.5)
MCV RBC AUTO: 86.8 FL (ref 80–99.9)
MONOCYTES ABSOLUTE: 0.53 E9/L (ref 0.1–0.95)
MONOCYTES RELATIVE PERCENT: 6 % (ref 2–12)
NEUTROPHILS ABSOLUTE: 7.45 E9/L (ref 1.8–7.3)
NEUTROPHILS RELATIVE PERCENT: 84.4 % (ref 43–80)
PDW BLD-RTO: 16.1 FL (ref 11.5–15)
PHOSPHORUS: 2.6 MG/DL (ref 2.5–4.5)
PLATELET # BLD: 232 E9/L (ref 130–450)
PMV BLD AUTO: 10.7 FL (ref 7–12)
POTASSIUM REFLEX MAGNESIUM: 3.6 MMOL/L (ref 3.5–5)
POTASSIUM SERPL-SCNC: 3.6 MMOL/L (ref 3.5–5)
RBC # BLD: 5.44 E12/L (ref 3.5–5.5)
SODIUM BLD-SCNC: 150 MMOL/L (ref 132–146)
TOTAL PROTEIN: 6.3 G/DL (ref 6.4–8.3)
WBC # BLD: 8.8 E9/L (ref 4.5–11.5)

## 2023-02-16 PROCEDURE — 99231 SBSQ HOSP IP/OBS SF/LOW 25: CPT | Performed by: INTERNAL MEDICINE

## 2023-02-16 PROCEDURE — 6370000000 HC RX 637 (ALT 250 FOR IP): Performed by: INTERNAL MEDICINE

## 2023-02-16 PROCEDURE — 85025 COMPLETE CBC W/AUTO DIFF WBC: CPT

## 2023-02-16 PROCEDURE — 36415 COLL VENOUS BLD VENIPUNCTURE: CPT

## 2023-02-16 PROCEDURE — 2140000000 HC CCU INTERMEDIATE R&B

## 2023-02-16 PROCEDURE — 80048 BASIC METABOLIC PNL TOTAL CA: CPT

## 2023-02-16 PROCEDURE — 84100 ASSAY OF PHOSPHORUS: CPT

## 2023-02-16 PROCEDURE — 80053 COMPREHEN METABOLIC PANEL: CPT

## 2023-02-16 PROCEDURE — 83735 ASSAY OF MAGNESIUM: CPT

## 2023-02-16 PROCEDURE — 2580000003 HC RX 258: Performed by: FAMILY MEDICINE

## 2023-02-16 PROCEDURE — 6370000000 HC RX 637 (ALT 250 FOR IP): Performed by: PSYCHIATRY & NEUROLOGY

## 2023-02-16 PROCEDURE — 6370000000 HC RX 637 (ALT 250 FOR IP): Performed by: FAMILY MEDICINE

## 2023-02-16 PROCEDURE — 51798 US URINE CAPACITY MEASURE: CPT

## 2023-02-16 RX ORDER — POLYVINYL ALCOHOL 14 MG/ML
1 SOLUTION/ DROPS OPHTHALMIC PRN
Status: DISCONTINUED | OUTPATIENT
Start: 2023-02-16 | End: 2023-02-19 | Stop reason: HOSPADM

## 2023-02-16 RX ADMIN — DABIGATRAN ETEXILATE MESYLATE 75 MG: 75 CAPSULE ORAL at 08:13

## 2023-02-16 RX ADMIN — MELATONIN 3 MG ORAL TABLET 3 MG: 3 TABLET ORAL at 20:39

## 2023-02-16 RX ADMIN — DIPHENHYDRAMINE HYDROCHLORIDE 25 MG: 25 TABLET ORAL at 20:39

## 2023-02-16 RX ADMIN — Medication 10 ML: at 20:50

## 2023-02-16 RX ADMIN — METOPROLOL SUCCINATE 25 MG: 25 TABLET, EXTENDED RELEASE ORAL at 08:14

## 2023-02-16 RX ADMIN — AMIODARONE HYDROCHLORIDE 200 MG: 200 TABLET ORAL at 08:13

## 2023-02-16 RX ADMIN — BACLOFEN 5 MG: 10 TABLET ORAL at 20:39

## 2023-02-16 RX ADMIN — Medication 10 ML: at 08:14

## 2023-02-16 RX ADMIN — SERTRALINE HYDROCHLORIDE 25 MG: 50 TABLET ORAL at 08:13

## 2023-02-16 RX ADMIN — POLYVINYL ALCOHOL 1 DROP: 14 SOLUTION/ DROPS OPHTHALMIC at 16:48

## 2023-02-16 RX ADMIN — CETIRIZINE HYDROCHLORIDE 5 MG: 10 TABLET, FILM COATED ORAL at 08:13

## 2023-02-16 RX ADMIN — BACLOFEN 5 MG: 10 TABLET ORAL at 17:17

## 2023-02-16 RX ADMIN — PANTOPRAZOLE SODIUM 40 MG: 40 TABLET, DELAYED RELEASE ORAL at 06:28

## 2023-02-16 RX ADMIN — Medication 1000 UNITS: at 08:14

## 2023-02-16 RX ADMIN — DABIGATRAN ETEXILATE MESYLATE 75 MG: 75 CAPSULE ORAL at 20:39

## 2023-02-16 RX ADMIN — SPIRONOLACTONE 25 MG: 25 TABLET ORAL at 08:13

## 2023-02-16 NOTE — PROGRESS NOTES
Hospitalist Progress Note      PCP: Kwesi Villeda DO    Date of Admission: 2/9/2023  Days in the hospital: [address] Course:   Patient is 70-year-old with history of atrial fibrillation status post Watchman procedure, nonischemic cardiomyopathy, hypertension, CKD, renal cell carcinoma status post left nephrectomy, mage syndrome, TIA allergic to Eliquis and Xarelto and on Pradaxa for anticoagulation, history of PE who comes into the hospital with increased shortness of breath and hypotension. Patient was noted to have rapid A-fib. She apparently has failed multiple cardioversions before. Also noted to have severe mitral regurgitation and recommendation for MitraClip placement and plan is for [facility] transfer. Patient also seen by nephrology for acute on chronic kidney injury. Subjective  Patient seen and examined at bedside. Continues to feel better, awaiting transfer to [facility], chart reviewed, overnight events reviewed, discussed with patient's son at bedside. Exam:    BP 98/63   Pulse 86   Temp 98.3 °F (36.8 °C) (Oral)   Resp 18   Ht 5' 7\" (1.702 m)   Wt 151 lb 11.2 oz (68.8 kg)   SpO2 98%   BMI 23.76 kg/m²     HEENT: No pallor, no icterus. Respiratory:  CTA, good air entry. Cardiovascular: RRR, ESM noted. Musculoskeletal: +4 edema noted bilateral lower extremities  Neurologic: awake, alert and following commands       Assessment/Plan:  -Acute on chronic congestive heart failure, systolic ejection fraction 25%: Concern for cardiorenal syndrome. Noted to have moderate aortic regurgitation, moderate MR, moderate TR and severe pulmonary hypertension. Continue IV diuretics, appreciate input from cardiology. Continue Aldactone and IV Lasix. Watchman device. Pending transfer to [facility].    -Acute on chronic renal failure stage IV: Baseline serum creatinine 1.9-2.2, continue supportive care, continue to maintain euvolemia. Nephrology consultation, input appreciated.   Creatinine remains fairly stable. -Hyponatremia, monitor, discussed with nephrology    -Elevated troponin likely demand ischemia: Appreciate input from cardiology    -Lactic acidosis    -Persistent atrial fibrillation, chronic. On Pradaxa , on metoprolol and amiodarone  Electrophysiology following, possible transfer [facility] for severe MR and possible MitraClip    -Hypotension, currently off dobutamine drip    -Anxiety/depression: Supportive care, input from psychiatry appreciated, patient does not have full medical decision-making capacity, Zoloft was started.    -History of renal cell carcinoma status post left nephrectomy    -Abnormal LFTs, monitor    -Hypokalemia, replaced    Disposition: Awaiting transfer to [facility]      Labs:   Recent Labs     02/14/23  0642 02/15/23  0643 02/16/23  0515   WBC 9.8 11.0 8.8   HGB 16.0* 15.8* 14.7   HCT 50.1* 50.8* 47.2    238 232       Recent Labs     02/14/23  0902 02/15/23  0643 02/16/23  0515    151* 150*   K 2.9* 3.8 3.6  3.6    106 107   CO2 34* 28 31*   BUN 77* 80* 74*   CREATININE 2.4* 2.4* 2.1*   CALCIUM 9.8 9.9 9.4   PHOS 4.1 3.8 2.6       Recent Labs     02/14/23  0902 02/15/23  0643 02/16/23  0515   AST 85* 56* 38*   * 199* 137*   BILITOT 1.5* 1.2 1.0   ALKPHOS 170* 159* 132*       No results for input(s): INR in the last 72 hours. No results for input(s): Evan Callaways in the last 72 hours.     Medications:  Reviewed    Infusion Medications    sodium chloride       Scheduled Medications    [Held by provider] furosemide  60 mg IntraVENous BID    sertraline  25 mg Oral Daily    amiodarone  200 mg Oral Daily    cetirizine  5 mg Oral Daily    dabigatran  75 mg Oral BID    metoprolol succinate  25 mg Oral Daily    pantoprazole  40 mg Oral QAM AC    spironolactone  25 mg Oral Daily    Vitamin D  1,000 Units Oral Daily    sodium chloride flush  5-40 mL IntraVENous 2 times per day     PRN Meds: melatonin, ALPRAZolam, sodium chloride flush, sodium chloride, polyethylene glycol, diphenhydrAMINE, baclofen, ondansetron **OR** ondansetron      Intake/Output Summary (Last 24 hours) at 2/16/2023 1125  Last data filed at 2/16/2023 1177  Gross per 24 hour   Intake 240 ml   Output 750 ml   Net -510 ml       Body mass index is 23.76 kg/m². Diet  ADULT DIET; Regular; No Added Salt (3-4 gm)  ADULT ORAL NUTRITION SUPPLEMENT; Lunch; Standard High Calorie/High Protein Oral Supplement  ADULT ORAL NUTRITION SUPPLEMENT; Breakfast; Fortified Pudding Oral Supplement  ADULT ORAL NUTRITION SUPPLEMENT; Dinner; Frozen Oral Supplement    Code Status  Full Code       Electronically signed by Joann Louis MD on 2/16/2023 at 11:25 AM  [employer]   Please contact me through perfect serve    NOTE: This report was transcribed using voice recognition software. Every effort was made to ensure accuracy; however, inadvertent computerized transcription errors may be present.

## 2023-02-16 NOTE — PROGRESS NOTES
[facility]  Nephrology Progress Note    Patient's Name: Patricia Handy    History of Present Illness from 2/10 consult note:    \"Aidee Rivera is a 68 y.o. female with a past medical history of atrial fibrillation, CHF, mitral regurgitation, and hypertension. She presented to the ED on 2/9 with complaints of shortness of breath. Vital signs on 2/9 includes temperature 97.8, respirations 26, pulse 88, BP 99/62, and she was 95% on room air. Lab data on 2/9 include CO2 16, BUN 57, creatinine 2.2, anion gap 17, proBNP 21,949, and troponin 55. She had a chest x-ray on 2/9 which showed suspect pulmonary vascular congestion. Her CT of the chest 2/9 showed subtle groundglass opacities in the right lower lobe, stable groundglass nodule left upper lobe, stable nodule located in right upper lobe. CT abdomen pelvis 2/9 showed no acute process, nonobstructing 2 mm right renal calculus, partial left nephrectomy. She has a history of JAIME on 1/26 and direct-current electrical cardioversion on 2/3. She also has a history of left renal mass and is status post left partial nephrectomy for left renal mass with Dr. Fuentes Barrera on 11/16/2021. She also had a recent hospital stay from 2/4 - 2/7 for nausea. We were consulted to see the patient for NIKA/CKD. Patient appears to have a recent baseline creatinine of 1.3-1.7. At present, patient was seen and examined. She reports a history of A-fib and that she came in due to low blood pressures and shortness of breath. She reports that prior to admission she had a decreased appetite. She also reports issues with dizziness prior to admission. She currently reports nausea. She denies any chest pain. She denies any dysuria. She denies any fevers or chills. She denies any abdominal pain or vomiting. She denies the use of NSAIDs. \"    Subjective:    2/16: Patient was seen and examined. She reports that she feels pretty good. She reports intermittent nausea.   She denies any chest pain or shortness of breath. She has a visitor at the bedside. PMH:    Past Medical History:   Diagnosis Date    Afib (HCC)     WATCHMAN    Anxiety     Arthritis     Cervical radiculopathy     CHF (congestive heart failure) (HCC)     Chronic sinusitis     Ejection fraction < 50%     25%. Wearing life vest    Hilar adenopathy     Hx of blood clots     Hypertension     Left ventricular systolic dysfunction     Lesion of left native kidney     Lumbar radiculopathy     Lung nodules     Meige syndrome     partial/ PCP    Microscopic colitis     Mitral regurgitation     Mild to moderate    Nonischemic cardiomyopathy (HCC)     f/u with Dr. Elroy Sahni    Osteopenia     PE (pulmonary thromboembolism) [facility])     Renal cell carcinoma of left kidney ([address] 02/01/2022    Vertebrobasilar artery syndrome     f/u PCP       Patient Active Problem List   Diagnosis    Anxiety    NICM (nonischemic cardiomyopathy) ([address]    Nonrheumatic mitral valve regurgitation    Lumbar radiculopathy    Cervical radiculopathy    HTN (hypertension), benign    Left atrial thrombus    Paroxysmal atrial fibrillation (HCC)    Chronic HFrEF (heart failure with reduced ejection fraction) ([address]    Encounter for long-term (current) use of high-risk medication    Persistent atrial fibrillation ([address]    Encounter for medication refill    Itching    Chronic anticoagulation    Presence of Watchman left atrial appendage closure device    Renal mass    Renal cell carcinoma of left kidney (HCC)    Chronic renal disease, stage III ([address] [[id number]]    Multiple subsegmental pulmonary emboli without acute cor pulmonale (HCC)    Acute on chronic congestive heart failure (HCC)    Vertigo    Dizziness    HFrEF (heart failure with reduced ejection fraction) ([facility])    Stage 4 chronic kidney disease ([address]    Left bundle branch block    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       Diet:    ADULT DIET; Regular;  No Added Salt (3-4 gm)  ADULT ORAL NUTRITION SUPPLEMENT; Lunch; Standard High Calorie/High Protein Oral Supplement  ADULT ORAL NUTRITION SUPPLEMENT; Breakfast; Fortified Pudding Oral Supplement  ADULT ORAL NUTRITION SUPPLEMENT; Dinner; Frozen Oral Supplement    Meds:     [Held by provider] furosemide  60 mg IntraVENous BID    sertraline  25 mg Oral Daily    amiodarone  200 mg Oral Daily    cetirizine  5 mg Oral Daily    dabigatran  75 mg Oral BID    metoprolol succinate  25 mg Oral Daily    pantoprazole  40 mg Oral QAM AC    spironolactone  25 mg Oral Daily    Vitamin D  1,000 Units Oral Daily    sodium chloride flush  5-40 mL IntraVENous 2 times per day        sodium chloride         Meds prn:     melatonin, ALPRAZolam, sodium chloride flush, sodium chloride, polyethylene glycol, diphenhydrAMINE, baclofen, ondansetron **OR** ondansetron    Meds prior to admission:     No current facility-administered medications on file prior to encounter. Current Outpatient Medications on File Prior to Encounter   Medication Sig Dispense Refill    dabigatran (PRADAXA) 75 MG capsule Take 1 capsule by mouth 2 times daily 60 capsule 0    meclizine (ANTIVERT) 25 MG tablet Take 1 tablet by mouth every 6 hours as needed for Dizziness 30 tablet 0    metoprolol succinate (TOPROL XL) 25 MG extended release tablet Take 1 tablet by mouth daily 30 tablet 3    furosemide (LASIX) 20 MG tablet Take 1 tablet by mouth daily 60 tablet 3    spironolactone (ALDACTONE) 25 MG tablet Take 1 tablet by mouth daily 30 tablet 3    amiodarone (CORDARONE) 200 MG tablet Take 1 tablet by mouth daily 90 tablet 1    hydrOXYzine HCl (ATARAX) 10 MG tablet Take 1 tablet by mouth every 8 hours as needed for Itching or Anxiety (Patient not taking: Reported on 2/10/2023) 90 tablet 1    cetirizine (ZYRTEC ALLERGY) 10 MG tablet Take 0.5 tablets by mouth daily 30 tablet 0    fluticasone (FLONASE) 50 MCG/ACT nasal spray 1-2 sprays, each nostril daily as needed for nasal congestion.  16 g 0 aspirin 81 MG EC tablet Take 81 mg by mouth daily      baclofen (LIORESAL) 10 MG tablet Take 1 tablet by mouth nightly 90 tablet 1    omeprazole (PRILOSEC) 40 MG delayed release capsule Take 40 mg by mouth daily (Patient not taking: Reported on 2/10/2023)      triamcinolone (KENALOG) 0.1 % cream Apply topically 3 times daily as needed (rash)   1    loperamide (IMODIUM) 2 MG capsule Take 2 mg by mouth 4 times daily as needed for Diarrhea      diphenhydrAMINE (BENADRYL) 25 MG tablet Take 25 mg by mouth every 6 hours as needed for Itching      vitamin D (CHOLECALCIFEROL) 1000 UNIT TABS tablet Take 1,000 Units by mouth daily         Allergies:    Bentyl [dicyclomine], Adhesive tape, Atacand [candesartan], Cefdinir, Demerol, Digoxin, Imdur [isosorbide mononitrate], Losartan potassium, Sulfa antibiotics, Xarelto [rivaroxaban], Lovenox [enoxaparin sodium], Acetaminophen, Apap-caff-dihydrocodeine, Coreg [carvedilol], Cozaar [losartan], Diovan [valsartan], Effexor [venlafaxine hydrochloride], Eliquis [apixaban], Labetalol, Lisinopril, and Ramipril    Social History:     reports that she has never smoked. She has never used smokeless tobacco. She reports current alcohol use. She reports that she does not use drugs.     Family History:         Problem Relation Age of Onset    Heart Attack Mother     Stroke Mother     Heart Attack Father     Heart Failure Father     Heart Disease Father     Anxiety Disorder Sister     Depression Sister     Crohn's Disease Son        Physical Exam:    Patient Vitals for the past 24 hrs:   BP Temp Temp src Pulse Resp SpO2 Weight   02/16/23 0828 98/63 98.3 °F (36.8 °C) Oral 86 18 98 % --   02/16/23 0615 113/71 97.3 °F (36.3 °C) Oral 87 16 98 % --   02/16/23 0600 -- -- -- -- -- -- 151 lb 11.2 oz (68.8 kg)   02/15/23 2053 -- -- -- 86 -- -- --   02/15/23 2037 111/87 (!) 96.6 °F (35.9 °C) Oral 86 16 98 % --   02/15/23 1610 90/62 97.2 °F (36.2 °C) Oral 72 16 98 % --   02/15/23 1215 (!) 105/58 98.6 °F (37 °C) Oral 73 14 98 % --   02/15/23 1039 92/66 -- -- 85 -- -- --   02/15/23 1005 -- -- -- 76 -- -- --           Intake/Output Summary (Last 24 hours) at 2/16/2023 0847  Last data filed at 2/16/2023 6933  Gross per 24 hour   Intake 240 ml   Output 750 ml   Net -510 ml       General: Awake, alert, no acute distress  Neck: No JVD noted  Lungs: Clear bilaterally upper, diminished to the bases bilaterally. Unlabored  CV: Irregular. No rub  Abd: Soft, nontender, nondistended. Active bowel sounds  Skin: Warm and dry. No rash on exposed extremities  Ext: 2+ BLE edema   Neuro: Awake, answers questions appropriately    Data:    Recent Labs     02/14/23  0642 02/15/23  0643 02/16/23  0515   WBC 9.8 11.0 8.8   HGB 16.0* 15.8* 14.7   HCT 50.1* 50.8* 47.2   MCV 88.4 89.3 86.8    238 232         Recent Labs     02/14/23  0902 02/15/23  0643 02/16/23  0515    151* 150*   K 2.9* 3.8 3.6    106 107   CO2 34* 28 31*   CREATININE 2.4* 2.4* 2.1*   BUN 77* 80* 74*   LABGLOM 20 20 24   GLUCOSE 129* 132* 113*   CALCIUM 9.8 9.9 9.4   PHOS 4.1 3.8 2.6   MG 2.5 2.7* 2.6         Vit D, 25-Hydroxy   Date Value Ref Range Status   02/11/2023 30 30 - 100 ng/mL Final     Comment:     <20 ng/mL. ........... Trejo Potters Deficient  20-30 ng/mL. ......... Trejo Potters Insufficient   ng/mL. ........ Trejo Potters Sufficient  >100 ng/mL. .......... Trejo Potters Toxic         PTH   Date Value Ref Range Status   02/11/2023 120 (H) 15 - 65 pg/mL Final       Recent Labs     02/14/23  0902 02/15/23  0643 02/16/23  0515   * 199* 137*   AST 85* 56* 38*   ALKPHOS 170* 159* 132*   BILITOT 1.5* 1.2 1.0         Recent Labs     02/14/23  0902 02/15/23  0643 02/16/23  0515   LABALBU 3.5 3.5 3.1*         No results found for: FERRITIN, IRON, TIBC    Vitamin B-12   Date Value Ref Range Status   02/04/2023 1304 (H) 211 - 946 pg/mL Final       Folate   Date Value Ref Range Status   12/02/2019 14.9 4.8 - 24.2 ng/mL Final       Lab Results   Component Value Date/Time    COLORU Yellow 02/10/2023 02:00 PM    NITRU Negative 02/10/2023 02:00 PM    GLUCOSEU Negative 02/10/2023 02:00 PM    KETUA Negative 02/10/2023 02:00 PM    UROBILINOGEN 0.2 02/10/2023 02:00 PM    BILIRUBINUR Negative 02/10/2023 02:00 PM    BILIRUBINUR negative 07/08/2022 02:22 PM       Lab Results   Component Value Date/Time    OSMOU 206 (L) 12/19/2022 02:00 PM       No components found for: URIC    No results found for: LIPIDPAN    Assessment and Plans:    NIKA stage II on CKD 3B  Likely decreased effective renal perfusion in the setting of decreased oral intake, CHF/cardiorenal syndrome, atrial fibrillation; exacerbated by diuretics  history of left partial nephrectomy for left renal mass with Dr. Tanmay Sotelo on 11/16/2021  CT abd pelvis 2/9 nonobstructing 2 mm right renal calculus, partial left nephrectomy  Baseline creatinine 1.3-1.7  Creatinine peaked at 2.6 on 2/10--> 2.1 today  Avoid nephrotoxins/NSAIDs  Strict I&O, daily weights  On spironolactone   Lasix 60 mg IV twice daily-currently on hold per Cardiology  Monitor labs closely with diuresis    2. Cardiomyopathy/CHF  Echo 12/2022 EF 25%, mod aortic regurg, mod mitral regurg, mod tricuspid regurg, pulm hypertension severe  proBNP 21,949  Strict I&O, daily weights  On spironolactone   S/p dobutamine drip   For transfer to [facility] for severe mitral regurg  Monitor volume status  Cardiology following    3. High anion gap metabolic acidosis -> now with alkalosis   Likely in the setting of NIKA/CKD  Anion gap 20 and CO2 18 on 2/10--> CO2 31 today  S/p sodium bicarbonate   Monitor labs    4. Atrial fibrillation  S/p cardioversion 2/3  EP following    5. Shortness of breath   CXR 2/9 pulmonary vascular congestion  CT chest 2/9 subtle groundglass opacities in the right lower lobe, stable groundglass nodule left upper lobe, stable nodule located in right upper lobe  Defer management to primary    6.   History of left renal mass  S/p left partial nephrectomy for left renal mass with  Boy Alegre on 11/16/2021    7. Hypotension  Monitor BPs     8. Hypokalemia   in the setting of diuresis  K+ 3.6 today  Supplement potassium as needed  Monitor labs    9. Hypernatremia  Sodium 151 on 2/15--> 150 today  Lasix on hold  Monitor labs    John Tovar, APRN - CNP    Patient seen and examined all key components of the physical performed independently , case discussed with NP, all pertinent labs and radiologic tests personally reviewed agree with above.   Updated patients son at bedside      Addison Knott MD

## 2023-02-16 NOTE — PROGRESS NOTES
Inpatient Cardiology Progress note     PATIENT IS BEING FOLLOWED FOR: CHF    Patricia Handy is a 68 y.o. female who is known to [facility] cardiology through Dr Mona Mills. Patient is also followed by [facility] electrophysiology. Patient presented to hospital 2/10/2023 for shortness of breath and hypotension. Patient having increased swelling of bilateral lower legs. Cardiology was consulted for management of acute on chronic HFrEF. EP was consulted for A-fib with RVR. Patient at this time waiting for bed at [facility] clinic mitral clip evaluation. Dobutamine drip discontinued ( 2/13/2023    IV Lasix held 2/14/23 because of worsening renal function    SUBJECTIVE: No new complaints    OBJECTIVE: No apparent acute distress    ROS:  Consist: Denies fevers, chills or night sweats  Heart: Denies chest pain, palpitations, lightheadedness, dizziness or syncope  Lungs: Denies cough, wheezing, orthopnea or PND  GI: Denies abdominal pain, vomiting or diarrhea    PHYSICAL EXAM:   BP 98/63   Pulse 86   Temp 98.3 °F (36.8 °C) (Oral)   Resp 18   Ht 5' 7\" (1.702 m)   Wt 151 lb 11.2 oz (68.8 kg)   SpO2 98%   BMI 23.76 kg/m²    B/P Range last 24 hours: Systolic (53GDT), OQM:328 , Min:90 , LCW:382    Diastolic (64WQU), HDH:34, Min:58, Max:87    CONST: Well developed, well nourished who appears of stated age. Awake, alert and cooperative. No apparent distress  HEENT:   Head- Normocephalic, atraumatic   Eyes- Conjunctivae pink, anicteric  Throat- Oral mucosa pink and moist  Neck-  No stridor, trachea midline, no jugular venous distention. No carotid bruit  CHEST: Chest symmetrical and non-tender to palpation. No accessory muscle use or intercostal retractions  RESPIRATORY:  Lung sounds - bibasilar crackles   CARDIOVASCULAR:     Heart Inspection- shows no noted pulsations  Heart Palpation- no heaves or thrills; PMI is non-displaced   Heart Ausculation- Irregular rate and rhythm. No MR murmur heard.  No s3 or rub   PV: thick shins. 1-2+ lower extremity edema. No varicosities. Pedal pulses palpable, no clubbing or cyanosis   ABDOMEN: Soft, non-tender to light palpation. Bowel sounds present. No palpable masses no organomegaly; no abdominal bruit  MS: Good muscle strength and tone. No atrophy or abnormal movements. : Deferred  SKIN: Warm and dry no statis dermatitis or ulcers   NEURO / PSYCH: Oriented to person, place and time. Speech clear and appropriate. Follows all commands.  Flat affect       Intake/Output Summary (Last 24 hours) at 2/16/2023 1147  Last data filed at 2/16/2023 0634  Gross per 24 hour   Intake 240 ml   Output 750 ml   Net -510 ml       Weight:   Wt Readings from Last 3 Encounters:   02/16/23 151 lb 11.2 oz (68.8 kg)   02/07/23 166 lb 6.4 oz (75.5 kg)   02/06/23 165 lb 2 oz (74.9 kg)     Current Inpatient Medications:   [Held by provider] furosemide  60 mg IntraVENous BID    sertraline  25 mg Oral Daily    amiodarone  200 mg Oral Daily    cetirizine  5 mg Oral Daily    dabigatran  75 mg Oral BID    metoprolol succinate  25 mg Oral Daily    pantoprazole  40 mg Oral QAM AC    spironolactone  25 mg Oral Daily    Vitamin D  1,000 Units Oral Daily    sodium chloride flush  5-40 mL IntraVENous 2 times per day       IV Infusions (if any):   sodium chloride         DIAGNOSTIC/ LABORATORY DATA:  Labs:   CBC:   Recent Labs     02/15/23  0643 02/16/23  0515   WBC 11.0 8.8   HGB 15.8* 14.7   HCT 50.8* 47.2    232     BMP:   Recent Labs     02/15/23  0643 02/16/23  0515   * 150*   K 3.8 3.6  3.6   CO2 28 31*   BUN 80* 74*   CREATININE 2.4* 2.1*   LABGLOM 20 24   CALCIUM 9.9 9.4     Mag:   Recent Labs     02/15/23  0643 02/16/23  0515   MG 2.7* 2.6     Phos:   Recent Labs     02/15/23  0643 02/16/23  0515   PHOS 3.8 2.6     TFT:   Lab Results   Component Value Date    TSH 0.666 02/04/2023    W3KLIER 7.4 01/18/2023    FT3 3.6 01/18/2023    T4FREE 1.89 (H) 01/13/2023      HgA1c:   Lab Results   Component Value Date    LABA1C 5.7 (H) 02/01/2022     FASTING LIPID PANEL:  Lab Results   Component Value Date/Time    CHOL 151 12/18/2022 11:02 AM    HDL 55 12/18/2022 11:02 AM    LDLCALC 82 12/18/2022 11:02 AM    TRIG 68 12/18/2022 11:02 AM     LIVER PROFILE:  Recent Labs     02/15/23  0643 02/16/23  0515   AST 56* 38*   * 137*   LABALBU 3.5 3.1*      Latest Reference Range & Units 2/9/23 12:07 2/9/23 14:42 2/9/23 16:54   Pro-BNP 0 - 450 pg/mL 21,949 (H)     Troponin, High Sensitivity 0 - 9 ng/L 10 (H) 55 (H) 49 (H)   (H): Data is abnormally high     Latest Reference Range & Units 2/12/23 05:25   Pro-BNP 0 - 450 pg/mL 17,110 (H)   (H): Data is abnormally high      Lexiscan 12/20/2022:  Impression   1: No definite evidence of  ischemia or scar except soft tissue   attenuation. The left ventricle is visually dilated both rest and   stress without evidence of TID     2  Dilated left ventricle with Moderate to severe global left   ventricular hypokinesis with estimated left ventricular ejection   fraction of 32%. 3:  Please see separate report for EKG and hemodynamic aspect of the   stress test.     4:  Low dose CT attenuation correction protocol was utilized which   revealed minimal to mild coronary artery ossifications. 5: RISK SCAN:  High risk scan due to dilated left ventricle with   severe global hypokinesis and EF of 32%. EE 1/26/2023:  Summary   Severely reduced left ventricular systolic function. Reduced right ventricular function. Bi-atrial enlargement. No evidence of interatrial shunting on bubble study. History of WATCHMAN device (24 mm). No significant color flow jet around   the device. No device related thrombus visualized. Severe mitral regurgitation. Mild tricuspid regurgitation. RV-RA gradient is estimated at 27 mmHg. CT chest 2/9/2023:  Impression   1.  Subtle ground-glass opacities located in right lower lobe which appear new   compared to prior and could indicate bronchiolitis or developing viral   pneumonia. 2. Stable ground-glass nodule located in left upper lobe measures 7 mm. 3. Stable nodule located in right upper lobe measures 10 mm. 4. Stable moderate cardiomegaly. CT abdomen 2/9/2023:  Impression   1. No acute process in abdomen or pelvis. 2. Nonobstructing 2 mm right renal calculus. CXR 2/9/2023:   Impression   Suspect pulmonary vascular congestion. 12 lead EKG 2/13/2023: Atrial fibrillation with rapid ventricular response with premature ventricular or aberrantly conducted complexes. Left axis deviation. Left bundle branch block      Telemetry: Afib with controlled rate. BBB      ASSESSMENT:   Acute on chronic HFrEF  Hypotension. BP better today  Chronically elevated troponins  Persistent atrial fibrillation with failed DCCV 2/3/2023--s/p Watchman 2020  cLBBB  Severe mitral regurgitation  Acute on chronic renal insufficiency: BUN / Cr ( 2/7/23 ) = 45/1.7. BUN/Cr on admission ( 2/9/23 ) = 57/2.2 ---> 80/2.4 ( 2/15/23 ) ---> 74/2.1 today ( 2/16/23 )  Hypernatremia  Renal cell carcinoma s/p nephrectomy  History of pulmonary embolus  Meige syndrome  TIA       PLAN:  Continue to hold IV diuresis. Monitor BMP, BNP, I's and O's and daily weights  Rest of cardiac medications same. Not a candidate for ACE I therapy/ ARB / Entresto at present ( hypotension and NIKA on top of CKD ).  Cannot tolerate Isordil / hydralazine combination ( low BP )  Monitor BP  Awaiting transfer to [facility] pending bed availability  Will continue to follow while in the hospital    Electronically signed by Indira Mandujano MD on 2/16/2023 at 11:47 AM

## 2023-02-16 NOTE — CARE COORDINATION
Patient remains on [facility]. IV lasix continued. Cardiology recommendation is for  possible MitraClip placement  for severe MR and plan is for [facility] transfer. Await notification from access center. Ambulance in envelope in soft chart.  CM/SW will continue to follow

## 2023-02-16 NOTE — PROGRESS NOTES
Comprehensive Nutrition Assessment    Type and Reason for Visit:  Reassess    Nutrition Recommendations/Plan:   Continue current ONS; Standard high calorie/high protein Once/d, Magic Cup once/d; Fortified Pudding once/d. Encourage oral intake. Consult RD if more aggressive nutrition intervention (EN) warranted. Malnutrition Assessment:  Malnutrition Status: Moderate malnutrition (02/10/23 1704)    Context:  Acute Illness     Findings of the 6 clinical characteristics of malnutrition:  Energy Intake:  50% or less of estimated energy requirements for 5 or more days  Weight Loss:  No significant weight loss (-5.6% wt loss since 05/22 (does not meet criteria))     Body Fat Loss:  Mild body fat loss Orbital, Triceps, Buccal region   Muscle Mass Loss:  Mild muscle mass loss Temples (temporalis), Clavicles (pectoralis & deltoids)  Fluid Accumulation:  Unable to assess     Strength:  Not Performed    Nutrition Assessment:    Patient remains at nutritional risk; ongoing inadequate oral intake x 1 week; recorded 0%, 1-25% intakes; Pt states consuming ONS (noted 3 unopened at bedside). Meets criteria for moderate malnutrition. Pt adm d/t heart failure; PMhx of CAD, CHF, CKD,renal CA s/p nephrectomy; hx of confusion noted; NIKA on CKD noted; pt w/ AFIB s/p cardioversion 2/3; SOB noted; Awaiting transfer to [facility] pending bed availability. ONS modified recently; may need to consider EN support for nutrition;  if EN support desired; please consult for TF recs. Nutrition Related Findings:    A/O x 4, disoriented at times; -5.9 L I/Os; Abd soft, active BS; + nausea; + emesis (2/14); +1/+2 pitting edema. Wound Type:  (L arm (BOUBACAR))       Current Nutrition Intake & Therapies:    Average Meal Intake: 1-25%, 0%  Average Supplements Intake: None Ordered, Unable to assess  ADULT DIET; Regular;  No Added Salt (3-4 gm)  ADULT ORAL NUTRITION SUPPLEMENT; Lunch; Standard High Calorie/High Protein Oral Supplement  ADULT ORAL NUTRITION SUPPLEMENT; Breakfast; Fortified Pudding Oral Supplement  ADULT ORAL NUTRITION SUPPLEMENT; Dinner; Frozen Oral Supplement    Anthropometric Measures:  Height: 5' 7\" (170.2 cm)  Ideal Body Weight (IBW): 135 lbs (61 kg)    Admission Body Weight: 166 lb 3.6 oz (75.4 kg) (2/10/23)  Current Body Weight: 151 lb 10.8 oz (68.8 kg) (2/16), 112.4 % IBW. Weight Source: Bed Scale  Current BMI (kg/m2): 23.8  Usual Body Weight: 176 lb 2.4 oz (79.9 kg) (176 lb 3 oz (79.9 kg) (5/6/22 EMR measured))  % Weight Change (Calculated): -5.6  Weight Adjustment For: No Adjustment                 BMI Categories: Normal Weight (BMI 22.0 to 24.9) age over 72    Estimated Daily Nutrient Needs:  Energy Requirements Based On: Formula     Energy (kcal/day):  kcal/d (MSJ 1272 x SF 1.2)  Weight Used for Protein Requirements: Ideal  Protein (g/day): 75-85 gm pro/d (1.2-1.4 gm pro/kg IBW)  Method Used for Fluid Requirements: 1 ml/kcal  Fluid (ml/day):  mL    Nutrition Diagnosis:   Moderate malnutrition, In context of acute illness or injury related to cardiac dysfunction as evidenced by intake 0-25%, poor intake prior to admission, Criteria as identified in malnutrition assessment    Nutrition Interventions:   Food and/or Nutrient Delivery: Continue Current Diet, Continue Oral Nutrition Supplement  Nutrition Education/Counseling: No recommendation at this time  Coordination of Nutrition Care: Continue to monitor while inpatient       Goals:  Previous Goal Met: No Progress toward Goal(s)  Goals: by next RD assessment, Meet at least 75% of estimated needs       Nutrition Monitoring and Evaluation:   Behavioral-Environmental Outcomes: None Identified  Food/Nutrient Intake Outcomes: Food and Nutrient Intake, Supplement Intake  Physical Signs/Symptoms Outcomes: Biochemical Data, GI Status, Fluid Status or Edema, Nutrition Focused Physical Findings, Skin, Weight    Discharge Planning:     Too soon to determine     ALEYDA [employer] Janene, RD  Contact: [phone]

## 2023-02-17 LAB
ALBUMIN SERPL-MCNC: 2.8 G/DL (ref 3.5–5.2)
ALP BLD-CCNC: 127 U/L (ref 35–104)
ALT SERPL-CCNC: 106 U/L (ref 0–32)
ANION GAP SERPL CALCULATED.3IONS-SCNC: 13 MMOL/L (ref 7–16)
AST SERPL-CCNC: 32 U/L (ref 0–31)
BASOPHILS ABSOLUTE: 0 E9/L (ref 0–0.2)
BASOPHILS RELATIVE PERCENT: 0 % (ref 0–2)
BILIRUB SERPL-MCNC: 1 MG/DL (ref 0–1.2)
BUN BLDV-MCNC: 66 MG/DL (ref 6–23)
CALCIUM SERPL-MCNC: 9.5 MG/DL (ref 8.6–10.2)
CHLORIDE BLD-SCNC: 110 MMOL/L (ref 98–107)
CO2: 29 MMOL/L (ref 22–29)
CREAT SERPL-MCNC: 1.8 MG/DL (ref 0.5–1)
EOSINOPHILS ABSOLUTE: 0.13 E9/L (ref 0.05–0.5)
EOSINOPHILS RELATIVE PERCENT: 1.7 % (ref 0–6)
GFR SERPL CREATININE-BSD FRML MDRD: 28 ML/MIN/1.73
GLUCOSE BLD-MCNC: 119 MG/DL (ref 74–99)
HCT VFR BLD CALC: 46.8 % (ref 34–48)
HEMOGLOBIN: 14.7 G/DL (ref 11.5–15.5)
IMMATURE GRANULOCYTES #: 0.03 E9/L
IMMATURE GRANULOCYTES %: 0.4 % (ref 0–5)
LYMPHOCYTES ABSOLUTE: 0.73 E9/L (ref 1.5–4)
LYMPHOCYTES RELATIVE PERCENT: 9.4 % (ref 20–42)
MAGNESIUM: 2.7 MG/DL (ref 1.6–2.6)
MCH RBC QN AUTO: 27.3 PG (ref 26–35)
MCHC RBC AUTO-ENTMCNC: 31.4 % (ref 32–34.5)
MCV RBC AUTO: 87 FL (ref 80–99.9)
MONOCYTES ABSOLUTE: 0.44 E9/L (ref 0.1–0.95)
MONOCYTES RELATIVE PERCENT: 5.6 % (ref 2–12)
NEUTROPHILS ABSOLUTE: 6.47 E9/L (ref 1.8–7.3)
NEUTROPHILS RELATIVE PERCENT: 82.9 % (ref 43–80)
PDW BLD-RTO: 16.5 FL (ref 11.5–15)
PHOSPHORUS: 3 MG/DL (ref 2.5–4.5)
PLATELET # BLD: 215 E9/L (ref 130–450)
PMV BLD AUTO: 11 FL (ref 7–12)
POTASSIUM REFLEX MAGNESIUM: 3.6 MMOL/L (ref 3.5–5)
RBC # BLD: 5.38 E12/L (ref 3.5–5.5)
SODIUM BLD-SCNC: 152 MMOL/L (ref 132–146)
TOTAL PROTEIN: 6.4 G/DL (ref 6.4–8.3)
WBC # BLD: 7.8 E9/L (ref 4.5–11.5)

## 2023-02-17 PROCEDURE — 2580000003 HC RX 258: Performed by: FAMILY MEDICINE

## 2023-02-17 PROCEDURE — 2140000000 HC CCU INTERMEDIATE R&B

## 2023-02-17 PROCEDURE — 6360000002 HC RX W HCPCS

## 2023-02-17 PROCEDURE — 99232 SBSQ HOSP IP/OBS MODERATE 35: CPT | Performed by: INTERNAL MEDICINE

## 2023-02-17 PROCEDURE — 6370000000 HC RX 637 (ALT 250 FOR IP): Performed by: INTERNAL MEDICINE

## 2023-02-17 PROCEDURE — 85025 COMPLETE CBC W/AUTO DIFF WBC: CPT

## 2023-02-17 PROCEDURE — 6370000000 HC RX 637 (ALT 250 FOR IP): Performed by: FAMILY MEDICINE

## 2023-02-17 PROCEDURE — 6370000000 HC RX 637 (ALT 250 FOR IP): Performed by: PSYCHIATRY & NEUROLOGY

## 2023-02-17 PROCEDURE — 84100 ASSAY OF PHOSPHORUS: CPT

## 2023-02-17 PROCEDURE — 83735 ASSAY OF MAGNESIUM: CPT

## 2023-02-17 PROCEDURE — 36415 COLL VENOUS BLD VENIPUNCTURE: CPT

## 2023-02-17 PROCEDURE — 80053 COMPREHEN METABOLIC PANEL: CPT

## 2023-02-17 RX ORDER — CHLOROTHIAZIDE SODIUM 500 MG/1
250 INJECTION INTRAVENOUS EVERY 8 HOURS
Status: COMPLETED | OUTPATIENT
Start: 2023-02-17 | End: 2023-02-18

## 2023-02-17 RX ORDER — POTASSIUM CHLORIDE 20 MEQ/1
40 TABLET, EXTENDED RELEASE ORAL ONCE
Status: COMPLETED | OUTPATIENT
Start: 2023-02-17 | End: 2023-02-17

## 2023-02-17 RX ADMIN — PANTOPRAZOLE SODIUM 40 MG: 40 TABLET, DELAYED RELEASE ORAL at 06:23

## 2023-02-17 RX ADMIN — MELATONIN 3 MG ORAL TABLET 3 MG: 3 TABLET ORAL at 22:10

## 2023-02-17 RX ADMIN — POTASSIUM CHLORIDE 40 MEQ: 1500 TABLET, EXTENDED RELEASE ORAL at 17:19

## 2023-02-17 RX ADMIN — SPIRONOLACTONE 25 MG: 25 TABLET ORAL at 10:11

## 2023-02-17 RX ADMIN — DIPHENHYDRAMINE HYDROCHLORIDE 25 MG: 25 TABLET ORAL at 10:11

## 2023-02-17 RX ADMIN — SERTRALINE HYDROCHLORIDE 25 MG: 50 TABLET ORAL at 10:21

## 2023-02-17 RX ADMIN — Medication 1000 UNITS: at 10:11

## 2023-02-17 RX ADMIN — ALPRAZOLAM 0.25 MG: 0.25 TABLET ORAL at 01:45

## 2023-02-17 RX ADMIN — DABIGATRAN ETEXILATE MESYLATE 75 MG: 75 CAPSULE ORAL at 10:10

## 2023-02-17 RX ADMIN — DABIGATRAN ETEXILATE MESYLATE 75 MG: 75 CAPSULE ORAL at 22:10

## 2023-02-17 RX ADMIN — BACLOFEN 5 MG: 10 TABLET ORAL at 22:10

## 2023-02-17 RX ADMIN — CHLOROTHIAZIDE SODIUM 250 MG: 500 INJECTION, POWDER, LYOPHILIZED, FOR SOLUTION INTRAVENOUS at 22:10

## 2023-02-17 RX ADMIN — CETIRIZINE HYDROCHLORIDE 5 MG: 10 TABLET, FILM COATED ORAL at 10:11

## 2023-02-17 RX ADMIN — BACLOFEN 5 MG: 10 TABLET ORAL at 17:19

## 2023-02-17 RX ADMIN — Medication 10 ML: at 10:12

## 2023-02-17 RX ADMIN — CHLOROTHIAZIDE SODIUM 250 MG: 500 INJECTION, POWDER, LYOPHILIZED, FOR SOLUTION INTRAVENOUS at 12:32

## 2023-02-17 RX ADMIN — BACLOFEN 5 MG: 10 TABLET ORAL at 10:22

## 2023-02-17 RX ADMIN — AMIODARONE HYDROCHLORIDE 200 MG: 200 TABLET ORAL at 10:11

## 2023-02-17 RX ADMIN — Medication 10 ML: at 22:23

## 2023-02-17 RX ADMIN — DIPHENHYDRAMINE HYDROCHLORIDE 25 MG: 25 TABLET ORAL at 22:10

## 2023-02-17 RX ADMIN — METOPROLOL SUCCINATE 25 MG: 25 TABLET, EXTENDED RELEASE ORAL at 10:15

## 2023-02-17 ASSESSMENT — PAIN DESCRIPTION - LOCATION: LOCATION: BACK;NECK

## 2023-02-17 ASSESSMENT — PAIN SCALES - GENERAL: PAINLEVEL_OUTOF10: 5

## 2023-02-17 NOTE — PROGRESS NOTES
Inpatient Cardiology Progress note     PATIENT IS BEING FOLLOWED FOR: CHF    Patricia Obrien is a 68 y.o. female who is known to [address] [place] [address] cardiology through Dr Yonis Yousif. Patient is also followed by [address] [place] [address] electrophysiology. Patient presented to hospital 2/10/2023 for shortness of breath and hypotension. Patient having increased swelling of bilateral lower legs. Cardiology was consulted for management of acute on chronic HFrEF. EP was consulted for A-fib with RVR. Patient at this time waiting for [facility] clinic mitral clip evaluation. Dobutamine drip discontinued ( 2/13/2023    IV Lasix held 2/14/23 because of worsening renal function    SUBJECTIVE: No new complaints    OBJECTIVE: No apparent acute distress    ROS:  Consist: Denies fevers, chills or night sweats  Heart: Denies chest pain, palpitations, lightheadedness, dizziness or syncope  Lungs: Denies cough, wheezing, orthopnea or PND  GI: Denies abdominal pain, vomiting or diarrhea    PHYSICAL EXAM:   BP 95/64   Pulse 86   Temp 97.4 °F (36.3 °C) (Oral)   Resp 20   Ht 5' 7\" (1.702 m)   Wt 151 lb 11.2 oz (68.8 kg)   SpO2 98%   BMI 23.76 kg/m²    B/P Range last 24 hours: Systolic (40XHF), BZM:51 , Min:87 , DFS:69    Diastolic (35OEX), SXK:46, Min:59, Max:65    CONST: Well developed, well nourished who appears of stated age. Awake, alert and cooperative. No apparent distress  HEENT:   Head- Normocephalic, atraumatic   Eyes- Conjunctivae pink, anicteric  Throat- Oral mucosa pink and moist  Neck-  No stridor, trachea midline, no jugular venous distention. No carotid bruit  CHEST: Chest symmetrical and non-tender to palpation. No accessory muscle use or intercostal retractions  RESPIRATORY:  Lung sounds - bibasilar crackles   CARDIOVASCULAR:     Heart Inspection- shows no noted pulsations  Heart Palpation- no heaves or thrills; PMI is non-displaced   Heart Ausculation- Irregular rate and rhythm. No MR murmur heard.  No s3 or rub   PV: thick shins. 12+ lower extremity edema. No varicosities. Pedal pulses palpable, no clubbing or cyanosis   ABDOMEN: Soft, non-tender to light palpation. Bowel sounds present. No palpable masses no organomegaly; no abdominal bruit  MS: Good muscle strength and tone. No atrophy or abnormal movements. : Deferred  SKIN: Warm and dry no statis dermatitis or ulcers   NEURO / PSYCH: Oriented to person, place and time. Speech clear and appropriate. Follows all commands.  Flat affect       Intake/Output Summary (Last 24 hours) at 2/17/2023 1458  Last data filed at 2/17/2023 1011  Gross per 24 hour   Intake 5 ml   Output --   Net 5 ml       Weight:   Wt Readings from Last 3 Encounters:   02/16/23 151 lb 11.2 oz (68.8 kg)   02/07/23 166 lb 6.4 oz (75.5 kg)   02/06/23 165 lb 2 oz (74.9 kg)     Current Inpatient Medications:   chlorothiazide  250 mg IntraVENous Q8H    [Held by provider] furosemide  60 mg IntraVENous BID    sertraline  25 mg Oral Daily    amiodarone  200 mg Oral Daily    cetirizine  5 mg Oral Daily    dabigatran  75 mg Oral BID    metoprolol succinate  25 mg Oral Daily    pantoprazole  40 mg Oral QAM AC    spironolactone  25 mg Oral Daily    Vitamin D  1,000 Units Oral Daily    sodium chloride flush  5-40 mL IntraVENous 2 times per day       IV Infusions (if any):   sodium chloride         DIAGNOSTIC/ LABORATORY DATA:  Labs:   CBC:   Recent Labs     02/16/23 0515 02/17/23  0536   WBC 8.8 7.8   HGB 14.7 14.7   HCT 47.2 46.8    215     BMP:   Recent Labs     02/16/23 0515 02/17/23  0536   * 152*   K 3.6  3.6 3.6   CO2 31* 29   BUN 74* 66*   CREATININE 2.1* 1.8*   LABGLOM 24 28   CALCIUM 9.4 9.5     Mag:   Recent Labs     02/16/23 0515 02/17/23  0536   MG 2.6 2.7*     Phos:   Recent Labs     02/16/23 0515 02/17/23  0536   PHOS 2.6 3.0     TFT:   Lab Results   Component Value Date    TSH 0.666 02/04/2023    S1KDCKL 7.4 01/18/2023    FT3 3.6 01/18/2023    T4FREE 1.89 (H) 01/13/2023      HgA1c:   Lab Results   Component Value Date    LABA1C 5.7 (H) 02/01/2022     FASTING LIPID PANEL:  Lab Results   Component Value Date/Time    CHOL 151 12/18/2022 11:02 AM    HDL 55 12/18/2022 11:02 AM    LDLCALC 82 12/18/2022 11:02 AM    TRIG 68 12/18/2022 11:02 AM     LIVER PROFILE:  Recent Labs     02/16/23  0515 02/17/23  0536   AST 38* 32*   * 106*   LABALBU 3.1* 2.8*      Latest Reference Range & Units 2/9/23 12:07 2/9/23 14:42 2/9/23 16:54   Pro-BNP 0 - 450 pg/mL 21,949 (H)     Troponin, High Sensitivity 0 - 9 ng/L 10 (H) 55 (H) 49 (H)   (H): Data is abnormally high     Latest Reference Range & Units 2/12/23 05:25   Pro-BNP 0 - 450 pg/mL 17,110 (H)   (H): Data is abnormally high      Lexiscan 12/20/2022:  Impression   1: No definite evidence of  ischemia or scar except soft tissue   attenuation. The left ventricle is visually dilated both rest and   stress without evidence of TID     2  Dilated left ventricle with Moderate to severe global left   ventricular hypokinesis with estimated left ventricular ejection   fraction of 32%. 3:  Please see separate report for EKG and hemodynamic aspect of the   stress test.     4:  Low dose CT attenuation correction protocol was utilized which   revealed minimal to mild coronary artery ossifications. 5: RISK SCAN:  High risk scan due to dilated left ventricle with   severe global hypokinesis and EF of 32%. EE 1/26/2023:  Summary   Severely reduced left ventricular systolic function. Reduced right ventricular function. Bi-atrial enlargement. No evidence of interatrial shunting on bubble study. History of WATCHMAN device (24 mm). No significant color flow jet around   the device. No device related thrombus visualized. Severe mitral regurgitation. Mild tricuspid regurgitation. RV-RA gradient is estimated at 27 mmHg. CT chest 2/9/2023:  Impression   1.  Subtle ground-glass opacities located in right lower lobe which appear new   compared to prior and could indicate bronchiolitis or developing viral   pneumonia. 2. Stable ground-glass nodule located in left upper lobe measures 7 mm. 3. Stable nodule located in right upper lobe measures 10 mm. 4. Stable moderate cardiomegaly. CT abdomen 2/9/2023:  Impression   1. No acute process in abdomen or pelvis. 2. Nonobstructing 2 mm right renal calculus. CXR 2/9/2023:   Impression   Suspect pulmonary vascular congestion. 12 lead EKG 2/13/2023: Atrial fibrillation with rapid ventricular response with premature ventricular or aberrantly conducted complexes. Left axis deviation. Left bundle branch block      Telemetry: Afib with controlled rate. BBB      ASSESSMENT:   Acute on chronic HFrEF  Hypotension. BP better today  Chronically elevated troponins  Persistent atrial fibrillation with failed DCCV 2/3/2023--s/p Watchman 2020  cLBBB  Severe mitral regurgitation  Acute on chronic renal insufficiency: BUN / Cr ( 2/7/23 ) = 45/1.7. BUN/Cr on admission ( 2/9/23 ) = 57/2.2 ---> 80/2.4 ( 2/15/23 ) ---> 74/2.1 ( 2/16/23 ) --> 66/1.8 today ( 2/17/23 )  Hypernatremia, worse Na = 152  Renal cell carcinoma s/p nephrectomy  History of pulmonary embolus  Meige syndrome  TIA       PLAN:  Continue to hold IV diuresis. Monitor BMP, BNP, I's and O's and daily weights. IV diuresis as needed while awaiting transfer  Encourage PO free water intake  Rest of cardiac medications same. Still not a candidate for ACE I therapy/ ARB / Entresto or isordil/hydralazine combination at present because of low BP. Will readdress  Monitor BP  Awaiting transfer to [facility] pending bed availability  Will not see over the weekend.  Please call if needed    Electronically signed by Eduardo Ding MD on 2/17/2023 at 2:58 PM

## 2023-02-17 NOTE — PROGRESS NOTES
Hospitalist Progress Note      SYNOPSIS: Patient admitted on 2023 for HFrEF (heart failure with reduced ejection fraction) ([facility] [address]    Patient is 68-year-old with history of atrial fibrillation status post Watchman procedure, nonischemic cardiomyopathy, hypertension, CKD, renal cell carcinoma status post left nephrectomy, mage syndrome, TIA allergic to Eliquis and Xarelto and on Pradaxa for anticoagulation, history of PE who comes into the hospital with increased shortness of breath and hypotension. Patient was noted to have rapid A-fib. She apparently has failed multiple cardioversions before. Also noted to have severe mitral regurgitation and recommendation for MitraClip placement and plan is for [facility] transfer. Patient also seen by nephrology for acute on chronic kidney injury. SUBJECTIVE:  Stable overnight. No other overnight issues reported. Patient seen and examined  Records reviewed. Patient reports chronic neck pain as well as weakness. No other changes. Son at bedside and case discussed with him at bedside. Temp (24hrs), Av.1 °F (36.7 °C), Min:97.4 °F (36.3 °C), Max:98.6 °F (37 °C)    DIET: ADULT DIET; Regular; No Added Salt (3-4 gm)  ADULT ORAL NUTRITION SUPPLEMENT; Lunch; Standard High Calorie/High Protein Oral Supplement  ADULT ORAL NUTRITION SUPPLEMENT; Breakfast; Fortified Pudding Oral Supplement  ADULT ORAL NUTRITION SUPPLEMENT; Dinner; Frozen Oral Supplement  CODE: Full Code    Intake/Output Summary (Last 24 hours) at 2023 1545  Last data filed at 2023 1011  Gross per 24 hour   Intake 5 ml   Output --   Net 5 ml       Review of Systems  All bolded are positive; please see HPI  General:  Fever, chills, diaphoresis, fatigue, malaise, night sweats, weight loss  Psychological:  Anxiety, disorientation, hallucinations. ENT:  Epistaxis, headaches, vertigo, visual changes.   Cardiovascular:  Chest pain, irregular heartbeats, palpitations, paroxysmal nocturnal dyspnea. Respiratory:  Shortness of breath, coughing, sputum production, hemoptysis, wheezing, orthopnea. Gastrointestinal:  Nausea, vomiting, diarrhea, heartburn, constipation, abdominal pain, hematemesis, hematochezia, melena, acholic stools  Genito-Urinary:  Dysuria, urgency, frequency, hematuria  Musculoskeletal:  Joint pain, joint stiffness, joint swelling, muscle pain  Neurology:  Headache, focal neurological deficits, weakness, numbness, paresthesia  Derm:  Rashes, ulcers, excoriations, bruising  Extremities:  Decreased ROM, peripheral edema, mottling      OBJECTIVE:    /78   Pulse 72   Temp 97.8 °F (36.6 °C) (Oral)   Resp 16   Ht 5' 7\" (1.702 m)   Wt 151 lb 11.2 oz (68.8 kg)   SpO2 100%   BMI 23.76 kg/m²     General appearance:  awake, alert, and oriented to person, place, time, and purpose; appears stated age and cooperative; no apparent distress no labored breathing  HEENT:  Conjunctivae/corneas clear. Neck: Supple. No jugular venous distention. Respiratory: symmetrical; clear to auscultation bilaterally; no wheezes; no rhonchi; no rales  Cardiovascular: rhythm regular; rate controlled; no murmurs  Abdomen: Soft, nontender, nondistended  Extremities:  peripheral pulses present; no peripheral edema; no ulcers  Musculoskeletal: No clubbing, cyanosis, no bilateral lower extremity edema. Brisk capillary refill. Skin:  No rashes  on visible skin  Neurologic: awake, alert and following commands     ASSESSMENT and PLAN:      Acute on chronic congestive heart failure, systolic ejection fraction 25%: Concern for cardiorenal syndrome. Noted to have moderate aortic regurgitation, moderate MR, moderate TR and severe pulmonary hypertension. Appreciate cardiology assistance. IV diuresis currently held due to kidney function which is slowly improving.    -Acute on chronic renal failure stage IV: Baseline serum creatinine 1.9-2.2, continue supportive care, continue to maintain euvolemia. Nephrology consultation, input appreciated. Creatinine improving.     -Hypernatremia, continue to monitor off of diuresis. -Elevated troponin likely demand ischemia: Appreciate input from cardiology    -Lactic acidosis-resolved    -Persistent atrial fibrillation, chronic.   On Pradaxa , on metoprolol and amiodarone  Electrophysiology following, [facility] for severe MR and possible MitraClip    -Hypotension, currently off dobutamine drip    -Anxiety/depression: Supportive care, input from psychiatry appreciated, patient does not have full medical decision-making capacity, Zoloft was started.     -History of renal cell carcinoma status post left nephrectomy     -Abnormal LFTs, monitor     -Hypokalemia, monitor and replace as needed           DVT Prophylaxis Pradaxa   GI Prophylaxis [x] PPI,  [] H2 Blocker,  [] Carafate,  [] Diet/Tube Feeds   Disposition Patient requires continued admission due to  Awaiting [facility] [] Low, [x] Moderate,[]  High       Medications:  REVIEWED DAILY    Infusion Medications    sodium chloride       Scheduled Medications    chlorothiazide  250 mg IntraVENous Q8H    [Held by provider] furosemide  60 mg IntraVENous BID    sertraline  25 mg Oral Daily    amiodarone  200 mg Oral Daily    cetirizine  5 mg Oral Daily    dabigatran  75 mg Oral BID    metoprolol succinate  25 mg Oral Daily    pantoprazole  40 mg Oral QAM AC    spironolactone  25 mg Oral Daily    Vitamin D  1,000 Units Oral Daily    sodium chloride flush  5-40 mL IntraVENous 2 times per day     PRN Meds: polyvinyl alcohol, melatonin, ALPRAZolam, sodium chloride flush, sodium chloride, polyethylene glycol, diphenhydrAMINE, baclofen, ondansetron **OR** ondansetron    Labs:     Recent Labs     02/15/23  0643 02/16/23  0515 02/17/23  0536   WBC 11.0 8.8 7.8   HGB 15.8* 14.7 14.7   HCT 50.8* 47.2 46.8    232 215       Recent Labs     02/15/23  0643 02/16/23  0515 02/17/23  0536   * 150* 152*   K 3.8 3.6  3.6 3.6    107 110*   CO2 28 31* 29   BUN 80* 74* 66*   CREATININE 2.4* 2.1* 1.8*   CALCIUM 9.9 9.4 9.5   PHOS 3.8 2.6 3.0       Recent Labs     02/15/23  0643 02/16/23  0515 02/17/23  0536   PROT 7.0 6.3* 6.4   ALKPHOS 159* 132* 127*   * 137* 106*   AST 56* 38* 32*   BILITOT 1.2 1.0 1.0       No results for input(s): INR in the last 72 hours. No results for input(s): [place]ounds in the last 72 hours. Chronic labs:    Lab Results   Component Value Date    CHOL 151 12/18/2022    TRIG 68 12/18/2022    HDL 55 12/18/2022    LDLCALC 82 12/18/2022    TSH 0.666 02/04/2023    INR 2.2 02/09/2023    LABA1C 5.7 (H) 02/01/2022       Radiology: REVIEWED DAILY    +++++++++++++++++++++++++++++++++++++++++++++++++  Oriana Hoffman MD  [address]  +++++++++++++++++++++++++++++++++++++++++++++++++  NOTE: This report was transcribed using voice recognition software. Every effort was made to ensure accuracy; however, inadvertent computerized transcription errors may be present.

## 2023-02-17 NOTE — PROGRESS NOTES
[facility]  Nephrology Progress Note    Patient's Name: Dorian Dean    History of Present Illness from 2/10 consult note:    \"Aidee Ingram is a 68 y.o. female with a past medical history of atrial fibrillation, CHF, mitral regurgitation, and hypertension. She presented to the ED on 2/9 with complaints of shortness of breath. Vital signs on 2/9 includes temperature 97.8, respirations 26, pulse 88, BP 99/62, and she was 95% on room air. Lab data on 2/9 include CO2 16, BUN 57, creatinine 2.2, anion gap 17, proBNP 21,949, and troponin 55. She had a chest x-ray on 2/9 which showed suspect pulmonary vascular congestion. Her CT of the chest 2/9 showed subtle groundglass opacities in the right lower lobe, stable groundglass nodule left upper lobe, stable nodule located in right upper lobe. CT abdomen pelvis 2/9 showed no acute process, nonobstructing 2 mm right renal calculus, partial left nephrectomy. She has a history of JAIME on 1/26 and direct-current electrical cardioversion on 2/3. She also has a history of left renal mass and is status post left partial nephrectomy for left renal mass with Dr. Marissa Dahl on 11/16/2021. She also had a recent hospital stay from 2/4 - 2/7 for nausea. We were consulted to see the patient for NIKA/CKD. Patient appears to have a recent baseline creatinine of 1.3-1.7. At present, patient was seen and examined. She reports a history of A-fib and that she came in due to low blood pressures and shortness of breath. She reports that prior to admission she had a decreased appetite. She also reports issues with dizziness prior to admission. She currently reports nausea. She denies any chest pain. She denies any dysuria. She denies any fevers or chills. She denies any abdominal pain or vomiting. She denies the use of NSAIDs. \"    Subjective:    2/17: Patient was seen and examined. She reports that she feels okay. She denies any chest pain or shortness of breath.   She denies any abdominal pain. She denies any issues overnight. PMH:    Past Medical History:   Diagnosis Date    Afib (HCC)     WATCHMAN    Anxiety     Arthritis     Cervical radiculopathy     CHF (congestive heart failure) (HCC)     Chronic sinusitis     Ejection fraction < 50%     25%. Wearing life vest    Hilar adenopathy     Hx of blood clots     Hypertension     Left ventricular systolic dysfunction     Lesion of left native kidney     Lumbar radiculopathy     Lung nodules     Meige syndrome     partial/ PCP    Microscopic colitis     Mitral regurgitation     Mild to moderate    Nonischemic cardiomyopathy (HCC)     f/u with Dr. Marvin Mancia    Osteopenia     PE (pulmonary thromboembolism) [facility])     Renal cell carcinoma of left kidney ([facility] 02/01/2022    Vertebrobasilar artery syndrome     f/u PCP       Patient Active Problem List   Diagnosis    Anxiety    NICM (nonischemic cardiomyopathy) ([facility]    Nonrheumatic mitral valve regurgitation    Lumbar radiculopathy    Cervical radiculopathy    HTN (hypertension), benign    Left atrial thrombus    Paroxysmal atrial fibrillation (HCC)    Chronic HFrEF (heart failure with reduced ejection fraction) ([facility]    Encounter for long-term (current) use of high-risk medication    Persistent atrial fibrillation ([facility]    Encounter for medication refill    Itching    Chronic anticoagulation    Presence of Watchman left atrial appendage closure device    Renal mass    Renal cell carcinoma of left kidney (HCC)    Chronic renal disease, stage III ([facility] [129816]    Multiple subsegmental pulmonary emboli without acute cor pulmonale (HCC)    Acute on chronic congestive heart failure (HCC)    Vertigo    Dizziness    HFrEF (heart failure with reduced ejection fraction) ([facility])    Stage 4 chronic kidney disease ([facility]    Left bundle branch block    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       Diet:    ADULT DIET; Regular;  No Added Salt (3-4 gm)  ADULT ORAL NUTRITION SUPPLEMENT; Lunch; Standard High Calorie/High Protein Oral Supplement  ADULT ORAL NUTRITION SUPPLEMENT; Breakfast; Fortified Pudding Oral Supplement  ADULT ORAL NUTRITION SUPPLEMENT; Dinner; Frozen Oral Supplement    Meds:     [Held by provider] furosemide  60 mg IntraVENous BID    sertraline  25 mg Oral Daily    amiodarone  200 mg Oral Daily    cetirizine  5 mg Oral Daily    dabigatran  75 mg Oral BID    metoprolol succinate  25 mg Oral Daily    pantoprazole  40 mg Oral QAM AC    spironolactone  25 mg Oral Daily    Vitamin D  1,000 Units Oral Daily    sodium chloride flush  5-40 mL IntraVENous 2 times per day        sodium chloride         Meds prn:     polyvinyl alcohol, melatonin, ALPRAZolam, sodium chloride flush, sodium chloride, polyethylene glycol, diphenhydrAMINE, baclofen, ondansetron **OR** ondansetron    Meds prior to admission:     No current facility-administered medications on file prior to encounter. Current Outpatient Medications on File Prior to Encounter   Medication Sig Dispense Refill    dabigatran (PRADAXA) 75 MG capsule Take 1 capsule by mouth 2 times daily 60 capsule 0    meclizine (ANTIVERT) 25 MG tablet Take 1 tablet by mouth every 6 hours as needed for Dizziness 30 tablet 0    metoprolol succinate (TOPROL XL) 25 MG extended release tablet Take 1 tablet by mouth daily 30 tablet 3    furosemide (LASIX) 20 MG tablet Take 1 tablet by mouth daily 60 tablet 3    spironolactone (ALDACTONE) 25 MG tablet Take 1 tablet by mouth daily 30 tablet 3    amiodarone (CORDARONE) 200 MG tablet Take 1 tablet by mouth daily 90 tablet 1    hydrOXYzine HCl (ATARAX) 10 MG tablet Take 1 tablet by mouth every 8 hours as needed for Itching or Anxiety (Patient not taking: Reported on 2/10/2023) 90 tablet 1    cetirizine (ZYRTEC ALLERGY) 10 MG tablet Take 0.5 tablets by mouth daily 30 tablet 0    fluticasone (FLONASE) 50 MCG/ACT nasal spray 1-2 sprays, each nostril daily as needed for nasal congestion.  12 g 0    aspirin 81 MG EC tablet Take 81 mg by mouth daily      baclofen (LIORESAL) 10 MG tablet Take 1 tablet by mouth nightly 90 tablet 1    omeprazole (PRILOSEC) 40 MG delayed release capsule Take 40 mg by mouth daily (Patient not taking: Reported on 2/10/2023)      triamcinolone (KENALOG) 0.1 % cream Apply topically 3 times daily as needed (rash)   1    loperamide (IMODIUM) 2 MG capsule Take 2 mg by mouth 4 times daily as needed for Diarrhea      diphenhydrAMINE (BENADRYL) 25 MG tablet Take 25 mg by mouth every 6 hours as needed for Itching      vitamin D (CHOLECALCIFEROL) 1000 UNIT TABS tablet Take 1,000 Units by mouth daily         Allergies:    Bentyl [dicyclomine], Adhesive tape, Atacand [candesartan], Cefdinir, Demerol, Digoxin, Imdur [isosorbide mononitrate], Losartan potassium, Sulfa antibiotics, Xarelto [rivaroxaban], Lovenox [enoxaparin sodium], Acetaminophen, Apap-caff-dihydrocodeine, Coreg [carvedilol], Cozaar [losartan], Diovan [valsartan], Effexor [venlafaxine hydrochloride], Eliquis [apixaban], Labetalol, Lisinopril, and Ramipril    Social History:     reports that she has never smoked. She has never used smokeless tobacco. She reports current alcohol use. She reports that she does not use drugs.     Family History:         Problem Relation Age of Onset    Heart Attack Mother     Stroke Mother     Heart Attack Father     Heart Failure Father     Heart Disease Father     Anxiety Disorder Sister     Depression Sister     Crohn's Disease Son        Physical Exam:    Patient Vitals for the past 24 hrs:   BP Temp Temp src Pulse Resp SpO2 Height   02/17/23 0750 (!) 90/59 97.4 °F (36.3 °C) Oral 84 20 98 % --   02/17/23 0500 87/61 98.4 °F (36.9 °C) Oral 78 20 100 % --   02/16/23 2050 87/65 98.6 °F (37 °C) Oral 88 19 98 % --   02/16/23 1345 -- -- -- -- -- -- 5' 7\" (1.702 m)   02/16/23 0828 98/63 98.3 °F (36.8 °C) Oral 86 18 98 % --         No intake or output data in the 24 hours ending 02/17/23 7471    General: Awake, alert, no acute distress  Neck: No JVD noted  Lungs: Clear bilaterally upper, diminished to the bases bilaterally. Unlabored  CV: Irregular. No rub  Abd: Soft, nontender, nondistended. Active bowel sounds  Skin: Warm and dry. No rash on exposed extremities  Ext: 2+ BLE edema   Neuro: Awake, answers questions appropriately    Data:    Recent Labs     02/15/23  0643 02/16/23  0515 02/17/23  0536   WBC 11.0 8.8 7.8   HGB 15.8* 14.7 14.7   HCT 50.8* 47.2 46.8   MCV 89.3 86.8 87.0    232 215         Recent Labs     02/15/23  0643 02/16/23  0515 02/17/23  0536   * 150* 152*   K 3.8 3.6  3.6 3.6    107 110*   CO2 28 31* 29   CREATININE 2.4* 2.1* 1.8*   BUN 80* 74* 66*   LABGLOM 20 24 28   GLUCOSE 132* 113* 119*   CALCIUM 9.9 9.4 9.5   PHOS 3.8 2.6 3.0   MG 2.7* 2.6 2.7*         Vit D, 25-Hydroxy   Date Value Ref Range Status   02/11/2023 30 30 - 100 ng/mL Final     Comment:     <20 ng/mL. ........... Author Diego Deficient  20-30 ng/mL. ......... Author Diego Insufficient   ng/mL. ........ Author Diego Sufficient  >100 ng/mL. .......... Author Diego Toxic         PTH   Date Value Ref Range Status   02/11/2023 120 (H) 15 - 65 pg/mL Final       Recent Labs     02/15/23  0643 02/16/23  0515 02/17/23  0536   * 137* 106*   AST 56* 38* 32*   ALKPHOS 159* 132* 127*   BILITOT 1.2 1.0 1.0         Recent Labs     02/15/23  0643 02/16/23  0515 02/17/23  0536   LABALBU 3.5 3.1* 2.8*         No results found for: FERRITIN, IRON, TIBC    Vitamin B-12   Date Value Ref Range Status   02/04/2023 1304 (H) 211 - 946 pg/mL Final       Folate   Date Value Ref Range Status   12/02/2019 14.9 4.8 - 24.2 ng/mL Final       Lab Results   Component Value Date/Time    COLORU Yellow 02/10/2023 02:00 PM    NITRU Negative 02/10/2023 02:00 PM    GLUCOSEU Negative 02/10/2023 02:00 PM    KETUA Negative 02/10/2023 02:00 PM    UROBILINOGEN 0.2 02/10/2023 02:00 PM    BILIRUBINUR Negative 02/10/2023 02:00 PM    BILIRUBINUR negative 07/08/2022 02:22 PM Lab Results   Component Value Date/Time    MARLIN 206 (L) 12/19/2022 02:00 PM       No components found for: URIC    No results found for: LIPIDPAN    Assessment and Plans:    NIKA stage II on CKD 3B  Likely decreased effective renal perfusion in the setting of decreased oral intake, CHF/cardiorenal syndrome, atrial fibrillation; exacerbated by diuretics  history of left partial nephrectomy for left renal mass with Dr. Ariel Paulson on 11/16/2021  CT abd pelvis 2/9 nonobstructing 2 mm right renal calculus, partial left nephrectomy  Baseline creatinine 1.3-1.7  Creatinine peaked at 2.6 on 2/10--> 1.8 today  Avoid nephrotoxins/NSAIDs  Strict I&O, daily weights  On spironolactone   Lasix 60 mg IV twice daily-currently on hold per Cardiology  will trial 3 doses of Diuril  Monitor labs closely with diuresis    2. Cardiomyopathy/CHF  Echo 12/2022 EF 25%, mod aortic regurg, mod mitral regurg, mod tricuspid regurg, pulm hypertension severe  proBNP 21,949  Strict I&O, daily weights  On spironolactone   S/p dobutamine drip   For transfer to [facility] for severe mitral regurg  Monitor volume status  Cardiology following    3. High anion gap metabolic acidosis -> now with alkalosis   Likely in the setting of NIKA/CKD  Anion gap 20 and CO2 18 on 2/10--> CO2 29 today  S/p sodium bicarbonate   Monitor labs    4. Atrial fibrillation  S/p cardioversion 2/3  EP following    5. Shortness of breath   CXR 2/9 pulmonary vascular congestion  CT chest 2/9 subtle groundglass opacities in the right lower lobe, stable groundglass nodule left upper lobe, stable nodule located in right upper lobe  Defer management to primary    6. History of left renal mass  S/p left partial nephrectomy for left renal mass with Dr. Ariel Paulson on 11/16/2021    7. Hypotension  Monitor BPs     8. Hypokalemia   in the setting of diuresis  K+ 3.6 today  Supplement potassium as needed  Monitor labs    9.   Hypernatremia  Sodium 151 on 2/15--> 152 today  Lasix on hold  Monitor labs    UYEN Wallis - CNP    Patient seen and examined all key components of the physical performed independently , case discussed with NP, all pertinent labs and radiologic tests personally reviewed agree with above.     Clinically still appears hypervolemic; significant peripheral edema despite rising Na level  Will give few doses of diuril , cause greater excretion of Na; and reassess  Will check TSH level    Updated son at bedside      Razia Quesada MD

## 2023-02-18 VITALS
TEMPERATURE: 97.4 F | SYSTOLIC BLOOD PRESSURE: 90 MMHG | HEART RATE: 78 BPM | OXYGEN SATURATION: 100 % | HEIGHT: 67 IN | WEIGHT: 151.7 LBS | BODY MASS INDEX: 23.81 KG/M2 | DIASTOLIC BLOOD PRESSURE: 74 MMHG | RESPIRATION RATE: 19 BRPM

## 2023-02-18 PROBLEM — Z78.9 FREQUENT HOSPITAL ADMISSIONS: Status: ACTIVE | Noted: 2023-02-18

## 2023-02-18 LAB
ALBUMIN SERPL-MCNC: 2.9 G/DL (ref 3.5–5.2)
ALP BLD-CCNC: 139 U/L (ref 35–104)
ALT SERPL-CCNC: 86 U/L (ref 0–32)
ANION GAP SERPL CALCULATED.3IONS-SCNC: 10 MMOL/L (ref 7–16)
AST SERPL-CCNC: 32 U/L (ref 0–31)
BILIRUB SERPL-MCNC: 0.8 MG/DL (ref 0–1.2)
BUN BLDV-MCNC: 60 MG/DL (ref 6–23)
CALCIUM SERPL-MCNC: 9.6 MG/DL (ref 8.6–10.2)
CHLORIDE BLD-SCNC: 116 MMOL/L (ref 98–107)
CO2: 30 MMOL/L (ref 22–29)
CREAT SERPL-MCNC: 1.7 MG/DL (ref 0.5–1)
GFR SERPL CREATININE-BSD FRML MDRD: 31 ML/MIN/1.73
GLUCOSE BLD-MCNC: 121 MG/DL (ref 74–99)
MAGNESIUM: 2.7 MG/DL (ref 1.6–2.6)
PHOSPHORUS: 3.1 MG/DL (ref 2.5–4.5)
POTASSIUM REFLEX MAGNESIUM: 4.2 MMOL/L (ref 3.5–5)
PRO-BNP: ABNORMAL PG/ML (ref 0–450)
SARS-COV-2, NAAT: NOT DETECTED
SODIUM BLD-SCNC: 156 MMOL/L (ref 132–146)
TOTAL PROTEIN: 6.6 G/DL (ref 6.4–8.3)

## 2023-02-18 PROCEDURE — 6370000000 HC RX 637 (ALT 250 FOR IP): Performed by: FAMILY MEDICINE

## 2023-02-18 PROCEDURE — 83735 ASSAY OF MAGNESIUM: CPT

## 2023-02-18 PROCEDURE — 87635 SARS-COV-2 COVID-19 AMP PRB: CPT

## 2023-02-18 PROCEDURE — 84100 ASSAY OF PHOSPHORUS: CPT

## 2023-02-18 PROCEDURE — 2140000000 HC CCU INTERMEDIATE R&B

## 2023-02-18 PROCEDURE — 80053 COMPREHEN METABOLIC PANEL: CPT

## 2023-02-18 PROCEDURE — 6370000000 HC RX 637 (ALT 250 FOR IP): Performed by: INTERNAL MEDICINE

## 2023-02-18 PROCEDURE — 6360000002 HC RX W HCPCS: Performed by: FAMILY MEDICINE

## 2023-02-18 PROCEDURE — 6370000000 HC RX 637 (ALT 250 FOR IP): Performed by: PSYCHIATRY & NEUROLOGY

## 2023-02-18 PROCEDURE — 6360000002 HC RX W HCPCS

## 2023-02-18 PROCEDURE — 2580000003 HC RX 258

## 2023-02-18 PROCEDURE — 2580000003 HC RX 258: Performed by: FAMILY MEDICINE

## 2023-02-18 PROCEDURE — 83880 ASSAY OF NATRIURETIC PEPTIDE: CPT

## 2023-02-18 PROCEDURE — 36415 COLL VENOUS BLD VENIPUNCTURE: CPT

## 2023-02-18 RX ORDER — MECOBALAMIN 5000 MCG
5 TABLET,DISINTEGRATING ORAL NIGHTLY
Status: DISCONTINUED | OUTPATIENT
Start: 2023-02-18 | End: 2023-02-19 | Stop reason: HOSPADM

## 2023-02-18 RX ORDER — POLYVINYL ALCOHOL 14 MG/ML
1 SOLUTION/ DROPS OPHTHALMIC PRN
Qty: 1 EACH | Refills: 0
Start: 2023-02-18 | End: 2023-03-20

## 2023-02-18 RX ORDER — POLYETHYLENE GLYCOL 3350 17 G/17G
17 POWDER, FOR SOLUTION ORAL 2 TIMES DAILY
Status: DISCONTINUED | OUTPATIENT
Start: 2023-02-18 | End: 2023-02-19 | Stop reason: HOSPADM

## 2023-02-18 RX ORDER — DEXTROSE MONOHYDRATE 50 MG/ML
INJECTION, SOLUTION INTRAVENOUS CONTINUOUS
Status: DISCONTINUED | OUTPATIENT
Start: 2023-02-18 | End: 2023-02-19 | Stop reason: HOSPADM

## 2023-02-18 RX ORDER — SERTRALINE HYDROCHLORIDE 25 MG/1
25 TABLET, FILM COATED ORAL DAILY
Qty: 30 TABLET | Refills: 3
Start: 2023-02-19

## 2023-02-18 RX ORDER — SENNA AND DOCUSATE SODIUM 50; 8.6 MG/1; MG/1
2 TABLET, FILM COATED ORAL EVERY EVENING
Status: DISCONTINUED | OUTPATIENT
Start: 2023-02-18 | End: 2023-02-19 | Stop reason: HOSPADM

## 2023-02-18 RX ORDER — SENNA AND DOCUSATE SODIUM 50; 8.6 MG/1; MG/1
2 TABLET, FILM COATED ORAL 2 TIMES DAILY PRN
Status: DISCONTINUED | OUTPATIENT
Start: 2023-02-18 | End: 2023-02-19 | Stop reason: HOSPADM

## 2023-02-18 RX ORDER — MECOBALAMIN 5000 MCG
5 TABLET,DISINTEGRATING ORAL NIGHTLY PRN
Status: DISCONTINUED | OUTPATIENT
Start: 2023-02-18 | End: 2023-02-19 | Stop reason: HOSPADM

## 2023-02-18 RX ADMIN — SPIRONOLACTONE 25 MG: 25 TABLET ORAL at 09:56

## 2023-02-18 RX ADMIN — Medication 1000 UNITS: at 09:57

## 2023-02-18 RX ADMIN — CHLOROTHIAZIDE SODIUM 250 MG: 500 INJECTION, POWDER, LYOPHILIZED, FOR SOLUTION INTRAVENOUS at 04:36

## 2023-02-18 RX ADMIN — METOPROLOL SUCCINATE 25 MG: 25 TABLET, EXTENDED RELEASE ORAL at 09:56

## 2023-02-18 RX ADMIN — SERTRALINE HYDROCHLORIDE 25 MG: 50 TABLET ORAL at 10:15

## 2023-02-18 RX ADMIN — DABIGATRAN ETEXILATE MESYLATE 75 MG: 75 CAPSULE ORAL at 09:57

## 2023-02-18 RX ADMIN — SENNOSIDES AND DOCUSATE SODIUM 2 TABLET: 8.6; 5 TABLET ORAL at 17:13

## 2023-02-18 RX ADMIN — ONDANSETRON 4 MG: 2 INJECTION INTRAMUSCULAR; INTRAVENOUS at 12:03

## 2023-02-18 RX ADMIN — DEXTROSE MONOHYDRATE: 50 INJECTION, SOLUTION INTRAVENOUS at 12:42

## 2023-02-18 RX ADMIN — ONDANSETRON 4 MG: 2 INJECTION INTRAMUSCULAR; INTRAVENOUS at 18:30

## 2023-02-18 RX ADMIN — POLYETHYLENE GLYCOL 3350 17 G: 17 POWDER, FOR SOLUTION ORAL at 10:15

## 2023-02-18 RX ADMIN — DIPHENHYDRAMINE HYDROCHLORIDE 25 MG: 25 TABLET ORAL at 09:57

## 2023-02-18 RX ADMIN — DABIGATRAN ETEXILATE MESYLATE 75 MG: 75 CAPSULE ORAL at 20:45

## 2023-02-18 RX ADMIN — BACLOFEN 5 MG: 10 TABLET ORAL at 18:43

## 2023-02-18 RX ADMIN — AMIODARONE HYDROCHLORIDE 200 MG: 200 TABLET ORAL at 09:57

## 2023-02-18 RX ADMIN — PANTOPRAZOLE SODIUM 40 MG: 40 TABLET, DELAYED RELEASE ORAL at 06:21

## 2023-02-18 RX ADMIN — Medication 10 ML: at 09:57

## 2023-02-18 RX ADMIN — CETIRIZINE HYDROCHLORIDE 5 MG: 10 TABLET, FILM COATED ORAL at 09:56

## 2023-02-18 RX ADMIN — Medication 5 MG: at 20:46

## 2023-02-18 ASSESSMENT — PAIN DESCRIPTION - LOCATION: LOCATION: NECK

## 2023-02-18 NOTE — DISCHARGE SUMMARY
Patient: Monse Reed Sex: female DOA: 2/9/2023   YOB: 1945 Age: 68 y.o. LOS:  LOS: 9 days    Admit Date: 2/9/2023   Discharge Date: 02/18/23   Primary Care Physician: Miriam Kwon DO   Discharge to: [facility]  Readmission risk: [facility] admission:  Per HPI:\" Ms. Monse Reed, a 68y.o. year old female  who  has a past medical history of Afib ([address] Anxiety, Arthritis, Cervical radiculopathy, CHF (congestive heart failure) (HCC), Chronic sinusitis, Ejection fraction < 50%, Hilar adenopathy, Hx of blood clots, Hypertension, Left ventricular systolic dysfunction, Lesion of left native kidney, Lumbar radiculopathy, Lung nodules, Meige syndrome, Microscopic colitis, Mitral regurgitation, Nonischemic cardiomyopathy ([address] Osteopenia, PE (pulmonary thromboembolism) ([address] Renal cell carcinoma of left kidney ([address] and Vertebrobasilar artery syndrome. Patient presented to the emergency department from her PCPs office with shortness of breath and hypotension. Has a history of atrial fibrillation and congestive heart failure. Also reporting some lightheadedness and swelling of her lower extremities. Patient has a LifeVest.  On arrival to the emergency department vital signs are within normal limits and stable. The patient is afebrile. SPO2 98% on room air. Laboratory studies demonstrate BUN 57, creatinine 2.2, anion gap 17, lactic acid 2.7, glucose 143, proBNP 21,949, troponin 10 with repeat of 55 and 49. Imaging reveals pulmonary vascular congestion. Patient was given a dose of Lasix in the emergency department and medicine was consulted for admission.  [facility] Course and discharge assessment with plan:     Patient is 19-year-old with history of atrial fibrillation status post Watchman procedure, nonischemic cardiomyopathy, hypertension, CKD, renal cell carcinoma status post left nephrectomy, mage syndrome, TIA allergic to Eliquis and Xarelto and on Pradaxa for anticoagulation, history of PE who comes into the hospital with increased shortness of breath and hypotension. Patient was noted to have rapid A-fib. She apparently has failed multiple cardioversions before. Also noted to have severe mitral regurgitation and recommendation for MitraClip placement and plan is for [facility] transfer. Patient also seen by nephrology for acute on chronic kidney injury. Acute on chronic congestive heart failure, systolic ejection fraction 25%  Chronically Elevated troponin likely demand ischemia  Concern for cardiorenal syndrome. Noted to have moderate aortic regurgitation, moderate MR, moderate TR and severe pulmonary hypertension. IV diuresis currently held due to kidney function which is slowly improving. On aldactone    Acute (resolved) on chronic renal failure stage IIIb  Baseline serum creatinine 1.9-2.2      Hypernatremia,  continue to monitor off of diuresis  S/p few doses of chlorthalidone  Encourage PO intake     Persistent atrial fibrillation on Pradaxa  Severe MR  failed DCCV 2/3/2023--s/p Watchman 2020  on metoprolol and amiodarone  Electrophysiology following, awaiting transfer to [facility] clinic for severe MR and possible MitraClip    Hypotension, currently off dobutamine drip    Anxiety/depression   input from psychiatry appreciated, patient does not have full medical decision-making capacity, Zoloft was started.      History of renal cell carcinoma status post left nephrectomy     Abnormal LFTs suspected 2/2 hepatic congestion  Monitor prn     Hypokalemia, replaced     Meige syndrome     Hx/o TIA     Physical deconditioning  PT/OT     Discharge plan of care was discussed with: pt, her son, RN    Discharge Diagnoses:   Patient Active Problem List   Diagnosis    Anxiety    NICM (nonischemic cardiomyopathy) ([facility]    Nonrheumatic mitral valve regurgitation    Lumbar radiculopathy    Cervical radiculopathy    HTN (hypertension), benign    Left atrial thrombus Paroxysmal atrial fibrillation (HCC)    Chronic HFrEF (heart failure with reduced ejection fraction) ([facility]    Encounter for long-term (current) use of high-risk medication    Persistent atrial fibrillation ([facility]    Encounter for medication refill    Itching    Chronic anticoagulation    Presence of Watchman left atrial appendage closure device    Renal mass    Renal cell carcinoma of left kidney (HCC)    Chronic renal disease, stage III ([facility]) [732400]    Multiple subsegmental pulmonary emboli without acute cor pulmonale (HCC)    Acute on chronic congestive heart failure ([facility])    Vertigo    Dizziness    HFrEF (heart failure with reduced ejection fraction) ([facility])    Stage 4 chronic kidney disease ([facility])    Left bundle branch block    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    Frequent hospital admissions       Discharge Medications:   Current Discharge Medication List             Details   sertraline (ZOLOFT) 25 MG tablet Take 1 tablet by mouth daily  Qty: 30 tablet, Refills: 3      polyvinyl alcohol (LIQUIFILM TEARS) 1.4 % ophthalmic solution Place 1 drop into both eyes as needed for Dry Eyes  Qty: 1 each, Refills: 0                Details   dabigatran (PRADAXA) 75 MG capsule Take 1 capsule by mouth 2 times daily  Qty: 60 capsule, Refills: 0      metoprolol succinate (TOPROL XL) 25 MG extended release tablet Take 1 tablet by mouth daily  Qty: 30 tablet, Refills: 3      furosemide (LASIX) 20 MG tablet Take 1 tablet by mouth daily  Qty: 60 tablet, Refills: 3      spironolactone (ALDACTONE) 25 MG tablet Take 1 tablet by mouth daily  Qty: 30 tablet, Refills: 3      amiodarone (CORDARONE) 200 MG tablet Take 1 tablet by mouth daily  Qty: 90 tablet, Refills: 1      cetirizine (ZYRTEC ALLERGY) 10 MG tablet Take 0.5 tablets by mouth daily  Qty: 30 tablet, Refills: 0    Associated Diagnoses: Sinus drainage      fluticasone (FLONASE) 50 MCG/ACT nasal spray 1-2 sprays, each nostril daily as needed for nasal congestion. Qty: 16 g, Refills: 0    Associated Diagnoses: Sinus drainage      baclofen (LIORESAL) 10 MG tablet Take 1 tablet by mouth nightly  Qty: 90 tablet, Refills: 1    Associated Diagnoses: Muscle spasm      omeprazole (PRILOSEC) 40 MG delayed release capsule Take 40 mg by mouth daily      triamcinolone (KENALOG) 0.1 % cream Apply topically 3 times daily as needed (rash)   Refills: 1      loperamide (IMODIUM) 2 MG capsule Take 2 mg by mouth 4 times daily as needed for Diarrhea      diphenhydrAMINE (BENADRYL) 25 MG tablet Take 25 mg by mouth every 6 hours as needed for Itching      vitamin D (CHOLECALCIFEROL) 1000 UNIT TABS tablet Take 1,000 Units by mouth daily              Follow-up: PCP     Discharge Condition: stable    Discharge instruction:   Patient instructed to return to the emergency department if: Chest pain, shortness of breath, altered mental status, fever, chills, nausea, vomiting, abdominal pain or any other concerns. Activity: progressive as tolerated.     Diet: cardiac diet     Wound Care: none needed     Consults: cardiology, ID, nephrology, neurology, psychiatry, and EP       Discharge Physical Exam:   See progress note from earlier in the day      Isamar Dennis MD   02/18/23 3:16 [facility]

## 2023-02-18 NOTE — PROGRESS NOTES
[facility]  Nephrology Progress Note    Patient's Name: Delmis Fox    History of Present Illness from 2/10 consult note:    \"Aidee Levy is a 68 y.o. female with a past medical history of atrial fibrillation, CHF, mitral regurgitation, and hypertension. She presented to the ED on 2/9 with complaints of shortness of breath. Vital signs on 2/9 includes temperature 97.8, respirations 26, pulse 88, BP 99/62, and she was 95% on room air. Lab data on 2/9 include CO2 16, BUN 57, creatinine 2.2, anion gap 17, proBNP 21,949, and troponin 55. She had a chest x-ray on 2/9 which showed suspect pulmonary vascular congestion. Her CT of the chest 2/9 showed subtle groundglass opacities in the right lower lobe, stable groundglass nodule left upper lobe, stable nodule located in right upper lobe. CT abdomen pelvis 2/9 showed no acute process, nonobstructing 2 mm right renal calculus, partial left nephrectomy. She has a history of JAIME on 1/26 and direct-current electrical cardioversion on 2/3. She also has a history of left renal mass and is status post left partial nephrectomy for left renal mass with Dr. Tanmay Sotelo on 11/16/2021. She also had a recent hospital stay from 2/4 - 2/7 for nausea. We were consulted to see the patient for NIKA/CKD. Patient appears to have a recent baseline creatinine of 1.3-1.7. At present, patient was seen and examined. She reports a history of A-fib and that she came in due to low blood pressures and shortness of breath. She reports that prior to admission she had a decreased appetite. She also reports issues with dizziness prior to admission. She currently reports nausea. She denies any chest pain. She denies any dysuria. She denies any fevers or chills. She denies any abdominal pain or vomiting. She denies the use of NSAIDs. \"    Subjective:    2/18: Patient was seen and examined. She reports intermittent nausea, but denies any abdominal pain.   She denies any chest pain or shortness of breath. She has a visitor at the bedside. PMH:    Past Medical History:   Diagnosis Date    Afib (HCC)     WATCHMAN    Anxiety     Arthritis     Cervical radiculopathy     CHF (congestive heart failure) (HCC)     Chronic sinusitis     Ejection fraction < 50%     25%. Wearing life vest    Hilar adenopathy     Hx of blood clots     Hypertension     Left ventricular systolic dysfunction     Lesion of left native kidney     Lumbar radiculopathy     Lung nodules     Meige syndrome     partial/ PCP    Microscopic colitis     Mitral regurgitation     Mild to moderate    Nonischemic cardiomyopathy (HCC)     f/u with Dr. Carl Wallace    Osteopenia     PE (pulmonary thromboembolism) [facility])     Renal cell carcinoma of left kidney ([facility] 02/01/2022    Vertebrobasilar artery syndrome     f/u PCP       Patient Active Problem List   Diagnosis    Anxiety    NICM (nonischemic cardiomyopathy) ([facility]    Nonrheumatic mitral valve regurgitation    Lumbar radiculopathy    Cervical radiculopathy    HTN (hypertension), benign    Left atrial thrombus    Paroxysmal atrial fibrillation (HCC)    Chronic HFrEF (heart failure with reduced ejection fraction) ([address]    Encounter for long-term (current) use of high-risk medication    Persistent atrial fibrillation ([address]    Encounter for medication refill    Itching    Chronic anticoagulation    Presence of Watchman left atrial appendage closure device    Renal mass    Renal cell carcinoma of left kidney (HCC)    Chronic renal disease, stage III ([address] [[id number]]    Multiple subsegmental pulmonary emboli without acute cor pulmonale (HCC)    Acute on chronic congestive heart failure (HCC)    Vertigo    Dizziness    HFrEF (heart failure with reduced ejection fraction) ([facility])    Stage 4 chronic kidney disease ([address]    Left bundle branch block    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       Diet:    ADULT DIET; Regular;  No Added Salt (3-4 gm)  ADULT ORAL NUTRITION SUPPLEMENT; Lunch; Standard High Calorie/High Protein Oral Supplement  ADULT ORAL NUTRITION SUPPLEMENT; Breakfast; Fortified Pudding Oral Supplement  ADULT ORAL NUTRITION SUPPLEMENT; Dinner; Frozen Oral Supplement    Meds:     [Held by provider] furosemide  60 mg IntraVENous BID    sertraline  25 mg Oral Daily    amiodarone  200 mg Oral Daily    cetirizine  5 mg Oral Daily    dabigatran  75 mg Oral BID    metoprolol succinate  25 mg Oral Daily    pantoprazole  40 mg Oral QAM AC    spironolactone  25 mg Oral Daily    Vitamin D  1,000 Units Oral Daily    sodium chloride flush  5-40 mL IntraVENous 2 times per day        sodium chloride         Meds prn:     polyvinyl alcohol, melatonin, ALPRAZolam, sodium chloride flush, sodium chloride, polyethylene glycol, diphenhydrAMINE, baclofen, ondansetron **OR** ondansetron    Meds prior to admission:     No current facility-administered medications on file prior to encounter. Current Outpatient Medications on File Prior to Encounter   Medication Sig Dispense Refill    dabigatran (PRADAXA) 75 MG capsule Take 1 capsule by mouth 2 times daily 60 capsule 0    metoprolol succinate (TOPROL XL) 25 MG extended release tablet Take 1 tablet by mouth daily 30 tablet 3    furosemide (LASIX) 20 MG tablet Take 1 tablet by mouth daily 60 tablet 3    spironolactone (ALDACTONE) 25 MG tablet Take 1 tablet by mouth daily 30 tablet 3    amiodarone (CORDARONE) 200 MG tablet Take 1 tablet by mouth daily 90 tablet 1    hydrOXYzine HCl (ATARAX) 10 MG tablet Take 1 tablet by mouth every 8 hours as needed for Itching or Anxiety (Patient not taking: Reported on 2/10/2023) 90 tablet 1    cetirizine (ZYRTEC ALLERGY) 10 MG tablet Take 0.5 tablets by mouth daily 30 tablet 0    fluticasone (FLONASE) 50 MCG/ACT nasal spray 1-2 sprays, each nostril daily as needed for nasal congestion.  16 g 0    aspirin 81 MG EC tablet Take 81 mg by mouth daily      baclofen (LIORESAL) 10 MG tablet Take 1 tablet by mouth nightly 90 tablet 1    omeprazole (PRILOSEC) 40 MG delayed release capsule Take 40 mg by mouth daily (Patient not taking: Reported on 2/10/2023)      triamcinolone (KENALOG) 0.1 % cream Apply topically 3 times daily as needed (rash)   1    loperamide (IMODIUM) 2 MG capsule Take 2 mg by mouth 4 times daily as needed for Diarrhea      diphenhydrAMINE (BENADRYL) 25 MG tablet Take 25 mg by mouth every 6 hours as needed for Itching      vitamin D (CHOLECALCIFEROL) 1000 UNIT TABS tablet Take 1,000 Units by mouth daily         Allergies:    Bentyl [dicyclomine], Adhesive tape, Atacand [candesartan], Cefdinir, Demerol, Digoxin, Imdur [isosorbide mononitrate], Losartan potassium, Sulfa antibiotics, Xarelto [rivaroxaban], Lovenox [enoxaparin sodium], Acetaminophen, Apap-caff-dihydrocodeine, Coreg [carvedilol], Cozaar [losartan], Diovan [valsartan], Effexor [venlafaxine hydrochloride], Eliquis [apixaban], Labetalol, Lisinopril, and Ramipril    Social History:     reports that she has never smoked. She has never used smokeless tobacco. She reports current alcohol use. She reports that she does not use drugs.     Family History:         Problem Relation Age of Onset    Heart Attack Mother     Stroke Mother     Heart Attack Father     Heart Failure Father     Heart Disease Father     Anxiety Disorder Sister     Depression Sister     Crohn's Disease Son        Physical Exam:    Patient Vitals for the past 24 hrs:   BP Temp Temp src Pulse Resp SpO2   02/18/23 0400 90/60 97.6 °F (36.4 °C) Oral 73 20 99 %   02/17/23 2111 83/69 97.6 °F (36.4 °C) Oral 76 20 97 %   02/17/23 1535 100/78 97.8 °F (36.6 °C) Oral 72 16 100 %   02/17/23 1008 95/64 -- -- 86 -- --           Intake/Output Summary (Last 24 hours) at 2/18/2023 0827  Last data filed at 2/17/2023 1535  Gross per 24 hour   Intake 5 ml   Output 325 ml   Net -320 ml       General: Awake, alert, no acute distress  Neck: No JVD noted  Lungs: Clear bilaterally upper, diminished to the bases bilaterally. Unlabored  CV: Irregular. No rub  Abd: Soft, nontender, nondistended. Active bowel sounds  Skin: Warm and dry. No rash on exposed extremities  Ext: 1+ BLE edema   Neuro: Awake, answers questions appropriately    Data:    Recent Labs     02/16/23  0515 02/17/23  0536   WBC 8.8 7.8   HGB 14.7 14.7   HCT 47.2 46.8   MCV 86.8 87.0    215         Recent Labs     02/16/23  0515 02/17/23  0536 02/18/23  0611   * 152* 156*   K 3.6  3.6 3.6 4.2    110* 116*   CO2 31* 29 30*   CREATININE 2.1* 1.8* 1.7*   BUN 74* 66* 60*   LABGLOM 24 28 31   GLUCOSE 113* 119* 121*   CALCIUM 9.4 9.5 9.6   PHOS 2.6 3.0 3.1   MG 2.6 2.7* 2.7*         Vit D, 25-Hydroxy   Date Value Ref Range Status   02/11/2023 30 30 - 100 ng/mL Final     Comment:     <20 ng/mL. ........... Loreatha Press Deficient  20-30 ng/mL. ......... Loreatha Press Insufficient   ng/mL. ........ Loreatha Press Sufficient  >100 ng/mL. .......... Loreatha Press Toxic         PTH   Date Value Ref Range Status   02/11/2023 120 (H) 15 - 65 pg/mL Final       Recent Labs     02/16/23  0515 02/17/23  0536 02/18/23  0611   * 106* 86*   AST 38* 32* 32*   ALKPHOS 132* 127* 139*   BILITOT 1.0 1.0 0.8         Recent Labs     02/16/23  0515 02/17/23  0536 02/18/23  0611   LABALBU 3.1* 2.8* 2.9*         No results found for: FERRITIN, IRON, TIBC    Vitamin B-12   Date Value Ref Range Status   02/04/2023 1304 (H) 211 - 946 pg/mL Final       Folate   Date Value Ref Range Status   12/02/2019 14.9 4.8 - 24.2 ng/mL Final       Lab Results   Component Value Date/Time    COLORU Yellow 02/10/2023 02:00 PM    NITRU Negative 02/10/2023 02:00 PM    GLUCOSEU Negative 02/10/2023 02:00 PM    KETUA Negative 02/10/2023 02:00 PM    UROBILINOGEN 0.2 02/10/2023 02:00 PM    BILIRUBINUR Negative 02/10/2023 02:00 PM    BILIRUBINUR negative 07/08/2022 02:22 PM       Lab Results   Component Value Date/Time    OSMOU 206 (L) 12/19/2022 02:00 PM       No components found for: URIC    No results found for: LIPIDPAN    Assessment and Plans:    NIKA stage II on CKD 3B  Likely decreased effective renal perfusion in the setting of decreased oral intake, CHF/cardiorenal syndrome, atrial fibrillation; exacerbated by diuretics  history of left partial nephrectomy for left renal mass with Dr. Rupa Barreto on 11/16/2021  CT abd pelvis 2/9 nonobstructing 2 mm right renal calculus, partial left nephrectomy  Baseline creatinine 1.3-1.7  Creatinine peaked at 2.6 on 2/10--> 1.7 today  Avoid nephrotoxins/NSAIDs  Strict I&O, daily weights  On spironolactone   Lasix 60 mg IV twice daily-currently on hold per Cardiology  S/p 3 doses of Diuril  Monitor labs closely with diuresis    2. Cardiomyopathy/CHF  Echo 12/2022 EF 25%, mod aortic regurg, mod mitral regurg, mod tricuspid regurg, pulm hypertension severe  proBNP 21,949--> 10,587  Strict I&O, daily weights  On spironolactone   S/p dobutamine drip   For transfer to [facility] for severe mitral regurg  Monitor volume status  Cardiology following    3. High anion gap metabolic acidosis -> now with alkalosis   Likely in the setting of NIKA/CKD  Anion gap 20 and CO2 18 on 2/10--> CO2 30 today  S/p sodium bicarbonate   Monitor labs    4. Atrial fibrillation  S/p cardioversion 2/3  EP following    5. Shortness of breath   CXR 2/9 pulmonary vascular congestion  CT chest 2/9 subtle groundglass opacities in the right lower lobe, stable groundglass nodule left upper lobe, stable nodule located in right upper lobe  Defer management to primary    6. History of left renal mass  S/p left partial nephrectomy for left renal mass with Dr. Rupa Barreto on 11/16/2021    7. Hypotension  Monitor BPs     8. Hypokalemia   in the setting of diuresis  K+ 4.2 today  Supplement potassium as needed  Monitor labs    9.   Hypernatremia  Sodium 151 on 2/15--> 156 today  Lasix on hold  Will trial D5 @ 50 ml/hr until am   Monitor labs    UYEN Seymour - CNP  Pt seen and examined agree with above  Chart reviewed  Start low dose d5 with hi na and little po intake  Diuretics on hold  Nighat Denny MD

## 2023-02-18 NOTE — PROGRESS NOTES
[facility] provided room number  for the patient at [facility] (J62 Bed 5). Awaiting for transportation pick-up time. Nurse updated the family.

## 2023-02-18 NOTE — PROGRESS NOTES
Hospitalist Progress Note            Patient: Brian Cavanaugh Age: 68 y.o.   DOA: 2/9/2023 Admit Dx / CC: Heart failure ([address] [I50.9]   LOS:  LOS: 9 days      Assessment/ Plan:     Patient is 51-year-old with history of atrial fibrillation status post Watchman procedure, nonischemic cardiomyopathy, hypertension, CKD, renal cell carcinoma status post left nephrectomy, mage syndrome, TIA allergic to Eliquis and Xarelto and on Pradaxa for anticoagulation, history of PE who comes into the hospital with increased shortness of breath and hypotension. Patient was noted to have rapid A-fib. She apparently has failed multiple cardioversions before. Also noted to have severe mitral regurgitation and recommendation for MitraClip placement and plan is for [facility] transfer. Patient also seen by nephrology for acute on chronic kidney injury. Acute on chronic congestive heart failure, systolic ejection fraction 25%  Chronically Elevated troponin likely demand ischemia  Concern for cardiorenal syndrome. Noted to have moderate aortic regurgitation, moderate MR, moderate TR and severe pulmonary hypertension. IV diuresis currently held due to kidney function which is slowly improving. On ladactone  Cardiology following    Acute (resolved) on chronic renal failure stage IIIb  Baseline serum creatinine 1.9-2.2   Nephrology following     Hypernatremia  continue to monitor off of diuresis  S/p few doses of chlorthalidone  Encourage PO intake  Mx per nephrology    Persistent atrial fibrillation on Pradaxa  Severe MR  failed DCCV 2/3/2023--s/p Watchman 2020  on metoprolol and amiodarone  Electrophysiology following, awaiting transfer to [facility] clinic for severe MR and possible MitraClip    Hypotension, currently off dobutamine drip    Anxiety/depression   input from psychiatry appreciated, patient does not have full medical decision-making capacity, Zoloft was started.      History of renal cell carcinoma status post left nephrectomy     Abnormal LFTs suspected 2/2 hepatic congestion  monitor     Hypokalemia, replaced    Meige syndrome    Hx/o TIA    Physical deconditioning  PT/OT    Short term prognosis guarded, long term likely poor    DVT ppx:  Pradaxa  Code status: Full code    Awaiting transfer to [place] of care discussed with: patient, her son bedside and bedside RN    Patient Active Problem List   Diagnosis    Anxiety    NICM (nonischemic cardiomyopathy) ([facility]    Nonrheumatic mitral valve regurgitation    Lumbar radiculopathy    Cervical radiculopathy    HTN (hypertension), benign    Left atrial thrombus    Paroxysmal atrial fibrillation (HCC)    Chronic HFrEF (heart failure with reduced ejection fraction) ([facility] [address]    Encounter for long-term (current) use of high-risk medication    Persistent atrial fibrillation ([facility] [address]    Encounter for medication refill    Itching    Chronic anticoagulation    Presence of Watchman left atrial appendage closure device    Renal mass    Renal cell carcinoma of left kidney (HCC)    Chronic renal disease, stage III ([facility] [address] [[id number]]    Multiple subsegmental pulmonary emboli without acute cor pulmonale (HCC)    Acute on chronic congestive heart failure (HCC)    Vertigo    Dizziness    HFrEF (heart failure with reduced ejection fraction) ([facility])    Stage 4 chronic kidney disease ([facility] [address]    Left bundle branch block    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        Medications:  Reviewed    Infusion Medications    dextrose 50 mL/hr at 02/18/23 1242    sodium chloride       Scheduled Medications    polyethylene glycol  17 g Oral BID    sennosides-docusate sodium  2 tablet Oral QPM    melatonin  5 mg Oral Nightly    [Held by provider] furosemide  60 mg IntraVENous BID    sertraline  25 mg Oral Daily    amiodarone  200 mg Oral Daily    cetirizine  5 mg Oral Daily    dabigatran  75 mg Oral BID    metoprolol succinate  25 mg Oral Daily    pantoprazole  40 mg Oral QAM AC    spironolactone  25 mg Oral Daily    Vitamin D  1,000 Units Oral Daily    sodium chloride flush  5-40 mL IntraVENous 2 times per day     PRN Meds: sennosides-docusate sodium, melatonin, polyvinyl alcohol, ALPRAZolam, sodium chloride flush, sodium chloride, diphenhydrAMINE, baclofen, ondansetron **OR** ondansetron    I/O    Intake/Output Summary (Last 24 hours) at 2/18/2023 1327  Last data filed at 2/18/2023 1029  Gross per 24 hour   Intake 180 ml   Output 325 ml   Net -145 ml       Labs:   Recent Labs     02/16/23  0515 02/17/23  0536   WBC 8.8 7.8   HGB 14.7 14.7   HCT 47.2 46.8    215       Recent Labs     02/16/23  0515 02/17/23  0536 02/18/23  0611   * 152* 156*   K 3.6  3.6 3.6 4.2    110* 116*   CO2 31* 29 30*   BUN 74* 66* 60*   CREATININE 2.1* 1.8* 1.7*   CALCIUM 9.4 9.5 9.6   PHOS 2.6 3.0 3.1       Recent Labs     02/16/23  0515 02/17/23  0536 02/18/23  0611   PROT 6.3* 6.4 6.6   ALKPHOS 132* 127* 139*   * 106* 86*   AST 38* 32* 32*   BILITOT 1.0 1.0 0.8       No results for input(s): INR in the last 72 hours. No results for input(s): Michael Tracy in the last 72 hours. Chronic labs:  Lab Results   Component Value Date    CHOL 151 12/18/2022    TRIG 68 12/18/2022    HDL 55 12/18/2022    LDLCALC 82 12/18/2022    TSH 0.666 02/04/2023    INR 2.2 02/09/2023    LABA1C 5.7 (H) 02/01/2022       Radiology:  Imaging studies reviewed today. Subjective:     Eva Guard c/o feeling weak. Has ensure with apple sauce earlier and 1 hour later she vomited per her son.  Instructed to not take meds on empty stomach and family can bring food she likes from home    Objective:     Physical Exam:   /60   Pulse 72   Temp 97.9 °F (36.6 °C) (Axillary)   Resp 18   Ht 5' 7\" (1.702 m)   Wt 151 lb 11.2 oz (68.8 kg)   SpO2 99%   BMI 23.76 kg/m²     General appearance:in no distress, up in bed  Lungs: Clear to auscultation bilaterally, no wheezing or crackles   Heart: Regular rate and rhythm, S1, S2 normal   Abdomen: Soft, non-tender and not-distended.  Bowel sounds normal.   Extremities: no edema   Skin: Skin color, texture, turgor normal. No rashes or lesions   Neurologic: Grossly normal and non focal, CN II-XII intact but flat affect      Memo Garcia MD   Hospitalist Service   02/18/23 1:27 PM

## 2023-02-23 ENCOUNTER — HOSPITAL ENCOUNTER (OUTPATIENT)
Dept: OTHER | Age: 78
Setting detail: THERAPIES SERIES
Discharge: HOME | End: 2023-02-23
Payer: MEDICARE

## 2023-02-23 NOTE — PLAN OF CARE
Patient was a no show for today's appointment. According to discharge instructions, she was transferred to [facility].

## 2023-03-01 ENCOUNTER — HOSPITAL ENCOUNTER (OUTPATIENT)
Dept: OTHER | Age: 78
Setting detail: THERAPIES SERIES
Discharge: HOME | End: 2023-03-01

## 2023-03-01 NOTE — PLAN OF CARE
Patient was a no show for today's appointment. I called to reschedule, son states she's in [facility]. I asked that she call when discharged to reschedule.

## 2023-03-14 ENCOUNTER — TELEPHONE (OUTPATIENT)
Dept: FAMILY MEDICINE CLINIC | Age: 78
End: 2023-03-14

## 2023-03-14 NOTE — TELEPHONE ENCOUNTER
Patient's Son, Ru Bhatti, called to inform that patient is in a nursing facility and will be discharging from there later this week. Ru Bhatti informed he will be bringing patient home and  he will be on Hospice til she passes away. Ru Bhatti stated patient has been moving her bowels a lot and asked him to call Dr. Fani Duncan to see if she could prescribe something to help her not go so much. I informed Rurobby Bhatti that since patient is in a nursing facility, he would need to speak to the nurse/staff there and request RX since patient is under their care. Ru Bhatti verbalized understanding and was agreeable. Msg routed, for informational purposes.      Last seen 2/1/2022  Next appt Visit date not found

## 2023-03-16 ENCOUNTER — HOSPITAL ENCOUNTER (OUTPATIENT)
Dept: INFUSION THERAPY | Age: 78
Discharge: HOME | End: 2023-03-16

## 2023-03-16 DIAGNOSIS — Z51.5 HOSPICE CARE PATIENT: Primary | ICD-10-CM

## 2023-03-16 DIAGNOSIS — R09.89 AIR HUNGER: ICD-10-CM

## 2023-03-16 DIAGNOSIS — R52 PAIN: ICD-10-CM

## 2023-03-16 DIAGNOSIS — F41.9 ANXIETY: ICD-10-CM

## 2023-03-16 RX ORDER — LORAZEPAM 0.5 MG/1
0.5 TABLET ORAL EVERY 4 HOURS PRN
Qty: 30 TABLET | Refills: 3 | Status: SHIPPED | OUTPATIENT
Start: 2023-03-16 | End: 2023-04-15

## 2023-03-16 RX ORDER — HYDROXYZINE PAMOATE 25 MG/1
25 CAPSULE ORAL 3 TIMES DAILY PRN
Qty: 30 CAPSULE | Refills: 4 | Status: SHIPPED | OUTPATIENT
Start: 2023-03-16 | End: 2023-05-15

## 2023-03-16 RX ORDER — MORPHINE SULFATE 100 MG/5ML
2.5 SOLUTION ORAL EVERY 4 HOURS PRN
Qty: 30 ML | Refills: 0 | Status: SHIPPED | OUTPATIENT
Start: 2023-03-16 | End: 2023-04-15

## 2023-03-17 ENCOUNTER — CARE COORDINATION (OUTPATIENT)
Dept: CARE COORDINATION | Age: 78
End: 2023-03-17

## 2023-03-17 NOTE — CARE COORDINATION
Spoke with Florian Carlson at [facility] who confirmed patient is active with their services. Will notify CTN at this time.

## 2024-10-21 NOTE — PROGRESS NOTES
Chief Complaint   Patient presents with    Follow-Up from Hospital     a-fib    Fatigue       HPI:  Patient presents today for  Follow up from hospital. Patient was admitted 11/20/18-11/22/18. She was admitted for left arm pain and was found to be in new onset a-fib. During a JAIME she was found to have a clot to her left atrial appendage. She is tolerating the eliquis well. She does occasionally get itchy with the medication. She reports that the atarax does help her with that. She also feels as though she is a little more tired than normal.     She reports that she found out that she was told that she had osteoarthritis in her shoulder. She does swim on a regular basis. She does this a couple of times per week for an hour and a half. Reports that she has not returned to the pool - her arm is without pain at this time. She takes maxalt for migraines. She was told in the hospital to no longer take the maxalt. She is questioning what she is able to take. She is requesting a flu vaccine today. She is questioning weather or not she is able to drive. She did drive here to the office today and was not told that she couldn't drive. Prior to Visit Medications    Medication Sig Taking?  Authorizing Provider   hydrOXYzine (ATARAX) 10 MG tablet Take 1 tablet by mouth 3 times daily as needed for Itching or Anxiety Yes Madi Washington MD   apixaban (ELIQUIS) 5 MG TABS tablet Take 1 tablet by mouth 2 times daily Yes Madi Washington MD   COREG CR 10 MG CP24 extended release capsule Take 1 capsule by mouth daily Yes Wilton Romano MD   hydrALAZINE (APRESOLINE) 10 MG tablet Take 1 tablet by mouth 3 times daily Yes Nato Welch MD   spironolactone (ALDACTONE) 25 MG tablet Take 1 tablet by mouth daily Yes Nato Welch MD   vitamin D (CHOLECALCIFEROL) 1000 UNIT TABS tablet Take 1,000 Units by mouth daily Yes Historical Provider, MD VERASMERIC PO Take 1 capsule by mouth daily Yes Historical Provider, MD
Neuro

## 2024-11-21 NOTE — TELEPHONE ENCOUNTER
LMOM for patient to contact office to schedule hospital follow up appointment. LDL goal <70 due to DM  Pt LDL NA  Order fasting lipid if not recently done  Statin as per primary team. Not getting statin at this time   Manage per primary team   F/u levels as out pt

## (undated) DEVICE — INSUFFLATION NEEDLE TO ESTABLISH PNEUMOPERITONEUM.: Brand: INSUFFLATION NEEDLE

## (undated) DEVICE — SET INST DAVINCI ACCESSORIES

## (undated) DEVICE — SURGICAL PROCEDURE PACK BRONCH

## (undated) DEVICE — SOLUTION IV 1000ML 0.9% SOD CHL PH 5 INJ USP VIAFLX PLAS

## (undated) DEVICE — MARYLAND BIPOLAR FORCEPS: Brand: ENDOWRIST

## (undated) DEVICE — ECHOTIP PROCORE, ENDOBRONCHIAL HD ULTRASOUND BIOPSY NEEDLE FOR OLYMPUS SCOPES: Brand: ECHOTIP PROCORE

## (undated) DEVICE — Device

## (undated) DEVICE — LOOP VES W25MM THK1MM MAXI RED SIL FLD REPELLENT 100 PER

## (undated) DEVICE — TIBURON GENERAL ENDOSCOPY DRAPE: Brand: CONVERTORS

## (undated) DEVICE — ANCHOR TISSUE RETRIEVAL SYSTEM, BAG SIZE 175 ML, PORT SIZE 10 MM: Brand: ANCHOR TISSUE RETRIEVAL SYSTEM

## (undated) DEVICE — PAD BD CONVOLUTED FOAM

## (undated) DEVICE — BLADELESS OBTURATOR: Brand: WECK VISTA

## (undated) DEVICE — TOTAL TRAY, DB, 100% SILI FOLEY, 16FR 10: Brand: MEDLINE

## (undated) DEVICE — 3M™ IOBAN™ 2 ANTIMICROBIAL INCISE DRAPE 6650EZ: Brand: IOBAN™ 2

## (undated) DEVICE — TIP COVER ACCESSORY

## (undated) DEVICE — ELECTRO LUBE IS A SINGLE PATIENT USE DEVICE THAT IS INTENDED TO BE USED ON ELECTROSURGICAL ELECTRODES TO REDUCE STICKING.: Brand: KEY SURGICAL ELECTRO LUBE

## (undated) DEVICE — SOLUTION IRRIG 500ML 0.9% SOD CHL USP POUR PLAS BTL

## (undated) DEVICE — TROCAR: Brand: KII FIOS FIRST ENTRY

## (undated) DEVICE — TRI-LUMEN FILTERED TUBE SET WITH ACTIVATED CHARCOAL FILTER: Brand: AIRSEAL

## (undated) DEVICE — SINGLE USE BIOPSY VALVE MAJ-210: Brand: SINGLE USE BIOPSY VALVE (STERILE)

## (undated) DEVICE — COLUMN DRAPE

## (undated) DEVICE — PUMP SUC IRR TBNG L10FT W/ HNDPC ASSEMB STRYKEFLOW 2

## (undated) DEVICE — SPONGE,DRAIN,NONWVN,4"X4",6PLY,STRL,LF: Brand: MEDLINE

## (undated) DEVICE — SCOPE DAVINCI XI 30 DEG W/CORD

## (undated) DEVICE — SET INST DAVINCI HEMLOCK APPLIERS

## (undated) DEVICE — SINGLE USE SUCTION VALVE MAJ-209: Brand: SINGLE USE SUCTION VALVE (STERILE)

## (undated) DEVICE — ROBOTIC: Brand: MEDLINE INDUSTRIES, INC.

## (undated) DEVICE — GLOVE ORANGE PI 8   MSG9080

## (undated) DEVICE — TUBING, SUCTION, 3/16" X 12', STRAIGHT: Brand: MEDLINE

## (undated) DEVICE — CLIP INT M L POLYMER LOK LIG HEM O LOK

## (undated) DEVICE — AIRSEAL 12MM ACCESS PORT AND OBTURATOR WITH BLADELESS OPTICAL TIP, 150MM LENGTH: Brand: AIRSEAL

## (undated) DEVICE — SET INST DAVINCI LAP

## (undated) DEVICE — Z DUPLICATE USE 2517136 APPLICATOR LAP 45 CM 2 FLX VISTASEAL

## (undated) DEVICE — GOWN,SIRUS,FABRNF,2XL,18/CS: Brand: MEDLINE

## (undated) DEVICE — 1.5L THIN WALL CAN: Brand: CRD

## (undated) DEVICE — CLIP INT XL YEL POLYMER HEM-O-LOK WECK

## (undated) DEVICE — KIT,ANTI FOG,W/SPONGE & FLUID,SOFT PACK: Brand: MEDLINE

## (undated) DEVICE — ARM DRAPE

## (undated) DEVICE — SET SURG INSTR GENITOURINARY STGU1

## (undated) DEVICE — GLOVE SURG SZ 75 L12IN FNGR THK94MIL TRNSLUC YEL LTX

## (undated) DEVICE — CANNULA SEAL

## (undated) DEVICE — AGENT HEMSTAT W2XL4IN OXIDIZED REGENERATED CELOS ABSRB

## (undated) DEVICE — SET SURG INSTR GENITOURINARY STGU2

## (undated) DEVICE — ADAPTER TBNG DIA15MM SWVL FBROPT BRONCHSCP TERM 2 AXIS PEEP

## (undated) DEVICE — AGENT HEMSTAT W4XL8IN OXIDIZED REGENERATED CELOS ABSRB

## (undated) DEVICE — DRAIN INCISION 15FR SILICONE HUBLESS

## (undated) DEVICE — PROGRASP FORCEPS: Brand: ENDOWRIST

## (undated) DEVICE — CLIP INT L POLYMER LOK LIG HEM O LOK

## (undated) DEVICE — MEGA NEEDLE DRIVER: Brand: ENDOWRIST

## (undated) DEVICE — SCISSORS SURG DIA8MM MPLR CRV ENDOWRIST

## (undated) DEVICE — LARGE NEEDLE DRIVER: Brand: ENDOWRIST